# Patient Record
Sex: FEMALE | Race: WHITE | NOT HISPANIC OR LATINO | Employment: FULL TIME | URBAN - METROPOLITAN AREA
[De-identification: names, ages, dates, MRNs, and addresses within clinical notes are randomized per-mention and may not be internally consistent; named-entity substitution may affect disease eponyms.]

---

## 2022-03-17 ENCOUNTER — TELEPHONE (OUTPATIENT)
Dept: GYNECOLOGIC ONCOLOGY | Facility: CLINIC | Age: 32
End: 2022-03-17

## 2022-03-17 NOTE — TELEPHONE ENCOUNTER
Intake Form   Patient Details   Renard Mosqueda     1990     Reason For Appointment   Who is Calling? Patient   If not patient, Name? Who is the Referring Doctor? Jamshid Valera   What is the diagnosis? Stage 1 cervical cancer   Has this diagnosis been confirmed by a biopsy/surgery? If yes, what is the date it was done? yes   Biopsy done at [place] [address]? If not, where was it done? no   Was imaging done, and was it done at [facility]? If not, where was it done? no   For 2nd Opinions Only: Are you currently undergoing treatment, or are you scheduled to start treatment? If yes, name of facility, city and state     For "History Of" only: Have you completed treatment? Have you had Genetic Testing done in the past?  If so, advise to bring test results to their visit no   Record Gathering Information   Did you advise to have records faxed to [phone]? yes   Did you advise to bring discs to office visit? yes   Scheduling Information   What is the best call back number? [phone]   What Insurance does patient have & is an insurance referral required [address]  referral is required      If patient is NEW to [facility] a new patient packet (Titled Breast/Gyn) Yes   Miscellaneous Information

## 2022-03-17 NOTE — TELEPHONE ENCOUNTER
Patient schedule for Thursday, 3/24/22 with Dr Caprice East at [facility]  She notes Dr Leyla Tilley recently performed LEEP

## 2022-03-17 NOTE — TELEPHONE ENCOUNTER
Intake received  Insurance education outreach to follow    03/22/22  Per RTE  Pt has an active [employer] that runs on a mine year  Effective 06/01/18   Plan has in & out of network benefits  Deduct $200 met -0-  Out of pocket $2000 met $50

## 2022-03-24 ENCOUNTER — CONSULT (OUTPATIENT)
Dept: GYNECOLOGIC ONCOLOGY | Facility: HOSPITAL | Age: 32
End: 2022-03-24
Payer: COMMERCIAL

## 2022-03-24 VITALS
DIASTOLIC BLOOD PRESSURE: 82 MMHG | SYSTOLIC BLOOD PRESSURE: 124 MMHG | TEMPERATURE: 99.1 F | WEIGHT: 253 LBS | HEIGHT: 69 IN | BODY MASS INDEX: 37.47 KG/M2

## 2022-03-24 DIAGNOSIS — C53.8 MALIGNANT NEOPLASM OF OVERLAPPING SITES OF CERVIX (HCC): Primary | ICD-10-CM

## 2022-03-24 DIAGNOSIS — Z80.41 FAMILY HISTORY OF OVARIAN CANCER: ICD-10-CM

## 2022-03-24 PROCEDURE — 99205 OFFICE O/P NEW HI 60 MIN: CPT | Performed by: OBSTETRICS & GYNECOLOGY

## 2022-03-24 RX ORDER — CEFAZOLIN SODIUM 2 G/50ML
2000 SOLUTION INTRAVENOUS ONCE
Status: CANCELLED | OUTPATIENT
Start: 2022-03-24 | End: 2022-03-24

## 2022-03-24 RX ORDER — GABAPENTIN 100 MG/1
200 CAPSULE ORAL ONCE
Status: CANCELLED | OUTPATIENT
Start: 2022-03-24 | End: 2022-03-24

## 2022-03-24 RX ORDER — SODIUM CHLORIDE, SODIUM LACTATE, POTASSIUM CHLORIDE, CALCIUM CHLORIDE 600; 310; 30; 20 MG/100ML; MG/100ML; MG/100ML; MG/100ML
125 INJECTION, SOLUTION INTRAVENOUS CONTINUOUS
Status: CANCELLED | OUTPATIENT
Start: 2022-03-24

## 2022-03-24 RX ORDER — ACETAMINOPHEN 325 MG/1
975 TABLET ORAL ONCE
Status: CANCELLED | OUTPATIENT
Start: 2022-03-24 | End: 2022-03-24

## 2022-03-24 RX ORDER — HEPARIN SODIUM 5000 [USP'U]/ML
5000 INJECTION, SOLUTION INTRAVENOUS; SUBCUTANEOUS
Status: CANCELLED | OUTPATIENT
Start: 2022-03-24 | End: 2022-03-25

## 2022-03-24 NOTE — ASSESSMENT & PLAN NOTE
61-year-old  0 with a likely stage I B1 squamous cell carcinoma of the cervix  She is status post LEEP procedure on 3/7/2022  Pathology was reviewed at [facility]  Her performance status is 0     1  We discussed the pathophysiology of squamous cell carcinomas of the cervix  2  We discussed treatment options including radical trachelectomy with lymph node dissection verses radical hysterectomy and lymph node dissection  She is debating future fertility options  3  Preoperative MRI to assess tumor size  Clinical tumor size is less than 2 cm  No parametrial disease  4  I discussed the risks and benefits of radical abdominal hysterectomy, bilateral salpingectomy, pelvic lymph node dissection, all other indicated procedures  She understands the risks and benefits of the operation including the additional risks of bladder injury and dysfunction and she agrees to proceed as outlined  Consent for surgery was obtained by me in the office  5  In the event that she decides to have a radical trachelectomy, she will be referred to a center for this procedure  Thank you for the courtesy of this consultation  All questions were answered by the end of the visit

## 2022-03-24 NOTE — PROGRESS NOTES
Assessment/Plan:    Problem List Items Addressed This Visit        Genitourinary    Malignant neoplasm of overlapping sites of cervix ([facility] [address] ) - Primary     32year-old  0 with a likely stage I B1 squamous cell carcinoma of the cervix  She is status post LEEP procedure on 3/7/2022  Pathology was reviewed at [facility]  Her performance status is 0     1  We discussed the pathophysiology of squamous cell carcinomas of the cervix  2  We discussed treatment options including radical trachelectomy with lymph node dissection verses radical hysterectomy and lymph node dissection  She is debating future fertility options  3  Preoperative MRI to assess tumor size  Clinical tumor size is less than 2 cm  No parametrial disease  4  I discussed the risks and benefits of radical abdominal hysterectomy, bilateral salpingectomy, pelvic lymph node dissection, all other indicated procedures  She understands the risks and benefits of the operation including the additional risks of bladder injury and dysfunction and she agrees to proceed as outlined  Consent for surgery was obtained by me in the office  5  In the event that she decides to have a radical trachelectomy, she will be referred to a center for this procedure  Thank you for the courtesy of this consultation  All questions were answered by the end of the visit           Relevant Orders    MRI pelvis female wo and w contrast    Case request operating room: RADICAL HYSTERECTOMY TOTAL ABDOMINAL (LATONIA), LYMPH NODE DISSECTION (Completed)    Type and screen    Comprehensive metabolic panel    CBC and differential    Protime-INR    HEMOGLOBIN A1C W/ EAG ESTIMATION    XR chest pa & lateral       Other    Family history of ovarian cancer    Relevant Orders    Ambulatory Referral to Oncology Genetics              CHIEF COMPLAINT:  Biopsy-proven squamous cell carcinoma cervix          Patient ID: Blank Wilson is a 32 y o  female  59-year-old  0 presented for 1st Pap smear and then had a LEEP procedure on 3/7/2022  The LEEP procedure revealed moderately differentiated squamous cell carcinoma with lymphovascular space invasion, positive margins  Overall width was greater than 1 cm combined  She is not having abnormal bleeding after LEEP procedure  She has pelvic and vaginal pain  She has a family history of ovarian cancer  She is referred as a consultation from Dr Tim Henry to discuss treatment options  Review of Systems   Constitutional: Positive for fatigue  Gastrointestinal: Positive for abdominal pain and constipation  Genitourinary: Positive for pelvic pain and vaginal pain  Musculoskeletal: Positive for back pain  Hematological: Bruises/bleeds easily  Psychiatric/Behavioral: The patient is nervous/anxious  All other systems reviewed and are negative  Current Outpatient Medications   Medication Sig Dispense Refill    Psyllium (METAMUCIL PO)        No current facility-administered medications for this visit  No Known Allergies    Past Medical History:   Diagnosis Date    Anxiety     Depression     Obesity        Past Surgical History:   Procedure Laterality Date    CERVICAL BIOPSY  W/ LOOP ELECTRODE EXCISION         OB History        0    Para   0    Term   0       0    AB   0    Living   0       SAB   0    IAB   0    Ectopic   0    Multiple   0    Live Births   0                 Family History   Problem Relation Age of Onset    Ovarian cancer Maternal Aunt     Ovarian cancer Maternal Grandmother        The following portions of the patient's history were reviewed and updated as appropriate: allergies, current medications, past family history, past medical history, past social history, past surgical history and problem list       Objective:    Blood pressure 124/82, temperature 99 1 °F (37 3 °C), temperature source Tympanic, height 5' 9" (1 753 m), weight 115 kg (253 lb)    Body mass index is 37 36 kg/m²  Physical Exam  Vitals reviewed  Exam conducted with a chaperone present  Constitutional:       General: She is not in acute distress  Appearance: Normal appearance  She is well-developed  She is obese  She is not ill-appearing, toxic-appearing or diaphoretic  HENT:      Head: Normocephalic and atraumatic  Eyes:      General: No scleral icterus  Right eye: No discharge  Left eye: No discharge  Extraocular Movements: Extraocular movements intact  Neck:      Thyroid: No thyromegaly  Cardiovascular:      Rate and Rhythm: Normal rate and regular rhythm  Heart sounds: Normal heart sounds  No murmur heard  No friction rub  No gallop  Pulmonary:      Effort: Pulmonary effort is normal  No respiratory distress  Breath sounds: Normal breath sounds  No stridor  No wheezing or rhonchi  Abdominal:      General: There is no distension  Palpations: Abdomen is soft  There is no mass  Tenderness: There is no abdominal tenderness  There is no guarding or rebound  Hernia: No hernia is present  Genitourinary:     Comments: The external female genitalia is normal  The bartholin's, uretheral and skenes glands are normal  The urethral meatus is normal (midline with no lesions)  Anus without fissure or lesion  Speculum exam reveals vagina without lesion or discharge  Cervix is normal appearing post LEEP without visible lesions  No bleeding  No significant cystocele or rectocele noted  Bimanual exam notes a uterus with normal contour, mobility  The cervix is approximately 3 cm in diameter  It is not barrel shaped  No tenderness  Adnexa without masses or tenderness  Bladder is without fullness, mass or tenderness  No parametrial disease  Musculoskeletal:      Cervical back: Normal range of motion and neck supple  No rigidity  Right lower leg: No edema  Left lower leg: No edema  Lymphadenopathy:      Cervical: No cervical adenopathy     Skin: General: Skin is warm and dry  Coloration: Skin is not jaundiced or pale  Findings: No bruising or erythema  Neurological:      General: No focal deficit present  Mental Status: She is alert and oriented to person, place, and time  Cranial Nerves: No cranial nerve deficit  Motor: No weakness  Gait: Gait normal    Psychiatric:         Mood and Affect: Mood normal          Behavior: Behavior normal          Thought Content:  Thought content normal          Judgment: Judgment normal

## 2022-03-24 NOTE — LETTER
2022     Bal[place] Rose, [address] [place] [address]    Patient: Astrid Kendall   YOB: 1990   Date of Visit: 3/24/2022       Dear Dr Marsh Lung: Thank you for referring Astrid Kendall to me for evaluation  Below are my notes for this consultation  If you have questions, please do not hesitate to call me  I look forward to following your patient along with you  Sincerely,        Yesi Elliott MD        CC: No Recipients  Yesi Elliott MD  3/24/2022 12:19 PM  Sign when Signing Visit  Assessment/Plan:    Problem List Items Addressed This Visit        Genitourinary    Malignant neoplasm of overlapping sites of cervix [facility]) - Primary     32year-old  0 with a likely stage I B1 squamous cell carcinoma of the cervix  She is status post LEEP procedure on 3/7/2022  Pathology was reviewed at [facility]  Her performance status is 0     1  We discussed the pathophysiology of squamous cell carcinomas of the cervix  2  We discussed treatment options including radical trachelectomy with lymph node dissection verses radical hysterectomy and lymph node dissection  She is debating future fertility options  3  Preoperative MRI to assess tumor size  Clinical tumor size is less than 2 cm  No parametrial disease  4  I discussed the risks and benefits of radical abdominal hysterectomy, bilateral salpingectomy, pelvic lymph node dissection, all other indicated procedures  She understands the risks and benefits of the operation including the additional risks of bladder injury and dysfunction and she agrees to proceed as outlined  Consent for surgery was obtained by me in the office  5  In the event that she decides to have a radical trachelectomy, she will be referred to a center for this procedure  Thank you for the courtesy of this consultation  All questions were answered by the end of the visit           Relevant Orders    MRI pelvis female wo and w contrast    Case request operating room: RADICAL HYSTERECTOMY TOTAL ABDOMINAL (LATONIA), LYMPH NODE DISSECTION (Completed)    Type and screen    Comprehensive metabolic panel    CBC and differential    Protime-INR    HEMOGLOBIN A1C W/ EAG ESTIMATION    XR chest pa & lateral       Other    Family history of ovarian cancer    Relevant Orders    Ambulatory Referral to Oncology Genetics              CHIEF COMPLAINT:  Biopsy-proven squamous cell carcinoma cervix          Patient ID: Roselyn Yoder is a 32 y o  female  35-year-old  0 presented for 1st Pap smear and then had a LEEP procedure on 3/7/2022  The LEEP procedure revealed moderately differentiated squamous cell carcinoma with lymphovascular space invasion, positive margins  Overall width was greater than 1 cm combined  She is not having abnormal bleeding after LEEP procedure  She has pelvic and vaginal pain  She has a family history of ovarian cancer  She is referred as a consultation from Dr Vj Lopez to discuss treatment options  Review of Systems   Constitutional: Positive for fatigue  Gastrointestinal: Positive for abdominal pain and constipation  Genitourinary: Positive for pelvic pain and vaginal pain  Musculoskeletal: Positive for back pain  Hematological: Bruises/bleeds easily  Psychiatric/Behavioral: The patient is nervous/anxious  All other systems reviewed and are negative  Current Outpatient Medications   Medication Sig Dispense Refill    Psyllium (METAMUCIL PO)        No current facility-administered medications for this visit         No Known Allergies    Past Medical History:   Diagnosis Date    Anxiety     Depression     Obesity        Past Surgical History:   Procedure Laterality Date    CERVICAL BIOPSY  W/ LOOP ELECTRODE EXCISION         OB History        0    Para   0    Term   0       0    AB   0    Living   0       SAB   0    IAB   0    Ectopic   0    Multiple   0    Live Births   0                 Family History   Problem Relation Age of Onset    Ovarian cancer Maternal Aunt     Ovarian cancer Maternal Grandmother        The following portions of the patient's history were reviewed and updated as appropriate: allergies, current medications, past family history, past medical history, past social history, past surgical history and problem list       Objective:    Blood pressure 124/82, temperature 99 1 °F (37 3 °C), temperature source Tympanic, height 5' 9" (1 753 m), weight 115 kg (253 lb)  Body mass index is 37 36 kg/m²  Physical Exam  Vitals reviewed  Exam conducted with a chaperone present  Constitutional:       General: She is not in acute distress  Appearance: Normal appearance  She is well-developed  She is obese  She is not ill-appearing, toxic-appearing or diaphoretic  HENT:      Head: Normocephalic and atraumatic  Eyes:      General: No scleral icterus  Right eye: No discharge  Left eye: No discharge  Extraocular Movements: Extraocular movements intact  Neck:      Thyroid: No thyromegaly  Cardiovascular:      Rate and Rhythm: Normal rate and regular rhythm  Heart sounds: Normal heart sounds  No murmur heard  No friction rub  No gallop  Pulmonary:      Effort: Pulmonary effort is normal  No respiratory distress  Breath sounds: Normal breath sounds  No stridor  No wheezing or rhonchi  Abdominal:      General: There is no distension  Palpations: Abdomen is soft  There is no mass  Tenderness: There is no abdominal tenderness  There is no guarding or rebound  Hernia: No hernia is present  Genitourinary:     Comments: The external female genitalia is normal  The bartholin's, uretheral and skenes glands are normal  The urethral meatus is normal (midline with no lesions)  Anus without fissure or lesion  Speculum exam reveals vagina without lesion or discharge   Cervix is normal appearing post LEEP without visible lesions  No bleeding  No significant cystocele or rectocele noted  Bimanual exam notes a uterus with normal contour, mobility  The cervix is approximately 3 cm in diameter  It is not barrel shaped  No tenderness  Adnexa without masses or tenderness  Bladder is without fullness, mass or tenderness  No parametrial disease  Musculoskeletal:      Cervical back: Normal range of motion and neck supple  No rigidity  Right lower leg: No edema  Left lower leg: No edema  Lymphadenopathy:      Cervical: No cervical adenopathy  Skin:     General: Skin is warm and dry  Coloration: Skin is not jaundiced or pale  Findings: No bruising or erythema  Neurological:      General: No focal deficit present  Mental Status: She is alert and oriented to person, place, and time  Cranial Nerves: No cranial nerve deficit  Motor: No weakness  Gait: Gait normal    Psychiatric:         Mood and Affect: Mood normal          Behavior: Behavior normal          Thought Content:  Thought content normal          Judgment: Judgment normal

## 2022-03-31 ENCOUNTER — TELEPHONE (OUTPATIENT)
Dept: GENETICS | Facility: CLINIC | Age: 32
End: 2022-03-31

## 2022-03-31 NOTE — TELEPHONE ENCOUNTER
I called Wisam Gastelum to schedule a new patient appointment with the Cancer [facility]etics [facility]       Outcome:   I left a voice message encouraging the patient to call the genetics team at [phone] to schedule this appointment  Follow-up:   At this time the referral will be closed and we will wait to hear back from the patient regarding scheduling this appointment

## 2022-04-04 ENCOUNTER — TELEPHONE (OUTPATIENT)
Dept: GYNECOLOGIC ONCOLOGY | Facility: CLINIC | Age: 32
End: 2022-04-04

## 2022-04-04 NOTE — TELEPHONE ENCOUNTER
Patient had MRI done last Friday at [facility] and has results back  Would like to know if she should have more testing done

## 2022-04-12 ENCOUNTER — TELEPHONE (OUTPATIENT)
Dept: GYNECOLOGIC ONCOLOGY | Facility: CLINIC | Age: 32
End: 2022-04-12

## 2022-04-12 NOTE — TELEPHONE ENCOUNTER
Phone call to patient to see if she wanted to reschedule her post-op appointment that was cancelled via [address]  States that she is going for a second opinion at [facility] and she's not sure what she wants to do  She does not want to cancel her surgery, but if there is any way they can try to spare her fertility she would like to try  Patient becomes very tearful, let her know that this is not a problem at all and she can let us know how she would like to proceed when she is ready  Has no questions at this time

## 2022-04-13 ENCOUNTER — TELEPHONE (OUTPATIENT)
Dept: GYNECOLOGIC ONCOLOGY | Facility: CLINIC | Age: 32
End: 2022-04-13

## 2022-04-13 NOTE — TELEPHONE ENCOUNTER
Call placed to patient  Highly considering radical trachelectomy  Will reach out to [facility]  Also, has 2nd opinion with [facility] on 4/21/22  Patient will call the office after her 4/21 appt to confirm keeping 5/6 surgery vs  cancelling      ----- Message from Maximus Escalante sent at 4/13/2022  9:15 AM EDT -----  Regarding: FW: Refer    ----- Message -----  From: Ronie Hammans  Sent: 4/13/2022   4:36 AM EDT  To: [facility] [place] [facility]  Subject: Refer                                            Can you refer me to [facility] for radical trachelectomy  Im sorry but Im getting cold feet jumping right to hysterectomy  I have to try, I know it might end up in a hysterectomy anyway but I need the piece of mind that I tried  I was scared when you said I would be the first person you referred and Rolande Libman been in denial  But once my uterus Is out theres no buyers remorse and I cant take that back  I need to know I tried  Again Im really sorry for wasting your time   I cant live with what ifs

## 2022-04-15 ENCOUNTER — HOSPITAL ENCOUNTER (OUTPATIENT)
Dept: RADIOLOGY | Facility: HOSPITAL | Age: 32
Discharge: HOME | End: 2022-04-15
Attending: OBSTETRICS & GYNECOLOGY
Payer: COMMERCIAL

## 2022-04-15 DIAGNOSIS — C53.8 MALIGNANT NEOPLASM OF OVERLAPPING SITES OF CERVIX (HCC): ICD-10-CM

## 2022-04-15 PROCEDURE — 71046 X-RAY EXAM CHEST 2 VIEWS: CPT

## 2022-04-19 ENCOUNTER — TELEPHONE (OUTPATIENT)
Dept: GYNECOLOGIC ONCOLOGY | Facility: CLINIC | Age: 32
End: 2022-04-19

## 2022-04-19 NOTE — TELEPHONE ENCOUNTER
Spoke with patient regarding up coming procedure  Patient is going for 2nd opinion on Thursday 04/21/2022  She desires fertility and wants to know some other options available to her  Will call back once she has made her decision

## 2022-04-21 ENCOUNTER — PATIENT MESSAGE (OUTPATIENT)
Dept: GYNECOLOGIC ONCOLOGY | Facility: HOSPITAL | Age: 32
End: 2022-04-21

## 2022-04-22 ENCOUNTER — TELEPHONE (OUTPATIENT)
Dept: GENETICS | Facility: CLINIC | Age: 32
End: 2022-04-22

## 2022-04-25 ENCOUNTER — TELEPHONE (OUTPATIENT)
Dept: GENETICS | Facility: CLINIC | Age: 32
End: 2022-04-25

## 2022-04-25 NOTE — TELEPHONE ENCOUNTER
I called patient back following up her voicemail from Friday April 22 per Stephanie West left on the main genetics number, patient did not answer left message for her to return call at [phone]

## 2022-04-27 ENCOUNTER — PATIENT MESSAGE (OUTPATIENT)
Dept: GYNECOLOGIC ONCOLOGY | Facility: HOSPITAL | Age: 32
End: 2022-04-27

## 2022-05-02 ENCOUNTER — PATIENT MESSAGE (OUTPATIENT)
Dept: GYNECOLOGIC ONCOLOGY | Facility: HOSPITAL | Age: 32
End: 2022-05-02

## 2022-05-03 ENCOUNTER — PATIENT MESSAGE (OUTPATIENT)
Dept: GYNECOLOGIC ONCOLOGY | Facility: HOSPITAL | Age: 32
End: 2022-05-03

## 2022-05-04 ENCOUNTER — ANESTHESIA EVENT (OUTPATIENT)
Dept: PERIOP | Facility: HOSPITAL | Age: 32
DRG: 740 | End: 2022-05-04
Payer: COMMERCIAL

## 2022-05-04 RX ORDER — ELECTROLYTES/DEXTROSE
SOLUTION, ORAL ORAL
COMMUNITY
Start: 2022-04-29 | End: 2022-07-07

## 2022-05-04 NOTE — PRE-PROCEDURE INSTRUCTIONS
Pre-Surgery Instructions:   Medication Instructions    Psyllium (METAMUCIL PO) Hold day of surgery   Pyridoxine HCl (Vitamin B6) 100 MG TABS Already stopped week ago   Have you had / have a sore throat? No  Have you had / have a cough less than 1 week? No  Have you had / have a fever greater than 100 0 - 100  4? No  Are you experiencing any shortness of breath? No    Review with patient via phone medications and showering instructions  Advised don't take NSAID's, ok tylenol products  Advised ASC call with surgery schedule time, nothing eat or drink after midnight  Verbalized understanding

## 2022-05-05 ENCOUNTER — TELEPHONE (OUTPATIENT)
Dept: GYNECOLOGIC ONCOLOGY | Facility: CLINIC | Age: 32
End: 2022-05-05

## 2022-05-06 ENCOUNTER — HOSPITAL ENCOUNTER (INPATIENT)
Facility: HOSPITAL | Age: 32
LOS: 2 days | Discharge: HOME | DRG: 740 | End: 2022-05-08
Attending: OBSTETRICS & GYNECOLOGY | Admitting: OBSTETRICS & GYNECOLOGY
Payer: COMMERCIAL

## 2022-05-06 ENCOUNTER — ANESTHESIA (OUTPATIENT)
Dept: PERIOP | Facility: HOSPITAL | Age: 32
DRG: 740 | End: 2022-05-06
Payer: COMMERCIAL

## 2022-05-06 DIAGNOSIS — C53.8 MALIGNANT NEOPLASM OF OVERLAPPING SITES OF CERVIX (HCC): ICD-10-CM

## 2022-05-06 PROBLEM — E66.9 OBESITY (BMI 35.0-39.9 WITHOUT COMORBIDITY): Chronic | Status: ACTIVE | Noted: 2022-05-06

## 2022-05-06 LAB
ABO GROUP BLD: NORMAL
ANION GAP SERPL CALCULATED.3IONS-SCNC: 2 MMOL/L (ref 4–13)
BLD GP AB SCN SERPL QL: NEGATIVE
BUN SERPL-MCNC: 9 MG/DL (ref 5–25)
CALCIUM SERPL-MCNC: 9.3 MG/DL (ref 8.3–10.1)
CHLORIDE SERPL-SCNC: 107 MMOL/L (ref 100–108)
CO2 SERPL-SCNC: 27 MMOL/L (ref 21–32)
CREAT SERPL-MCNC: 0.78 MG/DL (ref 0.6–1.3)
ERYTHROCYTE [DISTWIDTH] IN BLOOD BY AUTOMATED COUNT: 11.9 % (ref 11.6–15.1)
EXT PREGNANCY TEST URINE: NEGATIVE
EXT. CONTROL: NORMAL
GFR SERPL CREATININE-BSD FRML MDRD: 101 ML/MIN/1.73SQ M
GLUCOSE SERPL-MCNC: 161 MG/DL (ref 65–140)
HCT VFR BLD AUTO: 42.5 % (ref 34.8–46.1)
HGB BLD-MCNC: 14.3 G/DL (ref 11.5–15.4)
MAGNESIUM SERPL-MCNC: 1.8 MG/DL (ref 1.6–2.6)
MCH RBC QN AUTO: 29.4 PG (ref 26.8–34.3)
MCHC RBC AUTO-ENTMCNC: 33.6 G/DL (ref 31.4–37.4)
MCV RBC AUTO: 87 FL (ref 82–98)
PHOSPHATE SERPL-MCNC: 3 MG/DL (ref 2.7–4.5)
PLATELET # BLD AUTO: 290 THOUSANDS/UL (ref 149–390)
PMV BLD AUTO: 8.7 FL (ref 8.9–12.7)
POTASSIUM SERPL-SCNC: 4 MMOL/L (ref 3.5–5.3)
RBC # BLD AUTO: 4.86 MILLION/UL (ref 3.81–5.12)
RH BLD: POSITIVE
SODIUM SERPL-SCNC: 136 MMOL/L (ref 136–145)
SPECIMEN EXPIRATION DATE: NORMAL
WBC # BLD AUTO: 11.82 THOUSAND/UL (ref 4.31–10.16)

## 2022-05-06 PROCEDURE — 88307 TISSUE EXAM BY PATHOLOGIST: CPT | Performed by: PATHOLOGY

## 2022-05-06 PROCEDURE — 88331 PATH CONSLTJ SURG 1 BLK 1SPC: CPT | Performed by: PATHOLOGY

## 2022-05-06 PROCEDURE — 88342 IMHCHEM/IMCYTCHM 1ST ANTB: CPT | Performed by: PATHOLOGY

## 2022-05-06 PROCEDURE — 86850 RBC ANTIBODY SCREEN: CPT | Performed by: STUDENT IN AN ORGANIZED HEALTH CARE EDUCATION/TRAINING PROGRAM

## 2022-05-06 PROCEDURE — 80048 BASIC METABOLIC PNL TOTAL CA: CPT | Performed by: OBSTETRICS & GYNECOLOGY

## 2022-05-06 PROCEDURE — 07BC0ZX EXCISION OF PELVIS LYMPHATIC, OPEN APPROACH, DIAGNOSTIC: ICD-10-PCS | Performed by: OBSTETRICS & GYNECOLOGY

## 2022-05-06 PROCEDURE — 86901 BLOOD TYPING SEROLOGIC RH(D): CPT | Performed by: STUDENT IN AN ORGANIZED HEALTH CARE EDUCATION/TRAINING PROGRAM

## 2022-05-06 PROCEDURE — 83735 ASSAY OF MAGNESIUM: CPT | Performed by: OBSTETRICS & GYNECOLOGY

## 2022-05-06 PROCEDURE — 0UT70ZZ RESECTION OF BILATERAL FALLOPIAN TUBES, OPEN APPROACH: ICD-10-PCS | Performed by: OBSTETRICS & GYNECOLOGY

## 2022-05-06 PROCEDURE — 85027 COMPLETE CBC AUTOMATED: CPT | Performed by: OBSTETRICS & GYNECOLOGY

## 2022-05-06 PROCEDURE — 58700 REMOVAL OF FALLOPIAN TUBE: CPT | Performed by: OBSTETRICS & GYNECOLOGY

## 2022-05-06 PROCEDURE — 88305 TISSUE EXAM BY PATHOLOGIST: CPT | Performed by: PATHOLOGY

## 2022-05-06 PROCEDURE — 38770 REMOVE PELVIS LYMPH NODES: CPT | Performed by: OBSTETRICS & GYNECOLOGY

## 2022-05-06 PROCEDURE — 58825 TRANSPOSITION OVARY(S): CPT | Performed by: OBSTETRICS & GYNECOLOGY

## 2022-05-06 PROCEDURE — 84100 ASSAY OF PHOSPHORUS: CPT | Performed by: OBSTETRICS & GYNECOLOGY

## 2022-05-06 PROCEDURE — 81025 URINE PREGNANCY TEST: CPT | Performed by: STUDENT IN AN ORGANIZED HEALTH CARE EDUCATION/TRAINING PROGRAM

## 2022-05-06 PROCEDURE — 0US20ZZ REPOSITION BILATERAL OVARIES, OPEN APPROACH: ICD-10-PCS | Performed by: OBSTETRICS & GYNECOLOGY

## 2022-05-06 PROCEDURE — NC001 PR NO CHARGE: Performed by: OBSTETRICS & GYNECOLOGY

## 2022-05-06 PROCEDURE — 86900 BLOOD TYPING SEROLOGIC ABO: CPT | Performed by: STUDENT IN AN ORGANIZED HEALTH CARE EDUCATION/TRAINING PROGRAM

## 2022-05-06 PROCEDURE — 88341 IMHCHEM/IMCYTCHM EA ADD ANTB: CPT | Performed by: PATHOLOGY

## 2022-05-06 RX ORDER — ACETAMINOPHEN 325 MG/1
975 TABLET ORAL ONCE
Status: COMPLETED | OUTPATIENT
Start: 2022-05-06 | End: 2022-05-06

## 2022-05-06 RX ORDER — ROCURONIUM BROMIDE 10 MG/ML
INJECTION, SOLUTION INTRAVENOUS AS NEEDED
Status: DISCONTINUED | OUTPATIENT
Start: 2022-05-06 | End: 2022-05-06

## 2022-05-06 RX ORDER — SODIUM CHLORIDE, SODIUM LACTATE, POTASSIUM CHLORIDE, CALCIUM CHLORIDE 600; 310; 30; 20 MG/100ML; MG/100ML; MG/100ML; MG/100ML
125 INJECTION, SOLUTION INTRAVENOUS CONTINUOUS
Status: DISCONTINUED | OUTPATIENT
Start: 2022-05-06 | End: 2022-05-06

## 2022-05-06 RX ORDER — ONDANSETRON 2 MG/ML
INJECTION INTRAMUSCULAR; INTRAVENOUS AS NEEDED
Status: DISCONTINUED | OUTPATIENT
Start: 2022-05-06 | End: 2022-05-06

## 2022-05-06 RX ORDER — SODIUM CHLORIDE 9 MG/ML
INJECTION, SOLUTION INTRAVENOUS CONTINUOUS PRN
Status: DISCONTINUED | OUTPATIENT
Start: 2022-05-06 | End: 2022-05-06

## 2022-05-06 RX ORDER — SODIUM CHLORIDE, SODIUM LACTATE, POTASSIUM CHLORIDE, CALCIUM CHLORIDE 600; 310; 30; 20 MG/100ML; MG/100ML; MG/100ML; MG/100ML
75 INJECTION, SOLUTION INTRAVENOUS CONTINUOUS
Status: DISCONTINUED | OUTPATIENT
Start: 2022-05-06 | End: 2022-05-07

## 2022-05-06 RX ORDER — CALCIUM CARBONATE 200(500)MG
500 TABLET,CHEWABLE ORAL 2 TIMES DAILY PRN
Status: DISCONTINUED | OUTPATIENT
Start: 2022-05-06 | End: 2022-05-08 | Stop reason: HOSPADM

## 2022-05-06 RX ORDER — CEFAZOLIN SODIUM 2 G/50ML
2000 SOLUTION INTRAVENOUS ONCE
Status: DISCONTINUED | OUTPATIENT
Start: 2022-05-06 | End: 2022-05-06

## 2022-05-06 RX ORDER — FAMOTIDINE 20 MG/1
20 TABLET, FILM COATED ORAL 2 TIMES DAILY
Status: DISCONTINUED | OUTPATIENT
Start: 2022-05-06 | End: 2022-05-08 | Stop reason: HOSPADM

## 2022-05-06 RX ORDER — ALPRAZOLAM 0.5 MG/1
TABLET ORAL
COMMUNITY
Start: 2022-04-25 | End: 2022-07-07

## 2022-05-06 RX ORDER — ROPIVACAINE HYDROCHLORIDE 2 MG/ML
INJECTION, SOLUTION EPIDURAL; INFILTRATION; PERINEURAL CONTINUOUS PRN
Status: DISCONTINUED | OUTPATIENT
Start: 2022-05-06 | End: 2022-05-06

## 2022-05-06 RX ORDER — SIMETHICONE 80 MG
80 TABLET,CHEWABLE ORAL 4 TIMES DAILY PRN
Status: DISCONTINUED | OUTPATIENT
Start: 2022-05-06 | End: 2022-05-08 | Stop reason: HOSPADM

## 2022-05-06 RX ORDER — ONDANSETRON 2 MG/ML
4 INJECTION INTRAMUSCULAR; INTRAVENOUS EVERY 6 HOURS PRN
Status: DISCONTINUED | OUTPATIENT
Start: 2022-05-06 | End: 2022-05-08 | Stop reason: HOSPADM

## 2022-05-06 RX ORDER — LIDOCAINE HYDROCHLORIDE AND EPINEPHRINE 15; 5 MG/ML; UG/ML
INJECTION, SOLUTION EPIDURAL
Status: COMPLETED | OUTPATIENT
Start: 2022-05-06 | End: 2022-05-06

## 2022-05-06 RX ORDER — FENTANYL CITRATE/PF 50 MCG/ML
25 SYRINGE (ML) INJECTION
Status: DISCONTINUED | OUTPATIENT
Start: 2022-05-06 | End: 2022-05-06 | Stop reason: HOSPADM

## 2022-05-06 RX ORDER — DOCUSATE SODIUM 100 MG/1
100 CAPSULE, LIQUID FILLED ORAL 2 TIMES DAILY
Status: DISCONTINUED | OUTPATIENT
Start: 2022-05-06 | End: 2022-05-08 | Stop reason: HOSPADM

## 2022-05-06 RX ORDER — ONDANSETRON 2 MG/ML
4 INJECTION INTRAMUSCULAR; INTRAVENOUS ONCE AS NEEDED
Status: DISCONTINUED | OUTPATIENT
Start: 2022-05-06 | End: 2022-05-06 | Stop reason: HOSPADM

## 2022-05-06 RX ORDER — FENTANYL CITRATE 50 UG/ML
INJECTION, SOLUTION INTRAMUSCULAR; INTRAVENOUS AS NEEDED
Status: DISCONTINUED | OUTPATIENT
Start: 2022-05-06 | End: 2022-05-06

## 2022-05-06 RX ORDER — LIDOCAINE HYDROCHLORIDE 10 MG/ML
0.5 INJECTION, SOLUTION EPIDURAL; INFILTRATION; INTRACAUDAL; PERINEURAL ONCE AS NEEDED
Status: DISCONTINUED | OUTPATIENT
Start: 2022-05-06 | End: 2022-05-06

## 2022-05-06 RX ORDER — ACETAMINOPHEN 325 MG/1
650 TABLET ORAL EVERY 6 HOURS SCHEDULED
Status: DISCONTINUED | OUTPATIENT
Start: 2022-05-06 | End: 2022-05-07 | Stop reason: SDUPTHER

## 2022-05-06 RX ORDER — MIDAZOLAM HYDROCHLORIDE 2 MG/2ML
INJECTION, SOLUTION INTRAMUSCULAR; INTRAVENOUS AS NEEDED
Status: DISCONTINUED | OUTPATIENT
Start: 2022-05-06 | End: 2022-05-06

## 2022-05-06 RX ORDER — PROPOFOL 10 MG/ML
INJECTION, EMULSION INTRAVENOUS AS NEEDED
Status: DISCONTINUED | OUTPATIENT
Start: 2022-05-06 | End: 2022-05-06

## 2022-05-06 RX ORDER — HEPARIN SODIUM 5000 [USP'U]/ML
5000 INJECTION, SOLUTION INTRAVENOUS; SUBCUTANEOUS
Status: COMPLETED | OUTPATIENT
Start: 2022-05-06 | End: 2022-05-06

## 2022-05-06 RX ORDER — SODIUM CHLORIDE, SODIUM LACTATE, POTASSIUM CHLORIDE, CALCIUM CHLORIDE 600; 310; 30; 20 MG/100ML; MG/100ML; MG/100ML; MG/100ML
50 INJECTION, SOLUTION INTRAVENOUS CONTINUOUS
Status: DISCONTINUED | OUTPATIENT
Start: 2022-05-06 | End: 2022-05-06

## 2022-05-06 RX ORDER — DEXAMETHASONE SODIUM PHOSPHATE 10 MG/ML
INJECTION, SOLUTION INTRAMUSCULAR; INTRAVENOUS AS NEEDED
Status: DISCONTINUED | OUTPATIENT
Start: 2022-05-06 | End: 2022-05-06

## 2022-05-06 RX ORDER — FAMOTIDINE 10 MG/ML
20 INJECTION, SOLUTION INTRAVENOUS 2 TIMES DAILY
Status: DISCONTINUED | OUTPATIENT
Start: 2022-05-06 | End: 2022-05-08 | Stop reason: HOSPADM

## 2022-05-06 RX ORDER — LIDOCAINE HYDROCHLORIDE 10 MG/ML
INJECTION, SOLUTION EPIDURAL; INFILTRATION; INTRACAUDAL; PERINEURAL AS NEEDED
Status: DISCONTINUED | OUTPATIENT
Start: 2022-05-06 | End: 2022-05-06

## 2022-05-06 RX ORDER — MAGNESIUM HYDROXIDE 1200 MG/15ML
LIQUID ORAL AS NEEDED
Status: DISCONTINUED | OUTPATIENT
Start: 2022-05-06 | End: 2022-05-06 | Stop reason: HOSPADM

## 2022-05-06 RX ORDER — SODIUM CHLORIDE, SODIUM LACTATE, POTASSIUM CHLORIDE, CALCIUM CHLORIDE 600; 310; 30; 20 MG/100ML; MG/100ML; MG/100ML; MG/100ML
100 INJECTION, SOLUTION INTRAVENOUS CONTINUOUS
Status: DISCONTINUED | OUTPATIENT
Start: 2022-05-06 | End: 2022-05-06

## 2022-05-06 RX ORDER — HEPARIN SODIUM 5000 [USP'U]/ML
5000 INJECTION, SOLUTION INTRAVENOUS; SUBCUTANEOUS EVERY 8 HOURS SCHEDULED
Status: DISCONTINUED | OUTPATIENT
Start: 2022-05-06 | End: 2022-05-08 | Stop reason: HOSPADM

## 2022-05-06 RX ORDER — GABAPENTIN 100 MG/1
200 CAPSULE ORAL ONCE
Status: COMPLETED | OUTPATIENT
Start: 2022-05-06 | End: 2022-05-06

## 2022-05-06 RX ORDER — KETOROLAC TROMETHAMINE 30 MG/ML
INJECTION, SOLUTION INTRAMUSCULAR; INTRAVENOUS AS NEEDED
Status: DISCONTINUED | OUTPATIENT
Start: 2022-05-06 | End: 2022-05-06

## 2022-05-06 RX ORDER — DEXMEDETOMIDINE HYDROCHLORIDE 100 UG/ML
INJECTION, SOLUTION INTRAVENOUS AS NEEDED
Status: DISCONTINUED | OUTPATIENT
Start: 2022-05-06 | End: 2022-05-06

## 2022-05-06 RX ADMIN — PROPOFOL 200 MG: 10 INJECTION, EMULSION INTRAVENOUS at 07:42

## 2022-05-06 RX ADMIN — LIDOCAINE HYDROCHLORIDE AND EPINEPHRINE 2 ML: 15; 5 INJECTION, SOLUTION EPIDURAL at 07:28

## 2022-05-06 RX ADMIN — SODIUM CHLORIDE: 0.9 INJECTION, SOLUTION INTRAVENOUS at 07:43

## 2022-05-06 RX ADMIN — DEXMEDETOMIDINE HCL 12 MCG: 100 INJECTION INTRAVENOUS at 09:03

## 2022-05-06 RX ADMIN — DOCUSATE SODIUM 100 MG: 100 CAPSULE, LIQUID FILLED ORAL at 17:23

## 2022-05-06 RX ADMIN — FENTANYL CITRATE 50 MCG: 50 INJECTION INTRAMUSCULAR; INTRAVENOUS at 08:32

## 2022-05-06 RX ADMIN — DEXMEDETOMIDINE HCL 8 MCG: 100 INJECTION INTRAVENOUS at 08:29

## 2022-05-06 RX ADMIN — DEXAMETHASONE SODIUM PHOSPHATE 10 MG: 10 INJECTION, SOLUTION INTRAMUSCULAR; INTRAVENOUS at 07:42

## 2022-05-06 RX ADMIN — ROCURONIUM BROMIDE 10 MG: 50 INJECTION INTRAVENOUS at 09:48

## 2022-05-06 RX ADMIN — FENTANYL CITRATE 50 MCG: 50 INJECTION INTRAMUSCULAR; INTRAVENOUS at 07:22

## 2022-05-06 RX ADMIN — DEXMEDETOMIDINE HCL 12 MCG: 100 INJECTION INTRAVENOUS at 09:23

## 2022-05-06 RX ADMIN — ROPIVACAINE HYDROCHLORIDE 4 ML/HR: 2 INJECTION, SOLUTION EPIDURAL; INFILTRATION at 09:15

## 2022-05-06 RX ADMIN — ACETAMINOPHEN 650 MG: 325 TABLET, FILM COATED ORAL at 19:45

## 2022-05-06 RX ADMIN — ACETAMINOPHEN 975 MG: 325 TABLET ORAL at 06:37

## 2022-05-06 RX ADMIN — FENTANYL CITRATE 50 MCG: 50 INJECTION INTRAMUSCULAR; INTRAVENOUS at 07:42

## 2022-05-06 RX ADMIN — HEPARIN SODIUM 5000 UNITS: 5000 INJECTION INTRAVENOUS; SUBCUTANEOUS at 21:53

## 2022-05-06 RX ADMIN — SODIUM CHLORIDE, SODIUM LACTATE, POTASSIUM CHLORIDE, AND CALCIUM CHLORIDE: .6; .31; .03; .02 INJECTION, SOLUTION INTRAVENOUS at 08:55

## 2022-05-06 RX ADMIN — ONDANSETRON 4 MG: 2 INJECTION INTRAMUSCULAR; INTRAVENOUS at 10:21

## 2022-05-06 RX ADMIN — DEXMEDETOMIDINE HCL 8 MCG: 100 INJECTION INTRAVENOUS at 07:57

## 2022-05-06 RX ADMIN — SUGAMMADEX 200 MG: 100 INJECTION, SOLUTION INTRAVENOUS at 10:50

## 2022-05-06 RX ADMIN — FAMOTIDINE 20 MG: 20 TABLET ORAL at 17:23

## 2022-05-06 RX ADMIN — SODIUM CHLORIDE, SODIUM LACTATE, POTASSIUM CHLORIDE, AND CALCIUM CHLORIDE: .6; .31; .03; .02 INJECTION, SOLUTION INTRAVENOUS at 07:15

## 2022-05-06 RX ADMIN — ROCURONIUM BROMIDE 50 MG: 50 INJECTION INTRAVENOUS at 07:42

## 2022-05-06 RX ADMIN — MIDAZOLAM 2 MG: 1 INJECTION INTRAMUSCULAR; INTRAVENOUS at 07:20

## 2022-05-06 RX ADMIN — GABAPENTIN 200 MG: 100 CAPSULE ORAL at 06:37

## 2022-05-06 RX ADMIN — ROCURONIUM BROMIDE 10 MG: 50 INJECTION INTRAVENOUS at 08:24

## 2022-05-06 RX ADMIN — KETOROLAC TROMETHAMINE 30 MG: 30 INJECTION, SOLUTION INTRAMUSCULAR at 10:34

## 2022-05-06 RX ADMIN — ROCURONIUM BROMIDE 20 MG: 50 INJECTION INTRAVENOUS at 09:02

## 2022-05-06 RX ADMIN — LIDOCAINE HYDROCHLORIDE 50 MG: 10 INJECTION, SOLUTION EPIDURAL; INFILTRATION; INTRACAUDAL; PERINEURAL at 07:42

## 2022-05-06 RX ADMIN — FENTANYL CITRATE 50 MCG: 50 INJECTION INTRAMUSCULAR; INTRAVENOUS at 10:18

## 2022-05-06 RX ADMIN — SODIUM CHLORIDE, SODIUM LACTATE, POTASSIUM CHLORIDE, AND CALCIUM CHLORIDE 75 ML/HR: .6; .31; .03; .02 INJECTION, SOLUTION INTRAVENOUS at 19:42

## 2022-05-06 RX ADMIN — SODIUM CHLORIDE, SODIUM LACTATE, POTASSIUM CHLORIDE, AND CALCIUM CHLORIDE 100 ML/HR: .6; .31; .03; .02 INJECTION, SOLUTION INTRAVENOUS at 15:09

## 2022-05-06 RX ADMIN — LIDOCAINE HYDROCHLORIDE AND EPINEPHRINE 3 ML: 15; 5 INJECTION, SOLUTION EPIDURAL at 07:25

## 2022-05-06 RX ADMIN — SODIUM CHLORIDE, SODIUM LACTATE, POTASSIUM CHLORIDE, AND CALCIUM CHLORIDE: .6; .31; .03; .02 INJECTION, SOLUTION INTRAVENOUS at 10:55

## 2022-05-06 RX ADMIN — ROPIVACAINE HYDROCHLORIDE: 5 INJECTION EPIDURAL; INFILTRATION; PERINEURAL at 11:13

## 2022-05-06 RX ADMIN — HEPARIN SODIUM 5000 UNITS: 5000 INJECTION INTRAVENOUS; SUBCUTANEOUS at 07:53

## 2022-05-06 NOTE — ANESTHESIA PROCEDURE NOTES
Epidural Block    Patient location during procedure: pre-op  Start time: 5/6/2022 7:12 AM  Reason for block: at surgeon's request and post-op pain management  Staffing  Performed: Anesthesiologist   Anesthesiologist: Armand Smalls, DO  Preanesthetic Checklist  Completed: patient identified, IV checked, site marked, risks and benefits discussed, surgical consent, monitors and equipment checked, pre-op evaluation and timeout performed  Epidural  Patient position: sitting  Prep: ChloraPrep  Patient monitoring: heart rate, cardiac monitor, continuous pulse ox and frequent blood pressure checks  Approach: midline  Location: thoracic  Injection technique: ARTURO saline  Needle  Needle type: Tuohy   Needle gauge: 18 G  Catheter type: side hole  Catheter size: 20 G  Catheter at skin depth: 11 cm  Catheter securement method: clear occlusive dressing  Test dose: negativelidocaine 1 5% with epinephrine 1:200,000 test dose, 3 mL  Assessment  Number of attempts: 1  patient tolerated the procedure well with no immediate complications  Additional Notes  Thoracic epidural placed at about T12 for postoperative pain management  ARTURO to saline obtained at 6cm, catheter taped at 11cm  No complications noted  One attempt

## 2022-05-06 NOTE — ANESTHESIA PREPROCEDURE EVALUATION
Procedure:  RADICAL HYSTERECTOMY TOTAL ABDOMINAL (LATONIA), BILATERAL SALPINECTOMY, PELVIC LYMPHADENECTOMY (N/A Abdomen)    Relevant Problems   GYN   (+) Malignant neoplasm of overlapping sites of cervix ([address] )      Other   (+) Obesity (BMI 35 0-39 9 without comorbidity)      Adequately NPO  Good functional capacity  Never had GA  Pt agreeable with epidural for postoperative pain control  Physical Exam    Airway    Mallampati score: II  TM Distance: >3 FB  Neck ROM: full     Dental   No notable dental hx     Cardiovascular  Rhythm: regular, Rate: normal,     Pulmonary  Pulmonary exam normal     Other Findings        Anesthesia Plan  ASA Score- 2     Anesthesia Type- general with ASA Monitors  Additional Monitors:   Airway Plan: ETT  Plan Factors-Exercise tolerance (METS): >4 METS  Chart reviewed  Patient summary reviewed  Patient is not a current smoker  Obstructive sleep apnea risk education given perioperatively  Induction- intravenous  Postoperative Plan- Plan for postoperative opioid use  Informed Consent- Anesthetic plan and risks discussed with patient  I personally reviewed this patient with the CRNA  Discussed and agreed on the Anesthesia Plan with the SUSIE Skinner

## 2022-05-06 NOTE — ANESTHESIA POSTPROCEDURE EVALUATION
Post-Op Assessment Note    CV Status:  Stable  Pain Score: 0    Pain management: adequate     Mental Status:  Alert and awake   Hydration Status:  Stable   PONV Controlled:  Controlled   Airway Patency:  Patent      Post Op Vitals Reviewed: Yes      Staff: CRNA         No complications documented      BP   130/64   Temp     Pulse  68   Resp   16   SpO2   99

## 2022-05-06 NOTE — H&P
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  She has seen consultation at [facility] and Joie stewart for possible radical trachelectomy given concerns for fertility, however, MRI documented possible invasion into the upper endocervix and she was not deemed a good candidate for radical trachelectomy  She therefore presents for radical hysterectomy, bilateral salpingectomy, pelvic lymph node dissection  I reviewed her MRIs, preoperative labs  Her performance status is 0   1  Plan for radical abdominal hysterectomy, bilateral salpingectomy, pelvic lymph node dissection, all other indicated procedures  CHIEF COMPLAINT:  Here for surgery        Problem:  Cancer Staging  Malignant neoplasm of overlapping sites of cervix [facility]  Staging form: Cervix Uteri, AJCC Version 9  - Clinical: No stage assigned - Unsigned  - Pathologic: No stage assigned - Unsigned      Previous therapy:  Oncology History    No history exists  Patient ID: Jose Antonio Jerez is a 32 y o  female  35-year-old  0 presents for surgery  Since her last visit to the office, she has had multiple consultations regarding possible radical trachelectomy as a fertility sparing surgery  Based on pelvic MRI, she was not deemed a good trachelectomy candidate due to possible extension into the upper endocervix  There has been no other interval change in her medications or medical history since her last visit to the office  Review of Systems   Constitutional: Negative for activity change and unexpected weight change  HENT: Negative  Eyes: Negative  Respiratory: Negative  Cardiovascular: Negative  Gastrointestinal: Negative for abdominal distention and abdominal pain  Endocrine: Negative  Genitourinary: Negative for pelvic pain and vaginal bleeding  Musculoskeletal: Negative  Skin: Negative  Allergic/Immunologic: Negative  Neurological: Negative  Hematological: Negative  Psychiatric/Behavioral: Negative  Current Facility-Administered Medications   Medication Dose Route Frequency Provider Last Rate Last Admin    ceFAZolin (ANCEF) IVPB (premix in dextrose) 2,000 mg 50 mL  2,000 mg Intravenous Once Tomy Byrd MD        heparin (porcine) 5,000 Units in sodium chloride 0 9 % 500 mL irrigation   Irrigation Once Tomy Byrd MD        heparin (porcine) subcutaneous injection 5,000 Units  5,000 Units Subcutaneous On Call To [address] [place] [address], MD        lactated ringers infusion  50 mL/hr Intravenous Continuous Theodoro Councilman, MD        lactated ringers infusion  125 mL/hr Intravenous Continuous Tomy Byrd MD        lidocaine (PF) (XYLOCAINE-MPF) 1 % injection 0 5 mL  0 5 mL Infiltration Once PRN Theodoro Councilman, MD           No Known Allergies    Past Medical History:   Diagnosis Date    Anxiety     Depression     Obesity        Past Surgical History:   Procedure Laterality Date    CERVICAL BIOPSY  W/ LOOP ELECTRODE EXCISION         OB History        0    Para   0    Term   0       0    AB   0    Living   0       SAB   0    IAB   0    Ectopic   0    Multiple   0    Live Births   0                 Family History   Problem Relation Age of Onset    Ovarian cancer Maternal Aunt     Ovarian cancer Maternal Grandmother     No Known Problems Mother     Heart disease Father        The following portions of the patient's history were reviewed and updated as appropriate: allergies, current medications, past family history, past medical history, past social history, past surgical history and problem list       Objective:    Blood pressure 117/77, pulse 86, temperature 99 3 °F (37 4 °C), temperature source Tympanic, resp  rate 16, height 5' 10" (1 778 m), weight 113 kg (250 lb), SpO2 100 %  Body mass index is 35 87 kg/m²  Physical Exam  Vitals reviewed     Constitutional:       General: She is not in acute distress  Appearance: Normal appearance  She is not ill-appearing  HENT:      Head: Normocephalic and atraumatic  Eyes:      General: No scleral icterus  Right eye: No discharge  Left eye: No discharge  Conjunctiva/sclera: Conjunctivae normal    Cardiovascular:      Heart sounds: Normal heart sounds  Pulmonary:      Effort: Pulmonary effort is normal       Breath sounds: Normal breath sounds  Abdominal:      Palpations: Abdomen is soft  Musculoskeletal:      Right lower leg: No edema  Left lower leg: No edema  Skin:     General: Skin is warm and dry  Coloration: Skin is not jaundiced  Findings: No rash  Neurological:      General: No focal deficit present  Mental Status: She is alert and oriented to person, place, and time  Cranial Nerves: No cranial nerve deficit  Motor: No weakness  Gait: Gait normal    Psychiatric:         Mood and Affect: Mood normal          Behavior: Behavior normal          Thought Content:  Thought content normal          Judgment: Judgment normal

## 2022-05-06 NOTE — OP NOTE
OPERATIVE REPORT  PATIENT NAME: Nehemias Szymanski    :  1990  MRN: [id number]  Pt Location: BE OR ROOM 03    SURGERY DATE: 2022    Surgeon(s) and Role:     * Casi Tinslye MD - Primary     * Gracy Castillo MD - Assisting    Preop Diagnosis:  Malignant neoplasm of overlapping sites of cervix ([facility] [address] ) [C53 8]    Post-Op Diagnosis Codes:     * Malignant neoplasm of overlapping sites of cervix ([facility] [address] ) [C53 8]    Procedure(s) (LRB):  DISSECTION/STAGING LYMPH NODE PELVIS/ABDOMEN (Bilateral)  SALPINGECTOMY, OVARIAN TRANSPOSITION (Bilateral)    Specimen(s):  ID Type Source Tests Collected by Time Destination   1 : Bilateral salpingectomy  Tissue Fallopian Tubes, Bilateral TISSUE EXAM Casi Tinsley MD 2022 2400    2 : Right Pelvic Lymph Nodes  Tissue Lymph Node TISSUE EXAM Casi Tinsley MD 2022 0906    3 : Right Pelvic lymph nodes  Tissue Lymph Node TISSUE EXAM Casi Tinsley MD 2022 0912    4 : Right Common Iliac lymph node  Tissue Lymph Node TISSUE EXAM Casi Tinsley MD 2022 0914    5 : Left pelvic lymph nodes  Tissue Lymph Node TISSUE EXAM Casi Tinsley MD 2022 0935        Estimated Blood Loss:   Minimal    Drains:  Urethral Catheter Non-latex;Straight-tip 16 Fr  (Active)   Collection Container Standard drainage bag 22 0755   Number of days: 0       [REMOVED] NG/OG/Enteral Tube Orogastric 18 Fr Right mouth (Removed)   Number of days: 0       Anesthesia Type:   * No anesthesia type entered *    Operative Indications:  Malignant neoplasm of overlapping sites of cervix (HCC) [C488]  32year-old  0 with BMI 35 and a clinical stage I B1 squamous cell carcinoma of the cervix  Preoperative MRI revealed a 1 4 cm right pelvic lymph node  She had 2 other 2nd opinions regarding radical trachelectomy as a fertility sparing procedure  She was not deemed a candidate for radical trachelectomy  Operative Findings:  1   Exam under anesthesia revealed an approximate 3 5 cm cervix  There was no palpable parametrial disease  No palpable adnexal masses  2  On laparotomy, there is no evidence of peritoneal disease  The adnexa were grossly normal bilaterally  Upon opening the perivesical and pararectal spaces bilaterally, there was an enlarged right external iliac lymph node overlying the external iliac vein  This measured approximately 2 x 1 5 cm  There was no other suspicious lymphadenopathy  No palpable parametrial disease  3  Due to gross disease in the right pelvic lymph node, decision was made to abort the radical hysterectomy and perform a bilateral ovarian transposition  Complications:   None    Procedure and Technique:  After informed consent was obtained, an epidural was placed  She then underwent general endotracheal anesthesia without incident  She was prepped and draped in normal sterile fashion in the low dorsal lithotomy position  Examination under anesthesia was then performed with findings noted as above  Marshall catheter was inserted  Attention was then turned the abdomen  A transverse skin incision was made using cutting current cautery  This incision measured approximately 20 cm in length  The incision was taken down to the underlying layer of fascia using cautery as well  The fascia was opened in the midline and the fascial incision extended laterally  The bilateral rectus muscles were cauterized and transected  The bilateral inferior epigastric vasculature was cauterized and transected using the EnSeal bipolar cautery device  The peritoneum was then identified and entered sharply  The peritoneal incision extended laterally  A Bookwalter retractor was placed  Findings on laparotomy are noted as above  The bowel was then packed away with moist laparotomy sponges  The uterus was grasped with  clamps at the cornua bilaterally  The pelvic spaces were then developed    The bilateral pararectal, paravesical, vesicovaginal and rectovaginal spaces were developed in anticipation of the radical hysterectomy  There was no palpable parametrial disease  The medial leaf of the broad ligament was opened above the ureter and the bilateral utero-ovarian ligaments were cauterized and transected with the EnSeal   The ovaries were then tucked into the gutters bilaterally  On the right side, there was a suspicious appearing lymph node identified  It was firm  It was removed intact and sent for frozen section analysis which returned as squamous cell carcinoma  Appropriate retractors were placed to address the right pelvic lymph nodes  Lymph node-bearing tissue was removed from the common iliac vessels superiorly to the deep circumflex iliac vein inferiorly to the ureter and superior vesical artery medially to the genitofemoral nerve laterally  A vein retractor was then placed  Lymph node-bearing tissue was then removed from the obturator fossa superior to the obturator nerve with excellent visualization of the obturator nerve throughout the dissection  An additional biopsy was sent of a right common iliac lymph node  Given the transverse nature of the incision and BMI of the patient, it was difficult to have adequate exposure for a periaortic lymph node dissection  Attention was then turned left-sided pelvis  The spaces had been developed  Appropriate retractors were placed  Lymph node-bearing tissue was then removed from the common iliac vessels superiorly to the deep circumflex iliac vein inferiorly to the ureter and superior vesical artery medially to the genitofemoral nerve laterally  A vein retractor was then placed to retract the external iliac vein and the lymph node-bearing tissue was removed from the obturator fossa superior to the obturator nerve with excellent visualization of the obturator nerve throughout the dissection  Attention was then turned to the bilateral ovarian transposition    The left infundibulopelvic ligament was adherent to the sigmoid mesentery  These adhesions were lysed  The peritoneal incision superior to the ureter was carried further to the pelvic brim allowing the left infundibulopelvic ligament to be more mobilized  The IP on stretch was approximately 15 cm in length  An incision was made on the line of Toldt using cautery to lateralize the colon  An interrupted 3-0 PDS suture was then placed high in the left pericolic gutter and used to secure the left ovary in the pericolic gutter  The IP itself was lateral and retroperitoneal to the colon  There was no evidence of torsion of the ovary  The ovaries and secured and a clip placed on the ovarian stroma to vivian its location  Attention was then turned to the right side  In similar fashion, adhesions from the right infundibulopelvic ligament to the right terminal ileum and ascending colon mesentery were lysed  The IP was lengthened above the ureter as well  Again, with the IP on stretch, the length of the IP was approximately 15 cm  In a similar fashion, the line of Toldt was opened on the patient's right side the colon was reflected and the IP was maintained retroperitoneally  A single 3-0 PDS suture was then used to place a stage high in the right pericolic gutter and secure the right ovary to the right pericolic gutter  Again a hemoclip was placed on the ovary for location purposes  The pelvis was then irrigated  There is no evidence of active bleeding identified  FloSeal was applied to the posterior pelvis at the rectovaginal space dissection  The incision was then closed using 1  Looped PDS in a running fashion to the fascia  Subcutaneous space was irrigated  The skin was closed using 3-0 plain suture to the subcutaneous adipose tissue followed by 3-0 Stratafix to the skin in a subcuticular fashion  Exofin and a sterile dressing were applied    The patient was then awakened and transferred to the recovery room in stable condition  All instrument counts correct x2 for the procedure  No complications  Estimated blood loss is 100 mL      I was present for the entire procedure    Patient Disposition:  PACU       SIGNATURE: Oscar Fitch MD  DATE: May 6, 2022  TIME: 11:30 AM

## 2022-05-07 LAB
ANION GAP SERPL CALCULATED.3IONS-SCNC: 4 MMOL/L (ref 4–13)
BUN SERPL-MCNC: 8 MG/DL (ref 5–25)
CALCIUM SERPL-MCNC: 8.3 MG/DL (ref 8.3–10.1)
CHLORIDE SERPL-SCNC: 108 MMOL/L (ref 100–108)
CO2 SERPL-SCNC: 26 MMOL/L (ref 21–32)
CREAT SERPL-MCNC: 0.63 MG/DL (ref 0.6–1.3)
ERYTHROCYTE [DISTWIDTH] IN BLOOD BY AUTOMATED COUNT: 12.2 % (ref 11.6–15.1)
GFR SERPL CREATININE-BSD FRML MDRD: 119 ML/MIN/1.73SQ M
GLUCOSE SERPL-MCNC: 105 MG/DL (ref 65–140)
HCT VFR BLD AUTO: 36.1 % (ref 34.8–46.1)
HGB BLD-MCNC: 11.8 G/DL (ref 11.5–15.4)
MAGNESIUM SERPL-MCNC: 1.9 MG/DL (ref 1.6–2.6)
MCH RBC QN AUTO: 29.1 PG (ref 26.8–34.3)
MCHC RBC AUTO-ENTMCNC: 32.7 G/DL (ref 31.4–37.4)
MCV RBC AUTO: 89 FL (ref 82–98)
PLATELET # BLD AUTO: 233 THOUSANDS/UL (ref 149–390)
PMV BLD AUTO: 8.9 FL (ref 8.9–12.7)
POTASSIUM SERPL-SCNC: 3.6 MMOL/L (ref 3.5–5.3)
RBC # BLD AUTO: 4.05 MILLION/UL (ref 3.81–5.12)
SODIUM SERPL-SCNC: 138 MMOL/L (ref 136–145)
WBC # BLD AUTO: 8.73 THOUSAND/UL (ref 4.31–10.16)

## 2022-05-07 PROCEDURE — 80048 BASIC METABOLIC PNL TOTAL CA: CPT | Performed by: OBSTETRICS & GYNECOLOGY

## 2022-05-07 PROCEDURE — 99223 1ST HOSP IP/OBS HIGH 75: CPT | Performed by: ANESTHESIOLOGY

## 2022-05-07 PROCEDURE — 85027 COMPLETE CBC AUTOMATED: CPT | Performed by: OBSTETRICS & GYNECOLOGY

## 2022-05-07 PROCEDURE — 83735 ASSAY OF MAGNESIUM: CPT | Performed by: OBSTETRICS & GYNECOLOGY

## 2022-05-07 PROCEDURE — 99024 POSTOP FOLLOW-UP VISIT: CPT | Performed by: OBSTETRICS & GYNECOLOGY

## 2022-05-07 RX ORDER — HYDROMORPHONE HCL/PF 1 MG/ML
1 SYRINGE (ML) INJECTION EVERY 2 HOUR PRN
Status: DISCONTINUED | OUTPATIENT
Start: 2022-05-07 | End: 2022-05-08 | Stop reason: HOSPADM

## 2022-05-07 RX ORDER — OXYCODONE HYDROCHLORIDE 10 MG/1
10 TABLET ORAL EVERY 4 HOURS PRN
Status: DISCONTINUED | OUTPATIENT
Start: 2022-05-07 | End: 2022-05-08 | Stop reason: HOSPADM

## 2022-05-07 RX ORDER — IBUPROFEN 600 MG/1
600 TABLET ORAL EVERY 6 HOURS SCHEDULED
Status: DISCONTINUED | OUTPATIENT
Start: 2022-05-07 | End: 2022-05-08 | Stop reason: HOSPADM

## 2022-05-07 RX ORDER — ACETAMINOPHEN 325 MG/1
975 TABLET ORAL EVERY 8 HOURS SCHEDULED
Status: DISCONTINUED | OUTPATIENT
Start: 2022-05-07 | End: 2022-05-08 | Stop reason: HOSPADM

## 2022-05-07 RX ORDER — OXYCODONE HYDROCHLORIDE 5 MG/1
5 TABLET ORAL EVERY 4 HOURS PRN
Status: DISCONTINUED | OUTPATIENT
Start: 2022-05-07 | End: 2022-05-08 | Stop reason: HOSPADM

## 2022-05-07 RX ADMIN — DOCUSATE SODIUM 100 MG: 100 CAPSULE, LIQUID FILLED ORAL at 08:44

## 2022-05-07 RX ADMIN — HEPARIN SODIUM 5000 UNITS: 5000 INJECTION INTRAVENOUS; SUBCUTANEOUS at 21:36

## 2022-05-07 RX ADMIN — SIMETHICONE 80 MG: 80 TABLET, CHEWABLE ORAL at 16:55

## 2022-05-07 RX ADMIN — DOCUSATE SODIUM 100 MG: 100 CAPSULE, LIQUID FILLED ORAL at 16:56

## 2022-05-07 RX ADMIN — FAMOTIDINE 20 MG: 20 TABLET ORAL at 08:44

## 2022-05-07 RX ADMIN — IBUPROFEN 600 MG: 600 TABLET, FILM COATED ORAL at 12:16

## 2022-05-07 RX ADMIN — ROPIVACAINE HYDROCHLORIDE: 5 INJECTION EPIDURAL; INFILTRATION; PERINEURAL at 09:18

## 2022-05-07 RX ADMIN — ACETAMINOPHEN 650 MG: 325 TABLET, FILM COATED ORAL at 00:26

## 2022-05-07 RX ADMIN — OXYCODONE HYDROCHLORIDE 10 MG: 10 TABLET ORAL at 15:38

## 2022-05-07 RX ADMIN — SIMETHICONE 80 MG: 80 TABLET, CHEWABLE ORAL at 11:18

## 2022-05-07 RX ADMIN — ACETAMINOPHEN 975 MG: 325 TABLET, FILM COATED ORAL at 21:36

## 2022-05-07 RX ADMIN — ACETAMINOPHEN 650 MG: 325 TABLET, FILM COATED ORAL at 06:24

## 2022-05-07 RX ADMIN — OXYCODONE HYDROCHLORIDE 5 MG: 5 TABLET ORAL at 12:29

## 2022-05-07 RX ADMIN — HEPARIN SODIUM 5000 UNITS: 5000 INJECTION INTRAVENOUS; SUBCUTANEOUS at 06:25

## 2022-05-07 RX ADMIN — FAMOTIDINE 20 MG: 20 TABLET ORAL at 16:55

## 2022-05-07 RX ADMIN — ACETAMINOPHEN 650 MG: 325 TABLET, FILM COATED ORAL at 11:18

## 2022-05-07 RX ADMIN — IBUPROFEN 600 MG: 600 TABLET, FILM COATED ORAL at 16:56

## 2022-05-07 RX ADMIN — SODIUM CHLORIDE, SODIUM LACTATE, POTASSIUM CHLORIDE, AND CALCIUM CHLORIDE 75 ML/HR: .6; .31; .03; .02 INJECTION, SOLUTION INTRAVENOUS at 06:27

## 2022-05-07 NOTE — CONSULTS
Consult Note- Acute Pain Service   Kathy Glez 32 y o  female MRN: [id number]  Unit/Bed#: [facility] 919-01 Encounter: [id number]               Assessment/Plan     Assessment:   Patient Active Problem List   Diagnosis    Malignant neoplasm of overlapping sites of cervix ([address] )    Family history of ovarian cancer    Obesity (BMI 35 0-39 9 without comorbidity)     Kathy Glez is a 32 y o  female POD 1 s/p dissection/staging lymph node bilateral pelvis salpingectomy, and bilateral ovarian transposition, with epidural in place for postop pain control    Pt doing well with epidural use for coverage of incisional pain  She uses the PCEA button when needed for breakthrough  She has received PO tylenol for breakthrough  Pain scores 0-4/10  Surgical team wishes epidural catheter be removed today  She is currently OOB to chair and having pain above umbilicus area  I provided a bolus of 5 cc 0 2% ropivacaine through epidural  Heq SQ was provided at 630 am  I visited the patient at 1 pm, with plans to have epidural removed  Since the bolus, pt says her pain is still present above the umbilicus  We discussed it may be a combination of gas pain, bloating, and pain from surgery  I encouraged her to ask for PO pain meds when needed after epidural removal      Epidural was removed without issue at 115 pm, tip intact Area cleaned  Site shows no bruising or erythema or sign of infection  Plan:  - continue tylenol 975 mg PO Q 8 hrs scheduled  - continue dilaudid 1 mg IV Q 2 hrs PRN severe pain  - continue ibuprofen 600 mg PO Q 6 hrs scheduled            - continue oxycodone 5-10 mg PO Q 4 hrs PRN mod-severe pain                               - Bowel regimen when appropriate from surgical perspective  - Docusate (Colace) 100 mg PO twice daily    APS will continue to follow  Please contact Acute Pain Service - [facility] via [employer] from [phone] with additional questions or concerns   See Chen or Jennifer for additional contacts and after hours information  History of Present Illness    Admit Date:  5/6/2022  Hospital Day:  0 days  Primary Service:  GYN Oncology  Attending Provider:  Claudeen Gust,*  Reason for Consult / Principal Problem: Stage I B now newly diagnosed metastatic cervical carcinoma:, opioid naiive patient, with epidural for postop pain control  HPI: Ryan Briones is a 32 y o  female who presents with Stage I B now newly diagnosed metastatic cervical carcinoma     Current pain location(s):  Surgical incision site  Pain Scale:   3-6/10  Current Analgesic regimen:  Epidural to be removed, transitioned to pain regimen by surgical team (PO/IV)    Pain History: opioid naiive patient  Pain Management Provider:  none    I have reviewed the patient's controlled substance dispensing history in the Prescription Drug Monitoring Program in compliance with the [facility] regulations before prescribing any controlled substances  Inpatient consult to Acute Pain Service  Consult performed by: Clarissa Herzog MD  Consult ordered by: Raul Trujillo MD          Review of Systems   Constitutional: Negative  HENT: Negative  Eyes: Negative  Respiratory: Negative  Cardiovascular: Negative  Gastrointestinal: Positive for abdominal pain  Endocrine: Negative  Genitourinary: Negative  Musculoskeletal: Negative  Cramps from surgery   Skin: Negative  Neurological: Negative  Psychiatric/Behavioral: Negative          Historical Information   Past Medical History:   Diagnosis Date    Anxiety     Depression     Obesity      Past Surgical History:   Procedure Laterality Date    CERVICAL BIOPSY  W/ LOOP ELECTRODE EXCISION       Social History   Social History     Substance and Sexual Activity   Alcohol Use Not Currently     Social History     Substance and Sexual Activity   Drug Use Never     Social History     Tobacco Use   Smoking Status Never Smoker   Smokeless Tobacco Never Used     Family History: non-contributory    Meds/Allergies   all current active meds have been reviewed, current meds:   Current Facility-Administered Medications   Medication Dose Route Frequency    acetaminophen (TYLENOL) tablet 650 mg  650 mg Oral Q6H [facility]    calcium carbonate (TUMS) chewable tablet 500 mg  500 mg Oral BID PRN    docusate sodium (COLACE) capsule 100 mg  100 mg Oral BID    famotidine (PEPCID) tablet 20 mg  20 mg Oral BID    Or    Famotidine (PF) (PEPCID) injection 20 mg  20 mg Intravenous BID    heparin (porcine) subcutaneous injection 5,000 Units  5,000 Units Subcutaneous Q8H [facility]    lactated ringers infusion  75 mL/hr Intravenous Continuous    ondansetron (ZOFRAN) injection 4 mg  4 mg Intravenous Q6H PRN    ropivacaine 0 1% and fentaNYL 2 mcg/mL PCEA   Epidural Continuous    simethicone (MYLICON) chewable tablet 80 mg  80 mg Oral 4x Daily PRN    and PTA meds:   Prior to Admission Medications   Prescriptions Last Dose Informant Patient Reported? Taking? ALPRAZolam (XANAX) 0 5 mg tablet   Yes Yes   Si tablet   Psyllium (METAMUCIL PO) 2022  Yes Yes   Sig: as needed     Pyridoxine HCl (Vitamin B6) 100 MG TABS 2022  Yes Yes      Facility-Administered Medications: None       No Known Allergies    Objective   Temp:  [96 8 °F (36 °C)-99 3 °F (37 4 °C)] 98 6 °F (37 °C)  HR:  [] 102  Resp:  [15-18] 15  BP: (104-130)/(52-83) 120/70    Intake/Output Summary (Last 24 hours) at 2022 2314  Last data filed at 2022 2044  Gross per 24 hour   Intake 3905 82 ml   Output 2545 ml   Net 1360 82 ml       Physical Exam  Vitals reviewed  Constitutional:       Appearance: Normal appearance  HENT:      Head: Normocephalic and atraumatic  Nose: Nose normal       Mouth/Throat:      Mouth: Mucous membranes are moist    Eyes:      Extraocular Movements: Extraocular movements intact  Pupils: Pupils are equal, round, and reactive to light  Cardiovascular:      Rate and Rhythm: Normal rate  Pulses: Normal pulses  Pulmonary:      Effort: Pulmonary effort is normal    Abdominal:      Comments: Abdominal pain above umbilical level   Musculoskeletal:      Cervical back: Normal range of motion  Comments: Moves around slowly from pain   Skin:     General: Skin is warm  Neurological:      General: No focal deficit present  Mental Status: She is alert and oriented to person, place, and time  Mental status is at baseline  Psychiatric:         Mood and Affect: Mood normal          Behavior: Behavior normal          Thought Content: Thought content normal          Judgment: Judgment normal          Lab Results:   I have personally reviewed pertinent labs  , CBC:   Lab Results   Component Value Date    WBC 11 82 (H) 05/06/2022    HGB 14 3 05/06/2022    HCT 42 5 05/06/2022    MCV 87 05/06/2022     05/06/2022    MCH 29 4 05/06/2022    MCHC 33 6 05/06/2022    RDW 11 9 05/06/2022    MPV 8 7 (L) 05/06/2022   , CMP:   Lab Results   Component Value Date    SODIUM 136 05/06/2022    K 4 0 05/06/2022     05/06/2022    CO2 27 05/06/2022    BUN 9 05/06/2022    CREATININE 0 78 05/06/2022    CALCIUM 9 3 05/06/2022    EGFR 101 05/06/2022   , BMP:  Lab Results   Component Value Date    SODIUM 136 05/06/2022    K 4 0 05/06/2022     05/06/2022    CO2 27 05/06/2022    BUN 9 05/06/2022    CREATININE 0 78 05/06/2022    GLUC 161 (H) 05/06/2022    CALCIUM 9 3 05/06/2022    AGAP 2 (L) 05/06/2022    EGFR 101 05/06/2022   , PT/PTT:No results found for: PT, PTT    Imaging Studies: I have personally reviewed pertinent reports  EKG, Pathology, and Other Studies: I have personally reviewed pertinent reports  Counseling / Coordination of Care  Total floor / unit time spent today Level 1 = 20 minutes  Greater than 50% of total time was spent with the patient and / or family counseling and / or coordination of care   A description of the counseling / coordination of care: epidural discontinued, pain regimen reinforced for use  Please note that the APS provides consultative services regarding pain management only  With the exception of ketamine and epidural infusions and except when indicated, final decisions regarding starting or changing doses of analgesic medications are at the discretion of the consulting service  Off hours consultation and/or medication management is generally not available      Ana Maria Parada MD  Acute Pain Service

## 2022-05-07 NOTE — UTILIZATION REVIEW
Initial Clinical Review    Elective IP surgical procedure  Age/Sex: 32 y o  female  Surgery Date: 5/6/2022  Procedure:   DISSECTION/STAGING LYMPH NODE PELVIS/ABDOMEN (Bilateral)   SALPINGECTOMY, OVARIAN TRANSPOSITION (Bilateral)  Anesthesia: General  Operative Findings:   1  Exam under anesthesia revealed an approximate 3 5 cm cervix  There was no palpable parametrial disease  No palpable adnexal masses  2  On laparotomy, there is no evidence of peritoneal disease  The adnexa were grossly normal bilaterally  Upon opening the perivesical and pararectal spaces bilaterally, there was an enlarged right external iliac lymph node overlying the external iliac vein  This measured approximately 2 x 1 5 cm  There was no other suspicious lymphadenopathy  No palpable parametrial disease  3  Due to gross disease in the right pelvic lymph node, decision was made to abort the radical hysterectomy and perform a bilateral ovarian transposition  POD#1 Progress Note: Pt with no acute events overnight  On exam today pt states pain is well controlled  [place] reg diet  (+) flatus  Epidural is not functioning particularly well and will need to be d/c'd  Continue pain management with PO/IV meds  D/c paul cath  Continue reg diet  SCD's, OOB/ambulate  Admission Orders: Date/Time/Statement:   Admission Orders (From admission, onward)     Ordered        05/06/22 1128  Inpatient Admission  Once                      Orders Placed This Encounter   Procedures    Inpatient Admission     Standing Status:   Standing     Number of Occurrences:   1     Order Specific Question:   Level of Care     Answer:   Med Surg [16]     Order Specific Question:   Estimated length of stay     Answer:   More than 2 Midnights     Order Specific Question:   Certification     Answer:   I certify that inpatient services are medically necessary for this patient for a duration of greater than two midnights   See H&P and MD Progress Notes for additional information about the patient's course of treatment       Vital Signs:   Date/Time Temp Pulse Resp BP MAP (mmHg) SpO2 O2 Flow Rate (L/min) O2 Device   05/07/22 06:45:35 98 7 °F (37 1 °C) 94 18 105/69 81 98 % -- --   05/07/22 06:44:17 98 7 °F (37 1 °C) 94 18 105/69 81 98 % -- --   05/06/22 22:04:31 98 6 °F (37 °C) 102 15 120/70 87 96 % -- --   05/06/22 2040 -- -- -- -- -- -- -- None (Room air)   05/06/22 18:58:09 98 6 °F (37 °C) 111 Abnormal  18 123/83 96 94 % -- --   05/06/22 16:59:16 98 7 °F (37 1 °C) 121 Abnormal  18 124/81 95 96 % -- --   05/06/22 16:58:48 98 7 °F (37 1 °C) 118 Abnormal  18 124/81 95 96 % -- --   05/06/22 15:50:54 98 2 °F (36 8 °C) 100 17 128/78 95 95 % -- --   05/06/22 12:56:25 97 5 °F (36 4 °C) 78 -- 124/75 91 97 % -- --   05/06/22 1215 97 6 °F (36 4 °C) 80 17 107/52 73 99 % -- None (Room air)   05/06/22 1200 -- 78 16 104/54 76 99 % -- None (Room air)   05/06/22 1145 97 4 °F (36 3 °C) Abnormal  74 17 117/57 82 98 % -- None (Room air)   05/06/22 1130 97 °F (36 1 °C) Abnormal  84 17 116/63 80 97 % -- None (Room air)   05/06/22 1113 96 8 °F (36 °C) Abnormal  88 17 130/66 87 100 % 6 L/min Simple mask   05/06/22 0603 99 3 °F (37 4 °C) 86 16 117/77 -- 100 % -- None (Room air)       Pertinent Labs/Diagnostic Test Results:   Results from last 7 days   Lab Units 05/07/22  0652 05/06/22  1617   WBC Thousand/uL 8 73 11 82*   HEMOGLOBIN g/dL 11 8 14 3   HEMATOCRIT % 36 1 42 5   PLATELETS Thousands/uL 233 290       Results from last 7 days   Lab Units 05/07/22  0651 05/06/22  1617   SODIUM mmol/L 138 136   POTASSIUM mmol/L 3 6 4 0   CHLORIDE mmol/L 108 107   CO2 mmol/L 26 27   ANION GAP mmol/L 4 2*   BUN mg/dL 8 9   CREATININE mg/dL 0 63 0 78   EGFR ml/min/1 73sq m 119 101   CALCIUM mg/dL 8 3 9 3   MAGNESIUM mg/dL 1 9 1 8   PHOSPHORUS mg/dL  --  3 0     Results from last 7 days   Lab Units 05/07/22  0651 05/06/22  1617   GLUCOSE RANDOM mg/dL 105 161*         Scheduled Medications:  acetaminophen, 650 mg, Oral, Q6H [address]  docusate sodium, 100 mg, Oral, BID  famotidine, 20 mg, Oral, BID   Or  Famotidine (PF), 20 mg, Intravenous, BID  heparin (porcine), 5,000 Units, Subcutaneous, Q8H [address]    Continuous IV Infusions:  lactated ringers, 75 mL/hr, Intravenous, Continuous  ropivacaine 0 1% and fentaNYL 2 mcg/mL, , Epidural, Continuous    PRN Meds:  calcium carbonate, 500 mg, Oral, BID PRN  ondansetron, 4 mg, Intravenous, Q6H PRN  simethicone, 80 mg, Oral, 4x Daily PRN        Network Utilization Review Department  ATTENTION: Please call with any questions or concerns to [phone] and carefully listen to the prompts so that you are directed to the right person  All voicemails are confidential   David Díaz all requests for admission clinical reviews, approved or denied determinations and any other requests to dedicated fax number below belonging to the campus where the patient is receiving treatment   List of dedicated fax numbers for the Facilities:  [address] DENIALS (Administrative/Medical Necessity) [phone]   [address] [place] [address] (Maternity/NICU/Pediatrics) [phone]   [address]  [address] [address] [facility] [address] [address] [place] [address] [address] [facility] [place] [address] [phone]

## 2022-05-07 NOTE — QUICK NOTE
Postop check    Assessment/Plan:  35-year-old female who underwent dissection/staging lymph node bilateral pelvis salpingectomy, and bilateral ovarian transposition earlier today for malignant neoplasm of the cervix   -thoracic epidural for pain control  -daily labs  -out of bed as tolerated  -incentive spirometry  -SCDs for DVT prophylaxis  -regular diet  -IV fluids           CHIEF COMPLAINT:       Subjective:     Problem:  Cancer Staging  Malignant neoplasm of overlapping sites of cervix ([address] )  Staging form: Cervix Uteri, AJCC Version 9  - Clinical: No stage assigned - Unsigned  - Pathologic: No stage assigned - Unsigned      Previous therapy:  Oncology History    No history exists  Patient ID: Hiren Sherman is a 32 y o  female  HPI  patient doing very well postoperatively  She states she has minimal pain only with movement  She denies chest pain, shortness of breath, nausea, vomiting, and chills  She is presently sitting up and eating upper and jelly sandwich         Current Facility-Administered Medications   Medication Dose Route Frequency Provider Last Rate Last Admin    acetaminophen (TYLENOL) tablet 650 mg  650 mg Oral Q6H [address] Maile Phan MD   650 mg at 05/06/22 1945    calcium carbonate (TUMS) chewable tablet 500 mg  500 mg Oral BID PRN Maile Phan MD        docusate sodium (COLACE) capsule 100 mg  100 mg Oral BID Maile Phan MD   100 mg at 05/06/22 1723    famotidine (PEPCID) tablet 20 mg  20 mg Oral BID Maile Phan MD   20 mg at 05/06/22 1723    Or    Famotidine (PF) (PEPCID) injection 20 mg  20 mg Intravenous BID Maile Phan MD        lactated ringers infusion  75 mL/hr Intravenous Continuous Juan A Bermudez CRNA 75 mL/hr at 05/06/22 1942 75 mL/hr at 05/06/22 1942    ondansetron (ZOFRAN) injection 4 mg  4 mg Intravenous Q6H PRN Maile Phan MD        ropivacaine 0 1% and fentaNYL 2 mcg/mL PCEA   Epidural Continuous Maile Phan MD   Rate Verify at 05/06/22 1831  simethicone (MYLICON) chewable tablet 80 mg  80 mg Oral 4x Daily PRN Bin Gavin MD           No Known Allergies    Past Medical History:   Diagnosis Date    Anxiety     Depression     Obesity        Past Surgical History:   Procedure Laterality Date    CERVICAL BIOPSY  W/ LOOP ELECTRODE EXCISION         OB History        0    Para   0    Term   0       0    AB   0    Living   0       SAB   0    IAB   0    Ectopic   0    Multiple   0    Live Births   0                 Family History   Problem Relation Age of Onset    Ovarian cancer Maternal Aunt     Ovarian cancer Maternal Grandmother     No Known Problems Mother     Heart disease Father              Objective:    Blood pressure 123/83, pulse (!) 111, temperature 98 6 °F (37 °C), resp  rate 18, height 5' 10" (1 778 m), weight 113 kg (250 lb), SpO2 94 %  Body mass index is 35 87 kg/m²  Physical Exam  Well-developed well-nourished 35-year-old female in no acute distress  She is alert and oriented  Chest:  Clear to auscultation throughout  Heart:  Regular rate and rhythm at 84  Abdomen:  Soft, tenderness as expected postoperatively, Mepilex dressing is dry and intact  Extremities:  No edema    No results found for:   Lab Results   Component Value Date    WBC 11 82 (H) 2022    HGB 14 3 2022    HCT 42 5 2022    MCV 87 2022     2022     Lab Results   Component Value Date    K 4 0 2022     2022    CO2 27 2022    BUN 9 2022    CREATININE 0 78 2022    CALCIUM 9 3 2022    EGFR 101 2022       This text is generated with voice recognition software  There may be translation, syntax,  or grammatical errors  If you have any questions, please contact the dictating provider      Apolinar Morales PA-C

## 2022-05-07 NOTE — CASE MANAGEMENT
Case Management Assessment & Discharge Planning Note    Patient name Jose Antonio Jerez  Location [facility] 919/PPHP 712-77 MRN [id number]  : 1990 Date 2022       Current Admission Date: 2022  Current Admission Diagnosis:Malignant neoplasm of overlapping sites of cervix [facility])   Patient Active Problem List    Diagnosis Date Noted    Obesity (BMI 35 0-39 9 without comorbidity) 2022    Malignant neoplasm of overlapping sites of cervix ([address] ) 2022    Family history of ovarian cancer 2022      LOS (days): 1  Geometric Mean LOS (GMLOS) (days):   Days to GMLOS:     OBJECTIVE:    Risk of Unplanned Readmission Score: 6         Current admission status: Inpatient       Preferred Pharmacy:   100 [place],9D, [address] [facility] [address] [address] [facility] [place] [address] [facility]  Phone: [phone] Fax: [phone]    Primary Care Provider: Jamie Ferrer DO    Primary Insurance: BLUE CROSS  Secondary Insurance:     ASSESSMENT:  Rufino Oliva Proxies    There are no active Health Care Proxies on file           Patient Information  Admitted from[de-identified] Home  Mental Status: Alert  During Assessment patient was accompanied by: Not accompanied during assessment  Assessment information provided by[de-identified] Patient  Primary Caregiver: Self  Support Systems: Spouse/significant other  What city do you live in?: [place]  Type of Current Residence: Other (Comment) (Private)  Living Arrangements: Other (Comment) (boyfriend)    Activities of Daily Living Prior to Admission  Functional Status: Independent  Completes ADLs independently?: Yes  Ambulates independently?: Yes  Does patient use assisted devices?: No  Does patient currently own DME?: No  Does patient have a history of Outpatient Therapy (PT/OT)?: No  Does the patient have a history of Short-Term Rehab?: No  Does patient have a history of HHC?: No  Does patient currently have [facility]?: No      Means of Transportation  Means of Transport to [facility][de-identified] Drives Self  In the past 12 months, has lack of transportation kept you from medical appointments or from getting medications?: No  In the past 12 months, has lack of transportation kept you from meetings, work, or from getting things needed for daily living?: No  Was application for public transport provided?: N/A      Discharge planning discussed with[de-identified] Patient  Freedom of Choice: Yes  Comments - Freedom of Choice: choice will be offered in the interests of dc planning  CM contacted family/caregiver?: No- see comments (pt makes own decisions)        Contacts  Patient Contacts: Kenia Melissa  Relationship to Patient[de-identified] Other (Comment) (sign other)  Contact Method: Phone  Phone Number: [phone]  Reason/Outcome: Continuity of [address]

## 2022-05-07 NOTE — PROGRESS NOTES
Progress Note - General Surgery   Kathy Nabor 32 y o  female MRN: [id number]  Unit/Bed#: [facility] 919-01 Encounter: [id number]    Assessment:  Stage I B now newly diagnosed metastatic cervical carcinoma:  Postop day 1 exploratory laparotomy pelvic lymphadenectomy bilateral oophoropexy for metastatic stage I B cervical carcinoma  Postoperative pain is well controlled urine output is appropriate patient is tolerating regular diet  Blood counts are appropriate  Epidural is not functioning particularly well and will need to be discontinued  Plan:  Postop:  Patient remains stable  DC Marshall catheter  DC epidural   Transition to p o  Pain  Medications  Subjective/Objective   Chief Complaint:  Postoperative day 1  A exploratory laparotomy bilateral oophoropexy lymph node dissection     Subjective:  Patient is doing well  Her pain is well controlled  She is tolerating regular diet  She is passing gas  Blood pressure 124/84, pulse (!) 106, temperature 98 6 °F (37 °C), temperature source Oral, resp  rate 18, height 5' 10" (1 778 m), weight 113 kg (250 lb), SpO2 97 %  ,Body mass index is 35 87 kg/m²        Intake/Output Summary (Last 24 hours) at 5/7/2022 1155  Last data filed at 5/7/2022 9993  Gross per 24 hour   Intake 1953 32 ml   Output 3225 ml   Net -1271 68 ml       Invasive Devices  Report    Peripheral Intravenous Line            Peripheral IV 05/06/22 Left Antecubital 1 day    Peripheral IV 05/06/22 Left Hand 1 day          Epidural Line            Epidural Catheter 05/06/22 1 day          Drain            Urethral Catheter Non-latex;Straight-tip 16 Fr  1 day                Physical Exam: /84   Pulse (!) 106   Temp 98 6 °F (37 °C) (Oral)   Resp 18   Ht 5' 10" (1 778 m)   Wt 113 kg (250 lb)   SpO2 97%   BMI 35 87 kg/m²     General Appearance:    Alert, cooperative, no distress, appears stated age   Head:    Normocephalic, without obvious abnormality, atraumatic   Eyes:    PERRL, conjunctiva/corneas clear, EOM's intact, fundi     benign, both eyes   Ears:    Normal TM's and external ear canals, both ears   Nose:   Nares normal, septum midline, mucosa normal, no drainage    or sinus tenderness   Throat:   Lips, mucosa, and tongue normal; teeth and gums normal   Neck:   Supple, symmetrical, trachea midline, no adenopathy;     thyroid:  no enlargement/tenderness/nodules; no carotid    bruit or JVD   Back:     Symmetric, no curvature, ROM normal, no CVA tenderness   Lungs:     Clear to auscultation bilaterally, respirations unlabored   Chest Wall:    No tenderness or deformity    Heart:    Regular rate and rhythm, S1 and S2 normal, no murmur, rub   or gallop       Abdomen:     Soft, non-tender, bowel sounds active all four quadrants,     no masses, no organomegaly  Incision with  bandage placed    It is clean dry intact         Extremities:   Extremities normal, atraumatic, no cyanosis or edema   Pulses:   2+ and symmetric all extremities   Skin:   Skin color, texture, turgor normal, no rashes or lesions   Lymph nodes:   Cervical, supraclavicular, and axillary nodes normal   Neurologic:   CNII-XII intact, normal strength, sensation and reflexes     throughout       Lab, Imaging and other studies:  CBC:   Lab Results   Component Value Date    WBC 8 73 05/07/2022    HGB 11 8 05/07/2022    HCT 36 1 05/07/2022    MCV 89 05/07/2022     05/07/2022    MCH 29 1 05/07/2022    MCHC 32 7 05/07/2022    RDW 12 2 05/07/2022    MPV 8 9 05/07/2022   , CMP:   Lab Results   Component Value Date    SODIUM 138 05/07/2022    K 3 6 05/07/2022     05/07/2022    CO2 26 05/07/2022    BUN 8 05/07/2022    CREATININE 0 63 05/07/2022    CALCIUM 8 3 05/07/2022    EGFR 119 05/07/2022     VTE Pharmacologic Prophylaxis: Heparin  VTE Mechanical Prophylaxis: sequential compression device

## 2022-05-07 NOTE — PLAN OF CARE
Problem: MOBILITY - ADULT  Goal: Maintain or return to baseline ADL function  Description: INTERVENTIONS:  -  Assess patient's ability to carry out ADLs; assess patient's baseline for ADL function and identify physical deficits which impact ability to perform ADLs (bathing, care of mouth/teeth, toileting, grooming, dressing, etc )  - Assess/evaluate cause of self-care deficits   - Assess range of motion  - Assess patient's mobility; develop plan if impaired  - Assess patient's need for assistive devices and provide as appropriate  - Encourage maximum independence but intervene and supervise when necessary  - Involve family in performance of ADLs  - Assess for home care needs following discharge   - Consider OT consult to assist with ADL evaluation and planning for discharge  - Provide patient education as appropriate  Outcome: Progressing  Goal: Maintains/Returns to pre admission functional level  Description: INTERVENTIONS:  - Perform BMAT or MOVE assessment daily    - Set and communicate daily mobility goal to care team and patient/family/caregiver     - Collaborate with rehabilitation services on mobility goals if consulted  - Stand patient 3 times a day  - Ambulate patient 3 times a day  - Out of bed to chair 3 times a day   - Out of bed for meals 3 times a day  - Out of bed for toileting  - Record patient progress and toleration of activity level   Outcome: Progressing     Problem: PAIN - ADULT  Goal: Verbalizes/displays adequate comfort level or baseline comfort level  Description: Interventions:  - Encourage patient to monitor pain and request assistance  - Assess pain using appropriate pain scale  - Administer analgesics based on type and severity of pain and evaluate response  - Implement non-pharmacological measures as appropriate and evaluate response  - Consider cultural and social influences on pain and pain management  - Notify physician/advanced practitioner if interventions unsuccessful or patient reports new pain  Outcome: Progressing     Problem: INFECTION - ADULT  Goal: Absence or prevention of progression during hospitalization  Description: INTERVENTIONS:  - Assess and monitor for signs and symptoms of infection  - Monitor lab/diagnostic results  - Monitor all insertion sites, i e  indwelling lines, tubes, and drains  - Monitor endotracheal if appropriate and nasal secretions for changes in amount and color  - [place] appropriate cooling/warming therapies per order  - Administer medications as ordered  - Instruct and encourage patient and family to use good hand hygiene technique  - Identify and instruct in appropriate isolation precautions for identified infection/condition  Outcome: Progressing  Goal: Absence of fever/infection during neutropenic period  Description: INTERVENTIONS:  - Monitor WBC    Outcome: Progressing     Problem: SAFETY ADULT  Goal: Maintain or return to baseline ADL function  Description: INTERVENTIONS:  -  Assess patient's ability to carry out ADLs; assess patient's baseline for ADL function and identify physical deficits which impact ability to perform ADLs (bathing, care of mouth/teeth, toileting, grooming, dressing, etc )  - Assess/evaluate cause of self-care deficits   - Assess range of motion  - Assess patient's mobility; develop plan if impaired  - Assess patient's need for assistive devices and provide as appropriate  - Encourage maximum independence but intervene and supervise when necessary  - Involve family in performance of ADLs  - Assess for home care needs following discharge   - Consider OT consult to assist with ADL evaluation and planning for discharge  - Provide patient education as appropriate  Outcome: Progressing  Goal: Patient will remain free of falls  Description: INTERVENTIONS:  -  Assess patient's ability to carry out ADLs; assess patient's baseline for ADL function and identify physical deficits which impact ability to perform ADLs (bathing, care of mouth/teeth, toileting, grooming, dressing, etc )  - Assess/evaluate cause of self-care deficits   - Assess range of motion  - Assess patient's mobility; develop plan if impaired  - Assess patient's need for assistive devices and provide as appropriate  - Encourage maximum independence but intervene and supervise when necessary  - Involve family in performance of ADLs  - Assess for home care needs following discharge   - Consider OT consult to assist with ADL evaluation and planning for discharge  - Provide patient education as appropriate  Outcome: Progressing

## 2022-05-08 VITALS
TEMPERATURE: 98.3 F | HEIGHT: 70 IN | DIASTOLIC BLOOD PRESSURE: 72 MMHG | BODY MASS INDEX: 35.79 KG/M2 | SYSTOLIC BLOOD PRESSURE: 118 MMHG | RESPIRATION RATE: 18 BRPM | HEART RATE: 97 BPM | OXYGEN SATURATION: 96 % | WEIGHT: 250 LBS

## 2022-05-08 LAB
ANION GAP SERPL CALCULATED.3IONS-SCNC: 2 MMOL/L (ref 4–13)
BUN SERPL-MCNC: 6 MG/DL (ref 5–25)
CALCIUM SERPL-MCNC: 8.5 MG/DL (ref 8.3–10.1)
CHLORIDE SERPL-SCNC: 111 MMOL/L (ref 100–108)
CO2 SERPL-SCNC: 29 MMOL/L (ref 21–32)
CREAT SERPL-MCNC: 0.58 MG/DL (ref 0.6–1.3)
ERYTHROCYTE [DISTWIDTH] IN BLOOD BY AUTOMATED COUNT: 12.6 % (ref 11.6–15.1)
GFR SERPL CREATININE-BSD FRML MDRD: 123 ML/MIN/1.73SQ M
GLUCOSE SERPL-MCNC: 87 MG/DL (ref 65–140)
HCT VFR BLD AUTO: 31.1 % (ref 34.8–46.1)
HGB BLD-MCNC: 10.2 G/DL (ref 11.5–15.4)
MAGNESIUM SERPL-MCNC: 1.8 MG/DL (ref 1.6–2.6)
MCH RBC QN AUTO: 29.3 PG (ref 26.8–34.3)
MCHC RBC AUTO-ENTMCNC: 32.8 G/DL (ref 31.4–37.4)
MCV RBC AUTO: 89 FL (ref 82–98)
PLATELET # BLD AUTO: 204 THOUSANDS/UL (ref 149–390)
PMV BLD AUTO: 8.8 FL (ref 8.9–12.7)
POTASSIUM SERPL-SCNC: 3.8 MMOL/L (ref 3.5–5.3)
RBC # BLD AUTO: 3.48 MILLION/UL (ref 3.81–5.12)
SODIUM SERPL-SCNC: 142 MMOL/L (ref 136–145)
WBC # BLD AUTO: 6.73 THOUSAND/UL (ref 4.31–10.16)

## 2022-05-08 PROCEDURE — 99024 POSTOP FOLLOW-UP VISIT: CPT | Performed by: OBSTETRICS & GYNECOLOGY

## 2022-05-08 PROCEDURE — NC001 PR NO CHARGE: Performed by: OBSTETRICS & GYNECOLOGY

## 2022-05-08 PROCEDURE — 80048 BASIC METABOLIC PNL TOTAL CA: CPT | Performed by: OBSTETRICS & GYNECOLOGY

## 2022-05-08 PROCEDURE — NC001 PR NO CHARGE: Performed by: PHYSICIAN ASSISTANT

## 2022-05-08 PROCEDURE — 99232 SBSQ HOSP IP/OBS MODERATE 35: CPT | Performed by: ANESTHESIOLOGY

## 2022-05-08 PROCEDURE — 85027 COMPLETE CBC AUTOMATED: CPT | Performed by: OBSTETRICS & GYNECOLOGY

## 2022-05-08 PROCEDURE — 83735 ASSAY OF MAGNESIUM: CPT | Performed by: OBSTETRICS & GYNECOLOGY

## 2022-05-08 RX ORDER — DOCUSATE SODIUM 100 MG/1
100 CAPSULE, LIQUID FILLED ORAL 2 TIMES DAILY PRN
Qty: 20 CAPSULE | Refills: 0 | Status: SHIPPED | OUTPATIENT
Start: 2022-05-08

## 2022-05-08 RX ORDER — SIMETHICONE 80 MG
80 TABLET,CHEWABLE ORAL EVERY 6 HOURS PRN
Qty: 20 TABLET | Refills: 0 | Status: SHIPPED | OUTPATIENT
Start: 2022-05-08

## 2022-05-08 RX ORDER — OXYCODONE HYDROCHLORIDE 5 MG/1
5 TABLET ORAL EVERY 6 HOURS PRN
Qty: 20 TABLET | Refills: 0 | Status: SHIPPED | OUTPATIENT
Start: 2022-05-08 | End: 2022-05-18

## 2022-05-08 RX ADMIN — IBUPROFEN 600 MG: 600 TABLET, FILM COATED ORAL at 11:16

## 2022-05-08 RX ADMIN — HEPARIN SODIUM 5000 UNITS: 5000 INJECTION INTRAVENOUS; SUBCUTANEOUS at 05:51

## 2022-05-08 RX ADMIN — IBUPROFEN 600 MG: 600 TABLET, FILM COATED ORAL at 00:47

## 2022-05-08 RX ADMIN — DOCUSATE SODIUM 100 MG: 100 CAPSULE, LIQUID FILLED ORAL at 17:17

## 2022-05-08 RX ADMIN — FAMOTIDINE 20 MG: 20 TABLET ORAL at 08:24

## 2022-05-08 RX ADMIN — HEPARIN SODIUM 5000 UNITS: 5000 INJECTION INTRAVENOUS; SUBCUTANEOUS at 14:00

## 2022-05-08 RX ADMIN — DOCUSATE SODIUM 100 MG: 100 CAPSULE, LIQUID FILLED ORAL at 08:24

## 2022-05-08 RX ADMIN — OXYCODONE HYDROCHLORIDE 10 MG: 10 TABLET ORAL at 17:17

## 2022-05-08 RX ADMIN — OXYCODONE HYDROCHLORIDE 10 MG: 10 TABLET ORAL at 11:16

## 2022-05-08 RX ADMIN — SIMETHICONE 80 MG: 80 TABLET, CHEWABLE ORAL at 14:00

## 2022-05-08 RX ADMIN — ACETAMINOPHEN 975 MG: 325 TABLET, FILM COATED ORAL at 14:00

## 2022-05-08 RX ADMIN — FAMOTIDINE 20 MG: 20 TABLET ORAL at 17:17

## 2022-05-08 RX ADMIN — SIMETHICONE 80 MG: 80 TABLET, CHEWABLE ORAL at 08:25

## 2022-05-08 RX ADMIN — IBUPROFEN 600 MG: 600 TABLET, FILM COATED ORAL at 05:51

## 2022-05-08 RX ADMIN — OXYCODONE HYDROCHLORIDE 10 MG: 10 TABLET ORAL at 00:47

## 2022-05-08 RX ADMIN — IBUPROFEN 600 MG: 600 TABLET, FILM COATED ORAL at 17:17

## 2022-05-08 RX ADMIN — ACETAMINOPHEN 975 MG: 325 TABLET, FILM COATED ORAL at 05:51

## 2022-05-08 NOTE — DISCHARGE INSTRUCTIONS
Cervical Cancer   WHAT YOU NEED TO KNOW:   Cervical cancer starts in the cells of the cervix  The cervix is the opening of the uterus  DISCHARGE INSTRUCTIONS:   Call your local emergency number (911 in the [address]) for any of the following:   · You suddenly feel lightheaded and short of breath  · You have chest pain when you take a deep breath or cough  · You cough up blood  Call your doctor or oncologist if:   · Your arm or leg feels warm, tender, and painful  It may look swollen and red  · You cannot urinate  · You have a fever  · You have foul-smelling vaginal discharge  · You have new or heavier bleeding from your vagina  · You have new or worsening pain  · You have nausea or are vomiting  · You have swelling in your abdomen or legs  · You have to urinate urgently and often, or you cannot hold your urine  · You have questions or concerns about your condition or care  Follow up with your doctor or oncologist as directed: You will need to see your oncologist for ongoing tests  Write down your questions so you remember to ask them during your visits  Do not smoke:  Smoking increases your risk for new or returning cancer  Ask your healthcare provider for information if you currently smoke and need help to quit  E-cigarettes or smokeless tobacco still contain nicotine  Talk to your healthcare provider before you use these products  Eat a variety of healthy foods:  Healthy foods include fruits, vegetables, whole-grain breads, low-fat dairy products, beans, lean meats, and fish  Ask if you need to be on a special diet  Drink liquids as directed:  Liquids prevent dehydration  Ask your healthcare provider how much liquid to drink each day and which liquids are best for you  Exercise as directed:  Ask your healthcare provider or oncologist about the best exercise plan for you  Exercise prevents muscle loss and can help improve your energy level and appetite         Limit or do not drink alcohol as directed:  Ask your healthcare provider if it is safe for you to drink alcohol  Also ask how much is safe for you to drink  Alcohol can make it hard to manage side effects of cancer treatment  For support and more information:   · [address]  [place]  [address]  Phone: 4- 299 - 439-8459  Web Address: http://[employer]/  org    · [address] [place] [address], [address] [place] [address]  Phone: 1- 828 - 297-7782  Web Address: http://[employer]/  [address]22 Information is for End User's use only and may not be sold, redistributed or otherwise used for commercial purposes  All illustrations and images included in CareNotes® are the copyrighted property of [employer]  or [address] [place] [address]  The above information is an  only  It is not intended as medical advice for individual conditions or treatments  Talk to your doctor, nurse or pharmacist before following any medical regimen to see if it is safe and effective for you

## 2022-05-08 NOTE — PROGRESS NOTES
Progress Note - Acute Pain Service    Hiren Shreman 32 y o  female MRN: [id number]  Unit/Bed#: [facility] 919-01 Encounter: [id number]      Assessment:   Principal Problem:    Malignant neoplasm of overlapping sites of cervix [facility])  Active Problems:    Obesity (BMI 35 0-39 9 without comorbidity)    Stage I B newly diagnosed metastatic cervical carcinoma to pelvic nodes postoperative day 2  Status post exploratory laparotomy pelvic lymphadenectomy bilateral oophoropexy for metastatic cervical cancer      Pain is controlled however patient is having some difficulty ambulating  She states she is somewhat dizzy  Epidural removed yesterday, pain did get a little worse, mostly gas pain  PRN opioids working  Plan:   1  Stable from a pain standpoint, on pathway  2  Discontinue IV dilaudid  APS sign off  Thank you for the Consult  Please contact APS ([phone]) with any further questions    Pain History  Current pain location(s): abdomen  Pain Scale:   5/10  Quality: throbbing  24 hour history: epidural removed yesterday    Opioid requirement previous 24 hours: 3 doses of 10 mg PO oxycodone    Meds/Allergies   all current active meds have been reviewed    No Known Allergies    Objective     Temp:  [97 4 °F (36 3 °C)-98 °F (36 7 °C)] 98 °F (36 7 °C)  HR:  [85-91] 85  Resp:  [18] 18  BP: (116-122)/(78-83) 122/78    Physical Exam  Constitutional:       Appearance: She is obese  HENT:      Head: Normocephalic  Nose: Nose normal       Mouth/Throat:      Mouth: Mucous membranes are moist    Pulmonary:      Effort: Pulmonary effort is normal    Musculoskeletal:         General: Normal range of motion  Cervical back: Normal range of motion  Skin:     General: Skin is warm  Neurological:      General: No focal deficit present  Mental Status: She is alert and oriented to person, place, and time  Mental status is at baseline     Psychiatric:         Mood and Affect: Mood normal          Behavior: Behavior normal          Thought Content: Thought content normal          Judgment: Judgment normal          Lab Results:   Results from last 7 days   Lab Units 05/08/22  0600   WBC Thousand/uL 6 73   HEMOGLOBIN g/dL 10 2*   HEMATOCRIT % 31 1*   PLATELETS Thousands/uL 204      Results from last 7 days   Lab Units 05/08/22  0600   POTASSIUM mmol/L 3 8   CHLORIDE mmol/L 111*   CO2 mmol/L 29   BUN mg/dL 6   CREATININE mg/dL 0 58*   CALCIUM mg/dL 8 5       Imaging Studies: I have personally reviewed pertinent reports  EKG, Pathology, and Other Studies: I have personally reviewed pertinent reports          Vinnie Hernandez MD

## 2022-05-08 NOTE — CASE MANAGEMENT
Case Management Discharge Planning Note    Patient name Hiren Sherman  Location [address] 919/PPHP 708-43 MRN [id number]  : 1990 Date 2022       Current Admission Date: 2022  Current Admission Diagnosis:Malignant neoplasm of overlapping sites of cervix [facility])   Patient Active Problem List    Diagnosis Date Noted    Obesity (BMI 35 0-39 9 without comorbidity) 2022    Malignant neoplasm of overlapping sites of cervix ([address] ) 2022    Family history of ovarian cancer 2022      LOS (days): 2  Geometric Mean LOS (GMLOS) (days):   Days to GMLOS:     OBJECTIVE:  Risk of Unplanned Readmission Score: 7         Current admission status: Inpatient   Preferred Pharmacy:   [address] [place] [address],9D, [address] [facility]  Phone: [phone] Fax: [phone]    Primary Care Provider: Le Atkinson DO    Primary Insurance: BLUE CROSS  Secondary Insurance:     DISCHARGE DETAILS:    Discharge planning discussed with[de-identified] Pt  Freedom of Choice: Yes  Comments - Freedom of Choice: Pt requesting BSC for d/c home         [address]         Is the patient interested in [facility] at discharge?: No    DME Referral Provided  Referral made for DME?: Yes  DME referral completed for the following items[de-identified] Bedside Commode  DME Supplier Name[de-identified] [employer]

## 2022-05-08 NOTE — PROGRESS NOTES
Was asked by Dr Becky Monge to evaluate patient for discharge this afternoon  Patient was up and ambulating in the hallway when I arrived to see her  She was wearing an abdominal binder for support, and stated that she was tolerating soups and oatmeal without any nausea  She did state that she felt gassy and did pass some flatus and admitted to belching, but no BM as yet  Her pain level was brought down today to approximately 3-4 with Oxycodone 10 mg from 7/10  Patient is voiding well and has minimal bloody vaginal discharge passing a small darker clot today when voiding  Overall her VSS and she is doing better today, feeling that she could go home  Texted Dr Becky Monge who agrees, and she is now written for discharge to home, prescriptions sent to her pharmacy, and instructions given  She is to call the office (Dr Yoly Adams) for follow-up vistit/ appointment       Scottie Jay [facility]  5/8/22 patient encounter [phone]

## 2022-05-08 NOTE — PLAN OF CARE
Problem: SAFETY ADULT  Goal: Maintain or return to baseline ADL function  Description: INTERVENTIONS:  -  Assess patient's ability to carry out ADLs; assess patient's baseline for ADL function and identify physical deficits which impact ability to perform ADLs (bathing, care of mouth/teeth, toileting, grooming, dressing, etc )  - Assess/evaluate cause of self-care deficits   - Assess range of motion  - Assess patient's mobility; develop plan if impaired  - Assess patient's need for assistive devices and provide as appropriate  - Encourage maximum independence but intervene and supervise when necessary  - Involve family in performance of ADLs  - Assess for home care needs following discharge   - Consider OT consult to assist with ADL evaluation and planning for discharge  - Provide patient education as appropriate  5/8/2022 1122 by Sean Hart RN  Outcome: Progressing  5/8/2022 1120 by Sean Hart RN  Outcome: Progressing  Goal: Maintains/Returns to pre admission functional level  Description: INTERVENTIONS:  - Perform BMAT or MOVE assessment daily    - Set and communicate daily mobility goal to care team and patient/family/caregiver  - Collaborate with rehabilitation services on mobility goals if consulted  - Perform Range of Motion 2 times a day  - Reposition patient every 2 hours    - Dangle patient 2 times a day  - Stand patient 2 times a day  - Ambulate patient 2 times a day  - Out of bed to chair 2 times a day   - Out of bed for meals 2 times a day  - Out of bed for toileting  - Record patient progress and toleration of activity level   5/8/2022 1122 by Sean Hart RN  Outcome: Progressing  5/8/2022 1120 by Sean Hart RN  Outcome: Progressing  Goal: Patient will remain free of falls  Description: INTERVENTIONS:  - Educate patient/family on patient safety including physical limitations  - Instruct patient to call for assistance with activity   - Consult OT/PT to assist with strengthening/mobility   - Keep Call bell within reach  - Keep bed low and locked with side rails adjusted as appropriate  - Keep care items and personal belongings within reach  - Initiate and maintain comfort rounds  - Make Fall Risk Sign visible to staff  - Offer Toileting every 2 Hours, in advance of need  - Initiate/Maintain alarm  - Obtain necessary fall risk management equipment:   - Apply yellow socks and bracelet for high fall risk patients  - Consider moving patient to room near nurses station  5/8/2022 1122 by Yusra Blanc RN  Outcome: Progressing  5/8/2022 1120 by Yusra Blanc RN  Outcome: Progressing     Problem: DISCHARGE PLANNING  Goal: Discharge to home or other facility with appropriate resources  Description: INTERVENTIONS:  - Identify barriers to discharge w/patient and caregiver  - Arrange for needed discharge resources and transportation as appropriate  - Identify discharge learning needs (meds, wound care, etc )  - Arrange for interpretive services to assist at discharge as needed  - Refer to Case Management Department for coordinating discharge planning if the patient needs post-hospital services based on physician/advanced practitioner order or complex needs related to functional status, cognitive ability, or social support system  5/8/2022 1122 by Yusra Blanc RN  Outcome: Progressing  5/8/2022 1120 by Yusra Blanc RN  Outcome: Progressing     Problem: Knowledge Deficit  Goal: Patient/family/caregiver demonstrates understanding of disease process, treatment plan, medications, and discharge instructions  Description: Complete learning assessment and assess knowledge base    Interventions:  - Provide teaching at level of understanding  - Provide teaching via preferred learning methods  5/8/2022 1122 by Yusra Blanc RN  Outcome: Progressing  5/8/2022 1120 by Yusra Blanc RN  Outcome: Progressing     Problem: Potential for Falls  Goal: Patient will remain free of falls  Description: INTERVENTIONS:  - Educate patient/family on patient safety including physical limitations  - Instruct patient to call for assistance with activity   - Consult OT/PT to assist with strengthening/mobility   - Keep Call bell within reach  - Keep bed low and locked with side rails adjusted as appropriate  - Keep care items and personal belongings within reach  - Initiate and maintain comfort rounds  - Make Fall Risk Sign visible to staff  - Offer Toileting every 2 Hours, in advance of need  - Initiate/Maintain alarm  - Obtain necessary fall risk management equipment:   - Apply yellow socks and bracelet for high fall risk patients  - Consider moving patient to room near nurses station  5/8/2022 1122 by Angelita Virgen RN  Outcome: Progressing  5/8/2022 1120 by Angelita Virgen RN  Outcome: Progressing     Problem: MOBILITY - ADULT  Goal: Maintain or return to baseline ADL function  Description: INTERVENTIONS:  -  Assess patient's ability to carry out ADLs; assess patient's baseline for ADL function and identify physical deficits which impact ability to perform ADLs (bathing, care of mouth/teeth, toileting, grooming, dressing, etc )  - Assess/evaluate cause of self-care deficits   - Assess range of motion  - Assess patient's mobility; develop plan if impaired  - Assess patient's need for assistive devices and provide as appropriate  - Encourage maximum independence but intervene and supervise when necessary  - Involve family in performance of ADLs  - Assess for home care needs following discharge   - Consider OT consult to assist with ADL evaluation and planning for discharge  - Provide patient education as appropriate  5/8/2022 1122 by Angelita Virgen RN  Outcome: Progressing  5/8/2022 1120 by Angelita Virgen RN  Outcome: Progressing  Goal: Maintains/Returns to pre admission functional level  Description: INTERVENTIONS:  - Perform BMAT or MOVE assessment daily    - Set and communicate daily mobility goal to care team and patient/family/caregiver  - Collaborate with rehabilitation services on mobility goals if consulted  - Perform Range of Motion 2 times a day  - Reposition patient every 2 hours    - Dangle patient 2 times a day  - Stand patient 2 times a day  - Ambulate patient 2 times a day  - Out of bed to chair 2 times a day   - Out of bed for meals 2 times a day  - Out of bed for toileting  - Record patient progress and toleration of activity level   5/8/2022 1122 by Jennifer Bemran RN  Outcome: Progressing  5/8/2022 1120 by Jennifer Berman RN  Outcome: Progressing     Problem: PAIN - ADULT  Goal: Verbalizes/displays adequate comfort level or baseline comfort level  Description: Interventions:  - Encourage patient to monitor pain and request assistance  - Assess pain using appropriate pain scale  - Administer analgesics based on type and severity of pain and evaluate response  - Implement non-pharmacological measures as appropriate and evaluate response  - Consider cultural and social influences on pain and pain management  - Notify physician/advanced practitioner if interventions unsuccessful or patient reports new pain  5/8/2022 1122 by Jennifer Berman RN  Outcome: Progressing  5/8/2022 1120 by Jennifer Berman RN  Outcome: Progressing

## 2022-05-08 NOTE — PLAN OF CARE
Problem: MOBILITY - ADULT  Goal: Maintain or return to baseline ADL function  Description: INTERVENTIONS:  -  Assess patient's ability to carry out ADLs; assess patient's baseline for ADL function and identify physical deficits which impact ability to perform ADLs (bathing, care of mouth/teeth, toileting, grooming, dressing, etc )  - Assess/evaluate cause of self-care deficits   - Assess range of motion  - Assess patient's mobility; develop plan if impaired  - Assess patient's need for assistive devices and provide as appropriate  - Encourage maximum independence but intervene and supervise when necessary  - Involve family in performance of ADLs  - Assess for home care needs following discharge   - Consider OT consult to assist with ADL evaluation and planning for discharge  - Provide patient education as appropriate  Outcome: Progressing  Goal: Maintains/Returns to pre admission functional level  Description: INTERVENTIONS:  - Perform BMAT or MOVE assessment daily    - Set and communicate daily mobility goal to care team and patient/family/caregiver  - Collaborate with rehabilitation services on mobility goals if consulted  - Perform Range of Motion 2 times a day  - Reposition patient every 2 hours    - Dangle patient 2 times a day  - Stand patient 2 times a day  - Ambulate patient 2 times a day  - Out of bed to chair 2 times a day   - Out of bed for meals 2 times a day  - Out of bed for toileting  - Record patient progress and toleration of activity level   Outcome: Progressing     Problem: PAIN - ADULT  Goal: Verbalizes/displays adequate comfort level or baseline comfort level  Description: Interventions:  - Encourage patient to monitor pain and request assistance  - Assess pain using appropriate pain scale  - Administer analgesics based on type and severity of pain and evaluate response  - Implement non-pharmacological measures as appropriate and evaluate response  - Consider cultural and social influences on pain and pain management  - Notify physician/advanced practitioner if interventions unsuccessful or patient reports new pain  Outcome: Progressing     Problem: INFECTION - ADULT  Goal: Absence or prevention of progression during hospitalization  Description: INTERVENTIONS:  - Assess and monitor for signs and symptoms of infection  - Monitor lab/diagnostic results  - Monitor all insertion sites, i e  indwelling lines, tubes, and drains  - Monitor endotracheal if appropriate and nasal secretions for changes in amount and color  - [place] appropriate cooling/warming therapies per order  - Administer medications as ordered  - Instruct and encourage patient and family to use good hand hygiene technique  - Identify and instruct in appropriate isolation precautions for identified infection/condition  Outcome: Progressing  Goal: Absence of fever/infection during neutropenic period  Description: INTERVENTIONS:  - Monitor WBC    Outcome: Progressing     Problem: SAFETY ADULT  Goal: Maintain or return to baseline ADL function  Description: INTERVENTIONS:  -  Assess patient's ability to carry out ADLs; assess patient's baseline for ADL function and identify physical deficits which impact ability to perform ADLs (bathing, care of mouth/teeth, toileting, grooming, dressing, etc )  - Assess/evaluate cause of self-care deficits   - Assess range of motion  - Assess patient's mobility; develop plan if impaired  - Assess patient's need for assistive devices and provide as appropriate  - Encourage maximum independence but intervene and supervise when necessary  - Involve family in performance of ADLs  - Assess for home care needs following discharge   - Consider OT consult to assist with ADL evaluation and planning for discharge  - Provide patient education as appropriate  Outcome: Progressing  Goal: Maintains/Returns to pre admission functional level  Description: INTERVENTIONS:  - Perform BMAT or MOVE assessment daily    - Set and communicate daily mobility goal to care team and patient/family/caregiver  - Collaborate with rehabilitation services on mobility goals if consulted  - Perform Range of Motion 2 times a day  - Reposition patient every 2 hours    - Dangle patient 2 times a day  - Stand patient 2 times a day  - Ambulate patient 2 times a day  - Out of bed to chair 2 times a day   - Out of bed for meals 2 times a day  - Out of bed for toileting  - Record patient progress and toleration of activity level   Outcome: Progressing  Goal: Patient will remain free of falls  Description: INTERVENTIONS:  - Educate patient/family on patient safety including physical limitations  - Instruct patient to call for assistance with activity   - Consult OT/PT to assist with strengthening/mobility   - Keep Call bell within reach  - Keep bed low and locked with side rails adjusted as appropriate  - Keep care items and personal belongings within reach  - Initiate and maintain comfort rounds  - Make Fall Risk Sign visible to staff  - Offer Toileting every 2 Hours, in advance of need  - Initiate/Maintain alarm  - Obtain necessary fall risk management equipment: 2  - Apply yellow socks and bracelet for high fall risk patients  - Consider moving patient to room near nurses station  Outcome: Progressing     Problem: Potential for Falls  Goal: Patient will remain free of falls  Description: INTERVENTIONS:  - Educate patient/family on patient safety including physical limitations  - Instruct patient to call for assistance with activity   - Consult OT/PT to assist with strengthening/mobility   - Keep Call bell within reach  - Keep bed low and locked with side rails adjusted as appropriate  - Keep care items and personal belongings within reach  - Initiate and maintain comfort rounds  - Make Fall Risk Sign visible to staff  - Offer Toileting every 2 Hours, in advance of need  - Initiate/Maintain alarm  - Obtain necessary fall risk management equipment:   - Apply yellow socks and bracelet for high fall risk patients  - Consider moving patient to room near nurses station  Outcome: Progressing

## 2022-05-09 ENCOUNTER — TELEPHONE (OUTPATIENT)
Dept: CARDIAC SURGERY | Facility: CLINIC | Age: 32
End: 2022-05-09

## 2022-05-09 NOTE — UTILIZATION REVIEW
Notification of Discharge   This is a Notification of Discharge from our facility [address]  Please be advised that this patient has been discharge from our facility  Below you will find the admission and discharge date and time including the patients disposition  UTILIZATION REVIEW CONTACT:  Eliana Reaves  Utilization   Network Utilization Review Department  Phone: [phone] x carefully listen to the prompts  All voicemails are confidential   Email: [email or web address]     PHYSICIAN ADVISORY SERVICES:  FOR OJBR-LA-CRCE REVIEW - MEDICAL NECESSITY DENIAL  Phone: [phone]  Fax: [phone]  Email: [email or web address]     PRESENTATION DATE: 5/6/2022  5:19 AM  OBERVATION ADMISSION DATE:   INPATIENT ADMISSION DATE: 5/6/22 11:28 AM   DISCHARGE DATE: 5/8/2022  6:44 PM  DISPOSITION: Home/Self Care Home/Self Care      IMPORTANT INFORMATION:  Send all requests for admission clinical reviews, approved or denied determinations and any other requests to dedicated fax number below belonging to the campus where the patient is receiving treatment   List of dedicated fax numbers:  [address] DENIALS (Administrative/Medical Necessity) [phone]   [address] (Maternity/NICU/Pediatrics) [phone]   [facility] [phone]   130 [facility] [phone]   [address] [place] [address] [phone]   2000 [facility] [address] [place] [address],[address] Floor [address] [phone]   [facility]  [phone]   2205 [facility], [address]  2401 [facility] 1000 [facility] [phone]

## 2022-05-11 DIAGNOSIS — R30.0 DYSURIA: Primary | ICD-10-CM

## 2022-05-11 LAB
DME PARACHUTE DELIVERY DATE ACTUAL: NORMAL
DME PARACHUTE DELIVERY DATE REQUESTED: NORMAL
DME PARACHUTE ITEM DESCRIPTION: NORMAL
DME PARACHUTE ORDER STATUS: NORMAL
DME PARACHUTE SUPPLIER NAME: NORMAL
DME PARACHUTE SUPPLIER PHONE: NORMAL

## 2022-05-13 ENCOUNTER — TELEPHONE (OUTPATIENT)
Dept: SURGICAL ONCOLOGY | Facility: CLINIC | Age: 32
End: 2022-05-13

## 2022-05-13 NOTE — TELEPHONE ENCOUNTER
Patient called in requesting urine lab order be faxed to Kimberly Wallace over lab order to number she provided, [phone]  Confirmed fax sent  She also asked if we had received her FMLA forms  Confirmed with Carolina Jacobs MA and Bar Chong that they had not received anything  Provided fax and email for Carolina Jacobs MA per her request  Patient was agreeable

## 2022-05-15 ENCOUNTER — PATIENT MESSAGE (OUTPATIENT)
Dept: GYNECOLOGIC ONCOLOGY | Facility: HOSPITAL | Age: 32
End: 2022-05-15

## 2022-05-17 ENCOUNTER — PATIENT MESSAGE (OUTPATIENT)
Dept: GYNECOLOGIC ONCOLOGY | Facility: HOSPITAL | Age: 32
End: 2022-05-17

## 2022-05-19 ENCOUNTER — OFFICE VISIT (OUTPATIENT)
Dept: GYNECOLOGIC ONCOLOGY | Facility: HOSPITAL | Age: 32
End: 2022-05-19
Payer: COMMERCIAL

## 2022-05-19 ENCOUNTER — TELEPHONE (OUTPATIENT)
Dept: SURGICAL ONCOLOGY | Facility: CLINIC | Age: 32
End: 2022-05-19

## 2022-05-19 VITALS
WEIGHT: 250 LBS | OXYGEN SATURATION: 98 % | SYSTOLIC BLOOD PRESSURE: 128 MMHG | HEIGHT: 70 IN | RESPIRATION RATE: 16 BRPM | TEMPERATURE: 97.4 F | DIASTOLIC BLOOD PRESSURE: 60 MMHG | BODY MASS INDEX: 35.79 KG/M2 | HEART RATE: 100 BPM

## 2022-05-19 DIAGNOSIS — C53.8 MALIGNANT NEOPLASM OF OVERLAPPING SITES OF CERVIX (HCC): Primary | ICD-10-CM

## 2022-05-19 PROCEDURE — 99024 POSTOP FOLLOW-UP VISIT: CPT | Performed by: OBSTETRICS & GYNECOLOGY

## 2022-05-19 NOTE — PROGRESS NOTES
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 status post exploratory laparotomy, aborted radical hysterectomy, bilateral pelvic lymph node dissection  There were 2 right pelvic lymph nodes positive, 1 was in the common iliac chain  She is recovering well from surgery  Her performance status is 1   1  PET-CT imaging to assess disease status and plan treatment  2  Referral to Radiation Oncology  3  I discussed the risks and benefits of cisplatin chemotherapy at 40 milligrams/meter squared to be given weekly along with radiation therapy as a radiation sensitizer  She understands the risks and benefits of cisplatin chemotherapy including kidney dysfunction, electrolyte imbalance, neuropathy, nausea, vomiting  She understands that side effects are typically limited due to the lower dose  Consent for treatment as well as for potential blood transfusion were obtained by me in the office  4  We discussed scheduling of chemotherapy and reviewed the basics of radiation therapy for cervical cancer  She will discuss this further with Radiation Oncology  5  We discussed ongoing activity limitations  Only a brief time was spent on the postoperative history and physical examination             Relevant Orders    NM PET CT skull base to mid thigh    Ambulatory referral to Radiation Oncology            CHIEF COMPLAINT:  Treatment discussion, postoperative evaluation      Problem:  Cancer Staging  Malignant neoplasm of overlapping sites of cervix ([facility] )  Staging form: Cervix Uteri, AJCC Version 9  - Clinical: No stage assigned - Unsigned  - Pathologic: No stage assigned - Unsigned  - Pathologic: FIGO Stage IIIC1p (pT1b1, cM0) - Signed by Marlena Marcial MD on 5/19/2022        Previous therapy:  Oncology History   Malignant neoplasm of overlapping sites of cervix ([facility] )   3/24/2022 Initial Diagnosis    Malignant neoplasm of overlapping sites of cervix [facility])     5/6/2022 Surgery    Exploratory laparotomy for planned radical hysterectomy, bilateral pelvic lymph node dissection, aborted radical hysterectomy due to positive lymph nodes  1  Two right pelvic lymph nodes positive     5/19/2022 -  Cancer Staged    Staging form: Cervix Uteri, AJCC Version 9  - Pathologic: FIGO Stage IIIC1p (pT1b1, cM0) - Signed by Rosalba Nava MD on 5/19/2022  Stage confirmation method: Pathology  Pelvic grady status: Positive  Pelvic grady method of assessment: Lymph node dissection  Para-aortic status: Negative             Patient ID: Alex Hobson is a 32 y o  female  Returns for postoperative evaluation and treatment discussion  She had an exploratory laparotomy via transverse incision for a preoperative diagnosis of stage I B1 squamous cell carcinoma of the cervix  Preoperative MRI demonstrated a 1 4 cm right-sided pelvic lymph node  A frozen section was obtained from the right pelvic lymph node and was noted to be positive for squamous cell carcinoma  The bilateral lymph node dissections were then completed  A common iliac lymph node on the right also had a squamous cell carcinoma  None of the other lymph nodes were positive  The radical hysterectomy was therefore aborted and a known ovarian transposition was performed  She is recovering well from surgery  She was having difficulty initiating and completing the urinary stream which was leading to urinary incontinence  This has improved  She has some vaginal spotting but no bleeding  No hot flashes        The following portions of the patient's history were reviewed and updated as appropriate: allergies, current medications, past family history, past medical history, past social history, past surgical history and problem list     Review of Systems   Constitutional: Negative for activity change and unexpected weight change  HENT: Negative  Eyes: Negative  Respiratory: Negative  Cardiovascular: Negative  Gastrointestinal: Positive for abdominal pain  Negative for abdominal distention  Endocrine: Negative  Genitourinary: Positive for difficulty urinating  Negative for pelvic pain and vaginal bleeding  Musculoskeletal: Negative  Skin: Negative  Allergic/Immunologic: Negative  Neurological: Negative  Hematological: Negative  Psychiatric/Behavioral: Negative  Current Outpatient Medications   Medication Sig Dispense Refill    docusate sodium (COLACE) 100 mg capsule Take 1 capsule (100 mg total) by mouth 2 (two) times a day as needed for constipation 20 capsule 0    Psyllium (METAMUCIL PO) as needed        simethicone (MYLICON) 80 mg chewable tablet Chew 1 tablet (80 mg total) every 6 (six) hours as needed for flatulence 20 tablet 0    ALPRAZolam (XANAX) 0 5 mg tablet 1 tablet (Patient not taking: No sig reported)      Pyridoxine HCl (Vitamin B6) 100 MG TABS  (Patient not taking: No sig reported)       No current facility-administered medications for this visit  Objective:    Blood pressure 128/60, pulse 100, temperature (!) 97 4 °F (36 3 °C), temperature source Temporal, resp  rate 16, height 5' 10" (1 778 m), weight 113 kg (250 lb), SpO2 98 %  Body mass index is 35 87 kg/m²  Body surface area is 2 29 meters squared  Physical Exam  Vitals reviewed  Constitutional:       General: She is not in acute distress  Appearance: Normal appearance  She is obese  She is not ill-appearing, toxic-appearing or diaphoretic  HENT:      Head: Normocephalic and atraumatic  Eyes:      General: No scleral icterus  Right eye: No discharge  Left eye: No discharge  Conjunctiva/sclera: Conjunctivae normal    Pulmonary:      Effort: Pulmonary effort is normal  No respiratory distress  Breath sounds: No stridor  No wheezing     Abdominal: Palpations: Abdomen is soft  Musculoskeletal:      Right lower leg: No edema  Left lower leg: No edema  Skin:     General: Skin is warm and dry  Coloration: Skin is not jaundiced  Findings: No rash  Comments: Incision is clean dry and intact   Neurological:      General: No focal deficit present  Mental Status: She is alert and oriented to person, place, and time  Mental status is at baseline  Cranial Nerves: No cranial nerve deficit  Motor: No weakness  Gait: Gait normal    Psychiatric:         Mood and Affect: Mood normal          Behavior: Behavior normal          Thought Content:  Thought content normal          Judgment: Judgment normal          No results found for:   Lab Results   Component Value Date    K 3 8 05/08/2022     (H) 05/08/2022    CO2 29 05/08/2022    BUN 6 05/08/2022    CREATININE 0 58 (L) 05/08/2022    CALCIUM 8 5 05/08/2022    EGFR 123 05/08/2022     Lab Results   Component Value Date    WBC 6 73 05/08/2022    HGB 10 2 (L) 05/08/2022    HCT 31 1 (L) 05/08/2022    MCV 89 05/08/2022     05/08/2022     No results found for: NEUTROABS     Trend:  No results found for:

## 2022-05-19 NOTE — LETTER
May 20, 2022     DO Jessica Jackson [id number] [address]    Patient: Kathy Tomlin   YOB: 1990   Date of Visit: 5/19/2022       Dear Dr Dasia Brenner: Thank you for referring Kathy Tomlin to me for evaluation  Below are my notes for this consultation  If you have questions, please do not hesitate to call me  I look forward to following your patient along with you  Sincerely,        Dante Villareal MD        CC: Huey Opitz, DO Delta Shield, MD  5/20/2022  2:27 PM  Sign when Signing Visit  Assessment/Plan:    Problem List Items Addressed This Visit        Genitourinary    Malignant neoplasm of overlapping sites of cervix [facility]) - Primary     19-year-old with stage IIIC 1 squamous cell carcinoma of the cervix status post exploratory laparotomy, aborted radical hysterectomy, bilateral pelvic lymph node dissection  There were 2 right pelvic lymph nodes positive, 1 was in the common iliac chain  She is recovering well from surgery  Her performance status is 1   1  PET-CT imaging to assess disease status and plan treatment  2  Referral to Radiation Oncology  3  I discussed the risks and benefits of cisplatin chemotherapy at 40 milligrams/meter squared to be given weekly along with radiation therapy as a radiation sensitizer  She understands the risks and benefits of cisplatin chemotherapy including kidney dysfunction, electrolyte imbalance, neuropathy, nausea, vomiting  She understands that side effects are typically limited due to the lower dose  Consent for treatment as well as for potential blood transfusion were obtained by me in the office  4  We discussed scheduling of chemotherapy and reviewed the basics of radiation therapy for cervical cancer  She will discuss this further with Radiation Oncology  5  We discussed ongoing activity limitations      Only a brief time was spent on the postoperative history and physical examination  Relevant Orders    NM PET CT skull base to mid thigh    Ambulatory referral to Radiation Oncology            CHIEF COMPLAINT:  Treatment discussion, postoperative evaluation      Problem:  Cancer Staging  Malignant neoplasm of overlapping sites of cervix ([place] [address] )  Staging form: Cervix Uteri, AJCC Version 9  - Clinical: No stage assigned - Unsigned  - Pathologic: No stage assigned - Unsigned  - Pathologic: FIGO Stage IIIC1p (pT1b1, cM0) - Signed by Dante Villareal MD on 5/19/2022        Previous therapy:  Oncology History   Malignant neoplasm of overlapping sites of cervix ([place] [address] )   3/24/2022 Initial Diagnosis    Malignant neoplasm of overlapping sites of cervix ([place] [address] )     5/6/2022 Surgery    Exploratory laparotomy for planned radical hysterectomy, bilateral pelvic lymph node dissection, aborted radical hysterectomy due to positive lymph nodes  1  Two right pelvic lymph nodes positive     5/19/2022 -  Cancer Staged    Staging form: Cervix Uteri, AJCC Version 9  - Pathologic: FIGO Stage IIIC1p (pT1b1, cM0) - Signed by Dante Villareal MD on 5/19/2022  Stage confirmation method: Pathology  Pelvic grady status: Positive  Pelvic grady method of assessment: Lymph node dissection  Para-aortic status: Negative             Patient ID: Kathy Tomlin is a 32 y o  female  Returns for postoperative evaluation and treatment discussion  She had an exploratory laparotomy via transverse incision for a preoperative diagnosis of stage I B1 squamous cell carcinoma of the cervix  Preoperative MRI demonstrated a 1 4 cm right-sided pelvic lymph node  A frozen section was obtained from the right pelvic lymph node and was noted to be positive for squamous cell carcinoma  The bilateral lymph node dissections were then completed  A common iliac lymph node on the right also had a squamous cell carcinoma  None of the other lymph nodes were positive    The radical hysterectomy was therefore aborted and a known ovarian transposition was performed  She is recovering well from surgery  She was having difficulty initiating and completing the urinary stream which was leading to urinary incontinence  This has improved  She has some vaginal spotting but no bleeding  No hot flashes  The following portions of the patient's history were reviewed and updated as appropriate: allergies, current medications, past family history, past medical history, past social history, past surgical history and problem list     Review of Systems   Constitutional: Negative for activity change and unexpected weight change  HENT: Negative  Eyes: Negative  Respiratory: Negative  Cardiovascular: Negative  Gastrointestinal: Positive for abdominal pain  Negative for abdominal distention  Endocrine: Negative  Genitourinary: Positive for difficulty urinating  Negative for pelvic pain and vaginal bleeding  Musculoskeletal: Negative  Skin: Negative  Allergic/Immunologic: Negative  Neurological: Negative  Hematological: Negative  Psychiatric/Behavioral: Negative  Current Outpatient Medications   Medication Sig Dispense Refill    docusate sodium (COLACE) 100 mg capsule Take 1 capsule (100 mg total) by mouth 2 (two) times a day as needed for constipation 20 capsule 0    Psyllium (METAMUCIL PO) as needed        simethicone (MYLICON) 80 mg chewable tablet Chew 1 tablet (80 mg total) every 6 (six) hours as needed for flatulence 20 tablet 0    ALPRAZolam (XANAX) 0 5 mg tablet 1 tablet (Patient not taking: No sig reported)      Pyridoxine HCl (Vitamin B6) 100 MG TABS  (Patient not taking: No sig reported)       No current facility-administered medications for this visit  Objective:    Blood pressure 128/60, pulse 100, temperature (!) 97 4 °F (36 3 °C), temperature source Temporal, resp  rate 16, height 5' 10" (1 778 m), weight 113 kg (250 lb), SpO2 98 %  Body mass index is 35 87 kg/m²    Body surface area is 2 29 meters squared  Physical Exam  Vitals reviewed  Constitutional:       General: She is not in acute distress  Appearance: Normal appearance  She is obese  She is not ill-appearing, toxic-appearing or diaphoretic  HENT:      Head: Normocephalic and atraumatic  Eyes:      General: No scleral icterus  Right eye: No discharge  Left eye: No discharge  Conjunctiva/sclera: Conjunctivae normal    Pulmonary:      Effort: Pulmonary effort is normal  No respiratory distress  Breath sounds: No stridor  No wheezing  Abdominal:      Palpations: Abdomen is soft  Musculoskeletal:      Right lower leg: No edema  Left lower leg: No edema  Skin:     General: Skin is warm and dry  Coloration: Skin is not jaundiced  Findings: No rash  Comments: Incision is clean dry and intact   Neurological:      General: No focal deficit present  Mental Status: She is alert and oriented to person, place, and time  Mental status is at baseline  Cranial Nerves: No cranial nerve deficit  Motor: No weakness  Gait: Gait normal    Psychiatric:         Mood and Affect: Mood normal          Behavior: Behavior normal          Thought Content:  Thought content normal          Judgment: Judgment normal          No results found for:   Lab Results   Component Value Date    K 3 8 05/08/2022     (H) 05/08/2022    CO2 29 05/08/2022    BUN 6 05/08/2022    CREATININE 0 58 (L) 05/08/2022    CALCIUM 8 5 05/08/2022    EGFR 123 05/08/2022     Lab Results   Component Value Date    WBC 6 73 05/08/2022    HGB 10 2 (L) 05/08/2022    HCT 31 1 (L) 05/08/2022    MCV 89 05/08/2022     05/08/2022     No results found for: NEUTROABS     Trend:  No results found for:

## 2022-05-19 NOTE — ASSESSMENT & PLAN NOTE
77-year-old with stage IIIC 1 squamous cell carcinoma of the cervix status post exploratory laparotomy, aborted radical hysterectomy, bilateral pelvic lymph node dissection  There were 2 right pelvic lymph nodes positive, 1 was in the common iliac chain  She is recovering well from surgery  Her performance status is 1   1  PET-CT imaging to assess disease status and plan treatment  2  Referral to Radiation Oncology  3  I discussed the risks and benefits of cisplatin chemotherapy at 40 milligrams/meter squared to be given weekly along with radiation therapy as a radiation sensitizer  She understands the risks and benefits of cisplatin chemotherapy including kidney dysfunction, electrolyte imbalance, neuropathy, nausea, vomiting  She understands that side effects are typically limited due to the lower dose  Consent for treatment as well as for potential blood transfusion were obtained by me in the office  4  We discussed scheduling of chemotherapy and reviewed the basics of radiation therapy for cervical cancer  She will discuss this further with Radiation Oncology  5  We discussed ongoing activity limitations  Only a brief time was spent on the postoperative history and physical examination

## 2022-05-23 ENCOUNTER — TELEPHONE (OUTPATIENT)
Dept: HEMATOLOGY ONCOLOGY | Facility: CLINIC | Age: 32
End: 2022-05-23

## 2022-05-23 ENCOUNTER — PATIENT MESSAGE (OUTPATIENT)
Dept: GYNECOLOGIC ONCOLOGY | Facility: HOSPITAL | Age: 32
End: 2022-05-23

## 2022-05-23 ENCOUNTER — DOCUMENTATION (OUTPATIENT)
Dept: GYNECOLOGIC ONCOLOGY | Facility: CLINIC | Age: 32
End: 2022-05-23

## 2022-05-23 NOTE — TELEPHONE ENCOUNTER
Regarding: Pet scan  ----- Message from [facility] sent at 5/23/2022  9:05 AM EDT -----       ----- Message from Shaila Delgado to Claudeen Gust, MD sent at 5/23/2022  9:04 AM -----   Pet scan is scheduled with at [facility] but my insurance wont allow imaging done outside of [facility]  Any imaging done that isnt through [facility] I have to pay out of network fees (deductible and 20% of the cost of the scan) I dont know how to fix this but I know it will delay treatment if I dont get it done  I think I need the auth transferred to [facility] so I can reschedule please

## 2022-05-23 NOTE — TELEPHONE ENCOUNTER
From: Monico Wright  To: Amber Nunez MD  Sent: 5/23/2022 9:04 AM EDT  Subject: Pet scan    Pet scan is scheduled with at [facility] but my insurance wont allow imaging done outside of [facility]  Any imaging done that isnt through [facility] I have to pay out of network fees (deductible and 20% of the cost of the scan) I dont know how to fix this but I know it will delay treatment if I dont get it done  I think I need the auth transferred to [facility] so I can reschedule please

## 2022-05-24 NOTE — PROGRESS NOTES
Multidisciplinary Gynecologic Oncology Tumor Case Review       Physician Recommended Plan     Katalina Sanz is a 32 y o  female     Diagnosis: Stage IB SCC of the cervix (Stage IIIC p) with positive pelvic LNs     Patient was discussed at the Multidisciplinary Gynecologic Oncology Case review on 5/23/22  The group recommended to consider treatment with PET imaging and curative intent chemo/RT  NCCN guidelines were available for review  The final treatment plan will be left to the discretion of the patient and the treating physician  DISCLAIMERS:    TO THE TREATING PHYSICIAN:  This conference is a meeting of clinicians from various specialty areas who evaluate and discuss patients for whom a multidisciplinary treatment approach is being considered  Please note that the above opinion was a consensus of the conference attendees and is intended only to assist in quality care of the discussed patient  The responsibility for follow up on the input given during the conference, along with any final decisions regarding plan of care, is that of the patient and the patient's provider  Accordingly, appointments have only been recommended based on this information and have NOT been scheduled unless otherwise noted  TO THE PATIENT:  This summary is a brief record of major aspects of your cancer treatment  You may choose to share a copy with any of your doctors or nurses  However, this is not a detailed or comprehensive record of your care

## 2022-05-27 ENCOUNTER — LAB REQUISITION (OUTPATIENT)
Dept: LAB | Facility: HOSPITAL | Age: 32
End: 2022-05-27

## 2022-05-27 DIAGNOSIS — C53.9 MALIGNANT NEOPLASM OF CERVIX UTERI, UNSPECIFIED (HCC): ICD-10-CM

## 2022-05-31 ENCOUNTER — RADIATION ONCOLOGY CONSULT (OUTPATIENT)
Dept: RADIATION ONCOLOGY | Facility: CLINIC | Age: 32
End: 2022-05-31
Attending: INTERNAL MEDICINE
Payer: COMMERCIAL

## 2022-05-31 VITALS
BODY MASS INDEX: 35.92 KG/M2 | RESPIRATION RATE: 20 BRPM | OXYGEN SATURATION: 99 % | HEART RATE: 78 BPM | DIASTOLIC BLOOD PRESSURE: 82 MMHG | HEIGHT: 70 IN | TEMPERATURE: 97.6 F | SYSTOLIC BLOOD PRESSURE: 122 MMHG | WEIGHT: 250.88 LBS

## 2022-05-31 DIAGNOSIS — C53.8 MALIGNANT NEOPLASM OF OVERLAPPING SITES OF CERVIX (HCC): Primary | ICD-10-CM

## 2022-05-31 PROCEDURE — 77263 THER RADIOLOGY TX PLNG CPLX: CPT | Performed by: INTERNAL MEDICINE

## 2022-05-31 PROCEDURE — 99204 OFFICE O/P NEW MOD 45 MIN: CPT | Performed by: INTERNAL MEDICINE

## 2022-05-31 PROCEDURE — 77470 SPECIAL RADIATION TREATMENT: CPT | Performed by: INTERNAL MEDICINE

## 2022-05-31 PROCEDURE — 99211 OFF/OP EST MAY X REQ PHY/QHP: CPT | Performed by: INTERNAL MEDICINE

## 2022-05-31 NOTE — PROGRESS NOTES
Consultation - Radiation Oncology      Patient Name: Nidhi Palafox VRW:[id number] : 1990  Encounter: [id number]  Referring Provider: Sage Pena, [address] [address] Staging  Malignant neoplasm of overlapping sites of cervix [facility])  Staging form: Cervix Uteri, AJCC Version 9  - Pathologic: FIGO Stage IIIC1p (pT1b1, cM0) - Signed by Alix Segura MD on 2022  Stage confirmation method: Pathology  Pelvic grady status: Positive  Pelvic grady method of assessment: Lymph node dissection  Para-aortic status: Negative    PLAN  Nidhi Palafox is a 32 y o  female with FIGO IIIC1 cervical cancer s/p initial LEEP in 2022 showing moderately differentiated SCC with extensive LVSI  She had an MRI on 22 which showed a 1 7 cm lesion in the cervix extending to the right vaginal fornix without parametrial invasion, and a single indeterminate right-sided pelvic LN (between the external and internal iliac chain measuring 1 4 x 1 1 cm in the sagittal plane series 4 image #25 and 11 mm in width series 6 image 24)  She underwent evaluation at [facility] and [facility], and was not deemed a candidate for trachelectomy  She thus was planned for radical hysterectomy  EUA revealed a 3 5cm cervix with no palpable parametrial disease  Gross grady disease was found in the right pelvic node and the hysterectomy was aborted  A staging pelvic LND and ovarian translocation to the paracolic gutters was completed  Pathology revealed 3/10 right-sided nodes (2 right pelvic and 1 right common iliac involved) and 0/5 left-sided pelvic nodes  She had a PET/CT at [facility] on 22, which showed a 2 3cm long focus of uptake in the right side of the cervix (image 244) with no other foci of abnormal uptake elsewhere  MDT consensus was for definitive concurrent chemoRT  We discussed definitive concurrent chemoRT followed by brachytherapy boost for cervical cancer   This would involve 5 weeks of external beam RT to the pelvis and paraaortic chain (extended field given common iliac node involvement and >2+ LNs)  Her transposed ovaries are near the level of her aortic bifurcation and we will maximize sparing, though there is still some risk of ovarian dysfunction/failure  This would be followed by a 4 fraction brachytherapy boost  I described that brachytherapy over 5 fractions is thought to perhaps be gentler in terms of fibrosis, however would necessitate an extra procedure with anesthesia over the 4-fraction regimen (logistically, she notes not having anyone beside her boyfriend for transportation, and he is unsure if he can get time off work to bring her to/from brachytherapy  For this reason, it may be prudent to use 4 fractions)  The benefits, alternatives and potential adverse effects of radiation therapy were explained to the patient  Risks include but are not limited to fatigue, skin reaction/dermatitis, hyperpigmentation, urinary frequency/urgency, dysuria, diarrhea, abdominal fullness/bloating, a permanent change in bowel habits, vaginal stenosis, cystitis, proctitis, risk of fistulas/adhesions, perforation, infection, laceration, bleeding, risks associated with anesthesia, and risk of secondary malignancy  We also discussed that, even with appropriate therapy, there is still a risk of progression or recurrence  We reviewed fertility options together, she mentioned that this was previously discussed and 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  The patient agreed to proceed with radiation therapy, and informed consent was signed  · CT simulation to be scheduled at [facility], with EBRT treatments at [facility] and [address] [facility] at [facility]  · Pregnancy test ordered prior to sim  · Requested discs of PET and MRIs from [facility] to assist with radiotherapy planning  · Will get another MRI at the end of EBRT for brachytherapy planning     · Social work referral for support and assisting with transport for EBRT treatments ([facility]) and for HDR treatments (family member)    Thank you for the opportunity to participate in the care of this patient  Jessie Carpenter MD  Department of [address]    Orders Placed This Encounter   Procedures    hCG, quantitative    Ambulatory Referral to Oncology Social Worker    Radiation Simulation Treatment     1  Malignant neoplasm of overlapping sites of cervix [facility])  Ambulatory referral to Radiation Oncology    Ambulatory Referral to Oncology Social Worker    hCG, quantitative    Radiation Simulation Treatment    hCG, quantitative       CHIEF COMPLAINT  Chief Complaint   Patient presents with   [address]        History of Present Illness  Ardajoselyn [place] 1990 is a 32 y o  female Here to discuss pelvic radiation therapy for her recently diagnosed cervical cancer  Referred by Dr Keara Perez       Patient completed first Pap Smear 11/2021  Result showing HSIL positive high risk HPV  Colposcopy completed 12/2021 showed CIN3  LEEP was performed 3/7/22  (Done at [facility] )     3/7/22 Tissue Exam      Slides (18) labeled  from [facility] (obtained on: 03/07/2022)   A  Cervix, anterior, conization:   - Invasive squamous cell carcinoma, moderately differentiated   -  High grade squamous intraepithelial lesion (NII 3)   - Tumor invades 6 mm in depth   - Extensive lymphovascular invasion is identified   - Carcinoma is involving the endocervical, ectocervical and deep margins of   excision     - CIN3 is involving the endocervical and ectocervical margins       B   Cervix, posterior, conization:   - Invasive squamous cell carcinoma, moderately differentiated   -  High grade squamous intraepithelial lesion (NII 3)   - Tumor invades 5 mm in depth   - Extensive lymphovascular invasion is identified   - CIN3 and invasive carcinoma are involving the endocervical margin   - Ectocervical margin is negative for dysplasia and carcinoma       3/24/22  Dr Naya Wise  Discussed treatment options including radical trachelectomy with lymph node dissection verses radical hysterectomy and lymph node dissection  Is unsure about future fertility   MRI ordered to assess tumor size     4/27/22 Orestes Hu/Interpretation of Outside MRI  LYMPH NODES: Indeterminate right pelvic lymph node located between the external and internal iliac chain measuring 1 4 x 1 1 cm in the sagittal plane series 4 image #25 and 11 mm in width series 6 image 24        Has completed 2 second opinions regarding radical trachelectomy for fertility sparing and was deemed to not be a candidate     5/6/22 Surgery performed [facility]  Procedure(s) ([facility]B):  DISSECTION/STAGING LYMPH NODE PELVIS/ABDOMEN (Bilateral)  SALPINGECTOMY, OVARIAN TRANSPOSITION (Bilateral)  Operative Findings:  1  Exam under anesthesia revealed an approximate 3 5 cm cervix  Femi Brochure was no palpable parametrial disease   No palpable adnexal masses  2  On laparotomy, there is no evidence of peritoneal disease   The adnexa were grossly normal bilaterally   Upon opening the perivesical and pararectal spaces bilaterally, there was an enlarged right external iliac lymph node overlying the external iliac vein   This measured approximately 2 x 1 5 cm   There was no other suspicious lymphadenopathy   No palpable parametrial disease  3  Due to gross disease in the right pelvic lymph node, decision was made to abort the radical hysterectomy and perform a bilateral ovarian transposition      5/6/22  Tissue Exam  A  Right pelvic lymph nodes (dissection):  - Metastatic squamous cell carcinoma involving two (2) of two (2) lymph nodes (2/2, 15mm larger focus)  - Lymph-vascular invasion identified   Comment:  - Tumor cells stain for p16 and p40 compatible with squamous cell carcinoma, suggestive of HPV-association   - The metastasis within the second lymph node is not present on the original frozen section/touch prep slides      B  Bilateral fallopian tubes (bilateral salpingectomy):  - Benign bilateral fallopian tubes  - No carcinoma identified    C  Right pelvic lymph nodes (dissection):  - Six (6) lymph nodes negative for carcinoma (0/6)   Comment:  - CK AE1/3 is negative     D  Right common iliac lymph node (excision):  - Metastatic carcinoma involving one (1) of two (2) lymph nodes (1/2, 2mm)   Comment:  - CK AE1/3 highlights the metastatic focus in one (1) lymph node     E  Left pelvic lymph nodes (dissection):  - Five (5) lymph nodes negative for carcinoma (0/5)  Comment:  - CKAE1/3 is negative      5/19/22  Dr Nickie Wilson  PET-CT ordered to assess status  Risks and benefit of chemotherapy discussed  Referred to radiation Oncology     5/23/22 Multidisciplinary Gynecologic Oncology Tumor Case Review  The group recommended to consider treatment with PET imaging and curative intent chemo/RT      5/27/22  PET scan (completed at [facility])  Called for faxed [phone]    Today, the patient feels well overall  She has mild soreness at her abdominal incision  She has some whitish vaginal discharge  No vaginal pain or bleeding  She works on a [employer] campus as [employer] staff  She presents with her boyfriend  Oncology History   Malignant neoplasm of overlapping sites of cervix ([facility] [address] )   3/24/2022 Initial Diagnosis    Malignant neoplasm of overlapping sites of cervix ([facility] [address] )     5/6/2022 Surgery    Exploratory laparotomy for planned radical hysterectomy, bilateral pelvic lymph node dissection, aborted radical hysterectomy, bilateral ovarian transposition due to positive lymph nodes    1  Two right pelvic lymph nodes positive     5/19/2022 -  Cancer Staged    Staging form: Cervix Uteri, AJCC Version 9  - Pathologic: FIGO Stage IIIC1p (pT1b1, cM0) - Signed by Rosalba Nava MD on 5/19/2022  Stage confirmation method: Pathology  Pelvic grady status: Positive  Pelvic grady method of assessment: Lymph node dissection  Para-aortic status: Negative         Historical Information   Past Medical History:   Diagnosis Date    Anxiety     Cervical cancer ([address] )     Depression     Obesity      Past Surgical History:   Procedure Laterality Date    CERVICAL BIOPSY  W/ LOOP ELECTRODE EXCISION      LYMPH NODE DISSECTION Bilateral 5/6/2022    Procedure: DISSECTION/STAGING LYMPH NODE PELVIS/ABDOMEN;  Surgeon: Roopa Rose MD;  [facility]: [facility];  Service: Gynecology Oncology    SALPINGECTOMY Bilateral 5/6/2022    Procedure: SALPINGECTOMY, OVARIAN TRANSPOSITION;  Surgeon: Roopa Rose MD;  [facility]: [facility];  Service: Gynecology Oncology     Family History   Problem Relation Age of Onset    Ovarian cancer Maternal Aunt     Ovarian cancer Maternal Grandmother     No Known Problems Mother     Heart disease Father      Social History   Social History     Substance and Sexual Activity   Alcohol Use Not Currently     Social History     Substance and Sexual Activity   Drug Use Never    Comment: CBD Gummies/Anxiety     Social History     Tobacco Use   Smoking Status Never Smoker   Smokeless Tobacco Never Used     Meds/Allergies     Current Outpatient Medications:     docusate sodium (COLACE) 100 mg capsule, Take 1 capsule (100 mg total) by mouth 2 (two) times a day as needed for constipation, Disp: 20 capsule, Rfl: 0    Psyllium (METAMUCIL PO), as needed  , Disp: , Rfl:     simethicone (MYLICON) 80 mg chewable tablet, Chew 1 tablet (80 mg total) every 6 (six) hours as needed for flatulence, Disp: 20 tablet, Rfl: 0    ALPRAZolam (XANAX) 0 5 mg tablet, 1 tablet (Patient not taking: No sig reported), Disp: , Rfl:     Pyridoxine HCl (Vitamin B6) 100 MG TABS, , Disp: , Rfl:   No Known Allergies    Lab Results/Imaging Studies       Chemistry        Component Value Date/Time    K 3 8 05/08/2022 0600     (H) 05/08/2022 0600    CO2 29 05/08/2022 0600    BUN 6 05/08/2022 0600    CREATININE 0 58 (L) 2022 0600        Component Value Date/Time    CALCIUM 8 5 2022 0600          Lab Results   Component Value Date    WBC 6 73 2022    HGB 10 2 (L) 2022    HCT 31 1 (L) 2022    MCV 89 2022     2022     Imaging Studies  No results found  Review of Systems  Constitutional: Negative  Pt is post op-Early May 2022 sx  Pt is FT /FMLA at this time  Single  HENT: Positive for dental problem (No regular dental care)  Eyes: Negative  Respiratory: Negative  Cardiovascular: Negative  Gastrointestinal: Positive for constipation and diarrhea  Gas pain still present post op    Endocrine: Negative  Genitourinary: Positive for vaginal discharge (Improving )  Pt notes vaginal burning "in general"  Musculoskeletal: Positive for back pain (Low back pain-Chronic )  Skin: Negative  Allergic/Immunologic: Negative  Neurological: Positive for dizziness (With standing for long periods  ) and light-headedness  Left thigh "numbness and tingling" post op-sx decreasing but present since Early May sx  Hematological: Bruises/bleeds easily (Easy bruising )  Psychiatric/Behavioral: Positive for sleep disturbance (Insomnia at times  )  OBJECTIVE:   /82   Pulse 78   Temp 97 6 °F (36 4 °C)   Resp 20   Ht 5' 10" (1 778 m)   Wt 114 kg (250 lb 14 1 oz)   SpO2 99%   BMI 36 00 kg/m²   Pain Assessment:  0  Performance Status: ECO - Asymptomatic    Physical Exam  General Appearance:  Alert, cooperative, no distress, appears stated age  HEENT: normocephalic/atraumatic  Cardiovascular:  Extremities warm and well perfused, no lower extremity edema  Lungs: Respirations unlabored, no cyanosis, able to speak in complete sentences without dyspnea  Abdomen: Non-distended  Extremities: No cyanosis or edema  Gynecologic: Exam does not show a payal visible cervical tumor  There is yellowish-white discharge but no bleeding  No pain on examination  The vaginal canal and fornices do not show gross tumor  RV exam deferred given multiple prior exams and imaging having ruled out parametrial involvement  Skin: No rash or dermatitis  Neurologic: ANOx3    Pathology:  See above  Portions of the record may have been created with voice recognition software  Occasional wrong word or "sound a like" substitutions may have occurred due to the inherent limitations of voice recognition software  Read the chart carefully and recognize, using context, where substitutions have occurred

## 2022-05-31 NOTE — LETTER
May 31, 2022     [address]    Patient: Nidhi Palafox   YOB: 1990   Date of Visit: 2022       Dear Dr Sage Pena: Thank you for referring Nidhi Palafox to me for evaluation  Below are my notes for this consultation  If you have questions, please do not hesitate to call me  I look forward to following your patient along with you  Sincerely,        Marychuy Hoyt MD        CC: No Recipients  Marychuy Hoyt MD  2022  5:08 PM  Sign when Signing Visit  Consultation - Radiation Oncology      Patient Name: Nidhi Palafox WTR:[id number] : 1990  Encounter: [id number]  Referring Provider: Sage Pena, [address] [place] [address] Staging  Malignant neoplasm of overlapping sites of cervix [facility])  Staging form: Cervix Uteri, AJCC Version 9  - Pathologic: FIGO Stage IIIC1p (pT1b1, cM0) - Signed by Alix Segura MD on 2022  Stage confirmation method: Pathology  Pelvic grady status: Positive  Pelvic grady method of assessment: Lymph node dissection  Para-aortic status: Negative    PLAN  Nidhi Palafox is a 32 y o  female with FIGO IIIC1 cervical cancer s/p initial LEEP in 2022 showing moderately differentiated SCC with extensive LVSI  She had an MRI on 22 which showed a 1 7 cm lesion in the cervix extending to the right vaginal fornix without parametrial invasion, and a single indeterminate right-sided pelvic LN (between the external and internal iliac chain measuring 1 4 x 1 1 cm in the sagittal plane series 4 image #25 and 11 mm in width series 6 image 24)  She underwent evaluation at [facility] and [facility], and was not deemed a candidate for trachelectomy  She thus was planned for radical hysterectomy  EUA revealed a 3 5cm cervix with no palpable parametrial disease  Gross grady disease was found in the right pelvic node and the hysterectomy was aborted   A staging pelvic LND and ovarian translocation to the paracolic gutters was completed  Pathology revealed 3/10 right-sided nodes (2 right pelvic and 1 right common iliac involved) and 0/5 left-sided pelvic nodes  She had a PET/CT at [facility] on 5/27/22, which showed a 2 3cm long focus of uptake in the right side of the cervix (image 244) with no other foci of abnormal uptake elsewhere  MDT consensus was for definitive concurrent chemoRT  We discussed definitive concurrent chemoRT followed by brachytherapy boost for cervical cancer  This would involve 5 weeks of external beam RT to the pelvis and paraaortic chain (extended field given common iliac node involvement and >2+ LNs), with a 4 fraction brachytherapy boost  I described that brachytherapy over 5 fractions is thought to perhaps be gentler in terms of fibrosis, however would necessitate an extra procedure with anesthesia over the 4-fraction regimen (logistically, she notes not having anyone beside her boyfriend for transportation, and he is unsure if he can get time off work to bring her to/from brachytherapy  For this reason, it may be prudent to use 4 fractions)  The benefits, alternatives and potential adverse effects of radiation therapy were explained to the patient  Risks include but are not limited to fatigue, skin reaction/dermatitis, hyperpigmentation, urinary frequency/urgency, dysuria, diarrhea, abdominal fullness/bloating, a permanent change in bowel habits, vaginal stenosis, cystitis, proctitis, risk of fistulas/adhesions, perforation, infection, laceration, bleeding, risks associated with anesthesia, and risk of secondary malignancy  We also discussed that, even with appropriate therapy, there is still a risk of progression or recurrence  The patient agreed to proceed with radiation therapy, and informed consent was signed  · CT simulation to be scheduled at [facility], with EBRT treatments at [facility] and [address] [place] [address] at [place]     · Pregnancy test ordered prior to sim  · Requested discs of PET and MRIs from [facility] to assist with radiotherapy planning  · Will get another MRI at the end of EBRT for brachytherapy planning  · Social work referral for support and assisting with transport for EBRT treatments ([facility]) and for HDR treatments (family member)    Thank you for the opportunity to participate in the care of this patient  Yas Mcelroy MD  Department of [facility] [facility] 12    Orders Placed This Encounter   Procedures    hCG, quantitative    Ambulatory Referral to Oncology Social Worker    Radiation Simulation Treatment     1  Malignant neoplasm of overlapping sites of cervix [facility])  Ambulatory referral to Radiation Oncology    Ambulatory Referral to Oncology Social Worker    hCG, quantitative    Radiation Simulation Treatment    hCG, quantitative       CHIEF COMPLAINT  Chief Complaint   Patient presents with   [address]        History of Present Illness  Nehemias Szymanski 1990 is a 32 y o  female Here to discuss pelvic radiation therapy for her recently diagnosed cervical cancer  Referred by Dr Tyson Anderson       Patient completed first Pap Smear 11/2021  Result showing HSIL positive high risk HPV  Colposcopy completed 12/2021 showed CIN3  LEEP was performed 3/7/22  (Done at [facility] )     3/7/22 Tissue Exam      Slides (18) labeled  from [facility] (obtained on: 03/07/2022)   A  Cervix, anterior, conization:   - Invasive squamous cell carcinoma, moderately differentiated   -  High grade squamous intraepithelial lesion (NII 3)   - Tumor invades 6 mm in depth   - Extensive lymphovascular invasion is identified   - Carcinoma is involving the endocervical, ectocervical and deep margins of   excision     - CIN3 is involving the endocervical and ectocervical margins       B   Cervix, posterior, conization:   - Invasive squamous cell carcinoma, moderately differentiated   -  High grade squamous intraepithelial lesion (NII 3)   - Tumor invades 5 mm in depth   - Extensive lymphovascular invasion is identified   - CIN3 and invasive carcinoma are involving the endocervical margin   - Ectocervical margin is negative for dysplasia and carcinoma       3/24/22  Dr Page Yang  Discussed treatment options including radical trachelectomy with lymph node dissection verses radical hysterectomy and lymph node dissection  Is unsure about future fertility   MRI ordered to assess tumor size     4/27/22 [facility]/Interpretation of Outside MRI  LYMPH NODES: Indeterminate right pelvic lymph node located between the external and internal iliac chain measuring 1 4 x 1 1 cm in the sagittal plane series 4 image #25 and 11 mm in width series 6 image 24        Has completed 2 second opinions regarding radical trachelectomy for fertility sparing and was deemed to not be a candidate     5/6/22 Surgery performed [facility]  Procedure(s) (LRB):  DISSECTION/STAGING LYMPH NODE PELVIS/ABDOMEN (Bilateral)  SALPINGECTOMY, OVARIAN TRANSPOSITION (Bilateral)  Operative Findings:  1  Exam under anesthesia revealed an approximate 3 5 cm cervix  Flakito Gavel was no palpable parametrial disease   No palpable adnexal masses  2  On laparotomy, there is no evidence of peritoneal disease   The adnexa were grossly normal bilaterally   Upon opening the perivesical and pararectal spaces bilaterally, there was an enlarged right external iliac lymph node overlying the external iliac vein   This measured approximately 2 x 1 5 cm   There was no other suspicious lymphadenopathy   No palpable parametrial disease  3  Due to gross disease in the right pelvic lymph node, decision was made to abort the radical hysterectomy and perform a bilateral ovarian transposition      5/6/22  Tissue Exam  A   Right pelvic lymph nodes (dissection):  - Metastatic squamous cell carcinoma involving two (2) of two (2) lymph nodes (2/2, 15mm larger focus)  - Lymph-vascular invasion identified   Comment:  - Tumor cells stain for p16 and p40 compatible with squamous cell carcinoma, suggestive of HPV-association   - The metastasis within the second lymph node is not present on the original frozen section/touch prep slides      B  Bilateral fallopian tubes (bilateral salpingectomy):  - Benign bilateral fallopian tubes  - No carcinoma identified    C  Right pelvic lymph nodes (dissection):  - Six (6) lymph nodes negative for carcinoma (0/6)   Comment:  - CK AE1/3 is negative     D  Right common iliac lymph node (excision):  - Metastatic carcinoma involving one (1) of two (2) lymph nodes (1/2, 2mm)   Comment:  - CK AE1/3 highlights the metastatic focus in one (1) lymph node     E  Left pelvic lymph nodes (dissection):  - Five (5) lymph nodes negative for carcinoma (0/5)  Comment:  - CKAE1/3 is negative      5/19/22  Dr Sage Pena  PET-CT ordered to assess status  Risks and benefit of chemotherapy discussed  Referred to radiation Oncology     5/23/22 Multidisciplinary Gynecologic Oncology Tumor Case Review  The group recommended to consider treatment with PET imaging and curative intent chemo/RT      5/27/22  PET scan (completed at [facility])  Called for faxed [phone]    Today, the patient feels well overall  She has mild soreness at her abdominal incision  She has some whitish vaginal discharge  No vaginal pain or bleeding  She works on a [employer] campus as [employer] staff  She presents with her boyfriend  Oncology History   Malignant neoplasm of overlapping sites of cervix ([address] )   3/24/2022 Initial Diagnosis    Malignant neoplasm of overlapping sites of cervix ([address] )     5/6/2022 Surgery    Exploratory laparotomy for planned radical hysterectomy, bilateral pelvic lymph node dissection, aborted radical hysterectomy, bilateral ovarian transposition due to positive lymph nodes    1  Two right pelvic lymph nodes positive     5/19/2022 -  Cancer Staged    Staging form: Cervix Uteri, AJCC Version 9  - Pathologic: FIGO Stage IIIC1p (pT1b1, cM0) - Signed by Mirta Helms MD on 5/19/2022  Stage confirmation method: Pathology  Pelvic grady status: Positive  Pelvic grady method of assessment: Lymph node dissection  Para-aortic status: Negative         Historical Information   Past Medical History:   Diagnosis Date    Anxiety     Cervical cancer ([address] )     Depression     Obesity      Past Surgical History:   Procedure Laterality Date    CERVICAL BIOPSY  W/ LOOP ELECTRODE EXCISION      LYMPH NODE DISSECTION Bilateral 5/6/2022    Procedure: DISSECTION/STAGING LYMPH NODE PELVIS/ABDOMEN;  Surgeon: Mirta Helms MD;  [facility];  Service: Gynecology Oncology    SALPINGECTOMY Bilateral 5/6/2022    Procedure: SALPINGECTOMY, OVARIAN TRANSPOSITION;  Surgeon: Mirta Helms MD;  [facility];  Service: Gynecology Oncology     Family History   Problem Relation Age of Onset    Ovarian cancer Maternal Aunt     Ovarian cancer Maternal Grandmother     No Known Problems Mother     Heart disease Father      Social History   Social History     Substance and Sexual Activity   Alcohol Use Not Currently     Social History     Substance and Sexual Activity   Drug Use Never    Comment: CBD Gummies/Anxiety     Social History     Tobacco Use   Smoking Status Never Smoker   Smokeless Tobacco Never Used     Meds/Allergies     Current Outpatient Medications:     docusate sodium (COLACE) 100 mg capsule, Take 1 capsule (100 mg total) by mouth 2 (two) times a day as needed for constipation, Disp: 20 capsule, Rfl: 0    Psyllium (METAMUCIL PO), as needed  , Disp: , Rfl:     simethicone (MYLICON) 80 mg chewable tablet, Chew 1 tablet (80 mg total) every 6 (six) hours as needed for flatulence, Disp: 20 tablet, Rfl: 0    ALPRAZolam (XANAX) 0 5 mg tablet, 1 tablet (Patient not taking: No sig reported), Disp: , Rfl:     Pyridoxine HCl (Vitamin B6) 100 MG TABS, , Disp: , Rfl: No Known Allergies    Lab Results/Imaging Studies       Chemistry        Component Value Date/Time    K 3 8 2022 0600     (H) 2022 0600    CO2 29 2022 0600    BUN 6 2022 0600    CREATININE 0 58 (L) 2022 0600        Component Value Date/Time    CALCIUM 8 5 2022 0600          Lab Results   Component Value Date    WBC 6 73 2022    HGB 10 2 (L) 2022    HCT 31 1 (L) 2022    MCV 89 2022     2022     Imaging Studies  No results found  Review of Systems  Constitutional: Negative  Pt is post op-Early May 2022 sx  Pt is FT /FMLA at this time  Single  HENT: Positive for dental problem (No regular dental care)  Eyes: Negative  Respiratory: Negative  Cardiovascular: Negative  Gastrointestinal: Positive for constipation and diarrhea  Gas pain still present post op    Endocrine: Negative  Genitourinary: Positive for vaginal discharge (Improving )  Pt notes vaginal burning "in general"  Musculoskeletal: Positive for back pain (Low back pain-Chronic )  Skin: Negative  Allergic/Immunologic: Negative  Neurological: Positive for dizziness (With standing for long periods  ) and light-headedness  Left thigh "numbness and tingling" post op-sx decreasing but present since Early May sx  Hematological: Bruises/bleeds easily (Easy bruising )  Psychiatric/Behavioral: Positive for sleep disturbance (Insomnia at times  )  OBJECTIVE:   /82   Pulse 78   Temp 97 6 °F (36 4 °C)   Resp 20   Ht 5' 10" (1 778 m)   Wt 114 kg (250 lb 14 1 oz)   SpO2 99%   BMI 36 00 kg/m²   Pain Assessment:  0  Performance Status: ECO - Asymptomatic    Physical Exam  General Appearance:  Alert, cooperative, no distress, appears stated age  HEENT: normocephalic/atraumatic  Cardiovascular:  Extremities warm and well perfused, no lower extremity edema    Lungs: Respirations unlabored, no cyanosis, able to speak in complete sentences without dyspnea  Abdomen: Non-distended  Extremities: No cyanosis or edema  Gynecologic: Exam does not show a payal visible cervical tumor  There is yellowish-white discharge but no bleeding  No pain on examination  The vaginal canal and fornices do not show gross tumor  RV exam deferred given multiple prior exams and imaging having ruled out parametrial involvement  Skin: No rash or dermatitis  Neurologic: ANOx3    Pathology:  See above  Portions of the record may have been created with voice recognition software  Occasional wrong word or "sound a like" substitutions may have occurred due to the inherent limitations of voice recognition software  Read the chart carefully and recognize, using context, where substitutions have occurred

## 2022-05-31 NOTE — PROGRESS NOTES
Nidhi Palafox 1990 is a 32 y o  female Here to discuss pelvic radiation therapy for her recently diagnosed cervical cancer  Referred by Dr Sage Pena  Patient completed first Pap Smear 11/2021  Result showing HSIL positive high risk HPV  Colposcopy completed 12/2021 showed CIN3  LEEP was performed 3/7/22  (Done at [facility] )    3/7/22 Tissue Exam     Slides (18) labeled  from [facility] (obtained on: 03/07/2022)   A  Cervix, anterior, conization:   - Invasive squamous cell carcinoma, moderately differentiated   -  High grade squamous intraepithelial lesion (NII 3)   - Tumor invades 6 mm in depth   - Extensive lymphovascular invasion is identified   - Carcinoma is involving the endocervical, ectocervical and deep margins of   excision     - CIN3 is involving the endocervical and ectocervical margins       B  Cervix, posterior, conization:   - Invasive squamous cell carcinoma, moderately differentiated   -  High grade squamous intraepithelial lesion (NII 3)   - Tumor invades 5 mm in depth   - Extensive lymphovascular invasion is identified   - CIN3 and invasive carcinoma are involving the endocervical margin   - Ectocervical margin is negative for dysplasia and carcinoma      3/24/22  Dr Sage Pena  Discussed treatment options including radical trachelectomy with lymph node dissection verses radical hysterectomy and lymph node dissection  Is unsure about future fertility   MRI ordered to assess tumor size    4/27/22 [facility]/Interpretation of Outside MRI  LYMPH NODES: Indeterminate right pelvic lymph node located between the external and internal iliac chain measuring 1 4 x 1 1 cm in the sagittal plane series 4 image #25 and 11 mm in width series 6 image 24        Has completed 2 second opinions regarding radical trachelectomy for fertility sparing and was deemed to not be a candidate    5/6/22 Surgery performed [facility]  Procedure(s) (LRB):  DISSECTION/STAGING LYMPH NODE PELVIS/ABDOMEN (Bilateral)  SALPINGECTOMY, OVARIAN TRANSPOSITION (Bilateral)  Operative Findings:  1  Exam under anesthesia revealed an approximate 3 5 cm cervix  There was no palpable parametrial disease  No palpable adnexal masses  2  On laparotomy, there is no evidence of peritoneal disease  The adnexa were grossly normal bilaterally  Upon opening the perivesical and pararectal spaces bilaterally, there was an enlarged right external iliac lymph node overlying the external iliac vein  This measured approximately 2 x 1 5 cm  There was no other suspicious lymphadenopathy  No palpable parametrial disease  3  Due to gross disease in the right pelvic lymph node, decision was made to abort the radical hysterectomy and perform a bilateral ovarian transposition  5/6/22  Tissue Exam  A  Right pelvic lymph nodes (dissection):  - Metastatic squamous cell carcinoma involving two (2) of two (2) lymph nodes (2/2, 15mm larger focus)  - Lymph-vascular invasion identified   Comment:  - Tumor cells stain for p16 and p40 compatible with squamous cell carcinoma, suggestive of HPV-association   - The metastasis within the second lymph node is not present on the original frozen section/touch prep slides  B  Bilateral fallopian tubes (bilateral salpingectomy):  - Benign bilateral fallopian tubes  - No carcinoma identified    C  Right pelvic lymph nodes (dissection):  - Six (6) lymph nodes negative for carcinoma (0/6)   Comment:  - CK AE1/3 is negative  D  Right common iliac lymph node (excision):  - Metastatic carcinoma involving one (1) of two (2) lymph nodes (1/2, 2mm)   Comment:  - CK AE1/3 highlights the metastatic focus in one (1) lymph node  E  Left pelvic lymph nodes (dissection):  - Five (5) lymph nodes negative for carcinoma (0/5)  Comment:  - CKAE1/3 is negative      5/19/22  Dr Keara Perez  PET-CT ordered to assess status  Risks and benefit of chemotherapy discussed  Referred to radiation Oncology    5/23/22 Multidisciplinary Gynecologic Oncology Tumor Case Review  The group recommended to consider treatment with PET imaging and curative intent chemo/RT  5/27/22  PET scan (completed at [facility])  Called for faxed [phone]        Oncology History   Malignant neoplasm of overlapping sites of cervix [facility])   3/24/2022 Initial Diagnosis    Malignant neoplasm of overlapping sites of cervix [facility])     5/6/2022 Surgery    Exploratory laparotomy for planned radical hysterectomy, bilateral pelvic lymph node dissection, aborted radical hysterectomy, bilateral ovarian transposition due to positive lymph nodes  1  Two right pelvic lymph nodes positive     5/19/2022 -  Cancer Staged    Staging form: Cervix Uteri, AJCC Version 9  - Pathologic: FIGO Stage IIIC1p (pT1b1, cM0) - Signed by Axel Snow MD on 5/19/2022  Stage confirmation method: Pathology  Pelvic grady status: Positive  Pelvic grady method of assessment: Lymph node dissection  Para-aortic status: Negative           Review of Systems:  Review of Systems   Constitutional: Negative  Pt is post op-Early May 2022 sx  Pt is FT /FMLA at this time  Single  HENT: Positive for dental problem (No regular dental care)  Eyes: Negative  Respiratory: Negative  Cardiovascular: Negative  Gastrointestinal: Positive for constipation and diarrhea  Gas pain still present post op    Endocrine: Negative  Genitourinary: Positive for vaginal discharge (Improving )  Pt notes vaginal burning "in general"  Musculoskeletal: Positive for back pain (Low back pain-Chronic )  Skin: Negative  Allergic/Immunologic: Negative  Neurological: Positive for dizziness (With standing for long periods  ) and light-headedness  Left thigh "numbness and tingling" post op-sx decreasing but present since Early May sx  Hematological: Bruises/bleeds easily (Easy bruising )     Psychiatric/Behavioral: Positive for sleep disturbance (Insomnia at times  )  Clinical Trial: no    OB/GYN History:  The patient underwent menarche at 15 years  Menopause Status Pre   No LMP recorded  Late 2022  Menopause at   Menopause Reason  Hormone replacement therapy: No      0  Para 0  Age at first delivery being   Nursing:   Birth control pills: At a younger age 14yo   oral contraceptives, patch, Depo Inj       Pregnancy test needed:  Yes     ONCOTYPE/MAMMOPRINT results  No     PFT    Prior Radiation  No prior hx Rad/Chemo tx     Teaching NCI RT TEACHING PACKET     MST    Implantable Devices (Port, Pacemaker, pain stimulator)  No    Hip Replacement No     Covid Vaccine Status   Vaccinated/No Boosters     [unfilled]  Health Maintenance   Topic Date Due    Hepatitis C Screening  Never done    Pneumococcal Vaccine: Pediatrics (0 to 5 Years) and At-Risk Patients (6 to 59 Years) (1 - PCV) Never done    HIV Screening  Never done    BMI: Followup Plan  Never done    Annual Physical  Never done    DTaP,Tdap,and Td Vaccines (1 - Tdap) Never done    COVID-19 Vaccine (3 - Moderna risk series) 2021    BMI: Adult  2023    Depression Screening  2023    Cervical Cancer Screening  2026    Influenza Vaccine  Completed    HIB Vaccine  Aged Out    Hepatitis B Vaccine  Aged Out    IPV Vaccine  Aged Out    Hepatitis A Vaccine  Aged Out    Meningococcal ACWY Vaccine  Aged Out    HPV Vaccine  Aged Out     Past Medical History:   Diagnosis Date    Anxiety     [facility] [address] [address]     Depression     Obesity      Past Surgical History:   Procedure Laterality Date    CERVICAL BIOPSY  W/ LOOP ELECTRODE EXCISION      LYMPH NODE DISSECTION Bilateral 2022    Procedure: DISSECTION/STAGING LYMPH NODE PELVIS/ABDOMEN;  Surgeon: Mirta Helms MD;  [facility];  Service: Gynecology Oncology    SALPINGECTOMY Bilateral 2022    Procedure: SALPINGECTOMY, OVARIAN TRANSPOSITION; Surgeon: Alex Clark MD;  [facility];  Service: Gynecology Oncology     Family History   Problem Relation Age of Onset    Ovarian cancer Maternal Aunt     Ovarian cancer Maternal Grandmother     No Known Problems Mother     Heart disease Father      Social History     Tobacco Use    Smoking status: Never Smoker    Smokeless tobacco: Never Used   Vaping Use    Vaping Use: Never used   Substance Use Topics    Alcohol use: Not Currently    Drug use: Never     Comment: CBD Gummies/Anxiety        Current Outpatient Medications:     docusate sodium (COLACE) 100 mg capsule, Take 1 capsule (100 mg total) by mouth 2 (two) times a day as needed for constipation, Disp: 20 capsule, Rfl: 0    Psyllium (METAMUCIL PO), as needed  , Disp: , Rfl:     simethicone (MYLICON) 80 mg chewable tablet, Chew 1 tablet (80 mg total) every 6 (six) hours as needed for flatulence, Disp: 20 tablet, Rfl: 0    ALPRAZolam (XANAX) 0 5 mg tablet, 1 tablet (Patient not taking: No sig reported), Disp: , Rfl:     Pyridoxine HCl (Vitamin B6) 100 MG TABS, , Disp: , Rfl:   No Known Allergies   Vitals:    05/31/22 1033   BP: 122/82   Pulse: 78   Resp: 20   Temp: 97 6 °F (36 4 °C)   SpO2: 99%   Weight: 114 kg (250 lb 14 1 oz)   Height: 5' 10" (1 778 m)     Pain Score: 0-No pain

## 2022-06-01 ENCOUNTER — PATIENT OUTREACH (OUTPATIENT)
Dept: CASE MANAGEMENT | Facility: OTHER | Age: 32
End: 2022-06-01

## 2022-06-01 DIAGNOSIS — C53.8 MALIGNANT NEOPLASM OF OVERLAPPING SITES OF CERVIX (HCC): Primary | ICD-10-CM

## 2022-06-01 NOTE — PROGRESS NOTES
OSW received email pt is requesting transportation for radiation appointments  Her SIM is scheduled on June 7 at 0900  Noted [facility] paperwork was completed at the office during her consult visit  Initially requested [facility] for her [facility] appointment, however pt stated she has a ride arranged  She requested rides moving forward after her SIM  Canceled request with [facility] on 6/7  [facility] team will be available for continued support as needed

## 2022-06-03 LAB — HCG INTACT+B SERPL-ACNC: <1 MIU/ML

## 2022-06-07 ENCOUNTER — PATIENT MESSAGE (OUTPATIENT)
Dept: GYNECOLOGIC ONCOLOGY | Facility: HOSPITAL | Age: 32
End: 2022-06-07

## 2022-06-07 ENCOUNTER — APPOINTMENT (OUTPATIENT)
Dept: RADIATION ONCOLOGY | Facility: CLINIC | Age: 32
End: 2022-06-07
Attending: INTERNAL MEDICINE
Payer: COMMERCIAL

## 2022-06-07 PROCEDURE — 77334 RADIATION TREATMENT AID(S): CPT | Performed by: INTERNAL MEDICINE

## 2022-06-08 ENCOUNTER — TELEPHONE (OUTPATIENT)
Dept: INFUSION CENTER | Facility: CLINIC | Age: 32
End: 2022-06-08

## 2022-06-09 ENCOUNTER — TELEPHONE (OUTPATIENT)
Dept: RADIATION ONCOLOGY | Facility: HOSPITAL | Age: 32
End: 2022-06-09

## 2022-06-09 ENCOUNTER — APPOINTMENT (OUTPATIENT)
Dept: RADIATION ONCOLOGY | Facility: HOSPITAL | Age: 32
End: 2022-06-09
Attending: INTERNAL MEDICINE
Payer: COMMERCIAL

## 2022-06-09 PROCEDURE — 77399 UNLISTED PX MED RADJ PHYSICS: CPT | Performed by: INTERNAL MEDICINE

## 2022-06-09 NOTE — TELEPHONE ENCOUNTER
Call to pt  Disability paperwork is completed  Rosa Rothman would like papers faxed to   [facility]  Papers faxed/original papers to be picked up in [place] on 6 20 22 at pt request   Copies to be scanned into system

## 2022-06-13 DIAGNOSIS — C53.8 MALIGNANT NEOPLASM OF OVERLAPPING SITES OF CERVIX (HCC): Primary | ICD-10-CM

## 2022-06-13 RX ORDER — ONDANSETRON HYDROCHLORIDE 8 MG/1
8 TABLET, FILM COATED ORAL EVERY 8 HOURS PRN
Qty: 20 TABLET | Refills: 1 | Status: SHIPPED | OUTPATIENT
Start: 2022-06-13 | End: 2022-06-21 | Stop reason: SDUPTHER

## 2022-06-16 ENCOUNTER — TELEPHONE (OUTPATIENT)
Dept: HEMATOLOGY ONCOLOGY | Facility: CLINIC | Age: 32
End: 2022-06-16

## 2022-06-16 NOTE — TELEPHONE ENCOUNTER
Patient is scheduled to start treatment on 06/20  Can the emend please be changed to the preferred [facility]?

## 2022-06-17 LAB
ALBUMIN SERPL-MCNC: 4.5 G/DL (ref 3.8–4.8)
ALBUMIN/GLOB SERPL: 1.6 {RATIO} (ref 1.2–2.2)
ALP SERPL-CCNC: 75 IU/L (ref 44–121)
ALT SERPL-CCNC: 13 IU/L (ref 0–32)
AST SERPL-CCNC: 15 IU/L (ref 0–40)
BASOPHILS # BLD AUTO: 0 X10E3/UL (ref 0–0.2)
BASOPHILS NFR BLD AUTO: 0 %
BILIRUB SERPL-MCNC: 0.2 MG/DL (ref 0–1.2)
BUN SERPL-MCNC: 12 MG/DL (ref 6–20)
BUN/CREAT SERPL: 17 (ref 9–23)
CALCIUM SERPL-MCNC: 9.6 MG/DL (ref 8.7–10.2)
CHLORIDE SERPL-SCNC: 103 MMOL/L (ref 96–106)
CO2 SERPL-SCNC: 24 MMOL/L (ref 20–29)
CREAT SERPL-MCNC: 0.69 MG/DL (ref 0.57–1)
EGFR: 119 ML/MIN/1.73
EOSINOPHIL # BLD AUTO: 0.1 X10E3/UL (ref 0–0.4)
EOSINOPHIL NFR BLD AUTO: 2 %
ERYTHROCYTE [DISTWIDTH] IN BLOOD BY AUTOMATED COUNT: 12.5 % (ref 11.7–15.4)
GLOBULIN SER-MCNC: 2.8 G/DL (ref 1.5–4.5)
GLUCOSE SERPL-MCNC: 99 MG/DL (ref 65–99)
HCT VFR BLD AUTO: 38.7 % (ref 34–46.6)
HGB BLD-MCNC: 12.8 G/DL (ref 11.1–15.9)
IMM GRANULOCYTES # BLD: 0 X10E3/UL (ref 0–0.1)
IMM GRANULOCYTES NFR BLD: 0 %
LYMPHOCYTES # BLD AUTO: 1.6 X10E3/UL (ref 0.7–3.1)
LYMPHOCYTES NFR BLD AUTO: 29 %
MAGNESIUM SERPL-MCNC: 2 MG/DL (ref 1.6–2.3)
MCH RBC QN AUTO: 28.8 PG (ref 26.6–33)
MCHC RBC AUTO-ENTMCNC: 33.1 G/DL (ref 31.5–35.7)
MCV RBC AUTO: 87 FL (ref 79–97)
MONOCYTES # BLD AUTO: 0.5 X10E3/UL (ref 0.1–0.9)
MONOCYTES NFR BLD AUTO: 10 %
NEUTROPHILS # BLD AUTO: 3.3 X10E3/UL (ref 1.4–7)
NEUTROPHILS NFR BLD AUTO: 59 %
PLATELET # BLD AUTO: 310 X10E3/UL (ref 150–450)
POTASSIUM SERPL-SCNC: 5 MMOL/L (ref 3.5–5.2)
PROT SERPL-MCNC: 7.3 G/DL (ref 6–8.5)
RBC # BLD AUTO: 4.44 X10E6/UL (ref 3.77–5.28)
SODIUM SERPL-SCNC: 141 MMOL/L (ref 134–144)
WBC # BLD AUTO: 5.5 X10E3/UL (ref 3.4–10.8)

## 2022-06-17 RX ORDER — PALONOSETRON 0.05 MG/ML
0.25 INJECTION, SOLUTION INTRAVENOUS ONCE
Status: CANCELLED | OUTPATIENT
Start: 2022-06-20

## 2022-06-17 RX ORDER — SODIUM CHLORIDE 9 MG/ML
20 INJECTION, SOLUTION INTRAVENOUS ONCE
Status: CANCELLED | OUTPATIENT
Start: 2022-06-20

## 2022-06-20 ENCOUNTER — HOSPITAL ENCOUNTER (OUTPATIENT)
Dept: INFUSION CENTER | Facility: CLINIC | Age: 32
Discharge: HOME | End: 2022-06-20
Payer: COMMERCIAL

## 2022-06-20 VITALS
BODY MASS INDEX: 37.61 KG/M2 | TEMPERATURE: 98.2 F | WEIGHT: 262.68 LBS | HEART RATE: 82 BPM | HEIGHT: 70 IN | SYSTOLIC BLOOD PRESSURE: 119 MMHG | RESPIRATION RATE: 18 BRPM | DIASTOLIC BLOOD PRESSURE: 75 MMHG

## 2022-06-20 DIAGNOSIS — C53.8 MALIGNANT NEOPLASM OF OVERLAPPING SITES OF CERVIX (HCC): Primary | ICD-10-CM

## 2022-06-20 PROCEDURE — 96375 TX/PRO/DX INJ NEW DRUG ADDON: CPT

## 2022-06-20 PROCEDURE — 96367 TX/PROPH/DG ADDL SEQ IV INF: CPT

## 2022-06-20 PROCEDURE — 96361 HYDRATE IV INFUSION ADD-ON: CPT

## 2022-06-20 PROCEDURE — 96413 CHEMO IV INFUSION 1 HR: CPT

## 2022-06-20 RX ORDER — PALONOSETRON 0.05 MG/ML
0.25 INJECTION, SOLUTION INTRAVENOUS ONCE
Status: COMPLETED | OUTPATIENT
Start: 2022-06-20 | End: 2022-06-20

## 2022-06-20 RX ORDER — SODIUM CHLORIDE 9 MG/ML
20 INJECTION, SOLUTION INTRAVENOUS ONCE
Status: COMPLETED | OUTPATIENT
Start: 2022-06-20 | End: 2022-06-20

## 2022-06-20 RX ADMIN — SODIUM CHLORIDE 500 ML: 0.9 INJECTION, SOLUTION INTRAVENOUS at 08:55

## 2022-06-20 RX ADMIN — DEXAMETHASONE SODIUM PHOSPHATE 20 MG: 100 INJECTION INTRAMUSCULAR; INTRAVENOUS at 08:57

## 2022-06-20 RX ADMIN — FAMOTIDINE 20 MG: 10 INJECTION INTRAVENOUS at 09:21

## 2022-06-20 RX ADMIN — SODIUM CHLORIDE 20 ML/HR: 0.9 INJECTION, SOLUTION INTRAVENOUS at 08:56

## 2022-06-20 RX ADMIN — CISPLATIN 70 MG: 1 INJECTION INTRAVENOUS at 10:31

## 2022-06-20 RX ADMIN — SODIUM CHLORIDE 500 ML: 0.9 INJECTION, SOLUTION INTRAVENOUS at 11:34

## 2022-06-20 RX ADMIN — PALONOSETRON 0.25 MG: 0.25 INJECTION, SOLUTION INTRAVENOUS at 09:01

## 2022-06-20 RX ADMIN — APREPITANT 130 MG: 130 INJECTION, EMULSION INTRAVENOUS at 09:45

## 2022-06-20 NOTE — PROGRESS NOTES
Pt presents for cisplatin infusion  Intake assessment completed  No new concerns  Pt tolerated infusion without adverse reaction  Future visits discussed  Bernard Deal from radiation came to see the pt while infusing to answer questions, confirmed pt will not have radiation tomorrow, 6/2/22  AVS reviewed and given

## 2022-06-21 ENCOUNTER — PATIENT MESSAGE (OUTPATIENT)
Dept: GYNECOLOGIC ONCOLOGY | Facility: HOSPITAL | Age: 32
End: 2022-06-21

## 2022-06-21 DIAGNOSIS — C53.8 MALIGNANT NEOPLASM OF OVERLAPPING SITES OF CERVIX (HCC): ICD-10-CM

## 2022-06-21 RX ORDER — PROCHLORPERAZINE MALEATE 5 MG/1
5 TABLET ORAL EVERY 6 HOURS PRN
Qty: 30 TABLET | Refills: 1 | Status: SHIPPED | OUTPATIENT
Start: 2022-06-21 | End: 2022-07-26 | Stop reason: SDUPTHER

## 2022-06-21 RX ORDER — ONDANSETRON HYDROCHLORIDE 8 MG/1
8 TABLET, FILM COATED ORAL EVERY 8 HOURS PRN
Qty: 60 TABLET | Refills: 1 | Status: SHIPPED | OUTPATIENT
Start: 2022-06-21 | End: 2022-07-26 | Stop reason: SDUPTHER

## 2022-06-22 PROCEDURE — 77427 RADIATION TX MANAGEMENT X5: CPT | Performed by: INTERNAL MEDICINE

## 2022-06-22 PROCEDURE — 77386 HB NTSTY MODUL RAD TX DLVR CPLX: CPT | Performed by: RADIOLOGY

## 2022-06-22 PROCEDURE — 77300 RADIATION THERAPY DOSE PLAN: CPT | Performed by: INTERNAL MEDICINE

## 2022-06-22 PROCEDURE — 77301 RADIOTHERAPY DOSE PLAN IMRT: CPT | Performed by: INTERNAL MEDICINE

## 2022-06-22 PROCEDURE — 77338 DESIGN MLC DEVICE FOR IMRT: CPT | Performed by: INTERNAL MEDICINE

## 2022-06-23 ENCOUNTER — PREP FOR PROCEDURE (OUTPATIENT)
Dept: GYNECOLOGIC ONCOLOGY | Facility: HOSPITAL | Age: 32
End: 2022-06-23

## 2022-06-23 ENCOUNTER — TELEPHONE (OUTPATIENT)
Dept: RADIATION ONCOLOGY | Facility: CLINIC | Age: 32
End: 2022-06-23

## 2022-06-23 DIAGNOSIS — C53.8 MALIGNANT NEOPLASM OF OVERLAPPING SITES OF CERVIX (HCC): Primary | ICD-10-CM

## 2022-06-23 PROCEDURE — 77386 HB NTSTY MODUL RAD TX DLVR CPLX: CPT | Performed by: RADIOLOGY

## 2022-06-23 PROCEDURE — 77014 CHG CT GUIDANCE PLACEMENT RAD THERAPY FIELDS: CPT | Performed by: RADIOLOGY

## 2022-06-23 RX ORDER — CEFAZOLIN SODIUM 2 G/50ML
2000 SOLUTION INTRAVENOUS ONCE
Status: CANCELLED | OUTPATIENT
Start: 2022-06-23 | End: 2022-06-23

## 2022-06-23 RX ORDER — ACETAMINOPHEN 325 MG/1
975 TABLET ORAL ONCE
Status: CANCELLED | OUTPATIENT
Start: 2022-06-23 | End: 2022-06-23

## 2022-06-23 RX ORDER — SODIUM CHLORIDE, SODIUM LACTATE, POTASSIUM CHLORIDE, CALCIUM CHLORIDE 600; 310; 30; 20 MG/100ML; MG/100ML; MG/100ML; MG/100ML
125 INJECTION, SOLUTION INTRAVENOUS CONTINUOUS
Status: CANCELLED | OUTPATIENT
Start: 2022-06-23

## 2022-06-24 LAB
ALBUMIN SERPL-MCNC: 4.3 G/DL (ref 3.8–4.8)
ALBUMIN/GLOB SERPL: 1.7 {RATIO} (ref 1.2–2.2)
ALP SERPL-CCNC: 65 IU/L (ref 44–121)
ALT SERPL-CCNC: 18 IU/L (ref 0–32)
AST SERPL-CCNC: 12 IU/L (ref 0–40)
BASOPHILS # BLD AUTO: 0 X10E3/UL (ref 0–0.2)
BASOPHILS NFR BLD AUTO: 0 %
BILIRUB SERPL-MCNC: 0.4 MG/DL (ref 0–1.2)
BUN SERPL-MCNC: 14 MG/DL (ref 6–20)
BUN/CREAT SERPL: 21 (ref 9–23)
CALCIUM SERPL-MCNC: 9.3 MG/DL (ref 8.7–10.2)
CHLORIDE SERPL-SCNC: 100 MMOL/L (ref 96–106)
CO2 SERPL-SCNC: 25 MMOL/L (ref 20–29)
CREAT SERPL-MCNC: 0.67 MG/DL (ref 0.57–1)
EGFR: 120 ML/MIN/1.73
EOSINOPHIL # BLD AUTO: 0.1 X10E3/UL (ref 0–0.4)
EOSINOPHIL NFR BLD AUTO: 1 %
ERYTHROCYTE [DISTWIDTH] IN BLOOD BY AUTOMATED COUNT: 12.3 % (ref 11.7–15.4)
GLOBULIN SER-MCNC: 2.6 G/DL (ref 1.5–4.5)
GLUCOSE SERPL-MCNC: 84 MG/DL (ref 65–99)
HCT VFR BLD AUTO: 37.7 % (ref 34–46.6)
HGB BLD-MCNC: 12.3 G/DL (ref 11.1–15.9)
IMM GRANULOCYTES # BLD: 0 X10E3/UL (ref 0–0.1)
IMM GRANULOCYTES NFR BLD: 0 %
LYMPHOCYTES # BLD AUTO: 1.3 X10E3/UL (ref 0.7–3.1)
LYMPHOCYTES NFR BLD AUTO: 18 %
MAGNESIUM SERPL-MCNC: 1.9 MG/DL (ref 1.6–2.3)
MCH RBC QN AUTO: 28.4 PG (ref 26.6–33)
MCHC RBC AUTO-ENTMCNC: 32.6 G/DL (ref 31.5–35.7)
MCV RBC AUTO: 87 FL (ref 79–97)
MONOCYTES # BLD AUTO: 0.7 X10E3/UL (ref 0.1–0.9)
MONOCYTES NFR BLD AUTO: 10 %
NEUTROPHILS # BLD AUTO: 5 X10E3/UL (ref 1.4–7)
NEUTROPHILS NFR BLD AUTO: 71 %
PLATELET # BLD AUTO: 291 X10E3/UL (ref 150–450)
POTASSIUM SERPL-SCNC: 4.6 MMOL/L (ref 3.5–5.2)
PROT SERPL-MCNC: 6.9 G/DL (ref 6–8.5)
RBC # BLD AUTO: 4.33 X10E6/UL (ref 3.77–5.28)
SODIUM SERPL-SCNC: 139 MMOL/L (ref 134–144)
WBC # BLD AUTO: 7.1 X10E3/UL (ref 3.4–10.8)

## 2022-06-24 PROCEDURE — 77014 CHG CT GUIDANCE PLACEMENT RAD THERAPY FIELDS: CPT | Performed by: INTERNAL MEDICINE

## 2022-06-24 PROCEDURE — 77386 HB NTSTY MODUL RAD TX DLVR CPLX: CPT | Performed by: INTERNAL MEDICINE

## 2022-06-24 RX ORDER — SODIUM CHLORIDE 9 MG/ML
20 INJECTION, SOLUTION INTRAVENOUS ONCE
Status: CANCELLED | OUTPATIENT
Start: 2022-06-27

## 2022-06-24 RX ORDER — PALONOSETRON 0.05 MG/ML
0.25 INJECTION, SOLUTION INTRAVENOUS ONCE
Status: CANCELLED | OUTPATIENT
Start: 2022-06-27

## 2022-06-27 ENCOUNTER — PATIENT OUTREACH (OUTPATIENT)
Dept: CASE MANAGEMENT | Facility: OTHER | Age: 32
End: 2022-06-27

## 2022-06-27 ENCOUNTER — HOSPITAL ENCOUNTER (OUTPATIENT)
Dept: INFUSION CENTER | Facility: CLINIC | Age: 32
Discharge: HOME | End: 2022-06-27
Payer: COMMERCIAL

## 2022-06-27 VITALS
SYSTOLIC BLOOD PRESSURE: 123 MMHG | BODY MASS INDEX: 37.68 KG/M2 | HEIGHT: 70 IN | DIASTOLIC BLOOD PRESSURE: 73 MMHG | HEART RATE: 93 BPM | RESPIRATION RATE: 18 BRPM | TEMPERATURE: 97.3 F | WEIGHT: 263.23 LBS

## 2022-06-27 DIAGNOSIS — C53.8 MALIGNANT NEOPLASM OF OVERLAPPING SITES OF CERVIX (HCC): Primary | ICD-10-CM

## 2022-06-27 PROCEDURE — 96361 HYDRATE IV INFUSION ADD-ON: CPT

## 2022-06-27 PROCEDURE — 96375 TX/PRO/DX INJ NEW DRUG ADDON: CPT

## 2022-06-27 PROCEDURE — 96413 CHEMO IV INFUSION 1 HR: CPT

## 2022-06-27 PROCEDURE — 96367 TX/PROPH/DG ADDL SEQ IV INF: CPT

## 2022-06-27 RX ORDER — PALONOSETRON 0.05 MG/ML
0.25 INJECTION, SOLUTION INTRAVENOUS ONCE
Status: COMPLETED | OUTPATIENT
Start: 2022-06-27 | End: 2022-06-27

## 2022-06-27 RX ORDER — SODIUM CHLORIDE 9 MG/ML
20 INJECTION, SOLUTION INTRAVENOUS ONCE
Status: COMPLETED | OUTPATIENT
Start: 2022-06-27 | End: 2022-06-27

## 2022-06-27 RX ADMIN — SODIUM CHLORIDE 500 ML: 0.9 INJECTION, SOLUTION INTRAVENOUS at 12:00

## 2022-06-27 RX ADMIN — SODIUM CHLORIDE 500 ML: 0.9 INJECTION, SOLUTION INTRAVENOUS at 09:24

## 2022-06-27 RX ADMIN — APREPITANT 130 MG: 130 INJECTION, EMULSION INTRAVENOUS at 10:16

## 2022-06-27 RX ADMIN — SODIUM CHLORIDE 20 ML/HR: 0.9 INJECTION, SOLUTION INTRAVENOUS at 09:26

## 2022-06-27 RX ADMIN — PALONOSETRON 0.25 MG: 0.25 INJECTION, SOLUTION INTRAVENOUS at 09:30

## 2022-06-27 RX ADMIN — CISPLATIN 70 MG: 1 INJECTION INTRAVENOUS at 11:01

## 2022-06-27 RX ADMIN — DEXAMETHASONE SODIUM PHOSPHATE 20 MG: 100 INJECTION INTRAMUSCULAR; INTRAVENOUS at 09:31

## 2022-06-27 RX ADMIN — FAMOTIDINE 20 MG: 10 INJECTION INTRAVENOUS at 09:55

## 2022-06-27 NOTE — PROGRESS NOTES
OSW met with Natividad Mullins today  Introduced self, as this writer previously s/w her on the phone  Today is her birthday  She became tearful and stated her treatments are really "wiping her out " Emotional support provided  She doesn't have any birthday plans this week because of her upcoming side effects  Unfortunately, the radiation machine is down right now, so she may not have her treatment today  Discussed the frustration with this, because any days not treated will be tacked onto the end of her schedule  Hopefully the machine will be fixed later today  OSW will continue to follow and offer support

## 2022-06-28 ENCOUNTER — APPOINTMENT (OUTPATIENT)
Dept: RADIATION ONCOLOGY | Facility: HOSPITAL | Age: 32
End: 2022-06-28
Payer: COMMERCIAL

## 2022-06-28 PROCEDURE — 77386 HB NTSTY MODUL RAD TX DLVR CPLX: CPT | Performed by: STUDENT IN AN ORGANIZED HEALTH CARE EDUCATION/TRAINING PROGRAM

## 2022-06-29 PROCEDURE — 77014 CHG CT GUIDANCE PLACEMENT RAD THERAPY FIELDS: CPT | Performed by: RADIOLOGY

## 2022-06-29 PROCEDURE — 77336 RADIATION PHYSICS CONSULT: CPT | Performed by: INTERNAL MEDICINE

## 2022-06-29 PROCEDURE — 77386 HB NTSTY MODUL RAD TX DLVR CPLX: CPT | Performed by: RADIOLOGY

## 2022-06-30 ENCOUNTER — TELEPHONE (OUTPATIENT)
Dept: RADIATION ONCOLOGY | Facility: HOSPITAL | Age: 32
End: 2022-06-30

## 2022-06-30 ENCOUNTER — TRANSCRIBE ORDERS (OUTPATIENT)
Dept: RADIATION ONCOLOGY | Facility: HOSPITAL | Age: 32
End: 2022-06-30

## 2022-06-30 DIAGNOSIS — C77.5 SECONDARY AND UNSPECIFIED MALIGNANT NEOPLASM OF INTRAPELVIC LYMPH NODES (HCC): Primary | ICD-10-CM

## 2022-06-30 PROCEDURE — 77014 CHG CT GUIDANCE PLACEMENT RAD THERAPY FIELDS: CPT | Performed by: RADIOLOGY

## 2022-06-30 PROCEDURE — 77386 HB NTSTY MODUL RAD TX DLVR CPLX: CPT | Performed by: RADIOLOGY

## 2022-06-30 PROCEDURE — 77427 RADIATION TX MANAGEMENT X5: CPT | Performed by: INTERNAL MEDICINE

## 2022-07-01 ENCOUNTER — APPOINTMENT (OUTPATIENT)
Dept: RADIATION ONCOLOGY | Facility: CLINIC | Age: 32
End: 2022-07-01
Attending: INTERNAL MEDICINE
Payer: COMMERCIAL

## 2022-07-01 ENCOUNTER — TRANSCRIBE ORDERS (OUTPATIENT)
Dept: RADIATION ONCOLOGY | Facility: HOSPITAL | Age: 32
End: 2022-07-01

## 2022-07-01 DIAGNOSIS — C34.11 MALIGNANT NEOPLASM OF UPPER LOBE OF RIGHT LUNG (HCC): Primary | ICD-10-CM

## 2022-07-01 PROCEDURE — 77386 HB NTSTY MODUL RAD TX DLVR CPLX: CPT | Performed by: INTERNAL MEDICINE

## 2022-07-01 PROCEDURE — 77014 CHG CT GUIDANCE PLACEMENT RAD THERAPY FIELDS: CPT | Performed by: INTERNAL MEDICINE

## 2022-07-02 LAB
ALBUMIN SERPL-MCNC: 4.3 G/DL (ref 3.8–4.8)
ALBUMIN/GLOB SERPL: 1.5 {RATIO} (ref 1.2–2.2)
ALP SERPL-CCNC: 72 IU/L (ref 44–121)
ALT SERPL-CCNC: 26 IU/L (ref 0–32)
AST SERPL-CCNC: 20 IU/L (ref 0–40)
BASOPHILS # BLD AUTO: 0 X10E3/UL (ref 0–0.2)
BASOPHILS NFR BLD AUTO: 0 %
BILIRUB SERPL-MCNC: 0.4 MG/DL (ref 0–1.2)
BUN SERPL-MCNC: 13 MG/DL (ref 6–20)
BUN/CREAT SERPL: 19 (ref 9–23)
CALCIUM SERPL-MCNC: 9.4 MG/DL (ref 8.7–10.2)
CHLORIDE SERPL-SCNC: 98 MMOL/L (ref 96–106)
CO2 SERPL-SCNC: 26 MMOL/L (ref 20–29)
CREAT SERPL-MCNC: 0.7 MG/DL (ref 0.57–1)
EGFR: 118 ML/MIN/1.73
EOSINOPHIL # BLD AUTO: 0 X10E3/UL (ref 0–0.4)
EOSINOPHIL NFR BLD AUTO: 1 %
ERYTHROCYTE [DISTWIDTH] IN BLOOD BY AUTOMATED COUNT: 12.7 % (ref 11.7–15.4)
GLOBULIN SER-MCNC: 2.9 G/DL (ref 1.5–4.5)
GLUCOSE SERPL-MCNC: 87 MG/DL (ref 65–99)
HCT VFR BLD AUTO: 35.7 % (ref 34–46.6)
HGB BLD-MCNC: 11.9 G/DL (ref 11.1–15.9)
IMM GRANULOCYTES # BLD: 0 X10E3/UL (ref 0–0.1)
IMM GRANULOCYTES NFR BLD: 0 %
LYMPHOCYTES # BLD AUTO: 0.5 X10E3/UL (ref 0.7–3.1)
LYMPHOCYTES NFR BLD AUTO: 13 %
MAGNESIUM SERPL-MCNC: 1.9 MG/DL (ref 1.6–2.3)
MCH RBC QN AUTO: 28.7 PG (ref 26.6–33)
MCHC RBC AUTO-ENTMCNC: 33.3 G/DL (ref 31.5–35.7)
MCV RBC AUTO: 86 FL (ref 79–97)
MONOCYTES # BLD AUTO: 0.4 X10E3/UL (ref 0.1–0.9)
MONOCYTES NFR BLD AUTO: 10 %
NEUTROPHILS # BLD AUTO: 2.6 X10E3/UL (ref 1.4–7)
NEUTROPHILS NFR BLD AUTO: 76 %
PLATELET # BLD AUTO: 227 X10E3/UL (ref 150–450)
POTASSIUM SERPL-SCNC: 4.3 MMOL/L (ref 3.5–5.2)
PROT SERPL-MCNC: 7.2 G/DL (ref 6–8.5)
RBC # BLD AUTO: 4.15 X10E6/UL (ref 3.77–5.28)
SODIUM SERPL-SCNC: 135 MMOL/L (ref 134–144)
WBC # BLD AUTO: 3.5 X10E3/UL (ref 3.4–10.8)

## 2022-07-05 ENCOUNTER — APPOINTMENT (OUTPATIENT)
Dept: RADIATION ONCOLOGY | Facility: CLINIC | Age: 32
End: 2022-07-05
Attending: INTERNAL MEDICINE
Payer: COMMERCIAL

## 2022-07-05 ENCOUNTER — TELEPHONE (OUTPATIENT)
Dept: RADIATION ONCOLOGY | Facility: CLINIC | Age: 32
End: 2022-07-05

## 2022-07-05 PROCEDURE — 77014 CHG CT GUIDANCE PLACEMENT RAD THERAPY FIELDS: CPT | Performed by: RADIOLOGY

## 2022-07-05 PROCEDURE — 77386 HB NTSTY MODUL RAD TX DLVR CPLX: CPT | Performed by: RADIOLOGY

## 2022-07-05 RX ORDER — PALONOSETRON 0.05 MG/ML
0.25 INJECTION, SOLUTION INTRAVENOUS ONCE
Status: CANCELLED | OUTPATIENT
Start: 2022-07-06

## 2022-07-05 RX ORDER — SODIUM CHLORIDE 9 MG/ML
20 INJECTION, SOLUTION INTRAVENOUS ONCE
Status: CANCELLED | OUTPATIENT
Start: 2022-07-06

## 2022-07-05 NOTE — TELEPHONE ENCOUNTER
1117-Return call to pt  Knee pain is unrelated to RT txmt  May reference PCP if knee pain continues  Pt verbalizes understanding information above

## 2022-07-06 ENCOUNTER — HOSPITAL ENCOUNTER (OUTPATIENT)
Dept: INFUSION CENTER | Facility: CLINIC | Age: 32
Discharge: HOME | End: 2022-07-06
Payer: COMMERCIAL

## 2022-07-06 ENCOUNTER — APPOINTMENT (OUTPATIENT)
Dept: RADIATION ONCOLOGY | Facility: CLINIC | Age: 32
End: 2022-07-06
Attending: INTERNAL MEDICINE
Payer: COMMERCIAL

## 2022-07-06 VITALS
HEIGHT: 70 IN | DIASTOLIC BLOOD PRESSURE: 80 MMHG | TEMPERATURE: 98.1 F | RESPIRATION RATE: 18 BRPM | HEART RATE: 74 BPM | SYSTOLIC BLOOD PRESSURE: 118 MMHG | BODY MASS INDEX: 38.11 KG/M2

## 2022-07-06 DIAGNOSIS — C53.8 MALIGNANT NEOPLASM OF OVERLAPPING SITES OF CERVIX (HCC): Primary | ICD-10-CM

## 2022-07-06 PROCEDURE — 96367 TX/PROPH/DG ADDL SEQ IV INF: CPT

## 2022-07-06 PROCEDURE — 96361 HYDRATE IV INFUSION ADD-ON: CPT

## 2022-07-06 PROCEDURE — 96413 CHEMO IV INFUSION 1 HR: CPT

## 2022-07-06 PROCEDURE — 77386 HB NTSTY MODUL RAD TX DLVR CPLX: CPT | Performed by: INTERNAL MEDICINE

## 2022-07-06 PROCEDURE — 96375 TX/PRO/DX INJ NEW DRUG ADDON: CPT

## 2022-07-06 RX ORDER — PALONOSETRON 0.05 MG/ML
0.25 INJECTION, SOLUTION INTRAVENOUS ONCE
Status: COMPLETED | OUTPATIENT
Start: 2022-07-06 | End: 2022-07-06

## 2022-07-06 RX ORDER — SODIUM CHLORIDE 9 MG/ML
20 INJECTION, SOLUTION INTRAVENOUS ONCE
Status: COMPLETED | OUTPATIENT
Start: 2022-07-06 | End: 2022-07-06

## 2022-07-06 RX ADMIN — DEXAMETHASONE SODIUM PHOSPHATE 20 MG: 10 INJECTION, SOLUTION INTRAMUSCULAR; INTRAVENOUS at 09:13

## 2022-07-06 RX ADMIN — SODIUM CHLORIDE 500 ML: 0.9 INJECTION, SOLUTION INTRAVENOUS at 11:35

## 2022-07-06 RX ADMIN — SODIUM CHLORIDE 500 ML: 0.9 INJECTION, SOLUTION INTRAVENOUS at 09:04

## 2022-07-06 RX ADMIN — PALONOSETRON 0.25 MG: 0.25 INJECTION, SOLUTION INTRAVENOUS at 09:12

## 2022-07-06 RX ADMIN — FAMOTIDINE 20 MG: 10 INJECTION INTRAVENOUS at 09:37

## 2022-07-06 RX ADMIN — APREPITANT 130 MG: 130 INJECTION, EMULSION INTRAVENOUS at 09:58

## 2022-07-06 RX ADMIN — CISPLATIN 70 MG: 1 INJECTION INTRAVENOUS at 10:35

## 2022-07-06 RX ADMIN — SODIUM CHLORIDE 20 ML/HR: 0.9 INJECTION, SOLUTION INTRAVENOUS at 09:09

## 2022-07-07 ENCOUNTER — APPOINTMENT (OUTPATIENT)
Dept: RADIATION ONCOLOGY | Facility: CLINIC | Age: 32
End: 2022-07-07
Attending: INTERNAL MEDICINE
Payer: COMMERCIAL

## 2022-07-07 ENCOUNTER — TELEPHONE (OUTPATIENT)
Dept: SURGICAL ONCOLOGY | Facility: CLINIC | Age: 32
End: 2022-07-07

## 2022-07-07 ENCOUNTER — ANESTHESIA EVENT (OUTPATIENT)
Dept: PERIOP | Facility: HOSPITAL | Age: 32
End: 2022-07-07
Payer: COMMERCIAL

## 2022-07-07 PROCEDURE — 77336 RADIATION PHYSICS CONSULT: CPT | Performed by: INTERNAL MEDICINE

## 2022-07-07 PROCEDURE — 77386 HB NTSTY MODUL RAD TX DLVR CPLX: CPT | Performed by: INTERNAL MEDICINE

## 2022-07-07 PROCEDURE — 77014 CHG CT GUIDANCE PLACEMENT RAD THERAPY FIELDS: CPT | Performed by: INTERNAL MEDICINE

## 2022-07-07 NOTE — TELEPHONE ENCOUNTER
Patient called in to see if it is ok for her to get her labs drawn today since she had chemo yesterday  Confirmed with CORDELL Esquivel and Sakina Gracia RN that she can get labs done today or tomorrow  She was agreeable

## 2022-07-07 NOTE — PRE-PROCEDURE INSTRUCTIONS
Pre-Surgery Instructions:   Medication Instructions    NON FORMULARY Hold day of surgery   ondansetron (ZOFRAN) 8 mg tablet Uses PRN- OK to take day of surgery    prochlorperazine (COMPAZINE) 5 mg tablet Hold day of surgery   Psyllium (METAMUCIL PO) Hold day of surgery  Verbal pre-op instructions given to pt  via phone   Pt verbalizes understanding

## 2022-07-08 ENCOUNTER — APPOINTMENT (OUTPATIENT)
Dept: RADIATION ONCOLOGY | Facility: CLINIC | Age: 32
End: 2022-07-08
Attending: INTERNAL MEDICINE
Payer: COMMERCIAL

## 2022-07-08 LAB
ALBUMIN SERPL-MCNC: 4 G/DL (ref 3.8–4.8)
ALBUMIN/GLOB SERPL: 1.5 {RATIO} (ref 1.2–2.2)
ALP SERPL-CCNC: 63 IU/L (ref 44–121)
ALT SERPL-CCNC: 19 IU/L (ref 0–32)
AST SERPL-CCNC: 13 IU/L (ref 0–40)
BASOPHILS # BLD AUTO: 0 X10E3/UL (ref 0–0.2)
BASOPHILS NFR BLD AUTO: 0 %
BILIRUB SERPL-MCNC: <0.2 MG/DL (ref 0–1.2)
BUN SERPL-MCNC: 14 MG/DL (ref 6–20)
BUN/CREAT SERPL: 22 (ref 9–23)
CALCIUM SERPL-MCNC: 9 MG/DL (ref 8.7–10.2)
CHLORIDE SERPL-SCNC: 104 MMOL/L (ref 96–106)
CO2 SERPL-SCNC: 23 MMOL/L (ref 20–29)
CREAT SERPL-MCNC: 0.64 MG/DL (ref 0.57–1)
EGFR: 120 ML/MIN/1.73
EOSINOPHIL # BLD AUTO: 0 X10E3/UL (ref 0–0.4)
EOSINOPHIL NFR BLD AUTO: 0 %
ERYTHROCYTE [DISTWIDTH] IN BLOOD BY AUTOMATED COUNT: 13.1 % (ref 11.7–15.4)
GLOBULIN SER-MCNC: 2.7 G/DL (ref 1.5–4.5)
GLUCOSE SERPL-MCNC: 114 MG/DL (ref 65–99)
HCT VFR BLD AUTO: 32.1 % (ref 34–46.6)
HGB BLD-MCNC: 10.8 G/DL (ref 11.1–15.9)
IMM GRANULOCYTES # BLD: 0 X10E3/UL (ref 0–0.1)
IMM GRANULOCYTES NFR BLD: 1 %
LYMPHOCYTES # BLD AUTO: 0.3 X10E3/UL (ref 0.7–3.1)
LYMPHOCYTES NFR BLD AUTO: 4 %
MAGNESIUM SERPL-MCNC: 1.8 MG/DL (ref 1.6–2.3)
MCH RBC QN AUTO: 28.5 PG (ref 26.6–33)
MCHC RBC AUTO-ENTMCNC: 33.6 G/DL (ref 31.5–35.7)
MCV RBC AUTO: 85 FL (ref 79–97)
MONOCYTES # BLD AUTO: 0.6 X10E3/UL (ref 0.1–0.9)
MONOCYTES NFR BLD AUTO: 9 %
NEUTROPHILS # BLD AUTO: 5.4 X10E3/UL (ref 1.4–7)
NEUTROPHILS NFR BLD AUTO: 86 %
PLATELET # BLD AUTO: 179 X10E3/UL (ref 150–450)
POTASSIUM SERPL-SCNC: 3.9 MMOL/L (ref 3.5–5.2)
PROT SERPL-MCNC: 6.7 G/DL (ref 6–8.5)
RBC # BLD AUTO: 3.79 X10E6/UL (ref 3.77–5.28)
SODIUM SERPL-SCNC: 139 MMOL/L (ref 134–144)
WBC # BLD AUTO: 6.2 X10E3/UL (ref 3.4–10.8)

## 2022-07-08 PROCEDURE — 77427 RADIATION TX MANAGEMENT X5: CPT | Performed by: INTERNAL MEDICINE

## 2022-07-08 PROCEDURE — 77014 CHG CT GUIDANCE PLACEMENT RAD THERAPY FIELDS: CPT | Performed by: RADIOLOGY

## 2022-07-08 PROCEDURE — 77386 HB NTSTY MODUL RAD TX DLVR CPLX: CPT | Performed by: RADIOLOGY

## 2022-07-08 RX ORDER — SODIUM CHLORIDE 9 MG/ML
20 INJECTION, SOLUTION INTRAVENOUS ONCE
Status: CANCELLED | OUTPATIENT
Start: 2022-07-11

## 2022-07-08 RX ORDER — PALONOSETRON 0.05 MG/ML
0.25 INJECTION, SOLUTION INTRAVENOUS ONCE
Status: CANCELLED | OUTPATIENT
Start: 2022-07-11

## 2022-07-11 ENCOUNTER — APPOINTMENT (OUTPATIENT)
Dept: RADIATION ONCOLOGY | Facility: CLINIC | Age: 32
End: 2022-07-11
Attending: INTERNAL MEDICINE
Payer: COMMERCIAL

## 2022-07-11 ENCOUNTER — HOSPITAL ENCOUNTER (OUTPATIENT)
Dept: INFUSION CENTER | Facility: CLINIC | Age: 32
Discharge: HOME | End: 2022-07-11
Payer: COMMERCIAL

## 2022-07-11 VITALS
WEIGHT: 261.47 LBS | HEIGHT: 69 IN | SYSTOLIC BLOOD PRESSURE: 132 MMHG | DIASTOLIC BLOOD PRESSURE: 81 MMHG | TEMPERATURE: 98.3 F | RESPIRATION RATE: 18 BRPM | BODY MASS INDEX: 38.73 KG/M2 | HEART RATE: 89 BPM

## 2022-07-11 DIAGNOSIS — C53.8 MALIGNANT NEOPLASM OF OVERLAPPING SITES OF CERVIX (HCC): Primary | ICD-10-CM

## 2022-07-11 PROCEDURE — 96413 CHEMO IV INFUSION 1 HR: CPT

## 2022-07-11 PROCEDURE — 77386 HB NTSTY MODUL RAD TX DLVR CPLX: CPT | Performed by: RADIOLOGY

## 2022-07-11 PROCEDURE — 96367 TX/PROPH/DG ADDL SEQ IV INF: CPT

## 2022-07-11 PROCEDURE — 96361 HYDRATE IV INFUSION ADD-ON: CPT

## 2022-07-11 PROCEDURE — 96375 TX/PRO/DX INJ NEW DRUG ADDON: CPT

## 2022-07-11 PROCEDURE — 77014 CHG CT GUIDANCE PLACEMENT RAD THERAPY FIELDS: CPT | Performed by: RADIOLOGY

## 2022-07-11 RX ORDER — SODIUM CHLORIDE 9 MG/ML
20 INJECTION, SOLUTION INTRAVENOUS ONCE
Status: COMPLETED | OUTPATIENT
Start: 2022-07-11 | End: 2022-07-11

## 2022-07-11 RX ORDER — PALONOSETRON 0.05 MG/ML
0.25 INJECTION, SOLUTION INTRAVENOUS ONCE
Status: COMPLETED | OUTPATIENT
Start: 2022-07-11 | End: 2022-07-11

## 2022-07-11 RX ADMIN — FAMOTIDINE 20 MG: 10 INJECTION INTRAVENOUS at 09:29

## 2022-07-11 RX ADMIN — SODIUM CHLORIDE 500 ML: 9 INJECTION, SOLUTION INTRAVENOUS at 09:05

## 2022-07-11 RX ADMIN — SODIUM CHLORIDE 500 ML: 0.9 INJECTION, SOLUTION INTRAVENOUS at 11:26

## 2022-07-11 RX ADMIN — PALONOSETRON 0.25 MG: 0.05 INJECTION, SOLUTION INTRAVENOUS at 09:11

## 2022-07-11 RX ADMIN — SODIUM CHLORIDE 20 ML/HR: 0.9 INJECTION, SOLUTION INTRAVENOUS at 09:05

## 2022-07-11 RX ADMIN — DEXAMETHASONE SODIUM PHOSPHATE 20 MG: 10 INJECTION, SOLUTION INTRAMUSCULAR; INTRAVENOUS at 09:11

## 2022-07-11 RX ADMIN — APREPITANT 130 MG: 130 INJECTION, EMULSION INTRAVENOUS at 09:45

## 2022-07-11 RX ADMIN — CISPLATIN 70 MG: 1 INJECTION INTRAVENOUS at 10:26

## 2022-07-11 NOTE — PROGRESS NOTES
Patient tolerated chemotherapy infusion without incident  Pt is aware of all future appointments   Declined AVS

## 2022-07-12 ENCOUNTER — HOSPITAL ENCOUNTER (OUTPATIENT)
Dept: MRI IMAGING | Facility: HOSPITAL | Age: 32
Discharge: HOME | End: 2022-07-12
Attending: INTERNAL MEDICINE
Payer: COMMERCIAL

## 2022-07-12 ENCOUNTER — APPOINTMENT (OUTPATIENT)
Dept: RADIATION ONCOLOGY | Facility: CLINIC | Age: 32
End: 2022-07-12
Attending: INTERNAL MEDICINE
Payer: COMMERCIAL

## 2022-07-12 DIAGNOSIS — C53.8 MALIGNANT NEOPLASM OF OVERLAPPING SITES OF CERVIX (HCC): ICD-10-CM

## 2022-07-12 PROCEDURE — 77386 HB NTSTY MODUL RAD TX DLVR CPLX: CPT | Performed by: RADIOLOGY

## 2022-07-12 PROCEDURE — A9585 GADOBUTROL INJECTION: HCPCS | Performed by: INTERNAL MEDICINE

## 2022-07-12 PROCEDURE — 72197 MRI PELVIS W/O & W/DYE: CPT

## 2022-07-12 PROCEDURE — 77014 CHG CT GUIDANCE PLACEMENT RAD THERAPY FIELDS: CPT | Performed by: RADIOLOGY

## 2022-07-12 RX ADMIN — GADOBUTROL 11 ML: 604.72 INJECTION INTRAVENOUS at 12:05

## 2022-07-13 ENCOUNTER — TELEPHONE (OUTPATIENT)
Dept: RADIATION ONCOLOGY | Facility: HOSPITAL | Age: 32
End: 2022-07-13

## 2022-07-13 ENCOUNTER — APPOINTMENT (OUTPATIENT)
Dept: RADIATION ONCOLOGY | Facility: CLINIC | Age: 32
End: 2022-07-13
Attending: INTERNAL MEDICINE
Payer: COMMERCIAL

## 2022-07-13 PROCEDURE — 77386 HB NTSTY MODUL RAD TX DLVR CPLX: CPT | Performed by: RADIOLOGY

## 2022-07-13 PROCEDURE — 77014 CHG CT GUIDANCE PLACEMENT RAD THERAPY FIELDS: CPT | Performed by: RADIOLOGY

## 2022-07-13 NOTE — TELEPHONE ENCOUNTER
Pt received a call from [facility] stating her ultrasounds that were scheduled in-conjunction with her NADIR sleeve procedure, has been canceled  Upon further review I reached out to [place] from authorizations to inform her this procedure is to be billed as a package  I called pt to notified her that we are good to go for her procedure  Pt verbalized understanding of information given

## 2022-07-14 ENCOUNTER — APPOINTMENT (OUTPATIENT)
Dept: RADIATION ONCOLOGY | Facility: CLINIC | Age: 32
End: 2022-07-14
Attending: INTERNAL MEDICINE
Payer: COMMERCIAL

## 2022-07-14 ENCOUNTER — TELEPHONE (OUTPATIENT)
Dept: RADIATION ONCOLOGY | Facility: HOSPITAL | Age: 32
End: 2022-07-14

## 2022-07-14 PROCEDURE — 77386 HB NTSTY MODUL RAD TX DLVR CPLX: CPT | Performed by: STUDENT IN AN ORGANIZED HEALTH CARE EDUCATION/TRAINING PROGRAM

## 2022-07-14 PROCEDURE — 77336 RADIATION PHYSICS CONSULT: CPT | Performed by: STUDENT IN AN ORGANIZED HEALTH CARE EDUCATION/TRAINING PROGRAM

## 2022-07-14 NOTE — TELEPHONE ENCOUNTER
1026-Call to pt  Reminder-Plan to start clear liquids at noon today  NPO after 2400 tonight  OR will call pt with arrival time/later today  Pt notes loose stools at this time-forgo Miralax prior to procedure  Pt verbalizes understanding information above

## 2022-07-15 ENCOUNTER — HOSPITAL ENCOUNTER (OUTPATIENT)
Facility: HOSPITAL | Age: 32
Setting detail: OUTPATIENT SURGERY
Discharge: HOME | End: 2022-07-15
Attending: OBSTETRICS & GYNECOLOGY | Admitting: OBSTETRICS & GYNECOLOGY
Payer: COMMERCIAL

## 2022-07-15 ENCOUNTER — APPOINTMENT (OUTPATIENT)
Dept: RADIATION ONCOLOGY | Facility: HOSPITAL | Age: 32
End: 2022-07-15
Attending: RADIOLOGY
Payer: COMMERCIAL

## 2022-07-15 ENCOUNTER — APPOINTMENT (OUTPATIENT)
Dept: RADIATION ONCOLOGY | Facility: CLINIC | Age: 32
End: 2022-07-15
Payer: COMMERCIAL

## 2022-07-15 ENCOUNTER — ANESTHESIA (OUTPATIENT)
Dept: PERIOP | Facility: HOSPITAL | Age: 32
End: 2022-07-15
Payer: COMMERCIAL

## 2022-07-15 ENCOUNTER — APPOINTMENT (OUTPATIENT)
Dept: RADIATION ONCOLOGY | Facility: CLINIC | Age: 32
End: 2022-07-15
Attending: INTERNAL MEDICINE
Payer: COMMERCIAL

## 2022-07-15 ENCOUNTER — APPOINTMENT (OUTPATIENT)
Dept: RADIATION ONCOLOGY | Facility: HOSPITAL | Age: 32
End: 2022-07-15
Payer: COMMERCIAL

## 2022-07-15 ENCOUNTER — RADIATION THERAPY TREATMENT (OUTPATIENT)
Dept: RADIATION ONCOLOGY | Facility: HOSPITAL | Age: 32
End: 2022-07-15
Attending: INTERNAL MEDICINE
Payer: COMMERCIAL

## 2022-07-15 ENCOUNTER — HOSPITAL ENCOUNTER (OUTPATIENT)
Dept: RADIOLOGY | Facility: HOSPITAL | Age: 32
Discharge: HOME | End: 2022-07-15
Payer: COMMERCIAL

## 2022-07-15 ENCOUNTER — HOSPITAL ENCOUNTER (OUTPATIENT)
Dept: RADIOLOGY | Facility: HOSPITAL | Age: 32
Setting detail: RADIATION/ONCOLOGY SERIES
Discharge: HOME | End: 2022-07-15
Attending: INTERNAL MEDICINE
Payer: COMMERCIAL

## 2022-07-15 VITALS
OXYGEN SATURATION: 100 % | SYSTOLIC BLOOD PRESSURE: 109 MMHG | BODY MASS INDEX: 39.84 KG/M2 | WEIGHT: 269 LBS | TEMPERATURE: 98.2 F | DIASTOLIC BLOOD PRESSURE: 66 MMHG | HEIGHT: 69 IN | HEART RATE: 80 BPM | RESPIRATION RATE: 15 BRPM

## 2022-07-15 DIAGNOSIS — C53.9: ICD-10-CM

## 2022-07-15 DIAGNOSIS — C77.5 SECONDARY AND UNSPECIFIED MALIGNANT NEOPLASM OF INTRAPELVIC LYMPH NODES (HCC): ICD-10-CM

## 2022-07-15 LAB
ALBUMIN SERPL-MCNC: 4.3 G/DL (ref 3.8–4.8)
ALBUMIN/GLOB SERPL: 1.4 {RATIO} (ref 1.2–2.2)
ALP SERPL-CCNC: 69 IU/L (ref 44–121)
ALT SERPL-CCNC: 23 IU/L (ref 0–32)
AST SERPL-CCNC: 17 IU/L (ref 0–40)
BASOPHILS # BLD AUTO: 0 X10E3/UL (ref 0–0.2)
BASOPHILS NFR BLD AUTO: 0 %
BILIRUB SERPL-MCNC: 0.3 MG/DL (ref 0–1.2)
BUN SERPL-MCNC: 18 MG/DL (ref 6–20)
BUN/CREAT SERPL: 23 (ref 9–23)
CALCIUM SERPL-MCNC: 9.4 MG/DL (ref 8.7–10.2)
CHLORIDE SERPL-SCNC: 98 MMOL/L (ref 96–106)
CO2 SERPL-SCNC: 26 MMOL/L (ref 20–29)
CREAT SERPL-MCNC: 0.8 MG/DL (ref 0.57–1)
EGFR: 100 ML/MIN/1.73
EOSINOPHIL # BLD AUTO: 0 X10E3/UL (ref 0–0.4)
EOSINOPHIL NFR BLD AUTO: 2 %
ERYTHROCYTE [DISTWIDTH] IN BLOOD BY AUTOMATED COUNT: 14 % (ref 11.7–15.4)
EXT PREGNANCY TEST URINE: NEGATIVE
EXT. CONTROL: NORMAL
GLOBULIN SER-MCNC: 3 G/DL (ref 1.5–4.5)
GLUCOSE SERPL-MCNC: 83 MG/DL (ref 65–99)
HCT VFR BLD AUTO: 35.4 % (ref 34–46.6)
HGB BLD-MCNC: 11.5 G/DL (ref 11.1–15.9)
IMM GRANULOCYTES # BLD: 0 X10E3/UL (ref 0–0.1)
IMM GRANULOCYTES NFR BLD: 1 %
LYMPHOCYTES # BLD AUTO: 0.3 X10E3/UL (ref 0.7–3.1)
LYMPHOCYTES NFR BLD AUTO: 11 %
MAGNESIUM SERPL-MCNC: 1.5 MG/DL (ref 1.6–2.3)
MCH RBC QN AUTO: 28 PG (ref 26.6–33)
MCHC RBC AUTO-ENTMCNC: 32.5 G/DL (ref 31.5–35.7)
MCV RBC AUTO: 86 FL (ref 79–97)
MONOCYTES # BLD AUTO: 0.4 X10E3/UL (ref 0.1–0.9)
MONOCYTES NFR BLD AUTO: 16 %
NEUTROPHILS # BLD AUTO: 1.9 X10E3/UL (ref 1.4–7)
NEUTROPHILS NFR BLD AUTO: 70 %
PLATELET # BLD AUTO: 158 X10E3/UL (ref 150–450)
POTASSIUM SERPL-SCNC: 4.2 MMOL/L (ref 3.5–5.2)
PROT SERPL-MCNC: 7.3 G/DL (ref 6–8.5)
RBC # BLD AUTO: 4.1 X10E6/UL (ref 3.77–5.28)
SODIUM SERPL-SCNC: 137 MMOL/L (ref 134–144)
WBC # BLD AUTO: 2.7 X10E3/UL (ref 3.4–10.8)

## 2022-07-15 PROCEDURE — 57156 INS VAG BRACHYTX DEVICE: CPT | Performed by: OBSTETRICS & GYNECOLOGY

## 2022-07-15 PROCEDURE — 77014 HB CT SCAN FOR THERAPY GUIDE: CPT

## 2022-07-15 PROCEDURE — NC001 PR NO CHARGE: Performed by: OBSTETRICS & GYNECOLOGY

## 2022-07-15 PROCEDURE — 77280 THER RAD SIMULAJ FIELD SMPL: CPT | Performed by: INTERNAL MEDICINE

## 2022-07-15 PROCEDURE — 77771 HDR RDNCL NTRSTL/ICAV BRCHTX: CPT | Performed by: INTERNAL MEDICINE

## 2022-07-15 PROCEDURE — 81025 URINE PREGNANCY TEST: CPT | Performed by: OBSTETRICS & GYNECOLOGY

## 2022-07-15 PROCEDURE — 76942 ECHO GUIDE FOR BIOPSY: CPT

## 2022-07-15 PROCEDURE — C1717 BRACHYTX, NON-STR,HDR IR-192: HCPCS | Performed by: INTERNAL MEDICINE

## 2022-07-15 PROCEDURE — 77295 3-D RADIOTHERAPY PLAN: CPT | Performed by: INTERNAL MEDICINE

## 2022-07-15 PROCEDURE — 57155 INSERT UTERI TANDEM/OVOIDS: CPT | Performed by: INTERNAL MEDICINE

## 2022-07-15 PROCEDURE — 77370 RADIATION PHYSICS CONSULT: CPT | Performed by: INTERNAL MEDICINE

## 2022-07-15 RX ORDER — PROPOFOL 10 MG/ML
INJECTION, EMULSION INTRAVENOUS AS NEEDED
Status: DISCONTINUED | OUTPATIENT
Start: 2022-07-15 | End: 2022-07-15

## 2022-07-15 RX ORDER — FENTANYL CITRATE 50 UG/ML
INJECTION, SOLUTION INTRAMUSCULAR; INTRAVENOUS AS NEEDED
Status: DISCONTINUED | OUTPATIENT
Start: 2022-07-15 | End: 2022-07-15

## 2022-07-15 RX ORDER — CEFAZOLIN SODIUM 2 G/50ML
2000 SOLUTION INTRAVENOUS ONCE
Status: COMPLETED | OUTPATIENT
Start: 2022-07-15 | End: 2022-07-15

## 2022-07-15 RX ORDER — FENTANYL CITRATE/PF 50 MCG/ML
25 SYRINGE (ML) INJECTION
Status: DISCONTINUED | OUTPATIENT
Start: 2022-07-15 | End: 2022-07-15 | Stop reason: HOSPADM

## 2022-07-15 RX ORDER — KETOROLAC TROMETHAMINE 30 MG/ML
30 INJECTION, SOLUTION INTRAMUSCULAR; INTRAVENOUS EVERY 6 HOURS PRN
Status: DISCONTINUED | OUTPATIENT
Start: 2022-07-15 | End: 2022-07-15 | Stop reason: HOSPADM

## 2022-07-15 RX ORDER — HYDROMORPHONE HCL/PF 1 MG/ML
0.5 SYRINGE (ML) INJECTION EVERY 4 HOURS PRN
Status: DISCONTINUED | OUTPATIENT
Start: 2022-07-15 | End: 2022-07-15 | Stop reason: HOSPADM

## 2022-07-15 RX ORDER — HYDROMORPHONE HCL/PF 1 MG/ML
0.5 SYRINGE (ML) INJECTION ONCE
Status: COMPLETED | OUTPATIENT
Start: 2022-07-15 | End: 2022-07-15

## 2022-07-15 RX ORDER — LIDOCAINE HYDROCHLORIDE 10 MG/ML
0.5 INJECTION, SOLUTION EPIDURAL; INFILTRATION; INTRACAUDAL; PERINEURAL ONCE AS NEEDED
Status: DISCONTINUED | OUTPATIENT
Start: 2022-07-15 | End: 2022-07-15 | Stop reason: HOSPADM

## 2022-07-15 RX ORDER — MIDAZOLAM HYDROCHLORIDE 2 MG/2ML
INJECTION, SOLUTION INTRAMUSCULAR; INTRAVENOUS AS NEEDED
Status: DISCONTINUED | OUTPATIENT
Start: 2022-07-15 | End: 2022-07-15

## 2022-07-15 RX ORDER — ACETAMINOPHEN 325 MG/1
975 TABLET ORAL EVERY 6 HOURS PRN
Status: DISCONTINUED | OUTPATIENT
Start: 2022-07-15 | End: 2022-07-15 | Stop reason: HOSPADM

## 2022-07-15 RX ORDER — LIDOCAINE HYDROCHLORIDE 10 MG/ML
INJECTION, SOLUTION EPIDURAL; INFILTRATION; INTRACAUDAL; PERINEURAL AS NEEDED
Status: DISCONTINUED | OUTPATIENT
Start: 2022-07-15 | End: 2022-07-15

## 2022-07-15 RX ORDER — PROMETHAZINE HYDROCHLORIDE 25 MG/ML
12.5 INJECTION, SOLUTION INTRAMUSCULAR; INTRAVENOUS
Status: COMPLETED | OUTPATIENT
Start: 2022-07-15 | End: 2022-07-15

## 2022-07-15 RX ORDER — DEXAMETHASONE SODIUM PHOSPHATE 10 MG/ML
INJECTION, SOLUTION INTRAMUSCULAR; INTRAVENOUS AS NEEDED
Status: DISCONTINUED | OUTPATIENT
Start: 2022-07-15 | End: 2022-07-15

## 2022-07-15 RX ORDER — DIPHENHYDRAMINE HYDROCHLORIDE 50 MG/ML
12.5 INJECTION INTRAMUSCULAR; INTRAVENOUS ONCE AS NEEDED
Status: COMPLETED | OUTPATIENT
Start: 2022-07-15 | End: 2022-07-15

## 2022-07-15 RX ORDER — HYDROMORPHONE HCL IN WATER/PF 6 MG/30 ML
0.2 PATIENT CONTROLLED ANALGESIA SYRINGE INTRAVENOUS
Status: DISCONTINUED | OUTPATIENT
Start: 2022-07-15 | End: 2022-07-15 | Stop reason: HOSPADM

## 2022-07-15 RX ORDER — ONDANSETRON 2 MG/ML
4 INJECTION INTRAMUSCULAR; INTRAVENOUS ONCE AS NEEDED
Status: DISCONTINUED | OUTPATIENT
Start: 2022-07-15 | End: 2022-07-15 | Stop reason: HOSPADM

## 2022-07-15 RX ORDER — SODIUM CHLORIDE, SODIUM LACTATE, POTASSIUM CHLORIDE, CALCIUM CHLORIDE 600; 310; 30; 20 MG/100ML; MG/100ML; MG/100ML; MG/100ML
125 INJECTION, SOLUTION INTRAVENOUS CONTINUOUS
Status: DISCONTINUED | OUTPATIENT
Start: 2022-07-15 | End: 2022-07-15 | Stop reason: HOSPADM

## 2022-07-15 RX ORDER — ACETAMINOPHEN 325 MG/1
975 TABLET ORAL ONCE
Status: COMPLETED | OUTPATIENT
Start: 2022-07-15 | End: 2022-07-15

## 2022-07-15 RX ORDER — KETOROLAC TROMETHAMINE 30 MG/ML
INJECTION, SOLUTION INTRAMUSCULAR; INTRAVENOUS AS NEEDED
Status: DISCONTINUED | OUTPATIENT
Start: 2022-07-15 | End: 2022-07-15

## 2022-07-15 RX ORDER — IBUPROFEN 600 MG/1
600 TABLET ORAL EVERY 6 HOURS PRN
Status: DISCONTINUED | OUTPATIENT
Start: 2022-07-15 | End: 2022-07-15 | Stop reason: HOSPADM

## 2022-07-15 RX ORDER — OXYCODONE HYDROCHLORIDE 5 MG/1
5 TABLET ORAL EVERY 4 HOURS PRN
Status: DISCONTINUED | OUTPATIENT
Start: 2022-07-15 | End: 2022-07-15 | Stop reason: HOSPADM

## 2022-07-15 RX ORDER — SCOLOPAMINE TRANSDERMAL SYSTEM 1 MG/1
1 PATCH, EXTENDED RELEASE TRANSDERMAL ONCE AS NEEDED
Status: DISCONTINUED | OUTPATIENT
Start: 2022-07-15 | End: 2022-07-15 | Stop reason: HOSPADM

## 2022-07-15 RX ORDER — SODIUM CHLORIDE 9 MG/ML
INJECTION, SOLUTION INTRAVENOUS AS NEEDED
Status: DISCONTINUED | OUTPATIENT
Start: 2022-07-15 | End: 2022-07-15 | Stop reason: HOSPADM

## 2022-07-15 RX ORDER — ONDANSETRON 2 MG/ML
INJECTION INTRAMUSCULAR; INTRAVENOUS AS NEEDED
Status: DISCONTINUED | OUTPATIENT
Start: 2022-07-15 | End: 2022-07-15

## 2022-07-15 RX ADMIN — DEXAMETHASONE SODIUM PHOSPHATE 10 MG: 10 INJECTION, SOLUTION INTRAMUSCULAR; INTRAVENOUS at 11:14

## 2022-07-15 RX ADMIN — FENTANYL CITRATE 50 MCG: 50 INJECTION INTRAMUSCULAR; INTRAVENOUS at 11:22

## 2022-07-15 RX ADMIN — PROMETHAZINE HYDROCHLORIDE 12.5 MG: 25 INJECTION INTRAMUSCULAR; INTRAVENOUS at 12:12

## 2022-07-15 RX ADMIN — Medication 25 MCG: at 12:30

## 2022-07-15 RX ADMIN — PROMETHAZINE HYDROCHLORIDE 12.5 MG: 25 INJECTION INTRAMUSCULAR; INTRAVENOUS at 12:11

## 2022-07-15 RX ADMIN — KETOROLAC TROMETHAMINE 15 MG: 30 INJECTION, SOLUTION INTRAMUSCULAR; INTRAVENOUS at 11:43

## 2022-07-15 RX ADMIN — PROPOFOL 200 MG: 10 INJECTION, EMULSION INTRAVENOUS at 11:12

## 2022-07-15 RX ADMIN — MIDAZOLAM 2 MG: 1 INJECTION INTRAMUSCULAR; INTRAVENOUS at 11:04

## 2022-07-15 RX ADMIN — LIDOCAINE HYDROCHLORIDE 50 MG: 10 INJECTION, SOLUTION EPIDURAL; INFILTRATION; INTRACAUDAL at 11:11

## 2022-07-15 RX ADMIN — DIPHENHYDRAMINE HYDROCHLORIDE 12.5 MG: 50 INJECTION, SOLUTION INTRAMUSCULAR; INTRAVENOUS at 12:31

## 2022-07-15 RX ADMIN — SODIUM CHLORIDE, POTASSIUM CHLORIDE, SODIUM LACTATE AND CALCIUM CHLORIDE 125 ML/HR: 600; 310; 30; 20 INJECTION, SOLUTION INTRAVENOUS at 08:54

## 2022-07-15 RX ADMIN — HYDROMORPHONE HYDROCHLORIDE 0.5 MG: 1 INJECTION, SOLUTION INTRAMUSCULAR; INTRAVENOUS; SUBCUTANEOUS at 13:51

## 2022-07-15 RX ADMIN — HYDROMORPHONE HYDROCHLORIDE 0.5 MG: 1 INJECTION, SOLUTION INTRAMUSCULAR; INTRAVENOUS; SUBCUTANEOUS at 16:17

## 2022-07-15 RX ADMIN — CEFAZOLIN SODIUM 2000 MG: 2 SOLUTION INTRAVENOUS at 11:12

## 2022-07-15 RX ADMIN — Medication 25 MCG: at 12:15

## 2022-07-15 RX ADMIN — ONDANSETRON HYDROCHLORIDE 4 MG: 2 INJECTION, SOLUTION INTRAMUSCULAR; INTRAVENOUS at 11:26

## 2022-07-15 RX ADMIN — ACETAMINOPHEN 975 MG: 325 TABLET, FILM COATED ORAL at 08:56

## 2022-07-15 RX ADMIN — Medication 25 MCG: at 12:20

## 2022-07-15 NOTE — OP NOTE
OPERATIVE REPORT  PATIENT NAME: Kary Trevino    :  1990  MRN: [id number]  Pt Location:  OR ROOM 06    SURGERY DATE: 7/15/2022    Surgeon(s) and Role:     * Roman Caballero MD - Primary     * Yvonne Paez MD - Assisting    Preop Diagnosis:  Malignant neoplasm of overlapping sites of cervix ([address] ) [C53 8]    Post-Op Diagnosis Codes:     * Malignant neoplasm of overlapping sites of cervix ([address] ) [C53 8]    Procedure(s) (LRB):  INSERTION NADIR SLEEVE VAGINA WITH POST OP BRACHYTHERAPY (IN RADIATION ONCOLOGY) (N/A)    Specimen(s):  * No specimens in log *    Estimated Blood Loss:   Minimal    Drains:  * No LDAs found *    Anesthesia Type:   General/LMA    Operative Indications:  Malignant neoplasm of overlapping sites of cervix ([address] ) [C53 8]  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 status post aborted radical hysterectomy getting curative intent chemotherapy and radiation presents for Nadir sleeve insertion and brachytherapy placement    Operative Findings:  1  Exam under anesthesia revealed a grossly normal cervix  There was no evidence of parametrial disease  The uterus was mobile  2  The uterus sounded to 6 cm  Appropriate placement of the Nadir sleeve was documented by intraoperative ultrasound  Complications:   None    Procedure and Technique:  After informed consent was obtained verbally from the patient and her , the patient was taken to the operating room where general anesthesia was administered via LMA  She was then prepped and draped in normal sterile fashion in the dorsal lithotomy position  Examination under anesthesia was then performed with findings noted as above  A speculum was placed in patient's vagina  The anterior lip of the cervix was grasped with single-tooth tenaculum  The uterus was sounded to 6 cm  Cervical dilation with Lois Spearing dilators was then performed under direct visualization with intraoperative ultrasound    2-0 Prolene suture was then placed at 3 and 9 o'clock on the cervix and we used to secure the 6 cm Antoine sleeve in place  Confirmation of appropriate placement was obtained with intraoperative ultrasound  The procedure was then turned over to Dr Marianne Contreras for brachytherapy placement  All instrument counts correct x2 at the end of the procedure    Estimated blood loss for this portion procedure is minimal    I was present for the entire procedure and A co-surgeon was required because of skills and techniques relevant to speciality    Patient Disposition:  PACU       SIGNATURE: Volodymyr Hamilton MD  DATE: July 15, 2022  TIME: 11:49 AM

## 2022-07-15 NOTE — ANESTHESIA PREPROCEDURE EVALUATION
Procedure:  INSERTION NADIR SLEEVE VAGINA WITH POST OP BRACHYTHERAPY (IN RADIATION ONCOLOGY) (N/A Abdomen)    Relevant Problems   ANESTHESIA (within normal limits)   (-) History of anesthesia complications      CARDIO   (-) Chest pain   (-) SEAMAN (dyspnea on exertion)      GI/HEPATIC   (-) Gastroesophageal reflux disease      GYN   (+) Malignant neoplasm of overlapping sites of cervix (HCC)      PULMONARY   (-) Shortness of breath   (-) URI (upper respiratory infection)      Other   (+) Obesity (BMI 35 0-39 9 without comorbidity)        Physical Exam    Airway    Mallampati score: I  TM Distance: >3 FB  Neck ROM: full     Dental       Cardiovascular      Pulmonary      Other Findings        Anesthesia Plan  ASA Score- 2     Anesthesia Type- general with ASA Monitors  Additional Monitors:   Airway Plan: LMA  Plan Factors-    Chart reviewed  Patient summary reviewed  Induction- intravenous  Postoperative Plan-     Informed Consent- Anesthetic plan and risks discussed with patient  I personally reviewed this patient with the CRNA  Discussed and agreed on the Anesthesia Plan with the CRNA  Beltran Toledo

## 2022-07-15 NOTE — H&P
H&P Exam - Gynecologic Oncology   Cathi Duke 28 y o  female MRN: [id number]  Unit/Bed#: OR POOL Encounter: [id number]    Assessment/Plan     Assessment:  29 yo G0 with stage IIIC1 SCCA s/p ex-lap, aborted radical hysterectomy, and b/l pelvic lymph node dissection, and chemotherapy x4 cycles  Plan: To OR for tamela sleeve placement    History of Present Illness   HPI:  Cathi Duke is a 28 y o  female who presents for Tamlea sleeve insertion with post-op brachytherapy  She has hx of stage IIC1 squamous cell carcinoma of cervix s/p ex-lap, aborted radical hysterectomy, and b/l pelvic lymph node dissection  She is feeling well today, but is anxious about the procedure  Review of Systems   Constitutional: Negative for chills and fever  Respiratory: Negative for chest tightness and shortness of breath  Cardiovascular: Negative for chest pain and palpitations  Genitourinary: Negative for pelvic pain, vaginal bleeding, vaginal discharge and vaginal pain  Psychiatric/Behavioral: The patient is nervous/anxious          Historical Information   Previous Oncology History:   Past Medical History:   Diagnosis Date    Anxiety     Cervical cancer ([address] )     receiving chemo and radiation    COVID     Jan 2022    Depression     Diarrhea     Dizziness     Frequent headaches     Obesity      Past Surgical History:   Procedure Laterality Date    CERVICAL BIOPSY  W/ LOOP ELECTRODE EXCISION      LYMPH NODE DISSECTION Bilateral 5/6/2022    Procedure: DISSECTION/STAGING LYMPH NODE PELVIS/ABDOMEN;  Surgeon: Leodan Escudero MD;  Location: [facility];  Service: Gynecology Oncology    SALPINGECTOMY Bilateral 5/6/2022    Procedure: SALPINGECTOMY, OVARIAN TRANSPOSITION;  Surgeon: Leodan Escudero MD;  Location: [facility];  Service: Gynecology Oncology     OB/GYN History:   Family History   Problem Relation Age of Onset    Ovarian cancer Maternal Aunt     Ovarian cancer Maternal Grandmother     No Known Problems Mother     Heart disease Father      Social History   Social History     Substance and Sexual Activity   Alcohol Use Not Currently     Social History     Substance and Sexual Activity   Drug Use Never    Comment: CBD Gummies/Anxiety     Social History     Tobacco Use   Smoking Status Never Smoker   Smokeless Tobacco Never Used     E-Cigarette/Vaping    E-Cigarette Use Never User      E-Cigarette/Vaping Substances    Nicotine No     THC No     CBD No     Flavoring No     Other No     Unknown No        Meds/Allergies   all current active meds have been reviewed  No Known Allergies    Objective   Vitals: Blood pressure 126/88, pulse 85, temperature 97 9 °F (36 6 °C), temperature source Temporal, resp  rate 21, height 5' 9" (1 753 m), weight 122 kg (269 lb), last menstrual period 06/30/2022, SpO2 98 %, not currently breastfeeding  No intake or output data in the 24 hours ending 07/15/22 0846    Invasive Devices:        Physical Exam  Constitutional:       Appearance: She is obese  HENT:      Head: Normocephalic  Eyes:      Conjunctiva/sclera: Conjunctivae normal    Cardiovascular:      Rate and Rhythm: Normal rate and regular rhythm  Pulses: Normal pulses  Heart sounds: Normal heart sounds  Pulmonary:      Effort: Pulmonary effort is normal       Breath sounds: Normal breath sounds  Abdominal:      Palpations: Abdomen is soft  Tenderness: There is no abdominal tenderness  Musculoskeletal:         General: No swelling or tenderness  Skin:     General: Skin is warm  Capillary Refill: Capillary refill takes less than 2 seconds  Neurological:      Mental Status: She is alert and oriented to person, place, and time  Psychiatric:      Comments: Anxious         Lab Results: I have personally reviewed pertinent reports  Imaging: I have personally reviewed pertinent reports

## 2022-07-15 NOTE — ANESTHESIA POSTPROCEDURE EVALUATION
Post-Op Assessment Note    CV Status:  Stable  Pain Score: 0    Pain management: adequate     Mental Status:  Alert and awake   Hydration Status:  Euvolemic   PONV Controlled:  Controlled   Airway Patency:  Patent      Post Op Vitals Reviewed: Yes      Staff: Anesthesiologist         No complications documented      BP   124/73   Temp      Pulse  67   Resp   11   SpO2   97

## 2022-07-18 ENCOUNTER — APPOINTMENT (OUTPATIENT)
Dept: RADIATION ONCOLOGY | Facility: CLINIC | Age: 32
End: 2022-07-18
Attending: INTERNAL MEDICINE
Payer: COMMERCIAL

## 2022-07-18 ENCOUNTER — ANESTHESIA EVENT (OUTPATIENT)
Dept: SURGERY | Facility: HOSPITAL | Age: 32
End: 2022-07-18

## 2022-07-18 ENCOUNTER — APPOINTMENT (OUTPATIENT)
Dept: RADIATION ONCOLOGY | Facility: HOSPITAL | Age: 32
End: 2022-07-18
Attending: INTERNAL MEDICINE
Payer: COMMERCIAL

## 2022-07-18 ENCOUNTER — APPOINTMENT (OUTPATIENT)
Dept: RADIATION ONCOLOGY | Facility: HOSPITAL | Age: 32
End: 2022-07-18
Attending: RADIOLOGY
Payer: COMMERCIAL

## 2022-07-18 ENCOUNTER — HOSPITAL ENCOUNTER (OUTPATIENT)
Dept: SURGERY | Facility: HOSPITAL | Age: 32
Setting detail: OUTPATIENT SURGERY
Discharge: HOME | End: 2022-07-18
Attending: INTERNAL MEDICINE
Payer: COMMERCIAL

## 2022-07-18 ENCOUNTER — HOSPITAL ENCOUNTER (OUTPATIENT)
Dept: RADIOLOGY | Facility: HOSPITAL | Age: 32
Setting detail: RADIATION/ONCOLOGY SERIES
Discharge: HOME | End: 2022-07-18
Attending: RADIOLOGY
Payer: COMMERCIAL

## 2022-07-18 ENCOUNTER — HOSPITAL ENCOUNTER (OUTPATIENT)
Dept: RADIOLOGY | Facility: HOSPITAL | Age: 32
Discharge: HOME | End: 2022-07-18
Attending: INTERNAL MEDICINE
Payer: COMMERCIAL

## 2022-07-18 ENCOUNTER — ANESTHESIA (OUTPATIENT)
Dept: SURGERY | Facility: HOSPITAL | Age: 32
End: 2022-07-18

## 2022-07-18 VITALS
SYSTOLIC BLOOD PRESSURE: 119 MMHG | HEART RATE: 82 BPM | BODY MASS INDEX: 39.84 KG/M2 | DIASTOLIC BLOOD PRESSURE: 81 MMHG | RESPIRATION RATE: 15 BRPM | OXYGEN SATURATION: 100 % | HEIGHT: 69 IN | WEIGHT: 269 LBS | TEMPERATURE: 98.7 F

## 2022-07-18 DIAGNOSIS — C53.8 MALIGNANT NEOPLASM OF OVERLAPPING SITES OF CERVIX (HCC): ICD-10-CM

## 2022-07-18 DIAGNOSIS — C34.11 MALIGNANT NEOPLASM OF UPPER LOBE OF RIGHT LUNG (HCC): ICD-10-CM

## 2022-07-18 LAB
EXT PREGNANCY TEST URINE: NEGATIVE
EXT. CONTROL: NORMAL

## 2022-07-18 PROCEDURE — C1717 BRACHYTX, NON-STR,HDR IR-192: HCPCS | Performed by: INTERNAL MEDICINE

## 2022-07-18 PROCEDURE — 77280 THER RAD SIMULAJ FIELD SMPL: CPT | Performed by: INTERNAL MEDICINE

## 2022-07-18 PROCEDURE — 77295 3-D RADIOTHERAPY PLAN: CPT | Performed by: INTERNAL MEDICINE

## 2022-07-18 PROCEDURE — 77771 HDR RDNCL NTRSTL/ICAV BRCHTX: CPT | Performed by: INTERNAL MEDICINE

## 2022-07-18 PROCEDURE — 81025 URINE PREGNANCY TEST: CPT | Performed by: INTERNAL MEDICINE

## 2022-07-18 PROCEDURE — 57155 INSERT UTERI TANDEM/OVOIDS: CPT | Performed by: INTERNAL MEDICINE

## 2022-07-18 PROCEDURE — 76857 US EXAM PELVIC LIMITED: CPT

## 2022-07-18 RX ORDER — DIPHENHYDRAMINE HYDROCHLORIDE 50 MG/ML
INJECTION INTRAMUSCULAR; INTRAVENOUS AS NEEDED
Status: DISCONTINUED | OUTPATIENT
Start: 2022-07-18 | End: 2022-07-18

## 2022-07-18 RX ORDER — FENTANYL CITRATE 50 UG/ML
INJECTION, SOLUTION INTRAMUSCULAR; INTRAVENOUS AS NEEDED
Status: DISCONTINUED | OUTPATIENT
Start: 2022-07-18 | End: 2022-07-18

## 2022-07-18 RX ORDER — MAGNESIUM SULFATE HEPTAHYDRATE 40 MG/ML
2 INJECTION, SOLUTION INTRAVENOUS ONCE
Status: CANCELLED | OUTPATIENT
Start: 2022-07-19

## 2022-07-18 RX ORDER — MIDAZOLAM HYDROCHLORIDE 2 MG/2ML
INJECTION, SOLUTION INTRAMUSCULAR; INTRAVENOUS AS NEEDED
Status: DISCONTINUED | OUTPATIENT
Start: 2022-07-18 | End: 2022-07-18

## 2022-07-18 RX ORDER — PROPOFOL 10 MG/ML
INJECTION, EMULSION INTRAVENOUS AS NEEDED
Status: DISCONTINUED | OUTPATIENT
Start: 2022-07-18 | End: 2022-07-18

## 2022-07-18 RX ORDER — HYDROMORPHONE HCL IN WATER/PF 6 MG/30 ML
0.2 PATIENT CONTROLLED ANALGESIA SYRINGE INTRAVENOUS
Status: DISCONTINUED | OUTPATIENT
Start: 2022-07-18 | End: 2022-07-18 | Stop reason: HOSPADM

## 2022-07-18 RX ORDER — CEFAZOLIN SODIUM 1 G/3ML
INJECTION, POWDER, FOR SOLUTION INTRAMUSCULAR; INTRAVENOUS AS NEEDED
Status: DISCONTINUED | OUTPATIENT
Start: 2022-07-18 | End: 2022-07-18

## 2022-07-18 RX ORDER — LIDOCAINE HYDROCHLORIDE 10 MG/ML
INJECTION, SOLUTION EPIDURAL; INFILTRATION; INTRACAUDAL; PERINEURAL AS NEEDED
Status: DISCONTINUED | OUTPATIENT
Start: 2022-07-18 | End: 2022-07-18

## 2022-07-18 RX ORDER — SODIUM CHLORIDE, SODIUM LACTATE, POTASSIUM CHLORIDE, CALCIUM CHLORIDE 600; 310; 30; 20 MG/100ML; MG/100ML; MG/100ML; MG/100ML
20 INJECTION, SOLUTION INTRAVENOUS CONTINUOUS
Status: DISCONTINUED | OUTPATIENT
Start: 2022-07-18 | End: 2022-07-22 | Stop reason: HOSPADM

## 2022-07-18 RX ORDER — KETOROLAC TROMETHAMINE 30 MG/ML
30 INJECTION, SOLUTION INTRAMUSCULAR; INTRAVENOUS EVERY 6 HOURS PRN
Status: DISCONTINUED | OUTPATIENT
Start: 2022-07-18 | End: 2022-07-21 | Stop reason: HOSPADM

## 2022-07-18 RX ORDER — FENTANYL CITRATE/PF 50 MCG/ML
50 SYRINGE (ML) INJECTION
Status: COMPLETED | OUTPATIENT
Start: 2022-07-18 | End: 2022-07-18

## 2022-07-18 RX ORDER — HYDROMORPHONE HCL/PF 1 MG/ML
0.5 SYRINGE (ML) INJECTION ONCE
Status: COMPLETED | OUTPATIENT
Start: 2022-07-18 | End: 2022-07-18

## 2022-07-18 RX ORDER — PALONOSETRON 0.05 MG/ML
0.25 INJECTION, SOLUTION INTRAVENOUS ONCE
Status: CANCELLED | OUTPATIENT
Start: 2022-07-19

## 2022-07-18 RX ORDER — HYDROMORPHONE HCL/PF 1 MG/ML
0.5 SYRINGE (ML) INJECTION EVERY 6 HOURS PRN
Status: DISCONTINUED | OUTPATIENT
Start: 2022-07-18 | End: 2022-07-22 | Stop reason: HOSPADM

## 2022-07-18 RX ORDER — ACETAMINOPHEN 500 MG
1000 TABLET ORAL EVERY 6 HOURS PRN
COMMUNITY

## 2022-07-18 RX ORDER — GLYCOPYRROLATE 0.2 MG/ML
INJECTION INTRAMUSCULAR; INTRAVENOUS AS NEEDED
Status: DISCONTINUED | OUTPATIENT
Start: 2022-07-18 | End: 2022-07-18

## 2022-07-18 RX ORDER — DEXAMETHASONE SODIUM PHOSPHATE 10 MG/ML
INJECTION, SOLUTION INTRAMUSCULAR; INTRAVENOUS AS NEEDED
Status: DISCONTINUED | OUTPATIENT
Start: 2022-07-18 | End: 2022-07-18

## 2022-07-18 RX ORDER — KETOROLAC TROMETHAMINE 30 MG/ML
INJECTION, SOLUTION INTRAMUSCULAR; INTRAVENOUS AS NEEDED
Status: DISCONTINUED | OUTPATIENT
Start: 2022-07-18 | End: 2022-07-18

## 2022-07-18 RX ORDER — ONDANSETRON 2 MG/ML
INJECTION INTRAMUSCULAR; INTRAVENOUS AS NEEDED
Status: DISCONTINUED | OUTPATIENT
Start: 2022-07-18 | End: 2022-07-18

## 2022-07-18 RX ORDER — SODIUM CHLORIDE 9 MG/ML
20 INJECTION, SOLUTION INTRAVENOUS ONCE
Status: CANCELLED | OUTPATIENT
Start: 2022-07-19

## 2022-07-18 RX ORDER — LORAZEPAM 2 MG/ML
1 INJECTION INTRAMUSCULAR ONCE
Status: COMPLETED | OUTPATIENT
Start: 2022-07-18 | End: 2022-07-18

## 2022-07-18 RX ORDER — SODIUM CHLORIDE, SODIUM LACTATE, POTASSIUM CHLORIDE, CALCIUM CHLORIDE 600; 310; 30; 20 MG/100ML; MG/100ML; MG/100ML; MG/100ML
100 INJECTION, SOLUTION INTRAVENOUS CONTINUOUS
Status: CANCELLED | OUTPATIENT
Start: 2022-07-18

## 2022-07-18 RX ORDER — IBUPROFEN 600 MG/1
600 TABLET ORAL EVERY 4 HOURS PRN
Status: DISCONTINUED | OUTPATIENT
Start: 2022-07-18 | End: 2022-07-22 | Stop reason: HOSPADM

## 2022-07-18 RX ADMIN — FENTANYL CITRATE 25 MCG: 50 INJECTION INTRAMUSCULAR; INTRAVENOUS at 08:26

## 2022-07-18 RX ADMIN — KETOROLAC TROMETHAMINE 30 MG: 30 INJECTION, SOLUTION INTRAMUSCULAR at 08:50

## 2022-07-18 RX ADMIN — DEXAMETHASONE SODIUM PHOSPHATE 10 MG: 10 INJECTION, SOLUTION INTRAMUSCULAR; INTRAVENOUS at 08:26

## 2022-07-18 RX ADMIN — DIPHENHYDRAMINE HYDROCHLORIDE 25 MG: 50 INJECTION, SOLUTION INTRAMUSCULAR; INTRAVENOUS at 08:32

## 2022-07-18 RX ADMIN — HYDROMORPHONE HYDROCHLORIDE 0.5 MG: 1 INJECTION, SOLUTION INTRAMUSCULAR; INTRAVENOUS; SUBCUTANEOUS at 12:03

## 2022-07-18 RX ADMIN — Medication 50 MCG: at 09:54

## 2022-07-18 RX ADMIN — SODIUM CHLORIDE, SODIUM LACTATE, POTASSIUM CHLORIDE, AND CALCIUM CHLORIDE: .6; .31; .03; .02 INJECTION, SOLUTION INTRAVENOUS at 08:16

## 2022-07-18 RX ADMIN — ONDANSETRON 4 MG: 2 INJECTION INTRAMUSCULAR; INTRAVENOUS at 08:26

## 2022-07-18 RX ADMIN — LORAZEPAM 1 MG: 2 INJECTION INTRAMUSCULAR; INTRAVENOUS at 09:55

## 2022-07-18 RX ADMIN — FENTANYL CITRATE 50 MCG: 50 INJECTION INTRAMUSCULAR; INTRAVENOUS at 09:32

## 2022-07-18 RX ADMIN — FENTANYL CITRATE 25 MCG: 50 INJECTION INTRAMUSCULAR; INTRAVENOUS at 08:45

## 2022-07-18 RX ADMIN — HYDROMORPHONE HYDROCHLORIDE 0.2 MG: 0.2 INJECTION, SOLUTION INTRAMUSCULAR; INTRAVENOUS; SUBCUTANEOUS at 10:21

## 2022-07-18 RX ADMIN — GLYCOPYRROLATE 0.2 MG: 0.2 INJECTION INTRAMUSCULAR; INTRAVENOUS at 08:22

## 2022-07-18 RX ADMIN — LIDOCAINE HYDROCHLORIDE 50 MG: 10 INJECTION, SOLUTION EPIDURAL; INFILTRATION; INTRACAUDAL; PERINEURAL at 08:22

## 2022-07-18 RX ADMIN — PROPOFOL 200 MG: 10 INJECTION, EMULSION INTRAVENOUS at 08:22

## 2022-07-18 RX ADMIN — Medication 50 MCG: at 10:08

## 2022-07-18 RX ADMIN — CEFAZOLIN SODIUM 2000 MG: 1 INJECTION, POWDER, FOR SOLUTION INTRAMUSCULAR; INTRAVENOUS at 08:26

## 2022-07-18 RX ADMIN — MIDAZOLAM 2 MG: 1 INJECTION INTRAMUSCULAR; INTRAVENOUS at 08:16

## 2022-07-18 NOTE — ANESTHESIA PREPROCEDURE EVALUATION
Procedure:  RAD ONCOLOGY VISIT TYPE    Relevant Problems   No relevant active problems   Cervical cancer, obesity    Physical Exam    Airway    Mallampati score: II  TM Distance: >3 FB  Neck ROM: full     Dental   No notable dental hx     Cardiovascular  Cardiovascular exam normal    Pulmonary      Other Findings        Anesthesia Plan  ASA Score- 2     Anesthesia Type- general with ASA Monitors  Additional Monitors:   Airway Plan: LMA  Plan Factors-    Induction- intravenous  Postoperative Plan-     Informed Consent- Anesthetic plan and risks discussed with patient

## 2022-07-18 NOTE — ANESTHESIA PREPROCEDURE EVALUATION
Procedure:  RAD ONCOLOGY VISIT TYPE    Relevant Problems   GYN   (+) Malignant neoplasm of overlapping sites of cervix [facility])        Physical Exam    Airway       Dental       Cardiovascular  Cardiovascular exam normal    Pulmonary  Pulmonary exam normal     Other Findings        Anesthesia Plan  ASA Score- 3     Anesthesia Type- general with ASA Monitors  Additional Monitors:   Airway Plan: LMA  Plan Factors-    Chart reviewed  Existing labs reviewed  Patient summary reviewed  Induction- intravenous  Postoperative Plan- Plan for postoperative opioid use  Planned trial extubation    Informed Consent- Anesthetic plan and risks discussed with patient  I personally reviewed this patient with the CRNA  Discussed and agreed on the Anesthesia Plan with the SUSIE Orr

## 2022-07-18 NOTE — ANESTHESIA POSTPROCEDURE EVALUATION
Post-Op Assessment Note    CV Status:  Stable    Pain management: adequate     Mental Status:  Alert and awake   Hydration Status:  Euvolemic   PONV Controlled:  Controlled   Airway Patency:  Patent      Post Op Vitals Reviewed: Yes      Staff: CRNA   Comments: vss report RN        No complications documented      BP   116/61   Temp      Pulse  96   Resp      SpO2   99 RA

## 2022-07-19 ENCOUNTER — HOSPITAL ENCOUNTER (OUTPATIENT)
Dept: INFUSION CENTER | Facility: CLINIC | Age: 32
Discharge: HOME | End: 2022-07-19
Payer: COMMERCIAL

## 2022-07-19 ENCOUNTER — APPOINTMENT (OUTPATIENT)
Dept: RADIATION ONCOLOGY | Facility: CLINIC | Age: 32
End: 2022-07-19
Attending: INTERNAL MEDICINE
Payer: COMMERCIAL

## 2022-07-19 ENCOUNTER — PATIENT OUTREACH (OUTPATIENT)
Dept: CASE MANAGEMENT | Facility: OTHER | Age: 32
End: 2022-07-19

## 2022-07-19 VITALS
RESPIRATION RATE: 16 BRPM | HEART RATE: 86 BPM | BODY MASS INDEX: 39.98 KG/M2 | SYSTOLIC BLOOD PRESSURE: 123 MMHG | HEIGHT: 69 IN | DIASTOLIC BLOOD PRESSURE: 70 MMHG | OXYGEN SATURATION: 99 % | TEMPERATURE: 98.7 F | WEIGHT: 269.95 LBS

## 2022-07-19 DIAGNOSIS — C53.8 MALIGNANT NEOPLASM OF OVERLAPPING SITES OF CERVIX (HCC): Primary | ICD-10-CM

## 2022-07-19 PROCEDURE — 77427 RADIATION TX MANAGEMENT X5: CPT | Performed by: INTERNAL MEDICINE

## 2022-07-19 PROCEDURE — 96367 TX/PROPH/DG ADDL SEQ IV INF: CPT

## 2022-07-19 PROCEDURE — 96375 TX/PRO/DX INJ NEW DRUG ADDON: CPT

## 2022-07-19 PROCEDURE — 96413 CHEMO IV INFUSION 1 HR: CPT

## 2022-07-19 PROCEDURE — 96361 HYDRATE IV INFUSION ADD-ON: CPT

## 2022-07-19 RX ORDER — SODIUM CHLORIDE 9 MG/ML
20 INJECTION, SOLUTION INTRAVENOUS ONCE
Status: COMPLETED | OUTPATIENT
Start: 2022-07-19 | End: 2022-07-19

## 2022-07-19 RX ORDER — MAGNESIUM SULFATE HEPTAHYDRATE 40 MG/ML
2 INJECTION, SOLUTION INTRAVENOUS ONCE
Status: COMPLETED | OUTPATIENT
Start: 2022-07-19 | End: 2022-07-19

## 2022-07-19 RX ORDER — PALONOSETRON 0.05 MG/ML
0.25 INJECTION, SOLUTION INTRAVENOUS ONCE
Status: COMPLETED | OUTPATIENT
Start: 2022-07-19 | End: 2022-07-19

## 2022-07-19 RX ADMIN — SODIUM CHLORIDE 500 ML: 0.9 INJECTION, SOLUTION INTRAVENOUS at 08:57

## 2022-07-19 RX ADMIN — SODIUM CHLORIDE 500 ML: 0.9 INJECTION, SOLUTION INTRAVENOUS at 13:10

## 2022-07-19 RX ADMIN — FAMOTIDINE 20 MG: 10 INJECTION INTRAVENOUS at 11:04

## 2022-07-19 RX ADMIN — DEXAMETHASONE SODIUM PHOSPHATE 20 MG: 10 INJECTION, SOLUTION INTRAMUSCULAR; INTRAVENOUS at 10:31

## 2022-07-19 RX ADMIN — CISPLATIN 70 MG: 1 INJECTION INTRAVENOUS at 12:11

## 2022-07-19 RX ADMIN — APREPITANT 130 MG: 130 INJECTION, EMULSION INTRAVENOUS at 11:31

## 2022-07-19 RX ADMIN — SODIUM CHLORIDE 20 ML/HR: 0.9 INJECTION, SOLUTION INTRAVENOUS at 09:00

## 2022-07-19 RX ADMIN — PALONOSETRON 0.25 MG: 0.05 INJECTION, SOLUTION INTRAVENOUS at 10:28

## 2022-07-19 RX ADMIN — MAGNESIUM SULFATE HEPTAHYDRATE 2 G: 40 INJECTION, SOLUTION INTRAVENOUS at 09:02

## 2022-07-19 NOTE — PROGRESS NOTES
Pt arrived to unit without complaint  Pt tolerated treatment without incident  AVS provided  Pt left unit in stable condition

## 2022-07-19 NOTE — PROGRESS NOTES
OSW team met with Eliza Caballero today during her infusion  She started brachytherapy last week  There was an issue with pain management after her procedure, but during her 2nd treatment on Monday, the medications were available to her quickly and she did not have uncontrolled pain  She has 2 more brachytherapy treatments left  She shared how difficult her treatments have been, but is staying the course  Provided emotional support  Explained this writer will be changing assignments and introduced her to new OSW  Pt appreciative of support

## 2022-07-20 ENCOUNTER — ANESTHESIA (OUTPATIENT)
Dept: ANESTHESIOLOGY | Facility: HOSPITAL | Age: 32
End: 2022-07-20

## 2022-07-20 ENCOUNTER — APPOINTMENT (OUTPATIENT)
Dept: RADIATION ONCOLOGY | Facility: CLINIC | Age: 32
End: 2022-07-20
Attending: INTERNAL MEDICINE
Payer: COMMERCIAL

## 2022-07-20 ENCOUNTER — ANESTHESIA EVENT (OUTPATIENT)
Dept: ANESTHESIOLOGY | Facility: HOSPITAL | Age: 32
End: 2022-07-20

## 2022-07-20 PROCEDURE — 77386 HB NTSTY MODUL RAD TX DLVR CPLX: CPT | Performed by: RADIOLOGY

## 2022-07-20 NOTE — ANESTHESIA PREPROCEDURE EVALUATION
Procedure:  PRE-OP ONLY    Relevant Problems   GYN   (+) Malignant neoplasm of overlapping sites of cervix [facility])      Lab Results   Component Value Date    WBC 2 7 (L) 07/14/2022    HGB 11 5 07/14/2022    HCT 35 4 07/14/2022    MCV 86 07/14/2022     07/14/2022     Lab Results   Component Value Date    SODIUM 137 07/14/2022    K 4 2 07/14/2022    CL 98 07/14/2022    CO2 26 07/14/2022    BUN 18 07/14/2022    CREATININE 0 80 07/14/2022    GLUC 83 07/14/2022    CALCIUM 8 5 05/08/2022     No results found for: INR, PROTIME  No results found for: HGBA1C            Anesthesia Plan  ASA Score- 2     Anesthesia Type- general with ASA Monitors  Additional Monitors:   Airway Plan: LMA  Plan Factors-    Chart reviewed  Existing labs reviewed  Patient summary reviewed  Induction- intravenous      Postoperative Plan-     Informed Consent-

## 2022-07-21 ENCOUNTER — ANESTHESIA (OUTPATIENT)
Dept: SURGERY | Facility: HOSPITAL | Age: 32
End: 2022-07-21

## 2022-07-21 ENCOUNTER — APPOINTMENT (OUTPATIENT)
Dept: RADIATION ONCOLOGY | Facility: HOSPITAL | Age: 32
End: 2022-07-21
Attending: INTERNAL MEDICINE
Payer: COMMERCIAL

## 2022-07-21 ENCOUNTER — HOSPITAL ENCOUNTER (OUTPATIENT)
Dept: SURGERY | Facility: HOSPITAL | Age: 32
Setting detail: OUTPATIENT SURGERY
Discharge: HOME | End: 2022-07-21
Attending: INTERNAL MEDICINE
Payer: COMMERCIAL

## 2022-07-21 ENCOUNTER — ANESTHESIA EVENT (OUTPATIENT)
Dept: SURGERY | Facility: HOSPITAL | Age: 32
End: 2022-07-21

## 2022-07-21 ENCOUNTER — HOSPITAL ENCOUNTER (OUTPATIENT)
Dept: RADIOLOGY | Facility: HOSPITAL | Age: 32
Discharge: HOME | End: 2022-07-21
Attending: INTERNAL MEDICINE
Payer: COMMERCIAL

## 2022-07-21 ENCOUNTER — HOSPITAL ENCOUNTER (OUTPATIENT)
Dept: RADIOLOGY | Facility: HOSPITAL | Age: 32
Setting detail: RADIATION/ONCOLOGY SERIES
Discharge: HOME | End: 2022-07-21
Attending: INTERNAL MEDICINE
Payer: COMMERCIAL

## 2022-07-21 ENCOUNTER — RADIATION THERAPY TREATMENT (OUTPATIENT)
Dept: RADIATION ONCOLOGY | Facility: HOSPITAL | Age: 32
End: 2022-07-21
Attending: RADIOLOGY
Payer: COMMERCIAL

## 2022-07-21 ENCOUNTER — APPOINTMENT (OUTPATIENT)
Dept: RADIATION ONCOLOGY | Facility: CLINIC | Age: 32
End: 2022-07-21
Payer: COMMERCIAL

## 2022-07-21 VITALS
RESPIRATION RATE: 18 BRPM | HEIGHT: 69 IN | OXYGEN SATURATION: 97 % | TEMPERATURE: 98.5 F | DIASTOLIC BLOOD PRESSURE: 68 MMHG | HEART RATE: 84 BPM | WEIGHT: 269 LBS | BODY MASS INDEX: 39.84 KG/M2 | SYSTOLIC BLOOD PRESSURE: 119 MMHG

## 2022-07-21 DIAGNOSIS — C53.8 MALIGNANT NEOPLASM OF OVERLAPPING SITES OF CERVIX (HCC): ICD-10-CM

## 2022-07-21 DIAGNOSIS — C77.5 SECONDARY AND UNSPECIFIED MALIGNANT NEOPLASM OF INTRAPELVIC LYMPH NODES (HCC): ICD-10-CM

## 2022-07-21 LAB
EXT PREGNANCY TEST URINE: NEGATIVE
EXT. CONTROL: NORMAL

## 2022-07-21 PROCEDURE — 77280 THER RAD SIMULAJ FIELD SMPL: CPT | Performed by: INTERNAL MEDICINE

## 2022-07-21 PROCEDURE — C1717 BRACHYTX, NON-STR,HDR IR-192: HCPCS | Performed by: INTERNAL MEDICINE

## 2022-07-21 PROCEDURE — 77771 HDR RDNCL NTRSTL/ICAV BRCHTX: CPT | Performed by: INTERNAL MEDICINE

## 2022-07-21 PROCEDURE — 76857 US EXAM PELVIC LIMITED: CPT

## 2022-07-21 PROCEDURE — 77295 3-D RADIOTHERAPY PLAN: CPT | Performed by: INTERNAL MEDICINE

## 2022-07-21 PROCEDURE — 57155 INSERT UTERI TANDEM/OVOIDS: CPT | Performed by: INTERNAL MEDICINE

## 2022-07-21 PROCEDURE — 81025 URINE PREGNANCY TEST: CPT | Performed by: INTERNAL MEDICINE

## 2022-07-21 RX ORDER — HYDROMORPHONE HCL/PF 1 MG/ML
0.5 SYRINGE (ML) INJECTION EVERY 4 HOURS PRN
Status: DISCONTINUED | OUTPATIENT
Start: 2022-07-21 | End: 2022-07-25 | Stop reason: HOSPADM

## 2022-07-21 RX ORDER — FENTANYL CITRATE 50 UG/ML
INJECTION, SOLUTION INTRAMUSCULAR; INTRAVENOUS AS NEEDED
Status: DISCONTINUED | OUTPATIENT
Start: 2022-07-21 | End: 2022-07-21

## 2022-07-21 RX ORDER — LIDOCAINE HYDROCHLORIDE 10 MG/ML
INJECTION, SOLUTION EPIDURAL; INFILTRATION; INTRACAUDAL; PERINEURAL AS NEEDED
Status: DISCONTINUED | OUTPATIENT
Start: 2022-07-21 | End: 2022-07-21

## 2022-07-21 RX ORDER — IBUPROFEN 600 MG/1
600 TABLET ORAL EVERY 4 HOURS PRN
Status: DISCONTINUED | OUTPATIENT
Start: 2022-07-21 | End: 2022-07-25 | Stop reason: HOSPADM

## 2022-07-21 RX ORDER — PROPOFOL 10 MG/ML
INJECTION, EMULSION INTRAVENOUS AS NEEDED
Status: DISCONTINUED | OUTPATIENT
Start: 2022-07-21 | End: 2022-07-21

## 2022-07-21 RX ORDER — MIDAZOLAM HYDROCHLORIDE 2 MG/2ML
INJECTION, SOLUTION INTRAMUSCULAR; INTRAVENOUS
Status: COMPLETED
Start: 2022-07-21 | End: 2022-07-21

## 2022-07-21 RX ORDER — LORAZEPAM 2 MG/ML
INJECTION INTRAMUSCULAR AS NEEDED
Status: DISCONTINUED | OUTPATIENT
Start: 2022-07-21 | End: 2022-07-21

## 2022-07-21 RX ORDER — DIAZEPAM 5 MG/1
TABLET ORAL
COMMUNITY
Start: 2022-06-28

## 2022-07-21 RX ORDER — PROMETHAZINE HYDROCHLORIDE 25 MG/ML
12.5 INJECTION, SOLUTION INTRAMUSCULAR; INTRAVENOUS ONCE AS NEEDED
Status: DISCONTINUED | OUTPATIENT
Start: 2022-07-21 | End: 2022-07-21 | Stop reason: HOSPADM

## 2022-07-21 RX ORDER — SODIUM CHLORIDE, SODIUM LACTATE, POTASSIUM CHLORIDE, CALCIUM CHLORIDE 600; 310; 30; 20 MG/100ML; MG/100ML; MG/100ML; MG/100ML
20 INJECTION, SOLUTION INTRAVENOUS CONTINUOUS
Status: DISCONTINUED | OUTPATIENT
Start: 2022-07-21 | End: 2022-07-25 | Stop reason: HOSPADM

## 2022-07-21 RX ORDER — KETOROLAC TROMETHAMINE 30 MG/ML
INJECTION, SOLUTION INTRAMUSCULAR; INTRAVENOUS AS NEEDED
Status: DISCONTINUED | OUTPATIENT
Start: 2022-07-21 | End: 2022-07-21

## 2022-07-21 RX ORDER — FENTANYL CITRATE/PF 50 MCG/ML
25 SYRINGE (ML) INJECTION
Status: DISCONTINUED | OUTPATIENT
Start: 2022-07-21 | End: 2022-07-21 | Stop reason: HOSPADM

## 2022-07-21 RX ORDER — KETOROLAC TROMETHAMINE 30 MG/ML
30 INJECTION, SOLUTION INTRAMUSCULAR; INTRAVENOUS EVERY 6 HOURS PRN
Status: DISCONTINUED | OUTPATIENT
Start: 2022-07-21 | End: 2022-07-24 | Stop reason: HOSPADM

## 2022-07-21 RX ORDER — DEXAMETHASONE SODIUM PHOSPHATE 10 MG/ML
INJECTION, SOLUTION INTRAMUSCULAR; INTRAVENOUS AS NEEDED
Status: DISCONTINUED | OUTPATIENT
Start: 2022-07-21 | End: 2022-07-21

## 2022-07-21 RX ORDER — HYDROMORPHONE HCL/PF 1 MG/ML
0.5 SYRINGE (ML) INJECTION ONCE
Status: COMPLETED | OUTPATIENT
Start: 2022-07-21 | End: 2022-07-21

## 2022-07-21 RX ORDER — CEFAZOLIN SODIUM 1 G/3ML
INJECTION, POWDER, FOR SOLUTION INTRAMUSCULAR; INTRAVENOUS AS NEEDED
Status: DISCONTINUED | OUTPATIENT
Start: 2022-07-21 | End: 2022-07-21

## 2022-07-21 RX ORDER — MIDAZOLAM HYDROCHLORIDE 2 MG/2ML
INJECTION, SOLUTION INTRAMUSCULAR; INTRAVENOUS AS NEEDED
Status: DISCONTINUED | OUTPATIENT
Start: 2022-07-21 | End: 2022-07-21

## 2022-07-21 RX ORDER — SODIUM CHLORIDE, SODIUM LACTATE, POTASSIUM CHLORIDE, CALCIUM CHLORIDE 600; 310; 30; 20 MG/100ML; MG/100ML; MG/100ML; MG/100ML
100 INJECTION, SOLUTION INTRAVENOUS CONTINUOUS
Status: DISCONTINUED | OUTPATIENT
Start: 2022-07-21 | End: 2022-07-25 | Stop reason: HOSPADM

## 2022-07-21 RX ORDER — ONDANSETRON 2 MG/ML
INJECTION INTRAMUSCULAR; INTRAVENOUS AS NEEDED
Status: DISCONTINUED | OUTPATIENT
Start: 2022-07-21 | End: 2022-07-21

## 2022-07-21 RX ORDER — CEFAZOLIN SODIUM 2 G/50ML
2000 SOLUTION INTRAVENOUS ONCE
Status: DISCONTINUED | OUTPATIENT
Start: 2022-07-21 | End: 2022-07-25 | Stop reason: HOSPADM

## 2022-07-21 RX ORDER — HYDROMORPHONE HCL/PF 1 MG/ML
SYRINGE (ML) INJECTION AS NEEDED
Status: DISCONTINUED | OUTPATIENT
Start: 2022-07-21 | End: 2022-07-21

## 2022-07-21 RX ADMIN — HYDROMORPHONE HYDROCHLORIDE 0.5 MG: 1 INJECTION, SOLUTION INTRAMUSCULAR; INTRAVENOUS; SUBCUTANEOUS at 11:08

## 2022-07-21 RX ADMIN — ONDANSETRON 4 MG: 2 INJECTION INTRAMUSCULAR; INTRAVENOUS at 08:44

## 2022-07-21 RX ADMIN — ONDANSETRON 4 MG: 2 INJECTION INTRAMUSCULAR; INTRAVENOUS at 09:08

## 2022-07-21 RX ADMIN — HYDROMORPHONE HYDROCHLORIDE 0.5 MG: 1 INJECTION, SOLUTION INTRAMUSCULAR; INTRAVENOUS; SUBCUTANEOUS at 09:08

## 2022-07-21 RX ADMIN — HYDROMORPHONE HYDROCHLORIDE 0.5 MG: 1 INJECTION, SOLUTION INTRAMUSCULAR; INTRAVENOUS; SUBCUTANEOUS at 12:01

## 2022-07-21 RX ADMIN — PROPOFOL 200 MG: 10 INJECTION, EMULSION INTRAVENOUS at 08:14

## 2022-07-21 RX ADMIN — DEXAMETHASONE SODIUM PHOSPHATE 10 MG: 10 INJECTION, SOLUTION INTRAMUSCULAR; INTRAVENOUS at 08:44

## 2022-07-21 RX ADMIN — SODIUM CHLORIDE, SODIUM LACTATE, POTASSIUM CHLORIDE, AND CALCIUM CHLORIDE: .6; .31; .03; .02 INJECTION, SOLUTION INTRAVENOUS at 08:14

## 2022-07-21 RX ADMIN — KETOROLAC TROMETHAMINE 30 MG: 30 INJECTION, SOLUTION INTRAMUSCULAR at 09:00

## 2022-07-21 RX ADMIN — LORAZEPAM 1 MG: 2 INJECTION INTRAMUSCULAR; INTRAVENOUS at 09:26

## 2022-07-21 RX ADMIN — LIDOCAINE HYDROCHLORIDE 50 MG: 10 INJECTION, SOLUTION EPIDURAL; INFILTRATION; INTRACAUDAL; PERINEURAL at 08:14

## 2022-07-21 RX ADMIN — MIDAZOLAM 2 MG: 1 INJECTION INTRAMUSCULAR; INTRAVENOUS at 08:08

## 2022-07-21 RX ADMIN — CEFAZOLIN SODIUM 2000 MG: 1 INJECTION, POWDER, FOR SOLUTION INTRAMUSCULAR; INTRAVENOUS at 08:20

## 2022-07-21 RX ADMIN — IBUPROFEN 600 MG: 600 TABLET ORAL at 10:20

## 2022-07-21 RX ADMIN — FENTANYL CITRATE 50 MCG: 50 INJECTION INTRAMUSCULAR; INTRAVENOUS at 09:26

## 2022-07-21 NOTE — ANESTHESIA PREPROCEDURE EVALUATION
Procedure:  RAD ONCOLOGY VISIT TYPE    Relevant Problems   ANESTHESIA (within normal limits)      CARDIO (within normal limits)      GI/HEPATIC (within normal limits)      /RENAL (within normal limits)      GYN   (+) Malignant neoplasm of overlapping sites of cervix (HCC)      HEMATOLOGY (within normal limits)      NEURO/PSYCH (within normal limits)      PULMONARY (within normal limits)      Other   (+) Obesity (BMI 35 0-39 9 without comorbidity)      Lab Results   Component Value Date    WBC 2 7 (L) 07/14/2022    HGB 11 5 07/14/2022    HCT 35 4 07/14/2022    MCV 86 07/14/2022     07/14/2022     Lab Results   Component Value Date    SODIUM 137 07/14/2022    K 4 2 07/14/2022    CL 98 07/14/2022    CO2 26 07/14/2022    BUN 18 07/14/2022    CREATININE 0 80 07/14/2022    GLUC 83 07/14/2022    CALCIUM 8 5 05/08/2022     No results found for: INR, PROTIME  No results found for: HGBA1C            Anesthesia Plan  ASA Score- 2     Anesthesia Type- general with ASA Monitors  Additional Monitors:   Airway Plan: LMA  Plan Factors-    Chart reviewed  Existing labs reviewed  Patient summary reviewed  Induction- intravenous  Postoperative Plan-     Informed Consent-     Physical Exam    Airway    Mallampati score: II  TM Distance: >3 FB  Neck ROM: full     Dental   No notable dental hx     Cardiovascular  Cardiovascular exam normal    Pulmonary  Pulmonary exam normal     Other Findings        Anesthesia Plan  ASA Score- 2     Anesthesia Type- general with ASA Monitors  Additional Monitors:   Airway Plan: LMA  Comment: Patient with anxiety; responds better with lorazepam over midazolam        Plan Factors-Exercise tolerance (METS): >4 METS  Chart reviewed  Existing labs reviewed  Patient summary reviewed  Induction- intravenous  Postoperative Plan-     Informed Consent- Anesthetic plan and risks discussed with patient    I personally reviewed this patient with the CRNA  Discussed and agreed on the Anesthesia Plan with the CRNA  Giovana Sandoval

## 2022-07-22 ENCOUNTER — APPOINTMENT (OUTPATIENT)
Dept: RADIATION ONCOLOGY | Facility: CLINIC | Age: 32
End: 2022-07-22
Attending: INTERNAL MEDICINE
Payer: COMMERCIAL

## 2022-07-22 DIAGNOSIS — R30.0 DYSURIA: Primary | ICD-10-CM

## 2022-07-22 PROCEDURE — 77386 HB NTSTY MODUL RAD TX DLVR CPLX: CPT | Performed by: RADIOLOGY

## 2022-07-23 LAB
ALBUMIN SERPL-MCNC: 3.7 G/DL (ref 3.8–4.8)
ALBUMIN/GLOB SERPL: 1.2 {RATIO} (ref 1.2–2.2)
ALP SERPL-CCNC: 60 IU/L (ref 44–121)
ALT SERPL-CCNC: 17 IU/L (ref 0–32)
AST SERPL-CCNC: 14 IU/L (ref 0–40)
BASOPHILS # BLD AUTO: 0 X10E3/UL (ref 0–0.2)
BASOPHILS NFR BLD AUTO: 0 %
BILIRUB SERPL-MCNC: 0.3 MG/DL (ref 0–1.2)
BUN SERPL-MCNC: 15 MG/DL (ref 6–20)
BUN/CREAT SERPL: 23 (ref 9–23)
CALCIUM SERPL-MCNC: 8.9 MG/DL (ref 8.7–10.2)
CHLORIDE SERPL-SCNC: 101 MMOL/L (ref 96–106)
CO2 SERPL-SCNC: 23 MMOL/L (ref 20–29)
CREAT SERPL-MCNC: 0.64 MG/DL (ref 0.57–1)
EGFR: 120 ML/MIN/1.73
EOSINOPHIL # BLD AUTO: 0 X10E3/UL (ref 0–0.4)
EOSINOPHIL NFR BLD AUTO: 1 %
ERYTHROCYTE [DISTWIDTH] IN BLOOD BY AUTOMATED COUNT: 14.4 % (ref 11.7–15.4)
GLOBULIN SER-MCNC: 3 G/DL (ref 1.5–4.5)
GLUCOSE SERPL-MCNC: 82 MG/DL (ref 65–99)
HCT VFR BLD AUTO: 31 % (ref 34–46.6)
HGB BLD-MCNC: 10.4 G/DL (ref 11.1–15.9)
IMM GRANULOCYTES # BLD: 0 X10E3/UL (ref 0–0.1)
IMM GRANULOCYTES NFR BLD: 0 %
LYMPHOCYTES # BLD AUTO: 0.5 X10E3/UL (ref 0.7–3.1)
LYMPHOCYTES NFR BLD AUTO: 10 %
MAGNESIUM SERPL-MCNC: 1.5 MG/DL (ref 1.6–2.3)
MCH RBC QN AUTO: 28.4 PG (ref 26.6–33)
MCHC RBC AUTO-ENTMCNC: 33.5 G/DL (ref 31.5–35.7)
MCV RBC AUTO: 85 FL (ref 79–97)
MONOCYTES # BLD AUTO: 0.5 X10E3/UL (ref 0.1–0.9)
MONOCYTES NFR BLD AUTO: 10 %
NEUTROPHILS # BLD AUTO: 3.7 X10E3/UL (ref 1.4–7)
NEUTROPHILS NFR BLD AUTO: 79 %
PLATELET # BLD AUTO: 121 X10E3/UL (ref 150–450)
POTASSIUM SERPL-SCNC: 3.7 MMOL/L (ref 3.5–5.2)
PROT SERPL-MCNC: 6.7 G/DL (ref 6–8.5)
RBC # BLD AUTO: 3.66 X10E6/UL (ref 3.77–5.28)
SODIUM SERPL-SCNC: 138 MMOL/L (ref 134–144)
WBC # BLD AUTO: 4.7 X10E3/UL (ref 3.4–10.8)

## 2022-07-25 ENCOUNTER — ANESTHESIA EVENT (OUTPATIENT)
Dept: SURGERY | Facility: HOSPITAL | Age: 32
End: 2022-07-25

## 2022-07-25 ENCOUNTER — HOSPITAL ENCOUNTER (OUTPATIENT)
Dept: RADIOLOGY | Facility: HOSPITAL | Age: 32
Discharge: HOME | End: 2022-07-25
Attending: INTERNAL MEDICINE
Payer: COMMERCIAL

## 2022-07-25 ENCOUNTER — APPOINTMENT (OUTPATIENT)
Dept: RADIATION ONCOLOGY | Facility: HOSPITAL | Age: 32
End: 2022-07-25
Attending: RADIOLOGY
Payer: COMMERCIAL

## 2022-07-25 ENCOUNTER — APPOINTMENT (OUTPATIENT)
Dept: RADIATION ONCOLOGY | Facility: HOSPITAL | Age: 32
End: 2022-07-25
Attending: INTERNAL MEDICINE
Payer: COMMERCIAL

## 2022-07-25 ENCOUNTER — HOSPITAL ENCOUNTER (OUTPATIENT)
Dept: RADIOLOGY | Facility: HOSPITAL | Age: 32
Setting detail: RADIATION/ONCOLOGY SERIES
Discharge: HOME | End: 2022-07-25
Attending: RADIOLOGY
Payer: COMMERCIAL

## 2022-07-25 ENCOUNTER — HOSPITAL ENCOUNTER (OUTPATIENT)
Dept: SURGERY | Facility: HOSPITAL | Age: 32
Setting detail: OUTPATIENT SURGERY
Discharge: HOME | End: 2022-07-25
Attending: INTERNAL MEDICINE
Payer: COMMERCIAL

## 2022-07-25 ENCOUNTER — ANESTHESIA (OUTPATIENT)
Dept: SURGERY | Facility: HOSPITAL | Age: 32
End: 2022-07-25

## 2022-07-25 ENCOUNTER — APPOINTMENT (OUTPATIENT)
Dept: RADIATION ONCOLOGY | Facility: CLINIC | Age: 32
End: 2022-07-25
Payer: COMMERCIAL

## 2022-07-25 VITALS
TEMPERATURE: 97.5 F | DIASTOLIC BLOOD PRESSURE: 71 MMHG | OXYGEN SATURATION: 100 % | RESPIRATION RATE: 18 BRPM | SYSTOLIC BLOOD PRESSURE: 114 MMHG | WEIGHT: 269 LBS | HEIGHT: 69 IN | HEART RATE: 93 BPM | BODY MASS INDEX: 39.84 KG/M2

## 2022-07-25 DIAGNOSIS — C34.11 MALIGNANT NEOPLASM OF UPPER LOBE OF RIGHT LUNG (HCC): ICD-10-CM

## 2022-07-25 DIAGNOSIS — C53.8 MALIGNANT NEOPLASM OF OVERLAPPING SITES OF CERVIX (HCC): ICD-10-CM

## 2022-07-25 LAB
APPEARANCE UR: CLEAR
BACTERIA UR CULT: ABNORMAL
BACTERIA URNS QL MICRO: ABNORMAL
BILIRUB UR QL STRIP: NEGATIVE
CASTS URNS QL MICRO: ABNORMAL /LPF
COLOR UR: YELLOW
EPI CELLS #/AREA URNS HPF: ABNORMAL /HPF (ref 0–10)
EXT PREGNANCY TEST URINE: NEGATIVE
EXT. CONTROL: NORMAL
GLUCOSE UR QL: NEGATIVE
HGB UR QL STRIP: NEGATIVE
KETONES UR QL STRIP: NEGATIVE
LEUKOCYTE ESTERASE UR QL STRIP: ABNORMAL
Lab: ABNORMAL
Lab: ABNORMAL
MICRO URNS: ABNORMAL
NITRITE UR QL STRIP: NEGATIVE
PH UR STRIP: 6 [PH] (ref 5–7.5)
PROT UR QL STRIP: NEGATIVE
RBC #/AREA URNS HPF: ABNORMAL /HPF (ref 0–2)
SL AMB ANTIMICROBIAL SUSCEPTIBILITY: ABNORMAL
SL AMB URINALYSIS REFLEX: ABNORMAL
SP GR UR: 1.01 (ref 1–1.03)
UROBILINOGEN UR STRIP-ACNC: 0.2 MG/DL (ref 0.2–1)
WBC #/AREA URNS HPF: ABNORMAL /HPF (ref 0–5)

## 2022-07-25 PROCEDURE — 77295 3-D RADIOTHERAPY PLAN: CPT | Performed by: INTERNAL MEDICINE

## 2022-07-25 PROCEDURE — 81025 URINE PREGNANCY TEST: CPT | Performed by: ANESTHESIOLOGY

## 2022-07-25 PROCEDURE — 77771 HDR RDNCL NTRSTL/ICAV BRCHTX: CPT | Performed by: INTERNAL MEDICINE

## 2022-07-25 PROCEDURE — 77280 THER RAD SIMULAJ FIELD SMPL: CPT | Performed by: INTERNAL MEDICINE

## 2022-07-25 PROCEDURE — 57155 INSERT UTERI TANDEM/OVOIDS: CPT | Performed by: INTERNAL MEDICINE

## 2022-07-25 PROCEDURE — C1717 BRACHYTX, NON-STR,HDR IR-192: HCPCS | Performed by: INTERNAL MEDICINE

## 2022-07-25 PROCEDURE — 77014 HB CT SCAN FOR THERAPY GUIDE: CPT

## 2022-07-25 RX ORDER — KETOROLAC TROMETHAMINE 30 MG/ML
30 INJECTION, SOLUTION INTRAMUSCULAR; INTRAVENOUS EVERY 6 HOURS PRN
Status: DISCONTINUED | OUTPATIENT
Start: 2022-07-25 | End: 2022-07-26 | Stop reason: HOSPADM

## 2022-07-25 RX ORDER — HYDROMORPHONE HCL/PF 1 MG/ML
0.4 SYRINGE (ML) INJECTION
Status: DISCONTINUED | OUTPATIENT
Start: 2022-07-25 | End: 2022-07-25 | Stop reason: HOSPADM

## 2022-07-25 RX ORDER — HYDROMORPHONE HCL 110MG/55ML
PATIENT CONTROLLED ANALGESIA SYRINGE INTRAVENOUS AS NEEDED
Status: DISCONTINUED | OUTPATIENT
Start: 2022-07-25 | End: 2022-07-25

## 2022-07-25 RX ORDER — ONDANSETRON 2 MG/ML
4 INJECTION INTRAMUSCULAR; INTRAVENOUS ONCE AS NEEDED
Status: COMPLETED | OUTPATIENT
Start: 2022-07-25 | End: 2022-07-25

## 2022-07-25 RX ORDER — HYDROMORPHONE HCL/PF 1 MG/ML
0.5 SYRINGE (ML) INJECTION ONCE
Status: COMPLETED | OUTPATIENT
Start: 2022-07-25 | End: 2022-07-25

## 2022-07-25 RX ORDER — METOCLOPRAMIDE HYDROCHLORIDE 5 MG/ML
10 INJECTION INTRAMUSCULAR; INTRAVENOUS ONCE AS NEEDED
Status: COMPLETED | OUTPATIENT
Start: 2022-07-25 | End: 2022-07-25

## 2022-07-25 RX ORDER — CEFAZOLIN SODIUM 2 G/50ML
2000 SOLUTION INTRAVENOUS
Status: DISCONTINUED | OUTPATIENT
Start: 2022-07-25 | End: 2022-07-26 | Stop reason: HOSPADM

## 2022-07-25 RX ORDER — SODIUM CHLORIDE, SODIUM LACTATE, POTASSIUM CHLORIDE, CALCIUM CHLORIDE 600; 310; 30; 20 MG/100ML; MG/100ML; MG/100ML; MG/100ML
INJECTION, SOLUTION INTRAVENOUS CONTINUOUS PRN
Status: DISCONTINUED | OUTPATIENT
Start: 2022-07-25 | End: 2022-07-25

## 2022-07-25 RX ORDER — CEFAZOLIN SODIUM 2 G/50ML
2000 SOLUTION INTRAVENOUS ONCE
Status: DISCONTINUED | OUTPATIENT
Start: 2022-07-25 | End: 2022-07-29 | Stop reason: HOSPADM

## 2022-07-25 RX ORDER — HYDROMORPHONE HCL/PF 1 MG/ML
0.5 SYRINGE (ML) INJECTION EVERY 4 HOURS PRN
Status: DISCONTINUED | OUTPATIENT
Start: 2022-07-25 | End: 2022-07-29 | Stop reason: HOSPADM

## 2022-07-25 RX ORDER — PROPOFOL 10 MG/ML
INJECTION, EMULSION INTRAVENOUS AS NEEDED
Status: DISCONTINUED | OUTPATIENT
Start: 2022-07-25 | End: 2022-07-25

## 2022-07-25 RX ORDER — HYDROMORPHONE HCL/PF 1 MG/ML
0.5 SYRINGE (ML) INJECTION EVERY 4 HOURS PRN
Status: DISCONTINUED | OUTPATIENT
Start: 2022-07-25 | End: 2022-07-26 | Stop reason: HOSPADM

## 2022-07-25 RX ORDER — MIDAZOLAM HYDROCHLORIDE 2 MG/2ML
INJECTION, SOLUTION INTRAMUSCULAR; INTRAVENOUS AS NEEDED
Status: DISCONTINUED | OUTPATIENT
Start: 2022-07-25 | End: 2022-07-25

## 2022-07-25 RX ORDER — EPHEDRINE SULFATE 50 MG/ML
INJECTION INTRAVENOUS AS NEEDED
Status: DISCONTINUED | OUTPATIENT
Start: 2022-07-25 | End: 2022-07-25

## 2022-07-25 RX ORDER — SODIUM CHLORIDE, SODIUM LACTATE, POTASSIUM CHLORIDE, CALCIUM CHLORIDE 600; 310; 30; 20 MG/100ML; MG/100ML; MG/100ML; MG/100ML
20 INJECTION, SOLUTION INTRAVENOUS CONTINUOUS
Status: DISCONTINUED | OUTPATIENT
Start: 2022-07-25 | End: 2022-07-26 | Stop reason: HOSPADM

## 2022-07-25 RX ORDER — ONDANSETRON 2 MG/ML
4 INJECTION INTRAMUSCULAR; INTRAVENOUS ONCE
Status: COMPLETED | OUTPATIENT
Start: 2022-07-25 | End: 2022-07-25

## 2022-07-25 RX ORDER — LIDOCAINE HYDROCHLORIDE 10 MG/ML
INJECTION, SOLUTION EPIDURAL; INFILTRATION; INTRACAUDAL; PERINEURAL
Status: DISCONTINUED
Start: 2022-07-25 | End: 2022-07-25 | Stop reason: HOSPADM

## 2022-07-25 RX ORDER — KETOROLAC TROMETHAMINE 30 MG/ML
30 INJECTION, SOLUTION INTRAMUSCULAR; INTRAVENOUS EVERY 6 HOURS PRN
Status: CANCELLED | OUTPATIENT
Start: 2022-07-25 | End: 2022-07-27

## 2022-07-25 RX ORDER — IBUPROFEN 600 MG/1
600 TABLET ORAL EVERY 4 HOURS PRN
Status: DISCONTINUED | OUTPATIENT
Start: 2022-07-25 | End: 2022-07-29 | Stop reason: HOSPADM

## 2022-07-25 RX ORDER — LIDOCAINE HYDROCHLORIDE 10 MG/ML
0.5 INJECTION, SOLUTION EPIDURAL; INFILTRATION; INTRACAUDAL; PERINEURAL ONCE AS NEEDED
Status: DISCONTINUED | OUTPATIENT
Start: 2022-07-25 | End: 2022-07-29 | Stop reason: HOSPADM

## 2022-07-25 RX ORDER — ONDANSETRON 2 MG/ML
INJECTION INTRAMUSCULAR; INTRAVENOUS AS NEEDED
Status: DISCONTINUED | OUTPATIENT
Start: 2022-07-25 | End: 2022-07-25

## 2022-07-25 RX ORDER — SODIUM CHLORIDE, SODIUM LACTATE, POTASSIUM CHLORIDE, CALCIUM CHLORIDE 600; 310; 30; 20 MG/100ML; MG/100ML; MG/100ML; MG/100ML
20 INJECTION, SOLUTION INTRAVENOUS CONTINUOUS
Status: DISCONTINUED | OUTPATIENT
Start: 2022-07-25 | End: 2022-07-29 | Stop reason: HOSPADM

## 2022-07-25 RX ORDER — FENTANYL CITRATE/PF 50 MCG/ML
50 SYRINGE (ML) INJECTION
Status: DISCONTINUED | OUTPATIENT
Start: 2022-07-25 | End: 2022-07-25 | Stop reason: HOSPADM

## 2022-07-25 RX ORDER — HYDROMORPHONE HCL/PF 1 MG/ML
0.5 SYRINGE (ML) INJECTION EVERY 4 HOURS PRN
Status: CANCELLED | OUTPATIENT
Start: 2022-07-25

## 2022-07-25 RX ORDER — PALONOSETRON 0.05 MG/ML
0.25 INJECTION, SOLUTION INTRAVENOUS ONCE
Status: CANCELLED | OUTPATIENT
Start: 2022-07-26

## 2022-07-25 RX ORDER — IBUPROFEN 600 MG/1
600 TABLET ORAL EVERY 4 HOURS PRN
Status: CANCELLED | OUTPATIENT
Start: 2022-07-25

## 2022-07-25 RX ORDER — FENTANYL CITRATE 50 UG/ML
INJECTION, SOLUTION INTRAMUSCULAR; INTRAVENOUS AS NEEDED
Status: DISCONTINUED | OUTPATIENT
Start: 2022-07-25 | End: 2022-07-25

## 2022-07-25 RX ORDER — SODIUM CHLORIDE 9 MG/ML
20 INJECTION, SOLUTION INTRAVENOUS ONCE
Status: CANCELLED | OUTPATIENT
Start: 2022-07-26

## 2022-07-25 RX ORDER — ALBUTEROL SULFATE 2.5 MG/3ML
2.5 SOLUTION RESPIRATORY (INHALATION) ONCE AS NEEDED
Status: DISCONTINUED | OUTPATIENT
Start: 2022-07-25 | End: 2022-07-25 | Stop reason: HOSPADM

## 2022-07-25 RX ORDER — MAGNESIUM SULFATE HEPTAHYDRATE 40 MG/ML
2 INJECTION, SOLUTION INTRAVENOUS ONCE
Status: CANCELLED | OUTPATIENT
Start: 2022-07-26

## 2022-07-25 RX ORDER — CEFAZOLIN SODIUM 2 G/50ML
SOLUTION INTRAVENOUS AS NEEDED
Status: DISCONTINUED | OUTPATIENT
Start: 2022-07-25 | End: 2022-07-25

## 2022-07-25 RX ORDER — IBUPROFEN 600 MG/1
600 TABLET ORAL EVERY 4 HOURS PRN
Status: DISCONTINUED | OUTPATIENT
Start: 2022-07-25 | End: 2022-07-26 | Stop reason: HOSPADM

## 2022-07-25 RX ORDER — DEXAMETHASONE SODIUM PHOSPHATE 10 MG/ML
INJECTION, SOLUTION INTRAMUSCULAR; INTRAVENOUS AS NEEDED
Status: DISCONTINUED | OUTPATIENT
Start: 2022-07-25 | End: 2022-07-25

## 2022-07-25 RX ADMIN — DEXAMETHASONE SODIUM PHOSPHATE 10 MG: 10 INJECTION, SOLUTION INTRAMUSCULAR; INTRAVENOUS at 08:34

## 2022-07-25 RX ADMIN — ONDANSETRON 4 MG: 2 INJECTION INTRAMUSCULAR; INTRAVENOUS at 13:36

## 2022-07-25 RX ADMIN — ONDANSETRON 4 MG: 2 INJECTION INTRAMUSCULAR; INTRAVENOUS at 08:34

## 2022-07-25 RX ADMIN — FENTANYL CITRATE 100 MCG: 50 INJECTION INTRAMUSCULAR; INTRAVENOUS at 08:34

## 2022-07-25 RX ADMIN — HYDROMORPHONE HYDROCHLORIDE 1 MG: 2 INJECTION, SOLUTION INTRAMUSCULAR; INTRAVENOUS; SUBCUTANEOUS at 09:28

## 2022-07-25 RX ADMIN — METOCLOPRAMIDE HYDROCHLORIDE 10 MG: 5 INJECTION INTRAMUSCULAR; INTRAVENOUS at 09:58

## 2022-07-25 RX ADMIN — HYDROMORPHONE HYDROCHLORIDE 0.5 MG: 1 INJECTION, SOLUTION INTRAMUSCULAR; INTRAVENOUS; SUBCUTANEOUS at 11:23

## 2022-07-25 RX ADMIN — SODIUM CHLORIDE, SODIUM LACTATE, POTASSIUM CHLORIDE, AND CALCIUM CHLORIDE: .6; .31; .03; .02 INJECTION, SOLUTION INTRAVENOUS at 07:45

## 2022-07-25 RX ADMIN — PROPOFOL 180 MG: 10 INJECTION, EMULSION INTRAVENOUS at 08:34

## 2022-07-25 RX ADMIN — Medication 50 MCG: at 09:43

## 2022-07-25 RX ADMIN — CEFAZOLIN SODIUM 2000 MG: 2 SOLUTION INTRAVENOUS at 08:36

## 2022-07-25 RX ADMIN — HYDROMORPHONE HYDROCHLORIDE 0.5 MG: 1 INJECTION, SOLUTION INTRAMUSCULAR; INTRAVENOUS; SUBCUTANEOUS at 13:37

## 2022-07-25 RX ADMIN — EPHEDRINE SULFATE 5 MG: 50 INJECTION INTRAVENOUS at 09:03

## 2022-07-25 RX ADMIN — MIDAZOLAM 2 MG: 1 INJECTION INTRAMUSCULAR; INTRAVENOUS at 08:25

## 2022-07-25 RX ADMIN — Medication 50 MCG: at 09:55

## 2022-07-25 RX ADMIN — KETOROLAC TROMETHAMINE 30 MG: 30 INJECTION, SOLUTION INTRAMUSCULAR; INTRAVENOUS at 13:07

## 2022-07-25 RX ADMIN — ONDANSETRON 4 MG: 2 INJECTION INTRAMUSCULAR; INTRAVENOUS at 09:43

## 2022-07-25 NOTE — ANESTHESIA POSTPROCEDURE EVALUATION
Post-Op Assessment Note    CV Status:  Stable  Pain Score: 3    Pain management: adequate     Mental Status:  Alert and awake   Hydration Status:  Euvolemic   PONV Controlled:  Controlled   Airway Patency:  Patent      Post Op Vitals Reviewed: Yes      Staff: Anesthesiologist, CRNA         No complications documented      BP   122/76   Temp   97 8   Pulse  96   Resp   16   SpO2   100

## 2022-07-25 NOTE — ANESTHESIA PREPROCEDURE EVALUATION
Procedure:  RAD ONCOLOGY VISIT TYPE    Relevant Problems   GYN   (+) Malignant neoplasm of overlapping sites of cervix (HCC)      NEURO/PSYCH   (+) Anxiety   (+) Depression      Other   (+) Dysuria   (+) Obesity (BMI 35 0-39 9 without comorbidity)        Physical Exam    Airway    Mallampati score: II  TM Distance: >3 FB  Neck ROM: full     Dental   No notable dental hx     Cardiovascular  Cardiovascular exam normal    Pulmonary  Pulmonary exam normal Breath sounds clear to auscultation,     Other Findings        Anesthesia Plan  ASA Score- 2     Anesthesia Type- general with ASA Monitors  Additional Monitors:   Airway Plan: LMA  Plan Factors-    Chart reviewed  EKG reviewed  Imaging results reviewed  Existing labs reviewed  Patient summary reviewed  Patient is not a current smoker  Patient instructed to abstain from smoking on day of procedure  Patient did not smoke on day of surgery  Obstructive sleep apnea risk education given perioperatively  Induction- intravenous  Postoperative Plan- Plan for postoperative opioid use  Planned trial extubation    Informed Consent- Anesthetic plan and risks discussed with patient  I personally reviewed this patient with the CRNA  Discussed and agreed on the Anesthesia Plan with the CRNA             Lab Results   Component Value Date    WBC 2 7 (L) 07/14/2022    HGB 11 5 07/14/2022    HCT 35 4 07/14/2022    MCV 86 07/14/2022     07/14/2022     Lab Results   Component Value Date    CALCIUM 8 5 05/08/2022    K 4 2 07/14/2022    CO2 26 07/14/2022    CL 98 07/14/2022    BUN 18 07/14/2022    CREATININE 0 80 07/14/2022     No results found for: INR, PROTIME  No results found for: PTT  Type and Screen:  O        Discussed with pt the benefits/alternatives and risks or General Anesthesia including breathing tube remaining in place if not strong enough, PONV, damage to lips and teeth, sore throat, eye injury or blindness    IDr Ron, the attending physician, have personally seen and evaluated the patient prior to anesthetic care  I have reviewed the pre-anesthetic record, and other medical records if appropriate to the anesthetic care  If a CRNA is involved in the case, I have reviewed the CRNA assessment, if present, and agree  The patient is in a suitable condition to proceed with my formulated anesthetic plan

## 2022-07-26 ENCOUNTER — TELEPHONE (OUTPATIENT)
Dept: RADIATION ONCOLOGY | Facility: HOSPITAL | Age: 32
End: 2022-07-26

## 2022-07-26 ENCOUNTER — HOSPITAL ENCOUNTER (OUTPATIENT)
Dept: INFUSION CENTER | Facility: CLINIC | Age: 32
Discharge: HOME | End: 2022-07-26
Payer: COMMERCIAL

## 2022-07-26 ENCOUNTER — APPOINTMENT (OUTPATIENT)
Dept: RADIATION ONCOLOGY | Facility: CLINIC | Age: 32
End: 2022-07-26
Attending: INTERNAL MEDICINE
Payer: COMMERCIAL

## 2022-07-26 ENCOUNTER — TELEPHONE (OUTPATIENT)
Dept: GYNECOLOGIC ONCOLOGY | Facility: CLINIC | Age: 32
End: 2022-07-26

## 2022-07-26 ENCOUNTER — PATIENT MESSAGE (OUTPATIENT)
Dept: GYNECOLOGIC ONCOLOGY | Facility: HOSPITAL | Age: 32
End: 2022-07-26

## 2022-07-26 VITALS
HEIGHT: 69 IN | DIASTOLIC BLOOD PRESSURE: 80 MMHG | SYSTOLIC BLOOD PRESSURE: 115 MMHG | TEMPERATURE: 97.7 F | WEIGHT: 261.91 LBS | HEART RATE: 81 BPM | RESPIRATION RATE: 16 BRPM | BODY MASS INDEX: 38.79 KG/M2

## 2022-07-26 DIAGNOSIS — N30.90 CYSTITIS: Primary | ICD-10-CM

## 2022-07-26 DIAGNOSIS — C53.8 MALIGNANT NEOPLASM OF OVERLAPPING SITES OF CERVIX (HCC): Primary | ICD-10-CM

## 2022-07-26 DIAGNOSIS — C53.8 MALIGNANT NEOPLASM OF OVERLAPPING SITES OF CERVIX (HCC): ICD-10-CM

## 2022-07-26 PROCEDURE — 96375 TX/PRO/DX INJ NEW DRUG ADDON: CPT

## 2022-07-26 PROCEDURE — 77386 HB NTSTY MODUL RAD TX DLVR CPLX: CPT | Performed by: SURGERY

## 2022-07-26 PROCEDURE — 96413 CHEMO IV INFUSION 1 HR: CPT

## 2022-07-26 PROCEDURE — 96367 TX/PROPH/DG ADDL SEQ IV INF: CPT

## 2022-07-26 PROCEDURE — 96361 HYDRATE IV INFUSION ADD-ON: CPT

## 2022-07-26 RX ORDER — PROCHLORPERAZINE MALEATE 5 MG/1
5 TABLET ORAL EVERY 6 HOURS PRN
Qty: 30 TABLET | Refills: 1 | Status: SHIPPED | OUTPATIENT
Start: 2022-07-26

## 2022-07-26 RX ORDER — PALONOSETRON 0.05 MG/ML
0.25 INJECTION, SOLUTION INTRAVENOUS ONCE
Status: COMPLETED | OUTPATIENT
Start: 2022-07-26 | End: 2022-07-26

## 2022-07-26 RX ORDER — MAGNESIUM SULFATE HEPTAHYDRATE 40 MG/ML
2 INJECTION, SOLUTION INTRAVENOUS ONCE
Status: COMPLETED | OUTPATIENT
Start: 2022-07-26 | End: 2022-07-26

## 2022-07-26 RX ORDER — SODIUM CHLORIDE 9 MG/ML
20 INJECTION, SOLUTION INTRAVENOUS ONCE
Status: COMPLETED | OUTPATIENT
Start: 2022-07-26 | End: 2022-07-26

## 2022-07-26 RX ORDER — CIPROFLOXACIN 250 MG/1
250 TABLET, FILM COATED ORAL EVERY 12 HOURS SCHEDULED
Qty: 6 TABLET | Refills: 0 | Status: SHIPPED | OUTPATIENT
Start: 2022-07-26 | End: 2022-07-29

## 2022-07-26 RX ORDER — ONDANSETRON HYDROCHLORIDE 8 MG/1
8 TABLET, FILM COATED ORAL EVERY 8 HOURS PRN
Qty: 60 TABLET | Refills: 1 | Status: SHIPPED | OUTPATIENT
Start: 2022-07-26

## 2022-07-26 RX ADMIN — APREPITANT 130 MG: 130 INJECTION, EMULSION INTRAVENOUS at 11:35

## 2022-07-26 RX ADMIN — SODIUM CHLORIDE 500 ML: 0.9 INJECTION, SOLUTION INTRAVENOUS at 10:05

## 2022-07-26 RX ADMIN — DEXAMETHASONE SODIUM PHOSPHATE 20 MG: 10 INJECTION, SOLUTION INTRAMUSCULAR; INTRAVENOUS at 10:46

## 2022-07-26 RX ADMIN — FAMOTIDINE 20 MG: 10 INJECTION INTRAVENOUS at 11:10

## 2022-07-26 RX ADMIN — SODIUM CHLORIDE 500 ML: 0.9 INJECTION, SOLUTION INTRAVENOUS at 13:40

## 2022-07-26 RX ADMIN — SODIUM CHLORIDE 20 ML/HR: 0.9 INJECTION, SOLUTION INTRAVENOUS at 10:05

## 2022-07-26 RX ADMIN — PALONOSETRON 0.25 MG: 0.05 INJECTION, SOLUTION INTRAVENOUS at 12:21

## 2022-07-26 RX ADMIN — MAGNESIUM SULFATE HEPTAHYDRATE 2 G: 40 INJECTION, SOLUTION INTRAVENOUS at 13:40

## 2022-07-26 RX ADMIN — CISPLATIN 70 MG: 1 INJECTION INTRAVENOUS at 12:32

## 2022-07-26 NOTE — PROGRESS NOTES
Patient position supine. Patient prepped and draped per unit standard.    Safety straps applied:Yes   Pt  Arrived to unit c/o burning with urination  It appears Dr Bre Delgadillo ordered urinalysis  RN notified Saida Keating PAC  Per Armando Lubin, ok to proceed with treatment today as long as afebrile  T  97 7  Labs reviewed  Mg 1 5  Pt  Will receive Magnesium as ordered  Cisplatin and hydration also ordered today

## 2022-07-26 NOTE — TELEPHONE ENCOUNTER
1002-Call to pt  Aware Cipro (Antibiotic) has been escribed to her pharmacy post lab results dated 7 22 22  Pt notes "still have some urinary burning and throbbing pain from yesterday"  Pt planning to utilize antibiotic Rx as directed

## 2022-07-27 ENCOUNTER — APPOINTMENT (OUTPATIENT)
Dept: RADIATION ONCOLOGY | Facility: CLINIC | Age: 32
End: 2022-07-27
Attending: INTERNAL MEDICINE
Payer: COMMERCIAL

## 2022-07-27 ENCOUNTER — APPOINTMENT (OUTPATIENT)
Dept: RADIATION ONCOLOGY | Facility: CLINIC | Age: 32
End: 2022-07-27
Payer: COMMERCIAL

## 2022-07-27 PROCEDURE — 77386 HB NTSTY MODUL RAD TX DLVR CPLX: CPT | Performed by: INTERNAL MEDICINE

## 2022-07-27 PROCEDURE — 77336 RADIATION PHYSICS CONSULT: CPT | Performed by: INTERNAL MEDICINE

## 2022-07-28 ENCOUNTER — APPOINTMENT (OUTPATIENT)
Dept: RADIATION ONCOLOGY | Facility: CLINIC | Age: 32
End: 2022-07-28
Attending: INTERNAL MEDICINE
Payer: COMMERCIAL

## 2022-07-28 ENCOUNTER — APPOINTMENT (OUTPATIENT)
Dept: RADIATION ONCOLOGY | Facility: CLINIC | Age: 32
End: 2022-07-28
Payer: COMMERCIAL

## 2022-07-28 PROCEDURE — 77386 HB NTSTY MODUL RAD TX DLVR CPLX: CPT | Performed by: SURGERY

## 2022-07-28 PROCEDURE — 77014 CHG CT GUIDANCE PLACEMENT RAD THERAPY FIELDS: CPT | Performed by: RADIOLOGY

## 2022-07-28 PROCEDURE — 77427 RADIATION TX MANAGEMENT X5: CPT | Performed by: INTERNAL MEDICINE

## 2022-07-29 ENCOUNTER — APPOINTMENT (OUTPATIENT)
Dept: RADIATION ONCOLOGY | Facility: CLINIC | Age: 32
End: 2022-07-29
Attending: INTERNAL MEDICINE
Payer: COMMERCIAL

## 2022-07-29 ENCOUNTER — APPOINTMENT (OUTPATIENT)
Dept: RADIATION ONCOLOGY | Facility: CLINIC | Age: 32
End: 2022-07-29
Payer: COMMERCIAL

## 2022-07-29 LAB
ALBUMIN SERPL-MCNC: 4 G/DL (ref 3.8–4.8)
ALBUMIN/GLOB SERPL: 1.6 {RATIO} (ref 1.2–2.2)
ALP SERPL-CCNC: 60 IU/L (ref 44–121)
ALT SERPL-CCNC: 17 IU/L (ref 0–32)
AST SERPL-CCNC: 12 IU/L (ref 0–40)
BASOPHILS # BLD AUTO: 0 X10E3/UL (ref 0–0.2)
BASOPHILS NFR BLD AUTO: 0 %
BILIRUB SERPL-MCNC: 0.2 MG/DL (ref 0–1.2)
BUN SERPL-MCNC: 12 MG/DL (ref 6–20)
BUN/CREAT SERPL: 15 (ref 9–23)
CALCIUM SERPL-MCNC: 8.9 MG/DL (ref 8.7–10.2)
CHLORIDE SERPL-SCNC: 100 MMOL/L (ref 96–106)
CO2 SERPL-SCNC: 23 MMOL/L (ref 20–29)
CREAT SERPL-MCNC: 0.78 MG/DL (ref 0.57–1)
EGFR: 103 ML/MIN/1.73
EOSINOPHIL # BLD AUTO: 0 X10E3/UL (ref 0–0.4)
EOSINOPHIL NFR BLD AUTO: 1 %
ERYTHROCYTE [DISTWIDTH] IN BLOOD BY AUTOMATED COUNT: 15.3 % (ref 11.7–15.4)
GLOBULIN SER-MCNC: 2.5 G/DL (ref 1.5–4.5)
GLUCOSE SERPL-MCNC: 81 MG/DL (ref 65–99)
HCT VFR BLD AUTO: 29.4 % (ref 34–46.6)
HGB BLD-MCNC: 10.1 G/DL (ref 11.1–15.9)
IMM GRANULOCYTES # BLD: 0 X10E3/UL (ref 0–0.1)
IMM GRANULOCYTES NFR BLD: 1 %
LYMPHOCYTES # BLD AUTO: 0.3 X10E3/UL (ref 0.7–3.1)
LYMPHOCYTES NFR BLD AUTO: 9 %
MAGNESIUM SERPL-MCNC: 1.3 MG/DL (ref 1.6–2.3)
MCH RBC QN AUTO: 29 PG (ref 26.6–33)
MCHC RBC AUTO-ENTMCNC: 34.4 G/DL (ref 31.5–35.7)
MCV RBC AUTO: 85 FL (ref 79–97)
MONOCYTES # BLD AUTO: 0.5 X10E3/UL (ref 0.1–0.9)
MONOCYTES NFR BLD AUTO: 14 %
MORPHOLOGY BLD-IMP: ABNORMAL
NEUTROPHILS # BLD AUTO: 2.4 X10E3/UL (ref 1.4–7)
NEUTROPHILS NFR BLD AUTO: 75 %
PLATELET # BLD AUTO: 93 X10E3/UL (ref 150–450)
POTASSIUM SERPL-SCNC: 3.7 MMOL/L (ref 3.5–5.2)
PROT SERPL-MCNC: 6.5 G/DL (ref 6–8.5)
RBC # BLD AUTO: 3.48 X10E6/UL (ref 3.77–5.28)
SODIUM SERPL-SCNC: 138 MMOL/L (ref 134–144)
WBC # BLD AUTO: 3.2 X10E3/UL (ref 3.4–10.8)

## 2022-07-29 PROCEDURE — 77014 CHG CT GUIDANCE PLACEMENT RAD THERAPY FIELDS: CPT | Performed by: RADIOLOGY

## 2022-07-29 PROCEDURE — 77386 HB NTSTY MODUL RAD TX DLVR CPLX: CPT | Performed by: RADIOLOGY

## 2022-08-01 ENCOUNTER — APPOINTMENT (OUTPATIENT)
Dept: RADIATION ONCOLOGY | Facility: CLINIC | Age: 32
End: 2022-08-01
Attending: INTERNAL MEDICINE
Payer: COMMERCIAL

## 2022-08-01 PROCEDURE — 77386 HB NTSTY MODUL RAD TX DLVR CPLX: CPT | Performed by: RADIOLOGY

## 2022-08-01 PROCEDURE — 77014 CHG CT GUIDANCE PLACEMENT RAD THERAPY FIELDS: CPT | Performed by: RADIOLOGY

## 2022-08-02 ENCOUNTER — APPOINTMENT (OUTPATIENT)
Dept: RADIATION ONCOLOGY | Facility: CLINIC | Age: 32
End: 2022-08-02
Attending: INTERNAL MEDICINE
Payer: COMMERCIAL

## 2022-08-02 PROCEDURE — 77014 CHG CT GUIDANCE PLACEMENT RAD THERAPY FIELDS: CPT | Performed by: RADIOLOGY

## 2022-08-02 PROCEDURE — 77386 HB NTSTY MODUL RAD TX DLVR CPLX: CPT | Performed by: RADIOLOGY

## 2022-08-03 ENCOUNTER — APPOINTMENT (OUTPATIENT)
Dept: RADIATION ONCOLOGY | Facility: CLINIC | Age: 32
End: 2022-08-03
Attending: INTERNAL MEDICINE
Payer: COMMERCIAL

## 2022-08-03 PROCEDURE — 77336 RADIATION PHYSICS CONSULT: CPT | Performed by: INTERNAL MEDICINE

## 2022-08-03 PROCEDURE — 77386 HB NTSTY MODUL RAD TX DLVR CPLX: CPT | Performed by: INTERNAL MEDICINE

## 2022-08-03 PROCEDURE — 77014 CHG CT GUIDANCE PLACEMENT RAD THERAPY FIELDS: CPT | Performed by: INTERNAL MEDICINE

## 2022-08-04 ENCOUNTER — OFFICE VISIT (OUTPATIENT)
Dept: GYNECOLOGIC ONCOLOGY | Facility: HOSPITAL | Age: 32
End: 2022-08-04

## 2022-08-04 VITALS
BODY MASS INDEX: 38.54 KG/M2 | HEIGHT: 69 IN | DIASTOLIC BLOOD PRESSURE: 82 MMHG | OXYGEN SATURATION: 100 % | SYSTOLIC BLOOD PRESSURE: 123 MMHG | WEIGHT: 260.2 LBS | HEART RATE: 93 BPM | TEMPERATURE: 97.6 F | RESPIRATION RATE: 17 BRPM

## 2022-08-04 DIAGNOSIS — C53.8 MALIGNANT NEOPLASM OF OVERLAPPING SITES OF CERVIX (HCC): Primary | ICD-10-CM

## 2022-08-04 PROCEDURE — 99024 POSTOP FOLLOW-UP VISIT: CPT | Performed by: OBSTETRICS & GYNECOLOGY

## 2022-08-04 RX ORDER — SODIUM FLUORIDE 6.1 MG/ML
PASTE, DENTIFRICE DENTAL
COMMUNITY
Start: 2022-07-11

## 2022-08-04 NOTE — PROGRESS NOTES
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 status post aborted radical hysterectomy, bilateral pelvic lymph node dissection, curative intent chemotherapy and radiation  She received 6 cycles of cisplatin chemotherapy as of 7/26/2022 and completed her external beam and brachytherapy on 8/3/2022  Antoine sleeve was removed in the office today  She does not have menopausal symptoms  Her performance status is 0     1  Three-month post treatment PET scan prior to her next visit  Relevant Orders    NM PET CT skull base to mid thigh            CHIEF COMPLAINT:  Follow-up after radiation      Problem:  Cancer Staging  Malignant neoplasm of overlapping sites of cervix ([facility] )  Staging form: Cervix Uteri, AJCC Version 9  - Clinical: No stage assigned - Unsigned  - Pathologic: No stage assigned - Unsigned  - Pathologic: FIGO Stage IIIC1p (pT1b1, cM0) - Signed by Emery Fernandez MD on 5/19/2022        Previous therapy:  Oncology History   Malignant neoplasm of overlapping sites of cervix ([facility] )   3/24/2022 Initial Diagnosis    Malign[facility] sites [facility]     5/6/2022 Surgery    Exploratory laparotomy for planned radical hysterectomy, bilateral pelvic lymph node dissection, aborted radical hysterectomy, bilateral ovarian transposition due to positive lymph nodes    1  Two right pelvic lymph nodes positive     5/19/2022 -  Cancer Staged    Staging form: Cervix Uteri, AJCC Version 9  - Pathologic: FIGO Stage IIIC1p (pT1b1, cM0) - Signed by Emery Fernandez MD on 5/19/2022  Stage confirmation method: Pathology  Pelvic grady status: Positive  Pelvic grady method of assessment: Lymph node dissection  Para-aortic status: Negative       6/20/2022 - 7/26/2022 Chemotherapy    palonosetron (ALOXI), 0 25 mg, Intravenous, Once, 6 of 6 cycles  Administration: 0 25 mg (6/20/2022), 0 25 mg (6/27/2022), 0 25 mg (7/6/2022), 0 25 mg (7/11/2022), 0 25 mg (7/19/2022), 0 25 mg (7/26/2022)  CISplatin (PLATINOL) infusion, 70 mg (original dose 40 mg/m2), Intravenous, Once, 6 of 6 cycles  Dose modification: 70 mg (original dose 40 mg/m2, Cycle 1, Reason: Other (Must fill in a comment), Comment: max 70), 70 mg (original dose 40 mg/m2, Cycle 2, Reason: Dose modified as per discussion with consulting physician)  Administration: 70 mg (6/20/2022), 70 mg (6/27/2022), 70 mg (7/6/2022), 70 mg (7/11/2022), 70 mg (7/19/2022), 70 mg (7/26/2022)  aprepitant (CINVANTI) in  mL IVPB, 130 mg, Intravenous, Once, 6 of 6 cycles  Administration: 130 mg (6/20/2022), 130 mg (6/27/2022), 130 mg (7/6/2022), 130 mg (7/11/2022), 130 mg (7/19/2022), 130 mg (7/26/2022)      - 8/3/2022 Radiation    External beam and brachytherapy completed 8/3/2022  Patient ID: Arnie Grey is a 28 y o  female  Returns after completion of curative intent chemoradiation for stage IIIC 1 cervical cancer  She completed radiation yesterday completed chemotherapy on 7/26/2022  Hemoglobin is 10 1 grams/deciliter, platelet count 93 K  she tolerated radiation therapy well  No new complaints  No other interval change in her medications or medical history  She is not having hot flashes  The following portions of the patient's history were reviewed and updated as appropriate: allergies, current medications, past family history, past medical history, past social history, past surgical history and problem list     Review of Systems   Constitutional: Negative for activity change and unexpected weight change  HENT: Negative  Eyes: Negative  Respiratory: Negative  Cardiovascular: Negative  Gastrointestinal: Negative for abdominal distention and abdominal pain  Endocrine: Negative  Genitourinary: Negative for pelvic pain and vaginal bleeding  Musculoskeletal: Negative  Skin: Negative  Allergic/Immunologic: Negative  Neurological: Negative  Hematological: Negative  Psychiatric/Behavioral: Negative  Current Outpatient Medications   Medication Sig Dispense Refill    acetaminophen (TYLENOL) 500 mg tablet Take 1,000 mg by mouth every 6 (six) hours as needed for mild pain      diazepam (VALIUM) 5 mg tablet 1 TABLET AS NEEDED ORALLY ONCE A DAY BEFORE IMAGING 7 DAYS      docusate sodium (COLACE) 100 mg capsule Take 1 capsule (100 mg total) by mouth 2 (two) times a day as needed for constipation 20 capsule 0    NON FORMULARY CBD gummies daily      ondansetron (ZOFRAN) 8 mg tablet Take 1 tablet (8 mg total) by mouth every 8 (eight) hours as needed for nausea or vomiting 60 tablet 1    prochlorperazine (COMPAZINE) 5 mg tablet Take 1 tablet (5 mg total) by mouth every 6 (six) hours as needed for nausea or vomiting 30 tablet 1    Psyllium (METAMUCIL PO) as needed        simethicone (MYLICON) 80 mg chewable tablet Chew 1 tablet (80 mg total) every 6 (six) hours as needed for flatulence 20 tablet 0    Sodium Fluoride 5000 PPM 1 1 % GEL BRUSH 2 MIN AT BEDTIME, EXPECTORATE WELL, DO NOT RINSE, NOTHING BY MOUTH FOR 1 HOUR AFTERWARDS       No current facility-administered medications for this visit  Objective:    Blood pressure 123/82, pulse 93, temperature 97 6 °F (36 4 °C), temperature source Temporal, resp  rate 17, height 5' 9 02" (1 753 m), last menstrual period 07/27/2022, SpO2 100 %, not currently breastfeeding  Body mass index is 38 65 kg/m²  Body surface area is 2 32 meters squared  Physical Exam  Vitals reviewed  Exam conducted with a chaperone present  Constitutional:       General: She is not in acute distress  Appearance: Normal appearance  She is well-developed  She is obese  She is not ill-appearing, toxic-appearing or diaphoretic  HENT:      Head: Normocephalic and atraumatic     Eyes:      Extraocular Movements: Extraocular movements intact  Conjunctiva/sclera: Conjunctivae normal    Neck:      Thyroid: No thyromegaly  Abdominal:      General: There is no distension  Palpations: Abdomen is soft  There is no mass  Tenderness: There is no abdominal tenderness  There is no guarding or rebound  Genitourinary:     Comments: External female genitalia are normal   Speculum examination reveals a grossly normal vagina and cervix with radiation treatment effect  The Antoine sleeve was then removed  Musculoskeletal:      Cervical back: Normal range of motion and neck supple  Right lower leg: No edema  Left lower leg: No edema  Lymphadenopathy:      Cervical: No cervical adenopathy  Skin:     General: Skin is warm and dry  Coloration: Skin is not jaundiced or pale  Findings: Erythema present  No bruising  Comments: Mild erythema lower abdomen adjacent to radiation tattoo at the incision site  Neurological:      General: No focal deficit present  Mental Status: She is alert and oriented to person, place, and time  Mental status is at baseline  Cranial Nerves: No cranial nerve deficit  Motor: No weakness  Gait: Gait normal    Psychiatric:         Mood and Affect: Mood normal          Behavior: Behavior normal          Thought Content:  Thought content normal          Judgment: Judgment normal          No results found for:   Lab Results   Component Value Date    K 3 7 07/28/2022     07/28/2022    CO2 23 07/28/2022    BUN 12 07/28/2022    CREATININE 0 78 07/28/2022    CALCIUM 8 5 05/08/2022    AST 12 07/28/2022    ALT 17 07/28/2022    EGFR 103 07/28/2022     Lab Results   Component Value Date    WBC 3 2 (L) 07/28/2022    HGB 10 1 (L) 07/28/2022    HCT 29 4 (L) 07/28/2022    MCV 85 07/28/2022    PLT 93 (LL) 07/28/2022     Lab Results   Component Value Date    NEUTROABS 2 4 07/28/2022        Trend:  No results found for:

## 2022-08-04 NOTE — ASSESSMENT & PLAN NOTE
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

## 2022-08-04 NOTE — LETTER
August 4, 2022     Malcolm Parker, [id number] [address]    Patient: Niki Cuellar   YOB: 1990   Date of Visit: 8/4/2022       Dear Dr Vandana San: Thank you for referring Niki Cuellar to me for evaluation  Below are my notes for this consultation  If you have questions, please do not hesitate to call me  I look forward to following your patient along with you  Sincerely,        Lai Enriquez MD        CC: No Recipients  Lai Enriquez MD  8/4/2022  9:06 AM  Incomplete  Assessment/Plan:    Problem List Items Addressed This Visit        Genitourinary    Malignant neoplasm of overlapping sites of cervix ([facility] ) - Primary     58-year-old with stage IIIC 1 squamous cell carcinoma of the cervix status post aborted radical hysterectomy, bilateral pelvic lymph node dissection, curative intent chemotherapy and radiation  She received 6 cycles of cisplatin chemotherapy as of 7/26/2022 and completed her external beam and brachytherapy on 8/3/2022  Antoine sleeve was removed in the office today  She does not have menopausal symptoms  Her performance status is 0     1  Three-month post treatment PET scan prior to her next visit           Relevant Orders    NM PET CT skull base to mid thigh            CHIEF COMPLAINT:  Follow-up after radiation      Problem:  Cancer Staging  Malignant neoplasm of overlapping sites of cervix ([facility] [address] )  Staging form: Cervix Uteri, AJCC Version 9  - Clinical: No stage assigned - Unsigned  - Pathologic: No stage assigned - Unsigned  - Pathologic: FIGO Stage IIIC1p (pT1b1, cM0) - Signed by Lai Enriquez MD on 5/19/2022        Previous therapy:  Oncology History   Malignant neoplasm of overlapping sites of cervix ([facility] [address] )   3/24/2022 Initial Diagnosis    Malignant neoplasm of overlapping sites of cervix ([facility] [address] )     5/6/2022 Surgery    Exploratory laparotomy for planned radical hysterectomy, bilateral pelvic lymph node dissection, aborted radical hysterectomy, bilateral ovarian transposition due to positive lymph nodes  1  Two right pelvic lymph nodes positive     5/19/2022 -  Cancer Staged    Staging form: Cervix Uteri, AJCC Version 9  - Pathologic: FIGO Stage IIIC1p (pT1b1, cM0) - Signed by Isaura Quinonez MD on 5/19/2022  Stage confirmation method: Pathology  Pelvic grady status: Positive  Pelvic grady method of assessment: Lymph node dissection  Para-aortic status: Negative       6/20/2022 - 7/26/2022 Chemotherapy    palonosetron (ALOXI), 0 25 mg, Intravenous, Once, 6 of 6 cycles  Administration: 0 25 mg (6/20/2022), 0 25 mg (6/27/2022), 0 25 mg (7/6/2022), 0 25 mg (7/11/2022), 0 25 mg (7/19/2022), 0 25 mg (7/26/2022)  CISplatin (PLATINOL) infusion, 70 mg (original dose 40 mg/m2), Intravenous, Once, 6 of 6 cycles  Dose modification: 70 mg (original dose 40 mg/m2, Cycle 1, Reason: Other (Must fill in a comment), Comment: max 70), 70 mg (original dose 40 mg/m2, Cycle 2, Reason: Dose modified as per discussion with consulting physician)  Administration: 70 mg (6/20/2022), 70 mg (6/27/2022), 70 mg (7/6/2022), 70 mg (7/11/2022), 70 mg (7/19/2022), 70 mg (7/26/2022)  aprepitant (CINVANTI) in  mL IVPB, 130 mg, Intravenous, Once, 6 of 6 cycles  Administration: 130 mg (6/20/2022), 130 mg (6/27/2022), 130 mg (7/6/2022), 130 mg (7/11/2022), 130 mg (7/19/2022), 130 mg (7/26/2022)      - 8/3/2022 Radiation    External beam and brachytherapy completed 8/3/2022  Patient ID: Boris Wise is a 28 y o  female  Returns after completion of curative intent chemoradiation for stage IIIC 1 cervical cancer  She completed radiation yesterday completed chemotherapy on 7/26/2022  Hemoglobin is 10 1 grams/deciliter, platelet count 93 K  she tolerated radiation therapy well  No new complaints  No other interval change in her medications or medical history  She is not having hot flashes        The following portions of the patient's history were reviewed and updated as appropriate: allergies, current medications, past family history, past medical history, past social history, past surgical history and problem list     Review of Systems   Constitutional: Negative for activity change and unexpected weight change  HENT: Negative  Eyes: Negative  Respiratory: Negative  Cardiovascular: Negative  Gastrointestinal: Negative for abdominal distention and abdominal pain  Endocrine: Negative  Genitourinary: Negative for pelvic pain and vaginal bleeding  Musculoskeletal: Negative  Skin: Negative  Allergic/Immunologic: Negative  Neurological: Negative  Hematological: Negative  Psychiatric/Behavioral: Negative  Current Outpatient Medications   Medication Sig Dispense Refill    acetaminophen (TYLENOL) 500 mg tablet Take 1,000 mg by mouth every 6 (six) hours as needed for mild pain      diazepam (VALIUM) 5 mg tablet 1 TABLET AS NEEDED ORALLY ONCE A DAY BEFORE IMAGING 7 DAYS      docusate sodium (COLACE) 100 mg capsule Take 1 capsule (100 mg total) by mouth 2 (two) times a day as needed for constipation 20 capsule 0    NON FORMULARY CBD gummies daily      ondansetron (ZOFRAN) 8 mg tablet Take 1 tablet (8 mg total) by mouth every 8 (eight) hours as needed for nausea or vomiting 60 tablet 1    prochlorperazine (COMPAZINE) 5 mg tablet Take 1 tablet (5 mg total) by mouth every 6 (six) hours as needed for nausea or vomiting 30 tablet 1    Psyllium (METAMUCIL PO) as needed        simethicone (MYLICON) 80 mg chewable tablet Chew 1 tablet (80 mg total) every 6 (six) hours as needed for flatulence 20 tablet 0    Sodium Fluoride 5000 PPM 1 1 % GEL BRUSH 2 MIN AT BEDTIME, EXPECTORATE WELL, DO NOT RINSE, NOTHING BY MOUTH FOR 1 HOUR AFTERWARDS       No current facility-administered medications for this visit             Objective:    Blood pressure 123/82, pulse 93, temperature 97 6 °F (36 4 °C), temperature source Temporal, resp  rate 17, height 5' 9 02" (1 753 m), last menstrual period 07/27/2022, SpO2 100 %, not currently breastfeeding  Body mass index is 38 65 kg/m²  Body surface area is 2 32 meters squared  Physical Exam  Vitals reviewed  Exam conducted with a chaperone present  Constitutional:       General: She is not in acute distress  Appearance: Normal appearance  She is well-developed  She is obese  She is not ill-appearing, toxic-appearing or diaphoretic  HENT:      Head: Normocephalic and atraumatic  Eyes:      Extraocular Movements: Extraocular movements intact  Conjunctiva/sclera: Conjunctivae normal    Neck:      Thyroid: No thyromegaly  Abdominal:      General: There is no distension  Palpations: Abdomen is soft  There is no mass  Tenderness: There is no abdominal tenderness  There is no guarding or rebound  Genitourinary:     Comments: External female genitalia are normal   Speculum examination reveals a grossly normal vagina and cervix with radiation treatment effect  The Antoine sleeve was then removed  Musculoskeletal:      Cervical back: Normal range of motion and neck supple  Right lower leg: No edema  Left lower leg: No edema  Lymphadenopathy:      Cervical: No cervical adenopathy  Skin:     General: Skin is warm and dry  Coloration: Skin is not jaundiced or pale  Findings: Erythema present  No bruising  Comments: Mild erythema lower abdomen adjacent to radiation tattoo at the incision site  Neurological:      General: No focal deficit present  Mental Status: She is alert and oriented to person, place, and time  Mental status is at baseline  Cranial Nerves: No cranial nerve deficit  Motor: No weakness  Gait: Gait normal    Psychiatric:         Mood and Affect: Mood normal          Behavior: Behavior normal          Thought Content:  Thought content normal          Judgment: Judgment normal          No results found for:   Lab Results   Component Value Date    K 3 7 07/28/2022     07/28/2022    CO2 23 07/28/2022    BUN 12 07/28/2022    CREATININE 0 78 07/28/2022    CALCIUM 8 5 05/08/2022    AST 12 07/28/2022    ALT 17 07/28/2022    EGFR 103 07/28/2022     Lab Results   Component Value Date    WBC 3 2 (L) 07/28/2022    HGB 10 1 (L) 07/28/2022    HCT 29 4 (L) 07/28/2022    MCV 85 07/28/2022    PLT 93 (LL) 07/28/2022     Lab Results   Component Value Date    NEUTROABS 2 4 07/28/2022        Trend:  No results found for:                  Marika Willis MD  8/4/2022  9:05 AM  Sign when Signing Visit  Assessment/Plan:    Problem List Items Addressed This Visit        Genitourinary    Malignant neoplasm of overlapping sites of cervix ([facility] ) - Primary     59-year-old with stage IIIC 1 squamous cell carcinoma of the cervix status post aborted radical hysterectomy, bilateral pelvic lymph node dissection, curative intent chemotherapy and radiation  She received 6 cycles of cisplatin chemotherapy as of 7/26/2022 and completed her external beam and brachytherapy on 8/3/2022  Antoine sleeve was removed in the office today  She does not have menopausal symptoms  Her performance status is 0     1  Three-month post treatment PET scan prior to her next visit           Relevant Orders    NM PET CT skull base to mid thigh            CHIEF COMPLAINT:       Problem:  Cancer Staging  Malignant neoplasm of overlapping sites of cervix ([facility] [address] )  Staging form: Cervix Uteri, AJCC Version 9  - Clinical: No stage assigned - Unsigned  - Pathologic: No stage assigned - Unsigned  - Pathologic: FIGO Stage IIIC1p (pT1b1, cM0) - Signed by Marika Willis MD on 5/19/2022        Previous therapy:  Oncology History   Malignant neoplasm of overlapping sites of cervix [facility])   3/24/2022 Initial Diagnosis    Malignant neoplasm of overlapping sites of cervix [facility])     5/6/2022 Surgery    Exploratory laparotomy for planned radical hysterectomy, bilateral pelvic lymph node dissection, aborted radical hysterectomy, bilateral ovarian transposition due to positive lymph nodes  1  Two right pelvic lymph nodes positive     5/19/2022 -  Cancer Staged    Staging form: Cervix Uteri, AJCC Version 9  - Pathologic: FIGO Stage IIIC1p (pT1b1, cM0) - Signed by Jason Galeana MD on 5/19/2022  Stage confirmation method: Pathology  Pelvic grady status: Positive  Pelvic grady method of assessment: Lymph node dissection  Para-aortic status: Negative       6/20/2022 - 7/26/2022 Chemotherapy    palonosetron (ALOXI), 0 25 mg, Intravenous, Once, 6 of 6 cycles  Administration: 0 25 mg (6/20/2022), 0 25 mg (6/27/2022), 0 25 mg (7/6/2022), 0 25 mg (7/11/2022), 0 25 mg (7/19/2022), 0 25 mg (7/26/2022)  CISplatin (PLATINOL) infusion, 70 mg (original dose 40 mg/m2), Intravenous, Once, 6 of 6 cycles  Dose modification: 70 mg (original dose 40 mg/m2, Cycle 1, Reason: Other (Must fill in a comment), Comment: max 70), 70 mg (original dose 40 mg/m2, Cycle 2, Reason: Dose modified as per discussion with consulting physician)  Administration: 70 mg (6/20/2022), 70 mg (6/27/2022), 70 mg (7/6/2022), 70 mg (7/11/2022), 70 mg (7/19/2022), 70 mg (7/26/2022)  aprepitant (CINVANTI) in  mL IVPB, 130 mg, Intravenous, Once, 6 of 6 cycles  Administration: 130 mg (6/20/2022), 130 mg (6/27/2022), 130 mg (7/6/2022), 130 mg (7/11/2022), 130 mg (7/19/2022), 130 mg (7/26/2022)      - 8/3/2022 Radiation    External beam and brachytherapy completed 8/3/2022  Patient ID: Blank Wilson is a 28 y o  female  Returns after completion of curative intent chemoradiation for stage IIIC 1 cervical cancer  She completed radiation yesterday completed chemotherapy on 7/26/2022  Hemoglobin is 10 1 grams/deciliter, platelet count 93 K  she tolerated radiation therapy well  No new complaints  No other interval change in her medications or medical history        The following portions of the patient's history were reviewed and updated as appropriate: allergies, current medications, past family history, past medical history, past social history, past surgical history and problem list     Review of Systems   Constitutional: Negative for activity change and unexpected weight change  HENT: Negative  Eyes: Negative  Respiratory: Negative  Cardiovascular: Negative  Gastrointestinal: Negative for abdominal distention and abdominal pain  Endocrine: Negative  Genitourinary: Negative for pelvic pain and vaginal bleeding  Musculoskeletal: Negative  Skin: Negative  Allergic/Immunologic: Negative  Neurological: Negative  Hematological: Negative  Psychiatric/Behavioral: Negative  Current Outpatient Medications   Medication Sig Dispense Refill    acetaminophen (TYLENOL) 500 mg tablet Take 1,000 mg by mouth every 6 (six) hours as needed for mild pain      diazepam (VALIUM) 5 mg tablet 1 TABLET AS NEEDED ORALLY ONCE A DAY BEFORE IMAGING 7 DAYS      docusate sodium (COLACE) 100 mg capsule Take 1 capsule (100 mg total) by mouth 2 (two) times a day as needed for constipation 20 capsule 0    NON FORMULARY CBD gummies daily      ondansetron (ZOFRAN) 8 mg tablet Take 1 tablet (8 mg total) by mouth every 8 (eight) hours as needed for nausea or vomiting 60 tablet 1    prochlorperazine (COMPAZINE) 5 mg tablet Take 1 tablet (5 mg total) by mouth every 6 (six) hours as needed for nausea or vomiting 30 tablet 1    Psyllium (METAMUCIL PO) as needed        simethicone (MYLICON) 80 mg chewable tablet Chew 1 tablet (80 mg total) every 6 (six) hours as needed for flatulence 20 tablet 0    Sodium Fluoride 5000 PPM 1 1 % GEL BRUSH 2 MIN AT BEDTIME, EXPECTORATE WELL, DO NOT RINSE, NOTHING BY MOUTH FOR 1 HOUR AFTERWARDS       No current facility-administered medications for this visit             Objective:    Blood pressure 123/82, pulse 93, temperature 97 6 °F (36 4 °C), temperature source Temporal, resp  rate 17, height 5' 9 02" (1 753 m), last menstrual period 07/27/2022, SpO2 100 %, not currently breastfeeding  Body mass index is 38 65 kg/m²  Body surface area is 2 32 meters squared  Physical Exam  Vitals reviewed  Exam conducted with a chaperone present  Constitutional:       General: She is not in acute distress  Appearance: Normal appearance  She is well-developed  She is obese  She is not ill-appearing, toxic-appearing or diaphoretic  HENT:      Head: Normocephalic and atraumatic  Eyes:      Extraocular Movements: Extraocular movements intact  Conjunctiva/sclera: Conjunctivae normal    Neck:      Thyroid: No thyromegaly  Abdominal:      General: There is no distension  Palpations: Abdomen is soft  There is no mass  Tenderness: There is no abdominal tenderness  There is no guarding or rebound  Genitourinary:     Comments: External female genitalia are normal   Speculum examination reveals a grossly normal vagina and cervix with radiation treatment effect  The Antoine sleeve was then removed  Musculoskeletal:      Cervical back: Normal range of motion and neck supple  Right lower leg: No edema  Left lower leg: No edema  Lymphadenopathy:      Cervical: No cervical adenopathy  Skin:     General: Skin is warm and dry  Coloration: Skin is not jaundiced or pale  Findings: Erythema present  No bruising  Comments: Mild erythema lower abdomen adjacent to radiation tattoo at the incision site  Neurological:      General: No focal deficit present  Mental Status: She is alert and oriented to person, place, and time  Mental status is at baseline  Cranial Nerves: No cranial nerve deficit  Motor: No weakness  Gait: Gait normal    Psychiatric:         Mood and Affect: Mood normal          Behavior: Behavior normal          Thought Content:  Thought content normal          Judgment: Judgment normal          No results found for:   Lab Results   Component Value Date    K 3 7 07/28/2022     07/28/2022    CO2 23 07/28/2022    BUN 12 07/28/2022    CREATININE 0 78 07/28/2022    CALCIUM 8 5 05/08/2022    AST 12 07/28/2022    ALT 17 07/28/2022    EGFR 103 07/28/2022     Lab Results   Component Value Date    WBC 3 2 (L) 07/28/2022    HGB 10 1 (L) 07/28/2022    HCT 29 4 (L) 07/28/2022    MCV 85 07/28/2022    PLT 93 (LL) 07/28/2022     Lab Results   Component Value Date    NEUTROABS 2 4 07/28/2022        Trend:  No results found for:

## 2022-08-05 ENCOUNTER — APPOINTMENT (OUTPATIENT)
Dept: RADIATION ONCOLOGY | Facility: CLINIC | Age: 32
End: 2022-08-05
Payer: COMMERCIAL

## 2022-08-06 ENCOUNTER — PATIENT MESSAGE (OUTPATIENT)
Dept: GYNECOLOGIC ONCOLOGY | Facility: HOSPITAL | Age: 32
End: 2022-08-06

## 2022-08-06 LAB
ALBUMIN SERPL-MCNC: 4.3 G/DL (ref 3.8–4.8)
ALBUMIN/GLOB SERPL: 1.8 {RATIO} (ref 1.2–2.2)
ALP SERPL-CCNC: 71 IU/L (ref 44–121)
ALT SERPL-CCNC: 23 IU/L (ref 0–32)
AST SERPL-CCNC: 19 IU/L (ref 0–40)
BASOPHILS # BLD AUTO: 0 X10E3/UL (ref 0–0.2)
BASOPHILS NFR BLD AUTO: 1 %
BILIRUB SERPL-MCNC: 0.3 MG/DL (ref 0–1.2)
BUN SERPL-MCNC: 8 MG/DL (ref 6–20)
BUN/CREAT SERPL: 12 (ref 9–23)
CALCIUM SERPL-MCNC: 8.9 MG/DL (ref 8.7–10.2)
CHLORIDE SERPL-SCNC: 103 MMOL/L (ref 96–106)
CO2 SERPL-SCNC: 25 MMOL/L (ref 20–29)
CREAT SERPL-MCNC: 0.66 MG/DL (ref 0.57–1)
EGFR: 119 ML/MIN/1.73
EOSINOPHIL # BLD AUTO: 0 X10E3/UL (ref 0–0.4)
EOSINOPHIL NFR BLD AUTO: 2 %
ERYTHROCYTE [DISTWIDTH] IN BLOOD BY AUTOMATED COUNT: 16.3 % (ref 11.7–15.4)
GLOBULIN SER-MCNC: 2.4 G/DL (ref 1.5–4.5)
GLUCOSE SERPL-MCNC: 86 MG/DL (ref 65–99)
HCT VFR BLD AUTO: 27.3 % (ref 34–46.6)
HGB BLD-MCNC: 9.4 G/DL (ref 11.1–15.9)
IMM GRANULOCYTES # BLD: 0 X10E3/UL (ref 0–0.1)
IMM GRANULOCYTES NFR BLD: 1 %
LYMPHOCYTES # BLD AUTO: 0.2 X10E3/UL (ref 0.7–3.1)
LYMPHOCYTES NFR BLD AUTO: 10 %
MAGNESIUM SERPL-MCNC: 1.4 MG/DL (ref 1.6–2.3)
MCH RBC QN AUTO: 29 PG (ref 26.6–33)
MCHC RBC AUTO-ENTMCNC: 34.4 G/DL (ref 31.5–35.7)
MCV RBC AUTO: 84 FL (ref 79–97)
MONOCYTES # BLD AUTO: 0.1 X10E3/UL (ref 0.1–0.9)
MONOCYTES NFR BLD AUTO: 8 %
MORPHOLOGY BLD-IMP: ABNORMAL
NEUTROPHILS # BLD AUTO: 1.4 X10E3/UL (ref 1.4–7)
NEUTROPHILS NFR BLD AUTO: 78 %
PLATELET # BLD AUTO: 65 X10E3/UL (ref 150–450)
POTASSIUM SERPL-SCNC: 4.1 MMOL/L (ref 3.5–5.2)
PROT SERPL-MCNC: 6.7 G/DL (ref 6–8.5)
RBC # BLD AUTO: 3.24 X10E6/UL (ref 3.77–5.28)
SODIUM SERPL-SCNC: 141 MMOL/L (ref 134–144)
WBC # BLD AUTO: 1.7 X10E3/UL (ref 3.4–10.8)

## 2022-08-12 ENCOUNTER — TELEPHONE (OUTPATIENT)
Dept: GYNECOLOGIC ONCOLOGY | Facility: CLINIC | Age: 32
End: 2022-08-12

## 2022-08-12 ENCOUNTER — APPOINTMENT (OUTPATIENT)
Dept: RADIATION ONCOLOGY | Facility: CLINIC | Age: 32
End: 2022-08-12
Payer: COMMERCIAL

## 2022-08-12 LAB
ALBUMIN SERPL-MCNC: 4.1 G/DL (ref 3.8–4.8)
ALBUMIN/GLOB SERPL: 1.6 {RATIO} (ref 1.2–2.2)
ALP SERPL-CCNC: 72 IU/L (ref 44–121)
ALT SERPL-CCNC: 20 IU/L (ref 0–32)
AST SERPL-CCNC: 18 IU/L (ref 0–40)
BASOPHILS # BLD AUTO: 0 X10E3/UL (ref 0–0.2)
BASOPHILS NFR BLD AUTO: 0 %
BILIRUB SERPL-MCNC: 0.3 MG/DL (ref 0–1.2)
BUN SERPL-MCNC: 8 MG/DL (ref 6–20)
BUN/CREAT SERPL: 11 (ref 9–23)
CALCIUM SERPL-MCNC: 9.3 MG/DL (ref 8.7–10.2)
CHLORIDE SERPL-SCNC: 102 MMOL/L (ref 96–106)
CO2 SERPL-SCNC: 25 MMOL/L (ref 20–29)
CREAT SERPL-MCNC: 0.73 MG/DL (ref 0.57–1)
EGFR: 112 ML/MIN/1.73
EOSINOPHIL # BLD AUTO: 0 X10E3/UL (ref 0–0.4)
EOSINOPHIL NFR BLD AUTO: 2 %
ERYTHROCYTE [DISTWIDTH] IN BLOOD BY AUTOMATED COUNT: 17 % (ref 11.7–15.4)
GLOBULIN SER-MCNC: 2.6 G/DL (ref 1.5–4.5)
GLUCOSE SERPL-MCNC: 88 MG/DL (ref 65–99)
HCT VFR BLD AUTO: 27.6 % (ref 34–46.6)
HGB BLD-MCNC: 9.1 G/DL (ref 11.1–15.9)
IMM GRANULOCYTES # BLD: 0 X10E3/UL (ref 0–0.1)
IMM GRANULOCYTES NFR BLD: 1 %
LYMPHOCYTES # BLD AUTO: 0.2 X10E3/UL (ref 0.7–3.1)
LYMPHOCYTES NFR BLD AUTO: 19 %
MAGNESIUM SERPL-MCNC: 1.4 MG/DL (ref 1.6–2.3)
MCH RBC QN AUTO: 27.7 PG (ref 26.6–33)
MCHC RBC AUTO-ENTMCNC: 33 G/DL (ref 31.5–35.7)
MCV RBC AUTO: 84 FL (ref 79–97)
MONOCYTES # BLD AUTO: 0.1 X10E3/UL (ref 0.1–0.9)
MONOCYTES NFR BLD AUTO: 14 %
MORPHOLOGY BLD-IMP: ABNORMAL
NEUTROPHILS # BLD AUTO: 0.6 X10E3/UL (ref 1.4–7)
NEUTROPHILS NFR BLD AUTO: 64 %
PLATELET # BLD AUTO: 91 X10E3/UL (ref 150–450)
POTASSIUM SERPL-SCNC: 4 MMOL/L (ref 3.5–5.2)
PROT SERPL-MCNC: 6.7 G/DL (ref 6–8.5)
RBC # BLD AUTO: 3.28 X10E6/UL (ref 3.77–5.28)
SODIUM SERPL-SCNC: 140 MMOL/L (ref 134–144)
WBC # BLD AUTO: 1 X10E3/UL (ref 3.4–10.8)

## 2022-08-12 NOTE — RESULT ENCOUNTER NOTE
Call placed to patient to review labs, [facility]  Also morenitahart message sent  Plan to repeat labs on Monday, 8/15  Will plan for GSF is no improvement in WBC

## 2022-08-12 NOTE — TELEPHONE ENCOUNTER
Lashawn Lee from [facility] called to inform Corinna Goodell and Dr Jayshree Noriega that the patient had a critcal lab result  Patient's WBC is 1 0 and there was a note on the lab report stating "CBC results repeated"  [facility] is faxing over the lab report to the office

## 2022-08-16 LAB
ALBUMIN SERPL-MCNC: 4.5 G/DL (ref 3.8–4.8)
ALBUMIN/GLOB SERPL: 1.7 {RATIO} (ref 1.2–2.2)
ALP SERPL-CCNC: 82 IU/L (ref 44–121)
ALT SERPL-CCNC: 20 IU/L (ref 0–32)
AST SERPL-CCNC: 17 IU/L (ref 0–40)
BASOPHILS # BLD AUTO: 0 X10E3/UL (ref 0–0.2)
BASOPHILS NFR BLD AUTO: 0 %
BILIRUB SERPL-MCNC: 0.3 MG/DL (ref 0–1.2)
BUN SERPL-MCNC: 14 MG/DL (ref 6–20)
BUN/CREAT SERPL: 21 (ref 9–23)
CALCIUM SERPL-MCNC: 9.5 MG/DL (ref 8.7–10.2)
CHLORIDE SERPL-SCNC: 104 MMOL/L (ref 96–106)
CO2 SERPL-SCNC: 25 MMOL/L (ref 20–29)
CREAT SERPL-MCNC: 0.67 MG/DL (ref 0.57–1)
EGFR: 119 ML/MIN/1.73
EOSINOPHIL # BLD AUTO: 0 X10E3/UL (ref 0–0.4)
EOSINOPHIL NFR BLD AUTO: 1 %
ERYTHROCYTE [DISTWIDTH] IN BLOOD BY AUTOMATED COUNT: 18.1 % (ref 11.7–15.4)
GLOBULIN SER-MCNC: 2.6 G/DL (ref 1.5–4.5)
GLUCOSE SERPL-MCNC: 94 MG/DL (ref 65–99)
HCT VFR BLD AUTO: 27.6 % (ref 34–46.6)
HGB BLD-MCNC: 9.4 G/DL (ref 11.1–15.9)
IMM GRANULOCYTES # BLD: 0 X10E3/UL (ref 0–0.1)
IMM GRANULOCYTES NFR BLD: 2 %
LYMPHOCYTES # BLD AUTO: 0.3 X10E3/UL (ref 0.7–3.1)
LYMPHOCYTES NFR BLD AUTO: 21 %
MAGNESIUM SERPL-MCNC: 1.5 MG/DL (ref 1.6–2.3)
MCH RBC QN AUTO: 28.5 PG (ref 26.6–33)
MCHC RBC AUTO-ENTMCNC: 34.1 G/DL (ref 31.5–35.7)
MCV RBC AUTO: 84 FL (ref 79–97)
MONOCYTES # BLD AUTO: 0.2 X10E3/UL (ref 0.1–0.9)
MONOCYTES NFR BLD AUTO: 15 %
MORPHOLOGY BLD-IMP: ABNORMAL
NEUTROPHILS # BLD AUTO: 0.7 X10E3/UL (ref 1.4–7)
NEUTROPHILS NFR BLD AUTO: 61 %
PLATELET # BLD AUTO: 97 X10E3/UL (ref 150–450)
POTASSIUM SERPL-SCNC: 4.3 MMOL/L (ref 3.5–5.2)
PROT SERPL-MCNC: 7.1 G/DL (ref 6–8.5)
RBC # BLD AUTO: 3.3 X10E6/UL (ref 3.77–5.28)
SODIUM SERPL-SCNC: 141 MMOL/L (ref 134–144)
WBC # BLD AUTO: 1.2 X10E3/UL (ref 3.4–10.8)

## 2022-08-18 ENCOUNTER — PATIENT MESSAGE (OUTPATIENT)
Dept: GYNECOLOGIC ONCOLOGY | Facility: HOSPITAL | Age: 32
End: 2022-08-18

## 2022-08-18 ENCOUNTER — TELEPHONE (OUTPATIENT)
Dept: RADIATION ONCOLOGY | Facility: HOSPITAL | Age: 32
End: 2022-08-18

## 2022-08-18 ENCOUNTER — PATIENT OUTREACH (OUTPATIENT)
Dept: CASE MANAGEMENT | Facility: OTHER | Age: 32
End: 2022-08-18

## 2022-08-18 NOTE — TELEPHONE ENCOUNTER
1524-Return call to pt-question concerning LTD and extension of her LTD d/t abnormal lab values and her work environment  From Rad Onc perspective the pt's return to work date is as previously planned-Sept 1, 2022  Pt is followed by GynOnc for her labs  Suggest call to [address] to discuss her concerns  Information above left on pt's VM and office number if she would have any further questions

## 2022-08-18 NOTE — PROGRESS NOTES
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

## 2022-08-25 ENCOUNTER — CLINICAL SUPPORT (OUTPATIENT)
Dept: RADIATION ONCOLOGY | Facility: HOSPITAL | Age: 32
End: 2022-08-25
Attending: INTERNAL MEDICINE

## 2022-08-25 ENCOUNTER — RADIATION ONCOLOGY FOLLOW-UP (OUTPATIENT)
Dept: RADIATION ONCOLOGY | Facility: HOSPITAL | Age: 32
End: 2022-08-25

## 2022-08-25 VITALS
TEMPERATURE: 97.9 F | OXYGEN SATURATION: 98 % | HEART RATE: 119 BPM | HEIGHT: 69 IN | SYSTOLIC BLOOD PRESSURE: 124 MMHG | BODY MASS INDEX: 39.25 KG/M2 | RESPIRATION RATE: 20 BRPM | WEIGHT: 265 LBS | DIASTOLIC BLOOD PRESSURE: 86 MMHG

## 2022-08-25 DIAGNOSIS — C53.8 MALIGNANT NEOPLASM OF OVERLAPPING SITES OF CERVIX (HCC): Primary | ICD-10-CM

## 2022-08-25 PROCEDURE — 99024 POSTOP FOLLOW-UP VISIT: CPT | Performed by: INTERNAL MEDICINE

## 2022-08-25 PROCEDURE — 99211 OFF/OP EST MAY X REQ PHY/QHP: CPT | Performed by: INTERNAL MEDICINE

## 2022-08-25 NOTE — PROGRESS NOTES
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 s/p aborted radical hysterectomy  She proceeded with concurrent chemoradiation  She received 6 cycles of cisplatin concurrent with pelvic RT with brachytherapy boost on 8/3/22  She had mild to moderate expected acute toxicity of treatment  She returns today for 3 week follow-up  8/4/22 Dr  [place] follow-up  Antoine sleeve removed today  Plan for f/u post PET/CT in 3 months  PET ordered, not scheduled   11/10/22 Dr George matt, [facility]          Follow up visit     Oncology History   Malignant neoplasm of overlapping sites of cervix [facility])   3/24/2022 Initial Diagnosis    Malignant neoplasm of overlapping sites of cervix ([facility] [address] )     5/6/2022 Surgery    Exploratory laparotomy for planned radical hysterectomy, bilateral pelvic lymph node dissection, aborted radical hysterectomy, bilateral ovarian transposition due to positive lymph nodes    1  Two right pelvic lymph nodes positive     5/19/2022 -  Cancer Staged    Staging form: Cervix Uteri, AJCC Version 9  - Pathologic: FIGO Stage IIIC1p (pT1b1, cM0) - Signed by Emery Fernandez MD on 5/19/2022  Stage confirmation method: Pathology  Pelvic grady status: Positive  Pelvic grady method of assessment: Lymph node dissection  Para-aortic status: Negative       6/20/2022 - 7/26/2022 Chemotherapy    palonosetron (ALOXI), 0 25 mg, Intravenous, Once, 6 of 6 cycles  Administration: 0 25 mg (6/20/2022), 0 25 mg (6/27/2022), 0 25 mg (7/6/2022), 0 25 mg (7/11/2022), 0 25 mg (7/19/2022), 0 25 mg (7/26/2022)  CISplatin (PLATINOL) infusion, 70 mg (original dose 40 mg/m2), Intravenous, Once, 6 of 6 cycles  Dose modification: 70 mg (original dose 40 mg/m2, Cycle 1, Reason: Other (Must fill in a comment), Comment: max 70), 70 mg (original dose 40 mg/m2, Cycle 2, Reason: Dose modified as per discussion with consulting physician)  Administration: 70 mg (6/20/2022), 70 mg (6/27/2022), 70 mg (7/6/2022), 70 mg (7/11/2022), 70 mg (7/19/2022), 70 mg (7/26/2022)  aprepitant (CINVANTI) in  mL IVPB, 130 mg, Intravenous, Once, 6 of 6 cycles  Administration: 130 mg (6/20/2022), 130 mg (6/27/2022), 130 mg (7/6/2022), 130 mg (7/11/2022), 130 mg (7/19/2022), 130 mg (7/26/2022)     6/22/2022 - 8/3/2022 Radiation    Course: C1 - EBRT  Plan ID Energy Fractions Dose per Fraction (cGy) Dose Correction (cGy) Total Dose Delivered (cGy) Elapsed Days   Whole Pelvis 10X 25 / 25 180 0 4,500 42       Course: C1 HDR Tandem and Ring Brachytherapy  Plan ID Energy Fractions Dose per Fraction (cGy) Dose Correction (cGy) Total Dose Delivered (cGy) Elapsed Days   HDR1 7-15-22  1 / 1 700 0 700 0   [id number] 1 / 1 700 0 700 0   LUE0_2--21-22 1 / 1 700 0 700 0   HDR4 7-25-22 1 / 1 700 0 700 0             Review of Systems:  Review of Systems   Constitutional: Positive for chills and fatigue (5/10)  HENT: Negative  Eyes: Negative  Respiratory: Negative  Cardiovascular: Negative  Gastrointestinal: Positive for diarrhea (Occasional ) and nausea  Endocrine: Positive for heat intolerance (Hot flashes ? ?)  Genitourinary: Positive for frequency  Urinary incontinence noted   Musculoskeletal: Negative  Skin: Negative  Allergic/Immunologic: Negative  Neurological: Positive for dizziness, light-headedness and headaches (Daily )  Hematological: Negative  Psychiatric/Behavioral: Positive for sleep disturbance (Frequent waking periods )         Clinical Trial: no    Teaching    Covid Vaccine Status  Vaccinated/No Boosters     Health Maintenance   Topic Date Due    Hepatitis C Screening  Never done    Pneumococcal Vaccine: Pediatrics (0 to 5 Years) and At-Risk Patients (6 to 59 Years) (1 - PCV) Never done    HIV Screening  Never done    BMI: Followup Plan  Never done    Annual Physical  Never done    DTaP,Tdap,and Td Vaccines (1 - Tdap) Never done    COVID-19 Vaccine (3 - Moderna risk series) 06/22/2021    Influenza Vaccine (1) 09/01/2022    BMI: Adult  08/04/2023    Cervical Cancer Screening  11/12/2026    HIB Vaccine  Aged Out    Hepatitis B Vaccine  Aged Out    IPV Vaccine  Aged Out    Hepatitis A Vaccine  Aged Out    Meningococcal ACWY Vaccine  Aged Out    HPV Vaccine  Aged Out     Patient Active Problem List   Diagnosis    [facility]    Family history of ovarian cancer    Obesity (BMI 35 0-39 9 without comorbidity)    Dysuria    Anxiety    Depression     Past Medical History:   Diagnosis Date    Anxiety     Cervical cancer ([facility] )     receiving chemo and radiation    COVID     Jan 2022    Depression     Diarrhea     Dizziness     Frequent headaches     Obesity      Past Surgical History:   Procedure Laterality Date    CERVICAL BIOPSY  W/ LOOP ELECTRODE EXCISION      LYMPH NODE DISSECTION Bilateral 5/6/2022    Procedure: DISSECTION/STAGING LYMPH NODE PELVIS/ABDOMEN;  Surgeon: Denver Kitten, MD;  [facility];  Service: Gynecology Oncology    AK INSERT VAGINAL RADIATION DEVICE N/A 7/15/2022    Procedure: INSERTION NADIR SLEEVE VAGINA WITH POST OP BRACHYTHERAPY (IN RADIATION ONCOLOGY);   Surgeon: Denver Kitten, MD;  [facility];  Service: Gynecology Oncology    SALPINGECTOMY Bilateral 5/6/2022    Procedure: SALPINGECTOMY, OVARIAN TRANSPOSITION;  Surgeon: Denver Kitten, MD;  [facility];  Service: Gynecology Oncology    US GUIDANCE  7/15/2022     Family History   Problem Relation Age of Onset    Ovarian cancer Maternal Aunt     Ovarian cancer Maternal Grandmother     No Known Problems Mother     Heart disease Father      Social History     Socioeconomic History    Marital status: Single     Spouse name: Not on file    Number of children: Not on file    Years of education: Not on file    Highest education level: Not on file   Occupational History    Not on file   Tobacco Use    Smoking status: Never Smoker    Smokeless tobacco: Never Used   Vaping Use    Vaping Use: Never used   Substance and Sexual Activity    Alcohol use: Not Currently    Drug use: Never     Comment: CBD Gummies/Anxiety    Sexual activity: Not Currently   Other Topics Concern    Not on file   Social History Narrative    Not on file     Social Determinants of Health     Financial Resource Strain: Not on file   Food Insecurity: Not on file   Transportation Needs: No Transportation Needs    Lack of Transportation (Medical): No    Lack of Transportation (Non-Medical):  No   Physical Activity: Not on file   Stress: Not on file   Social Connections: Not on file   Intimate Partner Violence: Not on file   Housing Stability: Not on file       Current Outpatient Medications:     acetaminophen (TYLENOL) 500 mg tablet, Take 1,000 mg by mouth every 6 (six) hours as needed for mild pain, Disp: , Rfl:     docusate sodium (COLACE) 100 mg capsule, Take 1 capsule (100 mg total) by mouth 2 (two) times a day as needed for constipation, Disp: 20 capsule, Rfl: 0    ondansetron (ZOFRAN) 8 mg tablet, Take 1 tablet (8 mg total) by mouth every 8 (eight) hours as needed for nausea or vomiting, Disp: 60 tablet, Rfl: 1    prochlorperazine (COMPAZINE) 5 mg tablet, Take 1 tablet (5 mg total) by mouth every 6 (six) hours as needed for nausea or vomiting, Disp: 30 tablet, Rfl: 1    diazepam (VALIUM) 5 mg tablet, 1 TABLET AS NEEDED ORALLY ONCE A DAY BEFORE IMAGING 7 DAYS (Patient not taking: Reported on 8/25/2022), Disp: , Rfl:     NON FORMULARY, CBD gummies daily, Disp: , Rfl:     Psyllium (METAMUCIL PO), as needed  , Disp: , Rfl:     simethicone (MYLICON) 80 mg chewable tablet, Chew 1 tablet (80 mg total) every 6 (six) hours as needed for flatulence (Patient not taking: Reported on 8/25/2022), Disp: 20 tablet, Rfl: 0    Sodium Fluoride 5000 PPM 1 1 % GEL, BRUSH 2 MIN AT BEDTIME, EXPECTORATE WELL, DO NOT RINSE, NOTHING BY MOUTH FOR 1 HOUR AFTERWARDS (Patient not taking: Reported on 8/25/2022), Disp: , Rfl:   No Known Allergies  Vitals:    08/25/22 1408   BP: 124/86   Pulse: (!) 119   Resp: 20   Temp: 97 9 °F (36 6 °C)   SpO2: 98%   Weight: 120 kg (265 lb)   Height: 5' 9" (1 753 m)      Pain Score: 0-No pain

## 2022-08-26 LAB
ALBUMIN SERPL-MCNC: 4.6 G/DL (ref 3.8–4.8)
ALBUMIN/GLOB SERPL: 1.8 {RATIO} (ref 1.2–2.2)
ALP SERPL-CCNC: 74 IU/L (ref 44–121)
ALT SERPL-CCNC: 35 IU/L (ref 0–32)
AST SERPL-CCNC: 25 IU/L (ref 0–40)
BASOPHILS # BLD AUTO: 0 X10E3/UL (ref 0–0.2)
BASOPHILS NFR BLD AUTO: 1 %
BILIRUB SERPL-MCNC: 0.3 MG/DL (ref 0–1.2)
BUN SERPL-MCNC: 12 MG/DL (ref 6–20)
BUN/CREAT SERPL: 15 (ref 9–23)
CALCIUM SERPL-MCNC: 9.3 MG/DL (ref 8.7–10.2)
CHLORIDE SERPL-SCNC: 100 MMOL/L (ref 96–106)
CO2 SERPL-SCNC: 24 MMOL/L (ref 20–29)
CREAT SERPL-MCNC: 0.79 MG/DL (ref 0.57–1)
EGFR: 102 ML/MIN/1.73
EOSINOPHIL # BLD AUTO: 0 X10E3/UL (ref 0–0.4)
EOSINOPHIL NFR BLD AUTO: 1 %
ERYTHROCYTE [DISTWIDTH] IN BLOOD BY AUTOMATED COUNT: 21.1 % (ref 11.7–15.4)
GLOBULIN SER-MCNC: 2.6 G/DL (ref 1.5–4.5)
GLUCOSE SERPL-MCNC: 97 MG/DL (ref 65–99)
HCT VFR BLD AUTO: 26.2 % (ref 34–46.6)
HGB BLD-MCNC: 9 G/DL (ref 11.1–15.9)
IMM GRANULOCYTES # BLD: 0 X10E3/UL (ref 0–0.1)
IMM GRANULOCYTES NFR BLD: 2 %
LYMPHOCYTES # BLD AUTO: 0.3 X10E3/UL (ref 0.7–3.1)
LYMPHOCYTES NFR BLD AUTO: 17 %
MAGNESIUM SERPL-MCNC: 1.8 MG/DL (ref 1.6–2.3)
MCH RBC QN AUTO: 29.3 PG (ref 26.6–33)
MCHC RBC AUTO-ENTMCNC: 34.4 G/DL (ref 31.5–35.7)
MCV RBC AUTO: 85 FL (ref 79–97)
MONOCYTES # BLD AUTO: 0.4 X10E3/UL (ref 0.1–0.9)
MONOCYTES NFR BLD AUTO: 19 %
NEUTROPHILS # BLD AUTO: 1.2 X10E3/UL (ref 1.4–7)
NEUTROPHILS NFR BLD AUTO: 60 %
PLATELET # BLD AUTO: 137 X10E3/UL (ref 150–450)
POTASSIUM SERPL-SCNC: 4.2 MMOL/L (ref 3.5–5.2)
PROT SERPL-MCNC: 7.2 G/DL (ref 6–8.5)
RBC # BLD AUTO: 3.07 X10E6/UL (ref 3.77–5.28)
SODIUM SERPL-SCNC: 138 MMOL/L (ref 134–144)
WBC # BLD AUTO: 2 X10E3/UL (ref 3.4–10.8)

## 2022-09-02 LAB
ALBUMIN SERPL-MCNC: 4.6 G/DL (ref 3.8–4.8)
ALBUMIN/GLOB SERPL: 2 {RATIO} (ref 1.2–2.2)
ALP SERPL-CCNC: 71 IU/L (ref 44–121)
ALT SERPL-CCNC: 25 IU/L (ref 0–32)
AST SERPL-CCNC: 19 IU/L (ref 0–40)
BASOPHILS # BLD AUTO: 0 X10E3/UL (ref 0–0.2)
BASOPHILS NFR BLD AUTO: 0 %
BILIRUB SERPL-MCNC: 0.3 MG/DL (ref 0–1.2)
BUN SERPL-MCNC: 11 MG/DL (ref 6–20)
BUN/CREAT SERPL: 14 (ref 9–23)
CALCIUM SERPL-MCNC: 9.5 MG/DL (ref 8.7–10.2)
CHLORIDE SERPL-SCNC: 103 MMOL/L (ref 96–106)
CO2 SERPL-SCNC: 25 MMOL/L (ref 20–29)
CREAT SERPL-MCNC: 0.79 MG/DL (ref 0.57–1)
EGFR: 102 ML/MIN/1.73
EOSINOPHIL # BLD AUTO: 0 X10E3/UL (ref 0–0.4)
EOSINOPHIL NFR BLD AUTO: 0 %
ERYTHROCYTE [DISTWIDTH] IN BLOOD BY AUTOMATED COUNT: 22.2 % (ref 11.7–15.4)
GLOBULIN SER-MCNC: 2.3 G/DL (ref 1.5–4.5)
GLUCOSE SERPL-MCNC: 92 MG/DL (ref 65–99)
HCT VFR BLD AUTO: 26.4 % (ref 34–46.6)
HGB BLD-MCNC: 9 G/DL (ref 11.1–15.9)
LYMPHOCYTES # BLD AUTO: 0.3 X10E3/UL (ref 0.7–3.1)
LYMPHOCYTES NFR BLD AUTO: 17 %
MAGNESIUM SERPL-MCNC: 1.6 MG/DL (ref 1.6–2.3)
MCH RBC QN AUTO: 29.4 PG (ref 26.6–33)
MCHC RBC AUTO-ENTMCNC: 34.1 G/DL (ref 31.5–35.7)
MCV RBC AUTO: 86 FL (ref 79–97)
MONOCYTES # BLD AUTO: 0.2 X10E3/UL (ref 0.1–0.9)
MONOCYTES NFR BLD AUTO: 10 %
MORPHOLOGY BLD-IMP: ABNORMAL
NEUTROPHILS # BLD AUTO: 1.3 X10E3/UL (ref 1.4–7)
NEUTROPHILS NFR BLD AUTO: 73 %
PLATELET # BLD AUTO: 161 X10E3/UL (ref 150–450)
POTASSIUM SERPL-SCNC: 4 MMOL/L (ref 3.5–5.2)
PROT SERPL-MCNC: 6.9 G/DL (ref 6–8.5)
RBC # BLD AUTO: 3.06 X10E6/UL (ref 3.77–5.28)
SODIUM SERPL-SCNC: 140 MMOL/L (ref 134–144)
WBC # BLD AUTO: 1.8 X10E3/UL (ref 3.4–10.8)

## 2022-09-07 ENCOUNTER — TELEPHONE (OUTPATIENT)
Dept: GYNECOLOGIC ONCOLOGY | Facility: CLINIC | Age: 32
End: 2022-09-07

## 2022-09-07 NOTE — PROGRESS NOTES
Follow-up - Radiation Oncology   Antoine Corley 1990 28 y o  female [id number]    Cancer Staging  Malignant neoplasm of overlapping sites of cervix [facility])  Staging form: Cervix Uteri, AJCC Version 9  - Clinical: No stage assigned - Unsigned  - Pathologic: No stage assigned - Unsigned  - Pathologic: FIGO Stage IIIC1p (pT1b1, cM0) - Signed by Melanie Reagan MD on 5/19/2022  Stage confirmation method: Pathology  Pelvic grady status: Positive  Pelvic grady method of assessment: Lymph node dissection  Para-aortic status: Negative    Assessment/Plan:  Antoine Corley is a 32 y o  female with FIGO IIIC1 cervical cancer s/p initial LEEP in March 2022 showing moderately differentiated SCC with extensive LVSI  She had an MRI on 4/1/22 which showed a 1 7 cm lesion in the cervix extending to the right vaginal fornix without parametrial invasion, and a single indeterminate right-sided pelvic LN (between the external and internal iliac chain measuring 1 4 x 1 1 cm in the sagittal plane series 4 image #25 and 11 mm in width series 6 image 24)  She underwent evaluation at [facility] and [facility], and was not deemed a candidate for trachelectomy  She thus was planned for radical hysterectomy  EUA revealed a 3 5cm cervix with no palpable parametrial disease  Gross grady disease was found in the right pelvic node and the hysterectomy was aborted  A staging pelvic LND and ovarian translocation to the paracolic gutters was completed  Pathology revealed 3/10 right-sided nodes (2 right pelvic and 1 right common iliac involved) and 0/5 left-sided pelvic nodes  She had a PET/CT at [facility] on 5/27/22, which showed a 2 3cm long focus of uptake in the right side of the cervix (image 244) with no other foci of abnormal uptake elsewhere  MDT consensus was for definitive concurrent chemoRT  She received 6 cycles of cisplatin concurrent with pelvic RT with brachytherapy boost on 8/3/22  She presents for 1 month EOT visit  She has largely recovered from expected acute toxicities of RT  She has intermittent hot flashes and rare diarrhea  She has urinary frequency with occasional stress incontinence  Her exam today is unremarkable  We reviewed vaginal dilator use if not sexually active    -Continue clinical and radiographic surveillance  PET ordered, but not yet scheduled  I have encouraged her to schedule this at [facility] for comparison of prior scans  11/10/22 Dr Lily Chavez  RTC in 3 months  No orders of the defined types were placed in this encounter  History of Present Illness   Interval History:  Portillo Rajan 1990 is a 28 y o  female  with history of Stage IIIC1 squamous cell carcinoma of cervix s/p aborted radical hysterectomy  She proceeded with concurrent chemoradiation  She received 6 cycles of cisplatin concurrent with pelvic RT with brachytherapy boost on 8/3/22  She had mild to moderate expected acute toxicity of treatment  She returns today for 3 week follow-up       8/4/22 Dr Gonzalo Zhong follow-up  Antoine sleeve removed today  Plan for f/u post PET/CT in 3 months       She has intermittent hot flashes and rare diarrhea  She has urinary frequency with occasional stress incontinence  Historical Information   Oncology History   Malignant neoplasm of overlapping sites of cervix ([facility] [address] )   3/24/2022 Initial Diagnosis    Malignant neoplasm of overlapping sites of cervix ([facility] [address] )     5/6/2022 Surgery    Exploratory laparotomy for planned radical hysterectomy, bilateral pelvic lymph node dissection, aborted radical hysterectomy, bilateral ovarian transposition due to positive lymph nodes    1  Two right pelvic lymph nodes positive     5/19/2022 -  Cancer Staged    Staging form: Cervix Uteri, AJCC Version 9  - Pathologic: FIGO Stage IIIC1p (pT1b1, cM0) - Signed by Dorcas Sharma MD on 5/19/2022  Stage confirmation method: Pathology  Pelvic grady status: Positive  Pelvic grady method of assessment: Lymph node dissection  Para-aortic status: Negative       6/20/2022 - 7/26/2022 Chemotherapy    palonosetron (ALOXI), 0 25 mg, Intravenous, Once, 6 of 6 cycles  Administration: 0 25 mg (6/20/2022), 0 25 mg (6/27/2022), 0 25 mg (7/6/2022), 0 25 mg (7/11/2022), 0 25 mg (7/19/2022), 0 25 mg (7/26/2022)  CISplatin (PLATINOL) infusion, 70 mg (original dose 40 mg/m2), Intravenous, Once, 6 of 6 cycles  Dose modification: 70 mg (original dose 40 mg/m2, Cycle 1, Reason: Other (Must fill in a comment), Comment: max 70), 70 mg (original dose 40 mg/m2, Cycle 2, Reason: Dose modified as per discussion with consulting physician)  Administration: 70 mg (6/20/2022), 70 mg (6/27/2022), 70 mg (7/6/2022), 70 mg (7/11/2022), 70 mg (7/19/2022), 70 mg (7/26/2022)  aprepitant (CINVANTI) in  mL IVPB, 130 mg, Intravenous, Once, 6 of 6 cycles  Administration: 130 mg (6/20/2022), 130 mg (6/27/2022), 130 mg (7/6/2022), 130 mg (7/11/2022), 130 mg (7/19/2022), 130 mg (7/26/2022)     6/22/2022 - 8/3/2022 Radiation    Course: C1 - EBRT  Plan ID Energy Fractions Dose per Fraction (cGy) Dose Correction (cGy) Total Dose Delivered (cGy) Elapsed Days   Whole Pelvis 10X 25 / 25 180 0 4,500 42       Course: C1 HDR Tandem and Ring Brachytherapy  Plan ID Energy Fractions Dose per Fraction (cGy) Dose Correction (cGy) Total Dose Delivered (cGy) Elapsed Days   HDR1 7-15-22  1 / 1 700 0 700 0   HUZ0_2--18-22 1 / 1 700 0 700 0   AXQ2_7--21-22 1 / 1 700 0 700 0   HDR4 7-25-22 1 / 1 700 0 700 0             Past Medical History:   Diagnosis Date    Anxiety     Cervical cancer ([address] )     receiving chemo and radiation    COVID     Jan 2022    Depression     Diarrhea     Dizziness     Frequent headaches     Obesity      Past Surgical History:   Procedure Laterality Date    CERVICAL BIOPSY  W/ LOOP ELECTRODE EXCISION      LYMPH NODE DISSECTION Bilateral 5/6/2022    Procedure: DISSECTION/STAGING LYMPH NODE PELVIS/ABDOMEN;  Surgeon: Yesi Elliott, MD;  Location: [facility];  Service: Gynecology Oncology    GA INSERT VAGINAL RADIATION DEVICE N/A 7/15/2022    Procedure: INSERTION NADIR SLEEVE VAGINA WITH POST OP BRACHYTHERAPY (IN RADIATION ONCOLOGY);   Surgeon: Bobbi Johnson MD;  Location: [facility];  Service: Gynecology Oncology    SALPINGECTOMY Bilateral 5/6/2022    Procedure: SALPINGECTOMY, OVARIAN TRANSPOSITION;  Surgeon: Bobbi Johnson MD;  [facility]: [facility];  Service: Gynecology Oncology    US GUIDANCE  7/15/2022       Social History   Social History     Substance and Sexual Activity   Alcohol Use Not Currently     Social History     Substance and Sexual Activity   Drug Use Never    Comment: CBD Gummies/Anxiety     Social History     Tobacco Use   Smoking Status Never Smoker   Smokeless Tobacco Never Used         Meds/Allergies     Current Outpatient Medications:     acetaminophen (TYLENOL) 500 mg tablet, Take 1,000 mg by mouth every 6 (six) hours as needed for mild pain, Disp: , Rfl:     docusate sodium (COLACE) 100 mg capsule, Take 1 capsule (100 mg total) by mouth 2 (two) times a day as needed for constipation, Disp: 20 capsule, Rfl: 0    ondansetron (ZOFRAN) 8 mg tablet, Take 1 tablet (8 mg total) by mouth every 8 (eight) hours as needed for nausea or vomiting, Disp: 60 tablet, Rfl: 1    prochlorperazine (COMPAZINE) 5 mg tablet, Take 1 tablet (5 mg total) by mouth every 6 (six) hours as needed for nausea or vomiting, Disp: 30 tablet, Rfl: 1    diazepam (VALIUM) 5 mg tablet, 1 TABLET AS NEEDED ORALLY ONCE A DAY BEFORE IMAGING 7 DAYS (Patient not taking: Reported on 8/25/2022), Disp: , Rfl:     NON FORMULARY, CBD gummies daily, Disp: , Rfl:     Psyllium (METAMUCIL PO), as needed  , Disp: , Rfl:     simethicone (MYLICON) 80 mg chewable tablet, Chew 1 tablet (80 mg total) every 6 (six) hours as needed for flatulence (Patient not taking: Reported on 8/25/2022), Disp: 20 tablet, Rfl: 0    Sodium Fluoride 5000 PPM 1 1 % GEL, BRUSH 2 MIN AT BEDTIME, EXPECTORATE WELL, DO NOT RINSE, NOTHING BY MOUTH FOR 1 HOUR AFTERWARDS (Patient not taking: Reported on 8/25/2022), Disp: , Rfl:   No Known Allergies      Review of Systems   Constitutional: Positive for chills and fatigue (5/10)  HENT: Negative  Eyes: Negative  Respiratory: Negative  Cardiovascular: Negative  Gastrointestinal: Positive for diarrhea (Occasional ) and nausea  Endocrine: Positive for heat intolerance (Hot flashes ? ?)  Genitourinary: Positive for frequency  Urinary incontinence noted   Musculoskeletal: Negative  Skin: Negative  Allergic/Immunologic: Negative  Neurological: Positive for dizziness, light-headedness and headaches (Daily )  Hematological: Negative  Psychiatric/Behavioral: Positive for sleep disturbance (Frequent waking periods )  OBJECTIVE:   /86   Pulse (!) 119   Temp 97 9 °F (36 6 °C)   Resp 20   Ht 5' 9" (1 753 m)   Wt 120 kg (265 lb)   LMP 07/27/2022 (Exact Date)   SpO2 98%   BMI 39 13 kg/m²   Pain Assessment:  0  ECOG/Zubrod/WHO: 1 - Symptomatic but completely ambulatory    Physical Exam   General Appearance:  Alert, cooperative, no distress, appears stated age  Gynecologic: Cervix and vagina normal  Mild RT induration  No payal masses appreciated  No discharge or bleeding appreciated  No pain on examination  Exam chaperoned by Leonila Patton RN      RESULTS    Lab Results:   Recent Results (from the past 672 hour(s))   CBC and differential    Collection Time: 08/11/22 10:58 AM   Result Value Ref Range    White Blood Cell Count 1 0 (<) 3 4 - 10 8 x10E3/uL    Red Blood Cell Count 3 28 (L) 3 77 - 5 28 x10E6/uL    Hemoglobin 9 1 (L) 11 1 - 15 9 g/dL    HCT 27 6 (L) 34 0 - 46 6 %    MCV 84 79 - 97 fL    MCH 27 7 26 6 - 33 0 pg    MCHC 33 0 31 5 - 35 7 g/dL    RDW 17 0 (H) 11 7 - 15 4 %    Platelet Count 91 (LL) 150 - 450 x10E3/uL    Neutrophils 64 Not Estab  %    Lymphocytes 19 Not Estab  %    Monocytes 14 Not Estab  %    Eosinophils 2 Not Estab  %    Basophils PCT 0 Not Estab  %    Neutrophils (Absolute) 0 6 (L) 1 4 - 7 0 x10E3/uL    Lymphocytes (Absolute) 0 2 (L) 0 7 - 3 1 x10E3/uL    Monocytes (Absolute) 0 1 0 1 - 0 9 x10E3/uL    Eosinophils (Absolute) 0 0 0 0 - 0 4 x10E3/uL    Basophils ABS 0 0 0 0 - 0 2 x10E3/uL    Immature Granulocytes 1 Not Estab  %    Immature Granulocytes (Absolute) 0 0 0 0 - 0 1 x10E3/uL    Hematology Comments Note:    Comprehensive metabolic panel    Collection Time: 08/11/22 10:58 AM   Result Value Ref Range    Glucose, Random 88 65 - 99 mg/dL    BUN 8 6 - 20 mg/dL    Creatinine 0 73 0 57 - 1 00 mg/dL    eGFR 112 >59 mL/min/1 73    SL AMB BUN/CREATININE RATIO 11 9 - 23    Sodium 140 134 - 144 mmol/L    Potassium 4 0 3 5 - 5 2 mmol/L    Chloride 102 96 - 106 mmol/L    CO2 25 20 - 29 mmol/L    CALCIUM 9 3 8 7 - 10 2 mg/dL    Protein, Total 6 7 6 0 - 8 5 g/dL    Albumin 4 1 3 8 - 4 8 g/dL    Globulin, Total 2 6 1 5 - 4 5 g/dL    Albumin/Globulin Ratio 1 6 1 2 - 2 2    TOTAL BILIRUBIN 0 3 0 0 - 1 2 mg/dL    Alk Phos Isoenzymes 72 44 - 121 IU/L    AST 18 0 - 40 IU/L    ALT 20 0 - 32 IU/L   Magnesium    Collection Time: 08/11/22 10:58 AM   Result Value Ref Range    Magnesium, Serum 1 4 (L) 1 6 - 2 3 mg/dL   CBC and differential    Collection Time: 08/15/22  9:31 AM   Result Value Ref Range    White Blood Cell Count 1 2 (LL) 3 4 - 10 8 x10E3/uL    Red Blood Cell Count 3 30 (L) 3 77 - 5 28 x10E6/uL    Hemoglobin 9 4 (L) 11 1 - 15 9 g/dL    HCT 27 6 (L) 34 0 - 46 6 %    MCV 84 79 - 97 fL    MCH 28 5 26 6 - 33 0 pg    MCHC 34 1 31 5 - 35 7 g/dL    RDW 18 1 (H) 11 7 - 15 4 %    Platelet Count 97 (LL) 150 - 450 x10E3/uL    Neutrophils 61 Not Estab  %    Lymphocytes 21 Not Estab  %    Monocytes 15 Not Estab  %    Eosinophils 1 Not Estab  %    Basophils PCT 0 Not Estab  %    Neutrophils (Absolute) 0 7 (L) 1 4 - 7 0 x10E3/uL    Lymphocytes (Absolute) 0 3 (L) 0 7 - 3 1 x10E3/uL    Monocytes (Absolute) 0 2 0 1 - 0 9 x10E3/uL    Eosinophils (Absolute) 0 0 0 0 - 0 4 x10E3/uL    Basophils ABS 0 0 0 0 - 0 2 x10E3/uL    Immature Granulocytes 2 Not Estab  %    Immature Granulocytes (Absolute) 0 0 0 0 - 0 1 x10E3/uL    Hematology Comments Note:    Comprehensive metabolic panel    Collection Time: 08/15/22  9:31 AM   Result Value Ref Range    Glucose, Random 94 65 - 99 mg/dL    BUN 14 6 - 20 mg/dL    Creatinine 0 67 0 57 - 1 00 mg/dL    eGFR 119 >59 mL/min/1 73    SL AMB BUN/CREATININE RATIO 21 9 - 23    Sodium 141 134 - 144 mmol/L    Potassium 4 3 3 5 - 5 2 mmol/L    Chloride 104 96 - 106 mmol/L    CO2 25 20 - 29 mmol/L    CALCIUM 9 5 8 7 - 10 2 mg/dL    Protein, Total 7 1 6 0 - 8 5 g/dL    Albumin 4 5 3 8 - 4 8 g/dL    Globulin, Total 2 6 1 5 - 4 5 g/dL    Albumin/Globulin Ratio 1 7 1 2 - 2 2    TOTAL BILIRUBIN 0 3 0 0 - 1 2 mg/dL    Alk Phos Isoenzymes 82 44 - 121 IU/L    AST 17 0 - 40 IU/L    ALT 20 0 - 32 IU/L   Magnesium    Collection Time: 08/15/22  9:31 AM   Result Value Ref Range    Magnesium, Serum 1 5 (L) 1 6 - 2 3 mg/dL   CBC and differential    Collection Time: 08/25/22 10:26 AM   Result Value Ref Range    White Blood Cell Count 2 0 (LL) 3 4 - 10 8 x10E3/uL    Red Blood Cell Count 3 07 (L) 3 77 - 5 28 x10E6/uL    Hemoglobin 9 0 (L) 11 1 - 15 9 g/dL    HCT 26 2 (L) 34 0 - 46 6 %    MCV 85 79 - 97 fL    MCH 29 3 26 6 - 33 0 pg    MCHC 34 4 31 5 - 35 7 g/dL    RDW 21 1 (H) 11 7 - 15 4 %    Platelet Count 657 (L) 150 - 450 x10E3/uL    Neutrophils 60 Not Estab  %    Lymphocytes 17 Not Estab  %    Monocytes 19 Not Estab  %    Eosinophils 1 Not Estab  %    Basophils PCT 1 Not Estab  %    Neutrophils (Absolute) 1 2 (L) 1 4 - 7 0 x10E3/uL    Lymphocytes (Absolute) 0 3 (L) 0 7 - 3 1 x10E3/uL    Monocytes (Absolute) 0 4 0 1 - 0 9 x10E3/uL    Eosinophils (Absolute) 0 0 0 0 - 0 4 x10E3/uL    Basophils ABS 0 0 0 0 - 0 2 x10E3/uL    Immature Granulocytes 2 Not Estab  %    Immature Granulocytes (Absolute) 0 0 0 0 - 0 1 x10E3/uL   Comprehensive metabolic panel    Collection Time: 08/25/22 10:26 AM   Result Value Ref Range    Glucose, Random 97 65 - 99 mg/dL    BUN 12 6 - 20 mg/dL    Creatinine 0 79 0 57 - 1 00 mg/dL    eGFR 102 >59 mL/min/1 73    SL AMB BUN/CREATININE RATIO 15 9 - 23    Sodium 138 134 - 144 mmol/L    Potassium 4 2 3 5 - 5 2 mmol/L    Chloride 100 96 - 106 mmol/L    CO2 24 20 - 29 mmol/L    CALCIUM 9 3 8 7 - 10 2 mg/dL    Protein, Total 7 2 6 0 - 8 5 g/dL    Albumin 4 6 3 8 - 4 8 g/dL    Globulin, Total 2 6 1 5 - 4 5 g/dL    Albumin/Globulin Ratio 1 8 1 2 - 2 2    TOTAL BILIRUBIN 0 3 0 0 - 1 2 mg/dL    Alk Phos Isoenzymes 74 44 - 121 IU/L    AST 25 0 - 40 IU/L    ALT 35 (H) 0 - 32 IU/L   Magnesium    Collection Time: 08/25/22 10:26 AM   Result Value Ref Range    Magnesium, Serum 1 8 1 6 - 2 3 mg/dL   CBC and differential    Collection Time: 09/01/22  9:25 AM   Result Value Ref Range    White Blood Cell Count 1 8 (LL) 3 4 - 10 8 x10E3/uL    Red Blood Cell Count 3 06 (L) 3 77 - 5 28 x10E6/uL    Hemoglobin 9 0 (L) 11 1 - 15 9 g/dL    HCT 26 4 (L) 34 0 - 46 6 %    MCV 86 79 - 97 fL    MCH 29 4 26 6 - 33 0 pg    MCHC 34 1 31 5 - 35 7 g/dL    RDW 22 2 (H) 11 7 - 15 4 %    Platelet Count 412 697 - 450 x10E3/uL    Neutrophils 73 Not Estab  %    Lymphocytes 17 Not Estab  %    Monocytes 10 Not Estab  %    Eosinophils 0 Not Estab  %    Basophils PCT 0 Not Estab  %    Neutrophils (Absolute) 1 3 (L) 1 4 - 7 0 x10E3/uL    Lymphocytes (Absolute) 0 3 (L) 0 7 - 3 1 x10E3/uL    Monocytes (Absolute) 0 2 0 1 - 0 9 x10E3/uL    Eosinophils (Absolute) 0 0 0 0 - 0 4 x10E3/uL    Basophils ABS 0 0 0 0 - 0 2 x10E3/uL    Hematology Comments Note:    Comprehensive metabolic panel    Collection Time: 09/01/22  9:25 AM   Result Value Ref Range    Glucose, Random 92 65 - 99 mg/dL    BUN 11 6 - 20 mg/dL    Creatinine 0 79 0 57 - 1 00 mg/dL    eGFR 102 >59 mL/min/1 73    SL AMB BUN/CREATININE RATIO 14 9 - 23    Sodium 140 134 - 144 mmol/L Potassium 4 0 3 5 - 5 2 mmol/L    Chloride 103 96 - 106 mmol/L    CO2 25 20 - 29 mmol/L    CALCIUM 9 5 8 7 - 10 2 mg/dL    Protein, Total 6 9 6 0 - 8 5 g/dL    Albumin 4 6 3 8 - 4 8 g/dL    Globulin, Total 2 3 1 5 - 4 5 g/dL    Albumin/Globulin Ratio 2 0 1 2 - 2 2    TOTAL BILIRUBIN 0 3 0 0 - 1 2 mg/dL    Alk Phos Isoenzymes 71 44 - 121 IU/L    AST 19 0 - 40 IU/L    ALT 25 0 - 32 IU/L   Magnesium    Collection Time: 09/01/22  9:25 AM   Result Value Ref Range    Magnesium, Serum 1 6 1 6 - 2 3 mg/dL       Imaging Studies:No results found  Ana Aquino MD  9/7/2022,6:37 PM    Portions of the record may have been created with voice recognition software  Occasional wrong word or "sound a like" substitutions may have occurred due to the inherent limitations of voice recognition software  Read the chart carefully and recognize, using context, where substitutions have occurred

## 2022-09-07 NOTE — TELEPHONE ENCOUNTER
[employer] called regarding the medical release on August 31 2022  Confirmation number is [id number]   If there are any questions please call back @ [phone]

## 2022-09-09 LAB
ALBUMIN SERPL-MCNC: 4.3 G/DL (ref 3.8–4.8)
ALBUMIN/GLOB SERPL: 1.7 {RATIO} (ref 1.2–2.2)
ALP SERPL-CCNC: 69 IU/L (ref 44–121)
ALT SERPL-CCNC: 21 IU/L (ref 0–32)
AST SERPL-CCNC: 16 IU/L (ref 0–40)
BASOPHILS # BLD AUTO: 0 X10E3/UL (ref 0–0.2)
BASOPHILS NFR BLD AUTO: 0 %
BILIRUB SERPL-MCNC: 0.3 MG/DL (ref 0–1.2)
BUN SERPL-MCNC: 12 MG/DL (ref 6–20)
BUN/CREAT SERPL: 14 (ref 9–23)
CALCIUM SERPL-MCNC: 9.5 MG/DL (ref 8.7–10.2)
CHLORIDE SERPL-SCNC: 104 MMOL/L (ref 96–106)
CO2 SERPL-SCNC: 24 MMOL/L (ref 20–29)
CREAT SERPL-MCNC: 0.83 MG/DL (ref 0.57–1)
EGFR: 96 ML/MIN/1.73
EOSINOPHIL # BLD AUTO: 0 X10E3/UL (ref 0–0.4)
EOSINOPHIL NFR BLD AUTO: 1 %
GLOBULIN SER-MCNC: 2.6 G/DL (ref 1.5–4.5)
GLUCOSE SERPL-MCNC: 89 MG/DL (ref 65–99)
HCT VFR BLD AUTO: 26.9 % (ref 34–46.6)
HGB BLD-MCNC: 9.3 G/DL (ref 11.1–15.9)
IMM GRANULOCYTES # BLD: 0 X10E3/UL (ref 0–0.1)
IMM GRANULOCYTES NFR BLD: 2 %
LYMPHOCYTES # BLD AUTO: 0.4 X10E3/UL (ref 0.7–3.1)
LYMPHOCYTES NFR BLD AUTO: 16 %
MAGNESIUM SERPL-MCNC: 1.7 MG/DL (ref 1.6–2.3)
MCH RBC QN AUTO: 30.3 PG (ref 26.6–33)
MCHC RBC AUTO-ENTMCNC: 34.6 G/DL (ref 31.5–35.7)
MCV RBC AUTO: 88 FL (ref 79–97)
MONOCYTES # BLD AUTO: 0.4 X10E3/UL (ref 0.1–0.9)
MONOCYTES NFR BLD AUTO: 17 %
MORPHOLOGY BLD-IMP: ABNORMAL
NEUTROPHILS # BLD AUTO: 1.5 X10E3/UL (ref 1.4–7)
NEUTROPHILS NFR BLD AUTO: 64 %
PLATELET # BLD AUTO: 182 X10E3/UL (ref 150–450)
POTASSIUM SERPL-SCNC: 4.2 MMOL/L (ref 3.5–5.2)
PROT SERPL-MCNC: 6.9 G/DL (ref 6–8.5)
RBC # BLD AUTO: 3.07 X10E6/UL (ref 3.77–5.28)
SODIUM SERPL-SCNC: 141 MMOL/L (ref 134–144)
WBC # BLD AUTO: 2.3 X10E3/UL (ref 3.4–10.8)

## 2022-09-16 LAB
ALBUMIN SERPL-MCNC: 4.2 G/DL (ref 3.8–4.8)
ALBUMIN/GLOB SERPL: 1.6 {RATIO} (ref 1.2–2.2)
ALP SERPL-CCNC: 79 IU/L (ref 44–121)
ALT SERPL-CCNC: 20 IU/L (ref 0–32)
AST SERPL-CCNC: 10 IU/L (ref 0–40)
BASOPHILS # BLD AUTO: 0 X10E3/UL (ref 0–0.2)
BASOPHILS NFR BLD AUTO: 1 %
BILIRUB SERPL-MCNC: 0.4 MG/DL (ref 0–1.2)
BUN SERPL-MCNC: 9 MG/DL (ref 6–20)
BUN/CREAT SERPL: 11 (ref 9–23)
CALCIUM SERPL-MCNC: 9.5 MG/DL (ref 8.7–10.2)
CHLORIDE SERPL-SCNC: 103 MMOL/L (ref 96–106)
CO2 SERPL-SCNC: 24 MMOL/L (ref 20–29)
CREAT SERPL-MCNC: 0.82 MG/DL (ref 0.57–1)
EGFR: 97 ML/MIN/1.73
EOSINOPHIL # BLD AUTO: 0.1 X10E3/UL (ref 0–0.4)
EOSINOPHIL NFR BLD AUTO: 3 %
GLOBULIN SER-MCNC: 2.6 G/DL (ref 1.5–4.5)
GLUCOSE SERPL-MCNC: 93 MG/DL (ref 65–99)
HCT VFR BLD AUTO: 27.4 % (ref 34–46.6)
HGB BLD-MCNC: 9.5 G/DL (ref 11.1–15.9)
IMM GRANULOCYTES # BLD: 0 X10E3/UL (ref 0–0.1)
IMM GRANULOCYTES NFR BLD: 2 %
LYMPHOCYTES # BLD AUTO: 0.3 X10E3/UL (ref 0.7–3.1)
LYMPHOCYTES NFR BLD AUTO: 14 %
MAGNESIUM SERPL-MCNC: 1.7 MG/DL (ref 1.6–2.3)
MCH RBC QN AUTO: 30.6 PG (ref 26.6–33)
MCHC RBC AUTO-ENTMCNC: 34.7 G/DL (ref 31.5–35.7)
MCV RBC AUTO: 88 FL (ref 79–97)
MONOCYTES # BLD AUTO: 0.3 X10E3/UL (ref 0.1–0.9)
MONOCYTES NFR BLD AUTO: 15 %
MORPHOLOGY BLD-IMP: ABNORMAL
NEUTROPHILS # BLD AUTO: 1.4 X10E3/UL (ref 1.4–7)
NEUTROPHILS NFR BLD AUTO: 65 %
PLATELET # BLD AUTO: 188 X10E3/UL (ref 150–450)
POTASSIUM SERPL-SCNC: 4 MMOL/L (ref 3.5–5.2)
PROT SERPL-MCNC: 6.8 G/DL (ref 6–8.5)
RBC # BLD AUTO: 3.1 X10E6/UL (ref 3.77–5.28)
SODIUM SERPL-SCNC: 140 MMOL/L (ref 134–144)
WBC # BLD AUTO: 2.1 X10E3/UL (ref 3.4–10.8)

## 2022-09-21 ENCOUNTER — PATIENT MESSAGE (OUTPATIENT)
Dept: GYNECOLOGIC ONCOLOGY | Facility: HOSPITAL | Age: 32
End: 2022-09-21

## 2022-10-03 ENCOUNTER — TELEPHONE (OUTPATIENT)
Dept: GYNECOLOGIC ONCOLOGY | Facility: CLINIC | Age: 32
End: 2022-10-03

## 2022-10-12 LAB
ALBUMIN SERPL-MCNC: 4.6 G/DL (ref 3.8–4.8)
ALBUMIN/GLOB SERPL: 1.8 {RATIO} (ref 1.2–2.2)
ALP SERPL-CCNC: 78 IU/L (ref 44–121)
ALT SERPL-CCNC: 29 IU/L (ref 0–32)
AST SERPL-CCNC: 24 IU/L (ref 0–40)
BASOPHILS # BLD AUTO: 0 X10E3/UL (ref 0–0.2)
BASOPHILS NFR BLD AUTO: 0 %
BILIRUB SERPL-MCNC: 0.3 MG/DL (ref 0–1.2)
BUN SERPL-MCNC: 7 MG/DL (ref 6–20)
BUN/CREAT SERPL: 10 (ref 9–23)
CALCIUM SERPL-MCNC: 9.5 MG/DL (ref 8.7–10.2)
CHLORIDE SERPL-SCNC: 104 MMOL/L (ref 96–106)
CO2 SERPL-SCNC: 25 MMOL/L (ref 20–29)
CREAT SERPL-MCNC: 0.73 MG/DL (ref 0.57–1)
EGFR: 112 ML/MIN/1.73
EOSINOPHIL # BLD AUTO: 0.3 X10E3/UL (ref 0–0.4)
EOSINOPHIL NFR BLD AUTO: 11 %
ERYTHROCYTE [DISTWIDTH] IN BLOOD BY AUTOMATED COUNT: 15.2 % (ref 11.7–15.4)
GLOBULIN SER-MCNC: 2.5 G/DL (ref 1.5–4.5)
GLUCOSE SERPL-MCNC: 87 MG/DL (ref 70–99)
HCT VFR BLD AUTO: 29.7 % (ref 34–46.6)
HGB BLD-MCNC: 9.8 G/DL (ref 11.1–15.9)
IMM GRANULOCYTES # BLD: 0 X10E3/UL (ref 0–0.1)
IMM GRANULOCYTES NFR BLD: 0 %
LYMPHOCYTES # BLD AUTO: 0.4 X10E3/UL (ref 0.7–3.1)
LYMPHOCYTES NFR BLD AUTO: 15 %
MAGNESIUM SERPL-MCNC: 1.6 MG/DL (ref 1.6–2.3)
MCH RBC QN AUTO: 31.9 PG (ref 26.6–33)
MCHC RBC AUTO-ENTMCNC: 33 G/DL (ref 31.5–35.7)
MCV RBC AUTO: 97 FL (ref 79–97)
MONOCYTES # BLD AUTO: 0.4 X10E3/UL (ref 0.1–0.9)
MONOCYTES NFR BLD AUTO: 15 %
NEUTROPHILS # BLD AUTO: 1.4 X10E3/UL (ref 1.4–7)
NEUTROPHILS NFR BLD AUTO: 59 %
PLATELET # BLD AUTO: 217 X10E3/UL (ref 150–450)
POTASSIUM SERPL-SCNC: 3.8 MMOL/L (ref 3.5–5.2)
PROT SERPL-MCNC: 7.1 G/DL (ref 6–8.5)
RBC # BLD AUTO: 3.07 X10E6/UL (ref 3.77–5.28)
SODIUM SERPL-SCNC: 142 MMOL/L (ref 134–144)
WBC # BLD AUTO: 2.4 X10E3/UL (ref 3.4–10.8)

## 2022-10-13 ENCOUNTER — PATIENT MESSAGE (OUTPATIENT)
Dept: GYNECOLOGIC ONCOLOGY | Facility: HOSPITAL | Age: 32
End: 2022-10-13

## 2022-11-10 ENCOUNTER — OFFICE VISIT (OUTPATIENT)
Dept: GYNECOLOGIC ONCOLOGY | Facility: HOSPITAL | Age: 32
End: 2022-11-10

## 2022-11-10 VITALS
BODY MASS INDEX: 37.92 KG/M2 | TEMPERATURE: 96.2 F | WEIGHT: 256 LBS | HEIGHT: 69 IN | SYSTOLIC BLOOD PRESSURE: 122 MMHG | DIASTOLIC BLOOD PRESSURE: 80 MMHG

## 2022-11-10 DIAGNOSIS — N94.19 DYSPAREUNIA DUE TO MEDICAL CONDITION IN FEMALE PATIENT: ICD-10-CM

## 2022-11-10 DIAGNOSIS — N95.1 MENOPAUSAL SYMPTOMS: ICD-10-CM

## 2022-11-10 DIAGNOSIS — C53.8 MALIGNANT NEOPLASM OF OVERLAPPING SITES OF CERVIX (HCC): Primary | ICD-10-CM

## 2022-11-10 RX ORDER — ESTRADIOL 0.04 MG/D
1 FILM, EXTENDED RELEASE TRANSDERMAL 2 TIMES WEEKLY
Qty: 8 PATCH | Refills: 3 | Status: SHIPPED | OUTPATIENT
Start: 2022-11-10 | End: 2022-11-15

## 2022-11-10 NOTE — ASSESSMENT & PLAN NOTE
41-year-old with stage IIIC 1 cervical cancer status post curative intent chemotherapy and radiation  I reviewed the PET-CT from 11/4/2022  She is clinically and radiologically without evidence of disease recurrence  She has dyspareunia secondary to radiation therapy, menopausal symptoms which are affecting her quality of life  Performance status is 0     1  Return in 3 months for cervical cancer surveillance  2  I discussed the risks and benefits of starting transdermal estrogen therapy to treat menopausal symptoms that are affecting her quality of life  She agrees to proceed with treatment  Given ovarian transposition, she will go for an [facility] prior to starting transdermal estrogen  3  Referral to pelvic physiotherapy for evaluation treatment of radiation induced dyspareunia

## 2022-11-10 NOTE — LETTER
November 10, 2022     DO Jessica Morgan [address]ika [address]  [facility] [place] [address]    Patient: Katalina Sanz   YOB: 1990   Date of Visit: 11/10/2022       Dear Dr Shea Cline: Thank you for referring Katalina Sanz to me for evaluation  Below are my notes for this consultation  If you have questions, please do not hesitate to call me  I look forward to following your patient along with you  Sincerely,        Yazan Heart MD        CC: DO Yzaan Clemens MD  11/10/2022  9:31 AM  Sign when Signing Visit  Assessment/Plan:    Problem List Items Addressed This Visit        Genitourinary    Malignant neoplasm of overlapping sites of cervix [facility]) - Primary     40-year-old with stage IIIC 1 cervical cancer status post curative intent chemotherapy and radiation  I reviewed the PET-CT from 11/4/2022  She is clinically and radiologically without evidence of disease recurrence  She has dyspareunia secondary to radiation therapy, menopausal symptoms which are affecting her quality of life  Performance status is 0     1  Return in 3 months for cervical cancer surveillance  2  I discussed the risks and benefits of starting transdermal estrogen therapy to treat menopausal symptoms that are affecting her quality of life  She agrees to proceed with treatment  Given ovarian transposition, she will go for an [facility] prior to starting transdermal estrogen  3  Referral to pelvic physiotherapy for evaluation treatment of radiation induced dyspareunia              Other    Dyspareunia due to medical condition in female patient    Relevant Orders    Ambulatory referral to Physical Therapy    Menopausal symptoms    Relevant Medications    estradiol (VIVELLE-DOT) 0 0375 MG/24HR    Other Relevant Orders    Follicle stimulating hormone            CHIEF COMPLAINT:  Hot flashes, dyspareunia, cervical cancer surveillance      Problem:  Cancer Staging  Malignant neoplasm of overlapping sites of cervix ([place] [address] )  Staging form: Cervix Uteri, AJCC Version 9  - Clinical: No stage assigned - Unsigned  - Pathologic: No stage assigned - Unsigned  - Pathologic: FIGO Stage IIIC1p (pT1b1, cM0) - Signed by Ti Chowdhury MD on 5/19/2022        Previous therapy:  Oncology History   Malignant neoplasm of overlapping sites of cervix ([place] [address] )   3/24/2022 Initial Diagnosis    Malignant neoplasm of overlapping sites of cervix ([place] [address] )     5/6/2022 Surgery    Exploratory laparotomy for planned radical hysterectomy, bilateral pelvic lymph node dissection, aborted radical hysterectomy, bilateral ovarian transposition due to positive lymph nodes    1  Two right pelvic lymph nodes positive     5/19/2022 -  Cancer Staged    Staging form: Cervix Uteri, AJCC Version 9  - Pathologic: FIGO Stage IIIC1p (pT1b1, cM0) - Signed by Ti Chowdhury MD on 5/19/2022  Stage confirmation method: Pathology  Pelvic grady status: Positive  Pelvic grady method of assessment: Lymph node dissection  Para-aortic status: Negative       6/20/2022 - 7/26/2022 Chemotherapy    palonosetron (ALOXI), 0 25 mg, Intravenous, Once, 6 of 6 cycles  Administration: 0 25 mg (6/20/2022), 0 25 mg (6/27/2022), 0 25 mg (7/6/2022), 0 25 mg (7/11/2022), 0 25 mg (7/19/2022), 0 25 mg (7/26/2022)  CISplatin (PLATINOL) infusion, 70 mg (original dose 40 mg/m2), Intravenous, Once, 6 of 6 cycles  Dose modification: 70 mg (original dose 40 mg/m2, Cycle 1, Reason: Other (Must fill in a comment), Comment: max 70), 70 mg (original dose 40 mg/m2, Cycle 2, Reason: Dose modified as per discussion with consulting physician)  Administration: 70 mg (6/20/2022), 70 mg (6/27/2022), 70 mg (7/6/2022), 70 mg (7/11/2022), 70 mg (7/19/2022), 70 mg (7/26/2022)  aprepitant (CINVANTI) in  mL IVPB, 130 mg, Intravenous, Once, 6 of 6 cycles  Administration: 130 mg (6/20/2022), 130 mg (6/27/2022), 130 mg (7/6/2022), 130 mg (7/11/2022), 130 mg (7/19/2022), 130 mg (7/26/2022)     6/22/2022 - 8/3/2022 Radiation    Course: C1 - EBRT  Plan ID Energy Fractions Dose per Fraction (cGy) Dose Correction (cGy) Total Dose Delivered (cGy) Elapsed Days   Whole Pelvis 10X 25 / 25 180 0 4,500 42       Course: C1 HDR Tandem and Ring Brachytherapy  Plan ID Energy Fractions Dose per Fraction (cGy) Dose Correction (cGy) Total Dose Delivered (cGy) Elapsed Days   HDR1 7-15-22  1 / 1 700 0 700 0   [id number]_4--18-22 1 / 1 700 0 700 0   VEN9_1--21-22 1 / 1 700 0 700 0   HDR4 7-25-22 1 / 1 700 0 700 0               Patient ID: Curtis Cheng is a 28 y o  female  Who returns for cervical cancer surveillance  She completed curative intent chemotherapy and radiation in August of 2022  PET-CT 3 months post treatment on 11/4/2022 did not reveal any evidence of recurrent disease  There was brown fat seen in the neck  She has dyspareunia  She is not been using dilators  She is having significant hot flashes that are affecting her quality of life  No other interval change in medications or medical history since her last visit  She does not have vaginal bleeding, hematochezia/melena, hematuria  The following portions of the patient's history were reviewed and updated as appropriate: allergies, current medications, past family history, past medical history, past social history, past surgical history and problem list     Review of Systems   Constitutional: Negative for activity change and unexpected weight change  HENT: Negative  Eyes: Negative  Respiratory: Negative  Cardiovascular: Negative  Gastrointestinal: Negative for abdominal distention and abdominal pain  Endocrine: Negative  Hot flashes   Genitourinary: Positive for dyspareunia  Negative for pelvic pain and vaginal bleeding  Musculoskeletal: Negative  Skin: Negative  Allergic/Immunologic: Negative  Neurological: Negative  Hematological: Negative  Psychiatric/Behavioral: Negative          Current Outpatient Medications   Medication Sig Dispense Refill   • estradiol (VIVELLE-DOT) 0 0375 MG/24HR Place 1 patch on the skin 2 (two) times a week 8 patch 3   • NON FORMULARY CBD gummies daily       No current facility-administered medications for this visit  Objective:    Blood pressure 122/80, temperature (!) 96 2 °F (35 7 °C), temperature source Tympanic, height 5' 9 02" (1 753 m), weight 116 kg (256 lb), not currently breastfeeding  Body mass index is 37 78 kg/m²  Body surface area is 2 29 meters squared  Physical Exam  Vitals reviewed  Exam conducted with a chaperone present  Constitutional:       General: She is not in acute distress  Appearance: Normal appearance  She is well-developed  She is obese  She is not ill-appearing, toxic-appearing or diaphoretic  HENT:      Head: Normocephalic and atraumatic  Eyes:      General: No scleral icterus  Extraocular Movements: Extraocular movements intact  Conjunctiva/sclera: Conjunctivae normal    Neck:      Thyroid: No thyromegaly  Pulmonary:      Effort: Pulmonary effort is normal    Abdominal:      General: There is no distension  Palpations: Abdomen is soft  There is no mass  Tenderness: There is no abdominal tenderness  There is no guarding or rebound  Hernia: No hernia is present  Genitourinary:     Comments: The external female genitalia is normal  The bartholin's, uretheral and skenes glands are normal  The urethral meatus is normal (midline with no lesions)  Anus without fissure or lesion  Speculum exam reveals vagina without lesion or discharge  Cervix is normal appearing without visible lesions  No bleeding  No significant cystocele or rectocele noted  Bimanual exam notes a uterus with normal contour, limited mobility  No tenderness  Adnexa without masses or tenderness  Bladder is without fullness, mass or tenderness  No parametrial disease  Musculoskeletal:         General: No swelling or tenderness  Cervical back: Normal range of motion and neck supple  Right lower leg: No edema  Left lower leg: No edema  Lymphadenopathy:      Cervical: No cervical adenopathy  Skin:     General: Skin is warm and dry  Coloration: Skin is not jaundiced or pale  Findings: No lesion or rash  Neurological:      General: No focal deficit present  Mental Status: She is alert and oriented to person, place, and time  Mental status is at baseline  Cranial Nerves: No cranial nerve deficit  Motor: No weakness  Gait: Gait normal    Psychiatric:         Mood and Affect: Mood normal          Behavior: Behavior normal          Thought Content:  Thought content normal          Judgment: Judgment normal          No results found for:   Lab Results   Component Value Date    K 3 8 10/11/2022     10/11/2022    CO2 25 10/11/2022    BUN 7 10/11/2022    CREATININE 0 73 10/11/2022    CALCIUM 8 5 05/08/2022    AST 24 10/11/2022    ALT 29 10/11/2022    EGFR 112 10/11/2022     Lab Results   Component Value Date    WBC 2 4 (LL) 10/11/2022    HGB 9 8 (L) 10/11/2022    HCT 29 7 (L) 10/11/2022    MCV 97 10/11/2022     10/11/2022     Lab Results   Component Value Date    NEUTROABS 1 4 10/11/2022        Trend:  No results found for:

## 2022-11-10 NOTE — LETTER
November 10, 2022     Patient: Nidhi Palafox  YOB: 1990  Date of Visit: 11/10/2022      To Whom it May Concern:    Nidhi Palafox is under my professional care  Luke Morocho was seen in my office on 11/10/2022  Luke Morocho may return to work on 11/11/2022       If you have any questions or concerns, please don't hesitate to call           Sincerely,          Alix Segura MD

## 2022-11-10 NOTE — PROGRESS NOTES
Assessment/Plan:    Problem List Items Addressed This Visit        Genitourinary    Malignant neoplasm of overlapping sites of cervix ([facility] ) - Primary     77-year-old with stage IIIC 1 cervical cancer status post curative intent chemotherapy and radiation  I reviewed the PET-CT from 11/4/2022  She is clinically and radiologically without evidence of disease recurrence  She has dyspareunia secondary to radiation therapy, menopausal symptoms which are affecting her quality of life  Performance status is 0     1  Return in 3 months for cervical cancer surveillance  2  I discussed the risks and benefits of starting transdermal estrogen therapy to treat menopausal symptoms that are affecting her quality of life  She agrees to proceed with treatment  Given ovarian transposition, she will go for an [facility] prior to starting transdermal estrogen  3  Referral to pelvic physiotherapy for evaluation treatment of radiation induced dyspareunia              Other    Dyspareunia due to medical condition in female patient    Relevant Orders    Ambulatory referral to Physical Therapy    Menopausal symptoms    Relevant Medications    estradiol (VIVELLE-DOT) 0 0375 MG/24HR    Other Relevant Orders    Follicle stimulating hormone            CHIEF COMPLAINT:  Hot flashes, dyspareunia, cervical cancer surveillance      Problem:  Cancer Staging  Malignant neoplasm of overlapping sites of cervix ([facility] )  Staging form: Cervix Uteri, AJCC Version 9  - Clinical: No stage assigned - Unsigned  - Pathologic: No stage assigned - Unsigned  - Pathologic: FIGO Stage IIIC1p (pT1b1, cM0) - Signed by Esther Szymanski MD on 5/19/2022        Previous therapy:  Oncology History   Malignant neoplasm of overlapping sites of cervix ([facility] )   3/24/2022 Initial Diagnosis    Malignant neoplasm of overlapping sites of cervix ([place] [address] )     5/6/2022 Surgery    Exploratory laparotomy for planned radical hysterectomy, bilateral pelvic lymph node dissection, aborted radical hysterectomy, bilateral ovarian transposition due to positive lymph nodes  1  Two right pelvic lymph nodes positive     5/19/2022 -  Cancer Staged    Staging form: Cervix Uteri, AJCC Version 9  - Pathologic: FIGO Stage IIIC1p (pT1b1, cM0) - Signed by Susana Magana MD on 5/19/2022  Stage confirmation method: Pathology  Pelvic grady status: Positive  Pelvic grady method of assessment: Lymph node dissection  Para-aortic status: Negative       6/20/2022 - 7/26/2022 Chemotherapy    palonosetron (ALOXI), 0 25 mg, Intravenous, Once, 6 of 6 cycles  Administration: 0 25 mg (6/20/2022), 0 25 mg (6/27/2022), 0 25 mg (7/6/2022), 0 25 mg (7/11/2022), 0 25 mg (7/19/2022), 0 25 mg (7/26/2022)  CISplatin (PLATINOL) infusion, 70 mg (original dose 40 mg/m2), Intravenous, Once, 6 of 6 cycles  Dose modification: 70 mg (original dose 40 mg/m2, Cycle 1, Reason: Other (Must fill in a comment), Comment: max 70), 70 mg (original dose 40 mg/m2, Cycle 2, Reason: Dose modified as per discussion with consulting physician)  Administration: 70 mg (6/20/2022), 70 mg (6/27/2022), 70 mg (7/6/2022), 70 mg (7/11/2022), 70 mg (7/19/2022), 70 mg (7/26/2022)  aprepitant (CINVANTI) in  mL IVPB, 130 mg, Intravenous, Once, 6 of 6 cycles  Administration: 130 mg (6/20/2022), 130 mg (6/27/2022), 130 mg (7/6/2022), 130 mg (7/11/2022), 130 mg (7/19/2022), 130 mg (7/26/2022)     6/22/2022 - 8/3/2022 Radiation    Course: C1 - EBRT  Plan ID Energy Fractions Dose per Fraction (cGy) Dose Correction (cGy) Total Dose Delivered (cGy) Elapsed Days   Whole Pelvis 10X 25 / 25 180 0 4,500 42       Course: C1 HDR Tandem and Ring Brachytherapy  Plan ID Energy Fractions Dose per Fraction (cGy) Dose Correction (cGy) Total Dose Delivered (cGy) Elapsed Days   HDR1 7-15-22  1 / 1 700 0 700 0   [id number]--18-22 1 / 1 700 0 700 0   TZQ9_7--21-22 1 / 1 700 0 700 0   HDR4 7-25-22 1 / 1 700 0 700 0               Patient ID: Zoe Painter is a 28 y o  female  Who returns for cervical cancer surveillance  She completed curative intent chemotherapy and radiation in August of 2022  PET-CT 3 months post treatment on 11/4/2022 did not reveal any evidence of recurrent disease  There was brown fat seen in the neck  She has dyspareunia  She is not been using dilators  She is having significant hot flashes that are affecting her quality of life  No other interval change in medications or medical history since her last visit  She does not have vaginal bleeding, hematochezia/melena, hematuria  The following portions of the patient's history were reviewed and updated as appropriate: allergies, current medications, past family history, past medical history, past social history, past surgical history and problem list     Review of Systems   Constitutional: Negative for activity change and unexpected weight change  HENT: Negative  Eyes: Negative  Respiratory: Negative  Cardiovascular: Negative  Gastrointestinal: Negative for abdominal distention and abdominal pain  Endocrine: Negative  Hot flashes   Genitourinary: Positive for dyspareunia  Negative for pelvic pain and vaginal bleeding  Musculoskeletal: Negative  Skin: Negative  Allergic/Immunologic: Negative  Neurological: Negative  Hematological: Negative  Psychiatric/Behavioral: Negative  Current Outpatient Medications   Medication Sig Dispense Refill   • estradiol (VIVELLE-DOT) 0 0375 MG/24HR Place 1 patch on the skin 2 (two) times a week 8 patch 3   • NON FORMULARY CBD gummies daily       No current facility-administered medications for this visit  Objective:    Blood pressure 122/80, temperature (!) 96 2 °F (35 7 °C), temperature source Tympanic, height 5' 9 02" (1 753 m), weight 116 kg (256 lb), not currently breastfeeding  Body mass index is 37 78 kg/m²  Body surface area is 2 29 meters squared  Physical Exam  Vitals reviewed   Exam conducted with a chaperone present  Constitutional:       General: She is not in acute distress  Appearance: Normal appearance  She is well-developed  She is obese  She is not ill-appearing, toxic-appearing or diaphoretic  HENT:      Head: Normocephalic and atraumatic  Eyes:      General: No scleral icterus  Extraocular Movements: Extraocular movements intact  Conjunctiva/sclera: Conjunctivae normal    Neck:      Thyroid: No thyromegaly  Pulmonary:      Effort: Pulmonary effort is normal    Abdominal:      General: There is no distension  Palpations: Abdomen is soft  There is no mass  Tenderness: There is no abdominal tenderness  There is no guarding or rebound  Hernia: No hernia is present  Genitourinary:     Comments: The external female genitalia is normal  The bartholin's, uretheral and skenes glands are normal  The urethral meatus is normal (midline with no lesions)  Anus without fissure or lesion  Speculum exam reveals vagina without lesion or discharge  Cervix is normal appearing without visible lesions  No bleeding  No significant cystocele or rectocele noted  Bimanual exam notes a uterus with normal contour, limited mobility  No tenderness  Adnexa without masses or tenderness  Bladder is without fullness, mass or tenderness  No parametrial disease  Musculoskeletal:         General: No swelling or tenderness  Cervical back: Normal range of motion and neck supple  Right lower leg: No edema  Left lower leg: No edema  Lymphadenopathy:      Cervical: No cervical adenopathy  Skin:     General: Skin is warm and dry  Coloration: Skin is not jaundiced or pale  Findings: No lesion or rash  Neurological:      General: No focal deficit present  Mental Status: She is alert and oriented to person, place, and time  Mental status is at baseline  Cranial Nerves: No cranial nerve deficit  Motor: No weakness        Gait: Gait normal    Psychiatric: Mood and Affect: Mood normal          Behavior: Behavior normal          Thought Content:  Thought content normal          Judgment: Judgment normal          No results found for:   Lab Results   Component Value Date    K 3 8 10/11/2022     10/11/2022    CO2 25 10/11/2022    BUN 7 10/11/2022    CREATININE 0 73 10/11/2022    CALCIUM 8 5 05/08/2022    AST 24 10/11/2022    ALT 29 10/11/2022    EGFR 112 10/11/2022     Lab Results   Component Value Date    WBC 2 4 (LL) 10/11/2022    HGB 9 8 (L) 10/11/2022    HCT 29 7 (L) 10/11/2022    MCV 97 10/11/2022     10/11/2022     Lab Results   Component Value Date    NEUTROABS 1 4 10/11/2022        Trend:  No results found for:

## 2022-11-15 DIAGNOSIS — N95.1 MENOPAUSAL SYMPTOMS: Primary | ICD-10-CM

## 2022-11-18 ENCOUNTER — RADIATION ONCOLOGY FOLLOW-UP (OUTPATIENT)
Dept: RADIATION ONCOLOGY | Facility: CLINIC | Age: 32
End: 2022-11-18
Attending: INTERNAL MEDICINE

## 2022-11-18 ENCOUNTER — CLINICAL SUPPORT (OUTPATIENT)
Dept: RADIATION ONCOLOGY | Facility: CLINIC | Age: 32
End: 2022-11-18

## 2022-11-18 VITALS
HEART RATE: 87 BPM | BODY MASS INDEX: 38.8 KG/M2 | TEMPERATURE: 96.9 F | SYSTOLIC BLOOD PRESSURE: 128 MMHG | RESPIRATION RATE: 18 BRPM | OXYGEN SATURATION: 98 % | WEIGHT: 262 LBS | HEIGHT: 69 IN | DIASTOLIC BLOOD PRESSURE: 78 MMHG

## 2022-11-18 DIAGNOSIS — C53.8 MALIGNANT NEOPLASM OF OVERLAPPING SITES OF CERVIX (HCC): Primary | ICD-10-CM

## 2022-11-18 NOTE — PROGRESS NOTES
Alex Hobson 1990 is a 28 y o  female   with FIGO IIIC1 cervical cancer s/p initial LEEP in March 2022 showing moderately differentiated SCC with extensive LVSI  She had an MRI on 4/1/22 which showed a 1 7 cm lesion in the cervix extending to the right vaginal fornix without parametrial invasion, and a single indeterminate right-sided pelvic LN (between the external and internal iliac chain measuring 1 4 x 1 1 cm in the sagittal plane series 4 image #25 and 11 mm in width series 6 image 24)  She underwent evaluation at [facility] and [facility], and was not deemed a candidate for trachelectomy  She thus was planned for radical hysterectomy  EUA revealed a 3 5cm cervix with no palpable parametrial disease  Gross grady disease was found in the right pelvic node and the hysterectomy was aborted  A staging pelvic LND and ovarian translocation to the paracolic gutters was completed  Pathology revealed 3/10 right-sided nodes (2 right pelvic and 1 right common iliac involved) and 0/5 left-sided pelvic nodes  She had a PET/CT at [facility] on 5/27/22, which showed a 2 3cm long focus of uptake in the right side of the cervix (image 244) with no other foci of abnormal uptake elsewhere  MDT consensus was for definitive concurrent chemoRT  She received 6 cycles of cisplatin concurrent with pelvic RT with brachytherapy boost on 8/3/22  Pt was seen for first follow up visit on 8 26 22  She returns for a three month follow up visit today  11 10 22  Mehnaz Wilson   Exam 11 10 22   Plan: PET 11 4 22 ([facility]) without evidence of disease recurrence  Experiencing dyspareunia secondary to radiation therapy and menopausal symptoms which are affecting her quality of life  Return in 3 mons for surveillance  Discussed starting transdermal estrogen therapy to tx menopausal sx  Pt agrees to proceed w/txmt  Given ovarian transposition, [address] to be obtained prior to starting transdermal estrogen      Referral to pelvic physiotherapy for eval of radiation induced dyspareunia  Appt  PT see schedule   2 9 23  Jenita Opitz Dr Marten Moros       Follow up visit     Oncology History   Malignant neoplasm of overlapping sites of cervix [facility])   3/24/2022 Initial Diagnosis    Malignant neoplasm of overlapping sites of cervix ([facility] [address] )     5/6/2022 Surgery    Exploratory laparotomy for planned radical hysterectomy, bilateral pelvic lymph node dissection, aborted radical hysterectomy, bilateral ovarian transposition due to positive lymph nodes    1  Two right pelvic lymph nodes positive     5/19/2022 -  Cancer Staged    Staging form: Cervix Uteri, AJCC Version 9  - Pathologic: FIGO Stage IIIC1p (pT1b1, cM0) - Signed by Uma Chadwick MD on 5/19/2022  Stage confirmation method: Pathology  Pelvic grady status: Positive  Pelvic grady method of assessment: Lymph node dissection  Para-aortic status: Negative       6/20/2022 - 7/26/2022 Chemotherapy    palonosetron (ALOXI), 0 25 mg, Intravenous, Once, 6 of 6 cycles  Administration: 0 25 mg (6/20/2022), 0 25 mg (6/27/2022), 0 25 mg (7/6/2022), 0 25 mg (7/11/2022), 0 25 mg (7/19/2022), 0 25 mg (7/26/2022)  CISplatin (PLATINOL) infusion, 70 mg (original dose 40 mg/m2), Intravenous, Once, 6 of 6 cycles  Dose modification: 70 mg (original dose 40 mg/m2, Cycle 1, Reason: Other (Must fill in a comment), Comment: max 70), 70 mg (original dose 40 mg/m2, Cycle 2, Reason: Dose modified as per discussion with consulting physician)  Administration: 70 mg (6/20/2022), 70 mg (6/27/2022), 70 mg (7/6/2022), 70 mg (7/11/2022), 70 mg (7/19/2022), 70 mg (7/26/2022)  aprepitant (CINVANTI) in  mL IVPB, 130 mg, Intravenous, Once, 6 of 6 cycles  Administration: 130 mg (6/20/2022), 130 mg (6/27/2022), 130 mg (7/6/2022), 130 mg (7/11/2022), 130 mg (7/19/2022), 130 mg (7/26/2022)     6/22/2022 - 8/3/2022 Radiation    Course: C1 - EBRT  Plan ID Energy Fractions Dose per Fraction (cGy) Dose Correction (cGy) Total Dose Delivered (cGy) Elapsed Days   Whole Pelvis 10X 25 / 25 180 0 4,500 42       Course: C1 HDR Tandem and Ring Brachytherapy  Plan ID Energy Fractions Dose per Fraction (cGy) Dose Correction (cGy) Total Dose Delivered (cGy) Elapsed Days   HDR1 7-15-22  1 / 1 700 0 700 0   QAY7_5--18-22 1 / 1 700 0 700 0   ZTA8_5--21-22 1 / 1 700 0 700 0   HDR4 7-25-22 1 / 1 700 0 700 0             Review of Systems:  Review of Systems   Constitutional: Positive for fatigue (4/10)  HENT: Negative  Eyes: Negative  Respiratory: Negative  Cardiovascular: Negative  Genitourinary: Negative  Musculoskeletal: Positive for back pain (Chronic )  Allergic/Immunologic: Negative  Neurological: Positive for numbness (Numbness Left thigh)  Hematological: Negative  Psychiatric/Behavioral: Positive for sleep disturbance (Hot flashes at night )         Clinical Trial: no    Teaching    Covid Vaccine Status    Health Maintenance   Topic Date Due   • Hepatitis C Screening  Never done   • Hepatitis B Vaccine (1 of 3 - 3-dose series) Never done   • Pneumococcal Vaccine: Pediatrics (0 to 5 Years) and At-Risk Patients (6 to 59 Years) (1 - PCV) Never done   • HIV Screening  Never done   • BMI: Followup Plan  Never done   • Annual Physical  Never done   • DTaP,Tdap,and Td Vaccines (1 - Tdap) Never done   • COVID-19 Vaccine (3 - Moderna risk series) 06/22/2021   • Influenza Vaccine (1) Never done   • BMI: Adult  11/18/2023   • Cervical Cancer Screening  11/12/2026   • HIB Vaccine  Aged Out   • IPV Vaccine  Aged Out   • Hepatitis A Vaccine  Aged Out   • Meningococcal ACWY Vaccine  Aged Out   • HPV Vaccine  Aged Out     Patient Active Problem List   Diagnosis   • Malignant neoplasm of overlapping sites of cervix ([facility] [address] )   • Family history of ovarian cancer   • Obesity (BMI 35 0-39 9 without comorbidity)   • Dysuria   • Anxiety   • Depression   • Dyspareunia due to medical condition in female patient   • Menopausal symptoms Past Medical History:   Diagnosis Date   • Anxiety    • Cervical cancer [facility])     receiving chemo and radiation   • COVID     Jan 2022   • Depression    • Diarrhea    • Dizziness    • Frequent headaches    • Obesity      Past Surgical History:   Procedure Laterality Date   • CERVICAL BIOPSY  W/ LOOP ELECTRODE EXCISION     • LYMPH NODE DISSECTION Bilateral 5/6/2022    Procedure: DISSECTION/STAGING LYMPH NODE PELVIS/ABDOMEN;  Surgeon: Vianca Dean MD;  [facility];  Service: Gynecology Oncology   • OR INSERT VAGINAL RADIATION DEVICE N/A 7/15/2022    Procedure: INSERTION NADIR SLEEVE VAGINA WITH POST OP BRACHYTHERAPY (IN RADIATION ONCOLOGY); Surgeon: Vianca Dean MD;  [facility];  Service: Gynecology Oncology   • SALPINGECTOMY Bilateral 5/6/2022    Procedure: SALPINGECTOMY, OVARIAN TRANSPOSITION;  Surgeon: Vianca Dean MD;  [facility];  Service: Gynecology Oncology   • US GUIDANCE  7/15/2022     Family History   Problem Relation Age of Onset   • Ovarian cancer Maternal Aunt    • Ovarian cancer Maternal Grandmother    • No Known Problems Mother    • Heart disease Father      Social History     Socioeconomic History   • Marital status: Single     Spouse name: Not on file   • Number of children: Not on file   • Years of education: Not on file   • Highest education level: Not on file   Occupational History   • Not on file   Tobacco Use   • Smoking status: Never   • Smokeless tobacco: Never   Vaping Use   • Vaping Use: Never used   Substance and Sexual Activity   • Alcohol use: Not Currently   • Drug use: Never     Comment: CBD Gummies/Anxiety   • Sexual activity: Not Currently   Other Topics Concern   • Not on file   Social History Narrative   • Not on file     Social Determinants of Health     Financial Resource Strain: Not on file   Food Insecurity: Not on file   Transportation Needs: No Transportation Needs   • Lack of Transportation (Medical):  No   • Lack of Transportation (Non-Medical): No   Physical Activity: Not on file   Stress: Not on file   Social Connections: Not on file   Intimate Partner Violence: Not on file   Housing Stability: Not on file       Current Outpatient Medications:   •  NON FORMULARY, CBD gummies daily, Disp: , Rfl:   •  conjugated estrogens (Premarin) 0 3 mg tablet, Take 1 tablet (0 3 mg total) by mouth daily Take daily for 21 days then do not take for 7 days   (Patient not taking: Reported on 11/18/2022), Disp: 30 tablet, Rfl: 3  No Known Allergies  Vitals:    11/18/22 1427   BP: 128/78   Pulse: 87   Resp: 18   Temp: (!) 96 9 °F (36 1 °C)   SpO2: 98%   Weight: 119 kg (262 lb)   Height: 5' 9" (1 753 m)      Pain Score: 0-No pain

## 2022-11-20 NOTE — PROGRESS NOTES
Follow-up - Radiation Oncology   Brice Lopez 1990 28 y o  female [id number]    Cancer Staging  Malignant neoplasm of overlapping sites of cervix [facility])  Staging form: Cervix Uteri, AJCC Version 9  - Clinical: No stage assigned - Unsigned  - Pathologic: No stage assigned - Unsigned  - Pathologic: FIGO Stage IIIC1p (pT1b1, cM0) - Signed by Dodie Harkins MD on 5/19/2022  Stage confirmation method: Pathology  Pelvic grady status: Positive  Pelvic grady method of assessment: Lymph node dissection  Para-aortic status: Negative    Assessment/Plan:  Brice Lopez is a 32 y o  female with FIGO IIIC1 cervical cancer s/p initial LEEP in March 2022 showing moderately differentiated SCC with extensive LVSI  She had an MRI on 4/1/22 which showed a 1 7 cm lesion in the cervix extending to the right vaginal fornix without parametrial invasion, and a single indeterminate right-sided pelvic LN (between the external and internal iliac chain measuring 1 4 x 1 1 cm in the sagittal plane series 4 image #25 and 11 mm in width series 6 image 24)  She underwent evaluation at [facility], and was not deemed a candidate for trachelectomy  She thus was planned for radical hysterectomy  EUA revealed a 3 5cm cervix with no palpable parametrial disease  Gross grady disease was found in the right pelvic node and the hysterectomy was aborted  A staging pelvic LND and ovarian translocation to the paracolic gutters was completed  Pathology revealed 3/10 right-sided nodes (2 right pelvic and 1 right common iliac involved) and 0/5 left-sided pelvic nodes  She had a PET/CT at [facility] on 5/27/22, which showed a 2 3cm long focus of uptake in the right side of the cervix (image 244) with no other foci of abnormal uptake elsewhere  MDT consensus was for definitive concurrent chemoRT  She received 6 cycles of cisplatin concurrent with pelvic RT with brachytherapy boost on 8/3/22      She presents for 3 month follow-up visit  She has largely recovered from expected acute toxicities of RT  She notes some dyspareunia  She had limited effect with an estrogen patch, was advised by nursing to try having it on for longer  She was also prescribed premarin pills but is reluctant to use these  Her PET/CT at [facility] was reassuring, no evidence of FDG avid disease and resolution of prior cervical activity  Her exam today is unremarkable  We reviewed vaginal dilator use if not sexually active  She is on physical therapy  -Continue surveillance  PT as scheduled  2 9 23  Gyn Onc  Dr Freeman Burr   RTC 3 months after GynOncology      No orders of the defined types were placed in this encounter  History of Present Illness   Interval History:  Pt was seen for first follow up visit on 8 26 22  She returns for a three month follow up visit today       11 10 22  Burak Burr   Exam 18 58 18   Plan: PET 11 4 22 ([facility]) without evidence of disease recurrence  Experiencing dyspareunia secondary to radiation therapy and menopausal symptoms which are affecting her quality of life  Return in 3 mons for surveillance  Discussed starting transdermal estrogen therapy to tx menopausal sx  Pt agrees to proceed w/txmt  Given ovarian transposition, [facility] to be obtained prior to starting transdermal estrogen  Referral to pelvic physiotherapy for eval of radiation induced dyspareunia       She notes vaginal dryness and dyspareunia  She is working  She has occasional urinary frequency with occasional stress incontinence  Historical Information   Oncology History   Malignant neoplasm of overlapping sites of cervix ([address] )   3/24/2022 Initial Diagnosis    Malignant neoplasm of overlapping sites of cervix ([address] )     5/6/2022 Surgery    Exploratory laparotomy for planned radical hysterectomy, bilateral pelvic lymph node dissection, aborted radical hysterectomy, bilateral ovarian transposition due to positive lymph nodes    1  Two right pelvic lymph nodes positive     5/19/2022 -  Cancer Staged    Staging form: Cervix Uteri, AJCC Version 9  - Pathologic: FIGO Stage IIIC1p (pT1b1, cM0) - Signed by Kamryn Alexandre MD on 5/19/2022  Stage confirmation method: Pathology  Pelvic grady status: Positive  Pelvic grady method of assessment: Lymph node dissection  Para-aortic status: Negative       6/20/2022 - 7/26/2022 Chemotherapy    palonosetron (ALOXI), 0 25 mg, Intravenous, Once, 6 of 6 cycles  Administration: 0 25 mg (6/20/2022), 0 25 mg (6/27/2022), 0 25 mg (7/6/2022), 0 25 mg (7/11/2022), 0 25 mg (7/19/2022), 0 25 mg (7/26/2022)  CISplatin (PLATINOL) infusion, 70 mg (original dose 40 mg/m2), Intravenous, Once, 6 of 6 cycles  Dose modification: 70 mg (original dose 40 mg/m2, Cycle 1, Reason: Other (Must fill in a comment), Comment: max 70), 70 mg (original dose 40 mg/m2, Cycle 2, Reason: Dose modified as per discussion with consulting physician)  Administration: 70 mg (6/20/2022), 70 mg (6/27/2022), 70 mg (7/6/2022), 70 mg (7/11/2022), 70 mg (7/19/2022), 70 mg (7/26/2022)  aprepitant (CINVANTI) in  mL IVPB, 130 mg, Intravenous, Once, 6 of 6 cycles  Administration: 130 mg (6/20/2022), 130 mg (6/27/2022), 130 mg (7/6/2022), 130 mg (7/11/2022), 130 mg (7/19/2022), 130 mg (7/26/2022)     6/22/2022 - 8/3/2022 Radiation    Course: C1 - EBRT  Plan ID Energy Fractions Dose per Fraction (cGy) Dose Correction (cGy) Total Dose Delivered (cGy) Elapsed Days   Whole Pelvis 10X 25 / 25 180 0 4,500 42       Course: C1 HDR Tandem and Ring Brachytherapy  Plan ID Energy Fractions Dose per Fraction (cGy) Dose Correction (cGy) Total Dose Delivered (cGy) Elapsed Days   HDR1 7-15-22 1 / 1 700 0 700 0   JMG0_5--18-22 1 / 1 700 0 700 0   AWE5_2--21-22 1 / 1 700 0 700 0   HDR4 7-25-22 1 / 1 700 0 700 0             Past Medical History:   Diagnosis Date   • Anxiety    • Cervical cancer ([facility] [address] )     receiving chemo and radiation   • COVID     Jan 2022   • Depression    • Diarrhea    • Dizziness    • Frequent headaches    • Obesity      Past Surgical History:   Procedure Laterality Date   • CERVICAL BIOPSY  W/ LOOP ELECTRODE EXCISION     • LYMPH NODE DISSECTION Bilateral 5/6/2022    Procedure: DISSECTION/STAGING LYMPH NODE PELVIS/ABDOMEN;  Surgeon: Hao Del Castillo MD;  [facility];  Service: Gynecology Oncology   • DE INSERT VAGINAL RADIATION DEVICE N/A 7/15/2022    Procedure: INSERTION NADIR SLEEVE VAGINA WITH POST OP BRACHYTHERAPY (IN RADIATION ONCOLOGY); Surgeon: Hao Del Castillo MD;  [facility];  Service: Gynecology Oncology   • SALPINGECTOMY Bilateral 5/6/2022    Procedure: SALPINGECTOMY, OVARIAN TRANSPOSITION;  Surgeon: Hao Del Castillo MD;  [facility];  Service: Gynecology Oncology   • US GUIDANCE  7/15/2022       Social History   Social History     Substance and Sexual Activity   Alcohol Use Not Currently     Social History     Substance and Sexual Activity   Drug Use Never    Comment: CBD Gummies/Anxiety     Social History     Tobacco Use   Smoking Status Never   Smokeless Tobacco Never         Meds/Allergies     Current Outpatient Medications:   •  NON FORMULARY, CBD gummies daily, Disp: , Rfl:   •  conjugated estrogens (Premarin) 0 3 mg tablet, Take 1 tablet (0 3 mg total) by mouth daily Take daily for 21 days then do not take for 7 days  (Patient not taking: Reported on 11/18/2022), Disp: 30 tablet, Rfl: 3  No Known Allergies      Review of Systems   Constitutional: Positive for chills and fatigue (5/10)  HENT: Negative  Eyes: Negative  Respiratory: Negative  Cardiovascular: Negative  Gastrointestinal: Positive for diarrhea (Occasional ) and nausea  Endocrine: Positive for heat intolerance (Hot flashes ? ?)  Genitourinary: Positive for frequency  Urinary incontinence noted   Musculoskeletal: Negative  Back pain: Chronic    Skin: Negative  Allergic/Immunologic: Negative      Neurological: Positive for dizziness, light-headedness, numbness (Numbness Left thigh) and headaches (Daily )  Hematological: Negative  Psychiatric/Behavioral: Positive for sleep disturbance (Frequent waking periods )  OBJECTIVE:   /78   Pulse 87   Temp (!) 96 9 °F (36 1 °C)   Resp 18   Ht 5' 9" (1 753 m)   Wt 119 kg (262 lb)   SpO2 98%   BMI 38 69 kg/m²   Pain Assessment:  0  ECOG/Zubrod/WHO: 1 - Symptomatic but completely ambulatory    Physical Exam   General Appearance:  Alert, cooperative, no distress, appears stated age  Cardiovascular:  Extremities warm and well perfused  Lungs: Respirations unlabored, no cyanosis, able to speak in complete sentences without dyspnea  Abdomen: Non-distended  Gynecologic: Cervix and vagina well healed  Mild RT induration  No payal masses appreciated  No discharge or bleeding appreciated  No pain on examination  Exam chaperoned by Flavio Sheldon RN  Skin: No generalized rash or dermatitis  Neurologic: ANOx3, speech and cognition intact  RESULTS    Lab Results:   No results found for this or any previous visit (from the past 672 hour(s))  Imaging Studies:No results found  Jessie Carpenter  11/20/2022,10:08 AM    Portions of the record may have been created with voice recognition software  Occasional wrong word or "sound a like" substitutions may have occurred due to the inherent limitations of voice recognition software  Read the chart carefully and recognize, using context, where substitutions have occurred

## 2022-12-13 ENCOUNTER — TELEPHONE (OUTPATIENT)
Dept: RADIATION ONCOLOGY | Facility: HOSPITAL | Age: 32
End: 2022-12-13

## 2022-12-13 DIAGNOSIS — N95.1 MENOPAUSAL SYMPTOMS: Primary | ICD-10-CM

## 2022-12-13 RX ORDER — ESTRADIOL 0.04 MG/D
1 FILM, EXTENDED RELEASE TRANSDERMAL 2 TIMES WEEKLY
Qty: 8 PATCH | Refills: 3 | Status: SHIPPED | OUTPATIENT
Start: 2022-12-15

## 2022-12-13 NOTE — TELEPHONE ENCOUNTER
0031-Return call to pt  Pt is S/P HDR EOT 8 3 22/Cervical Cancer  Pt noting three episodes of "rectal bleeding/itching and burning" in early December  Pt notes "bleeding is not w/wiping, but noted post BM"  "Wore a pad on three occasions"  Pt was not soaking a pad w/blood at any time  Pt notes no further bleeding post early December episodes  Education offered-may see rectal bleeding up to six months post RT tx  Suggested-baby wipes and sitz baths to area  Pt will call w/any further incidents of bleeding or concerns  Pt verbalizes understanding information above and in agreement w/the plan

## 2022-12-14 ENCOUNTER — TELEPHONE (OUTPATIENT)
Dept: GYNECOLOGIC ONCOLOGY | Facility: CLINIC | Age: 32
End: 2022-12-14

## 2022-12-14 NOTE — TELEPHONE ENCOUNTER
Left message that the letter in her chart and she can get it on MyChart  Also, stated that if she wants it received any other way she can give us a call back

## 2022-12-28 ENCOUNTER — PATIENT OUTREACH (OUTPATIENT)
Dept: CASE MANAGEMENT | Facility: OTHER | Age: 32
End: 2022-12-28

## 2022-12-28 NOTE — PROGRESS NOTES
OSW performed chart review  Pt has completed her radiation and per chart review is doing well  OSW will close the referral at this time  Pt was encouraged to reach out to this writer back in August with any needs  She does have this writer's contact information if any needs arise

## 2022-12-29 ENCOUNTER — TELEPHONE (OUTPATIENT)
Dept: RADIATION ONCOLOGY | Facility: HOSPITAL | Age: 32
End: 2022-12-29

## 2022-12-29 NOTE — TELEPHONE ENCOUNTER
1422-Return call to pt  Post physician review  Per pt earlier message-advised to follow up w/PCP concerning urinary symptoms  Pt verbalizes understanding information above

## 2023-01-01 ENCOUNTER — APPOINTMENT (EMERGENCY)
Dept: CT IMAGING | Facility: HOSPITAL | Age: 33
End: 2023-01-01

## 2023-01-01 ENCOUNTER — HOSPITAL ENCOUNTER (INPATIENT)
Facility: HOSPITAL | Age: 33
LOS: 1 days | Discharge: LEFT BEFORE DISCHARGE | End: 2023-01-02
Attending: EMERGENCY MEDICINE | Admitting: INTERNAL MEDICINE

## 2023-01-01 DIAGNOSIS — K52.9 COLITIS: ICD-10-CM

## 2023-01-01 DIAGNOSIS — R10.30 LOWER ABDOMINAL PAIN: Primary | ICD-10-CM

## 2023-01-01 DIAGNOSIS — K62.89 PROCTITIS: ICD-10-CM

## 2023-01-01 LAB
ALBUMIN SERPL BCP-MCNC: 3.7 G/DL (ref 3.5–5)
ALP SERPL-CCNC: 84 U/L (ref 46–116)
ALT SERPL W P-5'-P-CCNC: 20 U/L (ref 12–78)
AMORPH URATE CRY URNS QL MICRO: ABNORMAL
ANION GAP SERPL CALCULATED.3IONS-SCNC: 7 MMOL/L (ref 4–13)
AST SERPL W P-5'-P-CCNC: 15 U/L (ref 5–45)
BACTERIA UR QL AUTO: ABNORMAL /HPF
BASOPHILS # BLD AUTO: 0.01 THOUSANDS/ÂΜL (ref 0–0.1)
BASOPHILS NFR BLD AUTO: 0 % (ref 0–1)
BILIRUB SERPL-MCNC: 0.3 MG/DL (ref 0.2–1)
BILIRUB UR QL STRIP: NEGATIVE
BUN SERPL-MCNC: 15 MG/DL (ref 5–25)
CALCIUM SERPL-MCNC: 8.9 MG/DL (ref 8.3–10.1)
CHLORIDE SERPL-SCNC: 102 MMOL/L (ref 96–108)
CLARITY UR: ABNORMAL
CO2 SERPL-SCNC: 31 MMOL/L (ref 21–32)
COLOR UR: YELLOW
CREAT SERPL-MCNC: 0.8 MG/DL (ref 0.6–1.3)
EOSINOPHIL # BLD AUTO: 0.13 THOUSAND/ÂΜL (ref 0–0.61)
EOSINOPHIL NFR BLD AUTO: 3 % (ref 0–6)
ERYTHROCYTE [DISTWIDTH] IN BLOOD BY AUTOMATED COUNT: 11.9 % (ref 11.6–15.1)
GFR SERPL CREATININE-BSD FRML MDRD: 97 ML/MIN/1.73SQ M
GLUCOSE SERPL-MCNC: 103 MG/DL (ref 65–140)
GLUCOSE UR STRIP-MCNC: NEGATIVE MG/DL
HCT VFR BLD AUTO: 37.2 % (ref 34.8–46.1)
HGB BLD-MCNC: 12.5 G/DL (ref 11.5–15.4)
HGB UR QL STRIP.AUTO: NEGATIVE
IMM GRANULOCYTES # BLD AUTO: 0.03 THOUSAND/UL (ref 0–0.2)
IMM GRANULOCYTES NFR BLD AUTO: 1 % (ref 0–2)
KETONES UR STRIP-MCNC: NEGATIVE MG/DL
LEUKOCYTE ESTERASE UR QL STRIP: ABNORMAL
LIPASE SERPL-CCNC: 79 U/L (ref 73–393)
LYMPHOCYTES # BLD AUTO: 0.72 THOUSANDS/ÂΜL (ref 0.6–4.47)
LYMPHOCYTES NFR BLD AUTO: 16 % (ref 14–44)
MCH RBC QN AUTO: 29.7 PG (ref 26.8–34.3)
MCHC RBC AUTO-ENTMCNC: 33.6 G/DL (ref 31.4–37.4)
MCV RBC AUTO: 88 FL (ref 82–98)
MONOCYTES # BLD AUTO: 0.48 THOUSAND/ÂΜL (ref 0.17–1.22)
MONOCYTES NFR BLD AUTO: 11 % (ref 4–12)
MUCOUS THREADS UR QL AUTO: ABNORMAL
NEUTROPHILS # BLD AUTO: 3.05 THOUSANDS/ÂΜL (ref 1.85–7.62)
NEUTS SEG NFR BLD AUTO: 69 % (ref 43–75)
NITRITE UR QL STRIP: NEGATIVE
NON-SQ EPI CELLS URNS QL MICRO: ABNORMAL /HPF
NRBC BLD AUTO-RTO: 0 /100 WBCS
PH UR STRIP.AUTO: 8.5 [PH]
PLATELET # BLD AUTO: 252 THOUSANDS/UL (ref 149–390)
PMV BLD AUTO: 7.8 FL (ref 8.9–12.7)
POTASSIUM SERPL-SCNC: 3.8 MMOL/L (ref 3.5–5.3)
PROT SERPL-MCNC: 8 G/DL (ref 6.4–8.4)
PROT UR STRIP-MCNC: ABNORMAL MG/DL
RBC # BLD AUTO: 4.21 MILLION/UL (ref 3.81–5.12)
RBC #/AREA URNS AUTO: ABNORMAL /HPF
SODIUM SERPL-SCNC: 140 MMOL/L (ref 135–147)
SP GR UR STRIP.AUTO: 1.01 (ref 1–1.03)
UROBILINOGEN UR STRIP-ACNC: <2 MG/DL
WBC # BLD AUTO: 4.42 THOUSAND/UL (ref 4.31–10.16)
WBC #/AREA URNS AUTO: ABNORMAL /HPF

## 2023-01-01 RX ORDER — KETOROLAC TROMETHAMINE 30 MG/ML
15 INJECTION, SOLUTION INTRAMUSCULAR; INTRAVENOUS ONCE
Status: COMPLETED | OUTPATIENT
Start: 2023-01-01 | End: 2023-01-01

## 2023-01-01 RX ADMIN — SODIUM CHLORIDE 1000 ML: 0.9 INJECTION, SOLUTION INTRAVENOUS at 23:15

## 2023-01-01 RX ADMIN — IOHEXOL 100 ML: 350 INJECTION, SOLUTION INTRAVENOUS at 22:28

## 2023-01-01 RX ADMIN — SODIUM CHLORIDE 1000 ML: 0.9 INJECTION, SOLUTION INTRAVENOUS at 22:04

## 2023-01-01 RX ADMIN — KETOROLAC TROMETHAMINE 15 MG: 30 INJECTION, SOLUTION INTRAMUSCULAR; INTRAVENOUS at 22:06

## 2023-01-02 VITALS
HEART RATE: 84 BPM | DIASTOLIC BLOOD PRESSURE: 92 MMHG | RESPIRATION RATE: 16 BRPM | SYSTOLIC BLOOD PRESSURE: 142 MMHG | TEMPERATURE: 98.2 F | OXYGEN SATURATION: 98 %

## 2023-01-02 PROBLEM — R10.9 ABDOMINAL PAIN: Status: ACTIVE | Noted: 2023-01-02

## 2023-01-02 LAB
ALBUMIN SERPL BCP-MCNC: 2.7 G/DL (ref 3.5–5)
ALP SERPL-CCNC: 64 U/L (ref 46–116)
ALT SERPL W P-5'-P-CCNC: 15 U/L (ref 12–78)
ANION GAP SERPL CALCULATED.3IONS-SCNC: 5 MMOL/L (ref 4–13)
APTT PPP: 31 SECONDS (ref 23–37)
AST SERPL W P-5'-P-CCNC: 8 U/L (ref 5–45)
BASOPHILS # BLD AUTO: 0 THOUSANDS/ÂΜL (ref 0–0.1)
BASOPHILS NFR BLD AUTO: 0 % (ref 0–1)
BILIRUB SERPL-MCNC: 0.3 MG/DL (ref 0.2–1)
BUN SERPL-MCNC: 15 MG/DL (ref 5–25)
CALCIUM ALBUM COR SERPL-MCNC: 9 MG/DL (ref 8.3–10.1)
CALCIUM SERPL-MCNC: 8 MG/DL (ref 8.3–10.1)
CHLORIDE SERPL-SCNC: 110 MMOL/L (ref 96–108)
CK SERPL-CCNC: 27 U/L (ref 26–192)
CO2 SERPL-SCNC: 25 MMOL/L (ref 21–32)
CREAT SERPL-MCNC: 0.67 MG/DL (ref 0.6–1.3)
CRP SERPL QL: 36.2 MG/L
EOSINOPHIL # BLD AUTO: 0.15 THOUSAND/ÂΜL (ref 0–0.61)
EOSINOPHIL NFR BLD AUTO: 4 % (ref 0–6)
ERYTHROCYTE [DISTWIDTH] IN BLOOD BY AUTOMATED COUNT: 11.9 % (ref 11.6–15.1)
ERYTHROCYTE [SEDIMENTATION RATE] IN BLOOD: 28 MM/HOUR (ref 0–19)
GFR SERPL CREATININE-BSD FRML MDRD: 116 ML/MIN/1.73SQ M
GLUCOSE SERPL-MCNC: 87 MG/DL (ref 65–140)
HCT VFR BLD AUTO: 29.6 % (ref 34.8–46.1)
HGB BLD-MCNC: 9.8 G/DL (ref 11.5–15.4)
IMM GRANULOCYTES # BLD AUTO: 0.02 THOUSAND/UL (ref 0–0.2)
IMM GRANULOCYTES NFR BLD AUTO: 1 % (ref 0–2)
INR PPP: 1 (ref 0.84–1.19)
LACTATE SERPL-SCNC: 0.3 MMOL/L (ref 0.5–2)
LYMPHOCYTES # BLD AUTO: 0.72 THOUSANDS/ÂΜL (ref 0.6–4.47)
LYMPHOCYTES NFR BLD AUTO: 20 % (ref 14–44)
MAGNESIUM SERPL-MCNC: 1.8 MG/DL (ref 1.6–2.6)
MCH RBC QN AUTO: 29.6 PG (ref 26.8–34.3)
MCHC RBC AUTO-ENTMCNC: 33.1 G/DL (ref 31.4–37.4)
MCV RBC AUTO: 89 FL (ref 82–98)
MONOCYTES # BLD AUTO: 0.52 THOUSAND/ÂΜL (ref 0.17–1.22)
MONOCYTES NFR BLD AUTO: 14 % (ref 4–12)
NEUTROPHILS # BLD AUTO: 2.22 THOUSANDS/ÂΜL (ref 1.85–7.62)
NEUTS SEG NFR BLD AUTO: 61 % (ref 43–75)
NRBC BLD AUTO-RTO: 0 /100 WBCS
PHOSPHATE SERPL-MCNC: 3.5 MG/DL (ref 2.7–4.5)
PLATELET # BLD AUTO: 175 THOUSANDS/UL (ref 149–390)
PMV BLD AUTO: 7.8 FL (ref 8.9–12.7)
POTASSIUM SERPL-SCNC: 3.7 MMOL/L (ref 3.5–5.3)
PROT SERPL-MCNC: 6.1 G/DL (ref 6.4–8.4)
PROTHROMBIN TIME: 14 SECONDS (ref 11.6–14.5)
RBC # BLD AUTO: 3.31 MILLION/UL (ref 3.81–5.12)
SODIUM SERPL-SCNC: 140 MMOL/L (ref 135–147)
WBC # BLD AUTO: 3.63 THOUSAND/UL (ref 4.31–10.16)

## 2023-01-02 RX ORDER — SODIUM CHLORIDE 9 MG/ML
125 INJECTION, SOLUTION INTRAVENOUS CONTINUOUS
Status: DISCONTINUED | OUTPATIENT
Start: 2023-01-02 | End: 2023-01-02 | Stop reason: HOSPADM

## 2023-01-02 RX ORDER — SIMETHICONE 80 MG
80 TABLET,CHEWABLE ORAL EVERY 6 HOURS PRN
Status: DISCONTINUED | OUTPATIENT
Start: 2023-01-02 | End: 2023-01-02 | Stop reason: HOSPADM

## 2023-01-02 RX ORDER — POLYETHYLENE GLYCOL 3350 17 G/17G
17 POWDER, FOR SOLUTION ORAL DAILY
Qty: 119 G | Refills: 0 | Status: SHIPPED | OUTPATIENT
Start: 2023-01-03 | End: 2023-01-10

## 2023-01-02 RX ORDER — ACETAMINOPHEN 325 MG/1
650 TABLET ORAL EVERY 6 HOURS PRN
Status: DISCONTINUED | OUTPATIENT
Start: 2023-01-02 | End: 2023-01-02 | Stop reason: HOSPADM

## 2023-01-02 RX ORDER — DOCUSATE SODIUM 100 MG/1
100 CAPSULE, LIQUID FILLED ORAL 2 TIMES DAILY
Qty: 20 CAPSULE | Refills: 0 | Status: SHIPPED | OUTPATIENT
Start: 2023-01-02 | End: 2023-01-12

## 2023-01-02 RX ORDER — POLYETHYLENE GLYCOL 3350 17 G/17G
17 POWDER, FOR SOLUTION ORAL DAILY
Status: DISCONTINUED | OUTPATIENT
Start: 2023-01-02 | End: 2023-01-02 | Stop reason: HOSPADM

## 2023-01-02 RX ORDER — KETOROLAC TROMETHAMINE 30 MG/ML
15 INJECTION, SOLUTION INTRAMUSCULAR; INTRAVENOUS EVERY 6 HOURS PRN
Status: DISCONTINUED | OUTPATIENT
Start: 2023-01-02 | End: 2023-01-02 | Stop reason: HOSPADM

## 2023-01-02 RX ORDER — ONDANSETRON 2 MG/ML
4 INJECTION INTRAMUSCULAR; INTRAVENOUS EVERY 4 HOURS PRN
Status: DISCONTINUED | OUTPATIENT
Start: 2023-01-02 | End: 2023-01-02 | Stop reason: HOSPADM

## 2023-01-02 RX ORDER — DOCUSATE SODIUM 100 MG/1
100 CAPSULE, LIQUID FILLED ORAL 2 TIMES DAILY
Status: DISCONTINUED | OUTPATIENT
Start: 2023-01-02 | End: 2023-01-02 | Stop reason: HOSPADM

## 2023-01-02 RX ADMIN — SODIUM CHLORIDE 125 ML/HR: 0.9 INJECTION, SOLUTION INTRAVENOUS at 02:00

## 2023-01-02 RX ADMIN — KETOROLAC TROMETHAMINE 15 MG: 30 INJECTION, SOLUTION INTRAMUSCULAR; INTRAVENOUS at 11:19

## 2023-01-02 RX ADMIN — SODIUM CHLORIDE 125 ML/HR: 0.9 INJECTION, SOLUTION INTRAVENOUS at 08:11

## 2023-01-02 RX ADMIN — POLYETHYLENE GLYCOL 3350 17 G: 17 POWDER, FOR SOLUTION ORAL at 11:19

## 2023-01-02 RX ADMIN — DOCUSATE SODIUM 100 MG: 100 CAPSULE, LIQUID FILLED ORAL at 11:19

## 2023-01-02 NOTE — H&P
[facility] 1990, 28 y o  female MRN: [id number]  Unit/Bed#: Z2 H2 Encounter: [id number]  Primary Care Provider: Wenceslao Alvarado DO   Date and time admitted to hospital: 1/1/2023  9:25 PM    * Abdominal pain  Assessment & Plan  · Presented due to worsening lower abdominal pain x 5-6 days  Chronic mild abdominal pain due to history of cervical cancer s/p complete radical hysterectomy  · CT abdomen pelvis  · Severe thickening of the sigmoid colon and rectal wall which may represent colitis/proctitis (infection versus inflammatory cannot rule out ischemia)  Follow-up with gastroenterology is recommended  · Appendix measuring up to 9 mm with a appendicolith  Findings are equivocal for acute appendicitis  · Patient seen by surgical PA in the ER  Minimal RLQ pain  Do not feel patient has acute appendicitis at this time  · Recommended GI evaluation and holding off on antibiotics at this time  · NPO/IVF  · Manage pain  · Consult GI    Obesity (BMI 35 0-39 9 without comorbidity)  Assessment & Plan  · BMI 38 69  · Encourage lifestyle changes    Malignant neoplasm of overlapping sites of cervix [facility])  Assessment & Plan  · History of stage IIIc cervical cancer with positive lymph node biopsy  · S/p chemo/radiation therapy and hysterectomy  · Follows with gynecology oncology outpatient    VTE Pharmacologic Prophylaxis: VTE Score: 2 Low Risk (Score 0-2) - Encourage Ambulation  Code Status: Level 1 - Full Code   Discussion with family: Updated  (significant other) at bedside  Anticipated Length of Stay: Patient will be admitted on an inpatient basis with an anticipated length of stay of greater than 2 midnights secondary to Abdominal pain  Total Time for Visit, including Counseling / Coordination of Care: 60 minutes Greater than 50% of this total time spent on direct patient counseling and coordination of care      Chief Complaint: Abdominal pain     History of Present Illness:  John Montana is a 28 y o  female with a PMH of stage 3 cervical cancer, s/p hysterectomy, obesity who presents from home due to worsening lower abdominal pain over the past 5-6 days  Reports ongoing difficulty with abdominal pain related to cancer and hysterectomy but this pain is worse  Denies nausea or vomiting  Has not had a bowel movement in about 5 days which is abnormal for her  Reports normal appetite  Denies fevers or chills  Review of Systems:  Review of Systems   Constitutional: Negative for fatigue and fever  HENT: Negative for sore throat  Respiratory: Negative for cough, chest tightness and shortness of breath  Cardiovascular: Negative for chest pain  Gastrointestinal: Positive for abdominal pain and constipation  Negative for abdominal distention, diarrhea, nausea and vomiting  Genitourinary: Negative for difficulty urinating  Musculoskeletal: Negative for arthralgias  Neurological: Negative for weakness and headaches  Psychiatric/Behavioral: Negative for agitation and behavioral problems  All other systems reviewed and are negative  Past Medical and Surgical History:   Past Medical History:   Diagnosis Date   • Anxiety    • Cervical cancer [facility])     receiving chemo and radiation   • COVID     Jan 2022   • Depression    • Diarrhea    • Dizziness    • Frequent headaches    • Obesity        Past Surgical History:   Procedure Laterality Date   • CERVICAL BIOPSY  W/ LOOP ELECTRODE EXCISION     • LYMPH NODE DISSECTION Bilateral 5/6/2022    Procedure: DISSECTION/STAGING LYMPH NODE PELVIS/ABDOMEN;  Surgeon: Humera Lucero MD;  Location: [facility];  Service: Gynecology Oncology   • DC INSERTION VAGINAL RADIATION DEVICE N/A 7/15/2022    Procedure: INSERTION NADIR SLEEVE VAGINA WITH POST OP BRACHYTHERAPY (IN RADIATION ONCOLOGY);   Surgeon: Humera Lucero MD;  Location: [facility];  Service: Gynecology Oncology   • SALPINGECTOMY Bilateral 5/6/2022    Procedure: SALPINGECTOMY, OVARIAN TRANSPOSITION;  Surgeon: Sage Nava MD;  Location: [facility];  Service: Gynecology Oncology   • US GUIDANCE  7/15/2022       Meds/Allergies:  Prior to Admission medications    Medication Sig Start Date End Date Taking? Authorizing Provider   estradiol (VIVELLE-DOT) 0 0375 MG/24HR Place 1 patch on the skin 2 (two) times a week 12/15/22  Yes Sage Nava MD   NON FORMULARY CBD gummies daily   Yes Historical Provider, MD     I have reviewed home medications with patient personally  Allergies: No Known Allergies    Social History:  Marital Status: Single   Occupation: unknown   Patient Pre-hospital Living Situation: Home  Patient Pre-hospital Level of Mobility: walks  Patient Pre-hospital Diet Restrictions: regular   Substance Use History:   Social History     Substance and Sexual Activity   Alcohol Use Not Currently     Social History     Tobacco Use   Smoking Status Never   Smokeless Tobacco Never     Social History     Substance and Sexual Activity   Drug Use Never    Comment: CBD Gummies/Anxiety       Family History:  Family History   Problem Relation Age of Onset   • Ovarian cancer Maternal Aunt    • Ovarian cancer Maternal Grandmother    • No Known Problems Mother    • Heart disease Father        Physical Exam:     Vitals:   Blood Pressure: 120/70 (01/02/23 0100)  Pulse: 91 (01/02/23 0100)  Temperature: 99 2 °F (37 3 °C) (01/01/23 1706)  Respirations: 17 (01/02/23 0100)  SpO2: 97 % (01/02/23 0100)    Physical Exam  Vitals and nursing note reviewed  Constitutional:       Appearance: Normal appearance  She is obese  HENT:      Head: Normocephalic  Eyes:      Extraocular Movements: Extraocular movements intact  Pupils: Pupils are equal, round, and reactive to light  Cardiovascular:      Rate and Rhythm: Normal rate and regular rhythm  Heart sounds: No murmur heard    Pulmonary:      Effort: Pulmonary effort is normal  No respiratory distress  Breath sounds: Normal breath sounds  No wheezing  Abdominal:      General: Bowel sounds are normal  There is no distension  Tenderness: There is abdominal tenderness  There is no guarding  Musculoskeletal:         General: Normal range of motion  Cervical back: Normal range of motion  Skin:     General: Skin is warm  Neurological:      General: No focal deficit present  Mental Status: She is alert and oriented to person, place, and time  Psychiatric:         Mood and Affect: Mood normal          Behavior: Behavior normal          Thought Content: Thought content normal           Additional Data:     Lab Results:  Results from last 7 days   Lab Units 01/01/23  1712   WBC Thousand/uL 4 42   HEMOGLOBIN g/dL 12 5   HEMATOCRIT % 37 2   PLATELETS Thousands/uL 252   NEUTROS PCT % 69   LYMPHS PCT % 16   MONOS PCT % 11   EOS PCT % 3     Results from last 7 days   Lab Units 01/01/23  1712   SODIUM mmol/L 140   POTASSIUM mmol/L 3 8   CHLORIDE mmol/L 102   CO2 mmol/L 31   BUN mg/dL 15   CREATININE mg/dL 0 80   ANION GAP mmol/L 7   CALCIUM mg/dL 8 9   ALBUMIN g/dL 3 7   TOTAL BILIRUBIN mg/dL 0 30   ALK PHOS U/L 84   ALT U/L 20   AST U/L 15   GLUCOSE RANDOM mg/dL 103                 Results from last 7 days   Lab Units 01/01/23  2354   LACTIC ACID mmol/L 0 3*       Lines/Drains:  Invasive Devices     Peripheral Intravenous Line  Duration           Peripheral IV 01/01/23 Left Antecubital <1 day                    Imaging: Reviewed radiology reports from this admission including: abdominal/pelvic CT  CT abdomen pelvis with contrast   Final Result by Seble Hernandez DO (01/01 2143)      Severe thickening of the sigmoid colon and rectal wall which may represent colitis/proctitis (infection versus inflammatory cannot rule out ischemia)  Follow-up with gastroenterology is recommended  Appendix measuring up to 9 mm with a appendicolith    Findings are equivocal for acute appendicitis      The study was marked in EPIC for immediate notification  Workstation performed: [id number]             ** Please Note: This note has been constructed using a voice recognition system   **

## 2023-01-02 NOTE — ASSESSMENT & PLAN NOTE
· Presented due to worsening lower abdominal pain x 5-6 days  Chronic mild abdominal pain due to history of cervical cancer s/p complete radical hysterectomy  · CT abdomen pelvis  · Severe thickening of the sigmoid colon and rectal wall which may represent colitis/proctitis (infection versus inflammatory cannot rule out ischemia)  Follow-up with gastroenterology is recommended  · Appendix measuring up to 9 mm with a appendicolith  Findings are equivocal for acute appendicitis  · Patient seen by surgical PA in the ER  Minimal RLQ pain  Do not feel patient has acute appendicitis at this time  · Recommended GI evaluation and holding off on antibiotics at this time  · NPO/IVF  · Manage pain  · GI consult appreciated  · GI recommended symptoms are most likely consistent with radiation damage  Treatment is supportive  GI offered budesonide enemas patient states she wants to think about this and discuss with her radiation oncology and wants to get discharged    Patient signed out [address] and is recommended follow-up with radiation oncology and PCP ASAP

## 2023-01-02 NOTE — ED PROVIDER NOTES
History  Chief Complaint   Patient presents with   • Abdominal Pain     Pt recently in remission for cervical cancer since October  Pt says that she has 7/10 lower abd pain for 8 days now  Pt also states she hasnt had a bowl movement in a week and has tried otc medicine  Pt has nausea, denies vomitting  This is a 27 y/o female with PMH cervical cancer s/p complete radical hysterectomy, chemo and radiation and in remission currently not on medications for it other than estradiol  Patient here now for lower abdominal pain x 5-6 days  Patient states at baseline she is usually in 3/10 abdominal pain but it has gradually been worsening to now being a 7/10  She admits to intermittent nausea  Increased urinary frequency with "bladder spasms" occurring when urinating and incomplete emptying  She states she has not been able to have  BM or pass any flatus in the past 4-6 days either  She denies any fevers, chills, chest pain, shortness of breath, URI symptoms, coughing, vaginal bleeding  States she tried stool softeners, milk of magnesia, pepto bismol at home with no relief  Only abdominal surgery in the past was radical hysterectomy  No known allergies      History provided by:  Patient   used: No    Abdominal Pain  Pain location:  LLQ and RLQ  Pain quality: stabbing    Pain radiates to:  Does not radiate  Pain severity:  Moderate  Onset quality:  Gradual  Duration:  5 days  Timing:  Constant  Progression:  Worsening  Chronicity:  Recurrent  Context: previous surgery    Ineffective treatments:  OTC medications  Associated symptoms: anorexia, constipation, dysuria and nausea    Associated symptoms: no chest pain, no chills, no diarrhea, no fever, no hematemesis, no hematochezia, no hematuria, no melena, no shortness of breath, no vaginal bleeding, no vaginal discharge and no vomiting        Prior to Admission Medications   Prescriptions Last Dose Informant Patient Reported? Taking?    NON FORMULARY 1/1/2023  Yes Yes   Sig: CBD gummies daily   estradiol (VIVELLE-DOT) 0 0375 MG/24HR 1/1/2023  No Yes   Sig: Place 1 patch on the skin 2 (two) times a week      Facility-Administered Medications: None       Past Medical History:   Diagnosis Date   • Anxiety    • Cervical cancer ([address] )     receiving chemo and radiation   • COVID     Jan 2022   • Depression    • Diarrhea    • Dizziness    • Frequent headaches    • Obesity        Past Surgical History:   Procedure Laterality Date   • CERVICAL BIOPSY  W/ LOOP ELECTRODE EXCISION     • LYMPH NODE DISSECTION Bilateral 5/6/2022    Procedure: DISSECTION/STAGING LYMPH NODE PELVIS/ABDOMEN;  Surgeon: Bruce Raman MD;  Location: [facility];  Service: Gynecology Oncology   • FL INSERTION VAGINAL RADIATION DEVICE N/A 7/15/2022    Procedure: INSERTION NADIR SLEEVE VAGINA WITH POST OP BRACHYTHERAPY (IN RADIATION ONCOLOGY); Surgeon: Bruce Raman MD;  Location: [facility];  Service: Gynecology Oncology   • SALPINGECTOMY Bilateral 5/6/2022    Procedure: SALPINGECTOMY, OVARIAN TRANSPOSITION;  Surgeon: Bruce Raman MD;  Location: [facility];  Service: Gynecology Oncology   • US GUIDANCE  7/15/2022       Family History   Problem Relation Age of Onset   • Ovarian cancer Maternal Aunt    • Ovarian cancer Maternal Grandmother    • No Known Problems Mother    • Heart disease Father      I have reviewed and agree with the history as documented  E-Cigarette/Vaping   • E-Cigarette Use Never User      E-Cigarette/Vaping Substances   • Nicotine No    • THC No    • CBD No    • Flavoring No    • Other No    • Unknown No      Social History     Tobacco Use   • Smoking status: Never   • Smokeless tobacco: Never   Vaping Use   • Vaping Use: Never used   Substance Use Topics   • Alcohol use: Not Currently   • Drug use: Never     Comment: CBD Gummies/Anxiety       Review of Systems   Constitutional: Negative for chills and fever     Respiratory: Negative for shortness of breath  Cardiovascular: Negative for chest pain  Gastrointestinal: Positive for abdominal pain, anorexia, constipation and nausea  Negative for diarrhea, hematemesis, hematochezia, melena and vomiting  Genitourinary: Positive for dysuria  Negative for hematuria, vaginal bleeding and vaginal discharge  Physical Exam  Physical Exam  Vitals and nursing note reviewed  Constitutional:       General: She is awake  Appearance: Normal appearance  She is well-developed  HENT:      Head: Normocephalic and atraumatic  Right Ear: External ear normal       Left Ear: External ear normal       Nose: Nose normal    Eyes:      General: No scleral icterus  Extraocular Movements: Extraocular movements intact  Cardiovascular:      Rate and Rhythm: Normal rate and regular rhythm  Heart sounds: Normal heart sounds, S1 normal and S2 normal  No murmur heard  No gallop  Pulmonary:      Effort: Pulmonary effort is normal       Breath sounds: Normal breath sounds  No wheezing, rhonchi or rales  Abdominal:      General: Abdomen is flat  Bowel sounds are normal       Palpations: Abdomen is soft  Tenderness: There is abdominal tenderness in the right lower quadrant, suprapubic area and left lower quadrant  There is no right CVA tenderness, left CVA tenderness, guarding or rebound  Musculoskeletal:         General: Normal range of motion  Cervical back: Normal range of motion  Skin:     General: Skin is warm and dry  Neurological:      General: No focal deficit present  Mental Status: She is alert  Psychiatric:         Attention and Perception: Attention and perception normal          Mood and Affect: Mood normal          Behavior: Behavior normal  Behavior is cooperative           Vital Signs  ED Triage Vitals   Temperature Pulse Respirations Blood Pressure SpO2   01/01/23 1706 01/01/23 1706 01/01/23 1706 01/01/23 1706 01/01/23 1707   99 2 °F (37 3 °C) (!) 118 18 135/99 98 % Temp Source Heart Rate Source Patient Position - Orthostatic VS BP Location FiO2 (%)   01/02/23 1128 01/02/23 0100 01/02/23 0655 01/02/23 0655 --   Oral Monitor Lying Right arm       Pain Score       01/01/23 2204       6           Vitals:    01/02/23 0030 01/02/23 0100 01/02/23 0655 01/02/23 0811   BP: 115/62 120/70 95/51 142/92   Pulse: 100 91 88 84   Patient Position - Orthostatic VS:   Lying Sitting         Visual Acuity      ED Medications  Medications   sodium chloride 0 9 % bolus 1,000 mL (0 mL Intravenous Stopped 1/1/23 2314)   ketorolac (TORADOL) injection 15 mg (15 mg Intravenous Given 1/1/23 2206)   iohexol (OMNIPAQUE) 350 MG/ML injection (SINGLE-DOSE) 100 mL (100 mL Intravenous Given 1/1/23 2228)   sodium chloride 0 9 % bolus 1,000 mL (0 mL Intravenous Stopped 1/2/23 0137)       Diagnostic Studies  Results Reviewed     Procedure Component Value Units Date/Time    Urine culture [[id number]] Collected: 01/02/23 6852    Lab Status: Final result Specimen: Urine, Clean Catch Updated: 01/03/23 1038     Urine Culture 20,000-29,000 cfu/ml    C-reactive protein [[id number]]  (Abnormal) Collected: 01/02/23 0449    Lab Status: Final result Specimen: Blood from Arm, Left Updated: 01/02/23 0525     CRP 36 2 mg/L     Comprehensive metabolic panel [[id number]]  (Abnormal) Collected: 01/02/23 0449    Lab Status: Final result Specimen: Blood from Arm, Left Updated: 01/02/23 0525     Sodium 140 mmol/L      Potassium 3 7 mmol/L      Chloride 110 mmol/L      CO2 25 mmol/L      ANION GAP 5 mmol/L      BUN 15 mg/dL      Creatinine 0 67 mg/dL      Glucose 87 mg/dL      Calcium 8 0 mg/dL      Corrected Calcium 9 0 mg/dL      AST 8 U/L      ALT 15 U/L      Alkaline Phosphatase 64 U/L      Total Protein 6 1 g/dL      Albumin 2 7 g/dL      Total Bilirubin 0 30 mg/dL      eGFR 116 ml/min/1 73sq m     Narrative:      [place] guidelines for Chronic Kidney Disease (CKD):   •  Stage 1 with normal or high GFR (GFR > 90 mL/min/1 73 square meters)  •  Stage 2 Mild CKD (GFR = 60-89 mL/min/1 73 square meters)  •  Stage 3A Moderate CKD (GFR = 45-59 mL/min/1 73 square meters)  •  Stage 3B Moderate CKD (GFR = 30-44 mL/min/1 73 square meters)  •  Stage 4 Severe CKD (GFR = 15-29 mL/min/1 73 square meters)  •  Stage 5 End Stage CKD (GFR <15 mL/min/1 73 square meters)  Note: GFR calculation is accurate only with a steady state creatinine    Phosphorus - Morning draw [[id number]]  (Normal) Collected: 01/02/23 0449    Lab Status: Final result Specimen: Blood from Arm, Left Updated: 01/02/23 0525     Phosphorus 3 5 mg/dL     CK (with reflex to MB) [[id number]]  (Normal) Collected: 01/02/23 0449    Lab Status: Final result Specimen: Blood from Arm, Left Updated: 01/02/23 0525     Total CK 27 U/L     Magnesium - Morning draw [[id number]]  (Normal) Collected: 01/02/23 0449    Lab Status: Final result Specimen: Blood from Arm, Left Updated: 01/02/23 0525     Magnesium 1 8 mg/dL     Erythrocyte Sedimentation Rate (ESR) - Morning draw [[id number]]  (Abnormal) Collected: 01/02/23 0449    Lab Status: Final result Specimen: Blood from Arm, Left Updated: 01/02/23 0511     Sed Rate 28 mm/hour     CBC and differential [[id number]]  (Abnormal) Collected: 01/02/23 0449    Lab Status: Final result Specimen: Blood from Arm, Left Updated: 01/02/23 0510     WBC 3 63 Thousand/uL      RBC 3 31 Million/uL      Hemoglobin 9 8 g/dL      Hematocrit 29 6 %      MCV 89 fL      MCH 29 6 pg      MCHC 33 1 g/dL      RDW 11 9 %      MPV 7 8 fL      Platelets 182 Thousands/uL      nRBC 0 /100 WBCs      Neutrophils Relative 61 %      Immat GRANS % 1 %      Lymphocytes Relative 20 %      Monocytes Relative 14 %      Eosinophils Relative 4 %      Basophils Relative 0 %      Neutrophils Absolute 2 22 Thousands/µL      Immature Grans Absolute 0 02 Thousand/uL      Lymphocytes Absolute 0 72 Thousands/µL      Monocytes Absolute 0 52 Thousand/µL      Eosinophils Absolute 0 15 Thousand/µL      Basophils Absolute 0 00 Thousands/µL     Protime-INR - Morning draw [[id number]]  (Normal) Collected: 01/02/23 0449    Lab Status: Final result Specimen: Blood from Arm, Left Updated: 01/02/23 0508     Protime 14 0 seconds      INR 1 00    APTT - Morning draw [[id number]]  (Normal) Collected: 01/02/23 0449    Lab Status: Final result Specimen: Blood from Arm, Left Updated: 01/02/23 0508     PTT 31 seconds     Lactic acid, plasma [[id number]]  (Abnormal) Collected: 01/01/23 2354    Lab Status: Final result Specimen: Blood from Arm, Right Updated: 01/02/23 0021     LACTIC ACID 0 3 mmol/L     Narrative:      Result may be elevated if tourniquet was used during collection      Urine Microscopic [[id number]]  (Abnormal) Collected: 01/01/23 1712    Lab Status: Final result Specimen: Urine, Clean Catch Updated: 01/01/23 1749     RBC, UA None Seen /hpf      WBC, UA 4-10 /hpf      Epithelial Cells Moderate /hpf      Bacteria, UA Occasional /hpf      MUCUS THREADS Occasional     Amorphous Crystals, UA Moderate    UA w Reflex to Microscopic w Reflex to Culture [[id number]]  (Abnormal) Collected: 01/01/23 1712    Lab Status: Final result Specimen: Urine, Clean Catch Updated: 01/01/23 1748     Color, UA Yellow     Clarity, UA Cloudy     Specific Gravity, UA 1 015     pH, UA 8 5     Leukocytes, UA Moderate     Nitrite, UA Negative     Protein, UA Trace mg/dl      Glucose, UA Negative mg/dl      Ketones, UA Negative mg/dl      Urobilinogen, UA <2 0 mg/dl      Bilirubin, UA Negative     Occult Blood, UA Negative    Comprehensive metabolic panel [[id number]] Collected: 01/01/23 1712    Lab Status: Final result Specimen: Blood from Arm, Right Updated: 01/01/23 1733     Sodium 140 mmol/L      Potassium 3 8 mmol/L      Chloride 102 mmol/L      CO2 31 mmol/L      ANION GAP 7 mmol/L      BUN 15 mg/dL      Creatinine 0 80 mg/dL      Glucose 103 mg/dL      Calcium 8 9 mg/dL      AST 15 U/L      ALT 20 U/L      Alkaline Phosphatase 84 U/L      Total Protein 8 0 g/dL      Albumin 3 7 g/dL      Total Bilirubin 0 30 mg/dL      eGFR 97 ml/min/1 73sq m     Narrative:      [employer] guidelines for Chronic Kidney Disease (CKD):   •  Stage 1 with normal or high GFR (GFR > 90 mL/min/1 73 square meters)  •  Stage 2 Mild CKD (GFR = 60-89 mL/min/1 73 square meters)  •  Stage 3A Moderate CKD (GFR = 45-59 mL/min/1 73 square meters)  •  Stage 3B Moderate CKD (GFR = 30-44 mL/min/1 73 square meters)  •  Stage 4 Severe CKD (GFR = 15-29 mL/min/1 73 square meters)  •  Stage 5 End Stage CKD (GFR <15 mL/min/1 73 square meters)  Note: GFR calculation is accurate only with a steady state creatinine    Lipase [[id number]]  (Normal) Collected: 01/01/23 1712    Lab Status: Final result Specimen: Blood from Arm, Right Updated: 01/01/23 1733     Lipase 79 u/L     CBC and differential [[id number]]  (Abnormal) Collected: 01/01/23 1712    Lab Status: Final result Specimen: Blood from Arm, Right Updated: 01/01/23 1718     WBC 4 42 Thousand/uL      RBC 4 21 Million/uL      Hemoglobin 12 5 g/dL      Hematocrit 37 2 %      MCV 88 fL      MCH 29 7 pg      MCHC 33 6 g/dL      RDW 11 9 %      MPV 7 8 fL      Platelets 410 Thousands/uL      nRBC 0 /100 WBCs      Neutrophils Relative 69 %      Immat GRANS % 1 %      Lymphocytes Relative 16 %      Monocytes Relative 11 %      Eosinophils Relative 3 %      Basophils Relative 0 %      Neutrophils Absolute 3 05 Thousands/µL      Immature Grans Absolute 0 03 Thousand/uL      Lymphocytes Absolute 0 72 Thousands/µL      Monocytes Absolute 0 48 Thousand/µL      Eosinophils Absolute 0 13 Thousand/µL      Basophils Absolute 0 01 Thousands/µL                  CT abdomen pelvis with contrast   Final Result by Asia Campbell DO (01/01 9683)      Severe thickening of the sigmoid colon and rectal wall which may represent colitis/proctitis (infection versus inflammatory cannot rule out ischemia)    Follow-up with gastroenterology is recommended  Appendix measuring up to 9 mm with a appendicolith  Findings are equivocal for acute appendicitis      The study was marked in EPIC for immediate notification  Workstation performed: [id number]                    Procedures  Procedures         ED Course               Medical Decision Making  27 y/o female here for lower abdominal pain worsening over the past 5-6 days associated with constipation, nausea  Differential diagnosis including but not limited to: bowel obstructions, acute appendicitis, colitis, diverticulitis, fecal impaction, constipation, cancer recurrence  Assessment: see below  Plan: patient felt slight improvement after toradol and fluids, HR improved from 118 to low 100s  Obtaining surgical evaluation for CT findings  Care transferred to Dr Jocelin Rahman pending surgical treatment decision  Patient made aware of CT findings and need for surgery consult  Acute appendicitis: acute illness or injury     Details: RLQ abd pain on exam, WBC within normal limits, tachycardic  will obtain surgical evaluation  Colitis: acute illness or injury     Details: lower abdominal pain on exam, no BM in 5-6 days  CT cannot rule out inflammation/infection/ischemia  will obtain surgical evaluation   Proctitis: acute illness or injury     Details: lower abdominal pain on exam, no BM in 5-6 days  CT cannot rule out inflammation/infection/ischemia  will obtain surgical evaluation   Urinary tract infection:     Details: although urine sample looks contaminated, patient having urinary symptoms so should be started on antibiotics   Amount and/or Complexity of Data Reviewed  External Data Reviewed: labs and notes  Details: looked at previous gyn onc notes to see prior treatment and surgeries  Labs: ordered  Decision-making details documented in ED Course  Radiology: ordered  Decision-making details documented in ED Course    Discussion of management or test interpretation with external provider(s): Discussed with lorrie villareal surgical AP        Disposition  Final diagnoses:   Colitis   Proctitis     Time reflects when diagnosis was documented in both MDM as applicable and the Disposition within this note     Time User Action Codes Description Comment    1/1/2023 11:47 PM Gurdeep Amend Add [K52 9] Colitis     1/1/2023 11:48 PM Phani Flight [K62 89] Proctitis     1/1/2023 11:48 PM Gurdeep Amend Add [K35 80] Acute appendicitis     1/1/2023 11:48 PM Gurdeep Amend Add [N39 0] Urinary tract infection     1/2/2023 12:33 AM Obie Gray Remove [K35 80] Acute appendicitis     1/2/2023 12:41 AM Obie Gray Remove [N39 0] Urinary tract infection     1/2/2023  1:53 AM Janette Elliott Add [R10 30] Lower abdominal pain       ED Disposition     ED Disposition   Admit    Condition   Stable    Date/Time   Mon Jan 2, 2023 12:33 AM    Comment   Case was discussed with Shelton Alejandro* and the patient's admission status was agreed to be Admission Status: inpatient status to the service of Dr Nima Hays*              Follow-up Information     Follow up With Specialties Details Why Contact Info    Wenceslao Alvarado DO Family Medicine Follow up in 1 week(s)  [address]      Miranda Medrano MD Gastroenterology Follow up  [address] [place] [id number]  [phone]      Radiation oncology  Follow up in 1 day(s)            Discharge Medication List as of 1/2/2023  3:40 PM      START taking these medications    Details   docusate sodium (COLACE) 100 mg capsule Take 1 capsule (100 mg total) by mouth 2 (two) times a day for 10 days, Starting Mon 1/2/2023, Until Thu 1/12/2023, Normal      polyethylene glycol (MIRALAX) 17 g packet Take 17 g by mouth daily for 7 days Do not start before January 3, 2023 , Starting Tue 1/3/2023, Until Tue 1/10/2023, Normal         CONTINUE these medications which have NOT CHANGED    Details   estradiol (VIVELLE-DOT) 0 6082 MG/24HR Place 1 patch on the skin 2 (two) times a week, Starting Thu 12/15/2022, Normal      NON FORMULARY CBD gummies daily, Historical Med             Outpatient Discharge Orders   Discharge Diet     Activity as tolerated     Call provider for:  difficulty breathing, headache or visual disturbances     Call provider for:  severe uncontrolled pain     Call provider for:  persistent nausea or vomiting       PDMP Review       Value Time User    PDMP Reviewed  Yes 1/2/2023  3:08 PM Eugenia Camacho MD          ED Provider  Electronically Signed by           Dedrick Chery PA-C  01/02/23 Abbie Beverly MD, was the attending physician on duty at the time the patient visited the emergency department  The patient was evaluated by the AP  I was personally available for consultation concerning the patient  I did not see the patient nor participate in the medical decision making process of the encounter and the patient was discharged home without my knowledge, unless otherwise documented in the ED course  I am administratively signing the chart after the fact  1  Lower abdominal pain    2  Colitis    3   Proctitis                Pretty Pineda MD  01/03/23 6051

## 2023-01-02 NOTE — UTILIZATION REVIEW
Initial Clinical Review    Admission: Date/Time/Statement:   Admission Orders (From admission, onward)     Ordered        01/02/23 0046  INPATIENT ADMISSION  Once                      Orders Placed This Encounter   Procedures   • INPATIENT ADMISSION     Standing Status:   Standing     Number of Occurrences:   1     Order Specific Question:   Level of Care     Answer:   Med Surg [16]     Order Specific Question:   Estimated length of stay     Answer:   More than 2 Midnights     Order Specific Question:   Certification     Answer:   I certify that inpatient services are medically necessary for this patient for a duration of greater than two midnights  See H&P and MD Progress Notes for additional information about the patient's course of treatment  ED Arrival Information     Expected   -    Arrival   1/1/2023 16:10    Acuity   Urgent            Means of arrival   Walk-In    Escorted by   Family Member    Service   Hospitalist    Admission type   Emergency            Arrival complaint   abd pain, constipation           Chief Complaint   Patient presents with   • Abdominal Pain     Pt recently in remission for cervical cancer since October  Pt says that she has 7/10 lower abd pain for 8 days now  Pt also states she hasnt had a bowl movement in a week and has tried otc medicine  Pt has nausea, denies vomitting  Initial Presentation: 28 y o  female from home to ED 1/1/22 and inpatient order placed 1/2/23 due to abdominal pain  History of stage IIIc cervical cancer with positive lymph node biopsy  Presented due to lower abdominal pain starting about 5 to 6 days prior to arrival, rates as 7/10 from 3/10 baseline  Has intermittent nausea, bladder spasms and incomplete emptying  No flatus or BM in 5 to 6 days  On exam:  Abdominal tenderness in RLQ, suprapubic and LLQ  UA moderate leukocytes  Ct abdomen  Showed severe thickening of sigmoid colon and rectal wall  Appendix with appendicolith    In the ED given 1 liter bolus of IVF x 2, Toradol x 2, started on Miralax and Colace  Plan includes:  Consult surgery  Continue IVF, NPO, pain management  Consult GI  Bowel regimen  Antiemetics as needed  Per Surgery 1/2/23 : patient with lower abdominal pain, is similar to chronic pain  Doubt appendicitis  Recommend to treat for Colitis and consult GI  No surgical intervention  Per GI 1/2/23:  Patient with suspected tenesmus as has chronic abdominal pain with urge of defecation  Ct showed proctitis and colitis and is consistent with radiation damage  Plan is Rowasa enemas    Patient wants to discuss with her radiation oncologist       1/2/22 1552 left AMA      ED Triage Vitals   Temperature Pulse Respirations Blood Pressure SpO2   01/01/23 1706 01/01/23 1706 01/01/23 1706 01/01/23 1706 01/01/23 1707   99 2 °F (37 3 °C) (!) 118 18 135/99 98 %      Temp Source Heart Rate Source Patient Position - Orthostatic VS BP Location FiO2 (%)   01/02/23 1128 01/02/23 0100 01/02/23 0655 01/02/23 0655 --   Oral Monitor Lying Right arm       Pain Score       01/01/23 2204       6          Wt Readings from Last 1 Encounters:   11/18/22 119 kg (262 lb)     Additional Vital Signs:   01/02/23 1128 98 2 °F (36 8 °C) -- -- -- -- -- -- --   01/02/23 0811 -- 84 16 142/92 -- 98 % None (Room air) Sitting   01/02/23 0655 -- 88 16 95/51 70 95 % None (Room air) Lying   01/02/23 0100 -- 91 17 120/70 87 97 % -- --   01/02/23 0030 -- 100 -- 115/62 83 97 % -- --   01/02/23 0000 -- 102 -- 114/65 86 98 % -- --   01/01/23 2330 -- 99 -- 121/61 83 95 % -- --   01/01/23 2300 -- 103 -- 122/56 81 96 % -- --   01/01/23 2230 -- 109 Abnormal  -- 122/69 88 98 % -- --   01/01/23 2204 -- 115 Abnormal  -- 132/76 99 99 % -- --   01/01/23 2130 -- 93 -- 140/81 104 100 %       Pertinent Labs/Diagnostic Test Results:   CT abdomen pelvis with contrast   Final Result by Patricia Brantley DO (01/01 7765)      Severe thickening of the sigmoid colon and rectal wall which may represent colitis/proctitis (infection versus inflammatory cannot rule out ischemia)  Follow-up with gastroenterology is recommended  Appendix measuring up to 9 mm with a appendicolith  Findings are equivocal for acute appendicitis      The study was marked in EPIC for immediate notification         Workstation performed: [id number]             Results from last 7 days   Lab Units 01/02/23  0449 01/01/23  1712   WBC Thousand/uL 3 63* 4 42   HEMOGLOBIN g/dL 9 8* 12 5   HEMATOCRIT % 29 6* 37 2   PLATELETS Thousands/uL 175 252   NEUTROS ABS Thousands/µL 2 22 3 05     Results from last 7 days   Lab Units 01/02/23  0449 01/01/23  1712   SODIUM mmol/L 140 140   POTASSIUM mmol/L 3 7 3 8   CHLORIDE mmol/L 110* 102   CO2 mmol/L 25 31   ANION GAP mmol/L 5 7   BUN mg/dL 15 15   CREATININE mg/dL 0 67 0 80   EGFR ml/min/1 73sq m 116 97   CALCIUM mg/dL 8 0* 8 9   MAGNESIUM mg/dL 1 8  --    PHOSPHORUS mg/dL 3 5  --      Results from last 7 days   Lab Units 01/02/23  0449 01/01/23  1712   AST U/L 8 15   ALT U/L 15 20   ALK PHOS U/L 64 84   TOTAL PROTEIN g/dL 6 1* 8 0   ALBUMIN g/dL 2 7* 3 7   TOTAL BILIRUBIN mg/dL 0 30 0 30     Results from last 7 days   Lab Units 01/02/23  0449 01/01/23  1712   GLUCOSE RANDOM mg/dL 87 103     Results from last 7 days   Lab Units 01/02/23  0449   CK TOTAL U/L 27     Results from last 7 days   Lab Units 01/02/23  0449   PROTIME seconds 14 0   INR  1 00   PTT seconds 31     Results from last 7 days   Lab Units 01/01/23  2354   LACTIC ACID mmol/L 0 3*     Results from last 7 days   Lab Units 01/01/23  1712   LIPASE u/L 79     Results from last 7 days   Lab Units 01/02/23  0449   CRP mg/L 36 2*   SED RATE mm/hour 28*     Results from last 7 days   Lab Units 01/01/23  1712   CLARITY UA  Cloudy   COLOR UA  Yellow   SPEC GRAV UA  1 015   PH UA  8 5*   GLUCOSE UA mg/dl Negative   KETONES UA mg/dl Negative   BLOOD UA  Negative   PROTEIN UA mg/dl Trace*   NITRITE UA  Negative   BILIRUBIN UA  Negative UROBILINOGEN UA (BE) mg/dl <2 0   LEUKOCYTES UA  Moderate*   WBC UA /hpf 4-10*   RBC UA /hpf None Seen   BACTERIA UA /hpf Occasional   EPITHELIAL CELLS WET PREP /hpf Moderate*   MUCUS THREADS  Occasional*       ED Treatment:   Medication Administration from 01/01/2023 1610 to 01/02/2023 1736       Date/Time Order Dose Route Action Comments     01/01/2023 2204 EST sodium chloride 0 9 % bolus 1,000 mL 1,000 mL Intravenous New Bag --     01/01/2023 2206 EST ketorolac (TORADOL) injection 15 mg 15 mg Intravenous Given --     01/01/2023 2315 EST sodium chloride 0 9 % bolus 1,000 mL 1,000 mL Intravenous New Bag --     01/02/2023 0811 EST sodium chloride 0 9 % infusion 125 mL/hr Intravenous New Bag --     01/02/2023 0200 EST sodium chloride 0 9 % infusion 125 mL/hr Intravenous New Bag --     01/02/2023 1119 EST ketorolac (TORADOL) injection 15 mg 15 mg Intravenous Given --     01/02/2023 1119 EST polyethylene glycol (MIRALAX) packet 17 g 17 g Oral Given --     01/02/2023 1119 EST docusate sodium (COLACE) capsule 100 mg 100 mg Oral Given --        Past Medical History:   Diagnosis Date   • Anxiety    • Cervical cancer ([facility] )     receiving chemo and radiation   • COVID     Jan 2022   • Depression    • Diarrhea    • Dizziness    • Frequent headaches    • Obesity      Present on Admission:  • Malignant neoplasm of overlapping sites of cervix ([facility] )  • Obesity (BMI 35 0-39 9 without comorbidity)      Admitting Diagnosis: Abdominal pain [R10 9]  Colitis [K52 9]  Proctitis [K62 89]  Age/Sex: 28 y o  female  Admission Orders:  Scheduled Medications:  docusate sodium, 100 mg, Oral, BID  polyethylene glycol, 17 g, Oral, Daily      Continuous IV Infusions:  sodium chloride, 125 mL/hr, Intravenous, Continuous      PRN Meds:  acetaminophen, 650 mg, Oral, Q6H PRN  ketorolac, 15 mg, Intravenous, Q6H PRN - used x 1 1/2/23  ondansetron, 4 mg, Intravenous, Q4H PRN  simethicone, 80 mg, Oral, Q6H PRN        IP CONSULT TO ACUTE CARE SURGERY  IP CONSULT TO GASTROENTEROLOGY    Network Utilization Review Department  ATTENTION: Please call with any questions or concerns to [phone] and carefully listen to the prompts so that you are directed to the right person  All voicemails are confidential   Ty Limb all requests for admission clinical reviews, approved or denied determinations and any other requests to dedicated fax number below belonging to the campus where the patient is receiving treatment   List of dedicated fax numbers for the Facilities:  [address] DENIALS (Administrative/Medical Necessity) [phone]   [address] (Maternity/NICU/Pediatrics) [phone]   5 [address] Fernández [phone]   [facility] [phone]   [address] [address] [address] 28 [phone]   1557 [facility] 815 [facility] [phone]

## 2023-01-02 NOTE — ED CARE HANDOFF
Emergency Department Sign Out Note        Sign out and transfer of care from WARD Mascorro*  See Separate Emergency Department note  The patient, Blank Wilson, was evaluated by the previous provider for lower abd pain  Workup Completed:  Ct with colitis/ proctitis, appendictis    ED Course / Workup Pending (followup):  Surgical pa evaluating patient                                     Procedures  Medical Decision Making  Differential includes colitis/ proctitis perhaps related to previous radiation therapy  Less likely appendicitis - no focal tenderness  Colitis: complicated acute illness or injury  Proctitis: complicated acute illness or injury  Amount and/or Complexity of Data Reviewed  Labs:  Decision-making details documented in ED Course  Radiology:  Decision-making details documented in ED Course  ECG/medicine tests:  Decision-making details documented in ED Course    Discussion of management or test interpretation with external provider(s): Surgical pa evaluated patient - less likely appendicitis - recommends hold abx and trend serum WBC, keep npo for reassessment            Disposition  Final diagnoses:   Colitis   Proctitis     Time reflects when diagnosis was documented in both MDM as applicable and the Disposition within this note     Time User Action Codes Description Comment    1/1/2023 11:47 PM Judye Kitten Add [K52 9] Colitis     1/1/2023 11:48 PM Ether Yasmeen [K62 89] Proctitis     1/1/2023 11:48 PM Judye Kitten Add [K35 80] Acute appendicitis     1/1/2023 11:48 PM Judye Kitten Add [N39 0] Urinary tract infection     1/2/2023 12:33 AM Jewels Rowels Remove [K35 80] Acute appendicitis     1/2/2023 12:41 AM Jewels Rowels Remove [N39 0] Urinary tract infection     1/2/2023  1:53 AM Fish Vance Add [R10 30] Lower abdominal pain       ED Disposition     ED Disposition   Admit    Condition   Stable    Date/Time   Mon Jan 2, 2023 12:33 AM    Comment   Case was discussed with *L Reta* and the patient's admission status was agreed to be Admission Status: inpatient status to the service of Dr Ashleigh Conner*   Follow-up Information    None       Patient's Medications   Discharge Prescriptions    No medications on file     No discharge procedures on file         ED Provider  Electronically Signed by     Polina Kapadia DO  01/02/23 2180

## 2023-01-02 NOTE — DISCHARGE SUMMARY
[facility]  Discharge- Мария Le 1990, 28 y o  female MRN: [id number]  Unit/Bed#: Z2 H2 Encounter: [id number]  Primary Care Provider: Josy Ozuna DO   Date and time admitted to hospital: 1/1/2023  9:25 PM    Obesity (BMI 35 0-39 9 without comorbidity)  Assessment & Plan  · BMI 38 69  · Encourage lifestyle changes    Malignant neoplasm of overlapping sites of cervix [facility])  Assessment & Plan  · History of stage IIIc cervical cancer with positive lymph node biopsy  · S/p chemo/radiation therapy and hysterectomy  · Follows with gynecology oncology outpatient    * Abdominal pain  Assessment & Plan  · Presented due to worsening lower abdominal pain x 5-6 days  Chronic mild abdominal pain due to history of cervical cancer s/p complete radical hysterectomy  · CT abdomen pelvis  · Severe thickening of the sigmoid colon and rectal wall which may represent colitis/proctitis (infection versus inflammatory cannot rule out ischemia)  Follow-up with gastroenterology is recommended  · Appendix measuring up to 9 mm with a appendicolith  Findings are equivocal for acute appendicitis  · Patient seen by surgical PA in the ER  Minimal RLQ pain  Do not feel patient has acute appendicitis at this time  · Recommended GI evaluation and holding off on antibiotics at this time  · NPO/IVF  · Manage pain  · GI consult appreciated  · GI recommended symptoms are most likely consistent with radiation damage  Treatment is supportive  GI offered budesonide enemas patient states she wants to think about this and discuss with her radiation oncology and wants to get discharged    Patient signed out [address] and is recommended follow-up with radiation oncology and PCP ASAP      Hospital Course:     Мария Le is a 28 y o  female patient who originally presented to the hospital on   Admission Orders (From admission, onward)     Ordered        01/02/23 0046  INPATIENT ADMISSION  Once                     due to worsening lower abdominal pain over the past 5 to 6 days  Denies any vomiting but had some nausea  Patient states she has not had a bowel movement in the past 5 days  Denies any fever, chills, chest pain, dizziness, lightheadedness or any other complaints  CT abdomen pelvis showed- Thickening of the sigmoid colon and rectal wall which may represent colitis/proctitis  Was seen by GI and surgery  Patient was kept n p o  and started on IV fluids  GI recommended budesonide enemas but patient wanted to discuss with her radiation oncologist and stated she wants to get discharged  Patient signed out [address] is recommended outpatient follow-up with PCP and radiation oncologist ASAP  Patient is alert awake oriented x3 and verbalized understanding of risk of worsening abdominal pain causing perforation, sepsis and possible death  Proceeded with signing out [address]    On Exam-  Chest-bilateral air entry, clear to auscultation  Abdomen-soft, nontender  Heart-S1 S2 regular  Extremities-no pedal edema or calf tenderness  Neuro-alert awake oriented x3  No focal deficits    Please see above list of diagnoses and related plan for additional information  Follow-up with PCP and GI ASAP  Follow with radiation oncology  Return to ER with worsening abdominal pain, bleeding or any other alarming symptoms    Condition at Discharge:  good      Discharge instructions/Information to patient and family:   See after visit summary for information provided to patient and family  Provisions for Follow-Up Care:  See after visit summary for information related to follow-up care and any pertinent home health orders  Disposition:     Other: AGAINST MEDICAL ADVICE       Discharge Statement:  I spent 40 minutes discharging the patient  This time was spent on the day of discharge  I had direct contact with the patient on the day of discharge   Greater than 50% of the total time was spent examining patient, answering all patient questions, arranging and discussing plan of care with patient as well as directly providing post-discharge instructions  Additional time then spent on discharge activities  Discharge Medications:  See after visit summary for reconciled discharge medications provided to patient and family        ** Please Note: This note has been constructed using a voice recognition system **

## 2023-01-02 NOTE — CONSULTS
Consultation -General Surgery  Lorrene Mcburney 28 y o  female MRN: [id number]  Unit/Bed#: ED 05 Encounter: [id number]        Consults    ASSESSMENT/PLAN:  Medical Problems     Problem List abdominal pain    Malignant neoplasm of overlapping sites of cervix [facility])    Family history of ovarian cancer    Obesity (BMI 35 0-39 9 without comorbidity) (Chronic)    Anxiety    Depression      29 yo F with h/o stage IIIC cervical cancer with positive lymph node bx  s/p chemo/radiation therapy which she finished 8/22 and who has chronic abdominal pain presents to ED for evaluation of worsening abdominal pain  Her history of abdominal and vaginal XRT could result in radiation injury which could be evaluated by GI  Appendix findings noted in setting of minimal RLQ pain and no leukocytosis  Pain is in same location it has been since August   Will follow with you  Discussed with Dr Kimberli Santiago  · Recommend GI evaluate  · Serial exam  · Ck am labs  · NPO  · IVF    Reason for Consult / Principal Problem: abdominal pain    HPI: Lorrene Mcburney is a 28y o  year old female with h/o stage IIIC cervical cancer with positive lymph node bx  s/p chemo/radiation therapy which she finished 8/22 with resultant chronic abdominal pain presents to ED for evaluation of worsening abdominal pain  At baseline, she has suprapubic abdominal pain,3-4/10 and nausea since the radiation tx  About 10 days ago she had 4 days of diarrhea followed by no BM and increased abdominal pain to 7/10 in same area for the next 6 days  Pain does not radiate and is in the same location it has been since August  She is passing flatus  No sick contacts, fever, chills or vomiting but she is passing flatus  CTAP-1/1- Severe thickening of the sigmoid colon and rectal wall which may represent colitis/proctitis (infection versus inflammatory cannot rule out ischemia)  Follow-up with gastroenterology is recommended    Appendix measuring up to 9 mm with a appendicolith  Findings are equivocal for acute appendicitis      Review of Systems   Constitutional: Negative for activity change, appetite change, chills, fatigue and fever  Respiratory: Negative for cough, chest tightness and shortness of breath  Cardiovascular: Negative for chest pain, palpitations and leg swelling  Gastrointestinal: Positive for abdominal pain (chronic abd pain is 3/10), constipation (no bm for 6 days) and diarrhea (diarrhea 7 through 10 days ago)  Negative for abdominal distention, blood in stool, nausea and vomiting  Genitourinary: Positive for dyspareunia  Negative for difficulty urinating, dysuria and flank pain  Musculoskeletal: Negative for back pain and myalgias  Skin: Negative for color change and wound  Historical Information   Past Medical History:   Diagnosis Date   • Anxiety    • Cervical cancer [facility])     receiving chemo and radiation   • COVID     Jan 2022   • Depression    • Diarrhea    • Dizziness    • Frequent headaches    • Obesity      Past Surgical History:   Procedure Laterality Date   • CERVICAL BIOPSY  W/ LOOP ELECTRODE EXCISION     • LYMPH NODE DISSECTION Bilateral 5/6/2022    Procedure: DISSECTION/STAGING LYMPH NODE PELVIS/ABDOMEN;  Surgeon: Belén Mckee MD;  Location: [facility];  Service: Gynecology Oncology   • VT INSERTION VAGINAL RADIATION DEVICE N/A 7/15/2022    Procedure: INSERTION NADIR SLEEVE VAGINA WITH POST OP BRACHYTHERAPY (IN RADIATION ONCOLOGY);   Surgeon: Belén Mckee MD;  Location: [facility];  Service: Gynecology Oncology   • SALPINGECTOMY Bilateral 5/6/2022    Procedure: SALPINGECTOMY, OVARIAN TRANSPOSITION;  Surgeon: Belén Mckee MD;  Location: [facility];  Service: Gynecology Oncology   • US GUIDANCE  7/15/2022     Social History   Social History     Substance and Sexual Activity   Alcohol Use Not Currently     Social History     Substance and Sexual Activity   Drug Use Never    Comment: CBD Gummies/Anxiety     Social History     Tobacco Use   Smoking Status Never   Smokeless Tobacco Never     Family History   Problem Relation Age of Onset   • Ovarian cancer Maternal Aunt    • Ovarian cancer Maternal Grandmother    • No Known Problems Mother    • Heart disease Father        Meds/Allergies      Home medications:  CBD gummies  Hormone patch  Probiotic x1 day    No Known Allergies    Objective     Blood pressure 122/69, pulse (!) 109, temperature 99 2 °F (37 3 °C), resp  rate 18, last menstrual period 07/27/2022, SpO2 98 %, not currently breastfeeding  No intake or output data in the 24 hours ending 01/02/23 0034    PHYSICAL EXAM  General appearance: alert and oriented, in no acute distress  Skin: Skin color, texture, turgor normal  No rashes or lesions  Head: Normocephalic, without obvious abnormality  Heart: regular rate and rhythm, S1, S2 normal, no murmur, click, rub or gallop  Lungs: clear to auscultation bilaterally  Abdomen: obese and soft, tender BLQ, L>R, no rebound or guarding  +BS   No masses palpable  Back: negative  Rectal: deferred  Neurological: normal without focal findings    Lab Results:   Admission on 01/01/2023   Component Date Value   • WBC 01/01/2023 4 42    • RBC 01/01/2023 4 21    • Hemoglobin 01/01/2023 12 5    • Hematocrit 01/01/2023 37 2    • MCV 01/01/2023 88    • MCH 01/01/2023 29 7    • MCHC 01/01/2023 33 6    • RDW 01/01/2023 11 9    • MPV 01/01/2023 7 8 (L)    • Platelets 94/29/5488 252    • nRBC 01/01/2023 0    • Neutrophils Relative 01/01/2023 69    • Immat GRANS % 01/01/2023 1    • Lymphocytes Relative 01/01/2023 16    • Monocytes Relative 01/01/2023 11    • Eosinophils Relative 01/01/2023 3    • Basophils Relative 01/01/2023 0    • Neutrophils Absolute 01/01/2023 3 05    • Immature Grans Absolute 01/01/2023 0 03    • Lymphocytes Absolute 01/01/2023 0 72    • Monocytes Absolute 01/01/2023 0 48    • Eosinophils Absolute 01/01/2023 0 13    • Basophils Absolute 01/01/2023 0 01    • Sodium 01/01/2023 140    • Potassium 01/01/2023 3 8    • Chloride 01/01/2023 102    • CO2 01/01/2023 31    • ANION GAP 01/01/2023 7    • BUN 01/01/2023 15    • Creatinine 01/01/2023 0 80    • Glucose 01/01/2023 103    • Calcium 01/01/2023 8 9    • AST 01/01/2023 15    • ALT 01/01/2023 20    • Alkaline Phosphatase 01/01/2023 84    • Total Protein 01/01/2023 8 0    • Albumin 01/01/2023 3 7    • Total Bilirubin 01/01/2023 0 30    • eGFR 01/01/2023 97    • Lipase 01/01/2023 79    • Color, UA 01/01/2023 Yellow    • Clarity, UA 01/01/2023 Cloudy    • Specific Gravity, UA 01/01/2023 1 015    • pH, UA 01/01/2023 8 5 (A)    • Leukocytes, UA 01/01/2023 Moderate (A)    • Nitrite, UA 01/01/2023 Negative    • Protein, UA 01/01/2023 Trace (A)    • Glucose, UA 01/01/2023 Negative    • Ketones, UA 01/01/2023 Negative    • Urobilinogen, UA 01/01/2023 <2 0    • Bilirubin, UA 01/01/2023 Negative    • Occult Blood, UA 01/01/2023 Negative    • RBC, UA 01/01/2023 None Seen    • WBC, UA 01/01/2023 4-10 (A)    • Epithelial Cells 01/01/2023 Moderate (A)    • Bacteria, UA 01/01/2023 Occasional    • MUCUS THREADS 01/01/2023 Occasional (A)    • Amorphous Crystals, UA 01/01/2023 Moderate    • LACTIC ACID 01/01/2023 0 3 (L)      Imaging Studies: I have personally reviewed pertinent reports  CTAP-1/1- Severe thickening of the sigmoid colon and rectal wall which may represent colitis/proctitis (infection versus inflammatory cannot rule out ischemia)  Follow-up with gastroenterology is recommended  Appendix measuring up to 9 mm with a appendicolith  Findings are equivocal for acute appendicitis    Counseling / Coordination of Care  Total time spent today  30 minutes  Greater than 50% of total time was spent with the patient and / or family counseling and / or coordination of care

## 2023-01-02 NOTE — ASSESSMENT & PLAN NOTE
· Presented due to worsening lower abdominal pain x 5-6 days  Chronic mild abdominal pain due to history of cervical cancer s/p complete radical hysterectomy  · CT abdomen pelvis  · Severe thickening of the sigmoid colon and rectal wall which may represent colitis/proctitis (infection versus inflammatory cannot rule out ischemia)  Follow-up with gastroenterology is recommended  · Appendix measuring up to 9 mm with a appendicolith  Findings are equivocal for acute appendicitis  · Patient seen by surgical PA in the ER  Minimal RLQ pain  Do not feel patient has acute appendicitis at this time  · Recommended GI evaluation and holding off on antibiotics at this time     · NPO/IVF  · Manage pain  · Consult GI

## 2023-01-02 NOTE — CONSULTS
Consultation - [facility] Gastroenterology     Portillo Rajan 28 y o  female MRN: [id number]  Unit/Bed#: Z2 H2 Encounter: [id number]    Consults    ASSESSMENT and PLAN    3  40-year-old female with a history of cervical cancer treated with chemo and radiation which finished in August 2022 now presents with worsening of chronic lower abdominal pain and feeling that she needs to go  I think she is describing tenesmus  CAT scan seems to show proctitis and colitis which would be most consistent with radiation damage  Treatment is supportive  Budesonide enemas are effective but not available as an inpatient  I did offer her Rowasa enemas which are 5-ASA enemas which are on formulary  She wants to think about this and discuss with radiation  We will continue to follow as needed thanks    Chief Complaint   Patient presents with   • Abdominal Pain     Pt recently in remission for cervical cancer since October  Pt says that she has 7/10 lower abd pain for 8 days now  Pt also states she hasnt had a bowl movement in a week and has tried otc medicine  Pt has nausea, denies vomitting  Physician Requesting Consult: Derrick Villela MD    HPI  Portillo Rajan is a 28y o  year old female with a history of cervical cancer treated with radiation and chemotherapy  Last radiation was August 2022 she is admitted with worsening of her chronic lower abdominal pain  She says she has the feeling that she needs to move her bowels but cannot she feels she is constipated and is not passing much from below  No bleeding no vomiting she is eating normally      Historical Information   Past Medical History:   Diagnosis Date   • Anxiety    • Cervical cancer [facility])     receiving chemo and radiation   • COVID     Jan 2022   • Depression    • Diarrhea    • Dizziness    • Frequent headaches    • Obesity      Past Surgical History:   Procedure Laterality Date   • CERVICAL BIOPSY  W/ LOOP ELECTRODE EXCISION     • LYMPH NODE DISSECTION Bilateral 5/6/2022    Procedure: DISSECTION/STAGING LYMPH NODE PELVIS/ABDOMEN;  Surgeon: Gadiel Tinsley MD;  [facility];  Service: Gynecology Oncology   • GA INSERTION VAGINAL RADIATION DEVICE N/A 7/15/2022    Procedure: INSERTION NADIR SLEEVE VAGINA WITH POST OP BRACHYTHERAPY (IN RADIATION ONCOLOGY); Surgeon: Gadiel Tinsley MD;  [facility];  Service: Gynecology Oncology   • SALPINGECTOMY Bilateral 5/6/2022    Procedure: SALPINGECTOMY, OVARIAN TRANSPOSITION;  Surgeon: Gadiel Tinsley MD;  [facility];  Service: Gynecology Oncology   • US GUIDANCE  7/15/2022     Social History   Social History     Substance and Sexual Activity   Alcohol Use Not Currently     Social History     Substance and Sexual Activity   Drug Use Never    Comment: CBD Gummies/Anxiety     Social History     Tobacco Use   Smoking Status Never   Smokeless Tobacco Never     Family History   Problem Relation Age of Onset   • Ovarian cancer Maternal Aunt    • Ovarian cancer Maternal Grandmother    • No Known Problems Mother    • Heart disease Father        Meds/Allergies     Current Facility-Administered Medications   Medication Dose Route Frequency   • acetaminophen (TYLENOL) tablet 650 mg  650 mg Oral Q6H PRN   • docusate sodium (COLACE) capsule 100 mg  100 mg Oral BID   • ketorolac (TORADOL) injection 15 mg  15 mg Intravenous Q6H PRN   • ondansetron (ZOFRAN) injection 4 mg  4 mg Intravenous Q4H PRN   • polyethylene glycol (MIRALAX) packet 17 g  17 g Oral Daily   • simethicone (MYLICON) chewable tablet 80 mg  80 mg Oral Q6H PRN   • sodium chloride 0 9 % infusion  125 mL/hr Intravenous Continuous     (Not in a hospital admission)      No Known Allergies    PHYSICAL EXAM    Blood pressure 142/92, pulse 84, temperature 98 2 °F (36 8 °C), temperature source Oral, resp  rate 16, last menstrual period 07/27/2022, SpO2 98 %, not currently breastfeeding   There is no height or weight on file to calculate BMI  General Appearance: NAD, cooperative, alert  Eyes: Anicteric, PERRLA, EOMI  ENT:  Normocephalic, atraumatic, normal mucosa  Neck:  Supple, symmetrical, trachea midline  GI:  Soft, non-tender, non-distended; normal bowel sounds; no masses, no organomegaly   Rectal: Deferred  Musculoskeletal: No cyanosis, clubbing or edema  Normal ROM  Skin:  No jaundice, rashes, or lesions   Heme/Lymph: No palpable cervical lymphadenopathy  Psych: Normal affect, good eye contact  Neuro: No gross deficits, AAOx3    Lab Results   Component Value Date    CALCIUM 8 0 (L) 01/02/2023    K 3 7 01/02/2023    CO2 25 01/02/2023     (H) 01/02/2023    BUN 15 01/02/2023    CREATININE 0 67 01/02/2023     Lab Results   Component Value Date    WBC 3 63 (L) 01/02/2023    HGB 9 8 (L) 01/02/2023    HCT 29 6 (L) 01/02/2023    MCV 89 01/02/2023     01/02/2023     Lab Results   Component Value Date    ALT 15 01/02/2023    AST 8 01/02/2023    ALKPHOS 64 01/02/2023     No results found for: AMYLASE  Lab Results   Component Value Date    LIPASE 79 01/01/2023     No results found for: IRON, TIBC, FERRITIN  Lab Results   Component Value Date    INR 1 00 01/02/2023       Imaging Studies: I have personally reviewed pertinent reports  EKG, Pathology, and Other Studies: I have personally reviewed pertinent reports  REVIEW OF SYSTEMS:    CONSTITUTIONAL: Denies any fever, chills, rigors, and weight loss  HEENT: No earache or tinnitus  Denies hearing loss or visual disturbances  CARDIOVASCULAR: No chest pain or palpitations  RESPIRATORY: Denies any cough, hemoptysis, shortness of breath or dyspnea on exertion  GASTROINTESTINAL: As noted in the History of Present Illness  GENITOURINARY: No problems with urination  Denies any hematuria or dysuria  NEUROLOGIC: No dizziness or vertigo, denies headaches  MUSCULOSKELETAL: Denies any muscle or joint pain  SKIN: Denies skin rashes or itching     ENDOCRINE: Denies excessive thirst  Denies intolerance to heat or cold  PSYCHOSOCIAL: Denies depression or anxiety  Denies any recent memory loss

## 2023-01-02 NOTE — DISCHARGE INSTR - AVS FIRST PAGE
Follow-up with PCP and GI ASAP  Follow with radiation oncology  Return to ER with worsening abdominal pain, bleeding or any other alarming symptoms

## 2023-01-02 NOTE — ED NOTES
Attending at bedside to see patient  Agreeable to sign AMA form  Patient tolerating clear liquids  Understands risks to leaving AMA        Antoinette Toussaint RN  01/02/23 4448

## 2023-01-02 NOTE — ED NOTES
Patient received from previous nurse  VSS on RA  C/o mild nausea and abdominal pain 6/10  Does not want any of her PRN's  They were offered  IVF running  Urine to be sent        Mar Durham RN  01/02/23 7048       Mar Durham RN  01/02/23 9217

## 2023-01-02 NOTE — ED NOTES
Patient ambulatory to the bathroom  Seen by surgery and GI and states she wants to leave AMA  Attending messaged        Evie Arellano RN  01/02/23 8316

## 2023-01-02 NOTE — ASSESSMENT & PLAN NOTE
· History of stage IIIc cervical cancer with positive lymph node biopsy      · S/p chemo/radiation therapy and hysterectomy  · Follows with gynecology oncology outpatient

## 2023-01-03 ENCOUNTER — TELEPHONE (OUTPATIENT)
Dept: RADIATION ONCOLOGY | Facility: HOSPITAL | Age: 33
End: 2023-01-03

## 2023-01-03 DIAGNOSIS — C53.8 MALIGNANT NEOPLASM OF OVERLAPPING SITES OF CERVIX (HCC): Primary | ICD-10-CM

## 2023-01-03 LAB
ATRIAL RATE: 87 BPM
BACTERIA UR CULT: NORMAL
P AXIS: 27 DEGREES
PR INTERVAL: 178 MS
QRS AXIS: 34 DEGREES
QRSD INTERVAL: 82 MS
QT INTERVAL: 354 MS
QTC INTERVAL: 425 MS
T WAVE AXIS: 28 DEGREES
VENTRICULAR RATE: 87 BPM

## 2023-01-03 RX ORDER — HYDROCORTISONE 100 MG/60ML
100 SUSPENSION RECTAL
Qty: 14 ENEMA | Refills: 0 | Status: SHIPPED | OUTPATIENT
Start: 2023-01-03 | End: 2023-01-17

## 2023-01-03 NOTE — PROGRESS NOTES
Ms Villa Hoang has symptoms suggestive of mild radiation proctitis  She had GI evaluation while briefly inpatient over the past few days  We reviewed this over the phone and will start with a course of steroid enemas and re-assess  I reviewed that the expected course of relief is slow and steady, over the course of 1-2 weeks

## 2023-01-03 NOTE — TELEPHONE ENCOUNTER
1039 Call to pt  Rx-Cortenema called to [facility] talked w/physician)  Reviewed above info and Rx directions w/pt  Also, suggested baby wipes to rectal area for comfort  Pt verbalizes understanding information above and in agreement w/plan  To call with any further questions/concerns

## 2023-01-05 NOTE — UTILIZATION REVIEW
NOTIFICATION OF ADMISSION DISCHARGE   This is a Notification of Discharge from [address]  Please be advised that this patient has been discharge from our facility  Below you will find the admission and discharge date and time including the patient’s disposition  UTILIZATION REVIEW CONTACT:  Calvin Moore  Utilization   Network Utilization Review Department  Phone: [phone] carefully listen to the prompts  All voicemails are confidential   Email: [email or web address]     ADMISSION INFORMATION  PRESENTATION DATE: 1/1/2023  9:25 PM  OBERVATION ADMISSION DATE:   INPATIENT ADMISSION DATE: 1/2/23 12:47 AM   DISCHARGE DATE: 1/2/2023  3:52 PM   DISPOSITION:Left against medical advice or discontinued care    IMPORTANT INFORMATION:  Send all requests for admission clinical reviews, approved or denied determinations and any other requests to dedicated fax number below belonging to the campus where the patient is receiving treatment   List of dedicated fax numbers:  [address] DENIALS (Administrative/Medical Necessity) [phone]   [address] (Maternity/NICU/Pediatrics) [phone]   [facility] [phone]   [facility] [phone]   [address] [phone]   [address] [facility] [phone]   [address] [place] [address] [phone]   [address] [place] [address] 119 [phone]   [facility]  [phone]   [address] [phone]   412 [facility] [address] [facility] [phone]

## 2023-01-06 ENCOUNTER — TELEPHONE (OUTPATIENT)
Dept: RADIATION ONCOLOGY | Facility: CLINIC | Age: 33
End: 2023-01-06

## 2023-01-06 NOTE — TELEPHONE ENCOUNTER
Patient called to speak with the doctor-would like a call back regarding abdominal/pelvic/rectal pain that she is experiencing

## 2023-01-09 ENCOUNTER — TELEPHONE (OUTPATIENT)
Dept: RADIATION ONCOLOGY | Facility: HOSPITAL | Age: 33
End: 2023-01-09

## 2023-01-10 ENCOUNTER — CLINICAL SUPPORT (OUTPATIENT)
Dept: RADIATION ONCOLOGY | Facility: HOSPITAL | Age: 33
End: 2023-01-10
Attending: INTERNAL MEDICINE

## 2023-01-10 ENCOUNTER — RADIATION ONCOLOGY FOLLOW-UP (OUTPATIENT)
Dept: RADIATION ONCOLOGY | Facility: HOSPITAL | Age: 33
End: 2023-01-10

## 2023-01-10 VITALS
DIASTOLIC BLOOD PRESSURE: 78 MMHG | RESPIRATION RATE: 20 BRPM | TEMPERATURE: 97.6 F | OXYGEN SATURATION: 97 % | SYSTOLIC BLOOD PRESSURE: 124 MMHG | WEIGHT: 259 LBS | HEART RATE: 79 BPM | HEIGHT: 69 IN | BODY MASS INDEX: 38.36 KG/M2

## 2023-01-10 DIAGNOSIS — C53.8 MALIGNANT NEOPLASM OF OVERLAPPING SITES OF CERVIX (HCC): Primary | ICD-10-CM

## 2023-01-10 RX ORDER — LIDOCAINE 50 MG/G
OINTMENT TOPICAL AS NEEDED
Qty: 35.44 G | Refills: 2 | Status: SHIPPED | OUTPATIENT
Start: 2023-01-10

## 2023-01-10 NOTE — PROGRESS NOTES
Follow-up - Radiation Oncology   Evelin Farrell 1990 28 y o  female [id number]    Cancer Staging   Malignant neoplasm of overlapping sites of cervix [facility])  Staging form: Cervix Uteri, AJCC Version 9  - Clinical: No stage assigned - Unsigned  - Pathologic: No stage assigned - Unsigned  - Pathologic: FIGO Stage IIIC1p (pT1b1, cM0) - Signed by Love Patino MD on 5/19/2022  Stage confirmation method: Pathology  Pelvic grady status: Positive  Pelvic grady method of assessment: Lymph node dissection  Para-aortic status: Negative    Assessment/Plan:  Evelin Farrell 1990 is a 28 y o  female with FIGO IIIC1 cervical cancer s/p pelvic lymph node dissection and ovarian translocation (hysterectomy was aborted after finding of grady disease)  She was treated with definitive chemoradiation therapy per multidisciplinary consensus with external beam RT + cervical brachytherapy, completing 6 months ago on 8/3/22  Her post-treatment PET/CT at [facility] was reassuring, no evidence of FDG avid disease and resolution of prior cervical activity  She presents for 6 month follow-up visit  She notes symptoms suggestive of mild to moderate radiation proctitis  She had a GI evaluation while briefly inpatient about 1 5 weeks ago and was recommended steroid enemas  As such, she has now completed approximately 6 days of daily Cortenema with only modest improvement  I advised that she may require up to 4 weeks of continued topical steroid treatment  She also notes severe painless constipation, with little to no stool or gas output despite Senna/Colace/Miralax and the occasional mag citrate  She had a CT in the ER which did not show an obstruction   She notes that she only eats a granola bar for breakfast but does have a consistent dinner consisting of meat/carbohydrates/vegetables   -continue Cortenema for presumed radiation proctitis  -recommend preponing GI visit (initially scheduled in 3 weeks) to sooner date if available  -in the meantime, continue stool softeners and Miralax PRN  -advised increasing fiber intake  -Prescribed lidocaine 5% topical gel as needed for perirectal discomfort  -RTC in May 2023  -She has been referred for pelvic floor PT  2 9 23 Lukasz Seen  Dr Ba Irving MD  Department of [address] [address]    No orders of the defined types were placed in this encounter  History of Present Illness   Interval History:    First follow up visit on 11 18 22  Pt presents today with complaint of vaginal and rectal discomfort        1 3 23 Phone notes/Ulises Wade  Ms Moody has symptoms suggestive of mild radiation proctitis  She had GI evaluation while briefly inpatient over the past few days  (ED visit noted on 1 1 23)  We reviewed this over the phone and will start with a course of steroid enemas and re-assess  I reviewed that the expected course of relief is slow and steady, over the course of 1-2 weeks      Today she notes some blood and mostly mucus per rectum  She notes severe painless constipation, with little to no stool or gas output despite Senna/Colace/Miralax and the occasional mag citrate  She continues working  She notes that the "burning, lava-like" sensation is not frequent and occurs intermittently  Historical Information   Oncology History   Malignant neoplasm of overlapping sites of cervix ([address] )   3/24/2022 Initial Diagnosis    Malignant neoplasm of overlapping sites of cervix ([address] )     5/6/2022 Surgery    Exploratory laparotomy for planned radical hysterectomy, bilateral pelvic lymph node dissection, aborted radical hysterectomy, bilateral ovarian transposition due to positive lymph nodes    1  Two right pelvic lymph nodes positive     5/19/2022 -  Cancer Staged    Staging form: Cervix Uteri, AJCC Version 9  - Pathologic: FIGO Stage IIIC1p (pT1b1, cM0) - Signed by Kristin Jackson MD on 5/19/2022  Stage confirmation method: Pathology  Pelvic grady status: Positive  Pelvic grady method of assessment: Lymph node dissection  Para-aortic status: Negative       6/20/2022 - 7/26/2022 Chemotherapy    palonosetron (ALOXI), 0 25 mg, Intravenous, Once, 6 of 6 cycles  Administration: 0 25 mg (6/20/2022), 0 25 mg (6/27/2022), 0 25 mg (7/6/2022), 0 25 mg (7/11/2022), 0 25 mg (7/19/2022), 0 25 mg (7/26/2022)  CISplatin (PLATINOL) infusion, 70 mg (original dose 40 mg/m2), Intravenous, Once, 6 of 6 cycles  Dose modification: 70 mg (original dose 40 mg/m2, Cycle 1, Reason: Other (Must fill in a comment), Comment: max 70), 70 mg (original dose 40 mg/m2, Cycle 2, Reason: Dose modified as per discussion with consulting physician)  Administration: 70 mg (6/20/2022), 70 mg (6/27/2022), 70 mg (7/6/2022), 70 mg (7/11/2022), 70 mg (7/19/2022), 70 mg (7/26/2022)  aprepitant (CINVANTI) in  mL IVPB, 130 mg, Intravenous, Once, 6 of 6 cycles  Administration: 130 mg (6/20/2022), 130 mg (6/27/2022), 130 mg (7/6/2022), 130 mg (7/11/2022), 130 mg (7/19/2022), 130 mg (7/26/2022)     6/22/2022 - 8/3/2022 Radiation    Course: C1 - EBRT  Plan ID Energy Fractions Dose per Fraction (cGy) Dose Correction (cGy) Total Dose Delivered (cGy) Elapsed Days   Whole Pelvis 10X 25 / 25 180 0 4,500 42       Course: C1 HDR Tandem and Ring Brachytherapy  Plan ID Energy Fractions Dose per Fraction (cGy) Dose Correction (cGy) Total Dose Delivered (cGy) Elapsed Days   HDR1 7-15-22  1 / 1 700 0 700 0   GIG3_6--18-22 1 / 1 700 0 700 0   RBI0_5--21-22 1 / 1 700 0 700 0   HDR4 7-25-22 1 / 1 700 0 700 0           Past Medical History:   Diagnosis Date   • Anxiety    • Cervical cancer ([address] )     receiving chemo and radiation   • COVID     Jan 2022   • Depression    • Diarrhea    • Dizziness    • Frequent headaches    • Obesity      Past Surgical History:   Procedure Laterality Date   • CERVICAL BIOPSY  W/ LOOP ELECTRODE EXCISION     • LYMPH NODE DISSECTION Bilateral 5/6/2022    Procedure: DISSECTION/STAGING LYMPH NODE PELVIS/ABDOMEN;  Surgeon: Melanie Reagan MD;  [facility];  Service: Gynecology Oncology   • AK INSERTION VAGINAL RADIATION DEVICE N/A 7/15/2022    Procedure: INSERTION NADIR SLEEVE VAGINA WITH POST OP BRACHYTHERAPY (IN RADIATION ONCOLOGY); Surgeon: Melanie Reagan MD;  [facility];  Service: Gynecology Oncology   • SALPINGECTOMY Bilateral 5/6/2022    Procedure: SALPINGECTOMY, OVARIAN TRANSPOSITION;  Surgeon: Melanie Reagan MD;  [facility];  Service: Gynecology Oncology   • US GUIDANCE  7/15/2022     Social History   Social History     Substance and Sexual Activity   Alcohol Use Not Currently     Social History     Substance and Sexual Activity   Drug Use Never    Comment: CBD Gummies/Anxiety     Social History     Tobacco Use   Smoking Status Never   Smokeless Tobacco Never       Meds/Allergies     Current Outpatient Medications:   •  docusate sodium (COLACE) 100 mg capsule, Take 1 capsule (100 mg total) by mouth 2 (two) times a day for 10 days, Disp: 20 capsule, Rfl: 0  •  estradiol (VIVELLE-DOT) 0 0375 MG/24HR, Place 1 patch on the skin 2 (two) times a week, Disp: 8 patch, Rfl: 3  •  hydrocortisone (CORTENEMA) 100 mg/60 mL enema, Insert 60 mL (100 mg total) into the rectum daily at bedtime for 14 days Leave enema solution internally for at least 5-10 minutes (ok to keep longer if tolerated) prior to having a bowel movement   Ok to go to sleep, Disp: 14 enema, Rfl: 0  •  lidocaine (XYLOCAINE) 5 % ointment, Apply topically as needed for mild pain, Disp: 35 44 g, Rfl: 2  •  NON FORMULARY, CBD gummies daily, Disp: , Rfl:   •  polyethylene glycol (MIRALAX) 17 g packet, Take 17 g by mouth daily for 7 days Do not start before January 3, 2023 , Disp: 119 g, Rfl: 0  No Known Allergies    Review of Systems  Please refer to nursing note for full ROS    OBJECTIVE:   /78   Pulse 79   Temp 97 6 °F (36 4 °C)   Resp 20   Ht 5' 9" (1 753 m)   Wt 117 kg (259 lb)   LMP 07/27/2022 (Exact Date)   SpO2 97%   BMI 38 25 kg/m²   Pain Assessment:  6  ECOG/Zubrod/WHO: 1 - Symptomatic but completely ambulatory    Physical Exam:   General Appearance:  Alert, cooperative, no distress, appears stated age  Gyn: Vaginal canal normal, no mucosal changes  No obvious masses appreciated  No discharge or bleeding noted  Perirectal skin healthy and intact  No obvious skin breakdown, fissures, or signs of infection  Skin: No generalized rash or dermatitis    Portions of the record may have been created with voice recognition software  Occasional wrong word or "sound a like" substitutions may have occurred due to the inherent limitations of voice recognition software  Read the chart carefully and recognize, using context, where substitutions have occurred

## 2023-01-10 NOTE — PROGRESS NOTES
Gonzalo Jauregui 1990 is a 28 y o  female  with FIGO IIIC1 cervical cancer s/p initial LEEP in March 2022 showing moderately differentiated SCC with extensive LVSI  She had an MRI on 4/1/22 which showed a 1 7 cm lesion in the cervix extending to the right vaginal fornix without parametrial invasion, and a single indeterminate right-sided pelvic LN (between the external and internal iliac chain measuring 1 4 x 1 1 cm in the sagittal plane series 4 image #25 and 11 mm in width series 6 image 24)  She underwent evaluation at [facility] and [facility], and was not deemed a candidate for trachelectomy  She thus was planned for radical hysterectomy  EUA revealed a 3 5cm cervix with no palpable parametrial disease  Gross grady disease was found in the right pelvic node and the hysterectomy was aborted  A staging pelvic LND and ovarian translocation to the paracolic gutters was completed  Pathology revealed 3/10 right-sided nodes (2 right pelvic and 1 right common iliac involved) and 0/5 left-sided pelvic nodes  She had a PET/CT at [facility] on 5/27/22, which showed a 2 3cm long focus of uptake in the right side of the cervix (image 244) with no other foci of abnormal uptake elsewhere  MDT consensus was for definitive concurrent chemoRT  She received 6 cycles of cisplatin concurrent with pelvic RT with brachytherapy boost on 8/3/22  First follow up visit on 11 18 22  Pt presents today with complaint of vaginal and rectal discomfort  1 3 23 Phone notes/Rad Beonit Dhillon  Ms Moody has symptoms suggestive of mild radiation proctitis  She had GI evaluation while briefly inpatient over the past few days  (ED visit noted on 1 1 23)  We reviewed this over the phone and will start with a course of steroid enemas and re-assess  I reviewed that the expected course of relief is slow and steady, over the course of 1-2 weeks      Appt    2 1 23    GI    2 9 23    Gyn Onc-Dr Don Vaz   5 12 23  [address]  Don Nielson            Follow up visit     Oncology History   Malignant neoplasm of overlapping sites of cervix [facility])   3/24/2022 Initial Diagnosis    Malignant neoplasm of overlapping sites of cervix ([address] )     5/6/2022 Surgery    Exploratory laparotomy for planned radical hysterectomy, bilateral pelvic lymph node dissection, aborted radical hysterectomy, bilateral ovarian transposition due to positive lymph nodes    1  Two right pelvic lymph nodes positive     5/19/2022 -  Cancer Staged    Staging form: Cervix Uteri, AJCC Version 9  - Pathologic: FIGO Stage IIIC1p (pT1b1, cM0) - Signed by Emery Fernandez MD on 5/19/2022  Stage confirmation method: Pathology  Pelvic grady status: Positive  Pelvic grady method of assessment: Lymph node dissection  Para-aortic status: Negative       6/20/2022 - 7/26/2022 Chemotherapy    palonosetron (ALOXI), 0 25 mg, Intravenous, Once, 6 of 6 cycles  Administration: 0 25 mg (6/20/2022), 0 25 mg (6/27/2022), 0 25 mg (7/6/2022), 0 25 mg (7/11/2022), 0 25 mg (7/19/2022), 0 25 mg (7/26/2022)  CISplatin (PLATINOL) infusion, 70 mg (original dose 40 mg/m2), Intravenous, Once, 6 of 6 cycles  Dose modification: 70 mg (original dose 40 mg/m2, Cycle 1, Reason: Other (Must fill in a comment), Comment: max 70), 70 mg (original dose 40 mg/m2, Cycle 2, Reason: Dose modified as per discussion with consulting physician)  Administration: 70 mg (6/20/2022), 70 mg (6/27/2022), 70 mg (7/6/2022), 70 mg (7/11/2022), 70 mg (7/19/2022), 70 mg (7/26/2022)  aprepitant (CINVANTI) in  mL IVPB, 130 mg, Intravenous, Once, 6 of 6 cycles  Administration: 130 mg (6/20/2022), 130 mg (6/27/2022), 130 mg (7/6/2022), 130 mg (7/11/2022), 130 mg (7/19/2022), 130 mg (7/26/2022)     6/22/2022 - 8/3/2022 Radiation    Course: C1 - EBRT  Plan ID Energy Fractions Dose per Fraction (cGy) Dose Correction (cGy) Total Dose Delivered (cGy) Elapsed Days   Whole Pelvis 10X 25 / 25 180 0 4,500 42       Course: C1 HDR Tandem and Ring Brachytherapy  Plan ID Energy Fractions Dose per Fraction (cGy) Dose Correction (cGy) Total Dose Delivered (cGy) Elapsed Days   HDR1 7-15-22  1 / 1 700 0 700 0   UTE3_2--18-22 1 / 1 700 0 700 0   LQX8_6--21-22 1 / 1 700 0 700 0   HDR4 7-25-22 1 / 1 700 0 700 0             Review of Systems:  Review of Systems   Constitutional: Negative  HENT: Negative  Eyes: Negative  Respiratory: Negative  Cardiovascular: Negative  Gastrointestinal: Positive for anal bleeding (Minimal blood/mucous x3 wks ), constipation, diarrhea and rectal pain  Endocrine: Negative  Genitourinary: Negative  Musculoskeletal: Positive for back pain (Chronic  LBP)  Skin: Negative  Allergic/Immunologic: Negative  Neurological: Positive for headaches (Often )  Hematological: Negative  Psychiatric/Behavioral: Positive for sleep disturbance (Chronic )         Clinical Trial: no    Teaching    Health Maintenance   Topic Date Due   • Hepatitis C Screening  Never done   • Pneumococcal Vaccine: Pediatrics (0 to 5 Years) and At-Risk Patients (6 to 59 Years) (1 - PCV) Never done   • HIV Screening  Never done   • BMI: Followup Plan  Never done   • Annual Physical  Never done   • DTaP,Tdap,and Td Vaccines (1 - Tdap) Never done   • COVID-19 Vaccine (3 - Moderna risk series) 06/22/2021   • Influenza Vaccine (1) Never done   • BMI: Adult  11/18/2023   • Cervical Cancer Screening  11/12/2026   • HIB Vaccine  Aged Out   • IPV Vaccine  Aged Out   • Hepatitis A Vaccine  Aged Out   • Meningococcal ACWY Vaccine  Aged Out   • HPV Vaccine  Aged Out     Patient Active Problem List   Diagnosis   • Malignant neoplasm of overlapping sites of cervix ([facility] [address] )   • Family history of ovarian cancer   • Obesity (BMI 35 0-39 9 without comorbidity)   • Dysuria   • Anxiety   • Depression   • Dyspareunia due to medical condition in female patient   • Menopausal symptoms   • Abdominal pain     Past Medical History:   Diagnosis Date   • Anxiety    • Cervical cancer [facility])     receiving chemo and radiation   • COVID     Jan 2022   • Depression    • Diarrhea    • Dizziness    • Frequent headaches    • Obesity      Past Surgical History:   Procedure Laterality Date   • CERVICAL BIOPSY  W/ LOOP ELECTRODE EXCISION     • LYMPH NODE DISSECTION Bilateral 5/6/2022    Procedure: DISSECTION/STAGING LYMPH NODE PELVIS/ABDOMEN;  Surgeon: Jennie Deshpande MD;  [facility];  Service: Gynecology Oncology   • PA INSERTION VAGINAL RADIATION DEVICE N/A 7/15/2022    Procedure: INSERTION NADIR SLEEVE VAGINA WITH POST OP BRACHYTHERAPY (IN RADIATION ONCOLOGY); Surgeon: Jennie Deshpande MD;  [facility];  Service: Gynecology Oncology   • SALPINGECTOMY Bilateral 5/6/2022    Procedure: SALPINGECTOMY, OVARIAN TRANSPOSITION;  Surgeon: Jennie Deshpande MD;  [facility];  Service: Gynecology Oncology   • US GUIDANCE  7/15/2022     Family History   Problem Relation Age of Onset   • Ovarian cancer Maternal Aunt    • Ovarian cancer Maternal Grandmother    • No Known Problems Mother    • Heart disease Father      Social History     Socioeconomic History   • Marital status: Single     Spouse name: Not on file   • Number of children: Not on file   • Years of education: Not on file   • Highest education level: Not on file   Occupational History   • Not on file   Tobacco Use   • Smoking status: Never   • Smokeless tobacco: Never   Vaping Use   • Vaping Use: Never used   Substance and Sexual Activity   • Alcohol use: Not Currently   • Drug use: Never     Comment: CBD Gummies/Anxiety   • Sexual activity: Not Currently   Other Topics Concern   • Not on file   Social History Narrative   • Not on file     Social Determinants of Health     Financial Resource Strain: Not on file   Food Insecurity: Not on file   Transportation Needs: No Transportation Needs   • Lack of Transportation (Medical): No   • Lack of Transportation (Non-Medical):  No   Physical Activity: Not on file   Stress: Not on file   Social Connections: Not on file   Intimate Partner Violence: Not on file   Housing Stability: Not on file       Current Outpatient Medications:   •  docusate sodium (COLACE) 100 mg capsule, Take 1 capsule (100 mg total) by mouth 2 (two) times a day for 10 days, Disp: 20 capsule, Rfl: 0  •  estradiol (VIVELLE-DOT) 0 0375 MG/24HR, Place 1 patch on the skin 2 (two) times a week, Disp: 8 patch, Rfl: 3  •  hydrocortisone (CORTENEMA) 100 mg/60 mL enema, Insert 60 mL (100 mg total) into the rectum daily at bedtime for 14 days Leave enema solution internally for at least 5-10 minutes (ok to keep longer if tolerated) prior to having a bowel movement   Ok to go to sleep, Disp: 14 enema, Rfl: 0  •  NON FORMULARY, CBD gummies daily, Disp: , Rfl:   •  polyethylene glycol (MIRALAX) 17 g packet, Take 17 g by mouth daily for 7 days Do not start before January 3, 2023 , Disp: 119 g, Rfl: 0  No Known Allergies  Vitals:    01/10/23 1434   BP: 124/78   Pulse: 79   Resp: 20   Temp: 97 6 °F (36 4 °C)   SpO2: 97%   Weight: 117 kg (259 lb)   Height: 5' 9" (1 753 m)      Pain Score:   6

## 2023-01-11 ENCOUNTER — OFFICE VISIT (OUTPATIENT)
Dept: GASTROENTEROLOGY | Facility: MEDICAL CENTER | Age: 33
End: 2023-01-11

## 2023-01-11 VITALS
WEIGHT: 256.6 LBS | HEART RATE: 88 BPM | TEMPERATURE: 98.1 F | DIASTOLIC BLOOD PRESSURE: 86 MMHG | BODY MASS INDEX: 37.89 KG/M2 | SYSTOLIC BLOOD PRESSURE: 133 MMHG

## 2023-01-11 DIAGNOSIS — R10.30 LOWER ABDOMINAL PAIN: Primary | ICD-10-CM

## 2023-01-11 DIAGNOSIS — K59.01 SLOW TRANSIT CONSTIPATION: ICD-10-CM

## 2023-01-11 DIAGNOSIS — K62.5 RECTAL BLEEDING: ICD-10-CM

## 2023-01-11 RX ORDER — PLECANATIDE 3 MG/1
3 TABLET ORAL DAILY
Qty: 30 TABLET | Refills: 3 | Status: SHIPPED | COMMUNITY
Start: 2023-01-11 | End: 2023-02-10

## 2023-01-11 NOTE — PATIENT INSTRUCTIONS
Scheduled date of Colon (as of today) 1/13/23  Physician performing: Dr Chito Martinez  Location of procedure:  [facility]  Instructions given to patient:  miralax/dulcolax  Clearances: na

## 2023-01-11 NOTE — PROGRESS NOTES
Outpatient Follow up  [address]  [address]  [facility] [address]  Christiano Freeman MD  Ph : [phone]  Fax : [phone]  Mobile : [phone]  Email : [email or web address]  Also available on [employer] Text    Carlin Santos 28 y o  female MRN: [id number]    PCP: Domingo Farfan DO  Referring: DO Wenceslao Casarez  [facility]    Carlin Arayasherri presented for a follow up visit  My recommendations are included  Please do not hesitate to contact me with any questions you may have  ASSESSMENT AND PLAN:      No problem-specific Assessment & Plan notes found for this encounter  Diagnoses and all orders for this visit:    Lower abdominal pain  -     Colonoscopy; Future    Slow transit constipation  -     Colonoscopy; Future  -     Plecanatide (Trulance) 3 MG TABS; Take 3 mg by mouth daily    Rectal bleeding  -     Colonoscopy; Future    Other orders  -     Probiotic Product (PROBIOTIC-10 PO); Take by mouth  -     Diet NPO; Sips with meds; Standing  -     Void on call to OR; Standing  -     Insert peripheral IV; Standing       29-year-old with history of cervical cancer status postradiation, history of chronic constipation and now with rectal bleeding and a CT scan showing colitis  This could be radiation-induced  However due to the ongoing pain and bleeding I would recommend the following  1  Take 1/2 of 238gm miralax bottle (7 scoops) in 32 oz of gatorade and drink tomorrow am  If you start moving your bowels then you do not need to take any further  If you do not then take another 32 oz of gatorade with 7 scoops of miralax in the evening  2  Trulance daily  If this is not approved then we will write for an alternative       3  Colonoscopy 1/13/23      ______________________________________________________________________    SUBJECTIVE:  29 y/o with h/o cervical cancer and s/p radiation who presents for evaluation for abodminal pain  The pain is belwo the umbilicus  She has had pain since her radiation treatment for the cervical cancer  Pain is every day  Waxes and wanes  Laying down may help slightly  She has nausea with it but no vomiting  She has urgency to go but can not go  She takes miralax and colace but can not go  Magnesium citrate helps  She has only scant bleeding when she goes tot he bathroom  She has mucous  She was started on cortenema and that has not helped  She has been on them for the past week  She has never had a colonoscopy done  No urinary symptoms  She has no heartburn or reflux  No dysphagia  She has had constipation for many years  She had it 10 years ago and had Linzess which she stated did not work well and was expensive  The past 2 weeks the pain has been worse  She has worsening constipation as well  Has not gone regularly and only small amount of liquid with magnesium citrate  She feels bloated  Not much flatulence  She went to the emergency room and had a CT scan done  Shows proctitis and colitis which would be consistent with radiation damage      Answers for HPI/ROS submitted by the patient on 1/11/2023  Chronicity: chronic  Onset: more than 1 year ago  Onset quality: undetermined  Frequency: daily  Episode duration: 12 Months  Progression since onset: waxing and waning  Pain location: suprapubic region  Pain - numeric: 6/10  Pain quality: aching, cramping, dull, a sensation of fullness  Radiates to: periumbilical region, suprapubic region, back, pelvis  anorexia: No  arthralgias: Yes  belching: Yes  constipation: Yes  diarrhea: Yes  dysuria: No  fever: No  flatus: No  frequency: No  headaches: Yes  hematochezia: Yes  hematuria: No  melena: No  myalgias: Yes  nausea: Yes  weight loss: No  vomiting: No  Aggravated by: nothing  Relieved by: recumbency  Diagnostic workup: CT scan        REVIEW OF SYSTEMS IS OTHERWISE NEGATIVE  Historical Information   Past Medical History:   Diagnosis Date   • Anxiety    • Cervical cancer [facility])     receiving chemo and radiation   • COVID     Jan 2022   • Depression    • Diarrhea    • Dizziness    • Frequent headaches    • Obesity      Past Surgical History:   Procedure Laterality Date   • CERVICAL BIOPSY  W/ LOOP ELECTRODE EXCISION     • LYMPH NODE DISSECTION Bilateral 5/6/2022    Procedure: DISSECTION/STAGING LYMPH NODE PELVIS/ABDOMEN;  Surgeon: Bruce Raman MD;  [facility];  Service: Gynecology Oncology   • FL INSERTION VAGINAL RADIATION DEVICE N/A 7/15/2022    Procedure: INSERTION NADIR SLEEVE VAGINA WITH POST OP BRACHYTHERAPY (IN RADIATION ONCOLOGY);   Surgeon: Bruce Raman MD;  [facility];  Service: Gynecology Oncology   • SALPINGECTOMY Bilateral 5/6/2022    Procedure: SALPINGECTOMY, OVARIAN TRANSPOSITION;  Surgeon: Bruce Raman MD;  [facility];  Service: Gynecology Oncology   • US GUIDANCE  7/15/2022     Social History   Social History     Substance and Sexual Activity   Alcohol Use Not Currently     Social History     Substance and Sexual Activity   Drug Use Never    Comment: CBD Gummies/Anxiety     Social History     Tobacco Use   Smoking Status Never   Smokeless Tobacco Never     Family History   Problem Relation Age of Onset   • Ovarian cancer Maternal Aunt    • Ovarian cancer Maternal Grandmother    • No Known Problems Mother    • Heart disease Father        Meds/Allergies       Current Outpatient Medications:   •  docusate sodium (COLACE) 100 mg capsule  •  estradiol (VIVELLE-DOT) 0 0375 MG/24HR  •  hydrocortisone (CORTENEMA) 100 mg/60 mL enema  •  lidocaine (XYLOCAINE) 5 % ointment  •  NON FORMULARY  •  Plecanatide (Trulance) 3 MG TABS  •  Probiotic Product (PROBIOTIC-10 PO)  •  polyethylene glycol (MIRALAX) 17 g packet    No Known Allergies        Objective     Blood pressure 133/86, pulse 88, temperature 98 1 °F (36 7 °C), temperature source Tympanic, weight 116 kg (256 lb 9 6 oz), last menstrual period 07/27/2022, not currently breastfeeding  Body mass index is 37 89 kg/m²  PHYSICAL EXAM:      Physical Exam  Constitutional:       Appearance: Normal appearance  She is well-developed  HENT:      Head: Normocephalic and atraumatic  Eyes:      General: No scleral icterus  Conjunctiva/sclera: Conjunctivae normal       Pupils: Pupils are equal, round, and reactive to light  Cardiovascular:      Rate and Rhythm: Normal rate and regular rhythm  Heart sounds: Normal heart sounds  Pulmonary:      Effort: Pulmonary effort is normal  No respiratory distress  Breath sounds: Normal breath sounds  Abdominal:      General: Bowel sounds are normal  There is no distension  Palpations: Abdomen is soft  There is no mass  Tenderness: There is no abdominal tenderness  Hernia: No hernia is present  Musculoskeletal:         General: Normal range of motion  Cervical back: Normal range of motion  Lymphadenopathy:      Cervical: No cervical adenopathy  Skin:     General: Skin is warm  Neurological:      Mental Status: She is alert and oriented to person, place, and time  Psychiatric:         Behavior: Behavior normal          Thought Content: Thought content normal          Lab Results:   No visits with results within 1 Day(s) from this visit     Latest known visit with results is:   Admission on 01/01/2023, Discharged on 01/02/2023   Component Date Value   • WBC 01/01/2023 4 42    • RBC 01/01/2023 4 21    • Hemoglobin 01/01/2023 12 5    • Hematocrit 01/01/2023 37 2    • MCV 01/01/2023 88    • MCH 01/01/2023 29 7    • MCHC 01/01/2023 33 6    • RDW 01/01/2023 11 9    • MPV 01/01/2023 7 8 (L)    • Platelets 52/34/9366 252    • nRBC 01/01/2023 0    • Neutrophils Relative 01/01/2023 69    • Immat GRANS % 01/01/2023 1    • Lymphocytes Relative 01/01/2023 16    • Monocytes Relative 01/01/2023 11 • Eosinophils Relative 01/01/2023 3    • Basophils Relative 01/01/2023 0    • Neutrophils Absolute 01/01/2023 3 05    • Immature Grans Absolute 01/01/2023 0 03    • Lymphocytes Absolute 01/01/2023 0 72    • Monocytes Absolute 01/01/2023 0 48    • Eosinophils Absolute 01/01/2023 0 13    • Basophils Absolute 01/01/2023 0 01    • Sodium 01/01/2023 140    • Potassium 01/01/2023 3 8    • Chloride 01/01/2023 102    • CO2 01/01/2023 31    • ANION GAP 01/01/2023 7    • BUN 01/01/2023 15    • Creatinine 01/01/2023 0 80    • Glucose 01/01/2023 103    • Calcium 01/01/2023 8 9    • AST 01/01/2023 15    • ALT 01/01/2023 20    • Alkaline Phosphatase 01/01/2023 84    • Total Protein 01/01/2023 8 0    • Albumin 01/01/2023 3 7    • Total Bilirubin 01/01/2023 0 30    • eGFR 01/01/2023 97    • Lipase 01/01/2023 79    • Color, UA 01/01/2023 Yellow    • Clarity, UA 01/01/2023 Cloudy    • Specific Gravity, UA 01/01/2023 1 015    • pH, UA 01/01/2023 8 5 (A)    • Leukocytes, UA 01/01/2023 Moderate (A)    • Nitrite, UA 01/01/2023 Negative    • Protein, UA 01/01/2023 Trace (A)    • Glucose, UA 01/01/2023 Negative    • Ketones, UA 01/01/2023 Negative    • Urobilinogen, UA 01/01/2023 <2 0    • Bilirubin, UA 01/01/2023 Negative    • Occult Blood, UA 01/01/2023 Negative    • RBC, UA 01/01/2023 None Seen    • WBC, UA 01/01/2023 4-10 (A)    • Epithelial Cells 01/01/2023 Moderate (A)    • Bacteria, UA 01/01/2023 Occasional    • MUCUS THREADS 01/01/2023 Occasional (A)    • Amorphous Crystals, UA 01/01/2023 Moderate    • LACTIC ACID 01/01/2023 0 3 (L)    • Urine Culture 01/02/2023 20,000-29,000 cfu/ml    • Ventricular Rate 01/02/2023 87    • Atrial Rate 01/02/2023 87    • OH Interval 01/02/2023 178    • QRSD Interval 01/02/2023 82    • QT Interval 01/02/2023 354    • QTC Interval 01/02/2023 425    • P Axis 01/02/2023 27    • QRS Axis 01/02/2023 34    • T Wave Axis 01/02/2023 28    • Sed Rate 01/02/2023 28 (H)    • Total CK 01/02/2023 27    • CRP 01/02/2023 36 2 (H)    • WBC 01/02/2023 3 63 (L)    • RBC 01/02/2023 3 31 (L)    • Hemoglobin 01/02/2023 9 8 (L)    • Hematocrit 01/02/2023 29 6 (L)    • MCV 01/02/2023 89    • MCH 01/02/2023 29 6    • MCHC 01/02/2023 33 1    • RDW 01/02/2023 11 9    • MPV 01/02/2023 7 8 (L)    • Platelets 67/09/7651 175    • nRBC 01/02/2023 0    • Neutrophils Relative 01/02/2023 61    • Immat GRANS % 01/02/2023 1    • Lymphocytes Relative 01/02/2023 20    • Monocytes Relative 01/02/2023 14 (H)    • Eosinophils Relative 01/02/2023 4    • Basophils Relative 01/02/2023 0    • Neutrophils Absolute 01/02/2023 2 22    • Immature Grans Absolute 01/02/2023 0 02    • Lymphocytes Absolute 01/02/2023 0 72    • Monocytes Absolute 01/02/2023 0 52    • Eosinophils Absolute 01/02/2023 0 15    • Basophils Absolute 01/02/2023 0 00    • Sodium 01/02/2023 140    • Potassium 01/02/2023 3 7    • Chloride 01/02/2023 110 (H)    • CO2 01/02/2023 25    • ANION GAP 01/02/2023 5    • BUN 01/02/2023 15    • Creatinine 01/02/2023 0 67    • Glucose 01/02/2023 87    • Calcium 01/02/2023 8 0 (L)    • Corrected Calcium 01/02/2023 9 0    • AST 01/02/2023 8    • ALT 01/02/2023 15    • Alkaline Phosphatase 01/02/2023 64    • Total Protein 01/02/2023 6 1 (L)    • Albumin 01/02/2023 2 7 (L)    • Total Bilirubin 01/02/2023 0 30    • eGFR 01/02/2023 116    • Magnesium 01/02/2023 1 8    • Phosphorus 01/02/2023 3 5    • Protime 01/02/2023 14 0    • INR 01/02/2023 1 00    • PTT 01/02/2023 31          Radiology Results:   CT abdomen pelvis with contrast    Result Date: 1/1/2023  Narrative: CT ABDOMEN AND PELVIS WITH IV CONTRAST INDICATION:   lower abdominal pain no BM  COMPARISON:  None  TECHNIQUE:  CT examination of the abdomen and pelvis was performed  Axial, sagittal, and coronal 2D reformatted images were created from the source data and submitted for interpretation  Radiation dose length product (DLP) for this visit:  1193 mGy-cm     This examination, like all CT scans performed in the [facility], was performed utilizing techniques to minimize radiation dose exposure, including the use of iterative reconstruction and automated exposure control  IV Contrast:  100 mL of iohexol (OMNIPAQUE) Enteric Contrast:  Enteric contrast was not administered  FINDINGS: ABDOMEN LOWER CHEST:  No clinically significant abnormality identified in the visualized lower chest  LIVER/BILIARY TREE:  One or more subcentimeter sharply circumscribed low-density hepatic lesion(s) are noted, too small to accurately characterize, but statistically most likely to represent subcentimeter hepatic cysts  No suspicious solid hepatic lesion is identified  Hepatic contours are normal   No biliary dilatation  GALLBLADDER:  No calcified gallstones  No pericholecystic inflammatory change  SPLEEN:  Unremarkable  PANCREAS:  Unremarkable  ADRENAL GLANDS:  Unremarkable  KIDNEYS/URETERS:  Unremarkable  No hydronephrosis  STOMACH AND BOWEL:  Severe thickening of the sigmoid colon and rectal wall  APPENDIX:  The appendix measures up to 9 mm with a appendicolith  ABDOMINOPELVIC CAVITY:  No ascites  No pneumoperitoneum  No lymphadenopathy  VESSELS:  Unremarkable for patient's age  PELVIS REPRODUCTIVE ORGANS:  Unremarkable for patient's age  URINARY BLADDER:  Unremarkable  ABDOMINAL WALL/INGUINAL REGIONS:  Unremarkable  OSSEOUS STRUCTURES:  No acute fracture or destructive osseous lesion  Impression: Severe thickening of the sigmoid colon and rectal wall which may represent colitis/proctitis (infection versus inflammatory cannot rule out ischemia)  Follow-up with gastroenterology is recommended  Appendix measuring up to 9 mm with a appendicolith  Findings are equivocal for acute appendicitis The study was marked in EPIC for immediate notification   Workstation performed: [id number]

## 2023-01-11 NOTE — H&P (VIEW-ONLY)
Outpatient Follow up  [address]  [address]  [facility] [address]  Merissa Her MD  Ph : [phone]  Fax : [phone]  Mobile : [phone]  Email : [email or web address]  Also available on [employer]    Angel Walter 28 y o  female MRN: [id number]    PCP: Param Rand DO  Referring: DO Wenceslao Diggs [address]    Angel Walter presented for a follow up visit  My recommendations are included  Please do not hesitate to contact me with any questions you may have  ASSESSMENT AND PLAN:      No problem-specific Assessment & Plan notes found for this encounter  Diagnoses and all orders for this visit:    Lower abdominal pain  -     Colonoscopy; Future    Slow transit constipation  -     Colonoscopy; Future  -     Plecanatide (Trulance) 3 MG TABS; Take 3 mg by mouth daily    Rectal bleeding  -     Colonoscopy; Future    Other orders  -     Probiotic Product (PROBIOTIC-10 PO); Take by mouth  -     Diet NPO; Sips with meds; Standing  -     Void on call to OR; Standing  -     Insert peripheral IV; Standing       26-year-old with history of cervical cancer status postradiation, history of chronic constipation and now with rectal bleeding and a CT scan showing colitis  This could be radiation-induced  However due to the ongoing pain and bleeding I would recommend the following  1  Take 1/2 of 238gm miralax bottle (7 scoops) in 32 oz of gatorade and drink tomorrow am  If you start moving your bowels then you do not need to take any further  If you do not then take another 32 oz of gatorade with 7 scoops of miralax in the evening  2  Trulance daily  If this is not approved then we will write for an alternative       3  Colonoscopy 1/13/23      ______________________________________________________________________    SUBJECTIVE:  29 y/o with h/o cervical cancer and s/p radiation who presents for evaluation for abodminal pain  The pain is belwo the umbilicus  She has had pain since her radiation treatment for the cervical cancer  Pain is every day  Waxes and wanes  Laying down may help slightly  She has nausea with it but no vomiting  She has urgency to go but can not go  She takes miralax and colace but can not go  Magnesium citrate helps  She has only scant bleeding when she goes tot he bathroom  She has mucous  She was started on cortenema and that has not helped  She has been on them for the past week  She has never had a colonoscopy done  No urinary symptoms  She has no heartburn or reflux  No dysphagia  She has had constipation for many years  She had it 10 years ago and had Linzess which she stated did not work well and was expensive  The past 2 weeks the pain has been worse  She has worsening constipation as well  Has not gone regularly and only small amount of liquid with magnesium citrate  She feels bloated  Not much flatulence  She went to the emergency room and had a CT scan done  Shows proctitis and colitis which would be consistent with radiation damage      Answers for HPI/ROS submitted by the patient on 1/11/2023  Chronicity: chronic  Onset: more than 1 year ago  Onset quality: undetermined  Frequency: daily  Episode duration: 12 Months  Progression since onset: waxing and waning  Pain location: suprapubic region  Pain - numeric: 6/10  Pain quality: aching, cramping, dull, a sensation of fullness  Radiates to: periumbilical region, suprapubic region, back, pelvis  anorexia: No  arthralgias: Yes  belching: Yes  constipation: Yes  diarrhea: Yes  dysuria: No  fever: No  flatus: No  frequency: No  headaches: Yes  hematochezia: Yes  hematuria: No  melena: No  myalgias: Yes  nausea: Yes  weight loss: No  vomiting: No  Aggravated by: nothing  Relieved by: recumbency  Diagnostic workup: CT scan        REVIEW OF SYSTEMS IS OTHERWISE NEGATIVE  Historical Information   Past Medical History:   Diagnosis Date   • Anxiety    • Cervical cancer [facility])     receiving chemo and radiation   • COVID     Jan 2022   • Depression    • Diarrhea    • Dizziness    • Frequent headaches    • Obesity      Past Surgical History:   Procedure Laterality Date   • CERVICAL BIOPSY  W/ LOOP ELECTRODE EXCISION     • LYMPH NODE DISSECTION Bilateral 5/6/2022    Procedure: DISSECTION/STAGING LYMPH NODE PELVIS/ABDOMEN;  Surgeon: Yesi Elliott MD;  [facility];  Service: Gynecology Oncology   • [place] INSERTION VAGINAL RADIATION DEVICE N/A 7/15/2022    Procedure: INSERTION NADIR SLEEVE VAGINA WITH POST OP BRACHYTHERAPY (IN RADIATION ONCOLOGY);   Surgeon: Yesi Elliott MD;  [facility];  Service: Gynecology Oncology   • SALPINGECTOMY Bilateral 5/6/2022    Procedure: SALPINGECTOMY, OVARIAN TRANSPOSITION;  Surgeon: Yesi Elliott MD;  [facility];  Service: Gynecology Oncology   • US GUIDANCE  7/15/2022     Social History   Social History     Substance and Sexual Activity   Alcohol Use Not Currently     Social History     Substance and Sexual Activity   Drug Use Never    Comment: CBD Gummies/Anxiety     Social History     Tobacco Use   Smoking Status Never   Smokeless Tobacco Never     Family History   Problem Relation Age of Onset   • Ovarian cancer Maternal Aunt    • Ovarian cancer Maternal Grandmother    • No Known Problems Mother    • Heart disease Father        Meds/Allergies       Current Outpatient Medications:   •  docusate sodium (COLACE) 100 mg capsule  •  estradiol (VIVELLE-DOT) 0 0375 MG/24HR  •  hydrocortisone (CORTENEMA) 100 mg/60 mL enema  •  lidocaine (XYLOCAINE) 5 % ointment  •  NON FORMULARY  •  Plecanatide (Trulance) 3 MG TABS  •  Probiotic Product (PROBIOTIC-10 PO)  •  polyethylene glycol (MIRALAX) 17 g packet    No Known Allergies        Objective     Blood pressure 133/86, pulse 88, temperature 98 1 °F (36 7 °C), temperature source Tympanic, weight 116 kg (256 lb 9 6 oz), last menstrual period 07/27/2022, not currently breastfeeding  Body mass index is 37 89 kg/m²  PHYSICAL EXAM:      Physical Exam  Constitutional:       Appearance: Normal appearance  She is well-developed  HENT:      Head: Normocephalic and atraumatic  Eyes:      General: No scleral icterus  Conjunctiva/sclera: Conjunctivae normal       Pupils: Pupils are equal, round, and reactive to light  Cardiovascular:      Rate and Rhythm: Normal rate and regular rhythm  Heart sounds: Normal heart sounds  Pulmonary:      Effort: Pulmonary effort is normal  No respiratory distress  Breath sounds: Normal breath sounds  Abdominal:      General: Bowel sounds are normal  There is no distension  Palpations: Abdomen is soft  There is no mass  Tenderness: There is no abdominal tenderness  Hernia: No hernia is present  Musculoskeletal:         General: Normal range of motion  Cervical back: Normal range of motion  Lymphadenopathy:      Cervical: No cervical adenopathy  Skin:     General: Skin is warm  Neurological:      Mental Status: She is alert and oriented to person, place, and time  Psychiatric:         Behavior: Behavior normal          Thought Content: Thought content normal          Lab Results:   No visits with results within 1 Day(s) from this visit     Latest known visit with results is:   Admission on 01/01/2023, Discharged on 01/02/2023   Component Date Value   • WBC 01/01/2023 4 42    • RBC 01/01/2023 4 21    • Hemoglobin 01/01/2023 12 5    • Hematocrit 01/01/2023 37 2    • MCV 01/01/2023 88    • MCH 01/01/2023 29 7    • MCHC 01/01/2023 33 6    • RDW 01/01/2023 11 9    • MPV 01/01/2023 7 8 (L)    • Platelets 81/94/7232 252    • nRBC 01/01/2023 0    • Neutrophils Relative 01/01/2023 69    • Immat GRANS % 01/01/2023 1    • Lymphocytes Relative 01/01/2023 16    • Monocytes Relative 01/01/2023 11 • Eosinophils Relative 01/01/2023 3    • Basophils Relative 01/01/2023 0    • Neutrophils Absolute 01/01/2023 3 05    • Immature Grans Absolute 01/01/2023 0 03    • Lymphocytes Absolute 01/01/2023 0 72    • Monocytes Absolute 01/01/2023 0 48    • Eosinophils Absolute 01/01/2023 0 13    • Basophils Absolute 01/01/2023 0 01    • Sodium 01/01/2023 140    • Potassium 01/01/2023 3 8    • Chloride 01/01/2023 102    • CO2 01/01/2023 31    • ANION GAP 01/01/2023 7    • BUN 01/01/2023 15    • Creatinine 01/01/2023 0 80    • Glucose 01/01/2023 103    • Calcium 01/01/2023 8 9    • AST 01/01/2023 15    • ALT 01/01/2023 20    • Alkaline Phosphatase 01/01/2023 84    • Total Protein 01/01/2023 8 0    • Albumin 01/01/2023 3 7    • Total Bilirubin 01/01/2023 0 30    • eGFR 01/01/2023 97    • Lipase 01/01/2023 79    • Color, UA 01/01/2023 Yellow    • Clarity, UA 01/01/2023 Cloudy    • Specific Gravity, UA 01/01/2023 1 015    • pH, UA 01/01/2023 8 5 (A)    • Leukocytes, UA 01/01/2023 Moderate (A)    • Nitrite, UA 01/01/2023 Negative    • Protein, UA 01/01/2023 Trace (A)    • Glucose, UA 01/01/2023 Negative    • Ketones, UA 01/01/2023 Negative    • Urobilinogen, UA 01/01/2023 <2 0    • Bilirubin, UA 01/01/2023 Negative    • Occult Blood, UA 01/01/2023 Negative    • RBC, UA 01/01/2023 None Seen    • WBC, UA 01/01/2023 4-10 (A)    • Epithelial Cells 01/01/2023 Moderate (A)    • Bacteria, UA 01/01/2023 Occasional    • MUCUS THREADS 01/01/2023 Occasional (A)    • Amorphous Crystals, UA 01/01/2023 Moderate    • LACTIC ACID 01/01/2023 0 3 (L)    • Urine Culture 01/02/2023 20,000-29,000 cfu/ml    • Ventricular Rate 01/02/2023 87    • Atrial Rate 01/02/2023 87    • VT Interval 01/02/2023 178    • QRSD Interval 01/02/2023 82    • QT Interval 01/02/2023 354    • QTC Interval 01/02/2023 425    • P Axis 01/02/2023 27    • QRS Axis 01/02/2023 34    • T Wave Axis 01/02/2023 28    • Sed Rate 01/02/2023 28 (H)    • Total CK 01/02/2023 27    • CRP 01/02/2023 36 2 (H)    • WBC 01/02/2023 3 63 (L)    • RBC 01/02/2023 3 31 (L)    • Hemoglobin 01/02/2023 9 8 (L)    • Hematocrit 01/02/2023 29 6 (L)    • MCV 01/02/2023 89    • MCH 01/02/2023 29 6    • MCHC 01/02/2023 33 1    • RDW 01/02/2023 11 9    • MPV 01/02/2023 7 8 (L)    • Platelets 12/10/0730 175    • nRBC 01/02/2023 0    • Neutrophils Relative 01/02/2023 61    • Immat GRANS % 01/02/2023 1    • Lymphocytes Relative 01/02/2023 20    • Monocytes Relative 01/02/2023 14 (H)    • Eosinophils Relative 01/02/2023 4    • Basophils Relative 01/02/2023 0    • Neutrophils Absolute 01/02/2023 2 22    • Immature Grans Absolute 01/02/2023 0 02    • Lymphocytes Absolute 01/02/2023 0 72    • Monocytes Absolute 01/02/2023 0 52    • Eosinophils Absolute 01/02/2023 0 15    • Basophils Absolute 01/02/2023 0 00    • Sodium 01/02/2023 140    • Potassium 01/02/2023 3 7    • Chloride 01/02/2023 110 (H)    • CO2 01/02/2023 25    • ANION GAP 01/02/2023 5    • BUN 01/02/2023 15    • Creatinine 01/02/2023 0 67    • Glucose 01/02/2023 87    • Calcium 01/02/2023 8 0 (L)    • Corrected Calcium 01/02/2023 9 0    • AST 01/02/2023 8    • ALT 01/02/2023 15    • Alkaline Phosphatase 01/02/2023 64    • Total Protein 01/02/2023 6 1 (L)    • Albumin 01/02/2023 2 7 (L)    • Total Bilirubin 01/02/2023 0 30    • eGFR 01/02/2023 116    • Magnesium 01/02/2023 1 8    • Phosphorus 01/02/2023 3 5    • Protime 01/02/2023 14 0    • INR 01/02/2023 1 00    • PTT 01/02/2023 31          Radiology Results:   CT abdomen pelvis with contrast    Result Date: 1/1/2023  Narrative: CT ABDOMEN AND PELVIS WITH IV CONTRAST INDICATION:   lower abdominal pain no BM  COMPARISON:  None  TECHNIQUE:  CT examination of the abdomen and pelvis was performed  Axial, sagittal, and coronal 2D reformatted images were created from the source data and submitted for interpretation  Radiation dose length product (DLP) for this visit:  6726 mGy-cm     This examination, like all CT scans performed in the [facility], was performed utilizing techniques to minimize radiation dose exposure, including the use of iterative reconstruction and automated exposure control  IV Contrast:  100 mL of iohexol (OMNIPAQUE) Enteric Contrast:  Enteric contrast was not administered  FINDINGS: ABDOMEN LOWER CHEST:  No clinically significant abnormality identified in the visualized lower chest  LIVER/BILIARY TREE:  One or more subcentimeter sharply circumscribed low-density hepatic lesion(s) are noted, too small to accurately characterize, but statistically most likely to represent subcentimeter hepatic cysts  No suspicious solid hepatic lesion is identified  Hepatic contours are normal   No biliary dilatation  GALLBLADDER:  No calcified gallstones  No pericholecystic inflammatory change  SPLEEN:  Unremarkable  PANCREAS:  Unremarkable  ADRENAL GLANDS:  Unremarkable  KIDNEYS/URETERS:  Unremarkable  No hydronephrosis  STOMACH AND BOWEL:  Severe thickening of the sigmoid colon and rectal wall  APPENDIX:  The appendix measures up to 9 mm with a appendicolith  ABDOMINOPELVIC CAVITY:  No ascites  No pneumoperitoneum  No lymphadenopathy  VESSELS:  Unremarkable for patient's age  PELVIS REPRODUCTIVE ORGANS:  Unremarkable for patient's age  URINARY BLADDER:  Unremarkable  ABDOMINAL WALL/INGUINAL REGIONS:  Unremarkable  OSSEOUS STRUCTURES:  No acute fracture or destructive osseous lesion  Impression: Severe thickening of the sigmoid colon and rectal wall which may represent colitis/proctitis (infection versus inflammatory cannot rule out ischemia)  Follow-up with gastroenterology is recommended  Appendix measuring up to 9 mm with a appendicolith  Findings are equivocal for acute appendicitis The study was marked in EPIC for immediate notification   Workstation performed: [id number]

## 2023-01-13 ENCOUNTER — HOSPITAL ENCOUNTER (OUTPATIENT)
Dept: GASTROENTEROLOGY | Facility: HOSPITAL | Age: 33
Setting detail: OUTPATIENT SURGERY
End: 2023-01-13
Attending: INTERNAL MEDICINE

## 2023-01-13 ENCOUNTER — TELEPHONE (OUTPATIENT)
Dept: GASTROENTEROLOGY | Facility: MEDICAL CENTER | Age: 33
End: 2023-01-13

## 2023-01-13 ENCOUNTER — ANESTHESIA EVENT (OUTPATIENT)
Dept: GASTROENTEROLOGY | Facility: HOSPITAL | Age: 33
End: 2023-01-13

## 2023-01-13 ENCOUNTER — ANESTHESIA (OUTPATIENT)
Dept: GASTROENTEROLOGY | Facility: HOSPITAL | Age: 33
End: 2023-01-13

## 2023-01-13 VITALS
SYSTOLIC BLOOD PRESSURE: 128 MMHG | HEART RATE: 83 BPM | TEMPERATURE: 98.9 F | WEIGHT: 256 LBS | BODY MASS INDEX: 37.8 KG/M2 | DIASTOLIC BLOOD PRESSURE: 90 MMHG | RESPIRATION RATE: 16 BRPM | OXYGEN SATURATION: 98 %

## 2023-01-13 DIAGNOSIS — K59.01 SLOW TRANSIT CONSTIPATION: ICD-10-CM

## 2023-01-13 DIAGNOSIS — R10.30 LOWER ABDOMINAL PAIN: ICD-10-CM

## 2023-01-13 DIAGNOSIS — K62.5 RECTAL BLEEDING: ICD-10-CM

## 2023-01-13 LAB
EXT PREGNANCY TEST URINE: NEGATIVE
EXT. CONTROL: NORMAL

## 2023-01-13 RX ORDER — SODIUM CHLORIDE, SODIUM LACTATE, POTASSIUM CHLORIDE, CALCIUM CHLORIDE 600; 310; 30; 20 MG/100ML; MG/100ML; MG/100ML; MG/100ML
INJECTION, SOLUTION INTRAVENOUS CONTINUOUS PRN
Status: DISCONTINUED | OUTPATIENT
Start: 2023-01-13 | End: 2023-01-13

## 2023-01-13 RX ORDER — PROPOFOL 10 MG/ML
INJECTION, EMULSION INTRAVENOUS AS NEEDED
Status: DISCONTINUED | OUTPATIENT
Start: 2023-01-13 | End: 2023-01-13

## 2023-01-13 RX ORDER — LIDOCAINE HYDROCHLORIDE 10 MG/ML
INJECTION, SOLUTION EPIDURAL; INFILTRATION; INTRACAUDAL; PERINEURAL AS NEEDED
Status: DISCONTINUED | OUTPATIENT
Start: 2023-01-13 | End: 2023-01-13

## 2023-01-13 RX ORDER — PROPOFOL 10 MG/ML
INJECTION, EMULSION INTRAVENOUS CONTINUOUS PRN
Status: DISCONTINUED | OUTPATIENT
Start: 2023-01-13 | End: 2023-01-13

## 2023-01-13 RX ADMIN — LIDOCAINE HYDROCHLORIDE 100 MG: 10 INJECTION, SOLUTION EPIDURAL; INFILTRATION; INTRACAUDAL; PERINEURAL at 12:41

## 2023-01-13 RX ADMIN — PROPOFOL 200 MCG/KG/MIN: 10 INJECTION, EMULSION INTRAVENOUS at 12:41

## 2023-01-13 RX ADMIN — PROPOFOL 100 MG: 10 INJECTION, EMULSION INTRAVENOUS at 12:41

## 2023-01-13 RX ADMIN — SODIUM CHLORIDE, SODIUM LACTATE, POTASSIUM CHLORIDE, AND CALCIUM CHLORIDE: .6; .31; .03; .02 INJECTION, SOLUTION INTRAVENOUS at 12:41

## 2023-01-13 NOTE — ANESTHESIA PREPROCEDURE EVALUATION
Procedure:  COLONOSCOPY    Relevant Problems   GYN   (+) Malignant neoplasm of overlapping sites of cervix (HCC)      NEURO/PSYCH   (+) Anxiety   (+) Depression        Physical Exam    Airway    Mallampati score: II         Dental   No notable dental hx     Cardiovascular  Cardiovascular exam normal    Pulmonary  Pulmonary exam normal     Other Findings     29 y/o with h/o cervical cancer and s/p radiation who presents for evaluation for abodminal pain  The pain is belwo the umbilicus  She has had pain since her radiation treatment for the cervical cancer  Pain is every day  Waxes and wanes  Laying down may help slightly  She has nausea with it but no vomiting  She has urgency to go but can not go  She takes miralax and colace but can not go  Magnesium citrate helps  She has only scant bleeding when she goes tot he bathroom  She has mucous  She was started on cortenema and that has not helped  She has been on them for the past week  She has never had a colonoscopy done  No urinary symptoms  She has no heartburn or reflux  No dysphagia  Anesthesia Plan  ASA Score- 3     Anesthesia Type- IV sedation with anesthesia with ASA Monitors  Additional Monitors:   Airway Plan:           Plan Factors-Exercise tolerance (METS): >4 METS  Induction-     Postoperative Plan-     Informed Consent- Anesthetic plan and risks discussed with patient

## 2023-01-13 NOTE — TELEPHONE ENCOUNTER
----- Message from Janette Faye MD sent at 1/13/2023  1:18 PM EST -----  Please have her seen by julio césar or PA in the next 1 - 2 weeks  Thank you     Espinoza Melendrez

## 2023-01-13 NOTE — ANESTHESIA POSTPROCEDURE EVALUATION
Post-Op Assessment Note    CV Status:  Stable  Pain Score: 0    Pain management: adequate     Mental Status:  Alert and awake   Hydration Status:  Euvolemic and stable   PONV Controlled:  Controlled   Airway Patency:  Patent      Post Op Vitals Reviewed: Yes      Staff: CRNA         No notable events documented      BP   128/83   Temp   98 2   Pulse  100   Resp   16   SpO2   99

## 2023-01-18 ENCOUNTER — TELEPHONE (OUTPATIENT)
Dept: RADIATION ONCOLOGY | Facility: CLINIC | Age: 33
End: 2023-01-18

## 2023-01-18 DIAGNOSIS — C53.8 MALIGNANT NEOPLASM OF OVERLAPPING SITES OF CERVIX (HCC): Primary | ICD-10-CM

## 2023-01-18 RX ORDER — MESALAMINE 4 G/60ML
4 ENEMA RECTAL
Qty: 14 ENEMA | Refills: 1 | Status: SHIPPED | OUTPATIENT
Start: 2023-01-18

## 2023-01-18 NOTE — TELEPHONE ENCOUNTER
0900-Call to pt  Reviewed plan to order   Rawasa Enema-use nightly x 14 days//Refill x 1 -pt will use [facility] per call  Low fiber diet reviewed-Nutritional support service offered to pt  Pt has follow up visit w/GI in   Early Feb  post recent colonoscopy  To call w/any further questions/concerns  Pt verbalizes understanding information above and in agreement w/plan

## 2023-01-26 ENCOUNTER — TELEPHONE (OUTPATIENT)
Dept: SURGICAL ONCOLOGY | Facility: CLINIC | Age: 33
End: 2023-01-26

## 2023-01-26 NOTE — TELEPHONE ENCOUNTER
Patient called in to reschedule follow up appointment with Dr Moctezuma due to work schedule  Rescheduled to 2/16/23 1:45pm at [facility] office  She was agreeable

## 2023-01-27 DIAGNOSIS — K56.699 COLONIC STRICTURE (HCC): ICD-10-CM

## 2023-01-27 DIAGNOSIS — K59.01 SLOW TRANSIT CONSTIPATION: Primary | ICD-10-CM

## 2023-01-27 RX ORDER — LACTULOSE 20 G/30ML
20 SOLUTION ORAL 2 TIMES DAILY
Qty: 600 ML | Refills: 1 | Status: SHIPPED | OUTPATIENT
Start: 2023-01-27 | End: 2023-02-06

## 2023-01-30 ENCOUNTER — TELEPHONE (OUTPATIENT)
Age: 33
End: 2023-01-30

## 2023-01-30 NOTE — TELEPHONE ENCOUNTER
Left PT message to call [facility] to schedule office visit, PT referred by Dr Kolby Arizmendi  Please schedule ASAP  Received: Today  [facility] Rect[facility] [place]  Good morning,   Please assist with scheduling this patient per Dr Kolby Arizmendi ASAP for consultation  Thank you for your help! Silvia Farley           Previous Messages     ----- Message -----   From: Melvi Oconnor MD   Sent: 1/27/2023   5:00 PM EST   To: Gastroenterology [facility]  Can you please help get this patient seen by colorectal surgery soon  Thank you     Emmanuel Contreras

## 2023-02-03 ENCOUNTER — OFFICE VISIT (OUTPATIENT)
Dept: GASTROENTEROLOGY | Facility: MEDICAL CENTER | Age: 33
End: 2023-02-03

## 2023-02-03 VITALS
SYSTOLIC BLOOD PRESSURE: 126 MMHG | DIASTOLIC BLOOD PRESSURE: 78 MMHG | OXYGEN SATURATION: 98 % | HEART RATE: 89 BPM | WEIGHT: 257 LBS | BODY MASS INDEX: 38.06 KG/M2 | HEIGHT: 69 IN

## 2023-02-03 DIAGNOSIS — R10.30 LOWER ABDOMINAL PAIN: ICD-10-CM

## 2023-02-03 DIAGNOSIS — K59.01 SLOW TRANSIT CONSTIPATION: Primary | ICD-10-CM

## 2023-02-03 DIAGNOSIS — K52.9 COLITIS: ICD-10-CM

## 2023-02-03 NOTE — PROGRESS NOTES
Assessment/Plan:     Diagnoses and all orders for this visit:    Slow transit constipation  Lower abdominal pain  Colitis  Patient has a history of cervical cancer, status post hysterectomy and radiation treatment who has been seen for abdominal pain, constipation and rectal bleeding  She underwent imaging in January which showed colitis  She then had a colonoscopy which showed of your colitis with minimal narrowing involving the sigmoid and rectosigmoid  Biopsy showed chronic inactive colitis  This is most likely related to previous radiation therapy  She has been using Arvin enema without significant improvement  I also feel her constipation is playing a role as well  We will attempt to get Trulance approved by her insurance  In the meantime would recommend a partial MiraLAX bowel prep and then drinking twice to 3 times a day as needed for regular bowel movements  She was also referred to colorectal for consultation, I encouraged her to make an appointment  We will see her back in 3 months or sooner if necessary  Subjective:      Patient ID: Issa Lai is a 28 y o  female  HPI     Is a follow-up for constipation, abdominal pain and rectal bleeding  Patient has a history of cervical cancer, diagnosed last year, status post hysterectomy and radiation treatment  Underwent CAT scan in January for abdominal pain and was found to have colitis  It was then recommended that she undergo colonoscopy for further evaluation  She was found to have severe colitis with luminal narrowing involving the sigmoid and rectosigmoid  Biopsies show chronic inactive colitis  Continues to be symptomatic  Continues to complain of constipation, previously tried Linzess which did not work well  She was then prescribed Trulance however it appears this was denied by her her insurance but prior authorization was never obtained    She then started on lactulose however she feels this causes significant abdominal cramping and is minimally effective  She does continue with intermittent rectal bleeding  She has been using Arvin enemas without any significant improvement  Patient Active Problem List   Diagnosis   • Malignant neoplasm of overlapping sites of cervix ([address] )   • Family history of ovarian cancer   • Obesity (BMI 35 0-39 9 without comorbidity)   • Dysuria   • Anxiety   • Depression   • Dyspareunia due to medical condition in female patient   • Menopausal symptoms   • Abdominal pain   • Slow transit constipation   • Colitis     No Known Allergies  Current Outpatient Medications on File Prior to Visit   Medication Sig   • estradiol (VIVELLE-DOT) 0 0375 MG/24HR Place 1 patch on the skin 2 (two) times a week   • lactulose 20 g/30 mL Take 30 mL (20 g total) by mouth 2 (two) times a day for 10 days   • lidocaine (XYLOCAINE) 5 % ointment Apply topically as needed for mild pain   • mesalamine (ROWASA) 4 g Insert 60 mL (4 g total) into the rectum daily at bedtime   • NON FORMULARY CBD gummies daily   • Probiotic Product (PROBIOTIC-10 PO) Take by mouth   • docusate sodium (COLACE) 100 mg capsule Take 1 capsule (100 mg total) by mouth 2 (two) times a day for 10 days   • hydrocortisone (CORTENEMA) 100 mg/60 mL enema Insert 60 mL (100 mg total) into the rectum daily at bedtime for 14 days Leave enema solution internally for at least 5-10 minutes (ok to keep longer if tolerated) prior to having a bowel movement  Ok to go to sleep   • Plecanatide (Trulance) 3 MG TABS Take 3 mg by mouth daily (Patient not taking: Reported on 2/3/2023)   • polyethylene glycol (MIRALAX) 17 g packet Take 17 g by mouth daily for 7 days Do not start before January 3, 2023  No current facility-administered medications on file prior to visit       Family History   Problem Relation Age of Onset   • Ovarian cancer Maternal Aunt    • Ovarian cancer Maternal Grandmother    • No Known Problems Mother    • Heart disease Father      Past Medical History: Diagnosis Date   • Anxiety    • Cervical cancer [facility])     receiving chemo and radiation   • COVID     Jan 2022   • Depression    • Diarrhea    • Dizziness    • Frequent headaches    • Obesity      Social History     Socioeconomic History   • Marital status: Single     Spouse name: None   • Number of children: None   • Years of education: None   • Highest education level: None   Occupational History   • None   Tobacco Use   • Smoking status: Never   • Smokeless tobacco: Never   Vaping Use   • Vaping Use: Never used   Substance and Sexual Activity   • Alcohol use: Not Currently   • Drug use: Never     Comment: CBD Gummies/Anxiety   • Sexual activity: Not Currently   Other Topics Concern   • None   Social History Narrative   • None     Social Determinants of Health     Financial Resource Strain: Not on file   Food Insecurity: Not on file   Transportation Needs: No Transportation Needs   • Lack of Transportation (Medical): No   • Lack of Transportation (Non-Medical): No   Physical Activity: Not on file   Stress: Not on file   Social Connections: Not on file   Intimate Partner Violence: Not on file   Housing Stability: Not on file     Past Surgical History:   Procedure Laterality Date   • CERVICAL BIOPSY  W/ LOOP ELECTRODE EXCISION     • LYMPH NODE DISSECTION Bilateral 5/6/2022    Procedure: DISSECTION/STAGING LYMPH NODE PELVIS/ABDOMEN;  Surgeon: Jean Graves MD;  [facility];  Service: Gynecology Oncology   • CA INSERTION VAGINAL RADIATION DEVICE N/A 7/15/2022    Procedure: INSERTION NADIR SLEEVE VAGINA WITH POST OP BRACHYTHERAPY (IN RADIATION ONCOLOGY);   Surgeon: Jean Graves MD;  [facility];  Service: Gynecology Oncology   • SALPINGECTOMY Bilateral 5/6/2022    Procedure: SALPINGECTOMY, OVARIAN TRANSPOSITION;  Surgeon: Jean Graves MD;  [facility];  Service: Gynecology Oncology   • US GUIDANCE  7/15/2022         Review of Systems   Constitutional: Negative for fever  Gastrointestinal: Positive for abdominal pain, constipation, diarrhea and nausea  Negative for vomiting  Genitourinary: Negative for dysuria, frequency and hematuria  Musculoskeletal: Negative for arthralgias and myalgias  Neurological: Positive for headaches  All other systems reviewed and are negative  Objective:      /78   Pulse 89   Ht 5' 9" (1 753 m)   Wt 117 kg (257 lb)   LMP 07/27/2022 (Exact Date)   SpO2 98%   BMI 37 95 kg/m²          Physical Exam  Constitutional:       Appearance: She is well-developed  She is obese  HENT:      Head: Normocephalic and atraumatic  Eyes:      Conjunctiva/sclera: Conjunctivae normal    Cardiovascular:      Rate and Rhythm: Normal rate and regular rhythm  Pulmonary:      Effort: Pulmonary effort is normal       Breath sounds: Normal breath sounds  Abdominal:      General: Bowel sounds are normal  There is no distension  Palpations: Abdomen is soft  Tenderness: There is abdominal tenderness  Musculoskeletal:         General: Normal range of motion  Cervical back: Normal range of motion  Skin:     General: Skin is warm and dry  Neurological:      Mental Status: She is alert and oriented to person, place, and time     Psychiatric:         Mood and Affect: Mood normal          Behavior: Behavior normal

## 2023-02-16 ENCOUNTER — OFFICE VISIT (OUTPATIENT)
Dept: GYNECOLOGIC ONCOLOGY | Facility: HOSPITAL | Age: 33
End: 2023-02-16

## 2023-02-16 VITALS
HEART RATE: 99 BPM | SYSTOLIC BLOOD PRESSURE: 122 MMHG | DIASTOLIC BLOOD PRESSURE: 64 MMHG | HEIGHT: 69 IN | BODY MASS INDEX: 37.92 KG/M2 | OXYGEN SATURATION: 100 % | WEIGHT: 256 LBS

## 2023-02-16 DIAGNOSIS — C53.8 MALIGNANT NEOPLASM OF OVERLAPPING SITES OF CERVIX (HCC): Primary | ICD-10-CM

## 2023-02-16 DIAGNOSIS — K62.7 RADIATION PROCTITIS: ICD-10-CM

## 2023-02-16 NOTE — ASSESSMENT & PLAN NOTE
43-year-old with stage III C1, cell carcinoma of the cervix  She is clinically and radiologically without evidence of disease recurrence, however, she has radiation proctitis with evidence of stricture  I reviewed her CT abdomen pelvis images, colonoscopy, admission notes her performance status is 0   1   Discussed ongoing treatment options for radiation proctitis with stricture  She will continue to use laxatives and steroid enemas  Bleeding has stopped  She is trying to find a bowel regimen that will work for her  I encouraged her to follow-up with colorectal surgery  In the meantime, she will continue to follow-up with GI   2   Return in 3 months for cervical cancer surveillance

## 2023-02-16 NOTE — PROGRESS NOTES
Assessment/Plan:    Problem List Items Addressed This Visit        Digestive    Radiation proctitis       Genitourinary    Malignant neoplasm of overlapping sites of cervix ([facility] ) - Primary     51-year-old with stage III C1, cell carcinoma of the cervix  She is clinically and radiologically without evidence of disease recurrence, however, she has radiation proctitis with evidence of stricture  I reviewed her CT abdomen pelvis images, colonoscopy, admission notes her performance status is 0   1   Discussed ongoing treatment options for radiation proctitis with stricture  She will continue to use laxatives and steroid enemas  Bleeding has stopped  She is trying to find a bowel regimen that will work for her  I encouraged her to follow-up with colorectal surgery  In the meantime, she will continue to follow-up with GI   2   Return in 3 months for cervical cancer surveillance  CHIEF COMPLAINT: Cervical cancer surveillance, radiation colitis      Problem:  Cancer Staging   Malignant neoplasm of overlapping sites of cervix ([facility] )  Staging form: Cervix Uteri, AJCC Version 9  - Clinical: No stage assigned - Unsigned  - Pathologic: No stage assigned - Unsigned  - Pathologic: FIGO Stage IIIC1p (pT1b1, cM0) - Signed by Caio Champion MD on 5/19/2022        Previous therapy:  Oncology History   Malignant neoplasm of overlapping sites of cervix ([facility] )   3/24/2022 Initial Diagnosis    Malignant neoplasm of overlapping sites of cervix ([facility] )     5/6/2022 Surgery    Exploratory laparotomy for planned radical hysterectomy, bilateral pelvic lymph node dissection, aborted radical hysterectomy, bilateral ovarian transposition due to positive lymph nodes    1  Two right pelvic lymph nodes positive     5/19/2022 -  Cancer Staged    Staging form: Cervix Uteri, AJCC Version 9  - Pathologic: FIGO Stage IIIC1p (pT1b1, cM0) - Signed by Caio Champion MD on 5/19/2022  Stage confirmation method: Pathology  Pelvic grady status: Positive  Pelvic grady method of assessment: Lymph node dissection  Para-aortic status: Negative       6/20/2022 - 7/26/2022 Chemotherapy    palonosetron (ALOXI), 0 25 mg, Intravenous, Once, 6 of 6 cycles  Administration: 0 25 mg (6/20/2022), 0 25 mg (6/27/2022), 0 25 mg (7/6/2022), 0 25 mg (7/11/2022), 0 25 mg (7/19/2022), 0 25 mg (7/26/2022)  CISplatin (PLATINOL) infusion, 70 mg (original dose 40 mg/m2), Intravenous, Once, 6 of 6 cycles  Dose modification: 70 mg (original dose 40 mg/m2, Cycle 1, Reason: Other (Must fill in a comment), Comment: max 70), 70 mg (original dose 40 mg/m2, Cycle 2, Reason: Dose modified as per discussion with consulting physician)  Administration: 70 mg (6/20/2022), 70 mg (6/27/2022), 70 mg (7/6/2022), 70 mg (7/11/2022), 70 mg (7/19/2022), 70 mg (7/26/2022)  aprepitant (CINVANTI) in  mL IVPB, 130 mg, Intravenous, Once, 6 of 6 cycles  Administration: 130 mg (6/20/2022), 130 mg (6/27/2022), 130 mg (7/6/2022), 130 mg (7/11/2022), 130 mg (7/19/2022), 130 mg (7/26/2022)     6/22/2022 - 8/3/2022 Radiation    Course: C1 - EBRT  Plan ID Energy Fractions Dose per Fraction (cGy) Dose Correction (cGy) Total Dose Delivered (cGy) Elapsed Days   Whole Pelvis 10X 25 / 25 180 0 4,500 42       Course: C1 HDR Tandem and Ring Brachytherapy  Plan ID Energy Fractions Dose per Fraction (cGy) Dose Correction (cGy) Total Dose Delivered (cGy) Elapsed Days   [id number] 7-15-22  1 / 1 700 0 700 0   [id number] 1 / 1 700 0 700 0   YYX3_6--21-22 1 / 1 700 0 700 0   HDR4 7-25-22 1 / 1 700 0 700 0               Patient ID: Marques Burris is a 28 y o  female  Who returns for cervical cancer surveillance  She was admitted to the hospital on 1/2/2023 for rectal bleeding  She had a work-up which included CT of abdomen pelvis on 1/1/2023 which revealed severe thickening of the sigmoid colon and rectal wall  Otherwise, there is no evidence of disease  No lymphadenopathy  No ascites  There was a question of appendicitis  She received steroid enemas and had a colonoscopy on 1/13/2023 which revealed severe mucosal changes consistent with colitis  Biopsy revealed minimal chronic inactive colitis  It appeared there was a stricture at the proximal end of the rectum  This is consistent with radiation-induced colitis/proctitis  She has been on a regimen of steroid enemas and takes multiple doses of MiraLAX in the morning so she can move her bowels  He has followed up with GI who encouraged her to make an appoint with colorectal surgery  She has not yet made the appointment  Rectal bleeding has stopped  She has a little bit of urgency  No vaginal bleeding  Her menopausal symptoms are controlled with hormone replacement therapy  The following portions of the patient's history were reviewed and updated as appropriate: allergies, current medications, past family history, past medical history, past social history, past surgical history and problem list     Review of Systems   Constitutional: Negative for activity change and unexpected weight change  HENT: Negative  Eyes: Negative  Respiratory: Negative  Cardiovascular: Negative  Gastrointestinal: Positive for anal bleeding, blood in stool and rectal pain  Negative for abdominal distention and abdominal pain  Endocrine: Negative  Genitourinary: Negative for pelvic pain and vaginal bleeding  Musculoskeletal: Negative  Skin: Negative  Allergic/Immunologic: Negative  Neurological: Negative  Hematological: Negative  Psychiatric/Behavioral: Negative          Current Outpatient Medications   Medication Sig Dispense Refill   • estradiol (VIVELLE-DOT) 0 0375 MG/24HR Place 1 patch on the skin 2 (two) times a week 8 patch 3   • lidocaine (XYLOCAINE) 5 % ointment Apply topically as needed for mild pain 35 44 g 2   • mesalamine (ROWASA) 4 g Insert 60 mL (4 g total) into the rectum daily at bedtime 14 enema 1   • NON FORMULARY CBD gummies daily     • Probiotic Product (PROBIOTIC-10 PO) Take by mouth     • docusate sodium (COLACE) 100 mg capsule Take 1 capsule (100 mg total) by mouth 2 (two) times a day for 10 days 20 capsule 0   • hydrocortisone (CORTENEMA) 100 mg/60 mL enema Insert 60 mL (100 mg total) into the rectum daily at bedtime for 14 days Leave enema solution internally for at least 5-10 minutes (ok to keep longer if tolerated) prior to having a bowel movement  Ok to go to sleep 14 enema 0   • lactulose 20 g/30 mL Take 30 mL (20 g total) by mouth 2 (two) times a day for 10 days 600 mL 1   • polyethylene glycol (MIRALAX) 17 g packet Take 17 g by mouth daily for 7 days Do not start before January 3, 2023  119 g 0     No current facility-administered medications for this visit  Objective:    Blood pressure 122/64, pulse 99, height 5' 9" (1 753 m), weight 116 kg (256 lb), last menstrual period 07/27/2022, SpO2 100 %, not currently breastfeeding  Body mass index is 37 8 kg/m²  Body surface area is 2 29 meters squared  Physical Exam  Vitals reviewed  Exam conducted with a chaperone present  Constitutional:       General: She is not in acute distress  Appearance: Normal appearance  She is well-developed  She is not ill-appearing, toxic-appearing or diaphoretic  HENT:      Head: Normocephalic and atraumatic  Mouth/Throat:      Mouth: Mucous membranes are moist    Eyes:      General: No scleral icterus  Extraocular Movements: Extraocular movements intact  Conjunctiva/sclera: Conjunctivae normal    Neck:      Thyroid: No thyromegaly  Pulmonary:      Effort: Pulmonary effort is normal    Abdominal:      General: There is no distension  Palpations: Abdomen is soft  There is no mass  Tenderness: There is no abdominal tenderness  There is no guarding or rebound  Hernia: No hernia is present  Genitourinary:     Comments: External female genitalia are within normal limits  Speculum examination reveals radiation change to the vagina particularly the vaginal apex  The anterior lip of the cervix is visible and is grossly normal   There is no evidence of mass, lesion, bleeding  Bimanual examination reveals a grossly normal cervix  There is no evidence of parametrial disease  There is evidence of prior radiation therapy, however, the uterus is mobile  No palpable adnexal mass  Musculoskeletal:         General: No swelling or tenderness  Cervical back: Normal range of motion and neck supple  Right lower leg: No edema  Left lower leg: No edema  Lymphadenopathy:      Cervical: No cervical adenopathy  Skin:     General: Skin is warm and dry  Coloration: Skin is not jaundiced or pale  Findings: No lesion or rash  Neurological:      General: No focal deficit present  Mental Status: She is alert and oriented to person, place, and time  Mental status is at baseline  Cranial Nerves: No cranial nerve deficit  Motor: No weakness  Gait: Gait normal    Psychiatric:         Mood and Affect: Mood normal          Behavior: Behavior normal          Thought Content:  Thought content normal          Judgment: Judgment normal          No results found for:   Lab Results   Component Value Date    K 3 7 01/02/2023     (H) 01/02/2023    CO2 25 01/02/2023    BUN 15 01/02/2023    CREATININE 0 67 01/02/2023    CALCIUM 8 0 (L) 01/02/2023    CORRECTEDCA 9 0 01/02/2023    AST 8 01/02/2023    ALT 15 01/02/2023    ALKPHOS 64 01/02/2023    EGFR 116 01/02/2023     Lab Results   Component Value Date    WBC 3 63 (L) 01/02/2023    HGB 9 8 (L) 01/02/2023    HCT 29 6 (L) 01/02/2023    MCV 89 01/02/2023     01/02/2023     Lab Results   Component Value Date    NEUTROABS 2 22 01/02/2023        Trend:  No results found for:

## 2023-02-27 ENCOUNTER — TELEPHONE (OUTPATIENT)
Age: 33
End: 2023-02-27

## 2023-02-27 NOTE — TELEPHONE ENCOUNTER
Spoke with patient on 2/23 regarding scheduling an appointment with our office  Patient stated she would like to reach out to Dr Tierra Barrera or Dr Ashu Gonzalez regarding her options prior to scheduling  I left her a voicemail today, 2/27 to follow up with the patient

## 2023-02-28 ENCOUNTER — TELEPHONE (OUTPATIENT)
Dept: RADIATION ONCOLOGY | Facility: CLINIC | Age: 33
End: 2023-02-28

## 2023-02-28 NOTE — TELEPHONE ENCOUNTER
Spoke to Jah Sierra today in response to her MyChart message regarding hyperbaric oxygen therapy which was suggested as an additional alternative for treatment of proctitis  I reviewed that while hyperbaric oxygen therapy does seem to have some benefit in the treatment of refractory proctitis, at this time I would agree with GIs recommendation for evaluation by colorectal surgery  This is because she appears to have proctitis that is improving, however also perhaps has a stricture  I reviewed that hyperbaric oxygen may not address the stricture the way a surgery would  I also reviewed that hyperbaric oxygen therapy involves multiple sessions and is not always covered by insurance  She understood the above and is agreeable to speak with colorectal surgery  I advised that if ultimately surgical resection is not offered, or if she opts against surgery, referral to wound care for hyperbaric oxygen therapy is an option  That being said it appears her proctitis is slowly improving with time; she has had resolution of her frequent bleeding  I reviewed that perhaps given the location of her proctitis, her enemas may not be fully reaching the irritated mucosa, and as such these areas are slowly healing with time

## 2023-03-20 ENCOUNTER — OFFICE VISIT (OUTPATIENT)
Age: 33
End: 2023-03-20

## 2023-03-20 VITALS — BODY MASS INDEX: 37.47 KG/M2 | HEIGHT: 69 IN | WEIGHT: 253 LBS

## 2023-03-20 DIAGNOSIS — K56.699 COLONIC STRICTURE (HCC): ICD-10-CM

## 2023-03-20 RX ORDER — METRONIDAZOLE 250 MG/1
1000 TABLET ORAL 3 TIMES DAILY
Status: CANCELLED | OUTPATIENT
Start: 2023-03-20 | End: 2023-03-21

## 2023-03-20 RX ORDER — NEOMYCIN SULFATE 500 MG/1
1000 TABLET ORAL 3 TIMES DAILY
Status: CANCELLED | OUTPATIENT
Start: 2023-03-20 | End: 2023-03-21

## 2023-03-20 NOTE — ASSESSMENT & PLAN NOTE
Patient presents for evaluation of colonic stricture  Patient has been treated for cervical cancer  Postoperatively she required treatment with radiation  Radiation was completed last year  Following this, patient has been diagnosed with a colonic stricture felt to be likely secondary to radiation  She was hospitalized for this in January  She underwent CT scan as well as colonoscopy that revealed significant narrowing of the rectosigmoid extending approxi-10 cm proximal   Rectum itself appeared largely unaffected on colonoscopy  She notes since this time she has continued difficult time with bowel movements  She notes abdominal pain  She is taking frequent doses of MiraLAX to continue having bowel movements  Based upon her symptoms, it appears 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  We discussed further work-up could include colonoscopy or repeat CT scan  Given her persistent symptoms and prior exams, this is likely not going to add very much  As this is occurring many months after treatment has been completed, I suspect that this is more of a chronic issue as opposed to an acute issue related to acute components of radiation colitis  As so it is unlikely that this will improve with any other treatment  Patient has had some symptoms that have improved over time to include bleeding and mucus  We discussed that endoscopic care for a long stricture is likely to fail in the long-term to include being of high risk of perforation  Hyperbaric oxygen in the setting of chronic stricture is unlikely to be beneficial   As such surgical resection is indicated  Plan will be for laparoscopic sigmoid colectomy with anastomosis  We discussed that this anastomosis may be at higher risk of leak and if there are any concerning intraoperative findings, diverting loop ileostomy may be entertained in a situation akin to preoperative radiation for rectal cancer    We discussed that it is possible that anastomosis is not technically feasible secondary to radiation disease and then colostomy may be encountered  I think this is less likely given the distance on her imaging and colonoscopy from the anal verge and would not be preferred  We discussed that temporary diversion is a more likely scenario in the form of loop ileostomy  I suspect anastomosis without loop ileostomy should be technically feasible  Laparoscopic and open procedures were discussed  Patient will consider these options and call if she wishes to schedule  Plan will be for laparoscopic sigmoid colectomy with possible diverting loop ileostomy  OR for Colectomy  Risks of surgery, including but not limited to bleeding, infection, anastomotic leak, need for colostomy or enterostomy, injury or dysfunction of the bowel or urinary tract, injury or need for removal of other organs, sexual dysfunction, impotence, bowel obstruction in the future, hernia formation, bowel dysfunction, fecal incontinence, thromboembolic complications (deep vein clots or clots to the lungs), heart failure, heart attack, even death were reviewed  Questions were fully answered

## 2023-03-20 NOTE — PROGRESS NOTES
Diagnoses and all orders for this visit:    Colonic stricture [facility])  -     Ambulatory Referral to Colorectal Surgery       Colonic stricture [facility])  Patient presents for evaluation of colonic stricture  Patient has been treated for cervical cancer  Postoperatively she required treatment with radiation  Radiation was completed last year  Following this, patient has been diagnosed with a colonic stricture felt to be likely secondary to radiation  She was hospitalized for this in January  She underwent CT scan as well as colonoscopy that revealed significant narrowing of the rectosigmoid extending approxi-10 cm proximal   Rectum itself appeared largely unaffected on colonoscopy  She notes since this time she has continued difficult time with bowel movements  She notes abdominal pain  She is taking frequent doses of MiraLAX to continue having bowel movements  Based upon her symptoms, it appears 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  We discussed further work-up could include colonoscopy or repeat CT scan  Given her persistent symptoms and prior exams, this is likely not going to add very much  As this is occurring many months after treatment has been completed, I suspect that this is more of a chronic issue as opposed to an acute issue related to acute components of radiation colitis  As so it is unlikely that this will improve with any other treatment  Patient has had some symptoms that have improved over time to include bleeding and mucus  We discussed that endoscopic care for a long stricture is likely to fail in the long-term to include being of high risk of perforation  Hyperbaric oxygen in the setting of chronic stricture is unlikely to be beneficial   As such surgical resection is indicated  Plan will be for laparoscopic sigmoid colectomy with anastomosis    We discussed that this anastomosis may be at higher risk of leak and if there are any concerning intraoperative findings, diverting loop ileostomy may be entertained in a situation akin to preoperative radiation for rectal cancer  We discussed that it is possible that anastomosis is not technically feasible secondary to radiation disease and then colostomy may be encountered  I think this is less likely given the distance on her imaging and colonoscopy from the anal verge and would not be preferred  We discussed that temporary diversion is a more likely scenario in the form of loop ileostomy  I suspect anastomosis without loop ileostomy should be technically feasible  Laparoscopic and open procedures were discussed  Patient will consider these options and call if she wishes to schedule  Plan will be for laparoscopic sigmoid colectomy with possible diverting loop ileostomy  OR for Colectomy  Risks of surgery, including but not limited to bleeding, infection, anastomotic leak, need for colostomy or enterostomy, injury or dysfunction of the bowel or urinary tract, injury or need for removal of other organs, sexual dysfunction, impotence, bowel obstruction in the future, hernia formation, bowel dysfunction, fecal incontinence, thromboembolic complications (deep vein clots or clots to the lungs), heart failure, heart attack, even death were reviewed  Questions were fully answered  HPI       Boris Wise is here today referred by Dr Aysha Gomez for surgical consultation for colonic stricture  The patient reports constipation and dull, intermittent lower abdominal pain for many years worsening over time, as well as bright red rectal bleeding with bowel movements in the toilet bowl since September, currently happening 1 every 2 weeks; having 4 loose bowel movements a day with daily MiraLax intake  She   has not used Rowasa enemas in the past 2 weeks  Her most recent colonoscopy on 1/13/2023 with Dr Radha Trujillo showed:    Indication: Slow transit constipation, Rectal bleeding, Lower abdominal pain  Impression:   Severe colitis with luminal narrowing (10cm) involving the sigmoid and rectosigmoid area  Biopsies were done  This is likely related to her radiation  Normal descending colon  Biopsies were done  Recommendations: Continue Rowasa enemas  Continue miralax and can take it 2 - 3 times/ day  Low fiber diet  Final Diagnosis   A  Large Intestine, Sigmoid Colon, sigmoid colitis, biopsy:  - Minimal chronic inactive colitis  See Note  -- Minimal glandular architectural disorder with paneth cell metaplasia; no activity  -- No granulomas or organism    - No glandular dysplasia and no evidence of malignancy  -- Pankeratin (CKAE1/3) immunostain confirms no invasive carcinoma        B  Large Intestine, Left/Descending Colon, random biopsy, r/o colitis:  - Benign colonic mucosa with paneth cell metaplasia consistent with minimal chronic inactive colitis  -- No granulomas or organism    - No glandular dysplasia and no evidence of malignancy  The patient has a medical history of cervical cancer diagnosed March 2022; patient received radiation and chemotherapy, finished in August 2022        Past Medical History:   Diagnosis Date   • Anxiety    • Cervical cancer [facility])     receiving chemo and radiation   • COVID     Jan 2022   • Depression    • Diarrhea    • Dizziness    • Frequent headaches    • Obesity      Past Surgical History:   Procedure Laterality Date   • CERVICAL BIOPSY  W/ LOOP ELECTRODE EXCISION     • LYMPH NODE DISSECTION Bilateral 5/6/2022    Procedure: DISSECTION/STAGING LYMPH NODE PELVIS/ABDOMEN;  Surgeon: Kristin Jackson MD;  Location: [facility];  Service: Gynecology Oncology   • HI INSERTION VAGINAL RADIATION DEVICE N/A 7/15/2022    Procedure: INSERTION NADIR SLEEVE VAGINA WITH POST OP BRACHYTHERAPY (IN RADIATION ONCOLOGY);   Surgeon: Kristin Jackson MD;  Location: [facility];  Service: Gynecology Oncology   • SALPINGECTOMY Bilateral 5/6/2022    Procedure: SALPINGECTOMY, OVARIAN TRANSPOSITION;  Surgeon: Kristin Jackson MD;  Location: [facility];  Service: Gynecology Oncology   • US GUIDANCE  7/15/2022       Current Outpatient Medications:   •  estradiol (VIVELLE-DOT) 0 0375 MG/24HR, Place 1 patch on the skin 2 (two) times a week, Disp: 8 patch, Rfl: 3  •  lidocaine (XYLOCAINE) 5 % ointment, Apply topically as needed for mild pain, Disp: 35 44 g, Rfl: 2  •  mesalamine (ROWASA) 4 g, Insert 60 mL (4 g total) into the rectum daily at bedtime, Disp: 14 enema, Rfl: 1  •  NON FORMULARY, CBD gummies daily, Disp: , Rfl:   •  Probiotic Product (PROBIOTIC-10 PO), Take by mouth, Disp: , Rfl:   •  docusate sodium (COLACE) 100 mg capsule, Take 1 capsule (100 mg total) by mouth 2 (two) times a day for 10 days, Disp: 20 capsule, Rfl: 0  •  hydrocortisone (CORTENEMA) 100 mg/60 mL enema, Insert 60 mL (100 mg total) into the rectum daily at bedtime for 14 days Leave enema solution internally for at least 5-10 minutes (ok to keep longer if tolerated) prior to having a bowel movement  Ok to go to sleep, Disp: 14 enema, Rfl: 0  •  lactulose 20 g/30 mL, Take 30 mL (20 g total) by mouth 2 (two) times a day for 10 days, Disp: 600 mL, Rfl: 1  •  polyethylene glycol (MIRALAX) 17 g packet, Take 17 g by mouth daily for 7 days Do not start before January 3, 2023 , Disp: 119 g, Rfl: 0  Allergies as of 03/20/2023   • (No Known Allergies)     Review of Systems   Gastrointestinal: Positive for abdominal pain and constipation  All other systems reviewed and are negative  There were no vitals filed for this visit  Physical Exam  Constitutional:       Appearance: Normal appearance  HENT:      Head: Normocephalic and atraumatic  Eyes:      Extraocular Movements: Extraocular movements intact  Pupils: Pupils are equal, round, and reactive to light  Pulmonary:      Effort: Pulmonary effort is normal    Musculoskeletal:         General: Normal range of motion  Cervical back: Normal range of motion  Skin:     General: Skin is warm and dry  Neurological:      General: No focal deficit present  Mental Status: She is alert and oriented to person, place, and time  Psychiatric:         Mood and Affect: Mood normal          Behavior: Behavior normal          Thought Content:  Thought content normal          Judgment: Judgment normal

## 2023-03-21 DIAGNOSIS — K56.699 COLONIC STRICTURE (HCC): Primary | ICD-10-CM

## 2023-03-21 NOTE — TELEPHONE ENCOUNTER
----- Message from Kristen Lowry MD sent at 3/20/2023  6:50 PM EDT -----  Patient will call if she wishes to schedule laparoscopic sigmoid colectomy

## 2023-03-21 NOTE — LETTER
[address] [facility]  [address] [address]        ------------------------------------------------------------------------------------------------------------    3/22/2023    Dear [address],    Your surgery is scheduled for: 5/10/2023   The hospital will call the evening prior to your surgery with your expected arrival time  Location:[place] [address] , [facility] [address]    CHECK LIST PRIOR TO INPATIENT SURGERY  It is your responsibility to obtain any/all referrals needed for your surgery if required by your insurance  Our office will contact you to discuss your insurance coverage for this procedure  Special instructions required if you are taking any blood thinners  Please verify with the prescribing physician  Examples include Coumadin, Plavix, Xarelto, Eliquis, Pradaxa, etc     Please check with your family physician if you are taking the following medications: Aspirin or any Aspirin containing medication, Gingko biloba, Ginseng, Feverfew, and/or St  Dread’s Wort  We suggest stopping these for 3 days  The night before and the day of your surgery, wash from your neck to groin with chlorhexidine soap  This soap is available at most retail pharmacies under such brand names as Hibiclens, Endure or Aplicare  Pre-admission testing Required: YES x NO     If Yes, what type:  BLOOD-WORK  x EKG   CHEST X-RAY         Other Clearances/ Additional Testing: NONE    BOWEL PREPARATION REQUIRED: YES x NO   If yes, start date: 5/9/2023   Please see attached bowel preparation instructions  NOTHING TO EAT OR DRINK AFTER MIDNIGHT PRIOR TO SURGERY  Please do not hesitate to call our office with any questions regarding your surgery                 MIRALAX/GATORADE SURGERY PREPARATION INSTRUCTIONS    Purchase:   (1) 238g bottle of MiraLax or Glycolax - no prescription needed   4 Dulcolax Laxative Tablets - no prescription needed   64 oz  bottle of Gatorade (NOT RED), Crystal Light, or another clear liquid of  choice  **REMEMBER** A prescription for antibiotics has been called into your pharmacy for  prior to your surgery  One Day Prior to Surgery  • Have a normal breakfast, light lunch, and clear liquids thereafter  It is important that you drink plenty of clear liquids throughout the day to prevent dehydration  CLEAR LIQUIDS INCLUDE:  Water, Clear Fruit Juice (Apple, Cranberry, Grape), Black Coffee, Tea, Ginger Ale, Gatorade, Robert-Aid, Hi-C, plain Jello, Ice Pops, Clear Broth or Bouillon, Crystal Light, Lemonade, Soda (regular or diet)  No Milk Products! • At 1:00 pm, take (4) Dulcolax Tablets with 8 oz  of water  Swallow the tablets whole with a full glass of water  The package may direct you not to exceed (2) tablets at any time but for the purpose of this examination, you should take (4)  • At 1:00 pm, take your first dose of antibiotic as prescribed  • At 1:00 pm, Mix the 238g bottle of MiraLax in 64 oz  of Gatorade and shake the solution until the MiraLax is dissolved  Drink 8 oz  glassfuls at your own pace  It may take 3-4 hours to drink all the solution  • At 10:00 pm, take your last dose of antibiotic as prescribed  • REMEMBER TO STAY CLOSE TO TOILET FACILITIES  The Day of Surgery  • Take nothing by mouth until after surgery

## 2023-03-22 ENCOUNTER — PREP FOR PROCEDURE (OUTPATIENT)
Age: 33
End: 2023-03-22

## 2023-03-22 DIAGNOSIS — K56.699 COLONIC STRICTURE (HCC): Primary | ICD-10-CM

## 2023-03-22 RX ORDER — NEOMYCIN SULFATE 500 MG/1
TABLET ORAL
Qty: 4 TABLET | Refills: 0 | Status: SHIPPED | OUTPATIENT
Start: 2023-05-01 | End: 2023-05-01

## 2023-03-22 RX ORDER — METRONIDAZOLE 500 MG/1
TABLET ORAL
Qty: 2 TABLET | Refills: 0 | Status: SHIPPED | OUTPATIENT
Start: 2023-05-01 | End: 2023-05-01

## 2023-03-22 NOTE — TELEPHONE ENCOUNTER
Patient scheduled for surgery  5/10/2023  at [facility] OR   Instructions and PAT's gone over with and mailed to the patient      Pre op meds routed to Dr Elicia Hodgkins

## 2023-04-04 DIAGNOSIS — N95.1 MENOPAUSAL SYMPTOMS: ICD-10-CM

## 2023-04-04 RX ORDER — ESTRADIOL 0.04 MG/D
FILM, EXTENDED RELEASE TRANSDERMAL
Qty: 8 PATCH | Refills: 3 | Status: SHIPPED | OUTPATIENT
Start: 2023-04-04

## 2023-04-14 NOTE — PRE-PROCEDURE INSTRUCTIONS
Pre-Surgery Instructions:   Medication Instructions   • docusate sodium (COLACE) 100 mg capsule Hold day of surgery  • estradiol (VIVELLE-DOT) 0 0375 MG/24HR Take day of surgery  • lidocaine (XYLOCAINE) 5 % ointment Uses PRN- DO NOT take day of surgery   • [START ON 5/1/2023] metroNIDAZOLE (FLAGYL) 500 mg tablet Per MD   • [START ON 5/1/2023] neomycin (MYCIFRADIN) 500 mg tablet Per MD   • NON FORMULARY Hold day of surgery  • polyethylene glycol (MIRALAX) 17 g packet Hold day of surgery  • Probiotic Product (PROBIOTIC-10 PO) Hold day of surgery  Medication instructions for day surgery reviewed  Please use only a sip of water to take your instructed medications  Avoid all over the counter vitamins, supplements and ASA & NSAIDS for one week prior to surgery per anesthesia guidelines  Tylenol is ok to take as needed  You will receive a call one business day prior to surgery with an arrival time and hospital directions  If your surgery is scheduled on a Monday, the hospital will be calling you on the Friday prior to your surgery  If you have not heard from anyone by 8pm, please call the hospital supervisor through the hospital  at [phone]  Do not eat or drink anything after midnight the night before your surgery, including candy, mints, lifesavers, or chewing gum  Do not drink alcohol 24hrs before your surgery  Try not to smoke at least 24hrs before your surgery  Follow the pre surgery showering instructions as listed in the [facility] and the [facility] Surgical Experience Booklet” or otherwise provided by your surgeon's office  Do not shave the surgical area 24 hours before surgery  Do not apply any lotions, creams, including makeup, cologne, deodorant, or perfumes after showering on the day of your surgery  No contact lenses, eye make-up, or artificial eyelashes  Remove nail polish, including gel polish, and any artificial, gel, or acrylic nails if possible   Remove all jewelry including rings and body piercing jewelry  Wear causal clothing that is easy to take on and off  Consider your type of surgery  Keep any valuables, jewelry, piercings at home  Please bring any specially ordered equipment (sling, braces) if indicated  Arrange for a responsible person to drive you to and from the hospital on the day of your surgery  Visitor Guidelines discussed  Call the surgeon's office with any new illnesses, exposures, or additional questions prior to surgery  Please reference your [facility] Surgical Experience Booklet” for additional information to prepare for your upcoming surgery

## 2023-05-02 LAB
ABO GROUP BLD: NORMAL
BASOPHILS # BLD AUTO: 0 X10E3/UL (ref 0–0.2)
BASOPHILS NFR BLD AUTO: 0 %
BLD GP AB SCN SERPL QL: NEGATIVE
BUN SERPL-MCNC: 15 MG/DL (ref 6–20)
BUN/CREAT SERPL: 21 (ref 9–23)
CALCIUM SERPL-MCNC: 9.7 MG/DL (ref 8.7–10.2)
CHLORIDE SERPL-SCNC: 103 MMOL/L (ref 96–106)
CO2 SERPL-SCNC: 23 MMOL/L (ref 20–29)
CREAT SERPL-MCNC: 0.73 MG/DL (ref 0.57–1)
EGFR: 112 ML/MIN/1.73
EOSINOPHIL # BLD AUTO: 0.1 X10E3/UL (ref 0–0.4)
EOSINOPHIL NFR BLD AUTO: 3 %
ERYTHROCYTE [DISTWIDTH] IN BLOOD BY AUTOMATED COUNT: 13.5 % (ref 11.7–15.4)
EST. AVERAGE GLUCOSE BLD GHB EST-MCNC: 117 MG/DL
GLUCOSE SERPL-MCNC: 89 MG/DL (ref 70–99)
HBA1C MFR BLD: 5.7 % (ref 4.8–5.6)
HCT VFR BLD AUTO: 38.9 % (ref 34–46.6)
HGB BLD-MCNC: 13.1 G/DL (ref 11.1–15.9)
IMM GRANULOCYTES # BLD: 0 X10E3/UL (ref 0–0.1)
IMM GRANULOCYTES NFR BLD: 0 %
LYMPHOCYTES # BLD AUTO: 0.7 X10E3/UL (ref 0.7–3.1)
LYMPHOCYTES NFR BLD AUTO: 18 %
MCH RBC QN AUTO: 29.4 PG (ref 26.6–33)
MCHC RBC AUTO-ENTMCNC: 33.7 G/DL (ref 31.5–35.7)
MCV RBC AUTO: 87 FL (ref 79–97)
MONOCYTES # BLD AUTO: 0.4 X10E3/UL (ref 0.1–0.9)
MONOCYTES NFR BLD AUTO: 10 %
NEUTROPHILS # BLD AUTO: 2.6 X10E3/UL (ref 1.4–7)
NEUTROPHILS NFR BLD AUTO: 69 %
PLATELET # BLD AUTO: 291 X10E3/UL (ref 150–450)
POTASSIUM SERPL-SCNC: 4.5 MMOL/L (ref 3.5–5.2)
RBC # BLD AUTO: 4.46 X10E6/UL (ref 3.77–5.28)
RH BLD: POSITIVE
SODIUM SERPL-SCNC: 142 MMOL/L (ref 134–144)
WBC # BLD AUTO: 3.9 X10E3/UL (ref 3.4–10.8)

## 2023-05-09 ENCOUNTER — ANESTHESIA EVENT (OUTPATIENT)
Dept: PERIOP | Facility: HOSPITAL | Age: 33
End: 2023-05-09

## 2023-05-09 PROBLEM — Z90.710 HISTORY OF HYSTERECTOMY: Status: ACTIVE | Noted: 2023-05-09

## 2023-05-09 NOTE — ANESTHESIA PREPROCEDURE EVALUATION
Procedure:  RESECTION COLON SIGMOID LAPAROSCOPIC, pos  diverting loop ileostomy (Abdomen)    Relevant Problems   ANESTHESIA (within normal limits)   (-) History of anesthesia complications      CARDIO (within normal limits)      ENDO (within normal limits)      GI/HEPATIC (within normal limits)      /RENAL (within normal limits)      GYN   (+) Malignant neoplasm of overlapping sites of cervix (HCC)   (-) Currently pregnant      HEMATOLOGY (within normal limits)      MUSCULOSKELETAL (within normal limits)      NEURO/PSYCH   (+) Anxiety   (+) Depression      PULMONARY (within normal limits)      Digestive   (+) Colonic stricture (HCC)      Other   (+) Obesity (BMI 35 0-39 9 without comorbidity)        Physical Exam    Airway    Mallampati score: II  TM Distance: >3 FB  Neck ROM: full     Dental   No notable dental hx     Cardiovascular  Rhythm: regular, Rate: normal, Cardiovascular exam normal    Pulmonary  Pulmonary exam normal Breath sounds clear to auscultation,     Other Findings        Anesthesia Plan  ASA Score- 2     Anesthesia Type- general with ASA Monitors  Additional Monitors:   Airway Plan: ETT  Plan Factors-    Chart reviewed  EKG reviewed  Imaging results reviewed  Existing labs reviewed  Patient summary reviewed  Patient is not a current smoker  Patient did not smoke on day of surgery  Induction- intravenous  Postoperative Plan-   Planned trial extubation    Informed Consent- Anesthetic plan and risks discussed with patient  I personally reviewed this patient with the CRNA  Discussed and agreed on the Anesthesia Plan with the CRNA             NPO verified  NKDA  Patient received PO Tylenol preoperatively this morning  Urine pregnancy test negative today, 5/10/23  Plan:  GETA    Risks and benefits of general anesthesia were discussed with the patient    Discussed risks of anesthesia including, but not limited to, the risk of dental injury, n/v, sore throat, corneal abrasions, and arrhythmias  All questions were answered  Anesthesia consent was obtained from the patient

## 2023-05-10 ENCOUNTER — ANESTHESIA (OUTPATIENT)
Dept: PERIOP | Facility: HOSPITAL | Age: 33
End: 2023-05-10

## 2023-05-10 ENCOUNTER — HOSPITAL ENCOUNTER (INPATIENT)
Facility: HOSPITAL | Age: 33
LOS: 8 days | Discharge: HOME | End: 2023-05-18
Attending: COLON & RECTAL SURGERY | Admitting: COLON & RECTAL SURGERY

## 2023-05-10 DIAGNOSIS — K56.699 COLONIC STRICTURE (HCC): ICD-10-CM

## 2023-05-10 LAB
ABO GROUP BLD: NORMAL
BLD GP AB SCN SERPL QL: NEGATIVE
EXT PREGNANCY TEST URINE: NEGATIVE
EXT. CONTROL: NORMAL
GLUCOSE SERPL-MCNC: 86 MG/DL (ref 65–140)
RH BLD: POSITIVE
SPECIMEN EXPIRATION DATE: NORMAL

## 2023-05-10 PROCEDURE — 0DTN4ZZ RESECTION OF SIGMOID COLON, PERCUTANEOUS ENDOSCOPIC APPROACH: ICD-10-PCS | Performed by: COLON & RECTAL SURGERY

## 2023-05-10 PROCEDURE — 0DTP4ZZ RESECTION OF RECTUM, PERCUTANEOUS ENDOSCOPIC APPROACH: ICD-10-PCS | Performed by: COLON & RECTAL SURGERY

## 2023-05-10 PROCEDURE — 0DJD8ZZ INSPECTION OF LOWER INTESTINAL TRACT, VIA NATURAL OR ARTIFICIAL OPENING ENDOSCOPIC: ICD-10-PCS | Performed by: COLON & RECTAL SURGERY

## 2023-05-10 RX ORDER — SODIUM CHLORIDE, SODIUM LACTATE, POTASSIUM CHLORIDE, CALCIUM CHLORIDE 600; 310; 30; 20 MG/100ML; MG/100ML; MG/100ML; MG/100ML
100 INJECTION, SOLUTION INTRAVENOUS CONTINUOUS
Status: DISCONTINUED | OUTPATIENT
Start: 2023-05-10 | End: 2023-05-11

## 2023-05-10 RX ORDER — FENTANYL CITRATE/PF 50 MCG/ML
50 SYRINGE (ML) INJECTION
Status: DISCONTINUED | OUTPATIENT
Start: 2023-05-10 | End: 2023-05-10 | Stop reason: HOSPADM

## 2023-05-10 RX ORDER — ONDANSETRON 2 MG/ML
4 INJECTION INTRAMUSCULAR; INTRAVENOUS EVERY 4 HOURS PRN
Status: DISCONTINUED | OUTPATIENT
Start: 2023-05-10 | End: 2023-05-18 | Stop reason: HOSPADM

## 2023-05-10 RX ORDER — ROCURONIUM BROMIDE 10 MG/ML
INJECTION, SOLUTION INTRAVENOUS AS NEEDED
Status: DISCONTINUED | OUTPATIENT
Start: 2023-05-10 | End: 2023-05-10

## 2023-05-10 RX ORDER — SODIUM CHLORIDE, SODIUM LACTATE, POTASSIUM CHLORIDE, CALCIUM CHLORIDE 600; 310; 30; 20 MG/100ML; MG/100ML; MG/100ML; MG/100ML
125 INJECTION, SOLUTION INTRAVENOUS CONTINUOUS
Status: DISCONTINUED | OUTPATIENT
Start: 2023-05-10 | End: 2023-05-11

## 2023-05-10 RX ORDER — METRONIDAZOLE 500 MG/100ML
500 INJECTION, SOLUTION INTRAVENOUS
Status: COMPLETED | OUTPATIENT
Start: 2023-05-10 | End: 2023-05-10

## 2023-05-10 RX ORDER — CEFAZOLIN SODIUM 2 G/50ML
2000 SOLUTION INTRAVENOUS
Status: COMPLETED | OUTPATIENT
Start: 2023-05-10 | End: 2023-05-10

## 2023-05-10 RX ORDER — NEOSTIGMINE METHYLSULFATE 1 MG/ML
INJECTION INTRAVENOUS AS NEEDED
Status: DISCONTINUED | OUTPATIENT
Start: 2023-05-10 | End: 2023-05-10

## 2023-05-10 RX ORDER — ONDANSETRON 2 MG/ML
4 INJECTION INTRAMUSCULAR; INTRAVENOUS ONCE AS NEEDED
Status: COMPLETED | OUTPATIENT
Start: 2023-05-10 | End: 2023-05-10

## 2023-05-10 RX ORDER — HEPARIN SODIUM 5000 [USP'U]/ML
5000 INJECTION, SOLUTION INTRAVENOUS; SUBCUTANEOUS EVERY 8 HOURS SCHEDULED
Status: DISCONTINUED | OUTPATIENT
Start: 2023-05-10 | End: 2023-05-17

## 2023-05-10 RX ORDER — SIMETHICONE 80 MG
80 TABLET,CHEWABLE ORAL EVERY 6 HOURS PRN
Status: DISCONTINUED | OUTPATIENT
Start: 2023-05-10 | End: 2023-05-18 | Stop reason: HOSPADM

## 2023-05-10 RX ORDER — PROPOFOL 10 MG/ML
INJECTION, EMULSION INTRAVENOUS AS NEEDED
Status: DISCONTINUED | OUTPATIENT
Start: 2023-05-10 | End: 2023-05-10

## 2023-05-10 RX ORDER — HYDROMORPHONE HCL/PF 1 MG/ML
0.25 SYRINGE (ML) INJECTION
Status: DISCONTINUED | OUTPATIENT
Start: 2023-05-10 | End: 2023-05-10 | Stop reason: HOSPADM

## 2023-05-10 RX ORDER — ONDANSETRON 2 MG/ML
INJECTION INTRAMUSCULAR; INTRAVENOUS AS NEEDED
Status: DISCONTINUED | OUTPATIENT
Start: 2023-05-10 | End: 2023-05-10

## 2023-05-10 RX ORDER — ACETAMINOPHEN 325 MG/1
975 TABLET ORAL ONCE
Status: COMPLETED | OUTPATIENT
Start: 2023-05-10 | End: 2023-05-10

## 2023-05-10 RX ORDER — LIDOCAINE HYDROCHLORIDE 20 MG/ML
INJECTION, SOLUTION EPIDURAL; INFILTRATION; INTRACAUDAL; PERINEURAL AS NEEDED
Status: DISCONTINUED | OUTPATIENT
Start: 2023-05-10 | End: 2023-05-10

## 2023-05-10 RX ORDER — LIDOCAINE HYDROCHLORIDE 10 MG/ML
0.5 INJECTION, SOLUTION EPIDURAL; INFILTRATION; INTRACAUDAL; PERINEURAL ONCE AS NEEDED
Status: DISCONTINUED | OUTPATIENT
Start: 2023-05-10 | End: 2023-05-18 | Stop reason: HOSPADM

## 2023-05-10 RX ORDER — PROMETHAZINE HYDROCHLORIDE 25 MG/ML
12.5 INJECTION, SOLUTION INTRAMUSCULAR; INTRAVENOUS ONCE AS NEEDED
Status: DISCONTINUED | OUTPATIENT
Start: 2023-05-10 | End: 2023-05-10 | Stop reason: HOSPADM

## 2023-05-10 RX ORDER — FENTANYL CITRATE 50 UG/ML
INJECTION, SOLUTION INTRAMUSCULAR; INTRAVENOUS AS NEEDED
Status: DISCONTINUED | OUTPATIENT
Start: 2023-05-10 | End: 2023-05-10

## 2023-05-10 RX ORDER — MIDAZOLAM HYDROCHLORIDE 2 MG/2ML
INJECTION, SOLUTION INTRAMUSCULAR; INTRAVENOUS AS NEEDED
Status: DISCONTINUED | OUTPATIENT
Start: 2023-05-10 | End: 2023-05-10

## 2023-05-10 RX ORDER — MAGNESIUM HYDROXIDE 1200 MG/15ML
LIQUID ORAL AS NEEDED
Status: DISCONTINUED | OUTPATIENT
Start: 2023-05-10 | End: 2023-05-10 | Stop reason: HOSPADM

## 2023-05-10 RX ORDER — GLYCOPYRROLATE 0.2 MG/ML
INJECTION INTRAMUSCULAR; INTRAVENOUS AS NEEDED
Status: DISCONTINUED | OUTPATIENT
Start: 2023-05-10 | End: 2023-05-10

## 2023-05-10 RX ORDER — HEPARIN SODIUM 5000 [USP'U]/ML
5000 INJECTION, SOLUTION INTRAVENOUS; SUBCUTANEOUS
Status: COMPLETED | OUTPATIENT
Start: 2023-05-10 | End: 2023-05-10

## 2023-05-10 RX ORDER — DEXAMETHASONE SODIUM PHOSPHATE 10 MG/ML
INJECTION, SOLUTION INTRAMUSCULAR; INTRAVENOUS AS NEEDED
Status: DISCONTINUED | OUTPATIENT
Start: 2023-05-10 | End: 2023-05-10

## 2023-05-10 RX ORDER — SODIUM CHLORIDE 9 MG/ML
INJECTION, SOLUTION INTRAVENOUS CONTINUOUS PRN
Status: DISCONTINUED | OUTPATIENT
Start: 2023-05-10 | End: 2023-05-10

## 2023-05-10 RX ORDER — HYDROMORPHONE HCL 110MG/55ML
PATIENT CONTROLLED ANALGESIA SYRINGE INTRAVENOUS AS NEEDED
Status: DISCONTINUED | OUTPATIENT
Start: 2023-05-10 | End: 2023-05-10

## 2023-05-10 RX ADMIN — ROCURONIUM BROMIDE 20 MG: 50 INJECTION INTRAVENOUS at 12:59

## 2023-05-10 RX ADMIN — HYDROMORPHONE HYDROCHLORIDE 0.5 MG: 2 INJECTION, SOLUTION INTRAMUSCULAR; INTRAVENOUS; SUBCUTANEOUS at 12:22

## 2023-05-10 RX ADMIN — HEPARIN SODIUM 5000 UNITS: 5000 INJECTION INTRAVENOUS; SUBCUTANEOUS at 10:43

## 2023-05-10 RX ADMIN — HYDROMORPHONE HYDROCHLORIDE 0.5 MG: 2 INJECTION, SOLUTION INTRAMUSCULAR; INTRAVENOUS; SUBCUTANEOUS at 14:35

## 2023-05-10 RX ADMIN — ROCURONIUM BROMIDE 10 MG: 50 INJECTION INTRAVENOUS at 13:45

## 2023-05-10 RX ADMIN — FENTANYL CITRATE 50 MCG: 50 INJECTION, SOLUTION INTRAMUSCULAR; INTRAVENOUS at 10:51

## 2023-05-10 RX ADMIN — ACETAMINOPHEN 975 MG: 325 TABLET ORAL at 10:02

## 2023-05-10 RX ADMIN — FENTANYL CITRATE 50 MCG: 50 INJECTION INTRAMUSCULAR; INTRAVENOUS at 15:39

## 2023-05-10 RX ADMIN — DEXAMETHASONE SODIUM PHOSPHATE 10 MG: 10 INJECTION, SOLUTION INTRAMUSCULAR; INTRAVENOUS at 12:12

## 2023-05-10 RX ADMIN — HEPARIN SODIUM 5000 UNITS: 5000 INJECTION INTRAVENOUS; SUBCUTANEOUS at 17:38

## 2023-05-10 RX ADMIN — ROCURONIUM BROMIDE 30 MG: 50 INJECTION INTRAVENOUS at 11:44

## 2023-05-10 RX ADMIN — ROCURONIUM BROMIDE 50 MG: 50 INJECTION INTRAVENOUS at 10:52

## 2023-05-10 RX ADMIN — NEOSTIGMINE METHYLSULFATE 4 MG: 1 INJECTION INTRAVENOUS at 14:26

## 2023-05-10 RX ADMIN — ONDANSETRON 4 MG: 2 INJECTION INTRAMUSCULAR; INTRAVENOUS at 13:50

## 2023-05-10 RX ADMIN — MIDAZOLAM 2 MG: 1 INJECTION INTRAMUSCULAR; INTRAVENOUS at 10:47

## 2023-05-10 RX ADMIN — GLYCOPYRROLATE 0.8 MG: 0.2 INJECTION, SOLUTION INTRAMUSCULAR; INTRAVENOUS at 14:26

## 2023-05-10 RX ADMIN — SODIUM CHLORIDE, SODIUM LACTATE, POTASSIUM CHLORIDE, AND CALCIUM CHLORIDE 100 ML/HR: .6; .31; .03; .02 INJECTION, SOLUTION INTRAVENOUS at 10:08

## 2023-05-10 RX ADMIN — SODIUM CHLORIDE, SODIUM LACTATE, POTASSIUM CHLORIDE, AND CALCIUM CHLORIDE 125 ML/HR: .6; .31; .03; .02 INJECTION, SOLUTION INTRAVENOUS at 21:52

## 2023-05-10 RX ADMIN — HEPARIN SODIUM 5000 UNITS: 5000 INJECTION INTRAVENOUS; SUBCUTANEOUS at 21:52

## 2023-05-10 RX ADMIN — SODIUM CHLORIDE 0.2 MCG/KG/HR: 9 INJECTION, SOLUTION INTRAVENOUS at 11:23

## 2023-05-10 RX ADMIN — METRONIDAZOLE: 500 SOLUTION INTRAVENOUS at 11:10

## 2023-05-10 RX ADMIN — PROPOFOL 200 MG: 10 INJECTION, EMULSION INTRAVENOUS at 10:51

## 2023-05-10 RX ADMIN — CEFAZOLIN SODIUM 2000 MG: 2 SOLUTION INTRAVENOUS at 10:51

## 2023-05-10 RX ADMIN — FENTANYL CITRATE 50 MCG: 50 INJECTION INTRAMUSCULAR; INTRAVENOUS at 14:51

## 2023-05-10 RX ADMIN — SODIUM CHLORIDE: 0.9 INJECTION, SOLUTION INTRAVENOUS at 11:09

## 2023-05-10 RX ADMIN — ROCURONIUM BROMIDE 20 MG: 50 INJECTION INTRAVENOUS at 12:31

## 2023-05-10 RX ADMIN — ONDANSETRON 4 MG: 2 INJECTION INTRAMUSCULAR; INTRAVENOUS at 14:49

## 2023-05-10 RX ADMIN — LIDOCAINE HYDROCHLORIDE 100 MG: 20 INJECTION, SOLUTION EPIDURAL; INFILTRATION; INTRACAUDAL; PERINEURAL at 10:51

## 2023-05-10 RX ADMIN — HYDROMORPHONE HYDROCHLORIDE: 10 INJECTION INTRAMUSCULAR; INTRAVENOUS; SUBCUTANEOUS at 15:45

## 2023-05-10 NOTE — OP NOTE
OPERATIVE REPORT  PATIENT NAME: Sher Cruz    :  1990  MRN: [id number]  Pt Location: [facility] OR ROOM 07    SURGERY DATE: 5/10/2023    Surgeon(s) and Role:     * Gopi Baldwin MD - Primary     * Cullen Smiley MD - Assisting    Preop Diagnosis:  Colonic stricture [facility]) [K56 699]    Post-Op Diagnosis Codes:     * Colonic stricture ([address] ) [W35 910]    Procedure(s):  RESECTION COLON SIGMOID LAPAROSCOPIC  SIGMOIDOSCOPY FLEXIBLE  TAKEDOWN SPLEENIC FLEXURE    Specimen(s):  ID Type Source Tests Collected by Time Destination   1 :  Tissue Large Intestine, Sigmoid Colon TISSUE EXAM Gopi Baldwin MD 5/10/2023 1317        Estimated Blood Loss:   100 mL    Drains:  NG/OG/Enteral Tube Orogastric 16 Fr Center mouth (Active)   Number of days: 0       Urethral Catheter Non-latex;Straight-tip 16 Fr  (Active)   Number of days: 0       Anesthesia Type:   General    Operative Indications:  Colonic stricture ([address] ) [K56 699]      Operative Findings:      Complications:   None    Procedure and Technique:  After preoperative identification in the preoperative holding area the patient was taken the operating room placed in the supine position  After satisfactory induction of general endotracheal anesthesia appropriate placement of anesthesia monitors, patient was placed in a modified low lithotomy position  Patient was secured to the operating room table  Patient was prepped and draped in usual sterile fashion  Antibiotics were given prior to incision  Time-out was undertaken procedure begun  Supra umbilical incision was made  This was taken down to level of fascia  5 mm trocar was placed using Optiview technique  And was insufflated to 15 mmHg  As inspected for signs of injury upon entry  None were seen  Abdomen was then inspected  No signs of metastatic disease present  Lower transverse incision had some adhesions but otherwise no anterior abdominal wall adhesions    Separate trocars were placed in the right upper and right lower quadrant  Pfannenstiel incision was made through which a HandPort was placed  Inspection was noted  Patient had significant adhesions to the left lateral sidewall as this had been dissected free  Dissection has been taken down and the left horn of the uterus was somewhat stuck with the anterior lateral site of the rectosigmoid junction  Below this the rectum itself appeared relatively soft  As such we prepared for our dissection  First restarted in the left lower quadrant  The true retroperitoneal dissection was undertaken and the left ureter was swept into the retroperitoneum  We are able to find soft supple descending colon  This dissection was taken cephalad around the splenic flexure so the entire left colon laterally and superiorly was completely mobilized  Medial to lateral dissection was undertaken  This was quite difficult as the left colon and sigmoid colon were stuck to the left lateral pelvic sidewall  Retrorectal space was entered however, we able to dissect down well below the peritoneal reflection  Right-sided dissection was taken down to the peritoneal reflection  Left lateral dissection was then performed  Left ureter was again swept into the retroperitoneum  There is a very thinned out wall of the colon itself that appeared to be adherent to itself  This was dissected free sharply  With this a small rent in the colon was created  This would be considered unavoidable secondary to adhesions and prior radiation  This is likely the site of the patient's stricture  We are able to dissect down below this  We are eventually able to get down to the true peritoneal reflection  This was dissected to allow the vagina and the rectum to be completely   With this we able to find soft supple rectum  This would be considered just below the peritoneal reflection  This area was cleared of the mesorectum    This was done and we prepared for distal transection  More proximally the inferior mesenteric artery was divided using the Enseal after again identifying the left ureter  Left-sided retroperitoneal structures was dissected free off of the left colon  Dissection was taken up to the duodenum on the medial side  The inferior mesenteric vein was not divided  With this a good soft supple descending colon that would be out of the patient's radiation field was able to come down into the pelvis under no tension  As such we prepared for specimen removal and anastomosis  Through the Pfannenstiel incision, elevating the uterus and vagina, a firing of the contour stapler was used to transect the rectum  This was an area soft and supple  This was just below the peritoneal reflection  By eventual measurements this appeared to be between 7 and 8 cm from the anal verge  The specimen was then removed from the Pfannenstiel incision  Emenari soft supple descending colon, mesentery was cleared and a pursestring was created around a 29 mm CDH stapler anvil  No gaps were noted  Bleeding edges were encountered upon transection  This came into the pelvis under no tension  Perineal  then brought the stapler up to the transected rectal staple line  Bimal was deployed just posterior to the staple line  This was then affixed to the anvil  Anvil was slowly closed  Care is taken not to involve any extraneous structures to include the anterior vaginal wall or mesenteric fat within our anastomosis  Care is taken to ensure that there is no twist in the mesentery  With this the stapler was completely closed and fired  Staple was removed from the anus  2 full-thickness anastomotic rings were noted both proximally and distally  Pelvis was filled with saline  Flexible sigmoidoscopy was undertaken  The anastomosis was noted to be circumferentially intact with healthy pink mucosa both proximally and distally  Underwater testing revealed no air leak  As such we prepared to close  All gown and gloves were changed  Needle sponge and instrument counts were correct  And was inspected for hemostasis  This noted to be excellent  All ports were removed under vision  HandPort was removed  Peritoneum was closed using #1 single-stranded Vicryl  Fascia was closed using #1 PDS  Skin was closed using 4-0 Monocryl and Exofin  Patient was awakened from anesthesia and transferred to recovery room in stable condition  I was present for the entire procedure      Patient Disposition:  PACU     This procedure was not performed to treat colon cancer through resection      SIGNATURE: Adriana Fabry, MD  DATE: May 10, 2023  TIME: 2:13 PM

## 2023-05-10 NOTE — PLAN OF CARE
Problem: PAIN - ADULT  Goal: Verbalizes/displays adequate comfort level or baseline comfort level  Description: Interventions:  - Encourage patient to monitor pain and request assistance  - Assess pain using appropriate pain scale  - Administer analgesics based on type and severity of pain and evaluate response  - Implement non-pharmacological measures as appropriate and evaluate response  - Consider cultural and social influences on pain and pain management  - Notify physician/advanced practitioner if interventions unsuccessful or patient reports new pain  Outcome: Progressing     Problem: INFECTION - ADULT  Goal: Absence or prevention of progression during hospitalization  Description: INTERVENTIONS:  - Assess and monitor for signs and symptoms of infection  - Monitor lab/diagnostic results  - Monitor all insertion sites, i e  indwelling lines, tubes, and drains  - Monitor endotracheal if appropriate and nasal secretions for changes in amount and color  - [place] appropriate cooling/warming therapies per order  - Administer medications as ordered  - Instruct and encourage patient and family to use good hand hygiene technique  - Identify and instruct in appropriate isolation precautions for identified infection/condition  Outcome: Progressing  Goal: Absence of fever/infection during neutropenic period  Description: INTERVENTIONS:  - Monitor WBC    Outcome: Progressing     Problem: SAFETY ADULT  Goal: Patient will remain free of falls  Description: INTERVENTIONS:  - Educate patient/family on patient safety including physical limitations  - Instruct patient to call for assistance with activity   - Consult OT/PT to assist with strengthening/mobility   - Keep Call bell within reach  - Keep bed low and locked with side rails adjusted as appropriate  - Keep care items and personal belongings within reach  - Initiate and maintain comfort rounds  - Make Fall Risk Sign visible to staff  - Apply yellow socks and bracelet for high fall risk patients  - Consider moving patient to room near nurses station  Outcome: Progressing  Goal: Maintain or return to baseline ADL function  Description: INTERVENTIONS:  -  Assess patient's ability to carry out ADLs; assess patient's baseline for ADL function and identify physical deficits which impact ability to perform ADLs (bathing, care of mouth/teeth, toileting, grooming, dressing, etc )  - Assess/evaluate cause of self-care deficits   - Assess range of motion  - Assess patient's mobility; develop plan if impaired  - Assess patient's need for assistive devices and provide as appropriate  - Encourage maximum independence but intervene and supervise when necessary  - Involve family in performance of ADLs  - Assess for home care needs following discharge   - Consider OT consult to assist with ADL evaluation and planning for discharge  - Provide patient education as appropriate  Outcome: Progressing  Goal: Maintains/Returns to pre admission functional level  Description: INTERVENTIONS:  - Perform BMAT or MOVE assessment daily    - Set and communicate daily mobility goal to care team and patient/family/caregiver     - Collaborate with rehabilitation services on mobility goals if consulted  - Out of bed for toileting  - Record patient progress and toleration of activity level   Outcome: Progressing     Problem: DISCHARGE PLANNING  Goal: Discharge to home or other facility with appropriate resources  Description: INTERVENTIONS:  - Identify barriers to discharge w/patient and caregiver  - Arrange for needed discharge resources and transportation as appropriate  - Identify discharge learning needs (meds, wound care, etc )  - Arrange for interpretive services to assist at discharge as needed  - Refer to Case Management Department for coordinating discharge planning if the patient needs post-hospital services based on physician/advanced practitioner order or complex needs related to functional status, cognitive ability, or social support system  Outcome: Progressing     Problem: Knowledge Deficit  Goal: Patient/family/caregiver demonstrates understanding of disease process, treatment plan, medications, and discharge instructions  Description: Complete learning assessment and assess knowledge base    Interventions:  - Provide teaching at level of understanding  - Provide teaching via preferred learning methods  Outcome: Progressing

## 2023-05-10 NOTE — H&P
History and Physical   Colon and Rectal Surgery   Guanaco Beckman 28 y o  female MRN: [id number]  Unit/Bed#: OR [place] Encounter: [id number]  05/10/23   @NOW    No chief complaint on file  History of Present Illness   HPI:  Guanaco Beckman is a 28 y o  female who presents for surgical treatment of sigmoid stricture secondary to radiation      Historical Information   Past Medical History:   Diagnosis Date   • Anxiety    • Cancer [facility])     cervix   • Cervical cancer ([address] )     receiving chemo and radiation   • COVID     Jan 2022   • Depression    • Diarrhea    • Dizziness    • Frequent headaches    • Hx of bleeding disorder     vaginal bleeding   • Obesity      Past Surgical History:   Procedure Laterality Date   • CERVICAL BIOPSY  W/ LOOP ELECTRODE EXCISION     • LYMPH NODE DISSECTION Bilateral 5/6/2022    Procedure: DISSECTION/STAGING LYMPH NODE PELVIS/ABDOMEN;  Surgeon: Abdon Thomas MD;  [facility];  Service: Gynecology Oncology   • ME INSERTION VAGINAL RADIATION DEVICE N/A 7/15/2022    Procedure: INSERTION NADIR SLEEVE VAGINA WITH POST OP BRACHYTHERAPY (IN RADIATION ONCOLOGY); Surgeon: Abdon Thomas MD;  Location: [facility];  Service: Gynecology Oncology   • SALPINGECTOMY Bilateral 5/6/2022    Procedure: SALPINGECTOMY, OVARIAN TRANSPOSITION;  Surgeon: Abdon Thomas MD;  Location: [facility];  Service: Gynecology Oncology   • US GUIDANCE  7/15/2022       Meds/Allergies     Medications Prior to Admission   Medication   • docusate sodium (COLACE) 100 mg capsule   • estradiol (VIVELLE-DOT) 0 0375 MG/24HR   • lidocaine (XYLOCAINE) 5 % ointment   • NON FORMULARY   • polyethylene glycol (MIRALAX) 17 g packet   • Probiotic Product (PROBIOTIC-10 PO)       No current facility-administered medications for this encounter      No Known Allergies      Social History   Social History     Substance and Sexual Activity   Alcohol Use Not Currently     Social History     Substance "and Sexual Activity   Drug Use Never    Comment: CBD Gummies/Anxiety     Social History     Tobacco Use   Smoking Status Never   Smokeless Tobacco Never         Family History:   Family History   Problem Relation Age of Onset   • Ovarian cancer Maternal Aunt    • Ovarian cancer Maternal Grandmother    • No Known Problems Mother    • Heart disease Father          Objective     Current Vitals:   Height: 5' 9\" (175 3 cm) (04/14/23 1321)  Weight - Scale: 115 kg (253 lb) (04/14/23 1321)  No intake or output data in the 24 hours ending 05/10/23 0847    Physical Exam:  General:  Resting comfortably in bed   Eyes:Sclera anicteric  ENT: Trachea midline  Pulm:  Symmetric chest raise  No respiratory Distress  CV:  Regular on monitor  Abdomen:  Soft NT ND  Extremities:  No clubbing/ cyanosis/ edema    Lab Results: I have personally reviewed pertinent lab results  Imaging: I have personally reviewed pertinent reports  ASSESSMENT:  Lissette Frye is a 28 y o  female who presents for surgical treatment of sigmoid stricture  Plan is for laparoscopic possible open sigmoid colectomy, possible stoma  OR for Colectomy  Risks of surgery, including but not limited to bleeding, infection, anastomotic leak, need for colostomy or enterostomy, injury or dysfunction of the bowel or urinary tract, injury or need for removal of other organs, sexual dysfunction, impotence, bowel obstruction in the future, hernia formation, bowel dysfunction, fecal incontinence, thromboembolic complications (deep vein clots or clots to the lungs), heart failure, heart attack, even death were reviewed  Questions were fully answered        "

## 2023-05-10 NOTE — ANESTHESIA POSTPROCEDURE EVALUATION
Post-Op Assessment Note    CV Status:  Stable  Pain Score: 3    Pain management: adequate     Mental Status:  Alert and awake   Hydration Status:  Euvolemic   PONV Controlled:  Controlled   Airway Patency:  Patent      Post Op Vitals Reviewed: Yes      Staff: CRNA         No notable events documented      BP   93/61   Temp   99 0   Pulse  85   Resp  14   SpO2   98

## 2023-05-11 LAB
ANION GAP SERPL CALCULATED.3IONS-SCNC: 4 MMOL/L (ref 4–13)
BASOPHILS # BLD AUTO: 0.01 THOUSANDS/ÂΜL (ref 0–0.1)
BASOPHILS NFR BLD AUTO: 0 % (ref 0–1)
BUN SERPL-MCNC: 7 MG/DL (ref 5–25)
CALCIUM SERPL-MCNC: 8.3 MG/DL (ref 8.3–10.1)
CHLORIDE SERPL-SCNC: 101 MMOL/L (ref 96–108)
CO2 SERPL-SCNC: 24 MMOL/L (ref 21–32)
CREAT SERPL-MCNC: 0.6 MG/DL (ref 0.6–1.3)
EOSINOPHIL # BLD AUTO: 0 THOUSAND/ÂΜL (ref 0–0.61)
EOSINOPHIL NFR BLD AUTO: 0 % (ref 0–6)
ERYTHROCYTE [DISTWIDTH] IN BLOOD BY AUTOMATED COUNT: 12.9 % (ref 11.6–15.1)
GFR SERPL CREATININE-BSD FRML MDRD: 120 ML/MIN/1.73SQ M
GLUCOSE SERPL-MCNC: 121 MG/DL (ref 65–140)
HCT VFR BLD AUTO: 31.9 % (ref 34.8–46.1)
HGB BLD-MCNC: 10.2 G/DL (ref 11.5–15.4)
IMM GRANULOCYTES # BLD AUTO: 0.03 THOUSAND/UL (ref 0–0.2)
IMM GRANULOCYTES NFR BLD AUTO: 1 % (ref 0–2)
LYMPHOCYTES # BLD AUTO: 0.65 THOUSANDS/ÂΜL (ref 0.6–4.47)
LYMPHOCYTES NFR BLD AUTO: 10 % (ref 14–44)
MAGNESIUM SERPL-MCNC: 1.7 MG/DL (ref 1.6–2.6)
MCH RBC QN AUTO: 29 PG (ref 26.8–34.3)
MCHC RBC AUTO-ENTMCNC: 32 G/DL (ref 31.4–37.4)
MCV RBC AUTO: 91 FL (ref 82–98)
MONOCYTES # BLD AUTO: 0.72 THOUSAND/ÂΜL (ref 0.17–1.22)
MONOCYTES NFR BLD AUTO: 11 % (ref 4–12)
NEUTROPHILS # BLD AUTO: 5.22 THOUSANDS/ÂΜL (ref 1.85–7.62)
NEUTS SEG NFR BLD AUTO: 78 % (ref 43–75)
NRBC BLD AUTO-RTO: 0 /100 WBCS
PHOSPHATE SERPL-MCNC: 2.7 MG/DL (ref 2.7–4.5)
PLATELET # BLD AUTO: 207 THOUSANDS/UL (ref 149–390)
PMV BLD AUTO: 8.2 FL (ref 8.9–12.7)
POTASSIUM SERPL-SCNC: 3.8 MMOL/L (ref 3.5–5.3)
RBC # BLD AUTO: 3.52 MILLION/UL (ref 3.81–5.12)
SODIUM SERPL-SCNC: 129 MMOL/L (ref 135–147)
WBC # BLD AUTO: 6.63 THOUSAND/UL (ref 4.31–10.16)

## 2023-05-11 RX ORDER — DEXTROSE, SODIUM CHLORIDE, AND POTASSIUM CHLORIDE 5; .9; .15 G/100ML; G/100ML; G/100ML
75 INJECTION INTRAVENOUS CONTINUOUS
Status: DISCONTINUED | OUTPATIENT
Start: 2023-05-11 | End: 2023-05-15

## 2023-05-11 RX ORDER — GABAPENTIN 100 MG/1
100 CAPSULE ORAL 3 TIMES DAILY
Status: DISCONTINUED | OUTPATIENT
Start: 2023-05-11 | End: 2023-05-16

## 2023-05-11 RX ORDER — PROMETHAZINE HYDROCHLORIDE 25 MG/ML
12.5 INJECTION, SOLUTION INTRAMUSCULAR; INTRAVENOUS EVERY 6 HOURS PRN
Status: DISCONTINUED | OUTPATIENT
Start: 2023-05-11 | End: 2023-05-18 | Stop reason: HOSPADM

## 2023-05-11 RX ORDER — HYDROMORPHONE HCL/PF 1 MG/ML
0.5 SYRINGE (ML) INJECTION
Status: DISCONTINUED | OUTPATIENT
Start: 2023-05-11 | End: 2023-05-15

## 2023-05-11 RX ORDER — ESTRADIOL 0.04 MG/D
1 FILM, EXTENDED RELEASE TRANSDERMAL 2 TIMES WEEKLY
Status: DISCONTINUED | OUTPATIENT
Start: 2023-05-11 | End: 2023-05-18 | Stop reason: HOSPADM

## 2023-05-11 RX ORDER — ACETAMINOPHEN 325 MG/1
975 TABLET ORAL EVERY 8 HOURS SCHEDULED
Status: DISCONTINUED | OUTPATIENT
Start: 2023-05-11 | End: 2023-05-16

## 2023-05-11 RX ORDER — METHOCARBAMOL 500 MG/1
500 TABLET, FILM COATED ORAL EVERY 6 HOURS SCHEDULED
Status: DISCONTINUED | OUTPATIENT
Start: 2023-05-11 | End: 2023-05-16

## 2023-05-11 RX ADMIN — GABAPENTIN 100 MG: 100 CAPSULE ORAL at 08:35

## 2023-05-11 RX ADMIN — SIMETHICONE 80 MG: 80 TABLET, CHEWABLE ORAL at 21:55

## 2023-05-11 RX ADMIN — ONDANSETRON 4 MG: 2 INJECTION INTRAMUSCULAR; INTRAVENOUS at 08:35

## 2023-05-11 RX ADMIN — PROMETHAZINE HYDROCHLORIDE 12.5 MG: 25 INJECTION INTRAMUSCULAR; INTRAVENOUS at 21:55

## 2023-05-11 RX ADMIN — ONDANSETRON 4 MG: 2 INJECTION INTRAMUSCULAR; INTRAVENOUS at 19:29

## 2023-05-11 RX ADMIN — PROMETHAZINE HYDROCHLORIDE 12.5 MG: 25 INJECTION INTRAMUSCULAR; INTRAVENOUS at 10:54

## 2023-05-11 RX ADMIN — GABAPENTIN 100 MG: 100 CAPSULE ORAL at 21:56

## 2023-05-11 RX ADMIN — HYDROMORPHONE HYDROCHLORIDE 0.5 MG: 1 INJECTION, SOLUTION INTRAMUSCULAR; INTRAVENOUS; SUBCUTANEOUS at 22:17

## 2023-05-11 RX ADMIN — METHOCARBAMOL 500 MG: 500 TABLET ORAL at 17:14

## 2023-05-11 RX ADMIN — METHOCARBAMOL 500 MG: 500 TABLET ORAL at 11:57

## 2023-05-11 RX ADMIN — HEPARIN SODIUM 5000 UNITS: 5000 INJECTION INTRAVENOUS; SUBCUTANEOUS at 21:56

## 2023-05-11 RX ADMIN — METHOCARBAMOL 500 MG: 500 TABLET ORAL at 09:33

## 2023-05-11 RX ADMIN — SODIUM CHLORIDE, SODIUM LACTATE, POTASSIUM CHLORIDE, AND CALCIUM CHLORIDE 125 ML/HR: .6; .31; .03; .02 INJECTION, SOLUTION INTRAVENOUS at 05:55

## 2023-05-11 RX ADMIN — DEXTROSE, SODIUM CHLORIDE, AND POTASSIUM CHLORIDE 125 ML/HR: 5; .9; .15 INJECTION INTRAVENOUS at 10:54

## 2023-05-11 RX ADMIN — HEPARIN SODIUM 5000 UNITS: 5000 INJECTION INTRAVENOUS; SUBCUTANEOUS at 14:28

## 2023-05-11 RX ADMIN — ACETAMINOPHEN 975 MG: 325 TABLET ORAL at 09:33

## 2023-05-11 RX ADMIN — ACETAMINOPHEN 975 MG: 325 TABLET ORAL at 21:56

## 2023-05-11 RX ADMIN — HEPARIN SODIUM 5000 UNITS: 5000 INJECTION INTRAVENOUS; SUBCUTANEOUS at 05:51

## 2023-05-11 RX ADMIN — SIMETHICONE 80 MG: 80 TABLET, CHEWABLE ORAL at 08:35

## 2023-05-11 RX ADMIN — SIMETHICONE 80 MG: 80 TABLET, CHEWABLE ORAL at 00:32

## 2023-05-11 RX ADMIN — GABAPENTIN 100 MG: 100 CAPSULE ORAL at 17:00

## 2023-05-11 NOTE — QUICK NOTE
Postoperative evaluation    Pt is a 27 yo female with pmhx of Cervical cancer s/p chemo (7/2022) & XRT (8/2022), now s/p Lap LAR  Having some abdominal pain and chest pain  Denies nausea or vomiting  AVSS on 2 L nc  Abdomen soft, nondistended, appropriately tender  Incisions clean, dry and intact        Plan  Clears  [email or web address]  PCA for pain control  [place] until POD2 at least  Encourage IS use, may need flutter valve      Carla Wise MD  8:13 PM  05/10/23

## 2023-05-11 NOTE — RESTORATIVE TECHNICIAN NOTE
Restorative Technician Note      Patient Name: Isabela Pierce     Restorative Tech Visit Date: 05/11/23  Note Type: Mobility  Patient Position Upon Consult: Bedside chair  Activity Performed: Ambulated; Other (Comment) (only able to take few steps; significant nausea; seated therex)  Assistive Device: Standard walker  Education Provided: Yes  Patient Position at End of Consult: Bedside chair;  All needs within reach    Loyd SHETHT, Restorative Technician

## 2023-05-11 NOTE — PLAN OF CARE
Problem: PAIN - ADULT  Goal: Verbalizes/displays adequate comfort level or baseline comfort level  Description: Interventions:  - Encourage patient to monitor pain and request assistance  - Assess pain using appropriate pain scale  - Administer analgesics based on type and severity of pain and evaluate response  - Implement non-pharmacological measures as appropriate and evaluate response  - Consider cultural and social influences on pain and pain management  - Notify physician/advanced practitioner if interventions unsuccessful or patient reports new pain  Outcome: Progressing     Problem: INFECTION - ADULT  Goal: Absence or prevention of progression during hospitalization  Description: INTERVENTIONS:  - Assess and monitor for signs and symptoms of infection  - Monitor lab/diagnostic results  - Monitor all insertion sites, i e  indwelling lines, tubes, and drains  - Monitor endotracheal if appropriate and nasal secretions for changes in amount and color  - [place] appropriate cooling/warming therapies per order  - Administer medications as ordered  - Instruct and encourage patient and family to use good hand hygiene technique  - Identify and instruct in appropriate isolation precautions for identified infection/condition  Outcome: Progressing  Goal: Absence of fever/infection during neutropenic period  Description: INTERVENTIONS:  - Monitor WBC    Outcome: Progressing     Problem: SAFETY ADULT  Goal: Patient will remain free of falls  Description: INTERVENTIONS:  - Educate patient/family on patient safety including physical limitations  - Instruct patient to call for assistance with activity   - Consult OT/PT to assist with strengthening/mobility   - Keep Call bell within reach  - Keep bed low and locked with side rails adjusted as appropriate  - Keep care items and personal belongings within reach  - Initiate and maintain comfort rounds  - Make Fall Risk Sign visible to staff  - Apply yellow socks and bracelet for high fall risk patients  - Consider moving patient to room near nurses station  Outcome: Progressing  Goal: Maintain or return to baseline ADL function  Description: INTERVENTIONS:  -  Assess patient's ability to carry out ADLs; assess patient's baseline for ADL function and identify physical deficits which impact ability to perform ADLs (bathing, care of mouth/teeth, toileting, grooming, dressing, etc )  - Assess/evaluate cause of self-care deficits   - Assess range of motion  - Assess patient's mobility; develop plan if impaired  - Assess patient's need for assistive devices and provide as appropriate  - Encourage maximum independence but intervene and supervise when necessary  - Involve family in performance of ADLs  - Assess for home care needs following discharge   - Consider OT consult to assist with ADL evaluation and planning for discharge  - Provide patient education as appropriate  Outcome: Progressing  Goal: Maintains/Returns to pre admission functional level  Description: INTERVENTIONS:  - Perform BMAT or MOVE assessment daily    - Set and communicate daily mobility goal to care team and patient/family/caregiver     - Collaborate with rehabilitation services on mobility goals if consulted  - Out of bed for toileting  - Record patient progress and toleration of activity level   Outcome: Progressing     Problem: Prexisting or High Potential for Compromised Skin Integrity  Goal: Skin integrity is maintained or improved  Description: INTERVENTIONS:  - Identify patients at risk for skin breakdown  - Assess and monitor skin integrity  - Assess and monitor nutrition and hydration status  - Monitor labs   - Assess for incontinence   - Turn and reposition patient  - Assist with mobility/ambulation  - Relieve pressure over bony prominences  - Avoid friction and shearing  - Provide appropriate hygiene as needed including keeping skin clean and dry  - Evaluate need for skin moisturizer/barrier cream  - Collaborate with interdisciplinary team   - Patient/family teaching  - Consider wound care consult   Outcome: Progressing     Problem: MOBILITY - ADULT  Goal: Maintain or return to baseline ADL function  Description: INTERVENTIONS:  -  Assess patient's ability to carry out ADLs; assess patient's baseline for ADL function and identify physical deficits which impact ability to perform ADLs (bathing, care of mouth/teeth, toileting, grooming, dressing, etc )  - Assess/evaluate cause of self-care deficits   - Assess range of motion  - Assess patient's mobility; develop plan if impaired  - Assess patient's need for assistive devices and provide as appropriate  - Encourage maximum independence but intervene and supervise when necessary  - Involve family in performance of ADLs  - Assess for home care needs following discharge   - Consider OT consult to assist with ADL evaluation and planning for discharge  - Provide patient education as appropriate  Outcome: Progressing  Goal: Maintains/Returns to pre admission functional level  Description: INTERVENTIONS:  - Perform BMAT or MOVE assessment daily    - Set and communicate daily mobility goal to care team and patient/family/caregiver     - Collaborate with rehabilitation services on mobility goals if consulted  - Out of bed for toileting  - Record patient progress and toleration of activity level   Outcome: Progressing

## 2023-05-11 NOTE — PROGRESS NOTES
"Progress Note - Colorectal   IDANIA Resident on 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, POD1 lap LAR 5/10  Exquisite tenderness and pain at rest, continue monitoring  Afebrile  VSS  Urine output: 1 1L  Labs pending    Plan:  Ellis [email or web address]    PCA  Maintain paul  DVT ppx  Encourage IS  OOB and ambulating    Subjective/Objective     Subjective: 35yo F PMH radiation proctitis 2/2 cervical cancer treatment POD1 lap LAR who reports significant diffuse abdominal tenderness, chest pain when breathing in, rare SOB, and nausea, exacerbated by occasional dry cough  She does not report fever or chills, vomiting  She has not passed gas or had a bowel movement  Paul is in place and draining clear yellow urine  Difficulty using IS due to reported inability to breathe deeply  Objective:     Blood pressure 110/76, pulse 99, temperature 98 9 °F (37 2 °C), temperature source Oral, resp  rate 20, height 5' 9\" (1 753 m), weight 115 kg (253 lb), last menstrual period 07/27/2022, SpO2 95 %, not currently breastfeeding  ,Body mass index is 37 36 kg/m²  Intake/Output Summary (Last 24 hours) at 5/11/2023 0532  Last data filed at 5/10/2023 2300  Gross per 24 hour   Intake 2993 38 ml   Output 475 ml   Net 2518 38 ml       Invasive Devices     Peripheral Intravenous Line  Duration           Peripheral IV 05/10/23 Dorsal (posterior); Right Hand <1 day    Peripheral IV 05/10/23 Right Forearm <1 day          Drain  Duration           Urethral Catheter Non-latex;Straight-tip 16 Fr  <1 day                General: Appears ill and uncomfortable  HENT: NCAT MMM  Neck: supple, no JVD  CV: nl rate  Lungs: nl wob  No resp distress  ABD: Soft, distended, exquisite tenderness diffusely to light palpation, abdominal exam limited due to patient pain   wounds CDI,   Extrem: No " CCE  Neuro: AAOx3       Scheduled Meds:  Current Facility-Administered Medications   Medication Dose Route Frequency Provider Last Rate   • heparin (porcine)  5,000 Units Subcutaneous Q8H MD Keegan     • HYDROmorphone   Intravenous Continuous Behzad Ramirez MD     • lactated ringers  100 mL/hr Intravenous Continuous Behzad Ramirez MD Stopped (05/10/23 1415)   • lactated ringers  125 mL/hr Intravenous Continuous Behzad Ramirez  mL/hr (05/10/23 2152)   • lidocaine (PF)  0 5 mL Infiltration Once PRN Behzad Ramirez MD     • ondansetron  4 mg Intravenous Q4H PRN Behzad Ramirez MD     • simethicone  80 mg Oral Q6H PRN Roland Zamora MD       Continuous Infusions:HYDROmorphone,   lactated ringers, 100 mL/hr, Last Rate: Stopped (05/10/23 1415)  lactated ringers, 125 mL/hr, Last Rate: 125 mL/hr (05/10/23 2152)      PRN Meds: •  lidocaine (PF)  •  ondansetron  •  simethicone      Lab, Imaging and other studies:I have personally reviewed pertinent lab results      VTE Pharmacologic Prophylaxis: Heparin  VTE Mechanical Prophylaxis: sequential compression device      Kamran Coelho  5/11/2023 5:32 AM

## 2023-05-11 NOTE — UTILIZATION REVIEW
"Initial Clinical Review    Elective Inpatient surgical procedure  Age/Sex: 28 y o  female  Surgery Date: 5/10/23  Procedure: RESECTION COLON SIGMOID LAPAROSCOPIC  SIGMOIDOSCOPY FLEXIBLE  TAKEDOWN SPLEENIC FLEXURE  Anesthesia: general    POD#1 Progress Note:  Reports significant diffuse abdominal tenderness, chest pain when breathing in, rare SOB, and nausea, exacerbated by occasional dry cough  She has not passed gas or had a bowel movement  Marshall is in place and draining clear yellow urine  Difficulty using IS due to reported inability to breathe deeply  Abdomen is soft, distended, exquisite tenderness diffusely to light palpation, abdominal exam limited due to patient pain  wounds CDI, Continue clear liquid diet and IVF, PCA, maintain Marshall, encourage inc spirometry  Admission Orders: Date/Time/Statement:   Admission Orders (From admission, onward)     Ordered        05/10/23 1442  Inpatient Admission  Once                      Orders Placed This Encounter   Procedures   • Inpatient Admission     Standing Status:   Standing     Number of Occurrences:   1     Order Specific Question:   Level of Care     Answer:   Med Surg [16]     Order Specific Question:   Estimated length of stay     Answer:   More than 2 Midnights     Order Specific Question:   Certification     Answer:   I certify that inpatient services are medically necessary for this patient for a duration of greater than two midnights  See H&P and MD Progress Notes for additional information about the patient's course of treatment       Vital Signs: /75   Pulse 97   Temp 98 5 °F (36 9 °C) (Oral)   Resp 16   Ht 5' 9\" (1 753 m)   Wt 115 kg (253 lb)   LMP 07/27/2022 (Exact Date)   SpO2 90%   BMI 37 36 kg/m²     Pertinent Labs/Diagnostic Test Results:   No orders to display         Results from last 7 days   Lab Units 05/11/23  0618   WBC Thousand/uL 6 63   HEMOGLOBIN g/dL 10 2*   HEMATOCRIT % 31 9*   PLATELETS Thousands/uL 207   NEUTROS ABS " Thousands/µL 5 22         Results from last 7 days   Lab Units 05/11/23  0618   SODIUM mmol/L 129*   POTASSIUM mmol/L 3 8   CHLORIDE mmol/L 101   CO2 mmol/L 24   ANION GAP mmol/L 4   BUN mg/dL 7   CREATININE mg/dL 0 60   EGFR ml/min/1 73sq m 120   CALCIUM mg/dL 8 3   MAGNESIUM mg/dL 1 7   PHOSPHORUS mg/dL 2 7         Results from last 7 days   Lab Units 05/10/23  0948   POC GLUCOSE mg/dl 86     Results from last 7 days   Lab Units 05/11/23  0618   GLUCOSE RANDOM mg/dL 121       Diet: clear liquid  Mobility: OOB and ambulation  DVT Prophylaxis: heparin, scd, ambulatory    Medications/Pain Control:   Scheduled Medications:  acetaminophen, 975 mg, Oral, Q8H KATYA  gabapentin, 100 mg, Oral, TID  heparin (porcine), 5,000 Units, Subcutaneous, Q8H KATYA  methocarbamol, 500 mg, Oral, Q6H [facility]      Continuous IV Infusions:  dextrose 5 % and sodium chloride 0 9 % with KCl 20 mEq/L, 125 mL/hr, Intravenous, Continuous  HYDROmorphone, , Intravenous, Continuous  lactated ringers, 100 mL/hr, Intravenous, Continuous      PRN Meds:  HYDROmorphone, 0 5 mg, Intravenous, Q3H PRN  lidocaine (PF), 0 5 mL, Infiltration, Once PRN  ondansetron, 4 mg, Intravenous, Q4H PRN  simethicone, 80 mg, Oral, Q6H PRN        Network Utilization Review Department  ATTENTION: Please call with any questions or concerns to [phone] and carefully listen to the prompts so that you are directed to the right person  All voicemails are confidential   Danny Mcclure all requests for admission clinical reviews, approved or denied determinations and any other requests to dedicated fax number below belonging to the campus where the patient is receiving treatment   List of dedicated fax numbers for the Facilities:  [address] DENIALS (Administrative/Medical Necessity) [phone]   [address] (Maternity/NICU/Pediatrics) [phone]   [facility] Tova Fernández [phone]   [facility] [phone] [address] [place] [address] Seth [address] [facility] [address] [facility] 310 [facility] [address] [phone]

## 2023-05-11 NOTE — CASE MANAGEMENT
Case Management Assessment & Discharge Planning Note    Patient name Yandel Lazo  [facility]N [id number]  : 1990 Date 2023       Current Admission Date: 5/10/2023  Current Admission Diagnosis:Radiation proctitis   Patient Active Problem List    Diagnosis Date Noted   • Colonic stricture ([address] ) 2023   • Radiation proctitis 2023   • Slow transit constipation 2023   • Abdominal pain 2023   • Dyspareunia due to medical condition in female patient 11/10/2022   • Menopausal symptoms 11/10/2022   • Anxiety    • Depression    • Dysuria 2022   • Obesity (BMI 35 0-39 9 without comorbidity) 2022   • Malignant ne[facility] of overlapping sites [facility] ) 2022   • Family history of ovarian cancer 2022      LOS (days): 1  Geometric Mean LOS (GMLOS) (days): 3 00  Days to GMLOS:1 9     OBJECTIVE:    Risk of Unplanned Readmission Score: 12 06         Current admission status: Inpatient       Preferred Pharmacy:   [address] [facility]  [address] [facility]  [facility] [address]  Phone: [phone] Fax: [address] [facility] [facility] [address] [facility], [address] [address]  Phone: [phone] Fax: [phone]    Primary Care Provider: Rhona Blue DO    Primary Insurance: BLUE CROSS  Secondary Insurance:     ASSESSMENT:   [address]  [employer] [place] Representative - Significant Other   Primary Phone: [phone] (Mobile)                         Readmission Root Cause  30 Day Readmission: No    Patient Information  Admitted from[de-identified] Home  Mental Status: Alert  During Assessment patient was accompanied by: Not accompanied during assessment  Assessment information provided by[de-identified] Patient  Primary Caregiver: Self  Support Systems: Spouse/significant other  [place] of Residence:  [facility] do you live in?: [address] [place] [address] entry access options  Select all that apply : Stairs  Number of steps to enter home : 3  Do the steps have railings?: No  Type of Current Residence: Other (Comment) (bilevel duplex)  In the last 12 months, was there a time when you were not able to pay the mortgage or rent on time?: No  In the last 12 months, how many places have you lived?: 1  In the last 12 months, was there a time when you did not have a steady place to sleep or slept in a shelter (including now)?: No  Homeless/housing insecurity resource given?: N/A  Living Arrangements: Lives w/ Spouse/significant other    Activities of Daily Living Prior to Admission  Functional Status: Independent  Completes ADLs independently?: Yes  Ambulates independently?: Yes  Does patient use assisted devices?: No  Does patient currently own DME?: No  Does patient have a history of Outpatient Therapy (PT/OT)?: No  Does the patient have a history of Short-Term Rehab?: No  Does patient have a history of HHC?: No  Does patient currently have [employer]?: No         Patient Information Continued  Income Source: Employed  Does patient have prescription coverage?: Yes  Within the past 12 months, you worried that your food would run out before you got the money to buy more : Never true  Within the past 12 months, the food you bought just didn't last and you didn't have money to get more : Never true  Food insecurity resource given?: N/A  Does patient receive dialysis treatments?: No  Does patient have a history of substance abuse?: No  Does patient have a history of Mental Health Diagnosis?: Yes  Is patient receiving treatment for mental health?: No  Treatment options were provided    Has patient received inpatient treatment related to mental health in the last 2 years?: No         Means of Transportation  Means of Transport to Appts[de-identified] Drives Self  In the past 12 months, has lack of transportation kept you from medical appointments or from getting medications?: No  In the past 12 months, has lack of transportation kept you from meetings, work, or from getting things needed for daily living?: No  Was application for public transport provided?: N/A        DISCHARGE DETAILS:    Discharge planning discussed with[de-identified] pt  Freedom of Choice: Yes     CM contacted family/caregiver?: No- see comments (pt alert and oriented)  Were Treatment Team discharge recommendations reviewed with patient/caregiver?: Yes  Did patient/caregiver verbalize understanding of patient care needs?: Yes  Were patient/caregiver advised of the risks associated with not following Treatment Team discharge recommendations?: Yes    Contacts  Patient Contacts: Glen Molina  Relationship to Patient[de-identified] Other (Comment)  Contact Method: Phone  Phone Number: [phone]  Reason/Outcome: Continuity of Care, Discharge Planning, Emergency [address]         Is the patient interested in [facility] at discharge? :  (pending pt recs)    DME Referral Provided  Referral made for DME?:  (pending pt reqs)    Other Referral/Resources/Interventions Provided:  Interventions: None Indicated  Referral Comments: pending pt reqs               Transport at Discharge : [facility] [address] MikeThird Floor

## 2023-05-12 LAB
ANION GAP SERPL CALCULATED.3IONS-SCNC: 2 MMOL/L (ref 4–13)
BASOPHILS # BLD AUTO: 0.01 THOUSANDS/ÂΜL (ref 0–0.1)
BASOPHILS NFR BLD AUTO: 0 % (ref 0–1)
BUN SERPL-MCNC: 3 MG/DL (ref 5–25)
CALCIUM SERPL-MCNC: 8.5 MG/DL (ref 8.3–10.1)
CHLORIDE SERPL-SCNC: 103 MMOL/L (ref 96–108)
CO2 SERPL-SCNC: 27 MMOL/L (ref 21–32)
CREAT SERPL-MCNC: 0.49 MG/DL (ref 0.6–1.3)
EOSINOPHIL # BLD AUTO: 0.01 THOUSAND/ÂΜL (ref 0–0.61)
EOSINOPHIL NFR BLD AUTO: 0 % (ref 0–6)
ERYTHROCYTE [DISTWIDTH] IN BLOOD BY AUTOMATED COUNT: 13 % (ref 11.6–15.1)
GFR SERPL CREATININE-BSD FRML MDRD: 129 ML/MIN/1.73SQ M
GLUCOSE SERPL-MCNC: 143 MG/DL (ref 65–140)
HCT VFR BLD AUTO: 29.5 % (ref 34.8–46.1)
HGB BLD-MCNC: 10.1 G/DL (ref 11.5–15.4)
IMM GRANULOCYTES # BLD AUTO: 0.07 THOUSAND/UL (ref 0–0.2)
IMM GRANULOCYTES NFR BLD AUTO: 1 % (ref 0–2)
LYMPHOCYTES # BLD AUTO: 0.47 THOUSANDS/ÂΜL (ref 0.6–4.47)
LYMPHOCYTES NFR BLD AUTO: 5 % (ref 14–44)
MCH RBC QN AUTO: 29.8 PG (ref 26.8–34.3)
MCHC RBC AUTO-ENTMCNC: 34.2 G/DL (ref 31.4–37.4)
MCV RBC AUTO: 87 FL (ref 82–98)
MONOCYTES # BLD AUTO: 0.84 THOUSAND/ÂΜL (ref 0.17–1.22)
MONOCYTES NFR BLD AUTO: 9 % (ref 4–12)
NEUTROPHILS # BLD AUTO: 7.77 THOUSANDS/ÂΜL (ref 1.85–7.62)
NEUTS SEG NFR BLD AUTO: 85 % (ref 43–75)
NRBC BLD AUTO-RTO: 0 /100 WBCS
PLATELET # BLD AUTO: 225 THOUSANDS/UL (ref 149–390)
PMV BLD AUTO: 8.3 FL (ref 8.9–12.7)
POTASSIUM SERPL-SCNC: 3.8 MMOL/L (ref 3.5–5.3)
RBC # BLD AUTO: 3.39 MILLION/UL (ref 3.81–5.12)
SODIUM SERPL-SCNC: 132 MMOL/L (ref 135–147)
WBC # BLD AUTO: 9.17 THOUSAND/UL (ref 4.31–10.16)

## 2023-05-12 RX ORDER — LIDOCAINE 50 MG/G
2 PATCH TOPICAL DAILY
Status: DISCONTINUED | OUTPATIENT
Start: 2023-05-12 | End: 2023-05-18 | Stop reason: HOSPADM

## 2023-05-12 RX ADMIN — ACETAMINOPHEN 975 MG: 325 TABLET ORAL at 13:36

## 2023-05-12 RX ADMIN — ESTRADIOL 1 PATCH: 0.04 PATCH, EXTENDED RELEASE TRANSDERMAL at 00:44

## 2023-05-12 RX ADMIN — METHOCARBAMOL 500 MG: 500 TABLET ORAL at 11:00

## 2023-05-12 RX ADMIN — ACETAMINOPHEN 975 MG: 325 TABLET ORAL at 05:23

## 2023-05-12 RX ADMIN — LIDOCAINE 5% 2 PATCH: 700 PATCH TOPICAL at 08:30

## 2023-05-12 RX ADMIN — HEPARIN SODIUM 5000 UNITS: 5000 INJECTION INTRAVENOUS; SUBCUTANEOUS at 21:11

## 2023-05-12 RX ADMIN — ONDANSETRON 4 MG: 2 INJECTION INTRAMUSCULAR; INTRAVENOUS at 05:26

## 2023-05-12 RX ADMIN — DEXTROSE, SODIUM CHLORIDE, AND POTASSIUM CHLORIDE 20 ML/HR: 5; .9; .15 INJECTION INTRAVENOUS at 15:32

## 2023-05-12 RX ADMIN — GABAPENTIN 100 MG: 100 CAPSULE ORAL at 15:53

## 2023-05-12 RX ADMIN — ONDANSETRON 4 MG: 2 INJECTION INTRAMUSCULAR; INTRAVENOUS at 09:27

## 2023-05-12 RX ADMIN — GABAPENTIN 100 MG: 100 CAPSULE ORAL at 20:49

## 2023-05-12 RX ADMIN — PROMETHAZINE HYDROCHLORIDE 12.5 MG: 25 INJECTION INTRAMUSCULAR; INTRAVENOUS at 08:25

## 2023-05-12 RX ADMIN — HEPARIN SODIUM 5000 UNITS: 5000 INJECTION INTRAVENOUS; SUBCUTANEOUS at 05:23

## 2023-05-12 RX ADMIN — METHOCARBAMOL 500 MG: 500 TABLET ORAL at 00:44

## 2023-05-12 RX ADMIN — ONDANSETRON 4 MG: 2 INJECTION INTRAMUSCULAR; INTRAVENOUS at 13:39

## 2023-05-12 RX ADMIN — PROMETHAZINE HYDROCHLORIDE 12.5 MG: 25 INJECTION INTRAMUSCULAR; INTRAVENOUS at 15:51

## 2023-05-12 RX ADMIN — ACETAMINOPHEN 975 MG: 325 TABLET ORAL at 21:11

## 2023-05-12 RX ADMIN — GABAPENTIN 100 MG: 100 CAPSULE ORAL at 08:29

## 2023-05-12 RX ADMIN — METHOCARBAMOL 500 MG: 500 TABLET ORAL at 17:47

## 2023-05-12 RX ADMIN — HEPARIN SODIUM 5000 UNITS: 5000 INJECTION INTRAVENOUS; SUBCUTANEOUS at 13:36

## 2023-05-12 RX ADMIN — METHOCARBAMOL 500 MG: 500 TABLET ORAL at 05:23

## 2023-05-12 NOTE — RESTORATIVE TECHNICIAN NOTE
Restorative Technician Note      Patient Name: Fabian Hickey     Restorative Tech Visit Date: 05/12/23  Note Type: Mobility  Patient Position Upon Consult: Supine  Activity Performed: Dangled; Stood; Ambulated  Assistive Device: Standard walker; Other (Comment) (chair follow due to nausea and some dizziness)  Education Provided: Yes  Patient Position at End of Consult: Bedside chair;  All needs within reach    Jerris Chuy  DPT, Restorative Technician

## 2023-05-12 NOTE — QUICK NOTE
Hyponatremia s/p LAR for rectosigmoid stricture a/e/b Na 129 treated with IVF, lab monitoring and VS monitoring

## 2023-05-12 NOTE — PROGRESS NOTES
Progress Note - Colorectal Surgery  : IDANIA [facility] Surgery Resident on [place]oles [place] 28 y o  female MRN: [id number]  Unit/Bed#: [facility] 808-01 Encounter: [id number]      Assessment:  28 y o  female s/p 5/10 LAR for stricture       Plan:  CLD, advance today  Off IVF  Anticipate transition PCA to PO  Encourage OOB/ambulation  Multimodal analgesia  Marshall removal  DVT ppx        Subjective: Tolerating CLD but no BF, not ambulating      Objective:     Physical Exam:  GEN: NAD   Ab: Soft, mildly tender/ND, CDI  Lung: Normal effort on RA  CV: RRR   Extrem: No CCE   Neuro: A+Ox3       I/O       05/10 0701  05/11 0700 05/11 0701  05/12 0700    P  O   840    I V  (mL/kg) 3902 2 (33 9) 1587 3 (13 8)    IV Piggyback 100     Total Intake(mL/kg) 4002 2 (34 8) 2427 3 (21 1)    Urine (mL/kg/hr) 375 5200 (1 9)    Blood 100     Total Output 475 5200    Net +3527 2 -2772 7                Lab, Imaging and other studies: I have personally reviewed pertinent reports    , CBC with diff: No results found for: WBC, HGB, HCT, MCV, PLT, ADJUSTEDWBC, MCH, MCHC, RDW, MPV, NRBC, BMP/CMP: No results found for: SODIUM, K, CL, CO2, ANIONGAP, BUN, CREATININE, GLUCOSE, CALCIUM, AST, ALT, ALKPHOS, PROT, BILITOT, EGFR      VTE Pharmacologic Prophylaxis: Heparin        Jorge Luis Stewart MD  5/12/2023 6:20 AM

## 2023-05-13 ENCOUNTER — APPOINTMENT (OUTPATIENT)
Dept: RADIOLOGY | Facility: HOSPITAL | Age: 33
End: 2023-05-13

## 2023-05-13 LAB
ANION GAP SERPL CALCULATED.3IONS-SCNC: 2 MMOL/L (ref 4–13)
BASOPHILS # BLD AUTO: 0.01 THOUSANDS/ÂΜL (ref 0–0.1)
BASOPHILS NFR BLD AUTO: 0 % (ref 0–1)
BUN SERPL-MCNC: 4 MG/DL (ref 5–25)
CALCIUM SERPL-MCNC: 8.6 MG/DL (ref 8.3–10.1)
CHLORIDE SERPL-SCNC: 102 MMOL/L (ref 96–108)
CO2 SERPL-SCNC: 31 MMOL/L (ref 21–32)
CREAT SERPL-MCNC: 0.68 MG/DL (ref 0.6–1.3)
EOSINOPHIL # BLD AUTO: 0.02 THOUSAND/ÂΜL (ref 0–0.61)
EOSINOPHIL NFR BLD AUTO: 0 % (ref 0–6)
ERYTHROCYTE [DISTWIDTH] IN BLOOD BY AUTOMATED COUNT: 13.2 % (ref 11.6–15.1)
GFR SERPL CREATININE-BSD FRML MDRD: 116 ML/MIN/1.73SQ M
GLUCOSE SERPL-MCNC: 121 MG/DL (ref 65–140)
HCT VFR BLD AUTO: 30.7 % (ref 34.8–46.1)
HGB BLD-MCNC: 10.2 G/DL (ref 11.5–15.4)
IMM GRANULOCYTES # BLD AUTO: 0.06 THOUSAND/UL (ref 0–0.2)
IMM GRANULOCYTES NFR BLD AUTO: 1 % (ref 0–2)
LYMPHOCYTES # BLD AUTO: 0.6 THOUSANDS/ÂΜL (ref 0.6–4.47)
LYMPHOCYTES NFR BLD AUTO: 6 % (ref 14–44)
MCH RBC QN AUTO: 29.3 PG (ref 26.8–34.3)
MCHC RBC AUTO-ENTMCNC: 33.2 G/DL (ref 31.4–37.4)
MCV RBC AUTO: 88 FL (ref 82–98)
MONOCYTES # BLD AUTO: 0.72 THOUSAND/ÂΜL (ref 0.17–1.22)
MONOCYTES NFR BLD AUTO: 8 % (ref 4–12)
NEUTROPHILS # BLD AUTO: 8.09 THOUSANDS/ÂΜL (ref 1.85–7.62)
NEUTS SEG NFR BLD AUTO: 85 % (ref 43–75)
NRBC BLD AUTO-RTO: 0 /100 WBCS
PLATELET # BLD AUTO: 248 THOUSANDS/UL (ref 149–390)
PMV BLD AUTO: 8 FL (ref 8.9–12.7)
POTASSIUM SERPL-SCNC: 3.4 MMOL/L (ref 3.5–5.3)
RBC # BLD AUTO: 3.48 MILLION/UL (ref 3.81–5.12)
SODIUM SERPL-SCNC: 135 MMOL/L (ref 135–147)
WBC # BLD AUTO: 9.5 THOUSAND/UL (ref 4.31–10.16)

## 2023-05-13 RX ORDER — POTASSIUM CHLORIDE 20 MEQ/1
40 TABLET, EXTENDED RELEASE ORAL ONCE
Status: COMPLETED | OUTPATIENT
Start: 2023-05-13 | End: 2023-05-13

## 2023-05-13 RX ADMIN — HEPARIN SODIUM 5000 UNITS: 5000 INJECTION INTRAVENOUS; SUBCUTANEOUS at 14:23

## 2023-05-13 RX ADMIN — ONDANSETRON 4 MG: 2 INJECTION INTRAMUSCULAR; INTRAVENOUS at 01:40

## 2023-05-13 RX ADMIN — LIDOCAINE 5% 2 PATCH: 700 PATCH TOPICAL at 09:38

## 2023-05-13 RX ADMIN — HEPARIN SODIUM 5000 UNITS: 5000 INJECTION INTRAVENOUS; SUBCUTANEOUS at 21:45

## 2023-05-13 RX ADMIN — GABAPENTIN 100 MG: 100 CAPSULE ORAL at 09:38

## 2023-05-13 RX ADMIN — HEPARIN SODIUM 5000 UNITS: 5000 INJECTION INTRAVENOUS; SUBCUTANEOUS at 05:20

## 2023-05-13 RX ADMIN — ACETAMINOPHEN 975 MG: 325 TABLET ORAL at 21:45

## 2023-05-13 RX ADMIN — HYDROMORPHONE HYDROCHLORIDE 0.5 MG: 1 INJECTION, SOLUTION INTRAMUSCULAR; INTRAVENOUS; SUBCUTANEOUS at 09:39

## 2023-05-13 RX ADMIN — METHOCARBAMOL 500 MG: 500 TABLET ORAL at 11:06

## 2023-05-13 RX ADMIN — METHOCARBAMOL 500 MG: 500 TABLET ORAL at 18:29

## 2023-05-13 RX ADMIN — ONDANSETRON 4 MG: 2 INJECTION INTRAMUSCULAR; INTRAVENOUS at 18:29

## 2023-05-13 RX ADMIN — ACETAMINOPHEN 975 MG: 325 TABLET ORAL at 14:23

## 2023-05-13 RX ADMIN — PROMETHAZINE HYDROCHLORIDE 12.5 MG: 25 INJECTION INTRAMUSCULAR; INTRAVENOUS at 03:20

## 2023-05-13 RX ADMIN — GABAPENTIN 100 MG: 100 CAPSULE ORAL at 21:45

## 2023-05-13 RX ADMIN — POTASSIUM CHLORIDE 40 MEQ: 1500 TABLET, EXTENDED RELEASE ORAL at 11:06

## 2023-05-13 RX ADMIN — ONDANSETRON 4 MG: 2 INJECTION INTRAMUSCULAR; INTRAVENOUS at 06:10

## 2023-05-13 RX ADMIN — GABAPENTIN 100 MG: 100 CAPSULE ORAL at 18:29

## 2023-05-13 NOTE — PROGRESS NOTES
"Progress Note - Colorectal Surgery   Rafi Gonzalez 28 y o  female MRN: [id number]  Unit/Bed#: [facility] 808-01 Encounter: [id number]    Assessment:  Rectosigmoid stricture, POD#3 s/p laparoscopic low anterior resection    Plan:  • Check KUB this morning given episode of vomiting  • NPO for now pending results of above  • Discontinue PCA, start oral PRN analgesic regimen  • Needs to ambulate as frequently as possible  • Needs to use incentive spirometry frequently  • DVT PPx    Subjective/Objective     Subjective:   Vomited 225 cc of bilious emesis overnight  States she had otherwise been tolerating clears throughout the day yesterday  Not using IS much due to pain  Ambulated in halls yesterday  Objective:    Blood pressure 139/86, pulse (!) 109, temperature 98 8 °F (37 1 °C), resp  rate 12, height 5' 9\" (1 753 m), weight 115 kg (253 lb), last menstrual period 07/27/2022, SpO2 94 %, not currently breastfeeding  ,Body mass index is 37 36 kg/m²        Intake/Output Summary (Last 24 hours) at 5/13/2023 0840  Last data filed at 5/13/2023 4340  Gross per 24 hour   Intake 0 6 ml   Output 2675 ml   Net -2674 4 ml       Invasive Devices     Peripheral Intravenous Line  Duration           Peripheral IV 05/10/23 Right Forearm 2 days                Physical Exam:   Gen:  NAD, sitting back in chair  CV:  mildly tachycardic, regular rhythm  Lungs: nl effort  Abd:  soft, no significant distension, appropriately tender, incisions C/D/I   Ext:  no CCE  Neuro: A&Ox3     Results from last 7 days   Lab Units 05/12/23  0554 05/11/23  0618   WBC Thousand/uL 9 17 6 63   HEMOGLOBIN g/dL 10 1* 10 2*   HEMATOCRIT % 29 5* 31 9*   PLATELETS Thousands/uL 225 207     Results from last 7 days   Lab Units 05/12/23  0554 05/11/23  0618   POTASSIUM mmol/L 3 8 3 8   CHLORIDE mmol/L 103 101   CO2 mmol/L 27 24   BUN mg/dL 3* 7   CREATININE mg/dL 0 49* 0 60   CALCIUM mg/dL 8 5 8 3               "

## 2023-05-13 NOTE — PROGRESS NOTES
Patient nauseous, not passing gas and just vomited 225 ml's of bile  White surgery notified  Per Kamilla Stanley MD, nursing is to keep patient NPO and will notify white surgery if the patient vomits again  Patient encouraged to be OOB and ambulating this morning

## 2023-05-13 NOTE — PHYSICAL THERAPY NOTE
Physical Therapy Screen    Patient's Name: Azam Macedo       05/13/23 1030   PT Last Visit   PT Visit Date 05/13/23   Note Type   Note type Screen   Additional Comments Chart reviewed, pt s/p LAR on 5/10  Pt observed ambulating unit with nursing, independent with mobility  Discussed with RN, pt primarily limited by pain, no functional mobility concerns  No acute PT needs identified  Will complete PT order  Thank you           Bessy Bustillos, PT, DPT, GCS

## 2023-05-13 NOTE — PLAN OF CARE
Problem: PAIN - ADULT  Goal: Verbalizes/displays adequate comfort level or baseline comfort level  Description: Interventions:  - Encourage patient to monitor pain and request assistance  - Assess pain using appropriate pain scale  - Administer analgesics based on type and severity of pain and evaluate response  - Implement non-pharmacological measures as appropriate and evaluate response  - Consider cultural and social influences on pain and pain management  - Notify physician/advanced practitioner if interventions unsuccessful or patient reports new pain  Outcome: Progressing     Problem: INFECTION - ADULT  Goal: Absence or prevention of progression during hospitalization  Description: INTERVENTIONS:  - Assess and monitor for signs and symptoms of infection  - Monitor lab/diagnostic results  - Monitor all insertion sites, i e  indwelling lines, tubes, and drains  - Monitor endotracheal if appropriate and nasal secretions for changes in amount and color  - [place] appropriate cooling/warming therapies per order  - Administer medications as ordered  - Instruct and encourage patient and family to use good hand hygiene technique  - Identify and instruct in appropriate isolation precautions for identified infection/condition  Outcome: Progressing  Goal: Absence of fever/infection during neutropenic period  Description: INTERVENTIONS:  - Monitor WBC    Outcome: Progressing     Problem: SAFETY ADULT  Goal: Patient will remain free of falls  Description: INTERVENTIONS:  - Educate patient/family on patient safety including physical limitations  - Instruct patient to call for assistance with activity   - Consult OT/PT to assist with strengthening/mobility   - Keep Call bell within reach  - Keep bed low and locked with side rails adjusted as appropriate  - Keep care items and personal belongings within reach  - Initiate and maintain comfort rounds  - Make Fall Risk Sign visible to staff  - Apply yellow socks and bracelet for high fall risk patients  - Consider moving patient to room near nurses station  Outcome: Progressing  Goal: Maintain or return to baseline ADL function  Description: INTERVENTIONS:  -  Assess patient's ability to carry out ADLs; assess patient's baseline for ADL function and identify physical deficits which impact ability to perform ADLs (bathing, care of mouth/teeth, toileting, grooming, dressing, etc )  - Assess/evaluate cause of self-care deficits   - Assess range of motion  - Assess patient's mobility; develop plan if impaired  - Assess patient's need for assistive devices and provide as appropriate  - Encourage maximum independence but intervene and supervise when necessary  - Involve family in performance of ADLs  - Assess for home care needs following discharge   - Consider OT consult to assist with ADL evaluation and planning for discharge  - Provide patient education as appropriate  Outcome: Progressing  Goal: Maintains/Returns to pre admission functional level  Description: INTERVENTIONS:  - Perform BMAT or MOVE assessment daily    - Set and communicate daily mobility goal to care team and patient/family/caregiver     - Collaborate with rehabilitation services on mobility goals if consulted  - Out of bed for toileting  - Record patient progress and toleration of activity level   Outcome: Progressing     Problem: DISCHARGE PLANNING  Goal: Discharge to home or other facility with appropriate resources  Description: INTERVENTIONS:  - Identify barriers to discharge w/patient and caregiver  - Arrange for needed discharge resources and transportation as appropriate  - Identify discharge learning needs (meds, wound care, etc )  - Arrange for interpretive services to assist at discharge as needed  - Refer to Case Management Department for coordinating discharge planning if the patient needs post-hospital services based on physician/advanced practitioner order or complex needs related to functional status, cognitive ability, or social support system  Outcome: Progressing     Problem: Knowledge Deficit  Goal: Patient/family/caregiver demonstrates understanding of disease process, treatment plan, medications, and discharge instructions  Description: Complete learning assessment and assess knowledge base    Interventions:  - Provide teaching at level of understanding  - Provide teaching via preferred learning methods  Outcome: Progressing     Problem: Prexisting or High Potential for Compromised Skin Integrity  Goal: Skin integrity is maintained or improved  Description: INTERVENTIONS:  - Identify patients at risk for skin breakdown  - Assess and monitor skin integrity  - Assess and monitor nutrition and hydration status  - Monitor labs   - Assess for incontinence   - Turn and reposition patient  - Assist with mobility/ambulation  - Relieve pressure over bony prominences  - Avoid friction and shearing  - Provide appropriate hygiene as needed including keeping skin clean and dry  - Evaluate need for skin moisturizer/barrier cream  - Collaborate with interdisciplinary team   - Patient/family teaching  - Consider wound care consult   Outcome: Progressing     Problem: MOBILITY - ADULT  Goal: Maintain or return to baseline ADL function  Description: INTERVENTIONS:  -  Assess patient's ability to carry out ADLs; assess patient's baseline for ADL function and identify physical deficits which impact ability to perform ADLs (bathing, care of mouth/teeth, toileting, grooming, dressing, etc )  - Assess/evaluate cause of self-care deficits   - Assess range of motion  - Assess patient's mobility; develop plan if impaired  - Assess patient's need for assistive devices and provide as appropriate  - Encourage maximum independence but intervene and supervise when necessary  - Involve family in performance of ADLs  - Assess for home care needs following discharge   - Consider OT consult to assist with ADL evaluation and planning for discharge  - Provide patient education as appropriate  Outcome: Progressing  Goal: Maintains/Returns to pre admission functional level  Description: INTERVENTIONS:  - Perform BMAT or MOVE assessment daily    - Set and communicate daily mobility goal to care team and patient/family/caregiver     - Collaborate with rehabilitation services on mobility goals if consulted  - Out of bed for toileting  - Record patient progress and toleration of activity level   Outcome: Progressing

## 2023-05-14 LAB
ANION GAP SERPL CALCULATED.3IONS-SCNC: 0 MMOL/L (ref 4–13)
BASOPHILS # BLD AUTO: 0.01 THOUSANDS/ÂΜL (ref 0–0.1)
BASOPHILS NFR BLD AUTO: 0 % (ref 0–1)
BUN SERPL-MCNC: 4 MG/DL (ref 5–25)
CALCIUM SERPL-MCNC: 8.4 MG/DL (ref 8.3–10.1)
CHLORIDE SERPL-SCNC: 105 MMOL/L (ref 96–108)
CO2 SERPL-SCNC: 29 MMOL/L (ref 21–32)
CREAT SERPL-MCNC: 0.61 MG/DL (ref 0.6–1.3)
EOSINOPHIL # BLD AUTO: 0.11 THOUSAND/ÂΜL (ref 0–0.61)
EOSINOPHIL NFR BLD AUTO: 2 % (ref 0–6)
ERYTHROCYTE [DISTWIDTH] IN BLOOD BY AUTOMATED COUNT: 13.4 % (ref 11.6–15.1)
GFR SERPL CREATININE-BSD FRML MDRD: 120 ML/MIN/1.73SQ M
GLUCOSE SERPL-MCNC: 117 MG/DL (ref 65–140)
HCT VFR BLD AUTO: 25.6 % (ref 34.8–46.1)
HGB BLD-MCNC: 8.1 G/DL (ref 11.5–15.4)
IMM GRANULOCYTES # BLD AUTO: 0.04 THOUSAND/UL (ref 0–0.2)
IMM GRANULOCYTES NFR BLD AUTO: 1 % (ref 0–2)
LYMPHOCYTES # BLD AUTO: 0.61 THOUSANDS/ÂΜL (ref 0.6–4.47)
LYMPHOCYTES NFR BLD AUTO: 12 % (ref 14–44)
MCH RBC QN AUTO: 29.1 PG (ref 26.8–34.3)
MCHC RBC AUTO-ENTMCNC: 31.6 G/DL (ref 31.4–37.4)
MCV RBC AUTO: 92 FL (ref 82–98)
MONOCYTES # BLD AUTO: 0.62 THOUSAND/ÂΜL (ref 0.17–1.22)
MONOCYTES NFR BLD AUTO: 13 % (ref 4–12)
NEUTROPHILS # BLD AUTO: 3.57 THOUSANDS/ÂΜL (ref 1.85–7.62)
NEUTS SEG NFR BLD AUTO: 72 % (ref 43–75)
NRBC BLD AUTO-RTO: 0 /100 WBCS
PLATELET # BLD AUTO: 223 THOUSANDS/UL (ref 149–390)
PMV BLD AUTO: 8.3 FL (ref 8.9–12.7)
POTASSIUM SERPL-SCNC: 3.6 MMOL/L (ref 3.5–5.3)
RBC # BLD AUTO: 2.78 MILLION/UL (ref 3.81–5.12)
SODIUM SERPL-SCNC: 134 MMOL/L (ref 135–147)
WBC # BLD AUTO: 4.96 THOUSAND/UL (ref 4.31–10.16)

## 2023-05-14 RX ORDER — OXYCODONE HYDROCHLORIDE 5 MG/1
5 TABLET ORAL EVERY 4 HOURS PRN
Status: DISCONTINUED | OUTPATIENT
Start: 2023-05-14 | End: 2023-05-15

## 2023-05-14 RX ORDER — OXYCODONE HYDROCHLORIDE 10 MG/1
10 TABLET ORAL EVERY 4 HOURS PRN
Status: DISCONTINUED | OUTPATIENT
Start: 2023-05-14 | End: 2023-05-15

## 2023-05-14 RX ORDER — POTASSIUM CHLORIDE 20 MEQ/1
40 TABLET, EXTENDED RELEASE ORAL ONCE
Status: COMPLETED | OUTPATIENT
Start: 2023-05-14 | End: 2023-05-14

## 2023-05-14 RX ADMIN — ACETAMINOPHEN 975 MG: 325 TABLET ORAL at 05:10

## 2023-05-14 RX ADMIN — POTASSIUM CHLORIDE 40 MEQ: 1500 TABLET, EXTENDED RELEASE ORAL at 10:21

## 2023-05-14 RX ADMIN — GABAPENTIN 100 MG: 100 CAPSULE ORAL at 20:48

## 2023-05-14 RX ADMIN — GABAPENTIN 100 MG: 100 CAPSULE ORAL at 08:22

## 2023-05-14 RX ADMIN — METHOCARBAMOL 500 MG: 500 TABLET ORAL at 16:48

## 2023-05-14 RX ADMIN — ACETAMINOPHEN 975 MG: 325 TABLET ORAL at 20:48

## 2023-05-14 RX ADMIN — DEXTROSE, SODIUM CHLORIDE, AND POTASSIUM CHLORIDE 100 ML/HR: 5; .9; .15 INJECTION INTRAVENOUS at 08:06

## 2023-05-14 RX ADMIN — METHOCARBAMOL 500 MG: 500 TABLET ORAL at 11:56

## 2023-05-14 RX ADMIN — HYDROMORPHONE HYDROCHLORIDE 0.5 MG: 1 INJECTION, SOLUTION INTRAMUSCULAR; INTRAVENOUS; SUBCUTANEOUS at 16:48

## 2023-05-14 RX ADMIN — GABAPENTIN 100 MG: 100 CAPSULE ORAL at 16:48

## 2023-05-14 RX ADMIN — HEPARIN SODIUM 5000 UNITS: 5000 INJECTION INTRAVENOUS; SUBCUTANEOUS at 20:49

## 2023-05-14 RX ADMIN — HEPARIN SODIUM 5000 UNITS: 5000 INJECTION INTRAVENOUS; SUBCUTANEOUS at 14:12

## 2023-05-14 RX ADMIN — OXYCODONE HYDROCHLORIDE 5 MG: 5 TABLET ORAL at 20:48

## 2023-05-14 RX ADMIN — HEPARIN SODIUM 5000 UNITS: 5000 INJECTION INTRAVENOUS; SUBCUTANEOUS at 05:10

## 2023-05-14 RX ADMIN — ONDANSETRON 4 MG: 2 INJECTION INTRAMUSCULAR; INTRAVENOUS at 12:13

## 2023-05-14 RX ADMIN — ACETAMINOPHEN 975 MG: 325 TABLET ORAL at 14:12

## 2023-05-14 RX ADMIN — LIDOCAINE 5% 2 PATCH: 700 PATCH TOPICAL at 08:22

## 2023-05-14 RX ADMIN — HYDROMORPHONE HYDROCHLORIDE 0.5 MG: 1 INJECTION, SOLUTION INTRAMUSCULAR; INTRAVENOUS; SUBCUTANEOUS at 08:23

## 2023-05-14 RX ADMIN — OXYCODONE HYDROCHLORIDE 5 MG: 5 TABLET ORAL at 14:57

## 2023-05-14 RX ADMIN — DEXTROSE, SODIUM CHLORIDE, AND POTASSIUM CHLORIDE 75 ML/HR: 5; .9; .15 INJECTION INTRAVENOUS at 22:00

## 2023-05-14 RX ADMIN — OXYCODONE HYDROCHLORIDE 10 MG: 10 TABLET ORAL at 10:50

## 2023-05-14 RX ADMIN — HYDROMORPHONE HYDROCHLORIDE 0.5 MG: 1 INJECTION, SOLUTION INTRAMUSCULAR; INTRAVENOUS; SUBCUTANEOUS at 12:22

## 2023-05-14 RX ADMIN — METHOCARBAMOL 500 MG: 500 TABLET ORAL at 05:10

## 2023-05-14 NOTE — PROGRESS NOTES
"Progress Note - Colorectal Surgery   Gael Campbell 28 y o  female MRN: [id number]  Unit/Bed#: [facility] 808-01 Encounter: [id number]    Assessment:  Rectosigmoid stricture, POD#4 s/p laparoscopic low anterior resection    Plan:  • Clears/toast/crackers  • mIVF until tolerating more PO  • Discontinue PCA, start oral PRN regimen  • Ambulate frequently in halls  • Incentive spirometry   • DVT PPx    Subjective/Objective     Subjective:   No acute events overnight  Had bowel movement last night  No further vomiting, nausea improved but still occasionally present  Still having pain, but controlled  Objective:    Blood pressure 135/81, pulse (!) 111, temperature 98 7 °F (37 1 °C), resp  rate 17, height 5' 9\" (1 753 m), weight 115 kg (253 lb), last menstrual period 07/27/2022, SpO2 96 %, not currently breastfeeding  ,Body mass index is 37 36 kg/m²        Intake/Output Summary (Last 24 hours) at 5/14/2023 0907  Last data filed at 5/14/2023 0804  Gross per 24 hour   Intake 1000 ml   Output 250 ml   Net 750 ml       Invasive Devices     Peripheral Intravenous Line  Duration           Peripheral IV 05/14/23 Left Antecubital <1 day                Physical Exam:   Gen:  NAD  CV:  warm, well-perfused  Lungs: nl effort on RA  Abd:  soft, non-distended, corey-incisional tenderness, incisions C/D/I   Ext:  no CCE  Neuro: A&Ox3     Results from last 7 days   Lab Units 05/14/23  0504 05/13/23  0900 05/12/23  0554   WBC Thousand/uL 4 96 9 50 9 17   HEMOGLOBIN g/dL 8 1* 10 2* 10 1*   HEMATOCRIT % 25 6* 30 7* 29 5*   PLATELETS Thousands/uL 223 248 225     Results from last 7 days   Lab Units 05/14/23  0504 05/13/23  0900 05/12/23  0554   POTASSIUM mmol/L 3 6 3 4* 3 8   CHLORIDE mmol/L 105 102 103   CO2 mmol/L 29 31 27   BUN mg/dL 4* 4* 3*   CREATININE mg/dL 0 61 0 68 0 49*   CALCIUM mg/dL 8 4 8 6 8 5               "

## 2023-05-15 ENCOUNTER — APPOINTMENT (OUTPATIENT)
Dept: RADIOLOGY | Facility: HOSPITAL | Age: 33
End: 2023-05-15

## 2023-05-15 LAB
ANION GAP SERPL CALCULATED.3IONS-SCNC: 3 MMOL/L (ref 4–13)
BASOPHILS # BLD AUTO: 0 THOUSANDS/ÂΜL (ref 0–0.1)
BASOPHILS NFR BLD AUTO: 0 % (ref 0–1)
BUN SERPL-MCNC: 3 MG/DL (ref 5–25)
CALCIUM SERPL-MCNC: 8.5 MG/DL (ref 8.3–10.1)
CHLORIDE SERPL-SCNC: 106 MMOL/L (ref 96–108)
CO2 SERPL-SCNC: 26 MMOL/L (ref 21–32)
CREAT SERPL-MCNC: 0.58 MG/DL (ref 0.6–1.3)
EOSINOPHIL # BLD AUTO: 0.06 THOUSAND/ÂΜL (ref 0–0.61)
EOSINOPHIL NFR BLD AUTO: 2 % (ref 0–6)
ERYTHROCYTE [DISTWIDTH] IN BLOOD BY AUTOMATED COUNT: 13.6 % (ref 11.6–15.1)
GFR SERPL CREATININE-BSD FRML MDRD: 122 ML/MIN/1.73SQ M
GLUCOSE SERPL-MCNC: 118 MG/DL (ref 65–140)
HCT VFR BLD AUTO: 26.5 % (ref 34.8–46.1)
HGB BLD-MCNC: 8.4 G/DL (ref 11.5–15.4)
IMM GRANULOCYTES # BLD AUTO: 0.03 THOUSAND/UL (ref 0–0.2)
IMM GRANULOCYTES NFR BLD AUTO: 1 % (ref 0–2)
LYMPHOCYTES # BLD AUTO: 0.52 THOUSANDS/ÂΜL (ref 0.6–4.47)
LYMPHOCYTES NFR BLD AUTO: 13 % (ref 14–44)
MAGNESIUM SERPL-MCNC: 1.9 MG/DL (ref 1.6–2.6)
MCH RBC QN AUTO: 28.8 PG (ref 26.8–34.3)
MCHC RBC AUTO-ENTMCNC: 31.7 G/DL (ref 31.4–37.4)
MCV RBC AUTO: 91 FL (ref 82–98)
MONOCYTES # BLD AUTO: 0.57 THOUSAND/ÂΜL (ref 0.17–1.22)
MONOCYTES NFR BLD AUTO: 14 % (ref 4–12)
NEUTROPHILS # BLD AUTO: 2.95 THOUSANDS/ÂΜL (ref 1.85–7.62)
NEUTS SEG NFR BLD AUTO: 70 % (ref 43–75)
NRBC BLD AUTO-RTO: 0 /100 WBCS
PLATELET # BLD AUTO: 221 THOUSANDS/UL (ref 149–390)
PMV BLD AUTO: 8 FL (ref 8.9–12.7)
POTASSIUM SERPL-SCNC: 3.7 MMOL/L (ref 3.5–5.3)
RBC # BLD AUTO: 2.92 MILLION/UL (ref 3.81–5.12)
SODIUM SERPL-SCNC: 135 MMOL/L (ref 135–147)
WBC # BLD AUTO: 4.13 THOUSAND/UL (ref 4.31–10.16)

## 2023-05-15 RX ORDER — HYDROMORPHONE HYDROCHLORIDE 4 MG/1
4 TABLET ORAL EVERY 4 HOURS PRN
Status: DISCONTINUED | OUTPATIENT
Start: 2023-05-15 | End: 2023-05-16

## 2023-05-15 RX ORDER — HYDROMORPHONE HYDROCHLORIDE 2 MG/1
2 TABLET ORAL EVERY 4 HOURS PRN
Status: DISCONTINUED | OUTPATIENT
Start: 2023-05-15 | End: 2023-05-16

## 2023-05-15 RX ORDER — POTASSIUM CHLORIDE 20 MEQ/1
40 TABLET, EXTENDED RELEASE ORAL ONCE
Status: COMPLETED | OUTPATIENT
Start: 2023-05-15 | End: 2023-05-15

## 2023-05-15 RX ORDER — DEXTROSE, SODIUM CHLORIDE, AND POTASSIUM CHLORIDE 5; .45; .15 G/100ML; G/100ML; G/100ML
100 INJECTION INTRAVENOUS CONTINUOUS
Status: DISCONTINUED | OUTPATIENT
Start: 2023-05-15 | End: 2023-05-17

## 2023-05-15 RX ADMIN — HYDROMORPHONE HYDROCHLORIDE 4 MG: 4 TABLET ORAL at 11:30

## 2023-05-15 RX ADMIN — SIMETHICONE 80 MG: 80 TABLET, CHEWABLE ORAL at 05:07

## 2023-05-15 RX ADMIN — METHOCARBAMOL 500 MG: 500 TABLET ORAL at 11:30

## 2023-05-15 RX ADMIN — GABAPENTIN 100 MG: 100 CAPSULE ORAL at 16:01

## 2023-05-15 RX ADMIN — OXYCODONE HYDROCHLORIDE 10 MG: 10 TABLET ORAL at 05:04

## 2023-05-15 RX ADMIN — ONDANSETRON 4 MG: 2 INJECTION INTRAMUSCULAR; INTRAVENOUS at 14:15

## 2023-05-15 RX ADMIN — HYDROMORPHONE HYDROCHLORIDE 0.5 MG: 1 INJECTION, SOLUTION INTRAMUSCULAR; INTRAVENOUS; SUBCUTANEOUS at 06:28

## 2023-05-15 RX ADMIN — ESTRADIOL 1 PATCH: 0.04 PATCH, EXTENDED RELEASE TRANSDERMAL at 08:55

## 2023-05-15 RX ADMIN — HEPARIN SODIUM 5000 UNITS: 5000 INJECTION INTRAVENOUS; SUBCUTANEOUS at 21:06

## 2023-05-15 RX ADMIN — METHOCARBAMOL 500 MG: 500 TABLET ORAL at 05:04

## 2023-05-15 RX ADMIN — HEPARIN SODIUM 5000 UNITS: 5000 INJECTION INTRAVENOUS; SUBCUTANEOUS at 05:04

## 2023-05-15 RX ADMIN — SIMETHICONE 80 MG: 80 TABLET, CHEWABLE ORAL at 16:01

## 2023-05-15 RX ADMIN — LIDOCAINE 5% 2 PATCH: 700 PATCH TOPICAL at 08:55

## 2023-05-15 RX ADMIN — GABAPENTIN 100 MG: 100 CAPSULE ORAL at 08:55

## 2023-05-15 RX ADMIN — HEPARIN SODIUM 5000 UNITS: 5000 INJECTION INTRAVENOUS; SUBCUTANEOUS at 14:15

## 2023-05-15 RX ADMIN — ONDANSETRON 4 MG: 2 INJECTION INTRAMUSCULAR; INTRAVENOUS at 05:07

## 2023-05-15 RX ADMIN — METHOCARBAMOL 500 MG: 500 TABLET ORAL at 18:00

## 2023-05-15 RX ADMIN — ACETAMINOPHEN 975 MG: 325 TABLET ORAL at 14:14

## 2023-05-15 RX ADMIN — POTASSIUM CHLORIDE 40 MEQ: 1500 TABLET, EXTENDED RELEASE ORAL at 08:55

## 2023-05-15 RX ADMIN — DEXTROSE, SODIUM CHLORIDE, AND POTASSIUM CHLORIDE 100 ML/HR: 5; .45; .15 INJECTION INTRAVENOUS at 23:19

## 2023-05-15 RX ADMIN — ACETAMINOPHEN 975 MG: 325 TABLET ORAL at 05:04

## 2023-05-15 RX ADMIN — PROMETHAZINE HYDROCHLORIDE 12.5 MG: 25 INJECTION INTRAMUSCULAR; INTRAVENOUS at 21:11

## 2023-05-15 NOTE — PROGRESS NOTES
"Progress Note - Colorectal Surgery   Kurt Centeno 28 y o  female MRN: [id number]  Unit/Bed#: [facility] 808-01 Encounter: [id number]    Assessment:  35yo F with rectosigmoid stricture s/p laparoscopic low anterior resection 5/10    Intermittently tachycardic to 110s  hgb 8 4 (8 1)    Plan:  • cont CLD tst/crx  • mIVF until tolerating more PO  • Cont PO pain regimen and nausea control PRN  • Encourage ambulation and OOB  • Incentive spirometry   • DVT PPx    Subjective/Objective     Subjective:   No acute events overnight  Passing flatus, but no more BM since 2 days ago  Feeling bloated, belching more  Has significant pain this morning but still ambulating  Objective:    Blood pressure 119/76, pulse 102, temperature 98 1 °F (36 7 °C), resp  rate 16, height 5' 9\" (1 753 m), weight 115 kg (253 lb), last menstrual period 07/27/2022, SpO2 94 %, not currently breastfeeding  ,Body mass index is 37 36 kg/m²  Intake/Output Summary (Last 24 hours) at 5/15/2023 0630  Last data filed at 5/15/2023 9807  Gross per 24 hour   Intake 2932 92 ml   Output 1200 ml   Net 1732 92 ml       Invasive Devices     Peripheral Intravenous Line  Duration           Peripheral IV 05/14/23 Left Antecubital <1 day                Physical Exam:  General: No acute distress  Neuro: alert and oriented  HEENT: moist mucous membranes  CV: Well perfused, regular rate and rhythm  Lungs: Normal work of breathing, no increased respiratory effort  Abdomen: Soft, non-tender, slightly distended  Incisions clean, dry and intact    Extremities: No edema, clubbing or cyanosis  Skin: Warm, dry      Results from last 7 days   Lab Units 05/14/23  0504 05/13/23  0900 05/12/23  0554   WBC Thousand/uL 4 96 9 50 9 17   HEMOGLOBIN g/dL 8 1* 10 2* 10 1*   HEMATOCRIT % 25 6* 30 7* 29 5*   PLATELETS Thousands/uL 223 248 225     Results from last 7 days   Lab Units 05/14/23  0504 05/13/23  0900 05/12/23  0554   POTASSIUM mmol/L 3 6 3 4* 3 8   CHLORIDE mmol/L 105 102 103 " CO2 mmol/L 29 31 27   BUN mg/dL 4* 4* 3*   CREATININE mg/dL 0 61 0 68 0 49*   CALCIUM mg/dL 8 4 8 6 8 5

## 2023-05-15 NOTE — RESTORATIVE TECHNICIAN NOTE
Restorative Technician Note      Patient Name: Maranda Orellana     Restorative Tech Visit Date: 05/15/23  Note Type: Mobility  Patient Position Upon Consult: Bedside chair  Activity Performed: Ambulated  Assistive Device: Other (Comment) (no AD)  Education Provided: Yes  Patient Position at End of Consult: Bedside chair;  All needs within reach    Gabriela Blood  DPT, Restorative Technician

## 2023-05-15 NOTE — OCCUPATIONAL THERAPY NOTE
Occupational Therapy Screen       05/15/23 1331   OT Last Visit   OT Visit Date 05/15/23   Note Type   Note type Screen       OT orders received and chart reviewed  Pt is currently independent with ADLs and functional mobility with no AD  No acute OT needs at this time  DC skilled OT services  Thank you        Kirti Randall, MS, OTR/L

## 2023-05-15 NOTE — PROGRESS NOTES
Acute blood loss anemia due to expected blood loss s/p LAR for stricture a/e/b Hgb 10 1 treated with lab monitoring and VS monitoring        Findings:  5/1 Hgb 13 1

## 2023-05-15 NOTE — PLAN OF CARE
Problem: PAIN - ADULT  Goal: Verbalizes/displays adequate comfort level or baseline comfort level  Description: Interventions:  - Encourage patient to monitor pain and request assistance  - Assess pain using appropriate pain scale  - Administer analgesics based on type and severity of pain and evaluate response  - Implement non-pharmacological measures as appropriate and evaluate response  - Consider cultural and social influences on pain and pain management  - Notify physician/advanced practitioner if interventions unsuccessful or patient reports new pain  Outcome: Progressing     Problem: INFECTION - ADULT  Goal: Absence or prevention of progression during hospitalization  Description: INTERVENTIONS:  - Assess and monitor for signs and symptoms of infection  - Monitor lab/diagnostic results  - Monitor all insertion sites, i e  indwelling lines, tubes, and drains  - Monitor endotracheal if appropriate and nasal secretions for changes in amount and color  - [place] appropriate cooling/warming therapies per order  - Administer medications as ordered  - Instruct and encourage patient and family to use good hand hygiene technique  - Identify and instruct in appropriate isolation precautions for identified infection/condition  Outcome: Progressing  Goal: Absence of fever/infection during neutropenic period  Description: INTERVENTIONS:  - Monitor WBC    Outcome: Progressing     Problem: SAFETY ADULT  Goal: Maintain or return to baseline ADL function  Description: INTERVENTIONS:  -  Assess patient's ability to carry out ADLs; assess patient's baseline for ADL function and identify physical deficits which impact ability to perform ADLs (bathing, care of mouth/teeth, toileting, grooming, dressing, etc )  - Assess/evaluate cause of self-care deficits   - Assess range of motion  - Assess patient's mobility; develop plan if impaired  - Assess patient's need for assistive devices and provide as appropriate  - Encourage maximum independence but intervene and supervise when necessary  - Involve family in performance of ADLs  - Assess for home care needs following discharge   - Consider OT consult to assist with ADL evaluation and planning for discharge  - Provide patient education as appropriate  Outcome: Progressing  Goal: Maintains/Returns to pre admission functional level  Description: INTERVENTIONS:  - Perform BMAT or MOVE assessment daily    - Set and communicate daily mobility goal to care team and patient/family/caregiver     - Collaborate with rehabilitation services on mobility goals if consulted  - Out of bed for toileting  - Record patient progress and toleration of activity level   Outcome: Progressing     Problem: DISCHARGE PLANNING  Goal: Discharge to home or other facility with appropriate resources  Description: INTERVENTIONS:  - Identify barriers to discharge w/patient and caregiver  - Arrange for needed discharge resources and transportation as appropriate  - Identify discharge learning needs (meds, wound care, etc )  - Arrange for interpretive services to assist at discharge as needed  - Refer to Case Management Department for coordinating discharge planning if the patient needs post-hospital services based on physician/advanced practitioner order or complex needs related to functional status, cognitive ability, or social support system  Outcome: Progressing

## 2023-05-16 LAB
ANION GAP SERPL CALCULATED.3IONS-SCNC: 2 MMOL/L (ref 4–13)
BUN SERPL-MCNC: 4 MG/DL (ref 5–25)
CALCIUM SERPL-MCNC: 8.5 MG/DL (ref 8.3–10.1)
CHLORIDE SERPL-SCNC: 104 MMOL/L (ref 96–108)
CO2 SERPL-SCNC: 28 MMOL/L (ref 21–32)
CREAT SERPL-MCNC: 0.56 MG/DL (ref 0.6–1.3)
GFR SERPL CREATININE-BSD FRML MDRD: 123 ML/MIN/1.73SQ M
GLUCOSE SERPL-MCNC: 120 MG/DL (ref 65–140)
MAGNESIUM SERPL-MCNC: 1.9 MG/DL (ref 1.6–2.6)
PHOSPHATE SERPL-MCNC: 3.8 MG/DL (ref 2.7–4.5)
POTASSIUM SERPL-SCNC: 3.4 MMOL/L (ref 3.5–5.3)
SODIUM SERPL-SCNC: 134 MMOL/L (ref 135–147)

## 2023-05-16 RX ORDER — METHOCARBAMOL 100 MG/ML
500 INJECTION, SOLUTION INTRAMUSCULAR; INTRAVENOUS EVERY 8 HOURS SCHEDULED
Status: DISPENSED | OUTPATIENT
Start: 2023-05-16 | End: 2023-05-16

## 2023-05-16 RX ORDER — HYDROMORPHONE HCL IN WATER/PF 6 MG/30 ML
0.2 PATIENT CONTROLLED ANALGESIA SYRINGE INTRAVENOUS
Status: DISCONTINUED | OUTPATIENT
Start: 2023-05-16 | End: 2023-05-18 | Stop reason: HOSPADM

## 2023-05-16 RX ORDER — HYDROMORPHONE HCL/PF 1 MG/ML
0.5 SYRINGE (ML) INJECTION
Status: DISCONTINUED | OUTPATIENT
Start: 2023-05-16 | End: 2023-05-18 | Stop reason: HOSPADM

## 2023-05-16 RX ORDER — POTASSIUM CHLORIDE 14.9 MG/ML
20 INJECTION INTRAVENOUS EVERY 4 HOURS
Status: COMPLETED | OUTPATIENT
Start: 2023-05-16 | End: 2023-05-17

## 2023-05-16 RX ADMIN — HEPARIN SODIUM 5000 UNITS: 5000 INJECTION INTRAVENOUS; SUBCUTANEOUS at 05:54

## 2023-05-16 RX ADMIN — LIDOCAINE 5% 2 PATCH: 700 PATCH TOPICAL at 10:54

## 2023-05-16 RX ADMIN — METHOCARBAMOL 500 MG: 1000 INJECTION, SOLUTION INTRAMUSCULAR; INTRAVENOUS at 05:54

## 2023-05-16 RX ADMIN — POTASSIUM CHLORIDE 20 MEQ: 14.9 INJECTION, SOLUTION INTRAVENOUS at 21:02

## 2023-05-16 RX ADMIN — METHOCARBAMOL 500 MG: 1000 INJECTION, SOLUTION INTRAMUSCULAR; INTRAVENOUS at 14:39

## 2023-05-16 RX ADMIN — HEPARIN SODIUM 5000 UNITS: 5000 INJECTION INTRAVENOUS; SUBCUTANEOUS at 14:39

## 2023-05-16 RX ADMIN — POTASSIUM CHLORIDE 20 MEQ: 14.9 INJECTION, SOLUTION INTRAVENOUS at 10:34

## 2023-05-16 RX ADMIN — DEXTROSE, SODIUM CHLORIDE, AND POTASSIUM CHLORIDE 100 ML/HR: 5; .45; .15 INJECTION INTRAVENOUS at 21:02

## 2023-05-16 RX ADMIN — HYDROMORPHONE HYDROCHLORIDE 0.5 MG: 1 INJECTION, SOLUTION INTRAMUSCULAR; INTRAVENOUS; SUBCUTANEOUS at 00:56

## 2023-05-16 RX ADMIN — HEPARIN SODIUM 5000 UNITS: 5000 INJECTION INTRAVENOUS; SUBCUTANEOUS at 21:02

## 2023-05-16 RX ADMIN — DEXTROSE, SODIUM CHLORIDE, AND POTASSIUM CHLORIDE 100 ML/HR: 5; .45; .15 INJECTION INTRAVENOUS at 10:34

## 2023-05-16 RX ADMIN — ONDANSETRON 4 MG: 2 INJECTION INTRAMUSCULAR; INTRAVENOUS at 00:57

## 2023-05-16 RX ADMIN — POTASSIUM CHLORIDE 20 MEQ: 14.9 INJECTION, SOLUTION INTRAVENOUS at 15:39

## 2023-05-16 NOTE — PLAN OF CARE
Problem: PAIN - ADULT  Goal: Verbalizes/displays adequate comfort level or baseline comfort level  Description: Interventions:  - Encourage patient to monitor pain and request assistance  - Assess pain using appropriate pain scale  - Administer analgesics based on type and severity of pain and evaluate response  - Implement non-pharmacological measures as appropriate and evaluate response  - Consider cultural and social influences on pain and pain management  - Notify physician/advanced practitioner if interventions unsuccessful or patient reports new pain  Outcome: Progressing     Problem: INFECTION - ADULT  Goal: Absence or prevention of progression during hospitalization  Description: INTERVENTIONS:  - Assess and monitor for signs and symptoms of infection  - Monitor lab/diagnostic results  - Monitor all insertion sites, i e  indwelling lines, tubes, and drains  - Monitor endotracheal if appropriate and nasal secretions for changes in amount and color  - [place] appropriate cooling/warming therapies per order  - Administer medications as ordered  - Instruct and encourage patient and family to use good hand hygiene technique  - Identify and instruct in appropriate isolation precautions for identified infection/condition  Outcome: Progressing  Goal: Absence of fever/infection during neutropenic period  Description: INTERVENTIONS:  - Monitor WBC    Outcome: Progressing     Problem: SAFETY ADULT  Goal: Patient will remain free of falls  Description: INTERVENTIONS:  - Educate patient/family on patient safety including physical limitations  - Instruct patient to call for assistance with activity   - Consult OT/PT to assist with strengthening/mobility   - Keep Call bell within reach  - Keep bed low and locked with side rails adjusted as appropriate  - Keep care items and personal belongings within reach  - Initiate and maintain comfort rounds  - Make Fall Risk Sign visible to staff  - Apply yellow socks and bracelet for high fall risk patients  - Consider moving patient to room near nurses station  Outcome: Progressing  Goal: Maintain or return to baseline ADL function  Description: INTERVENTIONS:  -  Assess patient's ability to carry out ADLs; assess patient's baseline for ADL function and identify physical deficits which impact ability to perform ADLs (bathing, care of mouth/teeth, toileting, grooming, dressing, etc )  - Assess/evaluate cause of self-care deficits   - Assess range of motion  - Assess patient's mobility; develop plan if impaired  - Assess patient's need for assistive devices and provide as appropriate  - Encourage maximum independence but intervene and supervise when necessary  - Involve family in performance of ADLs  - Assess for home care needs following discharge   - Consider OT consult to assist with ADL evaluation and planning for discharge  - Provide patient education as appropriate  Outcome: Progressing  Goal: Maintains/Returns to pre admission functional level  Description: INTERVENTIONS:  - Perform BMAT or MOVE assessment daily    - Set and communicate daily mobility goal to care team and patient/family/caregiver     - Collaborate with rehabilitation services on mobility goals if consulted  - Out of bed for toileting  - Record patient progress and toleration of activity level   Outcome: Progressing     Problem: DISCHARGE PLANNING  Goal: Discharge to home or other facility with appropriate resources  Description: INTERVENTIONS:  - Identify barriers to discharge w/patient and caregiver  - Arrange for needed discharge resources and transportation as appropriate  - Identify discharge learning needs (meds, wound care, etc )  - Arrange for interpretive services to assist at discharge as needed  - Refer to Case Management Department for coordinating discharge planning if the patient needs post-hospital services based on physician/advanced practitioner order or complex needs related to functional status, cognitive ability, or social support system  Outcome: Progressing     Problem: Knowledge Deficit  Goal: Patient/family/caregiver demonstrates understanding of disease process, treatment plan, medications, and discharge instructions  Description: Complete learning assessment and assess knowledge base    Interventions:  - Provide teaching at level of understanding  - Provide teaching via preferred learning methods  Outcome: Progressing     Problem: Prexisting or High Potential for Compromised Skin Integrity  Goal: Skin integrity is maintained or improved  Description: INTERVENTIONS:  - Identify patients at risk for skin breakdown  - Assess and monitor skin integrity  - Assess and monitor nutrition and hydration status  - Monitor labs   - Assess for incontinence   - Turn and reposition patient  - Assist with mobility/ambulation  - Relieve pressure over bony prominences  - Avoid friction and shearing  - Provide appropriate hygiene as needed including keeping skin clean and dry  - Evaluate need for skin moisturizer/barrier cream  - Collaborate with interdisciplinary team   - Patient/family teaching  - Consider wound care consult   Outcome: Progressing     Problem: MOBILITY - ADULT  Goal: Maintain or return to baseline ADL function  Description: INTERVENTIONS:  -  Assess patient's ability to carry out ADLs; assess patient's baseline for ADL function and identify physical deficits which impact ability to perform ADLs (bathing, care of mouth/teeth, toileting, grooming, dressing, etc )  - Assess/evaluate cause of self-care deficits   - Assess range of motion  - Assess patient's mobility; develop plan if impaired  - Assess patient's need for assistive devices and provide as appropriate  - Encourage maximum independence but intervene and supervise when necessary  - Involve family in performance of ADLs  - Assess for home care needs following discharge   - Consider OT consult to assist with ADL evaluation and planning for discharge  - Provide patient education as appropriate  Outcome: Progressing  Goal: Maintains/Returns to pre admission functional level  Description: INTERVENTIONS:  - Perform BMAT or MOVE assessment daily    - Set and communicate daily mobility goal to care team and patient/family/caregiver     - Collaborate with rehabilitation services on mobility goals if consulted  - Out of bed for toileting  - Record patient progress and toleration of activity level   Outcome: Progressing

## 2023-05-16 NOTE — RESTORATIVE TECHNICIAN NOTE
Restorative Technician Note      Patient Name: Gael Campbell     Restorative Tech Visit Date: 05/16/23  Note Type: Mobility  Patient Position Upon Consult: Bedside chair  Activity Performed: Ambulated  Education Provided: Yes  Patient Position at End of Consult: Bedside chair;  All needs within reach    Jeb Malone  DPT, Restorative Technician

## 2023-05-16 NOTE — PROGRESS NOTES
"Progress Note - Colorectal Surgery   Ronald Wilson 28 y o  female MRN: [id number]  Unit/Bed#: [facility] 808-01 Encounter: [id number]    Assessment:  35yo F with rectosigmoid stricture s/p laparoscopic low anterior resection 5/10 now w/ileus    NGT 0 cc    Plan:  · NPO/NGT  · Will likely remove NGT today  • mIVF  • Pain and nausea control  • Encourage ambulation and OOB  • Incentive spirometry   • DVT PPx    Subjective/Objective     Subjective:   NGT placed overnight due to bloating, belching, and increasing nausea  Passing flatus, no BMs  Vomited a few times overnight after placement of NGT  Objective:    Blood pressure 160/84, pulse (!) 117, temperature (!) 97 2 °F (36 2 °C), resp  rate 16, height 5' 9\" (1 753 m), weight 115 kg (253 lb), last menstrual period 07/27/2022, SpO2 98 %, not currently breastfeeding  ,Body mass index is 37 36 kg/m²  Intake/Output Summary (Last 24 hours) at 5/16/2023 0029  Last data filed at 5/15/2023 1210  Gross per 24 hour   Intake 1007 5 ml   Output 2100 ml   Net -1092 5 ml       Invasive Devices     Peripheral Intravenous Line  Duration           Peripheral IV 05/14/23 Left Antecubital 1 day          Drain  Duration           NG/OG/Enteral Tube Nasogastric Right nare <1 day                Physical Exam:  General: No acute distress  Neuro: alert and oriented  HEENT: moist mucous membranes, NGT in place  CV: Well perfused, regular rate and rhythm  Lungs: Normal work of breathing, no increased respiratory effort  Abdomen: Soft, non-tender, distended  Incisions clean, dry and intact    Extremities: No edema, clubbing or cyanosis  Skin: Warm, dry      Results from last 7 days   Lab Units 05/15/23  0510 05/14/23  0504 05/13/23  0900   WBC Thousand/uL 4 13* 4 96 9 50   HEMOGLOBIN g/dL 8 4* 8 1* 10 2*   HEMATOCRIT % 26 5* 25 6* 30 7*   PLATELETS Thousands/uL 221 223 248     Results from last 7 days   Lab Units 05/15/23  0510 05/14/23  0504 05/13/23  0900   POTASSIUM mmol/L 3 7 3 6 3 4* " CHLORIDE mmol/L 106 105 102   CO2 mmol/L 26 29 31   BUN mg/dL 3* 4* 4*   CREATININE mg/dL 0 58* 0 61 0 68   CALCIUM mg/dL 8 5 8 4 8 6

## 2023-05-17 LAB
ANION GAP SERPL CALCULATED.3IONS-SCNC: 2 MMOL/L (ref 4–13)
BUN SERPL-MCNC: 3 MG/DL (ref 5–25)
CALCIUM SERPL-MCNC: 9 MG/DL (ref 8.3–10.1)
CHLORIDE SERPL-SCNC: 106 MMOL/L (ref 96–108)
CO2 SERPL-SCNC: 24 MMOL/L (ref 21–32)
CREAT SERPL-MCNC: 0.51 MG/DL (ref 0.6–1.3)
GFR SERPL CREATININE-BSD FRML MDRD: 127 ML/MIN/1.73SQ M
GLUCOSE SERPL-MCNC: 129 MG/DL (ref 65–140)
POTASSIUM SERPL-SCNC: 4.3 MMOL/L (ref 3.5–5.3)
SODIUM SERPL-SCNC: 132 MMOL/L (ref 135–147)

## 2023-05-17 RX ORDER — DEXTROSE, SODIUM CHLORIDE, AND POTASSIUM CHLORIDE 5; .45; .15 G/100ML; G/100ML; G/100ML
84 INJECTION INTRAVENOUS CONTINUOUS
Status: DISCONTINUED | OUTPATIENT
Start: 2023-05-17 | End: 2023-05-18

## 2023-05-17 RX ORDER — ENOXAPARIN SODIUM 100 MG/ML
40 INJECTION SUBCUTANEOUS
Status: DISCONTINUED | OUTPATIENT
Start: 2023-05-17 | End: 2023-05-18 | Stop reason: HOSPADM

## 2023-05-17 RX ADMIN — ENOXAPARIN SODIUM 40 MG: 40 INJECTION SUBCUTANEOUS at 12:15

## 2023-05-17 RX ADMIN — DEXTROSE, SODIUM CHLORIDE, AND POTASSIUM CHLORIDE 84 ML/HR: 5; .45; .15 INJECTION INTRAVENOUS at 09:35

## 2023-05-17 RX ADMIN — HEPARIN SODIUM 5000 UNITS: 5000 INJECTION INTRAVENOUS; SUBCUTANEOUS at 05:41

## 2023-05-17 NOTE — PLAN OF CARE
Problem: PAIN - ADULT  Goal: Verbalizes/displays adequate comfort level or baseline comfort level  Description: Interventions:  - Encourage patient to monitor pain and request assistance  - Assess pain using appropriate pain scale  - Administer analgesics based on type and severity of pain and evaluate response  - Implement non-pharmacological measures as appropriate and evaluate response  - Consider cultural and social influences on pain and pain management  - Notify physician/advanced practitioner if interventions unsuccessful or patient reports new pain  Outcome: Progressing     Problem: INFECTION - ADULT  Goal: Absence or prevention of progression during hospitalization  Description: INTERVENTIONS:  - Assess and monitor for signs and symptoms of infection  - Monitor lab/diagnostic results  - Monitor all insertion sites, i e  indwelling lines, tubes, and drains  - Monitor endotracheal if appropriate and nasal secretions for changes in amount and color  - [place] appropriate cooling/warming therapies per order  - Administer medications as ordered  - Instruct and encourage patient and family to use good hand hygiene technique  - Identify and instruct in appropriate isolation precautions for identified infection/condition  Outcome: Progressing  Goal: Absence of fever/infection during neutropenic period  Description: INTERVENTIONS:  - Monitor WBC    Outcome: Progressing     Problem: SAFETY ADULT  Goal: Patient will remain free of falls  Description: INTERVENTIONS:  - Educate patient/family on patient safety including physical limitations  - Instruct patient to call for assistance with activity   - Consult OT/PT to assist with strengthening/mobility   - Keep Call bell within reach  - Keep bed low and locked with side rails adjusted as appropriate  - Keep care items and personal belongings within reach  - Initiate and maintain comfort rounds  - Make Fall Risk Sign visible to staff  - Apply yellow socks and bracelet for high fall risk patients  - Consider moving patient to room near nurses station  Outcome: Progressing  Goal: Maintain or return to baseline ADL function  Description: INTERVENTIONS:  -  Assess patient's ability to carry out ADLs; assess patient's baseline for ADL function and identify physical deficits which impact ability to perform ADLs (bathing, care of mouth/teeth, toileting, grooming, dressing, etc )  - Assess/evaluate cause of self-care deficits   - Assess range of motion  - Assess patient's mobility; develop plan if impaired  - Assess patient's need for assistive devices and provide as appropriate  - Encourage maximum independence but intervene and supervise when necessary  - Involve family in performance of ADLs  - Assess for home care needs following discharge   - Consider OT consult to assist with ADL evaluation and planning for discharge  - Provide patient education as appropriate  Outcome: Progressing  Goal: Maintains/Returns to pre admission functional level  Description: INTERVENTIONS:  - Perform BMAT or MOVE assessment daily    - Set and communicate daily mobility goal to care team and patient/family/caregiver     - Collaborate with rehabilitation services on mobility goals if consulted  - Out of bed for toileting  - Record patient progress and toleration of activity level   Outcome: Progressing     Problem: DISCHARGE PLANNING  Goal: Discharge to home or other facility with appropriate resources  Description: INTERVENTIONS:  - Identify barriers to discharge w/patient and caregiver  - Arrange for needed discharge resources and transportation as appropriate  - Identify discharge learning needs (meds, wound care, etc )  - Arrange for interpretive services to assist at discharge as needed  - Refer to Case Management Department for coordinating discharge planning if the patient needs post-hospital services based on physician/advanced practitioner order or complex needs related to functional status, cognitive ability, or social support system  Outcome: Progressing     Problem: Knowledge Deficit  Goal: Patient/family/caregiver demonstrates understanding of disease process, treatment plan, medications, and discharge instructions  Description: Complete learning assessment and assess knowledge base  Interventions:  - Provide teaching at level of understanding  - Provide teaching via preferred learning methods  Outcome: Progressing     Problem: MOBILITY - ADULT  Goal: Maintain or return to baseline ADL function  Description: INTERVENTIONS:  -  Assess patient's ability to carry out ADLs; assess patient's baseline for ADL function and identify physical deficits which impact ability to perform ADLs (bathing, care of mouth/teeth, toileting, grooming, dressing, etc )  - Assess/evaluate cause of self-care deficits   - Assess range of motion  - Assess patient's mobility; develop plan if impaired  - Assess patient's need for assistive devices and provide as appropriate  - Encourage maximum independence but intervene and supervise when necessary  - Involve family in performance of ADLs  - Assess for home care needs following discharge   - Consider OT consult to assist with ADL evaluation and planning for discharge  - Provide patient education as appropriate  Outcome: Progressing  Goal: Maintains/Returns to pre admission functional level  Description: INTERVENTIONS:  - Perform BMAT or MOVE assessment daily    - Set and communicate daily mobility goal to care team and patient/family/caregiver     - Collaborate with rehabilitation services on mobility goals if consulted  - Out of bed for toileting  - Record patient progress and toleration of activity level   Outcome: Progressing     Problem: GASTROINTESTINAL - ADULT  Goal: Minimal or absence of nausea and/or vomiting  Description: INTERVENTIONS:  - Administer IV fluids if ordered to ensure adequate hydration  - Maintain NPO status until nausea and vomiting are resolved  - Nasogastric tube if ordered  - Administer ordered antiemetic medications as needed  - Provide nonpharmacologic comfort measures as appropriate  - Advance diet as tolerated, if ordered  - Consider nutrition services referral to assist patient with adequate nutrition and appropriate food choices  Outcome: Progressing  Goal: Maintains or returns to baseline bowel function  Description: INTERVENTIONS:  - Assess bowel function  - Encourage oral fluids to ensure adequate hydration  - Administer IV fluids if ordered to ensure adequate hydration  - Administer ordered medications as needed  - Encourage mobilization and activity  - Consider nutritional services referral to assist patient with adequate nutrition and appropriate food choices  Outcome: Progressing     Problem: METABOLIC, FLUID AND ELECTROLYTES - ADULT  Goal: Electrolytes maintained within normal limits  Description: INTERVENTIONS:  - Monitor labs and assess patient for signs and symptoms of electrolyte imbalances  - Administer electrolyte replacement as ordered  - Monitor response to electrolyte replacements, including repeat lab results as appropriate  - Instruct patient on fluid and nutrition as appropriate  Outcome: Progressing     Problem: SKIN/TISSUE INTEGRITY - ADULT  Goal: Incision(s), wounds(s) or drain site(s) healing without S/S of infection  Description: INTERVENTIONS  - Assess and document dressing, incision, wound bed, drain sites and surrounding tissue  - Provide patient and family education  - Perform skin care/dressing changes every per shift  Outcome: Progressing

## 2023-05-17 NOTE — PROGRESS NOTES
"Progress Note - Colorectal   Yandel Lazo 28 y o  female MRN: [id number]  Unit/Bed#: [facility] 808-01 Encounter: [id number]      Objective: Doing well this am, small mucus bowel movement last evening, passing flatus  Is OOB and ambulating halls  Nausea at times, no emesis  Does not feel bloated  Anxious to have crackers  Would like to try liquids  Would like to shower  No urine output recorded over last 24 hours    Blood pressure 135/82, pulse 95, temperature 98 4 °F (36 9 °C), resp  rate 20, height 5' 9\" (1 753 m), weight 115 kg (253 lb), last menstrual period 07/27/2022, SpO2 96 %, not currently breastfeeding  ,Body mass index is 37 36 kg/m²  Intake/Output Summary (Last 24 hours) at 5/17/2023 0539  Last data filed at 5/16/2023 2102  Gross per 24 hour   Intake 2171 67 ml   Output --   Net 2171 67 ml       Invasive Devices     Peripheral Intravenous Line  Duration           Peripheral IV 05/14/23 Left Antecubital 2 days                Physical Exam:   Awake, orientated x 3, in no distress, speaking louder, pleasant  Abdomen: soft, does not appear distended, pfannenstiel incision clean and dry, no erythema, trochar sites clean and dry  minimal abdominal tenderness  Extremities: no calf tenderness bilaterally, no pedal edema    Lab, Imaging and other studies:  BMP pending  VTE Pharmacologic Prophylaxis: Heparin  VTE Mechanical Prophylaxis: sequential compression device    Assessment:  POD # 7 Laparoscopic hand assisted low anterior resection    Plan:  1  Clears/toast/crackers  2  Continue OOB and ambulating halls  3  Need better documentation of I/O's as ordered  4  Switch SQH to Lovenox 40 mg SQ daily  5  Check BMP today  6  Continue with incentive spirometry  7  May shower  8   Decrease IV fluids to 84/hr          "

## 2023-05-18 VITALS
HEART RATE: 95 BPM | RESPIRATION RATE: 16 BRPM | DIASTOLIC BLOOD PRESSURE: 77 MMHG | OXYGEN SATURATION: 96 % | BODY MASS INDEX: 37.47 KG/M2 | WEIGHT: 253 LBS | HEIGHT: 69 IN | TEMPERATURE: 98.2 F | SYSTOLIC BLOOD PRESSURE: 130 MMHG

## 2023-05-18 RX ORDER — ENOXAPARIN SODIUM 100 MG/ML
40 INJECTION SUBCUTANEOUS
Qty: 7.6 ML | Refills: 0 | Status: SHIPPED | OUTPATIENT
Start: 2023-05-19 | End: 2023-06-07

## 2023-05-18 RX ORDER — OXYCODONE HYDROCHLORIDE 5 MG/1
5 TABLET ORAL EVERY 6 HOURS PRN
Qty: 16 TABLET | Refills: 0 | Status: SHIPPED | OUTPATIENT
Start: 2023-05-18 | End: 2023-05-22

## 2023-05-18 RX ADMIN — ENOXAPARIN SODIUM 40 MG: 40 INJECTION SUBCUTANEOUS at 10:11

## 2023-05-18 NOTE — DISCHARGE INSTR - AVS FIRST PAGE
DISCHARGE INSTRUCTIONS    Follow Up: Follow up with Dr Ricki Ch in 2-3 weeks   Lovenox 40mg Injection daily until 6/7    Diet: Continue low residue diet for 5 more days, then may resume regular diet    Pain: Oxycodone 5mg as needed for moderate/severe every 6 hours  Tylenol scheduled every 8 hours for mild pain for 7days    Shower: You may shower over the wound  Do not bathe or use a pool or hot tub until cleared by the physician  Activity: As tolerated  You may go up and down stairs, walk as much as you are comfortable, but walk at least 3 times each day  Do not lift anything heavier than 15 pounds for at least 2-4 weeks, unless cleared by the doctor  Driving: Do not drive or make any important decisions while on narcotic pain medication  Generally, you may drive when your off all narcotic pain medications  Medications: Resume all of your previous medications, unless told otherwise by the doctor  Tylenol is always fine  You do not need to take the narcotic pain medications unless you are having significant pain and discomfort  Call the office: If you are experiencing any of the following, fevers above 101 5° or chills, significant nausea or vomiting, increase in abdominal pain, if the wound develops drainage and/or is excessive redness around the wound, or if you have significant diarrhea or other worsening symptoms

## 2023-05-18 NOTE — PLAN OF CARE
Problem: PAIN - ADULT  Goal: Verbalizes/displays adequate comfort level or baseline comfort level  Description: Interventions:  - Encourage patient to monitor pain and request assistance  - Assess pain using appropriate pain scale  - Administer analgesics based on type and severity of pain and evaluate response  - Implement non-pharmacological measures as appropriate and evaluate response  - Consider cultural and social influences on pain and pain management  - Notify physician/advanced practitioner if interventions unsuccessful or patient reports new pain  Outcome: Progressing     Problem: INFECTION - ADULT  Goal: Absence or prevention of progression during hospitalization  Description: INTERVENTIONS:  - Assess and monitor for signs and symptoms of infection  - Monitor lab/diagnostic results  - Monitor all insertion sites, i e  indwelling lines, tubes, and drains  - Monitor endotracheal if appropriate and nasal secretions for changes in amount and color  - [place] appropriate cooling/warming therapies per order  - Administer medications as ordered  - Instruct and encourage patient and family to use good hand hygiene technique  - Identify and instruct in appropriate isolation precautions for identified infection/condition  Outcome: Progressing

## 2023-05-18 NOTE — DISCHARGE SUMMARY
Discharge Summary - Colorectal Surgery   Loc Lemos 28 y o  female MRN: [id number]  [facility] Encounter: [id number]    Admission Date: 5/10/2023     Discharge Date: 5/18/2023    Admitting Diagnosis: Colonic stricture [facility]) [[id number]]    Discharge Diagnosis: Colonic stricture s/p lap LAR by Dr Lisa Santillan    Attending and Service: Dr Lisa Santillan, Colorectal Surgical Services  Consulting Physician(s): None    Imaging and Procedures Performed:   5/10 Lap LAR - Dr Lisa Santillan    XR abdomen 1 view kub    Result Date: 5/16/2023  Impression: New moderate gaseous distention of the stomach  Ongoing gaseous distention of large and small bowel favoring ileus pattern Workstation performed: [id number]     XR abdomen 1 view kub    Result Date: 5/16/2023  Impression: Nasogastric tube tip in the gastric fundus Workstation performed: [id number]     XR abdomen 1 view kub    Result Date: 5/14/2023  Impression: Postop ileus versus obstruction  Workstation performed: [id number]     Pathology: Pending    Hospital Course: Loc Lemos is a 40-year-old fm with PMHx of cervical ca who presented for elective surgical intervention including Lap LAR by Dr Lisa Santillan  Patient underwent surgical intervention without complications  Postoperatively she was transferred to the medical surgical floor where she was started on a clear liquid diet, maintained on IV fluids, had a PCA for pain control, Marshall in place, DVT prophylaxis, as needed antiemetics, and was encouraged to use her incentive spirometer as well as ambulation  Her Marshall was removed on postoperative day 2  Her Diet was slowly advanced as tolerated throughout her hospital stay  Unfortunately she had some nausea, and distention and developed a postoperative ileus  Unfortunately with bowel rest and taking her diet slowly she was unable to overcome ileus, an NG tube was placed on postoperative day 5    This was subsequently removed on postoperative day 6 secondary to significant discomfort  She was kept n p o  for an additional 24 hours post NG tube removal   Once her distention had subsided and she felt better, her diet was again slowly advanced until she tolerated a low residue low fiber diet  She was cleared for discharge on postoperative day 8 as she was tolerating diet without nausea or vomiting, out of bed and ambulating without any difficulties, utilizing her incentive spirometer, and her pain remained controlled on a p o  pain control regimen  All discharge instructions discussed with the patient and her significant other at bedside, both are in agreement with plan  All questions answered  On discharge, the patient is instructed to follow-up with the patient's primary care provider within the next 2 weeks to review the events of the recent hospitalization  The patient is instructed to follow-up with Dr Naomi John in 2 to 3 weeks  The patient is instructed to follow the provided discharge instructions  Condition at Discharge: good     Discharge instructions/Information to patient and family:   See after visit summary for information provided to patient and family  Provisions for Follow-Up Care:  See after visit summary for information related to follow-up care and any pertinent home health orders  Disposition: Home    Planned Readmission: No    Discharge Statement   I spent 30 minutes discharging the patient  This time was spent on the day of discharge  I had direct contact with the patient on the day of discharge  Additional documentation is required if more than 30 minutes were spent on discharge  Discharge Medications:  See after visit summary for reconciled discharge medications provided to patient and family      Mariaelena Rojas PA-C  5/18/2023  1:21 PM

## 2023-05-18 NOTE — DISCHARGE INSTRUCTIONS
Low Fiber Diet   WHAT YOU NEED TO KNOW:   A low-fiber diet limits foods that are high in fiber  Fiber is the part of fruits, vegetables, and grains that is not broken down by your body  You may need to follow this diet after surgery on your intestines  You may also need to follow this diet for certain conditions such as Crohn disease or ulcerative colitis  Ask your healthcare provider or dietitian how much fiber you can have each day  DISCHARGE INSTRUCTIONS:   Foods to include:  Read food labels to check the amount of fiber that are found in foods  Some foods that are low in fiber include the following:  Grains:  Choose grains that have less than 2 grams of fiber in each serving  Examples include the following:    Cream of wheat and finely ground grits    Dry cereal made from rice    White bread, white pasta, and white rice    Crackers, bagels, and rolls made from white or refined flour    Other foods:      Canned and well-cooked fruit without skins or seeds, and juice without pulp    Ripe bananas and melons    Canned and well-cooked vegetables without skins or seeds, and vegetable juice    Cow's milk, lactose-free milk, soy milk, and rice milk    Yogurt without nuts, fruit, or granola    Eggs, poultry (such as chicken and turkey), fish, and tender, ground, well-cooked beef    Tofu and smooth peanut butter    Broth and strained soups made of low-fiber foods    Foods to avoid:   Breads, cereals, crackers, and pasta made with whole wheat or whole grains (such as whole oats)    [employer], wild rice, quinoa, kasha, and barley    All fresh fruit with skin, except banana and melons    Dried fruits and fruit juice with pulp    Canned pineapple    Raw vegetables    Nuts, seeds, and popcorn    Beans, nuts, peas, and lentils    Tough meats    Coconut and avocado    What else you should know about a low-fiber diet:  A low-fiber diet can decrease the amount of bowel movements you have   Drink liquids as directed to avoid constipation  Ask how much liquid to drink each day and which liquids are best for you  © Copyright [facility] 2022 Information is for End User's use only and may not be sold, redistributed or otherwise used for commercial purposes  The above information is an  only  It is not intended as medical advice for individual conditions or treatments  Talk to your doctor, nurse or pharmacist before following any medical regimen to see if it is safe and effective for you

## 2023-05-18 NOTE — PROGRESS NOTES
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 5/10 now w/ileus    Plan:  · Advance diet today  • Discontinue mIVF  • Pain and nausea control  • Encourage ambulation and OOB  • Incentive spirometry   • DVT PPx  • dispo planning, possibly home later today if tolerating diet  Subjective/Objective     Subjective:   No acute events overnight  Passing flatus, having BMs  No n/v  Tolerating CLD  Wants to go home today    Objective:    Blood pressure 125/84, pulse 76, temperature 98 7 °F (37 1 °C), resp  rate 16, height 5' 9\" (1 753 m), weight 115 kg (253 lb), last menstrual period 07/27/2022, SpO2 98 %, not currently breastfeeding  ,Body mass index is 37 36 kg/m²  Intake/Output Summary (Last 24 hours) at 5/17/2023 2342  Last data filed at 5/17/2023 1833  Gross per 24 hour   Intake 1637 27 ml   Output --   Net 1637 27 ml       Invasive Devices     Peripheral Intravenous Line  Duration           Peripheral IV 05/17/23 Right;Ventral (anterior) Forearm <1 day                Physical Exam:  General: No acute distress  Neuro: alert and oriented  HEENT: moist mucous membranes  CV: Well perfused, regular rate and rhythm  Lungs: Normal work of breathing, no increased respiratory effort  Abdomen: Soft, non-tender, non-distended  Incisions clean, dry and intact    Extremities: No edema, clubbing or cyanosis  Skin: Warm, dry      Results from last 7 days   Lab Units 05/15/23  0510 05/14/23  0504 05/13/23  0900   WBC Thousand/uL 4 13* 4 96 9 50   HEMOGLOBIN g/dL 8 4* 8 1* 10 2*   HEMATOCRIT % 26 5* 25 6* 30 7*   PLATELETS Thousands/uL 221 223 248     Results from last 7 days   Lab Units 05/17/23  0514 05/16/23  0849 05/15/23  0510   POTASSIUM mmol/L 4 3 3 4* 3 7   CHLORIDE mmol/L 106 104 106   CO2 mmol/L 24 28 26   BUN mg/dL 3* 4* 3*   CREATININE mg/dL " 0 51* 0 56* 0 58*   CALCIUM mg/dL 9 0 8 5 8 5

## 2023-06-03 ENCOUNTER — PATIENT MESSAGE (OUTPATIENT)
Age: 33
End: 2023-06-03

## 2023-06-05 ENCOUNTER — TELEPHONE (OUTPATIENT)
Age: 33
End: 2023-06-05

## 2023-06-05 NOTE — TELEPHONE ENCOUNTER
Pt notes irregular b/m constipation/diarrhea  Wondering if she can try Metamucil  Left v/m for pt advising Metamucil is ok; if symptoms do not improve or worsen she is to call ofc back to further discuss

## 2023-06-13 ENCOUNTER — CLINICAL SUPPORT (OUTPATIENT)
Dept: RADIATION ONCOLOGY | Facility: CLINIC | Age: 33
End: 2023-06-13
Attending: INTERNAL MEDICINE
Payer: COMMERCIAL

## 2023-06-13 VITALS
SYSTOLIC BLOOD PRESSURE: 134 MMHG | HEIGHT: 69 IN | WEIGHT: 250 LBS | TEMPERATURE: 98.8 F | BODY MASS INDEX: 37.03 KG/M2 | DIASTOLIC BLOOD PRESSURE: 86 MMHG | HEART RATE: 105 BPM | OXYGEN SATURATION: 99 %

## 2023-06-13 DIAGNOSIS — C53.8 MALIGNANT NEOPLASM OF OVERLAPPING SITES OF CERVIX (HCC): Primary | ICD-10-CM

## 2023-06-13 PROCEDURE — 99214 OFFICE O/P EST MOD 30 MIN: CPT | Performed by: INTERNAL MEDICINE

## 2023-06-13 PROCEDURE — 99211 OFF/OP EST MAY X REQ PHY/QHP: CPT | Performed by: INTERNAL MEDICINE

## 2023-06-13 NOTE — PROGRESS NOTES
Follow-up - Radiation Oncology   Kimberly Narayan 1990 28 y o  female [id number]    Cancer Staging   Malignant neoplasm of overlapping sites of cervix [facility])  Staging form: Cervix Uteri, AJCC Version 9  - Clinical: No stage assigned - Unsigned  - Pathologic: No stage assigned - Unsigned  - Pathologic: FIGO Stage IIIC1p (pT1b1, cM0) - Signed by Jefe Fitzpatrick MD on 5/19/2022  Stage confirmation method: Pathology  Pelvic grady status: Positive  Pelvic grady method of assessment: Lymph node dissection  Para-aortic status: Negative    Assessment/Plan:  Kimberly Narayan 1990 is a 28 y o  female with FIGO IIIC1 cervical cancer s/p pelvic lymph node dissection and ovarian translocation (hysterectomy was aborted after finding of grady disease)  She was treated with definitive chemoradiation therapy per multidisciplinary consensus with external beam RT + cervical brachytherapy, completing on 8/3/22  Her post-treatment PET/CT at [facility] was reassuring, no evidence of FDG avid disease and resolution of prior cervical activity  She developed proctitis and stricture s/p laparoscopic resection on 5/10/23 with significant relief of bowel symptoms  She presents today for routine follow-up visit now 10 months after completion of radiation therapy  Her GI symptoms have significantly improved after recent resection, with mild residual symptoms including frequent small bowel movements, but no pain or bleeding  She notes occasional urinary dribbling/incontinence after her recent surgery  She denies vaginal pain or bleeding  She declined pelvic exam today because she is worried about pain with examinations and has an upcoming gynecology evaluation next week  She notes not using the vaginal dilator with everything going on   I reviewed that this is understandable and reinforced vaginal dilator teaching and provided new samples    -Continue clinical surveillance  -6/15/23  Dr Javier Miranda  -6/22/23  Dr Britton Romero  -RTC in March 2024    Brayden Crane MD  Department of [address] [place] [address] 12    No orders of the defined types were placed in this encounter  History of Present Illness   Interval History:  Wilfrid Bumpers 1990 is a 28 y o  female [address] cervical cancer s/p initial LEEP in March 2022 showing moderately differentiated SCC with extensive LVSI  MDT consensus was for definitive concurrent chemoRT   She received 6 cycles of cisplatin concurrent with pelvic RT with brachytherapy boost on 8/3/22  She was last seen by radiation oncology on 1/10/23 and presents for follow up today     1/11/23 Colonoscopy  FINDINGS:  Severe erythematous and friable mucosa in the sigmoid colon and rectosigmoid 13 cm from the anal verge; performed cold forceps biopsy  Area of colitis was seen in the 13 cm from the anal verge   This was the proximal rectum/distal sigmoid   This could not be traversed using a pediatric colonoscope or gastroscope   A slim endoscope was used   The area of inflammation extended for approximately 10 cm   The mucosa was very friable   Slight ulcerations were also seen   Proximal to this the colon was normal   The descending colon appeared normal  Performed random biopsy using biopsy forceps  Normal terminal ileum  The scope was not extended any further at this time       2/16/23  Katie Seals, GYN/Onc  Clinically without disease  Has radiation proctitis with evidence of stricture    Will f/u with colorectal surgery       5/10/23  [address]  Procedure(s):  RESECTION COLON SIGMOID LAPAROSCOPIC  SIGMOIDOSCOPY FLEXIBLE  TAKEDOWN SPLEENIC FLEXURE  Final Diagnosis   A  Large Intestine, Sigmoid Colon :  - Portion of colon with mucosal erosion, paneth cell metaplasia, reactive changes and marked serosal adhesions   - Additional portions of colon with no pathologic abnormality   - Negative for malignancy      Comment: Immunohistochemistry for AE1/3, calretinin, D2-40, and p16 are performed on tissue block A4 to help in the assessment of this case  Today Radha Richey feels well overall  She has had significant improvement in bowel symptoms after surgery  She notes mild urinary dribbling since the operation  She denies significant vaginal pain, discharge, or bleeding  She presents with her significant other  She denies vaginal dilator use recently  Historical Information   Oncology History   Malignant neoplasm of overlapping sites of cervix ([facility] [address] )   3/24/2022 Initial Diagnosis    Malignant neoplasm of overlapping sites of cervix ([facility] [address] )     5/6/2022 Surgery    Exploratory laparotomy for planned radical hysterectomy, bilateral pelvic lymph node dissection, aborted radical hysterectomy, bilateral ovarian transposition due to positive lymph nodes    1  Two right pelvic lymph nodes positive     5/19/2022 -  Cancer Staged    Staging form: Cervix Uteri, AJCC Version 9  - Pathologic: FIGO Stage IIIC1p (pT1b1, cM0) - Signed by Vicky Power MD on 5/19/2022  Stage confirmation method: Pathology  Pelvic grady status: Positive  Pelvic grady method of assessment: Lymph node dissection  Para-aortic status: Negative       6/20/2022 - 7/26/2022 Chemotherapy    palonosetron (ALOXI), 0 25 mg, Intravenous, Once, 6 of 6 cycles  Administration: 0 25 mg (6/20/2022), 0 25 mg (6/27/2022), 0 25 mg (7/6/2022), 0 25 mg (7/11/2022), 0 25 mg (7/19/2022), 0 25 mg (7/26/2022)  CISplatin (PLATINOL) infusion, 70 mg (original dose 40 mg/m2), Intravenous, Once, 6 of 6 cycles  Dose modification: 70 mg (original dose 40 mg/m2, Cycle 1, Reason: Other (Must fill in a comment), Comment: max 70), 70 mg (original dose 40 mg/m2, Cycle 2, Reason: Dose modified as per discussion with consulting physician)  Administration: 70 mg (6/20/2022), 70 mg (6/27/2022), 70 mg (7/6/2022), 70 mg (7/11/2022), 70 mg (7/19/2022), 70 mg (7/26/2022)  aprepitant (CINVANTI) in  mL IVPB, 130 mg, Intravenous, Once, 6 of 6 cycles  Administration: 130 mg (6/20/2022), 130 mg (6/27/2022), 130 mg (7/6/2022), 130 mg (7/11/2022), 130 mg (7/19/2022), 130 mg (7/26/2022)     6/22/2022 - 8/3/2022 Radiation    Course: C1 - EBRT  Plan ID Energy Fractions Dose per Fraction (cGy) Dose Correction (cGy) Total Dose Delivered (cGy) Elapsed Days   Whole Pelvis 10X 25 / 25 180 0 4,500 42       Course: C1 HDR Tandem and Ring Brachytherapy  Plan ID Energy Fractions Dose per Fraction (cGy) Dose Correction (cGy) Total Dose Delivered (cGy) Elapsed Days   HDR1 7-15-22  1 / 1 700 0 700 0   AJJ4_5--18-22 1 / 1 700 0 700 0   HMO9_0--21-22 1 / 1 700 0 700 0   HDR4 7-25-22 1 / 1 700 0 700 0           Past Medical History:   Diagnosis Date   • Anxiety    • Cancer [facility])     cervix   • Cervical cancer (HCC)     receiving chemo and radiation   • COVID     Jan 2022   • Depression    • Diarrhea    • Dizziness    • Frequent headaches    • Hx of bleeding disorder     vaginal bleeding   • Obesity      Past Surgical History:   Procedure Laterality Date   • CERVICAL BIOPSY  W/ LOOP ELECTRODE EXCISION     • LYMPH NODE DISSECTION Bilateral 5/6/2022    Procedure: DISSECTION/STAGING LYMPH NODE PELVIS/ABDOMEN;  Surgeon: Dirk Ibarra MD;  [facility];  Service: Gynecology Oncology   • IA INSERTION VAGINAL RADIATION DEVICE N/A 7/15/2022    Procedure: INSERTION NADIR SLEEVE VAGINA WITH POST OP BRACHYTHERAPY (IN RADIATION ONCOLOGY);   Surgeon: Dirk Ibarra MD;  Location: [facility];  Service: Gynecology Oncology   • IA LAPAROSCOPY COLECTOMY PARTIAL W/ANASTOMOSIS N/A 5/10/2023    Procedure: RESECTION COLON SIGMOID LAPAROSCOPIC;  Surgeon: Rakel Fernández MD;  Location: [facility];  Service: Colorectal   • IA SIGMOIDOSCOPY FLX DX W/COLLJ SPEC BR/WA IF PFRMD N/A 5/10/2023    Procedure: SIGMOIDOSCOPY FLEXIBLE;  Surgeon: Rakel Fernández MD;  Location: [facility];  Service: Colorectal   • SALPINGECTOMY Bilateral 5/6/2022    Procedure: "SALPINGECTOMY, OVARIAN TRANSPOSITION;  Surgeon: Garrison Lara MD;  Location[facility];  Service: Gynecology Oncology   • US GUIDANCE  7/15/2022     Social History   Social History     Substance and Sexual Activity   Alcohol Use Not Currently     Social History     Substance and Sexual Activity   Drug Use Never    Comment: CBD Gummies/Anxiety     Social History     Tobacco Use   Smoking Status Never   Smokeless Tobacco Never       Meds/Allergies     Current Outpatient Medications:   •  estradiol (VIVELLE-DOT) 0 0375 MG/24HR, PLACE 1 PATCH ON THE SKIN 2 TIMES A WEEK , Disp: 8 patch, Rfl: 3  •  lidocaine (XYLOCAINE) 5 % ointment, Apply topically as needed for mild pain, Disp: 35 44 g, Rfl: 2  •  NON FORMULARY, CBD gummies daily, Disp: , Rfl:   •  enoxaparin (LOVENOX) 40 mg/0 4 mL, Inject 0 4 mL (40 mg total) under the skin every 24 hours for 19 days Do not start before May 19, 2023 , Disp: 7 6 mL, Rfl: 0  •  Probiotic Product (PROBIOTIC-10 PO), Take by mouth (Patient not taking: Reported on 6/13/2023), Disp: , Rfl:   No Known Allergies    Review of Systems  Please refer to nursing note for full ROS    OBJECTIVE:   /86 (BP Location: Right arm, Patient Position: Sitting, Cuff Size: Large)   Pulse 105   Temp 98 8 °F (37 1 °C) (Temporal)   Ht 5' 9\" (1 753 m)   Wt 113 kg (250 lb)   LMP 07/27/2022 (Exact Date)   SpO2 99%   BMI 36 92 kg/m²   Pain Assessment:  6  ECOG/Zubrod/WHO: 1 - Symptomatic but completely ambulatory    Physical Exam:   General Appearance:  Alert, cooperative, no distress, appears stated age  Lungs: Respirations unlabored  Gynecologic: Patient elected to defer gyn exam given upcoming gynecology appt and concern due to pain with pelvic exam  Skin: No generalized rash or dermatitis  Neurologic: ANOx3, speech and cognition intact  Portions of the record may have been created with voice recognition software    Occasional wrong word or \"sound a like\" substitutions may have occurred " due to the inherent limitations of voice recognition software  Read the chart carefully and recognize, using context, where substitutions have occurred

## 2023-06-13 NOTE — PROGRESS NOTES
Bobby Villegas 1990 is a 28 y o  female  with FIGO IIIC1 cervical cancer s/p initial LEEP in 2022 showing moderately differentiated SCC with extensive LVSI  MDT consensus was for definitive concurrent chemoRT  She received 6 cycles of cisplatin concurrent with pelvic RT with brachytherapy boost on 8/3/22  She was last seen by radiation oncology on 1/10/23 and presents for follow up today    23 Colonoscopy  FINDINGS:  • Severe erythematous and friable mucosa in the sigmoid colon and rectosigmoid 13 cm from the anal verge; performed cold forceps biopsy  Area of colitis was seen in the 13 cm from the anal verge  This was the proximal rectum/distal sigmoid  This could not be traversed using a pediatric colonoscope or gastroscope  A slim endoscope was used  The area of inflammation extended for approximately 10 cm  The mucosa was very friable  Slight ulcerations were also seen  Proximal to this the colon was normal   • The descending colon appeared normal  Performed random biopsy using biopsy forceps  Normal terminal ileum  • The scope was not extended any further at this time  23  Primo Daly, GYN/Onc  Clinically without disease  Has radiation proctitis with evidence of stricture  Will f/u with colorectal surgery  5/10/23  [facility]  Procedure(s):  RESECTION COLON SIGMOID LAPAROSCOPIC  SIGMOIDOSCOPY FLEXIBLE  TAKEDOWN SPLEENIC FLEXURE    Upcomin/15/23  Dr Joseph Garcia  23  Dr Tamara Luis        Follow up visit     Oncology History   Malignant neoplasm of overlapping sites of cervix [facility])   3/24/2022 Initial Diagnosis    Malignant neoplasm of overlapping sites of cervix ([facility] [address] )     2022 Surgery    Exploratory laparotomy for planned radical hysterectomy, bilateral pelvic lymph node dissection, aborted radical hysterectomy, bilateral ovarian transposition due to positive lymph nodes    1  Two right pelvic lymph nodes positive     2022 -  Cancer Staged    Staging form: Cervix Uteri, AJCC Version 9  - Pathologic: FIGO Stage IIIC1p (pT1b1, cM0) - Signed by Chio Mike MD on 5/19/2022  Stage confirmation method: Pathology  Pelvic grady status: Positive  Pelvic grady method of assessment: Lymph node dissection  Para-aortic status: Negative       6/20/2022 - 7/26/2022 Chemotherapy    palonosetron (ALOXI), 0 25 mg, Intravenous, Once, 6 of 6 cycles  Administration: 0 25 mg (6/20/2022), 0 25 mg (6/27/2022), 0 25 mg (7/6/2022), 0 25 mg (7/11/2022), 0 25 mg (7/19/2022), 0 25 mg (7/26/2022)  CISplatin (PLATINOL) infusion, 70 mg (original dose 40 mg/m2), Intravenous, Once, 6 of 6 cycles  Dose modification: 70 mg (original dose 40 mg/m2, Cycle 1, Reason: Other (Must fill in a comment), Comment: max 70), 70 mg (original dose 40 mg/m2, Cycle 2, Reason: Dose modified as per discussion with consulting physician)  Administration: 70 mg (6/20/2022), 70 mg (6/27/2022), 70 mg (7/6/2022), 70 mg (7/11/2022), 70 mg (7/19/2022), 70 mg (7/26/2022)  aprepitant (CINVANTI) in  mL IVPB, 130 mg, Intravenous, Once, 6 of 6 cycles  Administration: 130 mg (6/20/2022), 130 mg (6/27/2022), 130 mg (7/6/2022), 130 mg (7/11/2022), 130 mg (7/19/2022), 130 mg (7/26/2022)     6/22/2022 - 8/3/2022 Radiation    Course: C1 - EBRT  Plan ID Energy Fractions Dose per Fraction (cGy) Dose Correction (cGy) Total Dose Delivered (cGy) Elapsed Days   Whole Pelvis 10X 25 / 25 180 0 4,500 42       Course: C1 HDR Tandem and Ring Brachytherapy  Plan ID Energy Fractions Dose per Fraction (cGy) Dose Correction (cGy) Total Dose Delivered (cGy) Elapsed Days   HDR1 7-15-22  1 / 1 700 0 700 0   XTT0_2--18-22 1 / 1 700 0 700 0   BBT8_7--21-22 1 / 1 700 0 700 0   HDR4 7-25-22 1 / 1 700 0 700 0             Review of Systems:  Review of Systems   Constitutional: Positive for fatigue  Negative for unexpected weight change  HENT: Positive for sore throat (r/t NG tube)  Eyes: Negative           Has a chalazion bilaterally Respiratory: Negative  Cardiovascular: Negative  Gastrointestinal: Positive for abdominal distention, abdominal pain, constipation, diarrhea, nausea and rectal pain  Negative for blood in stool and vomiting  Endocrine: Negative  Genitourinary: Positive for pelvic pain  Negative for vaginal bleeding, vaginal discharge and vaginal pain  Occasionally leaks urine   Musculoskeletal: Positive for back pain (Chronic  LBP)  Skin: Positive for wound (glue is comingoff)  Allergic/Immunologic: Negative  Neurological: Positive for weakness, numbness (left leg since surgery) and headaches (Often )  Hematological: Negative  Psychiatric/Behavioral: Positive for sleep disturbance (Chronic )  The patient is nervous/anxious          Clinical Trial: no    Teaching completed    Health Maintenance   Topic Date Due   • Hepatitis C Screening  Never done   • Pneumococcal Vaccine: Pediatrics (0 to 5 Years) and At-Risk Patients (6 to 59 Years) (1 - PCV) Never done   • HIV Screening  Never done   • BMI: Followup Plan  Never done   • Annual Physical  Never done   • DTaP,Tdap,and Td Vaccines (1 - Tdap) Never done   • COVID-19 Vaccine (3 - Moderna risk series) 06/22/2021   • Influenza Vaccine (Season Ended) 09/01/2023   • BMI: Adult  05/10/2024   • Cervical Cancer Screening  11/12/2026   • HIB Vaccine  Aged Out   • IPV Vaccine  Aged Out   • Hepatitis A Vaccine  Aged Out   • Meningococcal ACWY Vaccine  Aged Out   • HPV Vaccine  Aged Out     Patient Active Problem List   Diagnosis   • Malignant neoplasm of overlapping sites of cervix ([facility] )   • Family history of ovarian cancer   • Obesity (BMI 35 0-39 9 without comorbidity)   • Dysuria   • Anxiety   • Depression   • Dyspareunia due to medical condition in female patient   • Menopausal symptoms   • Abdominal pain   • Slow transit constipation   • Radiation proctitis   • Colonic stricture [facility])     Past Medical History:   Diagnosis Date   • Anxiety    • Cancer ([facility] ) cervix   • Cervical cancer [facility])     receiving chemo and radiation   • COVID     Jan 2022   • Depression    • Diarrhea    • Dizziness    • Frequent headaches    • Hx of bleeding disorder     vaginal bleeding   • Obesity      Past Surgical History:   Procedure Laterality Date   • CERVICAL BIOPSY  W/ LOOP ELECTRODE EXCISION     • LYMPH NODE DISSECTION Bilateral 5/6/2022    Procedure: DISSECTION/STAGING LYMPH NODE PELVIS/ABDOMEN;  Surgeon: Fazal Guallpa MD;  [facility];  Service: Gynecology Oncology   • UT INSERTION VAGINAL RADIATION DEVICE N/A 7/15/2022    Procedure: INSERTION NADIR SLEEVE VAGINA WITH POST OP BRACHYTHERAPY (IN RADIATION ONCOLOGY);   Surgeon: Fazal Guallpa MD;  Location: [facility];  Service: Gynecology Oncology   • UT LAPAROSCOPY COLECTOMY PARTIAL W/ANASTOMOSIS N/A 5/10/2023    Procedure: RESECTION COLON SIGMOID LAPAROSCOPIC;  Surgeon: Rocky Zhou MD;  Location: [facility];  Service: Colorectal   • UT SIGMOIDOSCOPY FLX DX W/COLLJ SPEC BR/WA IF PFRMD N/A 5/10/2023    Procedure: Nely Crouch;  Surgeon: Rocky Zhou MD;  Location: [facility];  Service: Colorectal   • SALPINGECTOMY Bilateral 5/6/2022    Procedure: SALPINGECTOMY, OVARIAN TRANSPOSITION;  Surgeon: Fazal Guallpa MD;  [facility]: [facility];  Service: Gynecology Oncology   • US GUIDANCE  7/15/2022     Family History   Problem Relation Age of Onset   • Ovarian cancer Maternal Aunt    • Ovarian cancer Maternal Grandmother    • No Known Problems Mother    • Heart disease Father      Social History     Socioeconomic History   • Marital status: Single     Spouse name: Not on file   • Number of children: Not on file   • Years of education: Not on file   • Highest education level: Not on file   Occupational History   • Not on file   Tobacco Use   • Smoking status: Never   • Smokeless tobacco: Never   Vaping Use   • Vaping Use: Never used   Substance and Sexual Activity   • Alcohol use: Not Currently   • Drug use: Never     Comment: CBD Gummies/Anxiety   • Sexual activity: Not Currently   Other Topics Concern   • Not on file   Social History Narrative   • Not on file     Social Determinants of Health     Financial Resource Strain: Not on file   Food Insecurity: No Food Insecurity (5/11/2023)    Hunger Vital Sign    • Worried About Running Out of Food in the Last Year: Never true    • Ran Out of Food in the Last Year: Never true   Transportation Needs: No Transportation Needs (5/11/2023)    PRAPARE - Transportation    • Lack of Transportation (Medical): No    • Lack of Transportation (Non-Medical): No   Physical Activity: Not on file   Stress: Not on file   Social Connections: Not on file   Intimate Partner Violence: Not on file   Housing Stability: Low Risk  (5/11/2023)    Housing Stability Vital Sign    • Unable to Pay for Housing in the Last Year: No    • Number of Places Lived in the Last Year: 1    • Unstable Housing in the Last Year: No       Current Outpatient Medications:   •  enoxaparin (LOVENOX) 40 mg/0 4 mL, Inject 0 4 mL (40 mg total) under the skin every 24 hours for 19 days Do not start before May 19, 2023 , Disp: 7 6 mL, Rfl: 0  •  estradiol (VIVELLE-DOT) 0 0375 MG/24HR, PLACE 1 PATCH ON THE SKIN 2 TIMES A WEEK , Disp: 8 patch, Rfl: 3  •  lidocaine (XYLOCAINE) 5 % ointment, Apply topically as needed for mild pain, Disp: 35 44 g, Rfl: 2  •  NON FORMULARY, CBD gummies daily, Disp: , Rfl:   •  Probiotic Product (PROBIOTIC-10 PO), Take by mouth, Disp: , Rfl:   No Known Allergies  There were no vitals filed for this visit

## 2023-06-13 NOTE — PROGRESS NOTES
There are no diagnoses linked to this encounter  No problem-specific Assessment & Plan notes found for this encounter  ZACHERY Hickman is here today for a post operative evaluation  The patient is status post laparoscopic sigmoid colon resection, takedown of splenic flexure on 5/10/2023  Post-Op Diagnosis Codes:     * Colonic stricture     Surgical pathology:  A  Large Intestine, Sigmoid Colon :  - Portion of colon with mucosal erosion, paneth cell metaplasia, reactive changes and marked serosal adhesions   - Additional portions of colon with no pathologic abnormality   - Negative for malignancy  XR of the abdomen on 5/13/2023 showed postop ileus versus obstruction  XR of the abdomen on 5/15/2023 showed New moderate gaseous distention of the stomach  Ongoing gaseous distention of large and small bowel favoring ileus pattern  Most recent XR of the abdomen on 5/15/2023: Nasogastric tube tip in the gastric fundus       Past Medical History:   Diagnosis Date   • Anxiety    • Cancer [facility])     cervix   • Cervical cancer ([address] )     receiving chemo and radiation   • COVID     Jan 2022   • Depression    • Diarrhea    • Dizziness    • Frequent headaches    • Hx of bleeding disorder     vaginal bleeding   • Obesity      Past Surgical History:   Procedure Laterality Date   • CERVICAL BIOPSY  W/ LOOP ELECTRODE EXCISION     • LYMPH NODE DISSECTION Bilateral 5/6/2022    Procedure: DISSECTION/STAGING LYMPH NODE PELVIS/ABDOMEN;  Surgeon: Dejon Santoro MD;  Location: [facility];  Service: Gynecology Oncology   • MS INSERTION VAGINAL RADIATION DEVICE N/A 7/15/2022    Procedure: INSERTION NADIR SLEEVE VAGINA WITH POST OP BRACHYTHERAPY (IN RADIATION ONCOLOGY);   Surgeon: Dejon Santoro MD;  Location: [facility];  Service: Gynecology Oncology   • MS LAPAROSCOPY COLECTOMY PARTIAL W/ANASTOMOSIS N/A 5/10/2023    Procedure: RESECTION COLON SIGMOID LAPAROSCOPIC;  Surgeon: Russell Ruvalcaba Britney Cassidy MD;  Location: [facility];  Service: Colorectal   • OK SIGMOIDOSCOPY FLX DX W/COLLJ SPEC BR/WA IF PFRMD N/A 5/10/2023    Procedure: Angelic Mendoza;  Surgeon: Karyn Mancia MD;  Location: [facility];  Service: Colorectal   • SALPINGECTOMY Bilateral 5/6/2022    Procedure: SALPINGECTOMY, OVARIAN TRANSPOSITION;  Surgeon: Art Salinas MD;  Location: [facility];  Service: Gynecology Oncology   • US GUIDANCE  7/15/2022       Current Outpatient Medications:   •  enoxaparin (LOVENOX) 40 mg/0 4 mL, Inject 0 4 mL (40 mg total) under the skin every 24 hours for 19 days Do not start before May 19, 2023 , Disp: 7 6 mL, Rfl: 0  •  estradiol (VIVELLE-DOT) 0 0375 MG/24HR, PLACE 1 PATCH ON THE SKIN 2 TIMES A WEEK , Disp: 8 patch, Rfl: 3  •  lidocaine (XYLOCAINE) 5 % ointment, Apply topically as needed for mild pain, Disp: 35 44 g, Rfl: 2  •  NON FORMULARY, CBD gummies daily, Disp: , Rfl:   •  Probiotic Product (PROBIOTIC-10 PO), Take by mouth (Patient not taking: Reported on 6/13/2023), Disp: , Rfl:   Allergies as of 06/15/2023   • (No Known Allergies)     Review of Systems  There were no vitals filed for this visit    Physical Exam

## 2023-06-15 ENCOUNTER — TELEPHONE (OUTPATIENT)
Age: 33
End: 2023-06-15

## 2023-06-15 ENCOUNTER — OFFICE VISIT (OUTPATIENT)
Age: 33
End: 2023-06-15

## 2023-06-15 VITALS — BODY MASS INDEX: 37.47 KG/M2 | WEIGHT: 253 LBS | HEIGHT: 69 IN

## 2023-06-15 DIAGNOSIS — K56.699 COLONIC STRICTURE (HCC): Primary | ICD-10-CM

## 2023-06-15 PROCEDURE — 99024 POSTOP FOLLOW-UP VISIT: CPT | Performed by: COLON & RECTAL SURGERY

## 2023-06-15 NOTE — ASSESSMENT & PLAN NOTE
Patient presents for follow-up after laparoscopic sigmoid colectomy for radiation related stricture  Overall she is feeling well  She still notes some urgency for bowel movements with some degree of clustering in the morning  She also notes some gassiness  After her initial episodes in the morning this seems to level out over time  She is tolerating diet and having bowel movements  Incisions are clean dry and intact  We discussed that it is normal to expect some functional issues for the next 3 to 6 months  If she has persistent bloating thereafter and follow-up sigmoidoscopy does not show mechanical concerns, GI evaluation may be required  Otherwise, I recommend flexible sigmoidoscopy for anastomotic follow-up in 6 months  She will return to see me as needed

## 2023-06-15 NOTE — PROGRESS NOTES
Diagnoses and all orders for this visit:    Colonic stricture [facility])       Colonic stricture [facility])  Patient presents for follow-up after laparoscopic sigmoid colectomy for radiation related stricture  Overall she is feeling well  She still notes some urgency for bowel movements with some degree of clustering in the morning  She also notes some gassiness  After her initial episodes in the morning this seems to level out over time  She is tolerating diet and having bowel movements  Incisions are clean dry and intact  We discussed that it is normal to expect some functional issues for the next 3 to 6 months  If she has persistent bloating thereafter and follow-up sigmoidoscopy does not show mechanical concerns, GI evaluation may be required  Otherwise, I recommend flexible sigmoidoscopy for anastomotic follow-up in 6 months  She will return to see me as needed  HPI        Manan Thomas is here today for a post operative evaluation       The patient is status post laparoscopic sigmoid colon resection, takedown of splenic flexure on 5/10/2023       Post-Op Diagnosis Codes:     * Colonic stricture      Surgical pathology:  A  Large Intestine, Sigmoid Colon :  - Portion of colon with mucosal erosion, paneth cell metaplasia, reactive changes and marked serosal adhesions   - Additional portions of colon with no pathologic abnormality   - Negative for malignancy      XR of the abdomen on 5/13/2023 showed postop ileus versus obstruction      XR of the abdomen on 5/15/2023 showed New moderate gaseous distention of the stomach   Ongoing gaseous distention of large and small bowel favoring ileus pattern      Most recent XR of the abdomen on 5/15/2023: Nasogastric tube tip in the gastric fundus       Past Medical History:   Diagnosis Date   • Anxiety    • Cancer [facility])     cervix   • Cervical cancer [facility])     receiving chemo and radiation   • COVID     Jan 2022   • Depression    • Diarrhea    • Dizziness    • Frequent headaches    • Hx of bleeding disorder     vaginal bleeding   • Obesity      Past Surgical History:   Procedure Laterality Date   • CERVICAL BIOPSY  W/ LOOP ELECTRODE EXCISION     • LYMPH NODE DISSECTION Bilateral 5/6/2022    Procedure: DISSECTION/STAGING LYMPH NODE PELVIS/ABDOMEN;  Surgeon: Chio Mike MD;  [facility];  Service: Gynecology Oncology   • NM INSERTION VAGINAL RADIATION DEVICE N/A 7/15/2022    Procedure: INSERTION NADIR SLEEVE VAGINA WITH POST OP BRACHYTHERAPY (IN RADIATION ONCOLOGY); Surgeon: Chio Mike MD;  [facility];  Service: Gynecology Oncology   • NM LAPAROSCOPY COLECTOMY PARTIAL W/ANASTOMOSIS N/A 5/10/2023    Procedure: RESECTION COLON SIGMOID LAPAROSCOPIC;  Surgeon: Emiliano Rosenthal MD;  [facility];  Service: Colorectal   • NM SIGMOIDOSCOPY FLX DX W/COLLJ SPEC BR/WA IF PFRMD N/A 5/10/2023    Procedure: Freeda Hard;  Surgeon: Emiliano Rosenthal MD;  [facility];  Service: Colorectal   • SALPINGECTOMY Bilateral 5/6/2022    Procedure: SALPINGECTOMY, OVARIAN TRANSPOSITION;  Surgeon: Chio Mike MD;  [facility];  Service: Gynecology Oncology   • US GUIDANCE  7/15/2022       Current Outpatient Medications:   •  enoxaparin (LOVENOX) 40 mg/0 4 mL, Inject 0 4 mL (40 mg total) under the skin every 24 hours for 19 days Do not start before May 19, 2023 , Disp: 7 6 mL, Rfl: 0  •  estradiol (VIVELLE-DOT) 0 0375 MG/24HR, PLACE 1 PATCH ON THE SKIN 2 TIMES A WEEK , Disp: 8 patch, Rfl: 3  •  lidocaine (XYLOCAINE) 5 % ointment, Apply topically as needed for mild pain, Disp: 35 44 g, Rfl: 2  •  NON FORMULARY, CBD gummies daily, Disp: , Rfl:   •  Probiotic Product (PROBIOTIC-10 PO), Take by mouth (Patient not taking: Reported on 6/13/2023), Disp: , Rfl:   Allergies as of 06/15/2023   • (No Known Allergies)     Review of Systems  There were no vitals filed for this visit    Physical Exam  Constitutional:       Appearance: Normal appearance  HENT:      Head: Normocephalic and atraumatic  Eyes:      Extraocular Movements: Extraocular movements intact  Pupils: Pupils are equal, round, and reactive to light  Abdominal:      General: Abdomen is flat  Palpations: Abdomen is soft  Comments: Incisions clean dry and intact   Musculoskeletal:         General: Normal range of motion  Skin:     General: Skin is warm and dry  Neurological:      General: No focal deficit present  Mental Status: She is alert and oriented to person, place, and time  Psychiatric:         Mood and Affect: Mood normal          Behavior: Behavior normal          Thought Content:  Thought content normal          Judgment: Judgment normal

## 2023-06-15 NOTE — TELEPHONE ENCOUNTER
Attempted to contact patient to schedule Flexible sigmoidoscopy, no answer  Left v/m requesting call back  Waiting on response from patient      DB OV 6/15/2023 status post laparoscopic sigmoid colon resection, takedown of splenic flexure on 5/10/2023  6 Month Flexible sigmoidoscopy

## 2023-06-15 NOTE — LETTER
[address] [facility]  [address] [facility] [id number]      FLEXIBLE SIGMOIDOSCOPY WITH SEDATION INSTRUCTIONS  PLEASE NOTE…  AS OF JUNE 1, 2014, OUR OFFICE REQUIRES 50 HOURS NOTICE OF CANCELLATION/RESCHEDULE OF A PROCEDURE TO AVOID INCURRING A MISSED APPOINTMENT FEE  Your Sigmoidoscopy Procedure has been scheduled at:  [address] [facility]  The Date of your Procedure is: December 4, 2023   Patient is to have nothing to eat or drink after midnight  Also in preparation for this procedure, take 2 Fleet Enemas 2 hours prior to the appointment  These enemas can be purchased over the counter in most pharmacies  Follow the directions on the box  The facility will call one business day prior with your arrival and procedure times  Special instructions may be needed if you are taking aspirin or any aspirin-containing medication  Check with your family physician  Check with your family doctor if you are taking Coumadin (a blood thinner), Plavix, Gingko biloba, Ginseng, Feverfew, and/or St  Dread's Wort  We suggest stopping these for 3 days  If you are on DIABETIC MEDICATION (tablets or insulin) your doctor may make changes in your preparation  Take all medications usual unless otherwise instructed  As always, if you have any questions or concerns, feel free to call the office  Our  staff will be happy to connect you with one of the nurses to answer any questions or address any concerns you have regarding your procedure  Arrangements must be made for a responsible party to drive you home from the procedure  If you do not have a responsible family member or friend to drive you home, the procedure will not be done  Vast or a taxi is not acceptable  It is important you notify our office of any insurance changes prior to your procedure and, if necessary, supply us with referrals from your primary care physician

## 2023-06-16 ENCOUNTER — PREP FOR PROCEDURE (OUTPATIENT)
Age: 33
End: 2023-06-16

## 2023-06-16 DIAGNOSIS — K56.699 COLONIC STRICTURE (HCC): Primary | ICD-10-CM

## 2023-06-16 DIAGNOSIS — K62.7 RADIATION PROCTITIS: ICD-10-CM

## 2023-06-16 DIAGNOSIS — Z80.41 FAMILY HISTORY OF OVARIAN CANCER: ICD-10-CM

## 2023-06-16 NOTE — TELEPHONE ENCOUNTER
DB OV 6/15/2023 status post laparoscopic sigmoid colon resection, takedown of splenic flexure on 5/10/2023  6 Month Flexible sigmoidoscopy  BMI 37 36    SLBO DB 12/4/2023    Mailed patient information

## 2023-07-10 ENCOUNTER — TELEPHONE (OUTPATIENT)
Dept: FAMILY MEDICINE CLINIC | Facility: CLINIC | Age: 33
End: 2023-07-10

## 2023-07-10 DIAGNOSIS — G44.89 OTHER HEADACHE SYNDROME: ICD-10-CM

## 2023-07-10 DIAGNOSIS — C53.8 MALIGNANT NEOPLASM OF OVERLAPPING SITES OF CERVIX (HCC): Primary | ICD-10-CM

## 2023-07-11 DIAGNOSIS — C53.8 MALIGNANT NEOPLASM OF OVERLAPPING SITES OF CERVIX (HCC): ICD-10-CM

## 2023-07-11 DIAGNOSIS — C53.8 MALIGNANT NEOPLASM OF OVERLAPPING SITES OF CERVIX (HCC): Primary | ICD-10-CM

## 2023-07-11 RX ORDER — DIAZEPAM 2 MG/1
TABLET ORAL
Qty: 2 TABLET | Refills: 0 | Status: SHIPPED | OUTPATIENT
Start: 2023-07-11

## 2023-07-11 RX ORDER — DIAZEPAM 2 MG/1
TABLET ORAL
Qty: 1 TABLET | Refills: 0 | Status: SHIPPED | OUTPATIENT
Start: 2023-07-11 | End: 2023-07-11 | Stop reason: SDUPTHER

## 2023-07-23 ENCOUNTER — TELEPHONE (OUTPATIENT)
Dept: MRI IMAGING | Facility: HOSPITAL | Age: 33
End: 2023-07-23

## 2023-07-24 ENCOUNTER — TELEPHONE (OUTPATIENT)
Dept: HEMATOLOGY ONCOLOGY | Facility: CLINIC | Age: 33
End: 2023-07-24

## 2023-07-24 ENCOUNTER — TELEPHONE (OUTPATIENT)
Age: 33
End: 2023-07-24

## 2023-07-24 NOTE — TELEPHONE ENCOUNTER
Appointment Change  Cancel, Reschedule, Change to Virtual      Who are you speaking with? Patient   If it is not the patient, are they listed on an active communication consent form? N/A   Which provider is the appointment scheduled with? Dr. Alvena Moritz   When is the appointment scheduled? Please list date and time  7/27/23 @ 3:15pm   At which location is the appointment scheduled to take place? [place]   Was the appointment rescheduled or changed from an in person visit to a virtual visit? If so, please list the details of the change. Yes 8/17/23 @ 4pm   What is the reason for the appointment change? Patient had death in her family   Was STAR transport scheduled for this visit? no   Does STAR transport need to be scheduled for the new visit (if applicable) no   Does the patient need an infusion appointment rescheduled? no   Does the patient have an infusion appointment scheduled? If so, when? no   Is the patient undergoing chemotherapy? no   Was the no-show policy reviewed for appointments being changed with less then 24 hours of notice?  yes

## 2023-07-31 DIAGNOSIS — R39.89 SENSATION OF PRESSURE IN BLADDER AREA: ICD-10-CM

## 2023-07-31 DIAGNOSIS — R30.0 DYSURIA: Primary | ICD-10-CM

## 2023-08-01 RX ORDER — ERYTHROMYCIN 5 MG/G
OINTMENT OPHTHALMIC
COMMUNITY
Start: 2023-07-19

## 2023-08-02 ENCOUNTER — TELEPHONE (OUTPATIENT)
Dept: FAMILY MEDICINE CLINIC | Facility: CLINIC | Age: 33
End: 2023-08-02

## 2023-08-02 ENCOUNTER — OFFICE VISIT (OUTPATIENT)
Dept: FAMILY MEDICINE CLINIC | Facility: CLINIC | Age: 33
End: 2023-08-02
Payer: COMMERCIAL

## 2023-08-02 VITALS
TEMPERATURE: 97.9 F | WEIGHT: 258 LBS | HEIGHT: 70 IN | OXYGEN SATURATION: 100 % | HEART RATE: 97 BPM | DIASTOLIC BLOOD PRESSURE: 96 MMHG | SYSTOLIC BLOOD PRESSURE: 138 MMHG | RESPIRATION RATE: 16 BRPM | BODY MASS INDEX: 36.94 KG/M2

## 2023-08-02 DIAGNOSIS — F41.9 ANXIETY: Primary | ICD-10-CM

## 2023-08-02 DIAGNOSIS — Z13.6 SCREENING FOR CARDIOVASCULAR CONDITION: ICD-10-CM

## 2023-08-02 DIAGNOSIS — Z13.1 SCREENING FOR DIABETES MELLITUS: ICD-10-CM

## 2023-08-02 DIAGNOSIS — F32.9 REACTIVE DEPRESSION: ICD-10-CM

## 2023-08-02 DIAGNOSIS — Z11.59 ENCOUNTER FOR HEPATITIS C SCREENING TEST FOR LOW RISK PATIENT: ICD-10-CM

## 2023-08-02 DIAGNOSIS — Z11.4 SCREENING FOR HIV (HUMAN IMMUNODEFICIENCY VIRUS): ICD-10-CM

## 2023-08-02 PROBLEM — H00.016 HORDEOLUM EXTERNUM OF LEFT EYE: Status: ACTIVE | Noted: 2023-08-02

## 2023-08-02 PROCEDURE — 99204 OFFICE O/P NEW MOD 45 MIN: CPT | Performed by: FAMILY MEDICINE

## 2023-08-02 NOTE — PROGRESS NOTES
Assessment/Plan:  Problem List Items Addressed This Visit        Other    Anxiety - Primary     Patient is very hesitant to start a medication for her anxiety. I did recommend BuSpar as an option or Lexapro and she is going to consider these at this time and will get back to us. In the meantime, she will go for the lab work as ordered and we will follow-up with the results. She will call with any worsening symptoms. Relevant Orders    CBC and differential (Completed)    Comprehensive metabolic panel (Completed)    LDL cholesterol, direct (Completed)    Lipid panel (Completed)    TSH, 3rd generation with Free T4 reflex (Completed)    UA (URINE) with reflex to Scope (Completed)    Depression     The patient is currently not suicidal.  She is not interested in medication at this time and is going to think about it. We will monitor her closely.          Relevant Orders    CBC and differential (Completed)    Comprehensive metabolic panel (Completed)    LDL cholesterol, direct (Completed)    Lipid panel (Completed)    TSH, 3rd generation with Free T4 reflex (Completed)    UA (URINE) with reflex to Scope (Completed)   Other Visit Diagnoses     Screening for HIV (human immunodeficiency virus)        Relevant Orders    HIV 1/2 AG/AB W REFLEX [facility] and [facility] only (Completed)    Encounter for hepatitis C screening test for low risk patient        Relevant Orders    Hepatitis C antibody (Completed)    Screening for cardiovascular condition        Relevant Orders    CBC and differential (Completed)    Comprehensive metabolic panel (Completed)    LDL cholesterol, direct (Completed)    Lipid panel (Completed)    TSH, 3rd generation with Free T4 reflex (Completed)    UA (URINE) with reflex to Scope (Completed)    Screening for diabetes mellitus        Relevant Orders    CBC and differential (Completed)    Comprehensive metabolic panel (Completed)    LDL cholesterol, direct (Completed)    Lipid panel (Completed)    TSH, 3rd generation with Free T4 reflex (Completed)    UA (URINE) with reflex to Scope (Completed)          Return in about 3 months (around 11/2/2023) for Annual physical.   I spent 20 minutes during the visit reviewing the history from the patient, performing the examination, discussing the findings with the patient, providing counseling and education, and making a plan. I spent 15 minutes ordering referrals and testing and documenting. Subjective:   Chief Complaint   Patient presents with   • New Patient Visit   • Anxiety        Patient ID: Debora Crews is a 35 y.o. female presents today for a new patient checkup. Debora Crews is a 35 y.o. female who presents today with increased symptoms of anxiety. She has had this for some time. There are occasional panic attacks and she is always worried. She is not sure if she wants to start medication at this point. She has a history of anxiety- she was prescribed an SSRI by er oncologist- but she never filled it because she was afraid to take it. She is having a hard time- Survivor guilt. It is worse since she was diagnosed with cancer. There are some panic attacks and she goes numb. She is having   She is being evaluated for HA's and Urinary symptoms. She is scheduled for a brain MRI and UA. The patient denies any chest pain, shortness of breath, or palpitations. There is no edema. There are no headaches or visual changes. There is no lightheadedness, dizziness, or fainting spells. There is fear about her cancer returning. She has been given Prn medications for anxiety. Her stomach is uncomfortable   Anxiety  Presents for follow-up visit. Symptoms include decreased concentration, excessive worry, hyperventilation, irritability and nervous/anxious behavior.  Patient reports no chest pain, compulsions, confusion, depressed mood, dizziness, dry mouth, feeling of choking, impotence, insomnia, malaise, muscle tension, nausea, obsessions, palpitations, panic, restlessness, shortness of breath or suicidal ideas. The following portions of the patient's history were reviewed and updated as appropriate: allergies, current medications, past family history, past medical history, past social history, past surgical history and problem list.  Patient Active Problem List   Diagnosis   • Malignant neoplasm of overlapping sites of cervix ([address])   • Family history of ovarian cancer   • Obesity (BMI 35.0-39.9 without comorbidity)   • Dysuria   • Anxiety   • Depression   • Dyspareunia due to medical condition in female patient   • Menopausal symptoms   • Abdominal pain   • Slow transit constipation   • Radiation proctitis   • Colonic stricture (HCC)   • Hordeolum externum of left eye     Past Medical History:   Diagnosis Date   • Anxiety    • Cancer [facility])     cervix   • Cervical cancer ([address] )     receiving chemo and radiation   • COVID     Jan 2022   • Depression    • Diarrhea    • Dizziness    • Frequent headaches    • Hx of bleeding disorder     vaginal bleeding   • Obesity      Past Surgical History:   Procedure Laterality Date   • CERVICAL BIOPSY  W/ LOOP ELECTRODE EXCISION     • LYMPH NODE DISSECTION Bilateral 5/6/2022    Procedure: DISSECTION/STAGING LYMPH NODE PELVIS/ABDOMEN;  Surgeon: Danika Burgess MD;  [facility];  Service: Gynecology Oncology   • TX INSERTION VAGINAL RADIATION DEVICE N/A 7/15/2022    Procedure: INSERTION NADIR SLEEVE VAGINA WITH POST OP BRACHYTHERAPY (IN RADIATION ONCOLOGY);   Surgeon: Danika Burgess MD;  [facility];  Service: Gynecology Oncology   • TX LAPAROSCOPY COLECTOMY PARTIAL W/ANASTOMOSIS N/A 5/10/2023    Procedure: RESECTION COLON SIGMOID LAPAROSCOPIC;  Surgeon: Todd Maldonado MD;  [facility];  Service: Colorectal   • TX SIGMOIDOSCOPY FLX DX W/COLLJ SPEC BR/WA IF PFRMD N/A 5/10/2023    Procedure: Barry Pantoja;  Surgeon: Todd Maldonado MD;  [facility];  Service: Colorectal   • SALPINGECTOMY Bilateral 5/6/2022    Procedure: SALPINGECTOMY, OVARIAN TRANSPOSITION;  Surgeon: Natalie Barrios MD;  [facility];  Service: Gynecology Oncology   • US GUIDANCE  7/15/2022     No Known Allergies  Family History   Problem Relation Age of Onset   • Ovarian cancer Maternal Aunt    • Ovarian cancer Maternal Grandmother    • No Known Problems Mother    • Heart disease Father      Social History     Socioeconomic History   • Marital status: Single     Spouse name: Not on file   • Number of children: Not on file   • Years of education: Not on file   • Highest education level: Not on file   Occupational History   • Not on file   Tobacco Use   • Smoking status: Never     Passive exposure: Current (very mild/social)   • Smokeless tobacco: Never   Vaping Use   • Vaping Use: Never used   Substance and Sexual Activity   • Alcohol use: Not Currently   • Drug use: Never     Comment: CBD Gummies/Anxiety   • Sexual activity: Not Currently   Other Topics Concern   • Not on file   Social History Narrative   • Not on file     Social Determinants of Health     Financial Resource Strain: Not on file   Food Insecurity: No Food Insecurity (5/11/2023)    Hunger Vital Sign    • Worried About Running Out of Food in the Last Year: Never true    • Ran Out of Food in the Last Year: Never true   Transportation Needs: No Transportation Needs (5/11/2023)    PRAPARE - Transportation    • Lack of Transportation (Medical): No    • Lack of Transportation (Non-Medical):  No   Physical Activity: Not on file   Stress: Not on file   Social Connections: Not on file   Intimate Partner Violence: Not At Risk (8/2/2023)    Humiliation, Afraid, Rape, and Kick questionnaire    • Fear of Current or Ex-Partner: No    • Emotionally Abused: No    • Physically Abused: No    • Sexually Abused: No   Housing Stability: Low Risk  (5/11/2023)    Housing Stability Vital Sign    • Unable to Pay for Housing in the Last Year: No    • Number of Places Lived in the Last Year: 1    • Unstable Housing in the Last Year: No     Current Outpatient Medications on File Prior to Visit   Medication Sig Dispense Refill   • diazepam (VALIUM) 2 mg tablet Take 2 tablets PO 30 min prior to MRI 2 tablet 0   • erythromycin (ILOTYCIN) ophthalmic ointment 1 APPLICATION INTO BOTH EYES 6 TIMES PER DAY     • lidocaine (XYLOCAINE) 5 % ointment Apply topically as needed for mild pain 35.44 g 2   • NON FORMULARY CBD gummies daily     • Probiotic Product (PROBIOTIC-10 PO) Take by mouth Not consistently       No current facility-administered medications on file prior to visit. Review of Systems   Constitutional: Positive for irritability. HENT: Negative. Eyes: Negative. Respiratory: Negative. Negative for shortness of breath. Cardiovascular: Negative. Negative for chest pain and palpitations. Gastrointestinal: Negative. Negative for nausea. Endocrine: Negative. Genitourinary: Negative. Negative for impotence. Musculoskeletal: Negative. Skin: Negative. Allergic/Immunologic: Negative. Neurological: Negative. Negative for dizziness. Hematological: Negative. Psychiatric/Behavioral: Positive for decreased concentration. Negative for confusion and suicidal ideas. The patient is nervous/anxious. The patient does not have insomnia. Objective:  Vitals:    08/02/23 1535   BP: 138/96   BP Location: Left arm   Patient Position: Sitting   Cuff Size: Large   Pulse: 97   Resp: 16   Temp: 97.9 °F (36.6 °C)   SpO2: 100%   Weight: 117 kg (258 lb)   Height: 5' 9.5" (1.765 m)     Body mass index is 37.55 kg/m². Physical Exam  Constitutional:       Appearance: She is well-developed. HENT:      Head: Normocephalic and atraumatic. Mouth/Throat:      Pharynx: No oropharyngeal exudate. Eyes:      Conjunctiva/sclera: Conjunctivae normal.      Pupils: Pupils are equal, round, and reactive to light.    Neck: Thyroid: No thyromegaly. Vascular: No JVD. Trachea: No tracheal deviation. Cardiovascular:      Rate and Rhythm: Normal rate and regular rhythm. Heart sounds: Normal heart sounds. No murmur heard. No friction rub. No gallop. Pulmonary:      Effort: Pulmonary effort is normal. No respiratory distress. Breath sounds: Normal breath sounds. No stridor. No wheezing or rales. Chest:      Chest wall: No tenderness. Abdominal:      General: Bowel sounds are normal. There is no distension. Palpations: Abdomen is soft. There is no mass. Tenderness: There is no abdominal tenderness. There is no guarding or rebound. Musculoskeletal:         General: No tenderness or deformity. Normal range of motion. Cervical back: Normal range of motion. Lymphadenopathy:      Cervical: No cervical adenopathy. Skin:     General: Skin is warm and dry. Neurological:      Mental Status: She is alert and oriented to person, place, and time. Cranial Nerves: No cranial nerve deficit. Motor: No abnormal muscle tone. Coordination: Coordination normal.      Deep Tendon Reflexes: Reflexes are normal and symmetric. Reflexes normal.           BMI Counseling: Body mass index is 37.55 kg/m². The BMI is above normal. Nutrition recommendations include decreasing portion sizes, encouraging healthy choices of fruits and vegetables, decreasing fast food intake, consuming healthier snacks, limiting drinks that contain sugar, moderation in carbohydrate intake, increasing intake of lean protein, reducing intake of saturated and trans fat and reducing intake of cholesterol. Exercise recommendations include moderate physical activity 150 minutes/week and exercising 3-5 times per week. Rationale for BMI follow-up plan is due to patient being overweight or obese.

## 2023-08-03 DIAGNOSIS — N95.1 MENOPAUSAL SYMPTOMS: ICD-10-CM

## 2023-08-04 RX ORDER — ESTRADIOL 0.04 MG/D
FILM, EXTENDED RELEASE TRANSDERMAL
Qty: 8 PATCH | Refills: 3 | Status: SHIPPED | OUTPATIENT
Start: 2023-08-04

## 2023-08-05 ENCOUNTER — PATIENT MESSAGE (OUTPATIENT)
Dept: FAMILY MEDICINE CLINIC | Facility: CLINIC | Age: 33
End: 2023-08-05

## 2023-08-05 DIAGNOSIS — R31.9 URINARY TRACT INFECTION WITH HEMATURIA, SITE UNSPECIFIED: Primary | ICD-10-CM

## 2023-08-05 DIAGNOSIS — N39.0 URINARY TRACT INFECTION WITH HEMATURIA, SITE UNSPECIFIED: Primary | ICD-10-CM

## 2023-08-05 LAB
ALBUMIN SERPL-MCNC: 4.5 G/DL (ref 3.9–4.9)
ALBUMIN/GLOB SERPL: 1.6 {RATIO} (ref 1.2–2.2)
ALP SERPL-CCNC: 85 IU/L (ref 44–121)
ALT SERPL-CCNC: 14 IU/L (ref 0–32)
APPEARANCE UR: CLEAR
AST SERPL-CCNC: 12 IU/L (ref 0–40)
BACTERIA UR CULT: NORMAL
BACTERIA URNS QL MICRO: ABNORMAL
BASOPHILS # BLD AUTO: 0 X10E3/UL (ref 0–0.2)
BASOPHILS NFR BLD AUTO: 0 %
BILIRUB SERPL-MCNC: 0.3 MG/DL (ref 0–1.2)
BILIRUB UR QL STRIP: NEGATIVE
BUN SERPL-MCNC: 10 MG/DL (ref 6–20)
BUN/CREAT SERPL: 12 (ref 9–23)
CALCIUM SERPL-MCNC: 9.6 MG/DL (ref 8.7–10.2)
CASTS URNS QL MICRO: ABNORMAL /LPF
CHLORIDE SERPL-SCNC: 102 MMOL/L (ref 96–106)
CHOLEST SERPL-MCNC: 193 MG/DL (ref 100–199)
CHOLEST/HDLC SERPL: 3.6 RATIO (ref 0–4.4)
CO2 SERPL-SCNC: 23 MMOL/L (ref 20–29)
COLOR UR: YELLOW
CREAT SERPL-MCNC: 0.83 MG/DL (ref 0.57–1)
EGFR: 95 ML/MIN/1.73
EOSINOPHIL # BLD AUTO: 0.1 X10E3/UL (ref 0–0.4)
EOSINOPHIL NFR BLD AUTO: 1 %
EPI CELLS #/AREA URNS HPF: ABNORMAL /HPF (ref 0–10)
ERYTHROCYTE [DISTWIDTH] IN BLOOD BY AUTOMATED COUNT: 15 % (ref 11.7–15.4)
GLOBULIN SER-MCNC: 2.8 G/DL (ref 1.5–4.5)
GLUCOSE SERPL-MCNC: 87 MG/DL (ref 70–99)
GLUCOSE UR QL: NEGATIVE
HCT VFR BLD AUTO: 35 % (ref 34–46.6)
HCV AB S/CO SERPL IA: NON REACTIVE
HDLC SERPL-MCNC: 54 MG/DL
HGB BLD-MCNC: 11.2 G/DL (ref 11.1–15.9)
HGB UR QL STRIP: ABNORMAL
HIV 1+2 AB+HIV1 P24 AG SERPL QL IA: NON REACTIVE
IMM GRANULOCYTES # BLD: 0 X10E3/UL (ref 0–0.1)
IMM GRANULOCYTES NFR BLD: 1 %
KETONES UR QL STRIP: NEGATIVE
LDLC SERPL CALC-MCNC: 127 MG/DL (ref 0–99)
LDLC SERPL DIRECT ASSAY-MCNC: 132 MG/DL (ref 0–99)
LEUKOCYTE ESTERASE UR QL STRIP: ABNORMAL
LYMPHOCYTES # BLD AUTO: 0.8 X10E3/UL (ref 0.7–3.1)
LYMPHOCYTES NFR BLD AUTO: 21 %
Lab: NORMAL
MCH RBC QN AUTO: 26.7 PG (ref 26.6–33)
MCHC RBC AUTO-ENTMCNC: 32 G/DL (ref 31.5–35.7)
MCV RBC AUTO: 84 FL (ref 79–97)
MICRO URNS: ABNORMAL
MONOCYTES # BLD AUTO: 0.3 X10E3/UL (ref 0.1–0.9)
MONOCYTES NFR BLD AUTO: 8 %
NEUTROPHILS # BLD AUTO: 2.8 X10E3/UL (ref 1.4–7)
NEUTROPHILS NFR BLD AUTO: 69 %
NITRITE UR QL STRIP: NEGATIVE
PH UR STRIP: 5 [PH] (ref 5–7.5)
PLATELET # BLD AUTO: 295 X10E3/UL (ref 150–450)
POTASSIUM SERPL-SCNC: 4.1 MMOL/L (ref 3.5–5.2)
PROT SERPL-MCNC: 7.3 G/DL (ref 6–8.5)
PROT UR QL STRIP: ABNORMAL
RBC # BLD AUTO: 4.19 X10E6/UL (ref 3.77–5.28)
RBC #/AREA URNS HPF: ABNORMAL /HPF (ref 0–2)
SL AMB VLDL CHOLESTEROL CALC: 12 MG/DL (ref 5–40)
SODIUM SERPL-SCNC: 142 MMOL/L (ref 134–144)
SP GR UR: 1.02 (ref 1–1.03)
TRIGL SERPL-MCNC: 67 MG/DL (ref 0–149)
TSH SERPL DL<=0.005 MIU/L-ACNC: 1.45 UIU/ML (ref 0.45–4.5)
UROBILINOGEN UR STRIP-ACNC: 0.2 MG/DL (ref 0.2–1)
WBC # BLD AUTO: 4 X10E3/UL (ref 3.4–10.8)
WBC #/AREA URNS HPF: ABNORMAL /HPF (ref 0–5)

## 2023-08-05 RX ORDER — NITROFURANTOIN 25; 75 MG/1; MG/1
100 CAPSULE ORAL 2 TIMES DAILY
Qty: 10 CAPSULE | Refills: 0 | Status: SHIPPED | OUTPATIENT
Start: 2023-08-05 | End: 2023-08-10

## 2023-08-05 NOTE — ASSESSMENT & PLAN NOTE
Patient is very hesitant to start a medication for her anxiety. I did recommend BuSpar as an option or Lexapro and she is going to consider these at this time and will get back to us. In the meantime, she will go for the lab work as ordered and we will follow-up with the results. She will call with any worsening symptoms.

## 2023-08-05 NOTE — TELEPHONE ENCOUNTER
From: Jose Rueda  To: Stalin Melgar  Sent: 8/5/2023 3:02 PM EDT  Subject: Sophy Ferraro,  I wanted you to know that I reviewed your recent lab work and for the most part everything looked good. Your blood sugar was normal and your kidney and liver function studies were normal. Your thyroid is normal and the routine screening for HIV and hepatitis C were both negative. With your cholesterol panel your LDL cholesterol which is your bad cholesterol is mildly elevated but not at the level that we would start you on medication to treat this. Work on [employer]. Try to decrease your intake of processed foods and fatty foods and eat more fresh fruits and vegetables and increase your exercise. O so the urinalysis had a trace amount of microscopic blood in it-when was your last period? That was probably the most likely source. So the best thing to do is to improve your diet and exercise and we will see you back as scheduled. Let me know when you decide if you do want to start a medication for your anxiety. Take care and have a nice weekend.     Dr. Radha Tinoco

## 2023-08-05 NOTE — ASSESSMENT & PLAN NOTE
The patient is currently not suicidal.  She is not interested in medication at this time and is going to think about it. We will monitor her closely.

## 2023-08-09 ENCOUNTER — PATIENT MESSAGE (OUTPATIENT)
Dept: FAMILY MEDICINE CLINIC | Facility: CLINIC | Age: 33
End: 2023-08-09

## 2023-08-09 DIAGNOSIS — F41.9 ANXIETY: Primary | ICD-10-CM

## 2023-08-10 ENCOUNTER — OFFICE VISIT (OUTPATIENT)
Age: 33
End: 2023-08-10
Payer: COMMERCIAL

## 2023-08-10 VITALS
DIASTOLIC BLOOD PRESSURE: 84 MMHG | HEART RATE: 102 BPM | OXYGEN SATURATION: 99 % | RESPIRATION RATE: 16 BRPM | HEIGHT: 70 IN | TEMPERATURE: 98 F | SYSTOLIC BLOOD PRESSURE: 142 MMHG | WEIGHT: 253.4 LBS | BODY MASS INDEX: 36.28 KG/M2

## 2023-08-10 DIAGNOSIS — F41.9 ANXIETY: ICD-10-CM

## 2023-08-10 DIAGNOSIS — C53.8 MALIGNANT NEOPLASM OF OVERLAPPING SITES OF CERVIX (HCC): Primary | ICD-10-CM

## 2023-08-10 PROCEDURE — 99214 OFFICE O/P EST MOD 30 MIN: CPT | Performed by: OBSTETRICS & GYNECOLOGY

## 2023-08-10 RX ORDER — DIAZEPAM 2 MG/1
TABLET ORAL
Qty: 2 TABLET | Refills: 0 | Status: SHIPPED | OUTPATIENT
Start: 2023-08-10

## 2023-08-10 NOTE — ASSESSMENT & PLAN NOTE
60-year-old with stage III C1 cervical cancer. She has pelvic pain that started approximately 3 weeks ago. She has anxiety which is affecting her quality of life. She also has headaches. MRI brain is pending. She is clinically without evidence of disease recurrence. Her performance status is 0.  1.  PET/CT imaging to evaluate disease recurrence as a cause of her pelvic pain. Valium prescribed to reduce anxiety for the scan. 2.  Will follow-up MRI brain  3. We discussed adding ibuprofen 600 mg every 6 hours for pelvic pain. Will discuss narcotics in the event that Tylenol and ibuprofen are not helpful. 4.  Return in 3 months for cervical cancer surveillance.

## 2023-08-10 NOTE — PROGRESS NOTES
Assessment/Plan:    Problem List Items Addressed This Visit        Genitourinary    Malignant neoplasm of overlapping sites of cervix ([address]) - Primary     77-year-old with stage III C1 cervical cancer. She has pelvic pain that started approximately 3 weeks ago. She has anxiety which is affecting her quality of life. She also has headaches. MRI brain is pending. She is clinically without evidence of disease recurrence. Her performance status is 0.  1.  PET/CT imaging to evaluate disease recurrence as a cause of her pelvic pain. Valium prescribed to reduce anxiety for the scan. 2.  Will follow-up MRI brain  3. We discussed adding ibuprofen 600 mg every 6 hours for pelvic pain. Will discuss narcotics in the event that Tylenol and ibuprofen are not helpful. 4.  Return in 3 months for cervical cancer surveillance. Relevant Medications    diazepam (VALIUM) 2 mg tablet    Other Relevant Orders    NM PET CT skull base to mid thigh       Other    Anxiety     I encouraged her to start her BuSpar. We discussed the risks of benzodiazepine therapy. She has an appointment with a therapist in 2 weeks. CHIEF COMPLAINT: Headaches, sensitivity to sound, pelvic pain, anxiety      Problem:  Cancer Staging   Malignant neoplasm of overlapping sites of cervix ([address])  Staging form: Cervix Uteri, AJCC Version 9  - Clinical: No stage assigned - Unsigned  - Pathologic: No stage assigned - Unsigned  - Pathologic: FIGO Stage IIIC1p (pT1b1, cM0) - Signed by Zack Frazier MD on 5/19/2022        Previous therapy:  Oncology History   Malignant neoplasm of overlapping sites of cervix ([address])   3/24/2022 Initial Diagnosis    Malignant neoplasm of overlapping sites of cervix ([address])     5/6/2022 Surgery    Exploratory laparotomy for planned radical hysterectomy, bilateral pelvic lymph node dissection, aborted radical hysterectomy, bilateral ovarian transposition due to positive lymph nodes.   1. Two right pelvic lymph nodes positive     5/19/2022 -  Cancer Staged    Staging form: Cervix Uteri, AJCC Version 9  - Pathologic: FIGO Stage IIIC1p (pT1b1, cM0) - Signed by Johan Rowley MD on 5/19/2022  Stage confirmation method: Pathology  Pelvic grady status: Positive  Pelvic grady method of assessment: Lymph node dissection  Para-aortic status: Negative       6/20/2022 - 7/26/2022 Chemotherapy    palonosetron (ALOXI), 0.25 mg, Intravenous, Once, 6 of 6 cycles  Administration: 0.25 mg (6/20/2022), 0.25 mg (6/27/2022), 0.25 mg (7/6/2022), 0.25 mg (7/11/2022), 0.25 mg (7/19/2022), 0.25 mg (7/26/2022)  CISplatin (PLATINOL) infusion, 70 mg (original dose 40 mg/m2), Intravenous, Once, 6 of 6 cycles  Dose modification: 70 mg (original dose 40 mg/m2, Cycle 1, Reason: Other (Must fill in a comment), Comment: max 70), 70 mg (original dose 40 mg/m2, Cycle 2, Reason: Dose modified as per discussion with consulting physician)  Administration: 70 mg (6/20/2022), 70 mg (6/27/2022), 70 mg (7/6/2022), 70 mg (7/11/2022), 70 mg (7/19/2022), 70 mg (7/26/2022)  aprepitant (CINVANTI) in  mL IVPB, 130 mg, Intravenous, Once, 6 of 6 cycles  Administration: 130 mg (6/20/2022), 130 mg (6/27/2022), 130 mg (7/6/2022), 130 mg (7/11/2022), 130 mg (7/19/2022), 130 mg (7/26/2022)     6/22/2022 - 8/3/2022 Radiation    Course: C1 - EBRT  Plan ID Energy Fractions Dose per Fraction (cGy) Dose Correction (cGy) Total Dose Delivered (cGy) Elapsed Days   Whole Pelvis 10X 25 / 25 180 0 4,500 42       Course: C1 HDR Tandem and Ring Brachytherapy  Plan ID Energy Fractions Dose per Fraction (cGy) Dose Correction (cGy) Total Dose Delivered (cGy) Elapsed Days   [id number] 7-15-22  1 / 1 700 0 700 0   [id number] 1 / 1 700 0 700 0   PTU6_6--21-22 1 / 1 700 0 700 0   HDR4 7-25-22 1 / 1 700 0 700 0               Patient ID: Sunny Ledesma is a 35 y.o. female  Who returns for cervical cancer surveillance.   She has been having headaches that are unremitting as well as difficulty dealing with loud noises. She needs to wear earplugs in restaurants. Her niece recently  at age 9 and this has amplified her anxiety. She has seen her primary care physician who prescribed BuSpar. She is concerned about taking the BuSpar due to potential side effects. She is status post laparoscopic LAR on 2023 for radiation stricture of the sigmoid colon. She is recovering well from that surgery. Bowel function is unpredictable. Sometimes constipation, sometimes diarrhea. She is not having vaginal bleeding but does have pelvic pain which started 3 weeks ago. She has been taking Tylenol without relief. The following portions of the patient's history were reviewed and updated as appropriate: allergies, current medications, past family history, past medical history, past social history, past surgical history and problem list.    Review of Systems   Constitutional: Negative for activity change and unexpected weight change. HENT: Negative. Eyes: Negative. Respiratory: Negative. Cardiovascular: Negative. Gastrointestinal: Positive for constipation and diarrhea. Negative for abdominal distention and abdominal pain. Endocrine: Negative. Genitourinary: Positive for pelvic pain. Negative for vaginal bleeding. Musculoskeletal: Negative. Skin: Negative. Allergic/Immunologic: Negative. Neurological: Negative. Hematological: Negative. Psychiatric/Behavioral: The patient is nervous/anxious. Current Outpatient Medications   Medication Sig Dispense Refill   • diazepam (VALIUM) 2 mg tablet Take 2 tablets PO 30 min prior to MRI 2 tablet 0   • estradiol (VIVELLE-DOT) 0.0375 MG/24HR PLACE 1 PATCH ON THE SKIN 2 TIMES A WEEK.  8 patch 3   • lidocaine (XYLOCAINE) 5 % ointment Apply topically as needed for mild pain 35.44 g 2   • NON FORMULARY CBD gummies daily     • Probiotic Product (PROBIOTIC-10 PO) Take by mouth Not consistently     • erythromycin (ILOTYCIN) ophthalmic ointment 1 APPLICATION INTO BOTH EYES 6 TIMES PER DAY (Patient not taking: Reported on 8/10/2023)     • nitrofurantoin (MACROBID) 100 mg capsule Take 1 capsule (100 mg total) by mouth 2 (two) times a day for 5 days (Patient not taking: Reported on 8/10/2023) 10 capsule 0     No current facility-administered medications for this visit. Objective:    Blood pressure 142/84, pulse 102, temperature 98 °F (36.7 °C), temperature source Temporal, resp. rate 16, height 5' 9.5" (1.765 m), weight 115 kg (253 lb 6.4 oz), last menstrual period 07/27/2022, SpO2 99 %, not currently breastfeeding. Body mass index is 36.88 kg/m². Body surface area is 2.3 meters squared. Physical Exam  Vitals reviewed. Exam conducted with a chaperone present. Constitutional:       General: She is not in acute distress. Appearance: Normal appearance. She is well-developed. She is obese. She is not ill-appearing, toxic-appearing or diaphoretic. HENT:      Head: Normocephalic and atraumatic. Eyes:      General: No scleral icterus. Extraocular Movements: Extraocular movements intact. Conjunctiva/sclera: Conjunctivae normal.   Neck:      Thyroid: No thyromegaly. Pulmonary:      Effort: Pulmonary effort is normal.   Abdominal:      General: There is no distension. Palpations: Abdomen is soft. There is no mass. Tenderness: There is abdominal tenderness. There is no guarding or rebound. Hernia: No hernia is present. Comments: Tender bilateral lower quadrants   Genitourinary:     Comments: External female genitalia are normal.  There is no visible mass or lesion. Speculum examination reveals an atrophic vagina with radiation change. The cervix is not visible as a separate anatomic entity at the vaginal apex. There is no mass or bleeding. Bimanual examination reveals immobility of the pelvic floor likely secondary to prior radiation therapy.   The uterus/adnexa are tender to palpation. Musculoskeletal:         General: No swelling or tenderness. Cervical back: Normal range of motion and neck supple. Right lower leg: No edema. Left lower leg: No edema. Lymphadenopathy:      Cervical: No cervical adenopathy. Skin:     General: Skin is warm and dry. Coloration: Skin is not jaundiced or pale. Findings: No lesion or rash. Neurological:      General: No focal deficit present. Mental Status: She is alert and oriented to person, place, and time. Mental status is at baseline. Cranial Nerves: No cranial nerve deficit. Motor: No weakness. Gait: Gait normal.   Psychiatric:         Mood and Affect: Mood normal.         Behavior: Behavior normal.         Thought Content:  Thought content normal.         Judgment: Judgment normal.         No results found for: ""  Lab Results   Component Value Date    K 4.1 08/04/2023     08/04/2023    CO2 23 08/04/2023    BUN 10 08/04/2023    CREATININE 0.83 08/04/2023    CALCIUM 9.0 05/17/2023    CORRECTEDCA 9.0 01/02/2023    AST 12 08/04/2023    ALT 14 08/04/2023    ALKPHOS 64 01/02/2023    EGFR 95 08/04/2023     Lab Results   Component Value Date    WBC 4.0 08/04/2023    HGB 11.2 08/04/2023    HCT 35.0 08/04/2023    MCV 84 08/04/2023     08/04/2023     Lab Results   Component Value Date    NEUTROABS 2.8 08/04/2023        Trend:  No results found for: ""

## 2023-08-10 NOTE — ASSESSMENT & PLAN NOTE
I encouraged her to start her BuSpar. We discussed the risks of benzodiazepine therapy. She has an appointment with a therapist in 2 weeks.

## 2023-08-11 RX ORDER — BUSPIRONE HYDROCHLORIDE 7.5 MG/1
7.5 TABLET ORAL 2 TIMES DAILY
Qty: 60 TABLET | Refills: 3 | Status: SHIPPED | OUTPATIENT
Start: 2023-08-11

## 2023-08-11 NOTE — TELEPHONE ENCOUNTER
From: Stalin Melgar  To: Jose Rueda  Sent: 8/9/2023 6:26 AM EDT  Subject: Antibiotics     I have one day left of antibiotics sadly I still have pain, burning, frequent urination, pressure. I took azo and have been drinking cranberry juice along with the antibiotics. Not sure if there’s more then one thing going on at once like inflammation from the previous surgery since my bowel movements are still not regular. Goes from constipation for days to diarrhea or loose stool 10-15  times a day. The pain is starting to radiating up my lower stomach and lower back causing cramps like a period. Thank you.

## 2023-08-14 ENCOUNTER — TELEPHONE (OUTPATIENT)
Dept: UROLOGY | Facility: MEDICAL CENTER | Age: 33
End: 2023-08-14

## 2023-08-14 ENCOUNTER — TELEPHONE (OUTPATIENT)
Dept: GYNECOLOGIC ONCOLOGY | Facility: CLINIC | Age: 33
End: 2023-08-14

## 2023-08-14 DIAGNOSIS — R39.89 BLADDER PAIN: ICD-10-CM

## 2023-08-14 DIAGNOSIS — R10.2 PELVIC PAIN: Primary | ICD-10-CM

## 2023-08-14 DIAGNOSIS — R39.89 BLADDER PAIN: Primary | ICD-10-CM

## 2023-08-14 RX ORDER — OXYCODONE HYDROCHLORIDE 5 MG/1
5 TABLET ORAL EVERY 6 HOURS PRN
Qty: 10 TABLET | Refills: 0 | Status: SHIPPED | OUTPATIENT
Start: 2023-08-14

## 2023-08-14 NOTE — TELEPHONE ENCOUNTER
Called to inform patient that I called the urology office and the soonest appointment was scheduled for the patient. Date, time and location were provided to patient. Office staff also stated they are putting patient on wait list and sending to clinical pool to move patient's appointment up. This was relayed to the patient as well.

## 2023-08-14 NOTE — TELEPHONE ENCOUNTER
New Patient Triage  Please Triage:   New Patient-   What is the reason for the patients appointment? Bladder pain    Imaging/Lab Results: Insurance:   Do we accept the pt's insurance or is the patient Self-Pay? Provider & Plan:  [facility]   Member ID#: [id number]    History  Has the pt had any previous urologist? no    Have pt records been requested?  No    Has the pt had any outside testing done? no    Does the pt have a personal history of cancer?: yes cervical     Pt scheduled for 10/5 in [place] with Dr. Pineda Balderas    Asking for sooner jennifer in [place] if possible

## 2023-08-17 ENCOUNTER — TELEPHONE (OUTPATIENT)
Dept: GYNECOLOGIC ONCOLOGY | Facility: CLINIC | Age: 33
End: 2023-08-17

## 2023-08-17 DIAGNOSIS — R10.2 PELVIC PAIN: ICD-10-CM

## 2023-08-17 DIAGNOSIS — C53.8 MALIGNANT NEOPLASM OF OVERLAPPING SITES OF CERVIX (HCC): Primary | ICD-10-CM

## 2023-08-17 NOTE — TELEPHONE ENCOUNTER
Spoke with Patient about scheduling a MRI Pelvis. Tried to add it on this weekend with her MRI of Brain, but  did not have time. Patient was scheduled for 9/3 at 9 am in [place]. Per Ashleigh Menon that was ok for the MRI be that far out. Patient agreed with the time, Date, and location.

## 2023-08-19 ENCOUNTER — HOSPITAL ENCOUNTER (OUTPATIENT)
Dept: MRI IMAGING | Facility: HOSPITAL | Age: 33
Discharge: HOME | End: 2023-08-19
Payer: COMMERCIAL

## 2023-08-19 PROCEDURE — 70553 MRI BRAIN STEM W/O & W/DYE: CPT

## 2023-08-19 PROCEDURE — G1004 CDSM NDSC: HCPCS

## 2023-08-19 PROCEDURE — A9585 GADOBUTROL INJECTION: HCPCS | Performed by: PHYSICIAN ASSISTANT

## 2023-08-19 RX ORDER — GADOBUTROL 604.72 MG/ML
11 INJECTION INTRAVENOUS
Status: COMPLETED | OUTPATIENT
Start: 2023-08-19 | End: 2023-08-19

## 2023-08-19 RX ADMIN — GADOBUTROL 11 ML: 604.72 INJECTION INTRAVENOUS at 08:09

## 2023-09-02 DIAGNOSIS — F41.9 ANXIETY: ICD-10-CM

## 2023-09-03 ENCOUNTER — HOSPITAL ENCOUNTER (OUTPATIENT)
Dept: MRI IMAGING | Facility: HOSPITAL | Age: 33
Discharge: HOME | End: 2023-09-03
Payer: COMMERCIAL

## 2023-09-03 DIAGNOSIS — C53.8 MALIGNANT NEOPLASM OF OVERLAPPING SITES OF CERVIX (HCC): ICD-10-CM

## 2023-09-03 DIAGNOSIS — R10.2 PELVIC PAIN: ICD-10-CM

## 2023-09-03 PROCEDURE — 72197 MRI PELVIS W/O & W/DYE: CPT

## 2023-09-03 PROCEDURE — A9585 GADOBUTROL INJECTION: HCPCS | Performed by: PHYSICIAN ASSISTANT

## 2023-09-03 PROCEDURE — G1004 CDSM NDSC: HCPCS

## 2023-09-03 RX ORDER — BUSPIRONE HYDROCHLORIDE 7.5 MG/1
7.5 TABLET ORAL 2 TIMES DAILY
Qty: 180 TABLET | Refills: 1 | Status: SHIPPED | OUTPATIENT
Start: 2023-09-03

## 2023-09-03 RX ORDER — GADOBUTROL 604.72 MG/ML
11 INJECTION INTRAVENOUS
Status: COMPLETED | OUTPATIENT
Start: 2023-09-03 | End: 2023-09-03

## 2023-09-03 RX ADMIN — GADOBUTROL 11 ML: 604.72 INJECTION INTRAVENOUS at 10:12

## 2023-09-21 ENCOUNTER — TELEPHONE (OUTPATIENT)
Dept: UROLOGY | Facility: AMBULATORY SURGERY CENTER | Age: 33
End: 2023-09-21

## 2023-09-21 NOTE — TELEPHONE ENCOUNTER
Hello,    Patient is going to be seen at [facility]) Urology (NPI: [id number]) on 10/5, and a insurance referral is needed. The diagnosis for the appointment is R39.8, and the procedure code needed. Is [id number]. Thank you for your time and help.

## 2023-09-28 ENCOUNTER — OFFICE VISIT (OUTPATIENT)
Dept: FAMILY MEDICINE CLINIC | Facility: CLINIC | Age: 33
End: 2023-09-28
Payer: COMMERCIAL

## 2023-09-28 VITALS
SYSTOLIC BLOOD PRESSURE: 118 MMHG | DIASTOLIC BLOOD PRESSURE: 84 MMHG | OXYGEN SATURATION: 99 % | RESPIRATION RATE: 16 BRPM | BODY MASS INDEX: 35.62 KG/M2 | TEMPERATURE: 97.8 F | WEIGHT: 248.8 LBS | HEART RATE: 77 BPM | HEIGHT: 70 IN

## 2023-09-28 DIAGNOSIS — F41.9 ANXIETY: Primary | ICD-10-CM

## 2023-09-28 DIAGNOSIS — F32.9 REACTIVE DEPRESSION: ICD-10-CM

## 2023-09-28 PROCEDURE — 99214 OFFICE O/P EST MOD 30 MIN: CPT | Performed by: FAMILY MEDICINE

## 2023-09-28 NOTE — PROGRESS NOTES
Assessment/Plan:  Problem List Items Addressed This Visit        Other    Anxiety - Primary     The patient is doing well on the BuSpar. She has had an improvement in her anxiety. She is not interested in increasing the medication at this time since she just started on it about 2 weeks ago. She wishes to remain on this current dose and monitor her symptoms. We will see her back in the office as scheduled. Depression     The patient has seen an improvement in her depression symptoms as well. We will continue to monitor closely. We will see her back as scheduled. Return for Next scheduled follow up. I spent 15 minutes during the visit reviewing the history from the patient, performing the examination, discussing the findings with the patient, providing counseling and education, and making a plan. I spent 15 minutes ordering referrals and testing and documenting. Subjective:   Chief Complaint   Patient presents with   • Anxiety     Pt reports she has noticed a positive difference being on the buspar in the last week or so. Patient ID: Scottie Wellington is a 35 y.o. female presents today for for a follow-up of her anxiety. Scottie Wellington is a 35 y.o. female who presents today for follow-up of her anxiety. At her last visit, we started her on buspirone to help with her anxiety and she is seen in noticeable improvement. She was late in starting the medication however and has only been on it for 2 weeks. She is still getting some anxiety symptoms but it is much improved. There are less panic attacks over the past 3 weeks. She is sleeping well. There is increased worry all the time, but not as much. There is less depression. She has lost 5 lbs. The patient denies any chest pain, shortness of breath, or palpitations. There is no edema. There are no headaches or visual changes. There is no lightheadedness, dizziness, or fainting spells.   The patient currently denies any nausea, vomiting, or GERD symptoms. she has normal bowel movements and normal urine output. she has a normal appetite. Anxiety  Presents for follow-up visit. Symptoms include excessive worry. Patient reports no chest pain, compulsions, confusion, decreased concentration, depressed mood, dizziness, dry mouth, feeling of choking, hyperventilation, impotence, insomnia, irritability, malaise, muscle tension, nausea, nervous/anxious behavior, obsessions, palpitations, panic, restlessness, shortness of breath or suicidal ideas.          The following portions of the patient's history were reviewed and updated as appropriate: allergies, current medications, past family history, past medical history, past social history, past surgical history and problem list.  Patient Active Problem List   Diagnosis   • Malignant neoplasm of overlapping sites of cervix ([address])   • Family history of ovarian cancer   • Obesity (BMI 35.0-39.9 without comorbidity)   • Dysuria   • Anxiety   • Depression   • Dyspareunia due to medical condition in female patient   • Menopausal symptoms   • Abdominal pain   • Slow transit constipation   • Radiation proctitis   • Colonic stricture (HCC)   • Hordeolum externum of left eye     Past Medical History:   Diagnosis Date   • Anxiety    • Cancer [facility])     cervix   • Cervical cancer ([address])     receiving chemo and radiation   • COVID     Jan 2022   • Depression    • Diarrhea    • Dizziness    • Frequent headaches    • Hx of bleeding disorder     vaginal bleeding   • Obesity      Past Surgical History:   Procedure Laterality Date   • CERVICAL BIOPSY  W/ LOOP ELECTRODE EXCISION     • LYMPH NODE DISSECTION Bilateral 5/6/2022    Procedure: DISSECTION/STAGING LYMPH NODE PELVIS/ABDOMEN;  Surgeon: Camron Bagley MD;  [facility]: [facility];  Service: Gynecology Oncology   • CO INSERTION VAGINAL RADIATION DEVICE N/A 7/15/2022    Procedure: INSERTION NADIR SLEEVE VAGINA WITH POST OP BRACHYTHERAPY (IN RADIATION ONCOLOGY); Surgeon: Azul Russell MD;  Location: [facility];  Service: Gynecology Oncology   • PA LAPAROSCOPY COLECTOMY PARTIAL W/ANASTOMOSIS N/A 5/10/2023    Procedure: RESECTION COLON SIGMOID LAPAROSCOPIC;  Surgeon: Andrew Melara MD;  Location: [facility];  Service: Colorectal   • PA SIGMOIDOSCOPY FLX DX W/COLLJ SPEC BR/WA IF PFRMD N/A 5/10/2023    Procedure: Valeda Grout;  Surgeon: Andrew Melara MD;  [facility]: [facility];  Service: Colorectal   • SALPINGECTOMY Bilateral 5/6/2022    Procedure: SALPINGECTOMY, OVARIAN TRANSPOSITION;  Surgeon: Azul Russell MD;  [facility];  Service: Gynecology Oncology   • US GUIDANCE  7/15/2022     No Known Allergies  Family History   Problem Relation Age of Onset   • Ovarian cancer Maternal Aunt    • Ovarian cancer Maternal Grandmother    • No Known Problems Mother    • Heart disease Father      Social History     Socioeconomic History   • Marital status: Single     Spouse name: Not on file   • Number of children: Not on file   • Years of education: Not on file   • Highest education level: Not on file   Occupational History   • Not on file   Tobacco Use   • Smoking status: Never     Passive exposure: Current (very mild/social)   • Smokeless tobacco: Never   Vaping Use   • Vaping Use: Never used   Substance and Sexual Activity   • Alcohol use: Not Currently   • Drug use: Never     Comment: CBD Gummies/Anxiety   • Sexual activity: Not Currently   Other Topics Concern   • Not on file   Social History Narrative   • Not on file     Social Determinants of Health     Financial Resource Strain: Not on file   Food Insecurity: No Food Insecurity (5/11/2023)    Hunger Vital Sign    • Worried About Running Out of Food in the Last Year: Never true    • Ran Out of Food in the Last Year: Never true   Transportation Needs: No Transportation Needs (5/11/2023)    PRAPARE - Transportation    • Lack of Transportation (Medical):  No • Lack of Transportation (Non-Medical): No   Physical Activity: Not on file   Stress: Not on file   Social Connections: Not on file   Intimate Partner Violence: Not At Risk (8/2/2023)    Humiliation, Afraid, Rape, and Kick questionnaire    • Fear of Current or Ex-Partner: No    • Emotionally Abused: No    • Physically Abused: No    • Sexually Abused: No   Housing Stability: Low Risk  (5/11/2023)    Housing Stability Vital Sign    • Unable to Pay for Housing in the Last Year: No    • Number of [address] in the Last Year: 1    • Unstable Housing in the Last Year: No     Current Outpatient Medications on File Prior to Visit   Medication Sig Dispense Refill   • busPIRone (BUSPAR) 7.5 mg tablet TAKE 1 TABLET BY MOUTH 2 TIMES A DAY. 180 tablet 1   • estradiol (VIVELLE-DOT) 0.0375 MG/24HR PLACE 1 PATCH ON THE SKIN 2 TIMES A WEEK. 8 patch 3     No current facility-administered medications on file prior to visit. Review of Systems   Constitutional: Negative. Negative for irritability. HENT: Negative. Eyes: Negative. Respiratory: Negative. Negative for shortness of breath. Cardiovascular: Negative. Negative for chest pain and palpitations. Gastrointestinal: Negative. Negative for nausea. Endocrine: Negative. Genitourinary: Negative. Negative for impotence. Musculoskeletal: Negative. Skin: Negative. Allergic/Immunologic: Negative. Neurological: Negative. Negative for dizziness. Hematological: Negative. Psychiatric/Behavioral: Negative. Negative for confusion, decreased concentration and suicidal ideas. The patient is not nervous/anxious and does not have insomnia. Objective:  Vitals:    09/28/23 1707   BP: 118/84   BP Location: Right arm   Patient Position: Sitting   Cuff Size: Large   Pulse: 77   Resp: 16   Temp: 97.8 °F (36.6 °C)   TempSrc: Tympanic   SpO2: 99%   Weight: 113 kg (248 lb 12.8 oz)   Height: 5' 9.5" (1.765 m)     Body mass index is 36.21 kg/m². Physical Exam  Constitutional:       Appearance: She is well-developed. HENT:      Head: Normocephalic and atraumatic. Mouth/Throat:      Pharynx: No oropharyngeal exudate. Eyes:      Conjunctiva/sclera: Conjunctivae normal.      Pupils: Pupils are equal, round, and reactive to light. Neck:      Thyroid: No thyromegaly. Vascular: No JVD. Trachea: No tracheal deviation. Cardiovascular:      Rate and Rhythm: Normal rate and regular rhythm. Heart sounds: Normal heart sounds. No murmur heard. No friction rub. No gallop. Pulmonary:      Effort: Pulmonary effort is normal. No respiratory distress. Breath sounds: Normal breath sounds. No stridor. No wheezing or rales. Chest:      Chest wall: No tenderness. Abdominal:      General: Bowel sounds are normal. There is no distension. Palpations: Abdomen is soft. There is no mass. Tenderness: There is no abdominal tenderness. There is no guarding or rebound. Musculoskeletal:         General: No tenderness or deformity. Normal range of motion. Cervical back: Normal range of motion. Lymphadenopathy:      Cervical: No cervical adenopathy. Skin:     General: Skin is warm and dry. Neurological:      Mental Status: She is alert and oriented to person, place, and time. Cranial Nerves: No cranial nerve deficit. Motor: No abnormal muscle tone. Coordination: Coordination normal.      Deep Tendon Reflexes: Reflexes are normal and symmetric.  Reflexes normal.

## 2023-10-02 NOTE — ASSESSMENT & PLAN NOTE
The patient is doing well on the BuSpar. She has had an improvement in her anxiety. She is not interested in increasing the medication at this time since she just started on it about 2 weeks ago. She wishes to remain on this current dose and monitor her symptoms. We will see her back in the office as scheduled.

## 2023-10-02 NOTE — ASSESSMENT & PLAN NOTE
The patient has seen an improvement in her depression symptoms as well. We will continue to monitor closely. We will see her back as scheduled.

## 2023-10-05 ENCOUNTER — CONSULT (OUTPATIENT)
Dept: UROLOGY | Facility: MEDICAL CENTER | Age: 33
End: 2023-10-05
Payer: COMMERCIAL

## 2023-10-05 VITALS
HEIGHT: 70 IN | WEIGHT: 245 LBS | OXYGEN SATURATION: 99 % | SYSTOLIC BLOOD PRESSURE: 118 MMHG | BODY MASS INDEX: 35.07 KG/M2 | HEART RATE: 68 BPM | DIASTOLIC BLOOD PRESSURE: 80 MMHG

## 2023-10-05 DIAGNOSIS — R39.89 BLADDER PAIN: Primary | ICD-10-CM

## 2023-10-05 LAB
POST-VOID RESIDUAL VOLUME, ML POC: 236 ML
SL AMB  POCT GLUCOSE, UA: NORMAL
SL AMB LEUKOCYTE ESTERASE,UA: NORMAL
SL AMB POCT BILIRUBIN,UA: NORMAL
SL AMB POCT BLOOD,UA: NORMAL
SL AMB POCT CLARITY,UA: CLEAR
SL AMB POCT COLOR,UA: CLEAR
SL AMB POCT KETONES,UA: NORMAL
SL AMB POCT NITRITE,UA: NORMAL
SL AMB POCT PH,UA: 5.5
SL AMB POCT SPECIFIC GRAVITY,UA: <=1.005
SL AMB POCT URINE PROTEIN: NORMAL
SL AMB POCT UROBILINOGEN: 0.2

## 2023-10-05 PROCEDURE — 51798 US URINE CAPACITY MEASURE: CPT | Performed by: UROLOGY

## 2023-10-05 PROCEDURE — 81003 URINALYSIS AUTO W/O SCOPE: CPT | Performed by: UROLOGY

## 2023-10-05 PROCEDURE — 99204 OFFICE O/P NEW MOD 45 MIN: CPT | Performed by: UROLOGY

## 2023-10-05 NOTE — PROGRESS NOTES
HISTORY:    1.  Bladder wall thickening on recent MRI    2. Urinary complaints of urgency and passive incontinence, variable bladder emptying, wears diapers and pads    3 months ago she had onset of severe bladder pain, she says that was a result of severe stress from a cousin  dying of cancer. Pain is much better, see chart regarding treatment details for her cervical cancer, colon surgery etc.             ASSESSMENT / PLAN:    1. Emptying is borderline 240 mL residual.    2.  We discussed possible treatment with intermittent self-catheterization, trying to keep the bladder empty and therefore less leakage. If she were to catheterize 3-4 times a day, and still leak, we might add an overactive bladder medication. She currently drinks 1 gallon of liquids a day, she feels it helps keep her bladder flexible less pain. 3.  No microscopic hematuria on urine samples, no need for cystoscopy at present    4 follow-up 6 months, she will call if she wants further discussion of intermittent self cath    The following portions of the patient's history were reviewed and updated as appropriate: allergies, current medications, past family history, past medical history, past social history, past surgical history, and problem list.    Review of Systems      Objective:     Physical Exam  Constitutional:       Appearance: Normal appearance. She is obese. Abdominal:      Comments: Soft nontender   Neurological:      Mental Status: She is alert.            No results found for: "PSA"]  BUN   Date Value Ref Range Status   08/04/2023 10 6 - 20 mg/dL Final     Creatinine   Date Value Ref Range Status   08/04/2023 0.83 0.57 - 1.00 mg/dL Final   05/17/2023 0.51 (L) 0.60 - 1.30 mg/dL Final     Comment:     Standardized to IDMS reference method     No components found for: "CBC"      Patient Active Problem List   Diagnosis    Malignant neoplasm of overlapping sites of cervix [facility])    Family history of ovarian cancer    Obesity (BMI 35.0-39.9 without comorbidity)    Dysuria    Anxiety    Depression    Dyspareunia due to medical condition in female patient    Menopausal symptoms    Abdominal pain    Slow transit constipation    Radiation proctitis    Colonic stricture (HCC)    Hordeolum externum of left eye        Diagnoses and all orders for this visit:    Bladder pain  -     Ambulatory referral to Urology  -     POCT urine dip auto non-scope  -     POCT Measure PVR           Patient ID: Faith Blair is a 35 y.o. female. Current Outpatient Medications:     busPIRone (BUSPAR) 7.5 mg tablet, TAKE 1 TABLET BY MOUTH 2 TIMES A DAY., Disp: 180 tablet, Rfl: 1    estradiol (VIVELLE-DOT) 0.0375 MG/24HR, PLACE 1 PATCH ON THE SKIN 2 TIMES A WEEK., Disp: 8 patch, Rfl: 3    Past Medical History:   Diagnosis Date    Anxiety     Cancer ([address])     cervix    Cervical cancer ([address])     receiving chemo and radiation    COVID     Jan 2022    Depression     Diarrhea     Dizziness     Frequent headaches     Hx of bleeding disorder     vaginal bleeding    Obesity        Past Surgical History:   Procedure Laterality Date    CERVICAL BIOPSY  W/ LOOP ELECTRODE EXCISION      LYMPH NODE DISSECTION Bilateral 5/6/2022    Procedure: DISSECTION/STAGING LYMPH NODE PELVIS/ABDOMEN;  Surgeon: Marie James MD;  [facility];  Service: Gynecology Oncology    ME INSERTION VAGINAL RADIATION DEVICE N/A 7/15/2022    Procedure: INSERTION NADIR SLEEVE VAGINA WITH POST OP BRACHYTHERAPY (IN RADIATION ONCOLOGY);   Surgeon: Marie James MD;  [facility];  Service: Gynecology Oncology    ME LAPAROSCOPY COLECTOMY PARTIAL W/ANASTOMOSIS N/A 5/10/2023    Procedure: RESECTION COLON SIGMOID LAPAROSCOPIC;  Surgeon: Lakisha Dobson MD;  [facility];  Service: Colorectal    ME SIGMOIDOSCOPY FLX DX W/COLLJ SPEC BR/WA IF PFRMD N/A 5/10/2023    Procedure: [place];  Surgeon: Lakisha Dobson MD;  [facility]; Service: Colorectal    SALPINGECTOMY Bilateral 5/6/2022    Procedure: SALPINGECTOMY, OVARIAN TRANSPOSITION;  Surgeon: Freeman Crews MD;  [facility];  Service: Gynecology Oncology    US GUIDANCE  7/15/2022       Social History

## 2023-10-05 NOTE — PATIENT INSTRUCTIONS
Consider intermittent self-catheterization to keep the bladder emptier.   Sometimes we add a medicine to relax the bladder to cut down the leakage

## 2023-11-02 ENCOUNTER — OFFICE VISIT (OUTPATIENT)
Dept: FAMILY MEDICINE CLINIC | Facility: CLINIC | Age: 33
End: 2023-11-02
Payer: COMMERCIAL

## 2023-11-02 VITALS
TEMPERATURE: 98 F | DIASTOLIC BLOOD PRESSURE: 82 MMHG | OXYGEN SATURATION: 99 % | RESPIRATION RATE: 16 BRPM | SYSTOLIC BLOOD PRESSURE: 114 MMHG | BODY MASS INDEX: 35.43 KG/M2 | WEIGHT: 239.2 LBS | HEIGHT: 69 IN | HEART RATE: 67 BPM

## 2023-11-02 DIAGNOSIS — R10.2 PELVIC PAIN: ICD-10-CM

## 2023-11-02 DIAGNOSIS — N94.19 DYSPAREUNIA DUE TO MEDICAL CONDITION IN FEMALE PATIENT: ICD-10-CM

## 2023-11-02 DIAGNOSIS — Z00.00 ANNUAL PHYSICAL EXAM: Primary | ICD-10-CM

## 2023-11-02 DIAGNOSIS — D22.9 MULTIPLE NEVI: ICD-10-CM

## 2023-11-02 DIAGNOSIS — Z23 ENCOUNTER FOR IMMUNIZATION: ICD-10-CM

## 2023-11-02 PROCEDURE — 90686 IIV4 VACC NO PRSV 0.5 ML IM: CPT

## 2023-11-02 PROCEDURE — 90471 IMMUNIZATION ADMIN: CPT

## 2023-11-02 PROCEDURE — 99395 PREV VISIT EST AGE 18-39: CPT | Performed by: FAMILY MEDICINE

## 2023-11-02 NOTE — PATIENT INSTRUCTIONS
Wellness Visit for Adults   AMBULATORY CARE:   A wellness visit  is when you see your healthcare provider to get screened for health problems. Your healthcare provider will also give you advice on how to stay healthy. Write down your questions so you remember to ask them. Ask your healthcare provider how often you should have a wellness visit. What happens at a wellness visit:  Your healthcare provider will ask about your health, and your family history of health problems. This includes high blood pressure, heart disease, and cancer. He or she will ask if you have symptoms that concern you, if you smoke, and about your mood. You may also be asked about your intake of medicines, supplements, food, and alcohol. Any of the following may be done: Your weight  will be checked. Your height may also be checked so your body mass index (BMI) can be calculated. Your BMI shows if you are at a healthy weight. Your blood pressure  and heart rate will be checked. Your temperature may also be checked. Blood and urine tests  may be done. Blood tests may be done to check your cholesterol levels. Abnormal cholesterol levels increase your risk for heart disease and stroke. You may also need a blood or urine test to check for diabetes if you are at increased risk. Urine tests may be done to look for signs of an infection or kidney disease. A physical exam  includes checking your heartbeat and lungs with a stethoscope. Your healthcare provider may also check your skin to look for sun damage. Screening tests  may be recommended. A screening test is done to check for diseases that may not cause symptoms. The screening tests you may need depend on your age, gender, family history, and lifestyle habits. For example, colorectal screening may be recommended if you are 48years old or older. Screening tests you need if you are a woman:   A Pap smear  is used to screen for cervical cancer.  Pap smears are usually done every 3 to 5 years depending on your age. You may need them more often if you have had abnormal Pap smear test results in the past. Ask your healthcare provider how often you should have a Pap smear. A mammogram  is an x-ray of your breasts to screen for breast cancer. Experts recommend mammograms every 2 years starting at age 48 years. You may need a mammogram at age 52 years or younger if you have an increased risk for breast cancer. Talk to your healthcare provider about when you should start having mammograms and how often you need them. Vaccines you may need:   Get an influenza vaccine  every year. The influenza vaccine protects you from the flu. Several types of viruses cause the flu. The viruses change over time, so new vaccines are made each year. Get a tetanus-diphtheria (Td) booster vaccine  every 10 years. This vaccine protects you against tetanus and diphtheria. Tetanus is a severe infection that may cause painful muscle spasms and lockjaw. Diphtheria is a severe bacterial infection that causes a thick covering in the back of your mouth and throat. Get a human papillomavirus (HPV) vaccine  if you are female and aged 23 to 32 or male 23 to 24 and never received it. This vaccine protects you from HPV infection. HPV is the most common infection spread by sexual contact. HPV may also cause vaginal, penile, and anal cancers. Get a pneumococcal vaccine  if you are aged 72 years or older. The pneumococcal vaccine is an injection given to protect you from pneumococcal disease. Pneumococcal disease is an infection caused by pneumococcal bacteria. The infection may cause pneumonia, meningitis, or an ear infection. Get a shingles vaccine  if you are 60 or older, even if you have had shingles before. The shingles vaccine is an injection to protect you from the varicella-zoster virus. This is the same virus that causes chickenpox.  Shingles is a painful rash that develops in people who had chickenpox or have been exposed to the virus. How to eat healthy:  My Plate is a model for planning healthy meals. It shows the types and amounts of foods that should go on your plate. Fruits and vegetables make up about half of your plate, and grains and protein make up the other half. A serving of dairy is included on the side of your plate. The amount of calories and serving sizes you need depends on your age, gender, weight, and height. Examples of healthy foods are listed below:  Eat a variety of vegetables  such as dark green, red, and orange vegetables. You can also include canned vegetables low in sodium (salt) and frozen vegetables without added butter or sauces. Eat a variety of fresh fruits , canned fruit in 100% juice, frozen fruit, and dried fruit. Include whole grains. At least half of the grains you eat should be whole grains. Examples include whole-wheat bread, wheat pasta, brown rice, and whole-grain cereals such as oatmeal.    Eat a variety of protein foods such as seafood (fish and shellfish), lean meat, and poultry without skin (turkey and chicken). Examples of lean meats include pork leg, shoulder, or tenderloin, and beef round, sirloin, tenderloin, and extra lean ground beef. Other protein foods include eggs and egg substitutes, beans, peas, soy products, nuts, and seeds. Choose low-fat dairy products such as skim or 1% milk or low-fat yogurt, cheese, and cottage cheese. Limit unhealthy fats  such as butter, hard margarine, and shortening. Exercise:  Exercise at least 30 minutes per day on most days of the week. Some examples of exercise include walking, biking, dancing, and swimming. You can also fit in more physical activity by taking the stairs instead of the elevator or parking farther away from stores. Include muscle strengthening activities 2 days each week. Regular exercise provides many health benefits.  It helps you manage your weight, and decreases your risk for type 2 diabetes, heart disease, stroke, and high blood pressure. Exercise can also help improve your mood. Ask your healthcare provider about the best exercise plan for you. General health and safety guidelines:   Do not smoke. Nicotine and other chemicals in cigarettes and cigars can cause lung damage. Ask your healthcare provider for information if you currently smoke and need help to quit. E-cigarettes or smokeless tobacco still contain nicotine. Talk to your healthcare provider before you use these products. Limit alcohol. A drink of alcohol is 12 ounces of beer, 5 ounces of wine, or 1½ ounces of liquor. Lose weight, if needed. Being overweight increases your risk of certain health conditions. These include heart disease, high blood pressure, type 2 diabetes, and certain types of cancer. Protect your skin. Do not sunbathe or use tanning beds. Use sunscreen with a SPF 15 or higher. Apply sunscreen at least 15 minutes before you go outside. Reapply sunscreen every 2 hours. Wear protective clothing, hats, and sunglasses when you are outside. Drive safely. Always wear your seatbelt. Make sure everyone in your car wears a seatbelt. A seatbelt can save your life if you are in an accident. Do not use your cell phone when you are driving. This could distract you and cause an accident. Pull over if you need to make a call or send a text message. Practice safe sex. Use latex condoms if are sexually active and have more than one partner. Your healthcare provider may recommend screening tests for sexually transmitted infections (STIs). Wear helmets, lifejackets, and protective gear. Always wear a helmet when you ride a bike or motorcycle, go skiing, or play sports that could cause a head injury. Wear protective equipment when you play sports. Wear a lifejacket when you are on a boat or doing water sports.     © Copyright Ry[employer] Hernandez 2023 Information is for End User's use only and may not be sold, redistributed or otherwise used for commercial purposes. The above information is an  only. It is not intended as medical advice for individual conditions or treatments. Talk to your doctor, nurse or pharmacist before following any medical regimen to see if it is safe and effective for you.

## 2023-11-02 NOTE — PROGRESS NOTES
[address] [facility]    NAME: Jordana Arevalo  AGE: 35 y.o. SEX: female  : 1990     DATE: 2023     Assessment and Plan:     Problem List Items Addressed This Visit        Other    Dyspareunia due to medical condition in female patient    Relevant Orders    Ambulatory Referral to Physical Therapy   Other Visit Diagnoses     Annual physical exam    -  Primary    Encounter for immunization        Relevant Orders    influenza vaccine, quadrivalent, 0.5 mL, preservative-free, for adult and pediatric patients 6 mos+ (AFLURIA, FLUARIX, FLULAVAL, FLUZONE) (Completed)    Pelvic pain        Relevant Orders    Ambulatory Referral to Physical Therapy    Multiple nevi        Relevant Orders    Ambulatory Referral to Dermatology      The patient had a normal exam today in the office. We will refer her to dermatology physical therapy for the pelvic floor therapy as indicated. She will continue to follow-up with her specialist as scheduled. We will see her back in the office as scheduled. Immunizations and preventive care screenings were discussed with patient today. Appropriate education was printed on patient's after visit summary. Counseling:  Dental Health: discussed importance of regular tooth brushing, flossing, and dental visits. Injury prevention: discussed safety/seat belts, safety helmets, smoke detectors, carbon dioxide detectors, and smoking near bedding or upholstery. Exercise: the importance of regular exercise/physical activity was discussed. Recommend exercise 3-5 times per week for at least 30 minutes. Return in about 6 months (around 2024) for Recheck. Chief Complaint:     Chief Complaint   Patient presents with   • Annual Exam     Complete Physical      History of Present Illness:     Adult Annual Physical   Patient here for a comprehensive physical exam. The patient reports no problems.   She is doing well on the Buspar- there is less anxiety. She is seeing Urology- she is having some urinary retention. She is trying to get in for pelvic therapy. The patient denies any chest pain, shortness of breath, or palpitations. There is no edema. There are no headaches or visual changes. There is no lightheadedness, dizziness, or fainting spells. The patient currently denies any nausea, vomiting, or GERD symptoms. she has normal bowel movements and normal urine output. she has a normal appetite. She is still having some ongoing issues with urianry retetion. Diet and Physical Activity  Diet/Nutrition: well balanced diet, limited junk food, low carb diet, and consuming 3-5 servings of fruits/vegetables daily. Exercise: moderate cardiovascular exercise. Depression Screening  PHQ-2/9 Depression Screening    Little interest or pleasure in doing things: 0 - not at all  Feeling down, depressed, or hopeless: 0 - not at all  Trouble falling or staying asleep, or sleeping too much: 0 - not at all  Feeling tired or having little energy: 0 - not at all  Poor appetite or overeatin - not at all  Feeling bad about yourself - or that you are a failure or have let yourself or your family down: 0 - not at all  Trouble concentrating on things, such as reading the newspaper or watching television: 0 - not at all  Moving or speaking so slowly that other people could have noticed. Or the opposite - being so fidgety or restless that you have been moving around a lot more than usual: 0 - not at all  Thoughts that you would be better off dead, or of hurting yourself in some way: 0 - not at all  PHQ-9 Score: 0   PHQ-9 Interpretation: No or Minimal depression        General Health  Sleep: sleeps well. Hearing: normal - bilateral.  Vision: no vision problems and the patient needs an eye exam  .   Dental: no dental visits for >1 year and brushes teeth twice daily. /GYN Health  Sees GYN oncology. Review of Systems:     Review of Systems   Constitutional: Negative. HENT: Negative. Eyes: Negative. Respiratory: Negative. Cardiovascular: Negative. Gastrointestinal: Negative. Endocrine: Negative. Genitourinary: Negative. Musculoskeletal: Negative. Skin: Negative. Allergic/Immunologic: Negative. Neurological: Negative. Hematological: Negative. Psychiatric/Behavioral: Negative. Past Medical History:     Past Medical History:   Diagnosis Date   • Anxiety    • Cancer [facility])     cervix   • Cervical cancer ([address])     receiving chemo and radiation   • COVID     Jan 2022   • Depression    • Diarrhea    • Dizziness    • Frequent headaches    • Hx of bleeding disorder     vaginal bleeding   • Obesity       Past Surgical History:     Past Surgical History:   Procedure Laterality Date   • CERVICAL BIOPSY  W/ LOOP ELECTRODE EXCISION     • LYMPH NODE DISSECTION Bilateral 5/6/2022    Procedure: DISSECTION/STAGING LYMPH NODE PELVIS/ABDOMEN;  Surgeon: Ted Ashton MD;  [facility];  Service: Gynecology Oncology   • NH INSERTION VAGINAL RADIATION DEVICE N/A 7/15/2022    Procedure: INSERTION NADIR SLEEVE VAGINA WITH POST OP BRACHYTHERAPY (IN RADIATION ONCOLOGY);   Surgeon: Ted Ashton MD;  [facility];  Service: Gynecology Oncology   • NH LAPAROSCOPY COLECTOMY PARTIAL W/ANASTOMOSIS N/A 5/10/2023    Procedure: RESECTION COLON SIGMOID LAPAROSCOPIC;  Surgeon: Ferny Mccurdy MD;  [facility];  Service: Colorectal   • NH SIGMOIDOSCOPY FLX DX W/COLLJ SPEC BR/WA IF PFRMD N/A 5/10/2023    Procedure: [place];  Surgeon: Ferny Mccurdy MD;  [facility];  Service: Colorectal   • SALPINGECTOMY Bilateral 5/6/2022    Procedure: SALPINGECTOMY, OVARIAN TRANSPOSITION;  Surgeon: Ted Ashton MD;  [facility];  Service: Gynecology Oncology   • US GUIDANCE  7/15/2022      Social History:     Social History     Socioeconomic History   • Marital status: Single     Spouse name: None   • Number of children: None   • Years of education: None   • Highest education level: None   Occupational History   • None   Tobacco Use   • Smoking status: Never     Passive exposure: Current (very mild/social)   • Smokeless tobacco: Never   Vaping Use   • Vaping Use: Never used   Substance and Sexual Activity   • Alcohol use: Not Currently   • Drug use: Never   • Sexual activity: Not Currently   Other Topics Concern   • None   Social History Narrative   • None     Social Determinants of Health     Financial Resource Strain: Not on file   Food Insecurity: No Food Insecurity (5/11/2023)    Hunger Vital Sign    • Worried About Running Out of Food in the Last Year: Never true    • Ran Out of Food in the Last Year: Never true   Transportation Needs: No Transportation Needs (5/11/2023)    PRAPARE - Transportation    • Lack of Transportation (Medical): No    • Lack of Transportation (Non-Medical): No   Physical Activity: Not on file   Stress: Not on file   Social Connections: Not on file   Intimate Partner Violence: Not At Risk (11/2/2023)    Humiliation, Afraid, Rape, and Kick questionnaire    • Fear of Current or Ex-Partner: No    • Emotionally Abused: No    • Physically Abused: No    • Sexually Abused: No   Housing Stability: Low Risk  (5/11/2023)    Housing Stability Vital Sign    • Unable to Pay for Housing in the Last Year: No    • Number of Places Lived in the Last Year: 1    • Unstable Housing in the Last Year: No      Family History:     Family History   Problem Relation Age of Onset   • Ovarian cancer Maternal Aunt    • Ovarian cancer Maternal Grandmother    • No Known Problems Mother    • Heart disease Father       Current Medications:     Current Outpatient Medications   Medication Sig Dispense Refill   • busPIRone (BUSPAR) 7.5 mg tablet TAKE 1 TABLET BY MOUTH 2 TIMES A DAY.  180 tablet 1   • estradiol (VIVELLE-DOT) 0.0375 MG/24HR PLACE 1 PATCH ON THE SKIN 2 TIMES A WEEK. 8 patch 3     No current facility-administered medications for this visit. Allergies:     No Known Allergies   Physical Exam:     /82 (BP Location: Left arm, Patient Position: Sitting, Cuff Size: Large)   Pulse 67   Temp 98 °F (36.7 °C) (Tympanic)   Resp 16   Ht 5' 9" (1.753 m)   Wt 109 kg (239 lb 3.2 oz)   LMP 07/27/2022 (Exact Date)   SpO2 99%   BMI 35.32 kg/m²     Physical Exam  Constitutional:       General: She is not in acute distress. Appearance: Normal appearance. She is well-developed. She is not diaphoretic. HENT:      Head: Normocephalic and atraumatic. Right Ear: Tympanic membrane, ear canal and external ear normal.      Left Ear: Tympanic membrane, ear canal and external ear normal.      Nose: Nose normal.      Mouth/Throat:      Mouth: Mucous membranes are moist.      Pharynx: Oropharynx is clear. No oropharyngeal exudate. Eyes:      General: No scleral icterus. Right eye: No discharge. Left eye: No discharge. Extraocular Movements: Extraocular movements intact. Pupils: Pupils are equal, round, and reactive to light. Neck:      Thyroid: No thyromegaly. Vascular: No JVD. Trachea: No tracheal deviation. Cardiovascular:      Rate and Rhythm: Normal rate and regular rhythm. Heart sounds: Normal heart sounds. No murmur heard. No friction rub. No gallop. Pulmonary:      Effort: Pulmonary effort is normal. No respiratory distress. Breath sounds: Normal breath sounds. No wheezing or rales. Chest:      Chest wall: No tenderness. Abdominal:      General: Bowel sounds are normal. There is no distension. Palpations: Abdomen is soft. There is no mass. Tenderness: There is no abdominal tenderness. There is no guarding or rebound. Hernia: No hernia is present. Musculoskeletal:         General: No tenderness or deformity. Normal range of motion.       Cervical back: Normal range of motion and neck supple. Lymphadenopathy:      Cervical: No cervical adenopathy. Skin:     General: Skin is warm and dry. Coloration: Skin is not pale. Findings: No erythema or rash. Neurological:      Mental Status: She is alert and oriented to person, place, and time. Cranial Nerves: No cranial nerve deficit. Sensory: No sensory deficit. Motor: No abnormal muscle tone. Coordination: Coordination normal.      Deep Tendon Reflexes: Reflexes normal.   Psychiatric:         Mood and Affect: Mood normal.         Behavior: Behavior normal.         Thought Content:  Thought content normal.         Judgment: Judgment normal.          Jessica Stubbs DO   [address] [facility]

## 2023-11-10 DIAGNOSIS — H00.016 HORDEOLUM EXTERNUM OF LEFT EYE, UNSPECIFIED EYELID: Primary | ICD-10-CM

## 2023-11-15 LAB — SCAN RESULT: NORMAL

## 2023-11-16 ENCOUNTER — OFFICE VISIT (OUTPATIENT)
Age: 33
End: 2023-11-16
Payer: COMMERCIAL

## 2023-11-16 VITALS
OXYGEN SATURATION: 98 % | SYSTOLIC BLOOD PRESSURE: 140 MMHG | HEART RATE: 91 BPM | RESPIRATION RATE: 17 BRPM | WEIGHT: 232.8 LBS | HEIGHT: 69 IN | BODY MASS INDEX: 34.48 KG/M2 | DIASTOLIC BLOOD PRESSURE: 80 MMHG

## 2023-11-16 DIAGNOSIS — R39.15 URGENCY OF URINATION: ICD-10-CM

## 2023-11-16 DIAGNOSIS — R32 URINARY INCONTINENCE, UNSPECIFIED TYPE: ICD-10-CM

## 2023-11-16 DIAGNOSIS — N94.19 DYSPAREUNIA DUE TO MEDICAL CONDITION IN FEMALE PATIENT: Primary | ICD-10-CM

## 2023-11-16 DIAGNOSIS — C53.8 MALIGNANT NEOPLASM OF OVERLAPPING SITES OF CERVIX (HCC): Primary | ICD-10-CM

## 2023-11-16 PROCEDURE — 99214 OFFICE O/P EST MOD 30 MIN: CPT | Performed by: OBSTETRICS & GYNECOLOGY

## 2023-11-16 RX ORDER — INCONT DEVICE,MUSCLE TONER,ELT
1 EACH VAGINAL AS NEEDED
Qty: 1 EACH | Refills: 0 | Status: SHIPPED | OUTPATIENT
Start: 2023-11-16

## 2023-11-16 RX ORDER — DOXYCYCLINE HYCLATE 100 MG
TABLET ORAL
COMMUNITY
Start: 2023-11-14

## 2023-11-16 NOTE — ASSESSMENT & PLAN NOTE
26-year-old with stage III C1 cervical cancer who completed curative intent chemotherapy and radiation in August 2022. She has pelvic pain and dyspareunia likely secondary to radiation therapy. I reviewed her MRI pelvis, MRI brain. She is on Vivelle for menopausal symptoms. She has urinary retention and symptomatic styes. She is clinically and radiologically without evidence of disease recurrence. Her performance status is 0.  1. She has follow-up with ophthalmology for her frequent styes. We discussed increasing estrogen replacement therapy to help with pelvic symptoms and she is concerned that increasing estrogen may worsen her styes. 2.  We discussed the possibility of starting bethanechol for urinary retention. She would prefer to wait until she has an evaluation with pelvic physiotherapy prior to starting additional medication. 3.  Pelvic physiotherapy for dyspareunia, pelvic pain  4. Return in 3 months for cervical cancer surveillance.

## 2023-11-16 NOTE — PROGRESS NOTES
Assessment/Plan:    Problem List Items Addressed This Visit          Genitourinary    Malignant neoplasm of overlapping sites of cervix [facility]) - Primary     69-year-old with stage III C1 cervical cancer who completed curative intent chemotherapy and radiation in August 2022. She has pelvic pain and dyspareunia likely secondary to radiation therapy. I reviewed her MRI pelvis, MRI brain. She is on Vivelle for menopausal symptoms. She has urinary retention and symptomatic styes. She is clinically and radiologically without evidence of disease recurrence. Her performance status is 0.  1. She has follow-up with ophthalmology for her frequent styes. We discussed increasing estrogen replacement therapy to help with pelvic symptoms and she is concerned that increasing estrogen may worsen her styes. 2.  We discussed the possibility of starting bethanechol for urinary retention. She would prefer to wait until she has an evaluation with pelvic physiotherapy prior to starting additional medication. 3.  Pelvic physiotherapy for dyspareunia, pelvic pain  4. Return in 3 months for cervical cancer surveillance. CHIEF COMPLAINT: Cervical cancer surveillance, dyspareunia, pelvic pain      Problem:  Cancer Staging   Malignant neoplasm of overlapping sites of cervix ([address])  Staging form: Cervix Uteri, AJCC Version 9  - Clinical: No stage assigned - Unsigned  - Pathologic: No stage assigned - Unsigned  - Pathologic: FIGO Stage IIIC1p (pT1b1, cM0) - Signed by Ja Winters MD on 5/19/2022        Previous therapy:  Oncology History   Malignant neoplasm of overlapping sites of cervix ([address])   3/24/2022 Initial Diagnosis    Malignant neoplasm of overlapping sites of cervix ([address])     5/6/2022 Surgery    Exploratory laparotomy for planned radical hysterectomy, bilateral pelvic lymph node dissection, aborted radical hysterectomy, bilateral ovarian transposition due to positive lymph nodes.   1. Two right pelvic lymph nodes positive     5/19/2022 -  Cancer Staged    Staging form: Cervix Uteri, AJCC Version 9  - Pathologic: FIGO Stage IIIC1p (pT1b1, cM0) - Signed by Antony Kuhn MD on 5/19/2022  Stage confirmation method: Pathology  Pelvic grady status: Positive  Pelvic grady method of assessment: Lymph node dissection  Para-aortic status: Negative       6/20/2022 - 7/26/2022 Chemotherapy    palonosetron (ALOXI), 0.25 mg, Intravenous, Once, 6 of 6 cycles  Administration: 0.25 mg (6/20/2022), 0.25 mg (6/27/2022), 0.25 mg (7/6/2022), 0.25 mg (7/11/2022), 0.25 mg (7/19/2022), 0.25 mg (7/26/2022)  CISplatin (PLATINOL) infusion, 70 mg (original dose 40 mg/m2), Intravenous, Once, 6 of 6 cycles  Dose modification: 70 mg (original dose 40 mg/m2, Cycle 1, Reason: Other (Must fill in a comment), Comment: max 70), 70 mg (original dose 40 mg/m2, Cycle 2, Reason: Dose modified as per discussion with consulting physician)  Administration: 70 mg (6/20/2022), 70 mg (6/27/2022), 70 mg (7/6/2022), 70 mg (7/11/2022), 70 mg (7/19/2022), 70 mg (7/26/2022)  aprepitant (CINVANTI) in  mL IVPB, 130 mg, Intravenous, Once, 6 of 6 cycles  Administration: 130 mg (6/20/2022), 130 mg (6/27/2022), 130 mg (7/6/2022), 130 mg (7/11/2022), 130 mg (7/19/2022), 130 mg (7/26/2022)     6/22/2022 - 8/3/2022 Radiation    Course: C1 - EBRT  Plan ID Energy Fractions Dose per Fraction (cGy) Dose Correction (cGy) Total Dose Delivered (cGy) Elapsed Days   Whole Pelvis 10X 25 / 25 180 0 4,500 42       Course: C1 HDR Tandem and Ring Brachytherapy  Plan ID Energy Fractions Dose per Fraction (cGy) Dose Correction (cGy) Total Dose Delivered (cGy) Elapsed Days   HDR1 7-15-22  1 / 1 700 0 700 0   GEC7_6--18-22 1 / 1 700 0 700 0   UJD6_8--21-22 1 / 1 700 0 700 0   HDR4 7-25-22 1 / 1 700 0 700 0               Patient ID: Tyrone Tejada is a 35 y.o. female  Who returns for cervical cancer surveillance. She has had follow-up with ophthalmology.   She is undergoing multiple treatments for recurrent styes. She continues to have dyspareunia and is no longer having intercourse. She is working full-time. She has lost weight intentionally. MRI of the brain on 8/19/2023 did not reveal any evidence of metastatic disease. MRI of the pelvis 9/3/2023 revealed mild thickening of the bladder wall consistent with radiation therapy. There is no evidence of recurrence. She does not have vaginal bleeding. She is able to perform her activities of daily living without difficulty. She has an initial appointment with pelvic physical therapy in December. The following portions of the patient's history were reviewed and updated as appropriate: allergies, current medications, past family history, past medical history, past social history, past surgical history, and problem list.    Review of Systems   Constitutional:  Negative for activity change and unexpected weight change. HENT: Negative. Eyes:  Positive for pain. Respiratory: Negative. Cardiovascular: Negative. Gastrointestinal:  Negative for abdominal distention and abdominal pain. Endocrine: Negative. Genitourinary:  Positive for dyspareunia and pelvic pain. Negative for vaginal bleeding. Musculoskeletal: Negative. Skin: Negative. Allergic/Immunologic: Negative. Neurological: Negative. Hematological: Negative. Psychiatric/Behavioral: Negative. Current Outpatient Medications   Medication Sig Dispense Refill    busPIRone (BUSPAR) 7.5 mg tablet TAKE 1 TABLET BY MOUTH 2 TIMES A DAY. 180 tablet 1    doxycycline hyclate (VIBRA-TABS) 100 mg tablet       estradiol (VIVELLE-DOT) 0.0375 MG/24HR PLACE 1 PATCH ON THE SKIN 2 TIMES A WEEK. 8 patch 3     No current facility-administered medications for this visit. Objective:    Blood pressure 140/80, pulse 91, resp.  rate 17, height 5' 9" (1.753 m), weight 106 kg (232 lb 12.8 oz), last menstrual period 07/27/2022, SpO2 98 %, not currently breastfeeding. Body mass index is 34.38 kg/m². Body surface area is 2.21 meters squared. Physical Exam  Vitals reviewed. Exam conducted with a chaperone present. Constitutional:       General: She is not in acute distress. Appearance: Normal appearance. She is well-developed. She is not ill-appearing, toxic-appearing or diaphoretic. HENT:      Head: Normocephalic and atraumatic. Eyes:      General: No scleral icterus. Extraocular Movements: Extraocular movements intact. Conjunctiva/sclera: Conjunctivae normal.   Neck:      Thyroid: No thyromegaly. Pulmonary:      Effort: Pulmonary effort is normal.   Abdominal:      General: There is no distension. Palpations: Abdomen is soft. There is no mass. Tenderness: There is no abdominal tenderness. There is no guarding or rebound. Hernia: No hernia is present. Genitourinary:     Comments: The external female genitalia is normal. The bartholin's, uretheral and skenes glands are normal. The urethral meatus is normal (midline with no lesions). Anus without fissure or lesion. Speculum exam reveals a grossly normal vagina. The cervix is not visible. No masses, lesions,discharge or bleeding. No significant cystocele or rectocele noted. Bimanual exam notes a fixed pelvis. Tender to palpation    Musculoskeletal:         General: No swelling or tenderness. Cervical back: Normal range of motion and neck supple. Lymphadenopathy:      Cervical: No cervical adenopathy. Skin:     General: Skin is warm and dry. Coloration: Skin is not jaundiced or pale. Findings: No lesion or rash. Neurological:      General: No focal deficit present. Mental Status: She is alert and oriented to person, place, and time. Mental status is at baseline. Cranial Nerves: No cranial nerve deficit. Motor: No weakness.       Gait: Gait normal.   Psychiatric:         Mood and Affect: Mood normal.         Behavior: Behavior normal. Thought Content:  Thought content normal.         Judgment: Judgment normal.         No results found for: ""  Lab Results   Component Value Date    K 4.1 08/04/2023     08/04/2023    CO2 23 08/04/2023    BUN 10 08/04/2023    CREATININE 0.83 08/04/2023    CALCIUM 9.0 05/17/2023    CORRECTEDCA 9.0 01/02/2023    AST 12 08/04/2023    ALT 14 08/04/2023    ALKPHOS 64 01/02/2023    EGFR 95 08/04/2023     Lab Results   Component Value Date    WBC 4.0 08/04/2023    HGB 11.2 08/04/2023    HCT 35.0 08/04/2023    MCV 84 08/04/2023     08/04/2023     Lab Results   Component Value Date    NEUTROABS 2.8 08/04/2023        Trend:  No results found for: ""

## 2023-11-21 ENCOUNTER — TELEPHONE (OUTPATIENT)
Age: 33
End: 2023-11-21

## 2023-12-03 DIAGNOSIS — N95.1 MENOPAUSAL SYMPTOMS: ICD-10-CM

## 2023-12-03 RX ORDER — ESTRADIOL 0.04 MG/D
FILM, EXTENDED RELEASE TRANSDERMAL
Qty: 8 PATCH | Refills: 3 | Status: SHIPPED | OUTPATIENT
Start: 2023-12-03

## 2023-12-04 ENCOUNTER — ANESTHESIA (OUTPATIENT)
Dept: GASTROENTEROLOGY | Facility: HOSPITAL | Age: 33
End: 2023-12-04

## 2023-12-04 ENCOUNTER — ANESTHESIA EVENT (OUTPATIENT)
Dept: GASTROENTEROLOGY | Facility: HOSPITAL | Age: 33
End: 2023-12-04

## 2023-12-04 ENCOUNTER — HOSPITAL ENCOUNTER (OUTPATIENT)
Dept: GASTROENTEROLOGY | Facility: HOSPITAL | Age: 33
Setting detail: OUTPATIENT SURGERY
Discharge: HOME | End: 2023-12-04
Attending: COLON & RECTAL SURGERY | Admitting: COLON & RECTAL SURGERY
Payer: COMMERCIAL

## 2023-12-04 VITALS
HEIGHT: 69 IN | BODY MASS INDEX: 33.47 KG/M2 | DIASTOLIC BLOOD PRESSURE: 70 MMHG | RESPIRATION RATE: 16 BRPM | HEART RATE: 76 BPM | WEIGHT: 226 LBS | OXYGEN SATURATION: 97 % | TEMPERATURE: 97.7 F | SYSTOLIC BLOOD PRESSURE: 107 MMHG

## 2023-12-04 DIAGNOSIS — Z80.41 FAMILY HISTORY OF OVARIAN CANCER: ICD-10-CM

## 2023-12-04 DIAGNOSIS — K56.699 COLONIC STRICTURE (HCC): ICD-10-CM

## 2023-12-04 DIAGNOSIS — K62.7 RADIATION PROCTITIS: ICD-10-CM

## 2023-12-04 PROCEDURE — 45330 DIAGNOSTIC SIGMOIDOSCOPY: CPT | Performed by: COLON & RECTAL SURGERY

## 2023-12-04 RX ORDER — PROPOFOL 10 MG/ML
INJECTION, EMULSION INTRAVENOUS AS NEEDED
Status: DISCONTINUED | OUTPATIENT
Start: 2023-12-04 | End: 2023-12-04

## 2023-12-04 RX ORDER — MIDAZOLAM HYDROCHLORIDE 2 MG/2ML
INJECTION, SOLUTION INTRAMUSCULAR; INTRAVENOUS AS NEEDED
Status: DISCONTINUED | OUTPATIENT
Start: 2023-12-04 | End: 2023-12-04

## 2023-12-04 RX ORDER — SODIUM CHLORIDE 9 MG/ML
INJECTION, SOLUTION INTRAVENOUS CONTINUOUS PRN
Status: DISCONTINUED | OUTPATIENT
Start: 2023-12-04 | End: 2023-12-04

## 2023-12-04 RX ADMIN — MIDAZOLAM 2 MG: 1 INJECTION INTRAMUSCULAR; INTRAVENOUS at 08:36

## 2023-12-04 RX ADMIN — PROPOFOL 80 MG: 10 INJECTION, EMULSION INTRAVENOUS at 08:51

## 2023-12-04 RX ADMIN — SODIUM CHLORIDE: 0.9 INJECTION, SOLUTION INTRAVENOUS at 08:38

## 2023-12-04 NOTE — H&P
History and Physical   Colon and Rectal Surgery   Roula De Souza 35 y.o. female MRN: [id number]  Unit/Bed#:  Encounter: [id number]  12/04/23   @NOW    No chief complaint on file. History of Present Illness   HPI:  Roula De Souza is a 35 y.o. female who presents for follow-up for history of sigmoid colectomy for radiation-induced stricture. Historical Information   Past Medical History:   Diagnosis Date    Anxiety     Cancer ([address])     cervix    Cervical cancer ([address])     receiving chemo and radiation    COVID     Jan 2022    Depression     Diarrhea     Dizziness     Frequent headaches     Hx of bleeding disorder     vaginal bleeding    Obesity      Past Surgical History:   Procedure Laterality Date    CERVICAL BIOPSY  W/ LOOP ELECTRODE EXCISION      LYMPH NODE DISSECTION Bilateral 5/6/2022    Procedure: DISSECTION/STAGING LYMPH NODE PELVIS/ABDOMEN;  Surgeon: Obed Mckeon MD;  Location: [facility];  Service: Gynecology Oncology    MS INSERTION VAGINAL RADIATION DEVICE N/A 7/15/2022    Procedure: INSERTION NADIR SLEEVE VAGINA WITH POST OP BRACHYTHERAPY (IN RADIATION ONCOLOGY);   Surgeon: Obed Mckeon MD;  Location: [facility];  Service: Gynecology Oncology    MS LAPAROSCOPY COLECTOMY PARTIAL W/ANASTOMOSIS N/A 5/10/2023    Procedure: RESECTION COLON SIGMOID LAPAROSCOPIC;  Surgeon: Comfort Silveira MD;  Location: [facility];  Service: Colorectal    MS SIGMOIDOSCOPY FLX DX W/COLLJ SPEC BR/WA IF PFRMD N/A 5/10/2023    Procedure: Xavier Fish;  Surgeon: Comfort Silveira MD;  Location: [facility];  Service: Colorectal    SALPINGECTOMY Bilateral 5/6/2022    Procedure: SALPINGECTOMY, OVARIAN TRANSPOSITION;  Surgeon: Obed Mckeon MD;  Location: [facility];  Service: Gynecology Oncology    US GUIDANCE  7/15/2022       Meds/Allergies     (Not in a hospital admission)        Current Outpatient Medications:     busPIRone (BUSPAR) 7.5 mg tablet, TAKE 1 TABLET BY MOUTH 2 TIMES A DAY., Disp: 180 tablet, Rfl: 1    doxycycline hyclate (VIBRA-TABS) 100 mg tablet, , Disp: , Rfl:     estradiol (VIVELLE-DOT) 0.0375 MG/24HR, PLACE 1 PATCH ON THE SKIN 2 TIMES A WEEK., Disp: 8 patch, Rfl: 3    Misc. Devices (Kegel Toner Pelvic Trainer) MISC, Use 1 each if needed (for strengthening the pelvic floor muscles to treat incontinence and increased urgency), Disp: 1 each, Rfl: 0    No Known Allergies      Social History   Social History     Substance and Sexual Activity   Alcohol Use Not Currently     Social History     Substance and Sexual Activity   Drug Use Never     Social History     Tobacco Use   Smoking Status Never    Passive exposure: Current (very mild/social)   Smokeless Tobacco Never         Family History:   Family History   Problem Relation Age of Onset    Ovarian cancer Maternal Aunt     Ovarian cancer Maternal Grandmother     No Known Problems Mother     Heart disease Father          Objective     Current Vitals:      No intake or output data in the 24 hours ending 12/04/23 0809    Physical Exam:  General:  Resting comfortably in bed   Eyes:Sclera anicteric  ENT: Trachea midline  Pulm:  Symmetric chest raise. No respiratory Distress  CV:  Regular on monitor  Abdomen:  Soft NT ND  Extremities:  No clubbing/ cyanosis/ edema    Lab Results: I have personally reviewed pertinent lab results. Imaging: I have personally reviewed pertinent reports. ASSESSMENT:  Belén Ward is a 35 y.o. female who presents for outpatient sigmoidoscopy. PLAN:  For sigmoidoscopy    Risks/ Benefits reviewed to include but not limited to anesthesia, bleeding, missed lesions, and colonoscopic perforation requiring surgery.

## 2023-12-04 NOTE — ANESTHESIA PREPROCEDURE EVALUATION
Procedure:  FLEXIBLE SIGMOIDOSCOPY    Relevant Problems   GYN   (+) Malignant neoplasm of overlapping sites of cervix ([address])      NEURO/PSYCH   (+) Anxiety   (+) Depression     Denies anes problems  Denies CP/SOB  Post menopausal  Denies GERD  Some easy bruising    EKG 1/23  Narrative & Impression    Normal sinus rhythm  Normal ECG  No previous ECGs available  Confirmed by Sammie Winkler ([id number]) on 1/3/2023 12:21:25 PM     Cr 0.83  Hgb 11.2  Plt 295       Physical Exam    Airway    Mallampati score: I         Dental   No notable dental hx     Cardiovascular  Rhythm: regular, Cardiovascular exam normal    Pulmonary   Breath sounds clear to auscultation    Other Findings  post-pubertal.      Anesthesia Plan  ASA Score- 3     Anesthesia Type- IV sedation with anesthesia with ASA Monitors. Additional Monitors:     Airway Plan:            Plan Factors-Exercise tolerance (METS): >4 METS. Chart reviewed. EKG reviewed. Patient is not a current smoker. Induction- intravenous. Postoperative Plan-     Informed Consent- Anesthetic plan and risks discussed with patient. I personally reviewed this patient with the CRNA. Discussed and agreed on the Anesthesia Plan with the CRNA. Steve Odonnell

## 2023-12-04 NOTE — ANESTHESIA POSTPROCEDURE EVALUATION
Post-Op Assessment Note    CV Status:  Stable    Pain management: adequate       Mental Status:  Alert   Hydration Status:  Stable   PONV Controlled:  None   Airway Patency:  Patent     Post Op Vitals Reviewed: Yes    No anethesia notable event occurred.     Staff: Anesthesiologist               BP   108/56   Temp   97.7   Pulse  72   Resp   15   SpO2   99

## 2023-12-13 ENCOUNTER — EVALUATION (OUTPATIENT)
Dept: PHYSICAL THERAPY | Facility: CLINIC | Age: 33
End: 2023-12-13
Payer: COMMERCIAL

## 2023-12-13 DIAGNOSIS — R32 URINARY INCONTINENCE IN FEMALE: ICD-10-CM

## 2023-12-13 DIAGNOSIS — R19.8 ABNORMAL BOWEL HABITS: ICD-10-CM

## 2023-12-13 DIAGNOSIS — N94.19 DYSPAREUNIA DUE TO MEDICAL CONDITION IN FEMALE PATIENT: Primary | ICD-10-CM

## 2023-12-13 DIAGNOSIS — Z85.41 HISTORY OF CERVICAL CANCER: ICD-10-CM

## 2023-12-13 DIAGNOSIS — R10.84 GENERALIZED ABDOMINAL PAIN: ICD-10-CM

## 2023-12-13 DIAGNOSIS — R10.2 PELVIC PAIN: ICD-10-CM

## 2023-12-13 PROCEDURE — 97110 THERAPEUTIC EXERCISES: CPT

## 2023-12-13 PROCEDURE — 97163 PT EVAL HIGH COMPLEX 45 MIN: CPT

## 2023-12-13 PROCEDURE — 97140 MANUAL THERAPY 1/> REGIONS: CPT

## 2023-12-13 NOTE — PROGRESS NOTES
PT Evaluation     Today's date: 2023  Patient name: Belén Ward  : 1990  MRN: [id number]  Referring provider: Starla Butler DO  Dx:   Encounter Diagnosis     ICD-10-CM    1. Dyspareunia due to medical condition in female patient  N94.19 Ambulatory Referral to Physical Therapy      2. Pelvic pain  R10.2 Ambulatory Referral to Physical Therapy      3. Abnormal bowel habits  R19.8       4. Urinary incontinence in female  R32       5. Generalized abdominal pain  R10.84       6. History of cervical cancer  Z85.41           Start Time: 1530  Stop Time: 1632  Total time in clinic (min): 62 minutes    Assessment  Assessment details: The pt is a 35year old female who presents to skilled physical therapy services due to a decline in functional status related to cervical cancer, chemo/radiation treatments, and partial colectomy. Upon completion of the IE, the pt presents with impairments in pain (abdominal, vaginal, rectal), scar/fascial restrictions, abnormal bowel habits, fatigue, as well as bowel/bladder incontinence. As a result of these impairments, the pt has difficulty with the following functional activities: bowel function/continence, bladder function/continence, activity tolerance for ADLs/IADLs/work responsibilities, sexual intimacy, as well as poor tolerance with contact to the perineal region. POC will include manual therapy for flexibility/mobility and symptom modulation, modalities (PRN), TE/TA/NR interventions for functional strength and neuromuscular control, HEP, and pt education. The pt will continue to benefit from skilled physical therapy services to address impairments in order to return to her PLOF without limitations due to pain and/or urinary/fecal incontinence.   Impairments: abnormal muscle firing, abnormal or restricted ROM, activity intolerance, impaired physical strength, lacks appropriate home exercise program, pain with function and poor posture     Symptom irritability: moderateBarriers to therapy: PMH Cervical Cancer with treatment side-effects; Chronicity of symptoms; PMH of HPV  Understanding of Dx/Px/POC: good   Prognosis: good    Goals  - Pt will present with 50% (achieve within 8 week) and by at least 80% (achieve within 16 weeks) decrease discomfort with pelvic touch for improved tolerance to manual therapy and self-treatment techniques. - Pt will report 50% reduction (achieve within 8 week) and at least 80% (achieve within 16 weeks) reduction in discomfort with either palpation or intimacy in order to improve quality of life as well as to tolerate gynecological examination and/or intimacy. - Pt will be able to tolerate vaginal penetration with 0-2/10 pain for improved tolerance for gynecological examination and/or intercourse. - Pt is able to cough, laugh, and sneeze with 50% reduction (achieve within 8 week) and at least 90% (achieve within 16 weeks) reduction bladder leakage in order to improve quality of life and decrease need/dependency on panty liners. - Pt reports 50% reduction (achieve within 8 week) and at least 90% (achieve within 16 weeks) reduction in urinary incontinence indicating decreased effect of urinary incontinence on function.  - Pt has implemented urge suppression strategies throughout the day and reports average voiding interval is 3-4 hours upon discharge in order to have more functional bladder functioning.  - Decrease nocturia to 1-2 voids/night for more thorough sleep.      Plan  Patient would benefit from: skilled physical therapy  Referral necessary: Yes  Planned therapy interventions: abdominal trunk stabilization, joint mobilization, manual therapy, body mechanics training, breathing training, neuromuscular re-education, patient education, postural training, self care, strengthening, stretching, flexibility, functional ROM exercises, therapeutic activities, therapeutic exercise and home exercise program  Frequency: 1x week  Duration in visits: 16  Plan of Care beginning date: 12/13/2023  Plan of Care expiration date: 4/3/2024  Treatment plan discussed with: patient      PT Pelvic Floor Subjective:   History of Present Illness:   - Has not had sex for 1 year  - physician wants her to consider self catheter due to incomplete emptying of bladder with urination     - Bowel: urgency; occational incontinence  (after radiation -- 1/3 colon removed in 05/2023); nausea (intermittent, no consistent pattern); abdominal pain (lower abdominal - central, daily - particularly in morning when wake up)    Date of onset: 6/1/2022  Mechanism of injury: surgery and trauma    Chemo/radiation (internal/external) treatment for cervical cancer: June 2022 - August 2022        Recurrent probem      Social Support:     Lives with:  Significant other    Relationship status: domestic partnership    Work status: employed full time and employed part time (Full-time: ; [employer]: ; Volunteering: EMT)    Life stress level: 6    History of Depression: yes  Diet and Exercise:    Diet:balanced nutrition    [employer] Exercise Classes    Exercise frequency: 2-3 times per week    Not exercising due to: lack of time and bladder incontinence  OB/ gyn History    Gestational History:     Prior Pregnancy: No      Menstrual History:      Menopausal: menopause    Birth control: no contraception  hormone replacement therapyForm of hormone replacement therapy: patch  Bladder Function:     Voiding Difficulties positive for: urgency, frequent urination, hesitancy, straining and incomplete emptying       Voiding Difficulties comments:     Voiding frequency: every 1-2 hours    Urinary leakage: urine leakage    Urinary leakage aggravated by: coughing, sneezing, bending, exercise, standing up, walking to the bathroom, key-in-the-door syndrome, post-void dribble and laughing    Urinary leakage not aggravated by: hearing running water and seeing a toilet    Nocturia (episodes per night): 3 and 4    Painful urination: Yes (Intermittent)      Fluid Intake Type: Water and coffee    Intake (ounces): Coffee: 12,     Intake (ounces) comment: If not drinking enough water - severe pain with urination  Incontinence Management:     Pads/Diaper Use:  24 hours and dayTried Melba-fit (too painful) : Bowel Function:     Voiding DIfficulties: urgency, stool frequency abnormal, painful defecating, unfinished feeling after defecating and constipation      Bowel frequency: multiple times a day and every 3 days    Stool softener use: no stool softeners    Uses "squatty potty": no Squatty Potty  Sexual Function:     Sexually Active:  Not sexually active (Has not been sexually active for approximately 1 year due to chemo/radiation and diagnosis of HPV)    pain causes abstinenceSexual function: vulvar pain and vaginal painPt was informed by her physician that HPV lead to cancer:  Pain:     Current pain ratin    At best pain ratin    At worst pain ratin    Onset:  1-2 years ago    Quality:  Burning, dull ache, cramping and numbness    Aggravating factors:   Bowel movements, intercourse, walking and urination (Frequent breaks at work to sit)    Duration of symptoms:  Does not go away    Relieving factors:  Change in position    Progression:  Worsening  Treatments:     Previous treatment:  Medication (Chemo; Radiation)    Current treatment: physical therapy    Patient Goals:     Patient goals for therapy:  Improved pain management, improved quality of life, relaxation, improved comfort, improved bladder or bowel function, fully empty bladder or bowels, decreased pain and decreased interpersonal problems    Other patient goals:  Avoid self catheterization      Objective     Strength/Myotome Testing     Left Hip   Planes of Motion   Flexion: 4 (Pain - anterior thigh)    Right Hip   Planes of Motion   Flexion: 4 (Pain - Anterior thigh)    Left Knee   Flexion: 4+  Extension: 5    Right Knee   Flexion: 4+  Extension: 5    Left Ankle/Foot   Dorsiflexion: 5    Right Ankle/Foot   Dorsiflexion: 5      Abdominal Assessment:      Position: supine exam    Diastatis   Diastasis recti present? no     Skin inspection:   scars present. Number of scars: 4  Scar 1 location: Anterior lower abdomen. Sensitivity level low Restriction level medium   Scar 2 location: Small scars at R/L/Central abdomen s/p colectectomy, salpinectomy, lymph node dissection, sigmoidoscopy.  Sensitivity level low Restriction level medium       General Perineum Exam:     General perineum exam comments: Deferred perineal and internal vaginal/rectal exams to follow-up appointments       Pelvic Floor Muscle Exam:             pelvic floor exam consent given by patient       Graphical documentation:     Pt was informed by her physician that HPV lead to cancer             Precautions: PMH: HPV, Chemo/Radiation (internal/external) treatments in 2022 to treat cervical cancer, Partial Colectomy (05/10/2023)       12/13            Manuals             Fascial mobilization KS                                                   Neuro Re-Ed             Kegel/PF Contraction Edu             TA Contraction Ed             Kegel - Hook-lying                                                                 Ther Ex                                                    POC; PF Physical therapy KS            Anatomy/physiology as it relates to pt's symptoms/presentation KS            Address pt's questions PRN KS            Examination findings KS            HEP KS                         Ther Activity                                       Gait Training                                       Modalities

## 2023-12-20 ENCOUNTER — OFFICE VISIT (OUTPATIENT)
Dept: PHYSICAL THERAPY | Facility: CLINIC | Age: 33
End: 2023-12-20
Payer: COMMERCIAL

## 2023-12-20 DIAGNOSIS — R10.2 PELVIC PAIN: ICD-10-CM

## 2023-12-20 DIAGNOSIS — R19.8 ABNORMAL BOWEL HABITS: ICD-10-CM

## 2023-12-20 DIAGNOSIS — R32 URINARY INCONTINENCE IN FEMALE: ICD-10-CM

## 2023-12-20 DIAGNOSIS — Z85.41 HISTORY OF CERVICAL CANCER: ICD-10-CM

## 2023-12-20 DIAGNOSIS — N94.19 DYSPAREUNIA DUE TO MEDICAL CONDITION IN FEMALE PATIENT: Primary | ICD-10-CM

## 2023-12-20 DIAGNOSIS — R10.84 GENERALIZED ABDOMINAL PAIN: ICD-10-CM

## 2023-12-20 PROCEDURE — 97110 THERAPEUTIC EXERCISES: CPT

## 2023-12-20 PROCEDURE — 97140 MANUAL THERAPY 1/> REGIONS: CPT

## 2023-12-20 NOTE — PROGRESS NOTES
"Daily Note     Today's date: 2023  Patient name: Margot Moody  : 1990  MRN: [id number]  Referring provider: Demi Mandel DO  Dx:   Encounter Diagnosis     ICD-10-CM    1. Dyspareunia due to medical condition in female patient  N94.19       2. Generalized abdominal pain  R10.84       3. History of cervical cancer  Z85.41       4. Pelvic pain  R10.2       5. Abnormal bowel habits  R19.8       6. Urinary incontinence in female  R32           Start Time: 1546  Stop Time: 1630  Total time in clinic (min): 44 minutes    Subjective: Pt reports abdominal pain everyday. She notes that she had inconsistencies with medications (Doxycyclin and probiotic) over the past week due to supply concerns and returned to prior medication routine as of yesterday. Pt reports 6/10-7/10 pain in lower abdomen upon arrival. -- \"A little bit\" of urinary leakage this week. She guesstimates urinary incontinence 7 times daily        Objective: See treatment diary below      Assessment: The pt participated in a physical therapy treatment session that focused on manual intervention and pt education. With pt consent, internal pelvic floor treatment was performed. During internal manual assessment/STM, she presented with 2/5 MMT strength of pelvic floor musculature with contraction and significant difficulty/delay of relaxation of pelvic floor musculature. Pt denies pain/tenderness with internal vaginal examination. Fascial and scar mobilization were provided to address the pt's chronic symptoms of abdominal pain. She presented with significant tissue restrictions in all planes (superior, inferior, R/L laterally) throughout the scar along the lower abdomen/anterior pelvis. Considering the pt's presentation, it is likely that fascial restrictions are contributing the pt's symptoms of centralized lower abdominal pain and urinary incontinence via increased intra-abdominal/pelvic pressure. She tolerated treatment well. The pt would " benefit from continued PT to address impairments in order to improve her quality of life, bowel/bladder function, urinary continence, as well as to decrease symptoms pf chronic pain.      Plan: Continue per plan of care.      Precautions: PMH: HPV, Chemo/Radiation (internal/external) treatments in 2022 to treat cervical cancer, Partial Colectomy (05/10/2023)       12/13 12/20           Manuals             Fascial mobilization KS KS           Scar mobilization  KS           Abdominal massage  KS           STM - Internal Vaginal  KS                        Neuro Re-Ed             Kegel/PF Contraction Edu             TA Contraction Ed             Thiago - Hook-lying                                                                 Ther Ex                                                    POC; PF Physical therapy KS KS           Anatomy/physiology as it relates to pt's symptoms/presentation KS KS           Address pt's questions PRN KS KS           Examination findings KS KS           HEP KS                         Ther Activity                                       Gait Training                                       Modalities

## 2023-12-27 ENCOUNTER — APPOINTMENT (OUTPATIENT)
Dept: PHYSICAL THERAPY | Facility: CLINIC | Age: 33
End: 2023-12-27
Payer: COMMERCIAL

## 2024-01-03 ENCOUNTER — OFFICE VISIT (OUTPATIENT)
Dept: PHYSICAL THERAPY | Facility: CLINIC | Age: 34
End: 2024-01-03
Payer: COMMERCIAL

## 2024-01-03 DIAGNOSIS — R10.84 GENERALIZED ABDOMINAL PAIN: ICD-10-CM

## 2024-01-03 DIAGNOSIS — Z85.41 HISTORY OF CERVICAL CANCER: ICD-10-CM

## 2024-01-03 DIAGNOSIS — R19.8 ABNORMAL BOWEL HABITS: ICD-10-CM

## 2024-01-03 DIAGNOSIS — R10.2 PELVIC PAIN: ICD-10-CM

## 2024-01-03 DIAGNOSIS — R32 URINARY INCONTINENCE IN FEMALE: ICD-10-CM

## 2024-01-03 DIAGNOSIS — N94.19 DYSPAREUNIA DUE TO MEDICAL CONDITION IN FEMALE PATIENT: Primary | ICD-10-CM

## 2024-01-03 PROCEDURE — 97110 THERAPEUTIC EXERCISES: CPT

## 2024-01-03 PROCEDURE — 97140 MANUAL THERAPY 1/> REGIONS: CPT

## 2024-01-03 NOTE — PROGRESS NOTES
Daily Note     Today's date: 1/3/2024  Patient name: Margot Moody  : 1990  MRN: [id number]  Referring provider: Demi Mandel DO  Dx:   Encounter Diagnosis     ICD-10-CM    1. Dyspareunia due to medical condition in female patient  N94.19       2. Abnormal bowel habits  R19.8       3. Urinary incontinence in female  R32       4. Generalized abdominal pain  R10.84       5. Pelvic pain  R10.2       6. History of cervical cancer  Z85.41           Start Time: 1633  Stop Time: 1717  Total time in clinic (min): 44 minutes    Subjective: The pt reports that the past few weeks have been difficult. She explains that she had a lot of changes in her routine and an increase in stress over the holidays. She took some time off of work and traveled to see family. She notes blood (bright red) in her stool with changes in her diet as well as constant abdominal pain (5/10-6/10 pain).     Objective: See treatment diary below      Assessment: The pt participated in a physical therapy treatment session that focused on manual intervention and relaxation of the PF musculature. The pt presented with fascial restrictions within the lower abdominal and anterior pelvis bilaterally. Deep issue restrictions were also noted at scar tissue along the lower abdomen inferior to the navel. Fascia and scar tissue were treated with manual intervention in order to decrease intra-abdominal pressures caused by tissue restrictions. Stretches were introduced to decreased tension within the PF musculature. She tolerated treatment well. The pt would benefit from continued PT to address impairments in order to improve her quality of life and bladder function/continence as well as to decrease pain.      Plan: Continue per plan of care.  Progress treatment as tolerated.       Precautions: PMH: HPV, Chemo/Radiation (internal/external) treatments in  to treat cervical cancer, Partial Colectomy (05/10/2023)                "  Manuals             Fascial mobilization KS KS KS          Scar mobilization  KS KS          Abdominal massage  KS KS          STM - Internal Vaginal  KS                        Neuro Re-Ed             Kegel/PF Contraction Edu             TA Contraction Ed             Kegel - Hook-lying                                                                 Ther Ex             Happy Baby Stretch   Seated: 2x30\"          Butterfly Stretch   2x30\"                       POC; PF Physical therapy KS KS           Anatomy/physiology as it relates to pt's symptoms/presentation KS KS KS          Address pt's questions PRN KS KS KS          Examination findings KS KS           HEP KS            Update on pt' symptoms/status   KS                       Ther Activity                                       Gait Training                                       Modalities                                              "

## 2024-01-10 ENCOUNTER — OFFICE VISIT (OUTPATIENT)
Dept: PHYSICAL THERAPY | Facility: CLINIC | Age: 34
End: 2024-01-10
Payer: COMMERCIAL

## 2024-01-10 DIAGNOSIS — Z85.41 HISTORY OF CERVICAL CANCER: ICD-10-CM

## 2024-01-10 DIAGNOSIS — R10.84 GENERALIZED ABDOMINAL PAIN: ICD-10-CM

## 2024-01-10 DIAGNOSIS — N94.19 DYSPAREUNIA DUE TO MEDICAL CONDITION IN FEMALE PATIENT: Primary | ICD-10-CM

## 2024-01-10 DIAGNOSIS — R10.2 PELVIC PAIN: ICD-10-CM

## 2024-01-10 DIAGNOSIS — R32 URINARY INCONTINENCE IN FEMALE: ICD-10-CM

## 2024-01-10 DIAGNOSIS — R19.8 ABNORMAL BOWEL HABITS: ICD-10-CM

## 2024-01-10 PROCEDURE — 97140 MANUAL THERAPY 1/> REGIONS: CPT

## 2024-01-10 PROCEDURE — 97535 SELF CARE MNGMENT TRAINING: CPT

## 2024-01-10 PROCEDURE — 97110 THERAPEUTIC EXERCISES: CPT
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
## 2024-01-16 NOTE — PROGRESS NOTES
PT Progress Report    Today's date: 2024  Patient name: Margot Moody  : 1990  MRN: [id number]  Referring provider: Demi Mandel DO  Dx:   Encounter Diagnosis     ICD-10-CM    1. Dyspareunia due to medical condition in female patient  N94.19       2. Pelvic pain  R10.2       3. Generalized abdominal pain  R10.84       4. Urinary incontinence in female  R32       5. Abnormal bowel habits  R19.8       6. History of cervical cancer  Z85.41             Start Time: 1548  Stop Time: 1632  Total time in clinic (min): 44 minutes    Assessment  Assessment details: The pt is a 33 year old female who presents to skilled physical therapy services due to a decline in functional status related to cervical cancer, chemo/radiation treatments, and partial colectomy. At this point in her POC, she has received 5 skilled therapy visits. Upon completion of the CO, the pt presents with improvements in quantity of urinary leakage, daily pad use, nocturia (decrease from 3-4 to 2-3), as well as decreased sensitivity with STM/scar massage to abdomen as noted by subjective report and physical assessment. A 2 point decrease (IE: 16; CO:14) in her Vulvar Pain Functional Questionnaire also reflects a decrease in symptom severity. She continues to present with impairments in pain (abdominal, vaginal, rectal), scar/fascial restrictions, abnormal bowel habits, fatigue, as well as bowel/bladder incontinence. As a result of these impairments, the pt has difficulty with the following functional activities: bowel function/continence, bladder function/continence, activity tolerance for ADLs/IADLs/work responsibilities, sexual intimacy, as well as poor tolerance with contact to the perineal region. POC will include manual therapy for flexibility/mobility and symptom modulation, modalities (PRN), TE/TA/NR interventions for functional strength and neuromuscular control, HEP, and pt education. The pt will continue to benefit from  skilled physical therapy services to address impairments in order to return to her PLOF without limitations due to pain and/or urinary/fecal incontinence.    Thank you for the referral.  Impairments: abnormal muscle firing, abnormal or restricted ROM, activity intolerance, impaired physical strength, lacks appropriate home exercise program, pain with function and poor posture     Symptom irritability: moderateBarriers to therapy: PMH Cervical Cancer with treatment side-effects; Chronicity of symptoms; PMH of HPV  Understanding of Dx/Px/POC: good   Prognosis: good    Goals  - Pt will present with 50% (achieve within 8 week) and by at least 80% (achieve within 16 weeks) decrease discomfort with pelvic touch for improved tolerance to manual therapy and self-treatment techniques.  - Pt will report 50% reduction (achieve within 8 week) and at least 80% (achieve within 16 weeks) reduction in discomfort with either palpation or intimacy in order to improve quality of life as well as to tolerate gynecological examination and/or intimacy.  - Pt will be able to tolerate vaginal penetration with 0-2/10 pain for improved tolerance for gynecological examination and/or intercourse.  - Pt is able to cough, laugh, and sneeze with 50% reduction (achieve within 8 week) and at least 90% (achieve within 16 weeks) reduction bladder leakage in order to improve quality of life and decrease need/dependency on panty liners.  - Pt reports 50% reduction (achieve within 8 week) and at least 90% (achieve within 16 weeks) reduction in urinary incontinence indicating decreased effect of urinary incontinence on function.  - Pt has implemented urge suppression strategies throughout the day and reports average voiding interval is 3-4 hours upon discharge in order to have more functional bladder functioning.  - Decrease nocturia to 1-2 voids/night for more thorough sleep.     Plan  Patient would benefit from: skilled physical therapy  Referral  "necessary: Yes  Planned therapy interventions: abdominal trunk stabilization, joint mobilization, manual therapy, body mechanics training, breathing training, neuromuscular re-education, patient education, postural training, self care, strengthening, stretching, flexibility, functional ROM exercises, therapeutic activities, therapeutic exercise and home exercise program  Frequency: 1x week  Duration in visits: 16  Plan of Care beginning date: 12/13/2023  Plan of Care expiration date: 4/3/2024  Treatment plan discussed with: patient      PT Pelvic Floor Subjective:   History of Present Illness:     PROGRESS REPORT:   Regarding leakage, \"a little less.\" Pt notes a decreased use of pads over the past 2 weeks, since she returned from [place]. -- Pt notes an improvement in flow with urinating. \"Before I was like straining to pee.\"      INITIAL EVALUATION:  - Has not had sex for 1 year  - physician wants her to consider self catheter due to incomplete emptying of bladder with urination     - Bowel: urgency; occational incontinence  (after radiation -- 1/3 colon removed in 05/2023); nausea (intermittent, no consistent pattern); abdominal pain (lower abdominal - central, daily - particularly in morning when wake up)    Date of onset: 6/1/2022  Mechanism of injury: surgery and trauma    Chemo/radiation (internal/external) treatment for cervical cancer: June 2022 - August 2022        Recurrent probem      Social Support:     Lives with:  Significant other    Relationship status: domestic partnership    Work status: employed full time and employed part time (Full-time: ; [employer]: ; Volunteering: EMT)    Life stress level: 6    History of Depression: yes  Diet and Exercise:    Diet:balanced nutrition    Group Exercise Classes    Exercise frequency: 2-3 times per week    Recently not had time for exercise due to physician appointments and work    Not exercising due to: lack of time and bladder " "incontinence  OB/ gyn History    Gestational History:     Prior Pregnancy: No      Menstrual History:      Menopausal: menopause    Birth control: no contraception  hormone replacement therapyForm of hormone replacement therapy: patch  Bladder Function:     Voiding Difficulties positive for: urgency, frequent urination and incomplete emptying      Voiding Difficulties negative for: hesitancy and straining (\"Not like I used to\")       Voiding Difficulties comments:     Voiding frequency: every 1-2 hours    Urinary leakage: urine leakage (Difficult to clarify frequency, but few drops throughout day)    Urinary leakage aggravated by: coughing, sneezing, bending, exercise, standing up, walking to the bathroom, hearing running water, key-in-the-door syndrome, post-void dribble and laughing    Urinary leakage not aggravated by: seeing a toilet    Nocturia (episodes per night): 3 and 2    Painful urination: Yes (IA: \"Just burning\" \"Minor\" decrease over past 2 weeks -- IE: Intermittent)      Fluid Intake Type:  Water and coffee    Intake (ounces): Water: 80, Coffee: 12,     Intake (ounces) comment: If not drinking enough water - severe pain with urination      Incontinence Management:     Pads/Diaper Use:  24 hours and dayTried Melba-fit (too painful) :  Bowel Function:     Voiding DIfficulties: stool frequency abnormal, painful defecating, unfinished feeling after defecating and constipation      Bowel Function comments:  Pt not eating regularly -- Pt does not eat at work -- Pain/nausea with eating and activity    Bowel frequency: more than every 4 days and every 3 days (1-2x/week over past 2 weeks)    Stool softener use: no stool softeners    Uses \"squatty potty\": no Squatty Potty  Sexual Function:     Sexually Active:  Not sexually active (Has not been sexually active for approximately 1 year due to chemo/radiation and diagnosis of HPV)    pain causes abstinenceSexual function: vulvar pain and vaginal painPt was informed " by her physician that HPV lead to cancer    -- Pain with physician examinations:  Pain:     Current pain ratin    At best pain ratin    At worst pain ratin    Location:  Abdomen: 4/10 pain currently; Low back: 6/10; Pelvis: 3/10-4/10 pain (constant burning unless she does not think about it)    Onset:  1-2 years ago    Quality:  Burning, dull ache, cramping and numbness (Numbness: stomach and down left leg from prior surgeries)    Aggravating factors:  Bowel movements, intercourse, walking and urination (Frequent breaks at work to sit)    Duration of symptoms:  Does not go away    Relieving factors:  Change in position  Treatments:     Previous treatment:  Medication (Chemo; Radiation)    Current treatment: medication and physical therapy    Patient Goals:     Patient goals for therapy:  Improved pain management, improved quality of life, relaxation, improved comfort, improved bladder or bowel function, fully empty bladder or bowels, decreased pain and decreased interpersonal problems    Other patient goals:  Avoid self catheterization      Objective     Strength/Myotome Testing     Left Hip   Planes of Motion   Flexion: 4 (Pain - anterior thigh)    Right Hip   Planes of Motion   Flexion: 4 (Pain - Anterior thigh)    Left Knee   Flexion: 4+  Extension: 5    Right Knee   Flexion: 4+  Extension: 5    Left Ankle/Foot   Dorsiflexion: 5    Right Ankle/Foot   Dorsiflexion: 5      Abdominal Assessment:      Position: supine exam    Diastatis   Diastasis recti present? no     Skin inspection:   scars present.   Scar 1 location: Anterior lower abdomen - 2 long horizontal scars. Sensitivity level medium Restriction level high   Scar 2 location: 3 Small scars at R/L/Central abdomen s/p colectectomy, salpinectomy, lymph node dissection, sigmoidoscopy. Sensitivity level low Restriction level low       General Perineum Exam:     General perineum exam comments: Deferred perineal and internal vaginal/rectal exams to  "follow-up appointments       Pelvic Floor Muscle Exam:             pelvic floor exam consent given by patient       Graphical documentation:     Pt was informed by her physician that HPV lead to cancer    -- Pain with physician examinations             Precautions: PMH: HPV, Chemo/Radiation (internal/external) treatments in 2022 to treat cervical cancer, Partial Colectomy (05/10/2023)       12/13 12/20 01/03 01/10 1/17        Manuals             Fascial mobilization KS KS KS KS KS        Scar mobilization  KS KS  KS        Abdominal massage  KS KS          STM - Internal Vaginal  KS           STM    KS (Back, posterior pelvis) KS (UT; Shoulders - tension in upper trunk impacting PF)                     Neuro Re-Ed             Kegel/PF Contraction Edu             TA Contraction Ed             Kegel - Hook-lying                                                                 Ther Ex             Happy Baby Stretch   Seated: 2x30\"          Butterfly Stretch   2x30\"                       POC; PF Physical therapy KS KS   KS        Anatomy/physiology as it relates to pt's symptoms/presentation KS KS KS  KS        Address pt's questions PRN KS KS KS KS KS        Examination findings KS KS   KS        HEP KS   KS         Update on pt' symptoms/status   KS KS KS                     Pt Edu regarding stress management/relaxation    KS KS                     Ther Activity             PF Questionnaires     KS        MS: Update on pt's symptoms/status/function     KS                     Gait Training                                       Modalities                                            "

## 2024-01-17 ENCOUNTER — OFFICE VISIT (OUTPATIENT)
Dept: PHYSICAL THERAPY | Facility: CLINIC | Age: 34
End: 2024-01-17
Payer: COMMERCIAL

## 2024-01-17 DIAGNOSIS — N94.19 DYSPAREUNIA DUE TO MEDICAL CONDITION IN FEMALE PATIENT: Primary | ICD-10-CM

## 2024-01-17 DIAGNOSIS — Z85.41 HISTORY OF CERVICAL CANCER: ICD-10-CM

## 2024-01-17 DIAGNOSIS — R32 URINARY INCONTINENCE IN FEMALE: ICD-10-CM

## 2024-01-17 DIAGNOSIS — R10.84 GENERALIZED ABDOMINAL PAIN: ICD-10-CM

## 2024-01-17 DIAGNOSIS — R19.8 ABNORMAL BOWEL HABITS: ICD-10-CM

## 2024-01-17 DIAGNOSIS — R10.2 PELVIC PAIN: ICD-10-CM

## 2024-01-17 PROCEDURE — 97110 THERAPEUTIC EXERCISES: CPT

## 2024-01-17 PROCEDURE — 97140 MANUAL THERAPY 1/> REGIONS: CPT

## 2024-01-17 PROCEDURE — 97530 THERAPEUTIC ACTIVITIES: CPT

## 2024-01-24 ENCOUNTER — OFFICE VISIT (OUTPATIENT)
Dept: PHYSICAL THERAPY | Facility: CLINIC | Age: 34
End: 2024-01-24
Payer: COMMERCIAL

## 2024-01-24 DIAGNOSIS — R19.8 ABNORMAL BOWEL HABITS: ICD-10-CM

## 2024-01-24 DIAGNOSIS — R32 URINARY INCONTINENCE IN FEMALE: ICD-10-CM

## 2024-01-24 DIAGNOSIS — R10.84 GENERALIZED ABDOMINAL PAIN: ICD-10-CM

## 2024-01-24 DIAGNOSIS — Z85.41 HISTORY OF CERVICAL CANCER: ICD-10-CM

## 2024-01-24 DIAGNOSIS — R10.2 PELVIC PAIN: ICD-10-CM

## 2024-01-24 DIAGNOSIS — N94.19 DYSPAREUNIA DUE TO MEDICAL CONDITION IN FEMALE PATIENT: Primary | ICD-10-CM

## 2024-01-24 PROCEDURE — 97140 MANUAL THERAPY 1/> REGIONS: CPT

## 2024-01-24 PROCEDURE — 97110 THERAPEUTIC EXERCISES: CPT

## 2024-01-24 PROCEDURE — 97530 THERAPEUTIC ACTIVITIES: CPT

## 2024-01-24 NOTE — PROGRESS NOTES
"Daily Note     Today's date: 2024  Patient name: Margot Moody  : 1990  MRN: [id number]  Referring provider: Demi Mandel DO  Dx:   Encounter Diagnosis     ICD-10-CM    1. Dyspareunia due to medical condition in female patient  N94.19       2. Pelvic pain  R10.2       3. History of cervical cancer  Z85.41       4. Generalized abdominal pain  R10.84       5. Abnormal bowel habits  R19.8       6. Urinary incontinence in female  R32           Start Time: 1546  Stop Time: 1630  Total time in clinic (min): 44 minutes    Subjective: Pt notes that the pain in her stomach has not been as bad this past week. -- Pt denies practicing breathing this past week. \"I don't really have much time to do anything.\" Pt explains that she is typically busy with work, meal prep, and medical appointments.       Objective: See treatment diary below      Assessment: The pt participated in a skilled physical therapy treatment session that focused on manual intervention and pt education. Pt was educated on use types, use, and sizes of dilators to address vulvar/vaginal pain with insertion and tolerance for penetration. She verbalized good understanding and was informed that, if she preferred, could bring dilators to future sessions to address any additional questions and treatment techniques. Fascial and scar mobilization were used to address scars and tissue adhesions at the lower abdominal/anterior-superior pelvic region. Improved tolerance with touch/mobilization was noted and pt reported decreased pain/sensitivity. She tolerated treatment well. The pt would benefit from continued PT to address impairments in order to improve her quality of life, bladder/bowel function, and return to her PLOF without limitations due to pain.      Plan: Continue per plan of care.      Precautions: PMH: HPV, Chemo/Radiation (internal/external) treatments in  to treat cervical cancer, Partial Colectomy (05/10/2023)        " "01/03 01/10 1/17 1/24       Manuals             Fascial mobilization KS KS KS KS KS KS       Scar mobilization  KS KS  KS KS       Abdominal massage  KS KS          STM - Internal Vaginal  KS           STM    KS (Back, posterior pelvis) KS (UT; Shoulders - tension in upper trunk impacting PF)                     Neuro Re-Ed             Kegel/PF Contraction Edu             TA Contraction Ed             Kegel - Hook-lying                                                                 Ther Ex             Happy Baby Stretch   Seated: 2x30\"          Butterfly Stretch   2x30\"                       POC; PF Physical therapy KS KS   KS KS       Anatomy/physiology as it relates to pt's symptoms/presentation KS KS KS  KS KS       Address pt's questions PRN KS KS KS KS KS KS       Examination findings KS KS   KS KS       HEP KS   KS  KS       Update on pt' symptoms/status   KS KS KS KS                    Pt Edu regarding stress management/relaxation    KS KS KS                    Ther Activity             PF Questionnaires     KS        SC: Update on pt's symptoms/status/function     KS        Pt edu: Use of dilators for symptom management      KS       Pt edu: Pelvic floor tools for home treatment/symptoms mangement      KS                    Gait Training                                       Modalities                                            "

## 2024-01-31 ENCOUNTER — OFFICE VISIT (OUTPATIENT)
Dept: PHYSICAL THERAPY | Facility: CLINIC | Age: 34
End: 2024-01-31
Payer: COMMERCIAL

## 2024-01-31 DIAGNOSIS — R32 URINARY INCONTINENCE IN FEMALE: ICD-10-CM

## 2024-01-31 DIAGNOSIS — Z85.41 HISTORY OF CERVICAL CANCER: ICD-10-CM

## 2024-01-31 DIAGNOSIS — N94.19 DYSPAREUNIA DUE TO MEDICAL CONDITION IN FEMALE PATIENT: Primary | ICD-10-CM

## 2024-01-31 DIAGNOSIS — R10.84 GENERALIZED ABDOMINAL PAIN: ICD-10-CM

## 2024-01-31 DIAGNOSIS — R10.2 PELVIC PAIN: ICD-10-CM

## 2024-01-31 DIAGNOSIS — R19.8 ABNORMAL BOWEL HABITS: ICD-10-CM

## 2024-01-31 PROCEDURE — 97530 THERAPEUTIC ACTIVITIES: CPT

## 2024-01-31 PROCEDURE — 97140 MANUAL THERAPY 1/> REGIONS: CPT

## 2024-01-31 PROCEDURE — 97110 THERAPEUTIC EXERCISES: CPT

## 2024-01-31 NOTE — PROGRESS NOTES
"Daily Note     Today's date: 2024  Patient name: Margot Moody  : 1990  MRN: [id number]  Referring provider: Demi Mandel DO  Dx:   Encounter Diagnosis     ICD-10-CM    1. Dyspareunia due to medical condition in female patient  N94.19       2. Abnormal bowel habits  R19.8       3. Pelvic pain  R10.2       4. Urinary incontinence in female  R32       5. Generalized abdominal pain  R10.84       6. History of cervical cancer  Z85.41           Start Time: 1550  Stop Time: 1630  Total time in clinic (min): 40 minutes    Subjective: The pt notes that she started drinking aloe vera past 2-3 day. Pt notes some decrease in abdominal pain, but adds that she is not sure whether or not it is placebo. -- Stress: \"about the same.\" Pt notes that she stopped using the estrogen patches 2-3 weeks ago to help manage styes on eyelids. Pt notes difficulty sleeping at night and intermittent hot flashes since she stopped using the estrogen patches. -- Pt notes broken sleep partly as a result of hot flashes.     MRI in 2023: mild inflammation of posterior wall of bladder       Objective: See treatment diary below      Assessment: The pt participated in a skilled physical therapy treatment session that focused on manual intervention and pt education. Additional pt education was provided to address questions regarding use of dilators to treat dyspareunia and scar tissue s/p radiation. In response to the pt's report of stress/tension within the neck and shoulders (chronic), manual therapy was provided to address impairments to aid in stress management. It remains important to further address muscle tension within the neck and shoulders since intra-thoracic tension can effect intra-abdominal and pelvic floor tension/function. Postural abnormalities (rounded, elevated shoulders) can contribute to pressure further down the trunk and into the pelvic floor. This additional pressure from above impacts the pressure within the " "lower trunk/pelvis and is likely contributing to impairments in pelvic floor function. She tolerated treatment well. The pt would benefit from continued PT to address impairments in order to improve her quality of life, bladder/bowel function, and return to her PLOF without limitations due to pain.       Plan: Continue per plan of care.      Precautions: PMH: HPV, Chemo/Radiation (internal/external) treatments in 2022 to treat cervical cancer, Partial Colectomy (05/10/2023)       12/13 12/20 01/03 01/10 1/17 1/24 1/31      Manuals             Fascial mobilization KS KS KS KS KS KS       Scar mobilization  KS KS  KS KS       Abdominal massage  KS KS          STM - Internal Vaginal  KS           STM    KS (Back, posterior pelvis) KS (UT; Shoulders - tension in upper trunk impacting PF)  KS (UT; Shoulders - tension in upper trunk impacting PF)                   Neuro Re-Ed             Kegel/PF Contraction Edu             TA Contraction Ed             Kegel - Hook-lying                                                                 Ther Ex             Happy Baby Stretch   Seated: 2x30\"          Butterfly Stretch   2x30\"                       POC; PF Physical therapy KS KS   KS KS       Anatomy/physiology as it relates to pt's symptoms/presentation KS KS KS  KS KS KS      Address pt's questions PRN KS KS KS KS KS KS KS      Examination findings KS KS   KS KS       HEP KS   KS  KS KS      Update on pt' symptoms/status   KS KS KS KS KS                   Pt Edu regarding stress management/relaxation    KS KS KS KS                   Ther Activity             PF Questionnaires     KS        GA: Update on pt's symptoms/status/function     KS        Pt edu: Use of dilators for symptom management      KS KS      Pt edu: Pelvic floor tools for home treatment/symptoms mangement      KS                    Gait Training                                       Modalities                                              "

## 2024-02-07 ENCOUNTER — APPOINTMENT (OUTPATIENT)
Dept: PHYSICAL THERAPY | Facility: CLINIC | Age: 34
End: 2024-02-07
Payer: COMMERCIAL

## 2024-02-09 ENCOUNTER — HOSPITAL ENCOUNTER (EMERGENCY)
Facility: HOSPITAL | Age: 34
Discharge: HOME | End: 2024-02-09
Attending: EMERGENCY MEDICINE
Payer: COMMERCIAL

## 2024-02-09 ENCOUNTER — APPOINTMENT (EMERGENCY)
Dept: CT IMAGING | Facility: HOSPITAL | Age: 34
End: 2024-02-09
Payer: COMMERCIAL

## 2024-02-09 VITALS
RESPIRATION RATE: 16 BRPM | OXYGEN SATURATION: 99 % | HEART RATE: 73 BPM | DIASTOLIC BLOOD PRESSURE: 63 MMHG | SYSTOLIC BLOOD PRESSURE: 120 MMHG | TEMPERATURE: 98.6 F

## 2024-02-09 DIAGNOSIS — R10.9 ABDOMINAL PAIN: Primary | ICD-10-CM

## 2024-02-09 DIAGNOSIS — R93.5 ABNORMAL CT OF THE ABDOMEN: ICD-10-CM

## 2024-02-09 DIAGNOSIS — N13.30 HYDROURETERONEPHROSIS: ICD-10-CM

## 2024-02-09 LAB
ALBUMIN SERPL BCP-MCNC: 4.2 G/DL (ref 3.5–5)
ALP SERPL-CCNC: 56 U/L (ref 34–104)
ALT SERPL W P-5'-P-CCNC: 10 U/L (ref 7–52)
ANION GAP SERPL CALCULATED.3IONS-SCNC: 9 MMOL/L
AST SERPL W P-5'-P-CCNC: 11 U/L (ref 13–39)
BACTERIA UR QL AUTO: NORMAL /HPF
BASOPHILS # BLD AUTO: 0.01 THOUSANDS/ÂΜL (ref 0–0.1)
BASOPHILS NFR BLD AUTO: 0 % (ref 0–1)
BILIRUB SERPL-MCNC: 0.34 MG/DL (ref 0.2–1)
BILIRUB UR QL STRIP: NEGATIVE
BUN SERPL-MCNC: 13 MG/DL (ref 5–25)
CALCIUM SERPL-MCNC: 9.6 MG/DL (ref 8.4–10.2)
CHLORIDE SERPL-SCNC: 103 MMOL/L (ref 96–108)
CLARITY UR: CLEAR
CO2 SERPL-SCNC: 27 MMOL/L (ref 21–32)
COLOR UR: ABNORMAL
CREAT SERPL-MCNC: 0.84 MG/DL (ref 0.6–1.3)
EOSINOPHIL # BLD AUTO: 0.04 THOUSAND/ÂΜL (ref 0–0.61)
EOSINOPHIL NFR BLD AUTO: 1 % (ref 0–6)
ERYTHROCYTE [DISTWIDTH] IN BLOOD BY AUTOMATED COUNT: 12.6 % (ref 11.6–15.1)
EXT PREGNANCY TEST URINE: NEGATIVE
EXT. CONTROL: NORMAL
GFR SERPL CREATININE-BSD FRML MDRD: 91 ML/MIN/1.73SQ M
GLUCOSE SERPL-MCNC: 105 MG/DL (ref 65–140)
GLUCOSE UR STRIP-MCNC: NEGATIVE MG/DL
HCT VFR BLD AUTO: 37.2 % (ref 34.8–46.1)
HGB BLD-MCNC: 12.4 G/DL (ref 11.5–15.4)
HGB UR QL STRIP.AUTO: NEGATIVE
IMM GRANULOCYTES # BLD AUTO: 0.02 THOUSAND/UL (ref 0–0.2)
IMM GRANULOCYTES NFR BLD AUTO: 0 % (ref 0–2)
KETONES UR STRIP-MCNC: NEGATIVE MG/DL
LEUKOCYTE ESTERASE UR QL STRIP: ABNORMAL
LIPASE SERPL-CCNC: <6 U/L (ref 11–82)
LYMPHOCYTES # BLD AUTO: 1.05 THOUSANDS/ÂΜL (ref 0.6–4.47)
LYMPHOCYTES NFR BLD AUTO: 17 % (ref 14–44)
MCH RBC QN AUTO: 29.7 PG (ref 26.8–34.3)
MCHC RBC AUTO-ENTMCNC: 33.3 G/DL (ref 31.4–37.4)
MCV RBC AUTO: 89 FL (ref 82–98)
MONOCYTES # BLD AUTO: 0.58 THOUSAND/ÂΜL (ref 0.17–1.22)
MONOCYTES NFR BLD AUTO: 10 % (ref 4–12)
NEUTROPHILS # BLD AUTO: 4.38 THOUSANDS/ÂΜL (ref 1.85–7.62)
NEUTS SEG NFR BLD AUTO: 72 % (ref 43–75)
NITRITE UR QL STRIP: NEGATIVE
NON-SQ EPI CELLS URNS QL MICRO: NORMAL /HPF
NRBC BLD AUTO-RTO: 0 /100 WBCS
PH UR STRIP.AUTO: 7.5 [PH]
PLATELET # BLD AUTO: 215 THOUSANDS/UL (ref 149–390)
PMV BLD AUTO: 8.4 FL (ref 8.9–12.7)
POTASSIUM SERPL-SCNC: 4.1 MMOL/L (ref 3.5–5.3)
PROT SERPL-MCNC: 7.7 G/DL (ref 6.4–8.4)
PROT UR STRIP-MCNC: NEGATIVE MG/DL
RBC # BLD AUTO: 4.17 MILLION/UL (ref 3.81–5.12)
RBC #/AREA URNS AUTO: NORMAL /HPF
SODIUM SERPL-SCNC: 139 MMOL/L (ref 135–147)
SP GR UR STRIP.AUTO: 1.01 (ref 1–1.03)
UROBILINOGEN UR STRIP-ACNC: <2 MG/DL
WBC # BLD AUTO: 6.08 THOUSAND/UL (ref 4.31–10.16)
WBC #/AREA URNS AUTO: NORMAL /HPF

## 2024-02-09 PROCEDURE — 83690 ASSAY OF LIPASE: CPT | Performed by: EMERGENCY MEDICINE

## 2024-02-09 PROCEDURE — 85025 COMPLETE CBC W/AUTO DIFF WBC: CPT | Performed by: EMERGENCY MEDICINE

## 2024-02-09 PROCEDURE — 36415 COLL VENOUS BLD VENIPUNCTURE: CPT

## 2024-02-09 PROCEDURE — 81001 URINALYSIS AUTO W/SCOPE: CPT | Performed by: EMERGENCY MEDICINE

## 2024-02-09 PROCEDURE — G1004 CDSM NDSC: HCPCS

## 2024-02-09 PROCEDURE — 80053 COMPREHEN METABOLIC PANEL: CPT | Performed by: EMERGENCY MEDICINE

## 2024-02-09 PROCEDURE — 96361 HYDRATE IV INFUSION ADD-ON: CPT

## 2024-02-09 PROCEDURE — 99284 EMERGENCY DEPT VISIT MOD MDM: CPT

## 2024-02-09 PROCEDURE — 96375 TX/PRO/DX INJ NEW DRUG ADDON: CPT

## 2024-02-09 PROCEDURE — 96374 THER/PROPH/DIAG INJ IV PUSH: CPT

## 2024-02-09 PROCEDURE — 99285 EMERGENCY DEPT VISIT HI MDM: CPT | Performed by: EMERGENCY MEDICINE

## 2024-02-09 PROCEDURE — 81025 URINE PREGNANCY TEST: CPT | Performed by: EMERGENCY MEDICINE

## 2024-02-09 PROCEDURE — 74177 CT ABD & PELVIS W/CONTRAST: CPT

## 2024-02-09 RX ORDER — LORAZEPAM 2 MG/ML
0.5 INJECTION INTRAMUSCULAR ONCE
Status: COMPLETED | OUTPATIENT
Start: 2024-02-09 | End: 2024-02-09

## 2024-02-09 RX ORDER — ONDANSETRON 2 MG/ML
4 INJECTION INTRAMUSCULAR; INTRAVENOUS ONCE
Status: COMPLETED | OUTPATIENT
Start: 2024-02-09 | End: 2024-02-09

## 2024-02-09 RX ADMIN — LORAZEPAM 0.5 MG: 2 INJECTION INTRAMUSCULAR; INTRAVENOUS at 20:40

## 2024-02-09 RX ADMIN — SODIUM CHLORIDE 500 ML: 0.9 INJECTION, SOLUTION INTRAVENOUS at 19:51

## 2024-02-09 RX ADMIN — IOHEXOL 100 ML: 350 INJECTION, SOLUTION INTRAVENOUS at 20:07

## 2024-02-09 RX ADMIN — ONDANSETRON 4 MG: 2 INJECTION INTRAMUSCULAR; INTRAVENOUS at 19:52

## 2024-02-10 NOTE — ED NOTES
Patient advised, she's feeling very anxious. Provider made aware.     Ashwini Bain  02/09/24 2037

## 2024-02-10 NOTE — ED PROVIDER NOTES
History  Chief Complaint   Patient presents with    Abdominal Pain     P to ED c/o abd pain x 3 days. Pt had colon resection in May from radiation damage. Pt states she is feeling constipated x 7 days, took miralax yesterday with no relief. C/o nausea & vomiting.      Patient is a 33-year-old female who presents with abdominal pain, nausea, vomiting and constipation.  Patient reports that she has not had a bowel movement in 6 to 7 days.  Today, she took 3 doses of MiraLAX at around 4 PM.  She states that after taking that, she vomited shortly thereafter.  She states that she has been having abdominal pain for the last 3 to 4 days, constant, generalized but mostly in the right upper and epigastrium.  Denies any fevers, chills, diarrhea, blood in the stools.             Prior to Admission Medications   Prescriptions Last Dose Informant Patient Reported? Taking?   Misc. Devices (Kegel Toner Pelvic Trainer) MISC   No No   Sig: Use 1 each if needed (for strengthening the pelvic floor muscles to treat incontinence and increased urgency)   busPIRone (BUSPAR) 7.5 mg tablet  Self No No   Sig: TAKE 1 TABLET BY MOUTH 2 TIMES A DAY.   doxycycline hyclate (VIBRA-TABS) 100 mg tablet   Yes No   estradiol (VIVELLE-DOT) 0.0375 MG/24HR   No No   Sig: PLACE 1 PATCH ON THE SKIN 2 TIMES A WEEK.      Facility-Administered Medications: None       Past Medical History:   Diagnosis Date    Anxiety     Cancer (HCC)     cervix    Cervical cancer (HCC)     receiving chemo and radiation    COVID     Jan 2022    Depression     Diarrhea     Dizziness     Frequent headaches     Hx of bleeding disorder     vaginal bleeding    Obesity        Past Surgical History:   Procedure Laterality Date    CERVICAL BIOPSY  W/ LOOP ELECTRODE EXCISION      LYMPH NODE DISSECTION Bilateral 5/6/2022    Procedure: DISSECTION/STAGING LYMPH NODE PELVIS/ABDOMEN;  Surgeon: Parminder Moctezuma MD;  [facility]: [facility];  Service: Gynecology Oncology    KS INSERTION  VAGINAL RADIATION DEVICE N/A 7/15/2022    Procedure: INSERTION NADIR SLEEVE VAGINA WITH POST OP BRACHYTHERAPY (IN RADIATION ONCOLOGY);  Surgeon: Parminder Moctezuma MD;  [facility];  Service: Gynecology Oncology    MT LAPAROSCOPY COLECTOMY PARTIAL W/ANASTOMOSIS N/A 5/10/2023    Procedure: RESECTION COLON SIGMOID LAPAROSCOPIC;  Surgeon: Marin Salinas MD;  [facility];  Service: Colorectal    MT SIGMOIDOSCOPY FLX DX W/COLLJ SPEC BR/WA IF PFRMD N/A 5/10/2023    Procedure: SIGMOIDOSCOPY FLEXIBLE;  Surgeon: Marin Salinas MD;  [facility];  Service: Colorectal    SALPINGECTOMY Bilateral 5/6/2022    Procedure: SALPINGECTOMY, OVARIAN TRANSPOSITION;  Surgeon: Parminder Moctezuma MD;  [facility];  Service: Gynecology Oncology    US GUIDANCE  7/15/2022       Family History   Problem Relation Age of Onset    Ovarian cancer Maternal Aunt     Ovarian cancer Maternal Grandmother     No Known Problems Mother     Heart disease Father      I have reviewed and agree with the history as documented.    E-Cigarette/Vaping    E-Cigarette Use Never User      E-Cigarette/Vaping Substances    Nicotine No     THC No     CBD No     Flavoring No     Other No     Unknown No      Social History     Tobacco Use    Smoking status: Never     Passive exposure: Current (very mild/social)    Smokeless tobacco: Never   Vaping Use    Vaping status: Never Used   Substance Use Topics    Alcohol use: Not Currently    Drug use: Never       Review of Systems   Constitutional:  Negative for chills and fever.   Respiratory:  Negative for shortness of breath.    Cardiovascular:  Negative for chest pain.   Gastrointestinal:  Positive for abdominal pain, constipation, nausea and vomiting. Negative for blood in stool and diarrhea.   Genitourinary:  Negative for dysuria, flank pain, frequency and urgency.       Physical Exam  Physical Exam  Vitals and nursing note reviewed.   Constitutional:        General: She is not in acute distress.     Appearance: Normal appearance. She is well-developed. She is not ill-appearing, toxic-appearing or diaphoretic.   HENT:      Head: Normocephalic and atraumatic.      Mouth/Throat:      Mouth: Mucous membranes are moist.   Eyes:      Conjunctiva/sclera: Conjunctivae normal.      Pupils: Pupils are equal, round, and reactive to light.   Cardiovascular:      Rate and Rhythm: Normal rate and regular rhythm.      Pulses: Normal pulses.      Heart sounds: Normal heart sounds. No murmur heard.  Pulmonary:      Effort: Pulmonary effort is normal. No respiratory distress.      Breath sounds: Normal breath sounds. No stridor. No wheezing, rhonchi or rales.   Chest:      Chest wall: No tenderness.   Abdominal:      General: Bowel sounds are normal. There is no distension.      Palpations: Abdomen is soft.      Tenderness: There is generalized abdominal tenderness. There is no right CVA tenderness, left CVA tenderness, guarding or rebound. Negative signs include Jarrett's sign, Rovsing's sign, McBurney's sign and psoas sign.   Skin:     General: Skin is warm and dry.   Neurological:      General: No focal deficit present.      Mental Status: She is alert and oriented to person, place, and time. Mental status is at baseline.         Vital Signs  ED Triage Vitals [02/09/24 1824]   Temperature Pulse Respirations Blood Pressure SpO2   98.6 °F (37 °C) 94 18 150/95 99 %      Temp src Heart Rate Source Patient Position - Orthostatic VS BP Location FiO2 (%)   -- Monitor Sitting Left arm --      Pain Score       5           Vitals:    02/09/24 1824 02/09/24 2034   BP: 150/95 120/63   Pulse: 94 73   Patient Position - Orthostatic VS: Sitting Lying         Visual Acuity      ED Medications  Medications   ondansetron (ZOFRAN) injection 4 mg (4 mg Intravenous Given 2/9/24 1952)   sodium chloride 0.9 % bolus 500 mL (0 mL Intravenous Stopped 2/9/24 2104)   iohexol (OMNIPAQUE) 350 MG/ML injection  (MULTI-DOSE) 100 mL (100 mL Intravenous Given 2/9/24 2007)   LORazepam (ATIVAN) injection 0.5 mg (0.5 mg Intravenous Given 2/9/24 2040)       Diagnostic Studies  Results Reviewed       Procedure Component Value Units Date/Time    Urine Microscopic [[id number]]  (Normal) Collected: 02/09/24 1919    Lab Status: Final result Specimen: Urine, Clean Catch Updated: 02/09/24 2018     RBC, UA 0-1 /hpf      WBC, UA 2-4 /hpf      Epithelial Cells Occasional /hpf      Bacteria, UA Occasional /hpf     UA (URINE) with reflex to Scope [[id number]]  (Abnormal) Collected: 02/09/24 1919    Lab Status: Final result Specimen: Urine, Clean Catch Updated: 02/09/24 1933     Color, UA Light Yellow     Clarity, UA Clear     Specific Gravity, UA 1.010     pH, UA 7.5     Leukocytes, UA Small     Nitrite, UA Negative     Protein, UA Negative mg/dl      Glucose, UA Negative mg/dl      Ketones, UA Negative mg/dl      Urobilinogen, UA <2.0 mg/dl      Bilirubin, UA Negative     Occult Blood, UA Negative    POCT pregnancy, urine [[id number]]  (Normal) Resulted: 02/09/24 1932    Lab Status: Final result Specimen: Urine Updated: 02/09/24 1932     EXT Preg Test, Ur Negative     Control Valid    Comprehensive metabolic panel [[id number]]  (Abnormal) Collected: 02/09/24 1826    Lab Status: Final result Specimen: Blood from Arm, Right Updated: 02/09/24 1853     Sodium 139 mmol/L      Potassium 4.1 mmol/L      Chloride 103 mmol/L      CO2 27 mmol/L      ANION GAP 9 mmol/L      BUN 13 mg/dL      Creatinine 0.84 mg/dL      Glucose 105 mg/dL      Calcium 9.6 mg/dL      AST 11 U/L      ALT 10 U/L      Alkaline Phosphatase 56 U/L      Total Protein 7.7 g/dL      Albumin 4.2 g/dL      Total Bilirubin 0.34 mg/dL      eGFR 91 ml/min/1.73sq m     Narrative:      National Kidney Disease Foundation guidelines for Chronic Kidney Disease (CKD):     Stage 1 with normal or high GFR (GFR > 90 mL/min/1.73 square meters)    Stage 2 Mild CKD (GFR = 60-89 mL/min/1.73 square  meters)    Stage 3A Moderate CKD (GFR = 45-59 mL/min/1.73 square meters)    Stage 3B Moderate CKD (GFR = 30-44 mL/min/1.73 square meters)    Stage 4 Severe CKD (GFR = 15-29 mL/min/1.73 square meters)    Stage 5 End Stage CKD (GFR <15 mL/min/1.73 square meters)  Note: GFR calculation is accurate only with a steady state creatinine    Lipase [[id number]]  (Abnormal) Collected: 02/09/24 1826    Lab Status: Final result Specimen: Blood from Arm, Right Updated: 02/09/24 1853     Lipase <6 u/L     CBC and differential [[id number]]  (Abnormal) Collected: 02/09/24 1826    Lab Status: Final result Specimen: Blood from Arm, Right Updated: 02/09/24 1833     WBC 6.08 Thousand/uL      RBC 4.17 Million/uL      Hemoglobin 12.4 g/dL      Hematocrit 37.2 %      MCV 89 fL      MCH 29.7 pg      MCHC 33.3 g/dL      RDW 12.6 %      MPV 8.4 fL      Platelets 215 Thousands/uL      nRBC 0 /100 WBCs      Neutrophils Relative 72 %      Immat GRANS % 0 %      Lymphocytes Relative 17 %      Monocytes Relative 10 %      Eosinophils Relative 1 %      Basophils Relative 0 %      Neutrophils Absolute 4.38 Thousands/µL      Immature Grans Absolute 0.02 Thousand/uL      Lymphocytes Absolute 1.05 Thousands/µL      Monocytes Absolute 0.58 Thousand/µL      Eosinophils Absolute 0.04 Thousand/µL      Basophils Absolute 0.01 Thousands/µL                    CT abdomen pelvis with contrast   Final Result by Patricio Santiago MD (02/09 2036)      Mild right hydroureteronephrosis and perinephric fat stranding, without ureteral stone. Findings could be due to pyelonephritis, or alternatively distal ureteral stricture, especially given history of pelvic radiation.      Colorectal anastomosis noted. No evidence of bowel obstruction.         Workstation performed: [id number]                    Procedures  Procedures         ED Course  ED Course as of 02/09/24 2120 Fri Feb 09, 2024 2041 IMPRESSION:  Mild right hydroureteronephrosis and perinephric fat stranding,  without ureteral stone. Findings could be due to pyelonephritis, or alternatively distal ureteral stricture, especially given history of pelvic radiation.     2052 TT Meredith Tavares, urology to review CT findings and recommendations for plan of care.   2104 Reviewed CT imaging and clinical presentation with urology PA. She recommends Outpt follow up in office with provider who can evaluate further to rule out stricture. As patient has no MODESTA, no wbc , no infection. Recommend antiemetics pain control. Could be infection with with normal UA and no leukocytosis, could just be inflammation from prior rad on CT. she had some thickening of posterior bladder on pelvic MRI back in September. May all be related to prior radiation   2115 Reviewed urology recommendations with the patient, reviewed strict RTED precautions which patient verbalized understanding of.     2120 Explained to the patient why I am currently not suspicious of infection and what to come back for.                               SBIRT 20yo+      Flowsheet Row Most Recent Value   Initial Alcohol Screen: US AUDIT-C     1. How often do you have a drink containing alcohol? 0 Filed at: 02/09/2024 1825   2. How many drinks containing alcohol do you have on a typical day you are drinking?  0 Filed at: 02/09/2024 1825   3a. Male UNDER 65: How often do you have five or more drinks on one occasion? 0 Filed at: 02/09/2024 1825   3b. FEMALE Any Age, or MALE 65+: How often do you have 4 or more drinks on one occassion? 0 Filed at: 02/09/2024 1825   Audit-C Score 0 Filed at: 02/09/2024 1825   ERIC: How many times in the past year have you...    Used an illegal drug or used a prescription medication for non-medical reasons? Never Filed at: 02/09/2024 1825                      Medical Decision Making  Assessment and plan:  Differential includes small bowel obstruction versus ileus versus constipation versus biliary colic versus cholecystitis versus pancreatitis versus  gastritis.  Check labs to evaluate for leukocytosis, anemia, electrolyte abnormalities, kidney and liver function; urinalysis to rule out pregnancy and urinary tract infection; CT scan of the abdomen/pelvis to evaluate for forementioned differential.  Treat with fluids and Zofran.  Reassess.    Review of medical records, specifically from discharge summary from colorectal surgery from 5/10/2023 shows that the patient has a past medical history significant for colonic stricture status post lap LAR by Dr. Salinas.  Additionally, patient has a past medical history significant for cervical cancer.     Amount and/or Complexity of Data Reviewed  Labs: ordered.  Radiology: ordered.    Risk  Prescription drug management.             Disposition  Final diagnoses:   Abdominal pain   Hydroureteronephrosis   Abnormal CT of the abdomen     Time reflects when diagnosis was documented in both MDM as applicable and the Disposition within this note       Time User Action Codes Description Comment    2/9/2024  9:17 PM Felipa Lafleur [R10.9] Abdominal pain     2/9/2024  9:17 PM Felipa Lafleur [N13.30] Hydroureteronephrosis     2/9/2024  9:18 PM Felipa Lafleur [R93.5] Abnormal CT of the abdomen           ED Disposition       ED Disposition   Discharge    Condition   Stable    Date/Time   Fri Feb 9, 2024 2111    Comment   Margot Moody discharge to home/self care.                   Follow-up Information       Follow up With Specialties Details Why Contact Info Additional Information    [facility] Schedule an appointment as soon as possible for a visit in 3 days for re-evaluation [address]  [facility] [address]  [facility] [id number] [phone] [facility], [address], [facility] [address], [place], [place], [address]   [phone]    Demi Mandel DO Family Medicine Schedule an appointment as soon as possible for a visit in 1 week for  re-evaluation [address] [address]  [phone]        [facility] Emergency Medicine Go to  As needed, If symptoms worsen, for re-evaluation [address] [facility] [address] [phone] [facility], [address] [place], [place] [address]            Patient's Medications   Discharge Prescriptions    No medications on file           PDMP Review         Value Time User    PDMP Reviewed  Yes 8/14/2023  2:32 PM Dunia Nunez PA-C            ED Provider  Electronically Signed by             Felipa Lafleur,   02/09/24 2120       Felipa Lafleur,   02/09/24 2121

## 2024-02-11 ENCOUNTER — APPOINTMENT (EMERGENCY)
Dept: RADIOLOGY | Facility: HOSPITAL | Age: 34
DRG: 694 | End: 2024-02-11
Payer: COMMERCIAL

## 2024-02-11 ENCOUNTER — HOSPITAL ENCOUNTER (INPATIENT)
Facility: HOSPITAL | Age: 34
LOS: 3 days | Discharge: HOME | DRG: 694 | End: 2024-02-14
Attending: EMERGENCY MEDICINE | Admitting: INTERNAL MEDICINE
Payer: COMMERCIAL

## 2024-02-11 DIAGNOSIS — N13.30 HYDRONEPHROSIS, UNSPECIFIED HYDRONEPHROSIS TYPE: ICD-10-CM

## 2024-02-11 DIAGNOSIS — E83.42 HYPOMAGNESEMIA: ICD-10-CM

## 2024-02-11 DIAGNOSIS — R11.2 NAUSEA AND VOMITING: ICD-10-CM

## 2024-02-11 DIAGNOSIS — R10.9 ABDOMINAL PAIN: ICD-10-CM

## 2024-02-11 DIAGNOSIS — N12 PYELONEPHRITIS: Primary | ICD-10-CM

## 2024-02-11 LAB
ALBUMIN SERPL BCP-MCNC: 4.2 G/DL (ref 3.5–5)
ALP SERPL-CCNC: 50 U/L (ref 34–104)
ALT SERPL W P-5'-P-CCNC: 11 U/L (ref 7–52)
ANION GAP SERPL CALCULATED.3IONS-SCNC: 9 MMOL/L
AST SERPL W P-5'-P-CCNC: 31 U/L (ref 13–39)
BACTERIA UR QL AUTO: ABNORMAL /HPF
BASOPHILS # BLD AUTO: 0.01 THOUSANDS/ÂΜL (ref 0–0.1)
BASOPHILS NFR BLD AUTO: 0 % (ref 0–1)
BILIRUB SERPL-MCNC: 0.52 MG/DL (ref 0.2–1)
BILIRUB UR QL STRIP: NEGATIVE
BUN SERPL-MCNC: 10 MG/DL (ref 5–25)
CALCIUM SERPL-MCNC: 9.4 MG/DL (ref 8.4–10.2)
CHLORIDE SERPL-SCNC: 101 MMOL/L (ref 96–108)
CLARITY UR: ABNORMAL
CO2 SERPL-SCNC: 26 MMOL/L (ref 21–32)
COLOR UR: YELLOW
CREAT SERPL-MCNC: 0.75 MG/DL (ref 0.6–1.3)
EOSINOPHIL # BLD AUTO: 0.01 THOUSAND/ÂΜL (ref 0–0.61)
EOSINOPHIL NFR BLD AUTO: 0 % (ref 0–6)
ERYTHROCYTE [DISTWIDTH] IN BLOOD BY AUTOMATED COUNT: 12.7 % (ref 11.6–15.1)
GFR SERPL CREATININE-BSD FRML MDRD: 105 ML/MIN/1.73SQ M
GLUCOSE SERPL-MCNC: 101 MG/DL (ref 65–140)
GLUCOSE UR STRIP-MCNC: NEGATIVE MG/DL
HCG SERPL QL: NEGATIVE
HCT VFR BLD AUTO: 34.1 % (ref 34.8–46.1)
HGB BLD-MCNC: 11.6 G/DL (ref 11.5–15.4)
HGB UR QL STRIP.AUTO: NEGATIVE
IMM GRANULOCYTES # BLD AUTO: 0.01 THOUSAND/UL (ref 0–0.2)
IMM GRANULOCYTES NFR BLD AUTO: 0 % (ref 0–2)
KETONES UR STRIP-MCNC: ABNORMAL MG/DL
LACTATE SERPL-SCNC: 0.7 MMOL/L (ref 0.5–2)
LEUKOCYTE ESTERASE UR QL STRIP: ABNORMAL
LIPASE SERPL-CCNC: <6 U/L (ref 11–82)
LYMPHOCYTES # BLD AUTO: 0.65 THOUSANDS/ÂΜL (ref 0.6–4.47)
LYMPHOCYTES NFR BLD AUTO: 12 % (ref 14–44)
MCH RBC QN AUTO: 30.4 PG (ref 26.8–34.3)
MCHC RBC AUTO-ENTMCNC: 34 G/DL (ref 31.4–37.4)
MCV RBC AUTO: 89 FL (ref 82–98)
MONOCYTES # BLD AUTO: 0.42 THOUSAND/ÂΜL (ref 0.17–1.22)
MONOCYTES NFR BLD AUTO: 8 % (ref 4–12)
MUCOUS THREADS UR QL AUTO: ABNORMAL
NEUTROPHILS # BLD AUTO: 4.52 THOUSANDS/ÂΜL (ref 1.85–7.62)
NEUTS SEG NFR BLD AUTO: 80 % (ref 43–75)
NITRITE UR QL STRIP: NEGATIVE
NON-SQ EPI CELLS URNS QL MICRO: ABNORMAL /HPF
NRBC BLD AUTO-RTO: 0 /100 WBCS
PH UR STRIP.AUTO: 6 [PH]
PLATELET # BLD AUTO: 201 THOUSANDS/UL (ref 149–390)
PMV BLD AUTO: 8.5 FL (ref 8.9–12.7)
POTASSIUM SERPL-SCNC: 5 MMOL/L (ref 3.5–5.3)
PROT SERPL-MCNC: 8 G/DL (ref 6.4–8.4)
PROT UR STRIP-MCNC: ABNORMAL MG/DL
RBC # BLD AUTO: 3.82 MILLION/UL (ref 3.81–5.12)
RBC #/AREA URNS AUTO: ABNORMAL /HPF
SODIUM SERPL-SCNC: 136 MMOL/L (ref 135–147)
SP GR UR STRIP.AUTO: 1.02 (ref 1–1.03)
UROBILINOGEN UR STRIP-ACNC: <2 MG/DL
WBC # BLD AUTO: 5.62 THOUSAND/UL (ref 4.31–10.16)
WBC #/AREA URNS AUTO: ABNORMAL /HPF

## 2024-02-11 PROCEDURE — 99284 EMERGENCY DEPT VISIT MOD MDM: CPT

## 2024-02-11 PROCEDURE — 87086 URINE CULTURE/COLONY COUNT: CPT

## 2024-02-11 PROCEDURE — 96365 THER/PROPH/DIAG IV INF INIT: CPT

## 2024-02-11 PROCEDURE — 99285 EMERGENCY DEPT VISIT HI MDM: CPT | Performed by: EMERGENCY MEDICINE

## 2024-02-11 PROCEDURE — 83690 ASSAY OF LIPASE: CPT

## 2024-02-11 PROCEDURE — 74177 CT ABD & PELVIS W/CONTRAST: CPT

## 2024-02-11 PROCEDURE — 96361 HYDRATE IV INFUSION ADD-ON: CPT

## 2024-02-11 PROCEDURE — G1004 CDSM NDSC: HCPCS

## 2024-02-11 PROCEDURE — 36415 COLL VENOUS BLD VENIPUNCTURE: CPT

## 2024-02-11 PROCEDURE — 83605 ASSAY OF LACTIC ACID: CPT

## 2024-02-11 PROCEDURE — 84703 CHORIONIC GONADOTROPIN ASSAY: CPT

## 2024-02-11 PROCEDURE — 99223 1ST HOSP IP/OBS HIGH 75: CPT | Performed by: INTERNAL MEDICINE

## 2024-02-11 PROCEDURE — 85025 COMPLETE CBC W/AUTO DIFF WBC: CPT

## 2024-02-11 PROCEDURE — 81001 URINALYSIS AUTO W/SCOPE: CPT

## 2024-02-11 PROCEDURE — 87040 BLOOD CULTURE FOR BACTERIA: CPT | Performed by: INTERNAL MEDICINE

## 2024-02-11 PROCEDURE — 96375 TX/PRO/DX INJ NEW DRUG ADDON: CPT

## 2024-02-11 PROCEDURE — 80053 COMPREHEN METABOLIC PANEL: CPT

## 2024-02-11 RX ORDER — HYDROMORPHONE HCL/PF 1 MG/ML
0.5 SYRINGE (ML) INJECTION EVERY 4 HOURS PRN
Status: DISCONTINUED | OUTPATIENT
Start: 2024-02-11 | End: 2024-02-14 | Stop reason: HOSPADM

## 2024-02-11 RX ORDER — KETOROLAC TROMETHAMINE 30 MG/ML
15 INJECTION, SOLUTION INTRAMUSCULAR; INTRAVENOUS ONCE
Status: COMPLETED | OUTPATIENT
Start: 2024-02-11 | End: 2024-02-11

## 2024-02-11 RX ORDER — OXYCODONE HYDROCHLORIDE 5 MG/1
5 TABLET ORAL EVERY 4 HOURS PRN
Status: DISCONTINUED | OUTPATIENT
Start: 2024-02-11 | End: 2024-02-14 | Stop reason: HOSPADM

## 2024-02-11 RX ORDER — ONDANSETRON 2 MG/ML
4 INJECTION INTRAMUSCULAR; INTRAVENOUS ONCE
Status: COMPLETED | OUTPATIENT
Start: 2024-02-11 | End: 2024-02-11

## 2024-02-11 RX ORDER — LORAZEPAM 2 MG/ML
0.5 INJECTION INTRAMUSCULAR ONCE
Status: COMPLETED | OUTPATIENT
Start: 2024-02-11 | End: 2024-02-11

## 2024-02-11 RX ORDER — HYDROMORPHONE HCL/PF 1 MG/ML
1 SYRINGE (ML) INJECTION ONCE
Status: COMPLETED | OUTPATIENT
Start: 2024-02-11 | End: 2024-02-11

## 2024-02-11 RX ORDER — BUSPIRONE HYDROCHLORIDE 5 MG/1
7.5 TABLET ORAL 2 TIMES DAILY
Status: DISCONTINUED | OUTPATIENT
Start: 2024-02-11 | End: 2024-02-14 | Stop reason: HOSPADM

## 2024-02-11 RX ORDER — ONDANSETRON 2 MG/ML
4 INJECTION INTRAMUSCULAR; INTRAVENOUS EVERY 6 HOURS PRN
Status: DISCONTINUED | OUTPATIENT
Start: 2024-02-11 | End: 2024-02-14 | Stop reason: HOSPADM

## 2024-02-11 RX ORDER — ACETAMINOPHEN 325 MG/1
650 TABLET ORAL EVERY 6 HOURS PRN
Status: DISCONTINUED | OUTPATIENT
Start: 2024-02-11 | End: 2024-02-14 | Stop reason: HOSPADM

## 2024-02-11 RX ORDER — SODIUM CHLORIDE 9 MG/ML
125 INJECTION, SOLUTION INTRAVENOUS CONTINUOUS
Status: DISCONTINUED | OUTPATIENT
Start: 2024-02-11 | End: 2024-02-14 | Stop reason: HOSPADM

## 2024-02-11 RX ADMIN — CEFTRIAXONE SODIUM 1000 MG: 10 INJECTION, POWDER, FOR SOLUTION INTRAVENOUS at 16:34

## 2024-02-11 RX ADMIN — ONDANSETRON 4 MG: 2 INJECTION INTRAMUSCULAR; INTRAVENOUS at 14:25

## 2024-02-11 RX ADMIN — SODIUM CHLORIDE 125 ML/HR: 0.9 INJECTION, SOLUTION INTRAVENOUS at 19:47

## 2024-02-11 RX ADMIN — IOHEXOL 85 ML: 350 INJECTION, SOLUTION INTRAVENOUS at 15:55

## 2024-02-11 RX ADMIN — LORAZEPAM 0.5 MG: 2 INJECTION INTRAMUSCULAR; INTRAVENOUS at 14:25

## 2024-02-11 RX ADMIN — SODIUM CHLORIDE 1000 ML: 0.9 INJECTION, SOLUTION INTRAVENOUS at 14:20

## 2024-02-11 RX ADMIN — KETOROLAC TROMETHAMINE 15 MG: 30 INJECTION, SOLUTION INTRAMUSCULAR; INTRAVENOUS at 14:25

## 2024-02-11 RX ADMIN — HYDROMORPHONE HYDROCHLORIDE 1 MG: 1 INJECTION, SOLUTION INTRAMUSCULAR; INTRAVENOUS; SUBCUTANEOUS at 14:26

## 2024-02-11 RX ADMIN — BUSPIRONE HYDROCHLORIDE 7.5 MG: 5 TABLET ORAL at 23:45

## 2024-02-11 NOTE — ASSESSMENT & PLAN NOTE
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    Treatment complicated by radiation proctitis   Note CT findings concerning for possible ureteral stricture - pt has this concern   Patient has followed urology previously and was instructed to self cath however she has been very reticent to proceed with this

## 2024-02-11 NOTE — ASSESSMENT & PLAN NOTE
Intravenous fluid resuscitation  Intravenous antibiotic therapy  Consult urology regarding pyelonephritis.  Noted CT findings regarding possible stricture?  Monitor creatinine closely  Of note patient has chronic doxycycline ordered for reported eye surgery for an infected stye.  She has had intermittent compliance with the Doxy due to symptoms of nausea and vomiting since this past Saturday.  May partially impact culture results?

## 2024-02-11 NOTE — ED PROVIDER NOTES
History  Chief Complaint   Patient presents with    Abdominal Pain     Pt presents ambulatory with c/o R side abdominal pain radiating to back ,nausea, vomiting      HPI  MDM  Pt is a 33 Y O F hx of cervical cancer, s/p radiation s/p colectomy in the past for radiation injury presents for right flank pain radiating to the RLQ, she was evaluated for similar symptoms 2 days ago , had mild symptoms at that point.  Her CT 2 days back was remarkable for mild perinephric edema without renal stones, UA unremarkable at that point.  Patient was discharged with urology follow-up and pain meds.  She continued to have worsening Ramiro pain and flank pain, and has been unable to tolerate anything p.o.  Last bowel movement was 4 days ago.  States in the last 2 hours she was not able to pee as well, denies any burning or blood in the urine.    Initial presentation pt is uncomfortable secondary to pain, tearful, nontoxic    On exam   General: VSS, NAD, awake, alert.  Uncomfortable secondary to pain,  Head: Normocephalic, atraumatic, nontender.  Eyes: EOM-No subconjunctival hemorrhages.  ENT: Nose atraumatic. MMM  No malocclusion. No stridor. Normal phonation. No drooling. Normal swallowing.   Neck: Trachea midline. No JVD.  CV: RRR.   Lungs: CTAB No tachypnea. No paradoxical motion.  Abd: +BS, soft, right lower quadrant tenderness, right-sided CVA tenderness. No guarding/rigidity.  No rebound, no signs of peritonitis.  MSK: Full ROM throughout. No lower extremity edema.   Skin: Dry, intact.   Neuro: AAOx3, GCS 15, CN II-XII grossly intact. Motor/sensory grossly intact.  Psychiatric/Behavioral: mood/affect normal; behavior normal; thought content normal; judgement normal   Exam: deferred      Ddx: Pyelo-, renal colic, appendicitis, abscess, intra-abdominal abscess, colitis, bowel obstruction, low concern for bowel ischemia but will get lactate given her history.    Plan: Patient was evaluated with abdominal labs, hCG level,  lactate, UA.  Lactate was normal, other evaluated with CT abdomen pelvis which was remarkable for worsening right-sided perinephric stranding with no obvious renal stone, mild hydronephrosis noted.  UA remarkable for large leuks with innumerable leukocytes, nitrate negative.  She was given IV dose of Rocephin.  Workup and symptoms consistent with John, given worsening symptoms and worsening imaging results and patient's pain will admit to medicine for further evaluation management.  Patient was treated in the ED with ceftriaxone, Dilaudid, Toradol, Zofran and Ativan.  Hemodynamically stable at the time of admission      Prior to Admission Medications   Prescriptions Last Dose Informant Patient Reported? Taking?   Misc. Devices (Kegel Toner Pelvic Trainer) MISC   No No   Sig: Use 1 each if needed (for strengthening the pelvic floor muscles to treat incontinence and increased urgency)   busPIRone (BUSPAR) 7.5 mg tablet  Self No No   Sig: TAKE 1 TABLET BY MOUTH 2 TIMES A DAY.   doxycycline hyclate (VIBRA-TABS) 100 mg tablet   Yes No   estradiol (VIVELLE-DOT) 0.0375 MG/24HR   No No   Sig: PLACE 1 PATCH ON THE SKIN 2 TIMES A WEEK.      Facility-Administered Medications: None       Past Medical History:   Diagnosis Date    Anxiety     Cancer (HCC)     cervix    Cervical cancer (HCC)     receiving chemo and radiation    COVID     Jan 2022    Depression     Diarrhea     Dizziness     Frequent headaches     Hx of bleeding disorder     vaginal bleeding    Obesity        Past Surgical History:   Procedure Laterality Date    CERVICAL BIOPSY  W/ LOOP ELECTRODE EXCISION      LYMPH NODE DISSECTION Bilateral 5/6/2022    Procedure: DISSECTION/STAGING LYMPH NODE PELVIS/ABDOMEN;  Surgeon: Parminder Moctezuma MD;  [facility];  Service: Gynecology Oncology    PA INSERTION VAGINAL RADIATION DEVICE N/A 7/15/2022    Procedure: INSERTION NADIR SLEEVE VAGINA WITH POST OP BRACHYTHERAPY (IN RADIATION ONCOLOGY);  Surgeon: Parminder  Miguel Moctezuma MD;  [facility];  Service: Gynecology Oncology    UT LAPAROSCOPY COLECTOMY PARTIAL W/ANASTOMOSIS N/A 5/10/2023    Procedure: RESECTION COLON SIGMOID LAPAROSCOPIC;  Surgeon: Marin Salinas MD;  [facility];  Service: Colorectal    UT SIGMOIDOSCOPY FLX DX W/COLLJ SPEC BR/WA IF PFRMD N/A 5/10/2023    Procedure: SIGMOIDOSCOPY FLEXIBLE;  Surgeon: Marin Salinas MD;  [facility];  Service: Colorectal    SALPINGECTOMY Bilateral 5/6/2022    Procedure: SALPINGECTOMY, OVARIAN TRANSPOSITION;  Surgeon: Parminder Moctezuma MD;  [facility];  Service: Gynecology Oncology    US GUIDANCE  7/15/2022       Family History   Problem Relation Age of Onset    Ovarian cancer Maternal Aunt     Ovarian cancer Maternal Grandmother     No Known Problems Mother     Heart disease Father      I have reviewed and agree with the history as documented.    E-Cigarette/Vaping    E-Cigarette Use Never User      E-Cigarette/Vaping Substances    Nicotine No     THC No     CBD No     Flavoring No     Other No     Unknown No      Social History     Tobacco Use    Smoking status: Never     Passive exposure: Current (very mild/social)    Smokeless tobacco: Never   Vaping Use    Vaping status: Never Used   Substance Use Topics    Alcohol use: Not Currently    Drug use: Never        Review of Systems    Physical Exam  ED Triage Vitals   Temperature Pulse Respirations Blood Pressure SpO2   02/11/24 1337 02/11/24 1337 02/11/24 1337 02/11/24 1338 02/11/24 1337   98.9 °F (37.2 °C) 79 20 147/68 100 %      Temp Source Heart Rate Source Patient Position - Orthostatic VS BP Location FiO2 (%)   02/11/24 1337 02/11/24 1337 02/11/24 1445 02/11/24 1445 --   Temporal Monitor Sitting Left arm       Pain Score       02/11/24 1337       7             Orthostatic Vital Signs  Vitals:    02/11/24 1530 02/11/24 1600 02/11/24 1700 02/11/24 1730   BP: 120/66 126/66 111/66 (!) 97/49   Pulse: 73 70 75 73    Patient Position - Orthostatic VS: Sitting Sitting Sitting Sitting       Physical Exam    ED Medications  Medications   sodium chloride 0.9 % bolus 1,000 mL (0 mL Intravenous Stopped 2/11/24 1539)   HYDROmorphone (DILAUDID) injection 1 mg (1 mg Intravenous Given 2/11/24 1426)   ketorolac (TORADOL) injection 15 mg (15 mg Intravenous Given 2/11/24 1425)   ondansetron (ZOFRAN) injection 4 mg (4 mg Intravenous Given 2/11/24 1425)   LORazepam (ATIVAN) injection 0.5 mg (0.5 mg Intravenous Given 2/11/24 1425)   iohexol (OMNIPAQUE) 350 MG/ML injection (MULTI-DOSE) 85 mL (85 mL Intravenous Given 2/11/24 1555)   ceftriaxone (ROCEPHIN) 1 g/50 mL in dextrose IVPB (0 mg Intravenous Stopped 2/11/24 1709)       Diagnostic Studies  Results Reviewed       Procedure Component Value Units Date/Time    Blood culture [[id number]] Collected: 02/11/24 1746    Lab Status: No result Specimen: Blood from Arm, Left     Blood culture [[id number]] Collected: 02/11/24 1746    Lab Status: No result Specimen: Blood from Arm, Right     CBC and differential [[id number]]  (Abnormal) Collected: 02/11/24 1453    Lab Status: Final result Specimen: Blood from Arm, Right Updated: 02/11/24 1742     WBC 5.62 Thousand/uL      RBC 3.82 Million/uL      Hemoglobin 11.6 g/dL      Hematocrit 34.1 %      MCV 89 fL      MCH 30.4 pg      MCHC 34.0 g/dL      RDW 12.7 %      MPV 8.5 fL      Platelets 201 Thousands/uL      nRBC 0 /100 WBCs      Neutrophils Relative 80 %      Immat GRANS % 0 %      Lymphocytes Relative 12 %      Monocytes Relative 8 %      Eosinophils Relative 0 %      Basophils Relative 0 %      Neutrophils Absolute 4.52 Thousands/µL      Immature Grans Absolute 0.01 Thousand/uL      Lymphocytes Absolute 0.65 Thousands/µL      Monocytes Absolute 0.42 Thousand/µL      Eosinophils Absolute 0.01 Thousand/µL      Basophils Absolute 0.01 Thousands/µL     hCG, qualitative pregnancy [[id number]]  (Normal) Collected: 02/11/24 1422    Lab Status: Final result  Specimen: Blood from Arm, Right Updated: 02/11/24 1620     Preg, Serum Negative    Urine Microscopic [[id number]]  (Abnormal) Collected: 02/11/24 1542    Lab Status: Final result Specimen: Urine, Clean Catch Updated: 02/11/24 1604     RBC, UA 2-4 /hpf      WBC, UA Innumerable /hpf      Epithelial Cells Occasional /hpf      Bacteria, UA None Seen /hpf      MUCUS THREADS Innumerable    Urine culture [[id number]] Collected: 02/11/24 1542    Lab Status: In process Specimen: Urine, Clean Catch Updated: 02/11/24 1604    UA w Reflex to Microscopic w Reflex to Culture [[id number]]  (Abnormal) Collected: 02/11/24 1542    Lab Status: Final result Specimen: Urine, Clean Catch Updated: 02/11/24 1556     Color, UA Yellow     Clarity, UA Turbid     Specific Gravity, UA 1.017     pH, UA 6.0     Leukocytes, UA Large     Nitrite, UA Negative     Protein, UA 30 (1+) mg/dl      Glucose, UA Negative mg/dl      Ketones, UA 80 (3+) mg/dl      Urobilinogen, UA <2.0 mg/dl      Bilirubin, UA Negative     Occult Blood, UA Negative    Comprehensive metabolic panel [[id number]] Collected: 02/11/24 1422    Lab Status: Final result Specimen: Blood from Arm, Right Updated: 02/11/24 1510     Sodium 136 mmol/L      Potassium 5.0 mmol/L      Chloride 101 mmol/L      CO2 26 mmol/L      ANION GAP 9 mmol/L      BUN 10 mg/dL      Creatinine 0.75 mg/dL      Glucose 101 mg/dL      Calcium 9.4 mg/dL      AST 31 U/L      ALT 11 U/L      Alkaline Phosphatase 50 U/L      Total Protein 8.0 g/dL      Albumin 4.2 g/dL      Total Bilirubin 0.52 mg/dL      eGFR 105 ml/min/1.73sq m     Narrative:      National Kid[employer] Foundation guidelines for Chronic Kidney Disease (CKD):     Stage 1 with normal or high GFR (GFR > 90 mL/min/1.73 square meters)    Stage 2 Mild CKD (GFR = 60-89 mL/min/1.73 square meters)    Stage 3A Moderate CKD (GFR = 45-59 mL/min/1.73 square meters)    Stage 3B Moderate CKD (GFR = 30-44 mL/min/1.73 square meters)    Stage 4 Severe CKD (GFR =  15-29 mL/min/1.73 square meters)    Stage 5 End Stage CKD (GFR <15 mL/min/1.73 square meters)  Note: GFR calculation is accurate only with a steady state creatinine    Lipase [[id number]]  (Abnormal) Collected: 02/11/24 1422    Lab Status: Final result Specimen: Blood from Arm, Right Updated: 02/11/24 1510     Lipase <6 u/L     Lactic acid, plasma (w/reflex if result > 2.0) [[id number]]  (Normal) Collected: 02/11/24 1422    Lab Status: Final result Specimen: Blood from Arm, Right Updated: 02/11/24 1452     LACTIC ACID 0.7 mmol/L     Narrative:      Result may be elevated if tourniquet was used during collection.                   CT abdomen pelvis with contrast   Final Result by Alistair Delgado MD (02/11 1619)      1. Persistent right-sided hydronephrosis with perinephric and periureteral edema mildly worsened from the prior examination. No radiopaque urinary tract calculus. As noted on prior study, findings are suspicious for pyelonephritis although distal    ureteral stricture given history of prior radiation is also in the differential.   2. Bladder decompressed but with suspicion of bladder wall thickening as well   3. Small amount of free fluid in the pelvis, unchanged            Workstation performed: [id number]               Procedures  Procedures      ED Course  ED Course as of 02/11/24 1748   Sun Feb 11, 2024   1506 Lactic normal, pending labs and imaging   1737 Texted Slim service                             SBIRT 22yo+      Flowsheet Row Most Recent Value   Initial Alcohol Screen: US AUDIT-C     1. How often do you have a drink containing alcohol? 0 Filed at: 02/11/2024 1543   2. How many drinks containing alcohol do you have on a typical day you are drinking?  0 Filed at: 02/11/2024 1543   3a. Male UNDER 65: How often do you have five or more drinks on one occasion? 0 Filed at: 02/11/2024 1543   3b. FEMALE Any Age, or MALE 65+: How often do you have 4 or more drinks on one occassion? 0 Filed at: 02/11/2024  1543   Audit-C Score 0 Filed at: 02/11/2024 1543   ERIC: How many times in the past year have you...    Used an illegal drug or used a prescription medication for non-medical reasons? Never Filed at: 02/11/2024 1543                  Medical Decision Making  Amount and/or Complexity of Data Reviewed  Labs: ordered.  Radiology: ordered.    Risk  Prescription drug management.  Decision regarding hospitalization.          Disposition  Final diagnoses:   Pyelonephritis   Nausea and vomiting   Abdominal pain     Time reflects when diagnosis was documented in both MDM as applicable and the Disposition within this note       Time User Action Codes Description Comment    2/11/2024  5:37 PM Ct Lei S Add [N12] Pyelonephritis     2/11/2024  5:39 PM Sadia Reinoso Modify [N12] Pyelonephritis     2/11/2024  5:39 PM Sadia Reinoso Add [R11.2] Nausea and vomiting     2/11/2024  5:39 PM Sadia Reinoso Add [R10.9] Abdominal pain           ED Disposition       ED Disposition   Admit    Condition   Stable    Date/Time   Sun Feb 11, 2024 8660    Comment   --             Follow-up Information    None         Patient's Medications   Discharge Prescriptions    No medications on file     No discharge procedures on file.    PDMP Review         Value Time User    PDMP Reviewed  Yes 8/14/2023  2:32 PM Dunia Nunez PA-C             ED Provider  Attending physically available and evaluated Margot Moody. I managed the patient along with the ED Attending.    Electronically Signed by           Sadia Reinoso DO  02/11/24 1197

## 2024-02-11 NOTE — ED ATTENDING ATTESTATION
2/11/2024  I, Jf Deshpande MD, saw and evaluated the patient. I have discussed the patient with the resident/non-physician practitioner and agree with the resident's/non-physician practitioner's findings, Plan of Care, and MDM as documented in the resident's/non-physician practitioner's note, except where noted. All available labs and Radiology studies were reviewed.  I was present for key portions of any procedure(s) performed by the resident/non-physician practitioner and I was immediately available to provide assistance.       At this point I agree with the current assessment done in the Emergency Department.  I have conducted an independent evaluation of this patient a history and physical is as follows:    ED Course         Critical Care Time  Procedures    32 yo female with hx of cervical ca, and colectomy due to radiation to area, started with right sided abdominal pain few days ago. Pt seen at [facility] few days ago and had negative workup. Pt now with n/v/ and trouble with urination. No fever, no hematemesis.  No diarrhea.  Vss, afebrile, moderate distress, lungs cta, rrr, abdomen soft tender rlq, no rebound.  Labs, ct a/p, lactate, pain meds, zofran, ivf.

## 2024-02-11 NOTE — ASSESSMENT & PLAN NOTE
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  Treatment complicated by radiation proctitis

## 2024-02-11 NOTE — ASSESSMENT & PLAN NOTE
"Intravenous fluid resuscitation  Intravenous antibiotic therapy,   Pending urine culture  Pending blood cultures in process   Pt systemically well, afebrile, no leukocytosis, today subtle leukopenia no burning   Consult urology regarding ct imaging, with concern for stricture .     Ct abdomen and pelvis w /contrast: \"1. Persistent right-sided hydronephrosis with perinephric and periureteral edema mildly worsened from the prior examination. No radiopaque urinary tract calculus. As noted on prior study, findings are suspicious for pyelonephritis although distal ureteral stricture given history of prior radiation is also in the differential.   2. Bladder decompressed but with suspicion of bladder wall thickening as well   3. Small amount of free fluid in the pelvis, unchanged \"  Monitor creatinine closely - remains stable   Per Urology Dr Becerra - recommending OR tomorrow   Keep NPO post midnight   Of note patient has chronic doxycycline ordered for reported eye surgery for an infected stye.  She has had intermittent compliance with the Doxy due to symptoms of nausea and vomiting since this past Saturday.  May partially impact culture results?   "

## 2024-02-11 NOTE — H&P
[facility]  H&P  Name: Margot Moody 33 y.o. female I MRN: [id number]  Unit/Bed#: ED 06 I Date of Admission: 2/11/2024   Date of Service: 2/11/2024 I Hospital Day: 0      Assessment/Plan   * Pyelonephritis  Assessment & Plan  Intravenous fluid resuscitation  Intravenous antibiotic therapy  Consult urology regarding pyelonephritis.  Noted CT findings regarding possible stricture?  Monitor creatinine closely  Of note patient has chronic doxycycline ordered for reported eye surgery for an infected stye.  She has had intermittent compliance with the Doxy due to symptoms of nausea and vomiting since this past Saturday.  May partially impact culture results?    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  Treatment complicated by radiation proctitis   Note CT findings concerning for possible ureteral stricture.  Patient has followed urology previously and was instructed to self cath however she has been very reticent to proceed with this.           VTE Prophylaxis:  amb   / sequential compression device   Code Status: fc  POLST: There is no POLST form on file for this patient (pre-hospital)  Discussion with family:family at bedside    Anticipated Length of Stay:  Patient will be admitted on an Inpatient basis with an anticipated length of stay of  > 2 midnights.   Justification for Hospital Stay: need to monitor symptoms    Total Time for Visit, including Counseling / Coordination of Care:  85 .  Greater than 50% of this total time spent on direct patient counseling and coordination of care.  CT imaging suggestive of right-sided hydronephrosis with perinephric and periureteral edema mildly worsening from prior exam.  There is no urinary tract calculus noted.  Findings were consistent with pyelonephritis versus distal ureteral  stricture given history of prior radiation.  abx  Chief Complaint: Flank pain    History of Present Illness:    Margot Moody is a 33 y.o. female who presents with right-sided abdominal pain rating to back with associated nausea and vomiting.  Patient presents with suspected pyelonephritis.  Patient reports symptoms of abdominal pain starting on Saturday with with nausea and vomiting symptoms as well.  She notes this developed into radiation to her right side flank.  She presents to the hospital    Review of Systems:    Review of Systems   Constitutional:  Positive for chills. Negative for appetite change, fatigue and fever.   HENT:  Negative for congestion.    Respiratory:  Negative for apnea.    Gastrointestinal:  Negative for abdominal distention and abdominal pain.   Musculoskeletal:  Negative for arthralgias.   Neurological:  Negative for dizziness and headaches.   All other systems reviewed and are negative.      Past Medical and Surgical History:     Past Medical History:   Diagnosis Date    Anxiety     Cancer (HCC)     cervix    Cervical cancer (HCC)     receiving chemo and radiation    COVID     Jan 2022    Depression     Diarrhea     Dizziness     Frequent headaches     Hx of bleeding disorder     vaginal bleeding    Obesity        Past Surgical History:   Procedure Laterality Date    CERVICAL BIOPSY  W/ LOOP ELECTRODE EXCISION      LYMPH NODE DISSECTION Bilateral 5/6/2022    Procedure: DISSECTION/STAGING LYMPH NODE PELVIS/ABDOMEN;  Surgeon: Parminder Moctezuma MD;  [facility];  Service: Gynecology Oncology    PA INSERTION VAGINAL RADIATION DEVICE N/A 7/15/2022    Procedure: INSERTION NADIR SLEEVE VAGINA WITH POST OP BRACHYTHERAPY (IN RADIATION ONCOLOGY);  Surgeon: Parminder Moctezuma MD;  [facility];  Service: Gynecology Oncology    PA LAPAROSCOPY COLECTOMY PARTIAL W/ANASTOMOSIS N/A 5/10/2023    Procedure: RESECTION COLON SIGMOID LAPAROSCOPIC;  Surgeon: Marin Salinas  MD;  [facility];  Service: Colorectal    [place] SIGMOIDOSCOPY FLX DX W/COLLJ SPEC BR/WA IF PFRMD N/A 5/10/2023    Procedure: SIGMOIDOSCOPY FLEXIBLE;  Surgeon: Marin Salinas MD;  [facility];  Service: Colorectal    SALPINGECTOMY Bilateral 5/6/2022    Procedure: SALPINGECTOMY, OVARIAN TRANSPOSITION;  Surgeon: Parminder Moctezuma MD;  [facility];  Service: Gynecology Oncology    US GUIDANCE  7/15/2022       Meds/Allergies:    Prior to Admission medications    Medication Sig Start Date End Date Taking? Authorizing Provider   busPIRone (BUSPAR) 7.5 mg tablet TAKE 1 TABLET BY MOUTH 2 TIMES A DAY. 9/3/23   Demi Mandel DO   doxycycline hyclate (VIBRA-TABS) 100 mg tablet  11/14/23   Historical Provider, MD   estradiol (VIVELLE-DOT) 0.0375 MG/24HR PLACE 1 PATCH ON THE SKIN 2 TIMES A WEEK. 12/3/23   Parminder Moctezuma MD   Misc. Devices (Kegel Toner Pelvic Trainer) MISC Use 1 each if needed (for strengthening the pelvic floor muscles to treat incontinence and increased urgency) 11/16/23   Demi Mandel DO     I have reviewed home medications with patient personally.    Allergies: No Known Allergies    Social History:     Marital Status: Single   Occupation: full code  Patient Pre-hospital Living Situation: home   Patient Pre-hospital Level of Mobility: amb  Patient Pre-hospital Diet Restrictions: denied  Substance Use History:   Social History     Substance and Sexual Activity   Alcohol Use Not Currently     Social History     Tobacco Use   Smoking Status Never    Passive exposure: Current (very mild/social)   Smokeless Tobacco Never     Social History     Substance and Sexual Activity   Drug Use Never       Family History:    non-contributory    Physical Exam:     Vitals:   Blood Pressure: 125/69 (02/11/24 1800)  Pulse: 77 (02/11/24 1800)  Temperature: 98.9 °F (37.2 °C) (02/11/24 1337)  Temp Source: Temporal (02/11/24 1337)  Respirations: 18 (02/11/24 1800)  SpO2: 98 %  (02/11/24 1800)    Physical Exam  Constitutional:       Appearance: Normal appearance.   Cardiovascular:      Rate and Rhythm: Normal rate and regular rhythm.   Pulmonary:      Effort: Pulmonary effort is normal.   Abdominal:      General: Abdomen is flat.      Palpations: Abdomen is soft.   Musculoskeletal:         General: No swelling or tenderness.   Neurological:      General: No focal deficit present.      Mental Status: She is alert and oriented to person, place, and time.   Psychiatric:         Mood and Affect: Mood normal.           Additional Data:     Lab Results: I have personally reviewed pertinent reports.      Results from last 7 days   Lab Units 02/11/24  1453   WBC Thousand/uL 5.62   HEMOGLOBIN g/dL 11.6   HEMATOCRIT % 34.1*   PLATELETS Thousands/uL 201   NEUTROS PCT % 80*   LYMPHS PCT % 12*   MONOS PCT % 8   EOS PCT % 0     Results from last 7 days   Lab Units 02/11/24  1422   SODIUM mmol/L 136   POTASSIUM mmol/L 5.0   CHLORIDE mmol/L 101   CO2 mmol/L 26   BUN mg/dL 10   CREATININE mg/dL 0.75   ANION GAP mmol/L 9   CALCIUM mg/dL 9.4   ALBUMIN g/dL 4.2   TOTAL BILIRUBIN mg/dL 0.52   ALK PHOS U/L 50   ALT U/L 11   AST U/L 31   GLUCOSE RANDOM mg/dL 101                 Results from last 7 days   Lab Units 02/11/24  1422   LACTIC ACID mmol/L 0.7       Imaging: I have personally reviewed pertinent reports.      CT abdomen pelvis with contrast   Final Result by Alistair Delgado MD (02/11 1619)      1. Persistent right-sided hydronephrosis with perinephric and periureteral edema mildly worsened from the prior examination. No radiopaque urinary tract calculus. As noted on prior study, findings are suspicious for pyelonephritis although distal    ureteral stricture given history of prior radiation is also in the differential.   2. Bladder decompressed but with suspicion of bladder wall thickening as well   3. Small amount of free fluid in the pelvis, unchanged            Workstation performed: [id number]              ** Please Note: This note has been constructed using a voice recognition system. **

## 2024-02-12 LAB
ALBUMIN SERPL BCP-MCNC: 3.3 G/DL (ref 3.5–5)
ALP SERPL-CCNC: 42 U/L (ref 34–104)
ALT SERPL W P-5'-P-CCNC: 8 U/L (ref 7–52)
ANION GAP SERPL CALCULATED.3IONS-SCNC: 8 MMOL/L
AST SERPL W P-5'-P-CCNC: 9 U/L (ref 13–39)
BACTERIA UR CULT: NORMAL
BASOPHILS # BLD AUTO: 0.01 THOUSANDS/ÂΜL (ref 0–0.1)
BASOPHILS NFR BLD AUTO: 0 % (ref 0–1)
BILIRUB SERPL-MCNC: 0.37 MG/DL (ref 0.2–1)
BUN SERPL-MCNC: 11 MG/DL (ref 5–25)
CALCIUM ALBUM COR SERPL-MCNC: 8.8 MG/DL (ref 8.3–10.1)
CALCIUM SERPL-MCNC: 8.2 MG/DL (ref 8.4–10.2)
CHLORIDE SERPL-SCNC: 105 MMOL/L (ref 96–108)
CO2 SERPL-SCNC: 26 MMOL/L (ref 21–32)
CREAT SERPL-MCNC: 0.67 MG/DL (ref 0.6–1.3)
EOSINOPHIL # BLD AUTO: 0.05 THOUSAND/ÂΜL (ref 0–0.61)
EOSINOPHIL NFR BLD AUTO: 1 % (ref 0–6)
ERYTHROCYTE [DISTWIDTH] IN BLOOD BY AUTOMATED COUNT: 12.8 % (ref 11.6–15.1)
GFR SERPL CREATININE-BSD FRML MDRD: 115 ML/MIN/1.73SQ M
GLUCOSE SERPL-MCNC: 86 MG/DL (ref 65–140)
HCT VFR BLD AUTO: 32.6 % (ref 34.8–46.1)
HGB BLD-MCNC: 11 G/DL (ref 11.5–15.4)
IMM GRANULOCYTES # BLD AUTO: 0.02 THOUSAND/UL (ref 0–0.2)
IMM GRANULOCYTES NFR BLD AUTO: 1 % (ref 0–2)
LYMPHOCYTES # BLD AUTO: 0.77 THOUSANDS/ÂΜL (ref 0.6–4.47)
LYMPHOCYTES NFR BLD AUTO: 18 % (ref 14–44)
MAGNESIUM SERPL-MCNC: 1.6 MG/DL (ref 1.9–2.7)
MCH RBC QN AUTO: 30.6 PG (ref 26.8–34.3)
MCHC RBC AUTO-ENTMCNC: 33.7 G/DL (ref 31.4–37.4)
MCV RBC AUTO: 91 FL (ref 82–98)
MONOCYTES # BLD AUTO: 0.46 THOUSAND/ÂΜL (ref 0.17–1.22)
MONOCYTES NFR BLD AUTO: 11 % (ref 4–12)
NEUTROPHILS # BLD AUTO: 2.88 THOUSANDS/ÂΜL (ref 1.85–7.62)
NEUTS SEG NFR BLD AUTO: 69 % (ref 43–75)
NRBC BLD AUTO-RTO: 0 /100 WBCS
PHOSPHATE SERPL-MCNC: 3.3 MG/DL (ref 2.7–4.5)
PLATELET # BLD AUTO: 187 THOUSANDS/UL (ref 149–390)
PMV BLD AUTO: 8.7 FL (ref 8.9–12.7)
POTASSIUM SERPL-SCNC: 3.4 MMOL/L (ref 3.5–5.3)
PROT SERPL-MCNC: 6.2 G/DL (ref 6.4–8.4)
RBC # BLD AUTO: 3.59 MILLION/UL (ref 3.81–5.12)
SODIUM SERPL-SCNC: 139 MMOL/L (ref 135–147)
WBC # BLD AUTO: 4.19 THOUSAND/UL (ref 4.31–10.16)

## 2024-02-12 PROCEDURE — 99232 SBSQ HOSP IP/OBS MODERATE 35: CPT | Performed by: NURSE PRACTITIONER

## 2024-02-12 PROCEDURE — 85025 COMPLETE CBC W/AUTO DIFF WBC: CPT | Performed by: INTERNAL MEDICINE

## 2024-02-12 PROCEDURE — 99222 1ST HOSP IP/OBS MODERATE 55: CPT | Performed by: UROLOGY

## 2024-02-12 PROCEDURE — 83735 ASSAY OF MAGNESIUM: CPT | Performed by: INTERNAL MEDICINE

## 2024-02-12 PROCEDURE — 80053 COMPREHEN METABOLIC PANEL: CPT | Performed by: INTERNAL MEDICINE

## 2024-02-12 PROCEDURE — 84100 ASSAY OF PHOSPHORUS: CPT | Performed by: INTERNAL MEDICINE

## 2024-02-12 RX ORDER — POLYETHYLENE GLYCOL 3350 17 G/17G
17 POWDER, FOR SOLUTION ORAL DAILY
Status: DISCONTINUED | OUTPATIENT
Start: 2024-02-12 | End: 2024-02-14 | Stop reason: HOSPADM

## 2024-02-12 RX ORDER — LACTULOSE 10 G/15ML
30 SOLUTION ORAL ONCE
Status: COMPLETED | OUTPATIENT
Start: 2024-02-12 | End: 2024-02-12

## 2024-02-12 RX ORDER — POTASSIUM CHLORIDE 20 MEQ/1
40 TABLET, EXTENDED RELEASE ORAL ONCE
Status: COMPLETED | OUTPATIENT
Start: 2024-02-12 | End: 2024-02-12

## 2024-02-12 RX ADMIN — SODIUM CHLORIDE 125 ML/HR: 0.9 INJECTION, SOLUTION INTRAVENOUS at 21:47

## 2024-02-12 RX ADMIN — POLYETHYLENE GLYCOL 3350 17 G: 17 POWDER, FOR SOLUTION ORAL at 12:18

## 2024-02-12 RX ADMIN — SODIUM CHLORIDE 125 ML/HR: 0.9 INJECTION, SOLUTION INTRAVENOUS at 13:14

## 2024-02-12 RX ADMIN — POTASSIUM CHLORIDE 40 MEQ: 1500 TABLET, EXTENDED RELEASE ORAL at 09:35

## 2024-02-12 RX ADMIN — CEFTRIAXONE SODIUM 1000 MG: 10 INJECTION, POWDER, FOR SOLUTION INTRAVENOUS at 16:50

## 2024-02-12 RX ADMIN — ONDANSETRON 4 MG: 2 INJECTION INTRAMUSCULAR; INTRAVENOUS at 05:28

## 2024-02-12 RX ADMIN — ONDANSETRON 4 MG: 2 INJECTION INTRAMUSCULAR; INTRAVENOUS at 22:07

## 2024-02-12 RX ADMIN — OXYCODONE HYDROCHLORIDE 5 MG: 5 TABLET ORAL at 13:25

## 2024-02-12 RX ADMIN — BUSPIRONE HYDROCHLORIDE 7.5 MG: 5 TABLET ORAL at 18:00

## 2024-02-12 RX ADMIN — LACTULOSE 30 G: 20 SOLUTION ORAL at 12:17

## 2024-02-12 RX ADMIN — ONDANSETRON 4 MG: 2 INJECTION INTRAMUSCULAR; INTRAVENOUS at 13:25

## 2024-02-12 RX ADMIN — BUSPIRONE HYDROCHLORIDE 7.5 MG: 5 TABLET ORAL at 09:35

## 2024-02-12 RX ADMIN — OXYCODONE HYDROCHLORIDE 5 MG: 5 TABLET ORAL at 22:06

## 2024-02-12 NOTE — CONSULTS
Consultation - Urology   Margot Moody 33 y.o. female MRN: [id number]  Unit/Bed#: -01 Encounter: [id number] 2/12/2024           Assessment/Plan      Assessment:  Right hydronephrosis-symptomatic.  Delayed excretion is noted on her most recent CAT scan.  Plan:  Options were discussed with the patient.  She remains symptomatic.  I recommended we proceed with cystoscopy, right retrograde pyelography and right stent placement.  The procedure was described.  She understands that if the right ureteral orifice is not identifiable she will likely need percutaneous nephrostomy tube placement.    History of Present Illness   33 year old female with history of cervical cancer receiving chemo/xrt admitted with right sided abdominal and flank pain.  She has long history of difficulty voiding with incontinence and frequency. No dysuria or gross hematuria. Pain is located right UQ and right flank. No fever. + nausea.  CT scan shows right sided hydro with delay. Urology has been consulted for the pain, hydro and for further eval and therapy.   Attending: Anusha Gooden MD  Reason for Consult / Principal Problem:    Patient Active Problem List   Diagnosis    Malignant neoplasm of overlapping sites of cervix (HCC)    Family history of ovarian cancer    Obesity (BMI 35.0-39.9 without comorbidity)    Dysuria    Anxiety    Depression    Dyspareunia due to medical condition in female patient    Menopausal symptoms    Abdominal pain    Slow transit constipation    Radiation proctitis    Colonic stricture (HCC)    Hordeolum externum of left eye    Pyelonephritis       Inpatient consult to Urology  Consult performed by: Christiano Becerra MD  Consult ordered by: Ct Lei DO          Review of Systems   Constitutional:  Negative for activity change, appetite change, fatigue and fever.   HENT: Negative.     Eyes: Negative.    Respiratory: Negative.     Cardiovascular: Negative.    Gastrointestinal:  Positive for abdominal pain and  nausea. Negative for blood in stool, constipation, diarrhea and vomiting.   Endocrine: Negative.    Genitourinary:         See HPI   Musculoskeletal: Negative.    Skin: Negative.    Allergic/Immunologic: Negative.    Neurological: Negative.    Hematological: Negative.    Psychiatric/Behavioral: Negative.         Historical Information   No Known Allergies  Past Medical History:   Diagnosis Date    Anxiety     Cancer (HCC)     cervix    Cervical cancer (HCC)     receiving chemo and radiation    COVID     Jan 2022    Depression     Diarrhea     Dizziness     Frequent headaches     Hx of bleeding disorder     vaginal bleeding    Obesity      Past Surgical History:   Procedure Laterality Date    CERVICAL BIOPSY  W/ LOOP ELECTRODE EXCISION      LYMPH NODE DISSECTION Bilateral 5/6/2022    Procedure: DISSECTION/STAGING LYMPH NODE PELVIS/ABDOMEN;  Surgeon: Parminder Moctezuma MD;  Location: [facility];  Service: Gynecology Oncology    MS INSERTION VAGINAL RADIATION DEVICE N/A 7/15/2022    Procedure: INSERTION NADIR SLEEVE VAGINA WITH POST OP BRACHYTHERAPY (IN RADIATION ONCOLOGY);  Surgeon: Parminder Moctezuma MD;  Location: [facility];  Service: Gynecology Oncology    MS LAPAROSCOPY COLECTOMY PARTIAL W/ANASTOMOSIS N/A 5/10/2023    Procedure: RESECTION COLON SIGMOID LAPAROSCOPIC;  Surgeon: Marin Salinas MD;  Location: [facility];  Service: Colorectal    MS SIGMOIDOSCOPY FLX DX W/COLLJ SPEC BR/WA IF PFRMD N/A 5/10/2023    Procedure: SIGMOIDOSCOPY FLEXIBLE;  Surgeon: Marin Salinas MD;  Location: [facility];  Service: Colorectal    SALPINGECTOMY Bilateral 5/6/2022    Procedure: SALPINGECTOMY, OVARIAN TRANSPOSITION;  Surgeon: Parminder Moctezuma MD;  Location: [facility];  Service: Gynecology Oncology    US GUIDANCE  7/15/2022     Social History   Social History     Substance and Sexual Activity   Alcohol Use Not Currently     Social History     Substance and Sexual Activity   Drug Use Never      Social History     Tobacco Use   Smoking Status Never    Passive exposure: Current (very mild/social)   Smokeless Tobacco Never     Family History: non-contributory    Meds/Allergies   all current active meds have been reviewed    Current Facility-Administered Medications:     acetaminophen (TYLENOL) tablet 650 mg, 650 mg, Oral, Q6H PRN, Hetul Lei, DO    busPIRone (BUSPAR) tablet 7.5 mg, 7.5 mg, Oral, BID, Hetul Lei, DO, 7.5 mg at 02/12/24 0935    ceftriaxone (ROCEPHIN) 1 g/50 mL in dextrose IVPB, 1,000 mg, Intravenous, Q24H, Hetul Lei, DO    HYDROmorphone (DILAUDID) injection 0.5 mg, 0.5 mg, Intravenous, Q4H PRN, Hetul Lei, DO    ondansetron (ZOFRAN) injection 4 mg, 4 mg, Intravenous, Q6H PRN, Hetul Lei, DO, 4 mg at 02/12/24 1325    oxyCODONE (ROXICODONE) IR tablet 5 mg, 5 mg, Oral, Q4H PRN, Hetul Lei, DO, 5 mg at 02/12/24 1325    polyethylene glycol (MIRALAX) packet 17 g, 17 g, Oral, Daily, CORDELL Keys, 17 g at 02/12/24 1218    sodium chloride 0.9 % infusion, 125 mL/hr, Intravenous, Continuous, Hetul Lei, DO, Last Rate: 125 mL/hr at 02/12/24 1314, 125 mL/hr at 02/12/24 1314  Current Facility-Administered Medications   Medication Dose Route Frequency Provider Last Rate    acetaminophen  650 mg Oral Q6H PRN Hetul Lei, DO      busPIRone  7.5 mg Oral BID Hetul Lei, DO      cefTRIAXone  1,000 mg Intravenous Q24H Hetul Lei, DO      HYDROmorphone  0.5 mg Intravenous Q4H PRN Hetul Lei, DO      ondansetron  4 mg Intravenous Q6H PRN Hetul Lei, DO      oxyCODONE  5 mg Oral Q4H PRN Hetul Lei, DO      polyethylene glycol  17 g Oral Daily CORDELL Keys      sodium chloride  125 mL/hr Intravenous Continuous Hetul Lei,  mL/hr (02/12/24 1314)       acetaminophen    HYDROmorphone    ondansetron    oxyCODONE  sodium chloride, 125 mL/hr, Last Rate: 125 mL/hr (02/12/24 1314)        Objective   Vitals: Blood pressure 105/62, pulse 90, temperature 98.3 °F (36.8 °C), resp. rate 16,  "height 5' 9\" (1.753 m), weight 96.6 kg (213 lb), last menstrual period 07/27/2022, SpO2 97%, not currently breastfeeding.    I/O last 24 hours:  In: 3231.3 [I.V.:2181.3; IV Piggyback:1050]  Out: -     Invasive Devices       Peripheral Intravenous Line  Duration             Peripheral IV 02/11/24 Right Antecubital 1 day                    Physical Exam  Vitals reviewed.   Constitutional:       General: She is in acute distress.      Appearance: Normal appearance. She is well-developed. She is not ill-appearing, toxic-appearing or diaphoretic.   HENT:      Head: Normocephalic and atraumatic.   Eyes:      General: No scleral icterus.     Conjunctiva/sclera: Conjunctivae normal.   Cardiovascular:      Rate and Rhythm: Normal rate and regular rhythm.   Pulmonary:      Effort: Pulmonary effort is normal.   Abdominal:      General: There is no distension.      Palpations: Abdomen is soft. There is no mass.      Tenderness: There is no abdominal tenderness. There is right CVA tenderness. There is no left CVA tenderness, guarding or rebound.      Hernia: No hernia is present.   Musculoskeletal:         General: Normal range of motion.      Cervical back: Neck supple.   Skin:     General: Skin is warm and dry.   Neurological:      General: No focal deficit present.      Mental Status: She is alert and oriented to person, place, and time.   Psychiatric:         Mood and Affect: Mood normal.         Behavior: Behavior normal.         Thought Content: Thought content normal.         Judgment: Judgment normal.         Lab Results: I have personally reviewed pertinent reports.    CBC:   Lab Results   Component Value Date    WBC 4.19 (L) 02/12/2024    HGB 11.0 (L) 02/12/2024    HCT 32.6 (L) 02/12/2024    MCV 91 02/12/2024     02/12/2024    RBC 3.59 (L) 02/12/2024    MCH 30.6 02/12/2024    MCHC 33.7 02/12/2024    RDW 12.8 02/12/2024    MPV 8.7 (L) 02/12/2024    NRBC 0 02/12/2024     CMP:   Lab Results   Component Value Date "    SODIUM 139 02/12/2024     02/12/2024    CO2 26 02/12/2024    BUN 11 02/12/2024    CREATININE 0.67 02/12/2024    CALCIUM 8.2 (L) 02/12/2024    AST 9 (L) 02/12/2024    ALT 8 02/12/2024    ALKPHOS 42 02/12/2024    EGFR 115 02/12/2024     Urinalysis:   Lab Results   Component Value Date    COLORU Yellow 02/11/2024    CLARITYU Turbid 02/11/2024    SPECGRAV 1.017 02/11/2024    PHUR 6.0 02/11/2024    LEUKOCYTESUR Large (A) 02/11/2024    NITRITE Negative 02/11/2024    GLUCOSEU Negative 02/11/2024    KETONESU 80 (3+) (A) 02/11/2024    BILIRUBINUR Negative 02/11/2024    BLOODU Negative 02/11/2024     Urine culture mixed    Imaging Studies: I have personally reviewed pertinent films in PACS  CT abdomen pelvis with contrast    Result Date: 2/11/2024  Narrative: CT ABDOMEN AND PELVIS WITH IV CONTRAST INDICATION: RLQ abdominal pain (Age >= 14y) abdominal pain. History of cervical cancer, prior colon surgery, radiation. Nausea and vomiting, difficulty urinating. COMPARISON: 2/9/2024 TECHNIQUE: CT examination of the abdomen and pelvis was performed. Multiplanar 2D reformatted images were created from the source data. This examination, like all CT scans performed in the [facility], was performed utilizing techniques to minimize radiation dose exposure, including the use of iterative reconstruction and automated exposure control. Radiation dose length product (DLP) for this visit: 810.28 mGy-cm IV Contrast: 85 mL of iohexol (OMNIPAQUE) Enteric Contrast: Not administered. FINDINGS: ABDOMEN LOWER CHEST: No clinically significant abnormality in the visualized lower chest. LIVER/BILIARY TREE: Subcentimeter hypoattenuating lesion(s), too small to characterize but statistically likely benign, which do not require follow-up (ACR White Paper 2017). No suspicious mass. Normal hepatic contours. No biliary dilation. GALLBLADDER: No calcified gallstones. No pericholecystic inflammatory change. SPLEEN: Unremarkable.  PANCREAS: Unremarkable. ADRENAL GLANDS: Unremarkable. KIDNEYS/URETERS: Right-sided hydronephrosis, with perinephric and periureteral edema, and delayed enhancement of the renal pyramids. No evidence of a calcified urinary tract calculus. Degree of soft tissue stranding about the right kidney has mildly worsened. Dilated ureter extends into the pelvis. Left kidney is unremarkable. STOMACH AND BOWEL: Postsurgical changes at the rectosigmoid junction. No bowel obstruction. Moderately increased stool in the colon. APPENDIX: No findings to suggest appendicitis. ABDOMINOPELVIC CAVITY: Small amount of free fluid in the pelvis. VESSELS: Unremarkable for patient's age. PELVIS REPRODUCTIVE ORGANS: Unremarkable for patient's age. URINARY BLADDER: Limited assessment as it is almost empty. There is suspicion however for bladder wall thickening. ABDOMINAL WALL/INGUINAL REGIONS: Unremarkable. BONES: No acute fracture or suspicious osseous lesion.     Impression: 1. Persistent right-sided hydronephrosis with perinephric and periureteral edema mildly worsened from the prior examination. No radiopaque urinary tract calculus. As noted on prior study, findings are suspicious for pyelonephritis although distal ureteral stricture given history of prior radiation is also in the differential. 2. Bladder decompressed but with suspicion of bladder wall thickening as well 3. Small amount of free fluid in the pelvis, unchanged Workstation performed: [id number]     CT abdomen pelvis with contrast    Result Date: 2/9/2024  Narrative: CT ABDOMEN AND PELVIS WITH IV CONTRAST INDICATION: Bowel obstruction suspected abdominal pain, r/o SBO. Dr. Lafleur's note from 2/9/2024 was reviewed. Patient has abdominal pain for 3 days. Status post colon resection in May 2024. Patient has history of cervical cancer receiving chemoradiation. COMPARISON: Abdomen and pelvic CT from 1/1/2023. TECHNIQUE: CT examination of the abdomen and pelvis was performed.  Multiplanar 2D reformatted images were created from the source data. This examination, like all CT scans performed in the [facility], was performed utilizing techniques to minimize radiation dose exposure, including the use of iterative reconstruction and automated exposure control. Radiation dose length product (DLP) for this visit: 934.28 mGy-cm IV Contrast: 100 mL of iohexol (OMNIPAQUE) Enteric Contrast: Not administered. FINDINGS: ABDOMEN LOWER CHEST: No clinically significant abnormality in the visualized lower chest. LIVER/BILIARY TREE: Subcentimeter hypoattenuating lesion(s), too small to characterize but statistically likely benign, which do not require follow-up (ACR White Paper 2017). No suspicious mass. Normal hepatic contours. No biliary dilation. GALLBLADDER: No calcified gallstones. No pericholecystic inflammatory change. SPLEEN: Unremarkable. PANCREAS: Unremarkable. ADRENAL GLANDS: Unremarkable. KIDNEYS/URETERS: Mild right hydroureteronephrosis and perinephric fat stranding, without ureteral stone. Findings could be due to pyelonephritis, or alternatively distal ureteral stricture, especially given history of pelvic radiation. STOMACH AND BOWEL: Colorectal anastomosis noted. No evidence of bowel obstruction. APPENDIX: No findings to suggest appendicitis. ABDOMINOPELVIC CAVITY: Small amount of ascites in the pelvis. No pneumoperitoneum. No lymphadenopathy. VESSELS: Unremarkable for patient's age. PELVIS REPRODUCTIVE ORGANS: Unremarkable for patient's age. URINARY BLADDER: Unremarkable. ABDOMINAL WALL/INGUINAL REGIONS: Unremarkable. BONES: No acute fracture or suspicious osseous lesion.     Impression: Mild right hydroureteronephrosis and perinephric fat stranding, without ureteral stone. Findings could be due to pyelonephritis, or alternatively distal ureteral stricture, especially given history of pelvic radiation. Colorectal anastomosis noted. No evidence of bowel obstruction.  Workstation performed: [id number]           Code Status: Level 1 - Full Code       Christiano Becerra MD

## 2024-02-12 NOTE — PLAN OF CARE
Problem: PAIN - ADULT  Goal: Verbalizes/displays adequate comfort level or baseline comfort level  Description: Interventions:  - Encourage patient to monitor pain and request assistance  - Assess pain using appropriate pain scale  - Administer analgesics based on type and severity of pain and evaluate response  - Implement non-pharmacological measures as appropriate and evaluate response  - Consider cultural and social influences on pain and pain management  - Notify physician/advanced practitioner if interventions unsuccessful or patient reports new pain  Outcome: Progressing     Problem: SAFETY ADULT  Goal: Patient will remain free of falls  Description: INTERVENTIONS:  - Educate patient/family on patient safety including physical limitations  - Instruct patient to call for assistance with activity   - Consult OT/PT to assist with strengthening/mobility   - Keep Call bell within reach  - Keep bed low and locked with side rails adjusted as appropriate  - Keep care items and personal belongings within reach  - Initiate and maintain comfort rounds  - Make Fall Risk Sign visible to staff  - Offer Toileting every 2 Hours, in advance of need  - Initiate/Maintain alarm  - Obtain necessary fall risk management equipment:   - Apply yellow socks and bracelet for high fall risk patients  - Consider moving patient to room near nurses station  Outcome: Progressing

## 2024-02-12 NOTE — UTILIZATION REVIEW
Initial Clinical Review    Admission: Date/Time/Statement:   Admission Orders (From admission, onward)       Ordered        02/11/24 1737  Inpatient Admission  Once                          Orders Placed This Encounter   Procedures    Inpatient Admission     Standing Status:   Standing     Number of Occurrences:   1     Order Specific Question:   Level of Care     Answer:   Med Surg [16]     Order Specific Question:   Estimated length of stay     Answer:   More than 2 Midnights     Order Specific Question:   Certification     Answer:   I certify that inpatient services are medically necessary for this patient for a duration of greater than two midnights. See H&P and MD Progress Notes for additional information about the patient's course of treatment.     ED Arrival Information       Expected   -    Arrival   2/11/2024 13:35    Acuity   Urgent              Means of arrival   Walk-In    Escorted by   Self    Service   Hospitalist    Admission type   Emergency              Arrival complaint   abdominal pain             Chief Complaint   Patient presents with    Abdominal Pain     Pt presents ambulatory with c/o R side abdominal pain radiating to back ,nausea, vomiting        Initial Presentation: 33 y.o. female to ED presents for right-sided abdominal pain radiating to back with nausea/ vomiting, started Saturday. Suspected pyelonephritis. PMH for Depression and Malignant neoplasm of overlapping sites of cervix.   Admit Inpatient level of care for Pyelonephritis. IVF and Iv antibiotics. Urology consult. Monitor creat closely.  Of not pt has chronic doxycycline ordered for reported eye surgery for an infected stye.     Date: 2/12   Day 2:   Progress notes; Bld and urine cultures pending. Plan for OR tomorrow with Urology. Keep NPO post MN. Continue Iv antibiotics.  Pt reports she is having pain generalized over entire abdomen and lower lumbar back area now, she is constipated we discussed some bowel prep. Pt also  reports having had a stye removed from her left eye removed this last Tuesday.    Urology cons; Right hydronephrosis-symptomatic.  Delayed excretion is noted on her most recent CAT scan.   Pt remains symptomatic. Plan for OR.     2/13 OR - S/p Right - CYSTOSCOPY WITH RETROGRADE PYELOGRAM  Right - INSERTION STENT URETERAL  Operative Findings:  Mild right-sided hydronephrosis secondary to a 3 cm distal right ureteral stricture likely secondary to radiation    ED Triage Vitals   Temperature Pulse Respirations Blood Pressure SpO2   02/11/24 1337 02/11/24 1337 02/11/24 1337 02/11/24 1338 02/11/24 1337   98.9 °F (37.2 °C) 79 20 147/68 100 %      Temp Source Heart Rate Source Patient Position - Orthostatic VS BP Location FiO2 (%)   02/11/24 1337 02/11/24 1337 02/11/24 1445 02/11/24 1445 --   Temporal Monitor Sitting Left arm       Pain Score       02/11/24 1337       7          Wt Readings from Last 1 Encounters:   02/11/24 96.6 kg (213 lb)     Additional Vital Signs:   02/12/24 07:11:47 98.3 °F (36.8 °C) 90 16 105/62 76 97 % -- --   02/11/24 20:27:31 98.1 °F (36.7 °C) 70 16 109/67 81 97 % -- --   02/11/24 1930 -- 67 18 106/68 82 96 % None (Room air) --   02/11/24 1830 -- 70 18 128/65 92 97 % None (Room air) Sitting   02/11/24 1800 -- 77 18 125/69 92 98 % None (Room air) Sitting   02/11/24 1730 -- 73 18 97/49 Abnormal  71 96 % None (Room air) Sitting   02/11/24 1700 -- 75 18 111/66 84 96 % None (Room air) Sitting   02/11/24 1600 -- 70 18 126/66 88 99 % None (Room air) Sitting     Pertinent Labs/Diagnostic Test Results:   CT abdomen pelvis with contrast   Final Result by Alistair Delgado MD (02/11 1619)      1. Persistent right-sided hydronephrosis with perinephric and periureteral edema mildly worsened from the prior examination. No radiopaque urinary tract calculus. As noted on prior study, findings are suspicious for pyelonephritis although distal    ureteral stricture given history of prior radiation is also in the  differential.   2. Bladder decompressed but with suspicion of bladder wall thickening as well   3. Small amount of free fluid in the pelvis, unchanged            Workstation performed: [id number]               Results from last 7 days   Lab Units 02/12/24  0614 02/11/24  1453   WBC Thousand/uL 4.19* 5.62   HEMOGLOBIN g/dL 11.0* 11.6   HEMATOCRIT % 32.6* 34.1*   PLATELETS Thousands/uL 187 201   NEUTROS ABS Thousands/µL 2.88 4.52         Results from last 7 days   Lab Units 02/12/24  0614 02/11/24  1422   SODIUM mmol/L 139 136   POTASSIUM mmol/L 3.4* 5.0   CHLORIDE mmol/L 105 101   CO2 mmol/L 26 26   ANION GAP mmol/L 8 9   BUN mg/dL 11 10   CREATININE mg/dL 0.67 0.75   EGFR ml/min/1.73sq m 115 105   CALCIUM mg/dL 8.2* 9.4   MAGNESIUM mg/dL 1.6*  --    PHOSPHORUS mg/dL 3.3  --      Results from last 7 days   Lab Units 02/12/24  0614 02/11/24  1422   AST U/L 9* 31   ALT U/L 8 11   ALK PHOS U/L 42 50   TOTAL PROTEIN g/dL 6.2* 8.0   ALBUMIN g/dL 3.3* 4.2   TOTAL BILIRUBIN mg/dL 0.37 0.52         Results from last 7 days   Lab Units 02/12/24  0614 02/11/24  1422   GLUCOSE RANDOM mg/dL 86 101       Results from last 7 days   Lab Units 02/11/24  1422   LACTIC ACID mmol/L 0.7       Results from last 7 days   Lab Units 02/11/24  1422   LIPASE u/L <6*       Results from last 7 days   Lab Units 02/11/24  1542   CLARITY UA  Turbid   COLOR UA  Yellow   SPEC GRAV UA  1.017   PH UA  6.0   GLUCOSE UA mg/dl Negative   KETONES UA mg/dl 80 (3+)*   BLOOD UA  Negative   PROTEIN UA mg/dl 30 (1+)*   NITRITE UA  Negative   BILIRUBIN UA  Negative   UROBILINOGEN UA (BE) mg/dl <2.0   LEUKOCYTES UA  Large*   WBC UA /hpf Innumerable*   RBC UA /hpf 2-4*   BACTERIA UA /hpf None Seen   EPITHELIAL CELLS WET PREP /hpf Occasional   MUCUS THREADS  Innumerable*           Results from last 7 days   Lab Units 02/11/24  1746   BLOOD CULTURE  Received in Microbiology Lab. Culture in Progress.  Received in Microbiology Lab. Culture in Progress.         ED  Treatment:   Medication Administration from 02/11/2024 1335 to 02/11/2024 2020         Date/Time Order Dose Route Action     02/11/2024 1420 EST sodium chloride 0.9 % bolus 1,000 mL 1,000 mL Intravenous New Bag     02/11/2024 1426 EST HYDROmorphone (DILAUDID) injection 1 mg 1 mg Intravenous Given     02/11/2024 1425 EST ketorolac (TORADOL) injection 15 mg 15 mg Intravenous Given     02/11/2024 1425 EST ondansetron (ZOFRAN) injection 4 mg 4 mg Intravenous Given     02/11/2024 1425 EST LORazepam (ATIVAN) injection 0.5 mg 0.5 mg Intravenous Given     02/11/2024 1555 EST iohexol (OMNIPAQUE) 350 MG/ML injection (MULTI-DOSE) 85 mL 85 mL Intravenous Given     02/11/2024 1634 EST ceftriaxone (ROCEPHIN) 1 g/50 mL in dextrose IVPB 1,000 mg Intravenous New Bag     02/11/2024 1947 EST sodium chloride 0.9 % infusion 125 mL/hr Intravenous New Bag          Past Medical History:   Diagnosis Date    Anxiety     Cancer (HCC)     cervix    Cervical cancer (HCC)     receiving chemo and radiation    COVID     Jan 2022    Depression     Diarrhea     Dizziness     Frequent headaches     Hx of bleeding disorder     vaginal bleeding    Obesity      Present on Admission:   Depression   Malignant neoplasm of overlapping sites of cervix (HCC)      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 female    Admission Orders:  Scheduled Medications:  busPIRone, 7.5 mg, Oral, BID  cefTRIAXone, 1,000 mg, Intravenous, Q24H  lactulose, 30 g, Oral, Once  polyethylene glycol, 17 g, Oral, Daily      Continuous IV Infusions:  sodium chloride, 125 mL/hr, Intravenous, Continuous      PRN Meds:  acetaminophen, 650 mg, Oral, Q6H PRN  HYDROmorphone, 0.5 mg, Intravenous, Q4H PRN  ondansetron, 4 mg, Intravenous, Q6H PRN 2/12 x4  oxyCODONE, 5 mg, Oral, Q4H PRN 2/12 x2      Bld culture x2  IP CONSULT TO UROLOGY    Network Utilization Review  Department  ATTENTION: Please call with any questions or concerns to [phone] and carefully listen to the prompts so that you are directed to the right person. All voicemails are confidential.   For Discharge needs, contact Care Management DC Support Team at [phone] opt. [phone]  Send all requests for admission clinical reviews, approved or denied determinations and any other requests to dedicated fax number below belonging to the campus where the patient is receiving treatment. List of dedicated fax numbers for the Facilities:  FACILITY NAME UR FAX NUMBER   ADMISSION DENIALS (Administrative/Medical Necessity) [phone]   DISCHARGE SUPPORT TEAM (NETWORK) [phone]   PARENT CHILD HEALTH (Maternity/NICU/Pediatrics) [phone]   [facility] [phone]   [facility] [phone]   [facility] [phone]   [facility] [phone]   Critical access hospital [phone]   [facility] [phone]   [facility] [phone]   [facility] [phone]   [facility] [phone]   [facility] [phone]   [facility] [phone]   [facility] [phone]

## 2024-02-12 NOTE — PLAN OF CARE
Problem: PAIN - ADULT  Goal: Verbalizes/displays adequate comfort level or baseline comfort level  Description: Interventions:  - Encourage patient to monitor pain and request assistance  - Assess pain using appropriate pain scale  - Administer analgesics based on type and severity of pain and evaluate response  - Implement non-pharmacological measures as appropriate and evaluate response  - Consider cultural and social influences on pain and pain management  - Notify physician/advanced practitioner if interventions unsuccessful or patient reports new pain  Outcome: Progressing     Problem: SAFETY ADULT  Goal: Patient will remain free of falls  Description: INTERVENTIONS:  - Educate patient/family on patient safety including physical limitations  - Instruct patient to call for assistance with activity   - Consult OT/PT to assist with strengthening/mobility   - Keep Call bell within reach  - Keep bed low and locked with side rails adjusted as appropriate  - Keep care items and personal belongings within reach  - Initiate and maintain comfort rounds  - Make Fall Risk Sign visible to staff  - Offer Toileting every 2 Hours, in advance of need  - Initiate/Maintain alarm  - Obtain necessary fall risk management equipment:   - Apply yellow socks and bracelet for high fall risk patients  - Consider moving patient to room near nurses station  Outcome: Progressing  Goal: Maintain or return to baseline ADL function  Description: INTERVENTIONS:  -  Assess patient's ability to carry out ADLs; assess patient's baseline for ADL function and identify physical deficits which impact ability to perform ADLs (bathing, care of mouth/teeth, toileting, grooming, dressing, etc.)  - Assess/evaluate cause of self-care deficits   - Assess range of motion  - Assess patient's mobility; develop plan if impaired  - Assess patient's need for assistive devices and provide as appropriate  - Encourage maximum independence but intervene and  supervise when necessary  - Involve family in performance of ADLs  - Assess for home care needs following discharge   - Consider OT consult to assist with ADL evaluation and planning for discharge  - Provide patient education as appropriate  Outcome: Progressing  Goal: Maintains/Returns to pre admission functional level  Description: INTERVENTIONS:  - Perform AM-PAC 6 Click Basic Mobility/ Daily Activity assessment daily.  - Set and communicate daily mobility goal to care team and patient/family/caregiver.   - Collaborate with rehabilitation services on mobility goals if consulted  - Perform Range of Motion 3 times a day.  - Reposition patient every 2 hours.  - Dangle patient 3 times a day  - Stand patient 3 times a day  - Ambulate patient 3 times a day  - Out of bed to chair 3 times a day   - Out of bed for meals 3 times a day  - Out of bed for toileting  - Record patient progress and toleration of activity level   Outcome: Progressing

## 2024-02-12 NOTE — CASE MANAGEMENT
Case Management Assessment & Discharge Planning Note    Patient name Margot Moody  Location /-01 MRN [id number]  : 1990 Date 2024       Current Admission Date: 2024  Current Admission Diagnosis:Pyelonephritis   Patient Active Problem List    Diagnosis Date Noted    Pyelonephritis 2024    Hordeolum externum of left eye 2023    Colonic stricture (HCC) 2023    Radiation proctitis 2023    Slow transit constipation 2023    Abdominal pain 2023    Dyspareunia due to medical condition in female patient 11/10/2022    Menopausal symptoms 11/10/2022    Anxiety     Depression     Dysuria 2022    Obesity (BMI 35.0-39.9 without comorbidity) 2022    Malignant neoplasm of overlapping sites of cervix (HCC) 2022    Family history of ovarian cancer 2022      LOS (days): 1  Geometric Mean LOS (GMLOS) (days):   Days to GMLOS:     OBJECTIVE:    Risk of Unplanned Readmission Score: 9.32         Current admission status: Inpatient       Preferred Pharmacy:   [facility]/pharmacy #[id number] [place] [id number]  Phone: [phone] Fax: [phone]    [facility] [place] [address]  Phone: [phone] Fax: [phone]    Primary Care Provider: Demi Mandel DO    Primary Insurance: BLUE CROSS  Secondary Insurance: [employer]    ASSESSMENT:  Active Health Care Proxies       Marcelino Shay Health Care Representative - Significant Other   Primary Phone: [phone] (Mobile)                           Readmission Root Cause  30 Day Readmission: No    Patient Information  Admitted from:: Home  Mental Status: Alert  During Assessment patient was accompanied by: Not accompanied during assessment  Assessment information provided by:: Patient  Primary Caregiver: Self  Support Systems: Self, Spouse/significant other  [facility]  Residence: [place]  What [facility] do you live in?: [place]  Home entry access options. Select all that apply.: Stairs  Number of steps to enter home.: 3  Do the steps have railings?: Yes  Type of Current Residence: 2 story home  Upon entering residence, is there a bedroom on the main floor (no further steps)?: No  A bedroom is located on the following floor levels of residence (select all that apply):: 2nd Floor  Upon entering residence, is there a bathroom on the main floor (no further steps)?: Yes  Number of steps to 2nd floor from main floor: One Flight  Living Arrangements: Lives w/ Spouse/significant other  Is patient a ?: No    Activities of Daily Living Prior to Admission  Functional Status: Independent  Completes ADLs independently?: Yes  Ambulates independently?: Yes  Does patient use assisted devices?: No  Does patient currently own DME?: No  Does patient have a history of Outpatient Therapy (PT/OT)?: Yes (Per Pt PT once a week)  Does the patient have a history of Short-Term Rehab?: No  Does patient have a history of HHC?: No  Does patient currently have HHC?: No         Patient Information Continued  Income Source: Employed  Does patient have prescription coverage?: Yes  Does patient receive dialysis treatments?: No  Does patient have a history of substance abuse?: No  Does patient have a history of Mental Health Diagnosis?: Yes  Is patient receiving treatment for mental health?: Yes  Has patient received inpatient treatment related to mental health in the last 2 years?: No         Means of Transportation  Means of Transport to Appts:: Drives Self      Housing Stability: Low Risk  (2/12/2024)    Housing Stability Vital Sign     Unable to Pay for Housing in the Last Year: No     Number of Places Lived in the Last Year: 1     Unstable Housing in the Last Year: No   Food Insecurity: No Food Insecurity (2/12/2024)    Hunger Vital Sign     Worried About Running Out of Food in the Last Year: Never true      Ran Out of Food in the Last Year: Never true   Transportation Needs: No Transportation Needs (2/12/2024)    PRAPARE - Transportation     Lack of Transportation (Medical): No     Lack of Transportation (Non-Medical): No   Utilities: Not At Risk (2/12/2024)    [facility] Utilities     Threatened with loss of utilities: No       DISCHARGE DETAILS:    Discharge planning discussed with:: pt at bedside  [place] of Choice: Yes  Comments - Freedom of Choice: No discharge recommendations discussed with pt at this time  CM contacted family/caregiver?: No- see comments  Were Treatment Team discharge recommendations reviewed with patient/caregiver?: Yes  Did patient/caregiver verbalize understanding of patient care needs?: Yes  Were patient/caregiver advised of the risks associated with not following Treatment Team discharge recommendations?: Yes    Contacts  Patient Contacts: Marcelino Shay  Relationship to Patient:: Other (Comment)  Contact Method: Phone  Phone Number: [phone]  Reason/Outcome: Continuity of Care, Discharge Planning, Emergency Contact    Requested Home Health Care         Is the patient interested in HHC at discharge?: No    DME Referral Provided  Referral made for DME?: No         Would you like to participate in our [facility] service program?  : No - Declined    Treatment Team Recommendation: Home  Discharge Destination Plan:: Home  Transport at Discharge : Family                                      Additional Comments: CM introduced herself and role to pt at bedside. Pt stated she is independent at baseline with ADLS/IADLS. Pt stated once medically cleared and ready for discharge SO will provide transport once medically cleared and ready for discharge. CM will continue to follow as needed.

## 2024-02-12 NOTE — OCCUPATIONAL THERAPY NOTE
Occupational Therapy Screen     02/12/24 9184   OT Last Visit   OT Visit Date 02/12/24   Note Type   Note type Screen   Additional Comments OT orders recieved, chart reviewed. Spoke with NSG who reports pt is independent with all mobility and self cares in room. Currently funcitoning at baseline level. No OT needs at this time. OT will sign off.

## 2024-02-12 NOTE — PROGRESS NOTES
"[facility]  Progress Note  Name: Margot Moody I  MRN: [id number]  Unit/Bed#: -01 I Date of Admission: 2/11/2024   Date of Service: 2/12/2024 I Hospital Day: 1    Assessment/Plan   * Pyelonephritis  Assessment & Plan  Intravenous fluid resuscitation  Intravenous antibiotic therapy,   Pending urine culture  Pending blood cultures in process   Pt systemically well, afebrile, no leukocytosis, today subtle leukopenia no burning   Consult urology regarding ct imaging, with concern for stricture .     Ct abdomen and pelvis w /contrast: \"1. Persistent right-sided hydronephrosis with perinephric and periureteral edema mildly worsened from the prior examination. No radiopaque urinary tract calculus. As noted on prior study, findings are suspicious for pyelonephritis although distal ureteral stricture given history of prior radiation is also in the differential.   2. Bladder decompressed but with suspicion of bladder wall thickening as well   3. Small amount of free fluid in the pelvis, unchanged \"  Monitor creatinine closely - remains stable   Per Urology Dr Becerra - recommending OR tomorrow   Keep NPO post midnight   Of note patient has chronic doxycycline ordered for reported eye surgery for an infected stye.  She has had intermittent compliance with the Doxy due to symptoms of nausea and vomiting since this past Saturday.  May partially impact culture results?     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    Treatment complicated by radiation proctitis   Note CT findings concerning for possible ureteral stricture - pt has this concern   Patient has followed urology previously and was instructed to self cath however she has been very reticent to proceed with this             VTE Pharmacologic Prophylaxis:   Low Risk (Score " 0-2) - Encourage Ambulation.    Mobility:   Basic Mobility Inpatient Raw Score: 24  JH-HLM Goal: 8: Walk 250 feet or more  JH-HLM Achieved: 8: Walk 250 feet ot more  HLM Goal achieved. Continue to encourage appropriate mobility.    Patient Centered Rounds: I performed bedside rounds with nursing staff today.   Discussions with Specialists or Other Care Team Provider: nursing     Education and Discussions with Family / Patient:  patient .     Total Time Spent on Date of Encounter in care of patient: 30 mins. This time was spent on one or more of the following: performing physical exam; counseling and coordination of care; obtaining or reviewing history; documenting in the medical record; reviewing/ordering tests, medications or procedures; communicating with other healthcare professionals and discussing with patient's family/caregivers.    Current Length of Stay: 1 day(s)  Current Patient Status: Inpatient   Certification Statement: The patient will continue to require additional inpatient hospital stay due to pending  Discharge Plan: Anticipate discharge in 24-48 hrs to home.    Code Status: Level 1 - Full Code    Subjective:   Pt reports that she is having pain generalized over entire abdomen and lower lumbar back area now, she is constipated we discussed some bowel prep. Pt also reports having had a stye removed from her left eye removed this last tuesday    Objective:     Vitals:   Temp (24hrs), Av.2 °F (36.8 °C), Min:98.1 °F (36.7 °C), Max:98.3 °F (36.8 °C)    Temp:  [98.1 °F (36.7 °C)-98.3 °F (36.8 °C)] 98.3 °F (36.8 °C)  HR:  [67-90] 90  Resp:  [16-18] 16  BP: ()/(49-69) 105/62  SpO2:  [96 %-99 %] 97 %  Body mass index is 31.45 kg/m².     Input and Output Summary (last 24 hours):     Intake/Output Summary (Last 24 hours) at 2024 1442  Last data filed at 2024 1314  Gross per 24 hour   Intake 3231.25 ml   Output --   Net 3231.25 ml       Physical Exam:   Physical Exam  Constitutional:        General: She is not in acute distress.     Appearance: She is not ill-appearing, toxic-appearing or diaphoretic.   HENT:      Head: Normocephalic and atraumatic.   Eyes:      General:         Right eye: No discharge.         Left eye: No discharge.   Cardiovascular:      Rate and Rhythm: Normal rate.      Heart sounds: No murmur heard.     No friction rub. No gallop.   Pulmonary:      Effort: No respiratory distress.      Breath sounds: No stridor. No wheezing, rhonchi or rales.   Chest:      Chest wall: No tenderness.   Abdominal:      General: There is no distension.      Palpations: Abdomen is soft. There is no mass.      Tenderness: There is abdominal tenderness. There is no guarding or rebound.      Hernia: No hernia is present.   Musculoskeletal:         General: No swelling, tenderness, deformity or signs of injury.      Right lower leg: No edema.      Left lower leg: No edema.   Skin:     Coloration: Skin is not jaundiced or pale.      Findings: No bruising, erythema, lesion or rash.   Neurological:      Mental Status: She is alert and oriented to person, place, and time.          Additional Data:     Labs:  Results from last 7 days   Lab Units 02/12/24  0614   WBC Thousand/uL 4.19*   HEMOGLOBIN g/dL 11.0*   HEMATOCRIT % 32.6*   PLATELETS Thousands/uL 187   NEUTROS PCT % 69   LYMPHS PCT % 18   MONOS PCT % 11   EOS PCT % 1     Results from last 7 days   Lab Units 02/12/24  0614   SODIUM mmol/L 139   POTASSIUM mmol/L 3.4*   CHLORIDE mmol/L 105   CO2 mmol/L 26   BUN mg/dL 11   CREATININE mg/dL 0.67   ANION GAP mmol/L 8   CALCIUM mg/dL 8.2*   ALBUMIN g/dL 3.3*   TOTAL BILIRUBIN mg/dL 0.37   ALK PHOS U/L 42   ALT U/L 8   AST U/L 9*   GLUCOSE RANDOM mg/dL 86                 Results from last 7 days   Lab Units 02/11/24  1422   LACTIC ACID mmol/L 0.7       Lines/Drains:  Invasive Devices       Peripheral Intravenous Line  Duration             Peripheral IV 02/11/24 Right Antecubital 1 day                           Imaging: Reviewed radiology reports from this admission including: abdominal/pelvic CT    Recent Cultures (last 7 days):   Results from last 7 days   Lab Units 02/11/24  1746 02/11/24  1542   BLOOD CULTURE  Received in Microbiology Lab. Culture in Progress.  Received in Microbiology Lab. Culture in Progress.  --    URINE CULTURE   --  20,000-29,000 cfu/ml       Last 24 Hours Medication List:   Current Facility-Administered Medications   Medication Dose Route Frequency Provider Last Rate    acetaminophen  650 mg Oral Q6H PRN Hetul Lei, DO      busPIRone  7.5 mg Oral BID Hetul Lei, DO      cefTRIAXone  1,000 mg Intravenous Q24H Hetul Lei, DO      HYDROmorphone  0.5 mg Intravenous Q4H PRN Hetul Lei, DO      ondansetron  4 mg Intravenous Q6H PRN Hetul Lei, DO      oxyCODONE  5 mg Oral Q4H PRN Hetul Lei, DO      polyethylene glycol  17 g Oral Daily CORDELL Keys      sodium chloride  125 mL/hr Intravenous Continuous Hetul Lei,  mL/hr (02/12/24 1314)        Today, Patient Was Seen By: CORDELL Keys    **Please Note: This note may have been constructed using a voice recognition system.**

## 2024-02-13 ENCOUNTER — ANESTHESIA EVENT (INPATIENT)
Dept: PERIOP | Facility: HOSPITAL | Age: 34
DRG: 694 | End: 2024-02-13
Payer: COMMERCIAL

## 2024-02-13 ENCOUNTER — ANESTHESIA (INPATIENT)
Dept: PERIOP | Facility: HOSPITAL | Age: 34
DRG: 694 | End: 2024-02-13
Payer: COMMERCIAL

## 2024-02-13 ENCOUNTER — APPOINTMENT (INPATIENT)
Dept: RADIOLOGY | Facility: HOSPITAL | Age: 34
DRG: 694 | End: 2024-02-13
Payer: COMMERCIAL

## 2024-02-13 PROBLEM — N13.30 HYDRONEPHROSIS: Status: ACTIVE | Noted: 2024-02-13

## 2024-02-13 PROBLEM — E87.8 ELECTROLYTE ABNORMALITY: Status: ACTIVE | Noted: 2024-02-13

## 2024-02-13 LAB
ALBUMIN SERPL BCP-MCNC: 3.5 G/DL (ref 3.5–5)
ALP SERPL-CCNC: 47 U/L (ref 34–104)
ALT SERPL W P-5'-P-CCNC: 8 U/L (ref 7–52)
ANION GAP SERPL CALCULATED.3IONS-SCNC: 8 MMOL/L
AST SERPL W P-5'-P-CCNC: 10 U/L (ref 13–39)
BASOPHILS # BLD AUTO: 0.01 THOUSANDS/ÂΜL (ref 0–0.1)
BASOPHILS NFR BLD AUTO: 0 % (ref 0–1)
BILIRUB SERPL-MCNC: 0.32 MG/DL (ref 0.2–1)
BUN SERPL-MCNC: 6 MG/DL (ref 5–25)
CALCIUM SERPL-MCNC: 8.2 MG/DL (ref 8.4–10.2)
CHLORIDE SERPL-SCNC: 107 MMOL/L (ref 96–108)
CO2 SERPL-SCNC: 27 MMOL/L (ref 21–32)
CREAT SERPL-MCNC: 0.65 MG/DL (ref 0.6–1.3)
EOSINOPHIL # BLD AUTO: 0.08 THOUSAND/ÂΜL (ref 0–0.61)
EOSINOPHIL NFR BLD AUTO: 2 % (ref 0–6)
ERYTHROCYTE [DISTWIDTH] IN BLOOD BY AUTOMATED COUNT: 12.9 % (ref 11.6–15.1)
GFR SERPL CREATININE-BSD FRML MDRD: 117 ML/MIN/1.73SQ M
GLUCOSE SERPL-MCNC: 94 MG/DL (ref 65–140)
HCT VFR BLD AUTO: 32.8 % (ref 34.8–46.1)
HGB BLD-MCNC: 11.1 G/DL (ref 11.5–15.4)
IMM GRANULOCYTES # BLD AUTO: 0.01 THOUSAND/UL (ref 0–0.2)
IMM GRANULOCYTES NFR BLD AUTO: 0 % (ref 0–2)
LYMPHOCYTES # BLD AUTO: 0.74 THOUSANDS/ÂΜL (ref 0.6–4.47)
LYMPHOCYTES NFR BLD AUTO: 17 % (ref 14–44)
MCH RBC QN AUTO: 30.6 PG (ref 26.8–34.3)
MCHC RBC AUTO-ENTMCNC: 33.8 G/DL (ref 31.4–37.4)
MCV RBC AUTO: 90 FL (ref 82–98)
MONOCYTES # BLD AUTO: 0.49 THOUSAND/ÂΜL (ref 0.17–1.22)
MONOCYTES NFR BLD AUTO: 12 % (ref 4–12)
NEUTROPHILS # BLD AUTO: 2.92 THOUSANDS/ÂΜL (ref 1.85–7.62)
NEUTS SEG NFR BLD AUTO: 69 % (ref 43–75)
NRBC BLD AUTO-RTO: 0 /100 WBCS
PLATELET # BLD AUTO: 195 THOUSANDS/UL (ref 149–390)
PMV BLD AUTO: 8.7 FL (ref 8.9–12.7)
POTASSIUM SERPL-SCNC: 3.6 MMOL/L (ref 3.5–5.3)
PROT SERPL-MCNC: 6.3 G/DL (ref 6.4–8.4)
RBC # BLD AUTO: 3.63 MILLION/UL (ref 3.81–5.12)
SODIUM SERPL-SCNC: 142 MMOL/L (ref 135–147)
WBC # BLD AUTO: 4.25 THOUSAND/UL (ref 4.31–10.16)

## 2024-02-13 PROCEDURE — C1769 GUIDE WIRE: HCPCS | Performed by: UROLOGY

## 2024-02-13 PROCEDURE — 99232 SBSQ HOSP IP/OBS MODERATE 35: CPT | Performed by: UROLOGY

## 2024-02-13 PROCEDURE — 85025 COMPLETE CBC W/AUTO DIFF WBC: CPT | Performed by: NURSE PRACTITIONER

## 2024-02-13 PROCEDURE — 87086 URINE CULTURE/COLONY COUNT: CPT | Performed by: UROLOGY

## 2024-02-13 PROCEDURE — C2617 STENT, NON-COR, TEM W/O DEL: HCPCS | Performed by: UROLOGY

## 2024-02-13 PROCEDURE — C1758 CATHETER, URETERAL: HCPCS | Performed by: UROLOGY

## 2024-02-13 PROCEDURE — 99232 SBSQ HOSP IP/OBS MODERATE 35: CPT | Performed by: INTERNAL MEDICINE

## 2024-02-13 PROCEDURE — BT1D1ZZ FLUOROSCOPY OF RIGHT KIDNEY, URETER AND BLADDER USING LOW OSMOLAR CONTRAST: ICD-10-PCS | Performed by: INTERNAL MEDICINE

## 2024-02-13 PROCEDURE — 80053 COMPREHEN METABOLIC PANEL: CPT | Performed by: NURSE PRACTITIONER

## 2024-02-13 PROCEDURE — 74420 UROGRAPHY RTRGR +-KUB: CPT

## 2024-02-13 PROCEDURE — 52332 CYSTOSCOPY AND TREATMENT: CPT | Performed by: UROLOGY

## 2024-02-13 PROCEDURE — 0T768DZ DILATION OF RIGHT URETER WITH INTRALUMINAL DEVICE, VIA NATURAL OR ARTIFICIAL OPENING ENDOSCOPIC: ICD-10-PCS | Performed by: UROLOGY

## 2024-02-13 DEVICE — INLAY OPTIMA URETERAL STENT W/O GUIDEWIRE
Type: IMPLANTABLE DEVICE | Site: URETER | Status: FUNCTIONAL
Brand: BARD® INLAY OPTIMA® URETERAL STENT

## 2024-02-13 RX ORDER — SUCCINYLCHOLINE/SOD CL,ISO/PF 100 MG/5ML
SYRINGE (ML) INTRAVENOUS AS NEEDED
Status: DISCONTINUED | OUTPATIENT
Start: 2024-02-13 | End: 2024-02-13

## 2024-02-13 RX ORDER — FENTANYL CITRATE/PF 50 MCG/ML
25 SYRINGE (ML) INJECTION
Status: DISCONTINUED | OUTPATIENT
Start: 2024-02-13 | End: 2024-02-13 | Stop reason: HOSPADM

## 2024-02-13 RX ORDER — PROPOFOL 10 MG/ML
INJECTION, EMULSION INTRAVENOUS AS NEEDED
Status: DISCONTINUED | OUTPATIENT
Start: 2024-02-13 | End: 2024-02-13

## 2024-02-13 RX ORDER — DEXAMETHASONE SODIUM PHOSPHATE 10 MG/ML
INJECTION, SOLUTION INTRAMUSCULAR; INTRAVENOUS AS NEEDED
Status: DISCONTINUED | OUTPATIENT
Start: 2024-02-13 | End: 2024-02-13

## 2024-02-13 RX ORDER — METOCLOPRAMIDE HYDROCHLORIDE 5 MG/ML
10 INJECTION INTRAMUSCULAR; INTRAVENOUS ONCE
Status: COMPLETED | OUTPATIENT
Start: 2024-02-13 | End: 2024-02-13

## 2024-02-13 RX ORDER — ONDANSETRON 2 MG/ML
INJECTION INTRAMUSCULAR; INTRAVENOUS AS NEEDED
Status: DISCONTINUED | OUTPATIENT
Start: 2024-02-13 | End: 2024-02-13

## 2024-02-13 RX ORDER — LIDOCAINE HYDROCHLORIDE 10 MG/ML
INJECTION, SOLUTION EPIDURAL; INFILTRATION; INTRACAUDAL; PERINEURAL AS NEEDED
Status: DISCONTINUED | OUTPATIENT
Start: 2024-02-13 | End: 2024-02-13

## 2024-02-13 RX ORDER — SODIUM CHLORIDE, SODIUM LACTATE, POTASSIUM CHLORIDE, CALCIUM CHLORIDE 600; 310; 30; 20 MG/100ML; MG/100ML; MG/100ML; MG/100ML
INJECTION, SOLUTION INTRAVENOUS CONTINUOUS PRN
Status: DISCONTINUED | OUTPATIENT
Start: 2024-02-13 | End: 2024-02-13

## 2024-02-13 RX ORDER — MAGNESIUM SULFATE HEPTAHYDRATE 40 MG/ML
2 INJECTION, SOLUTION INTRAVENOUS ONCE
Status: COMPLETED | OUTPATIENT
Start: 2024-02-13 | End: 2024-02-13

## 2024-02-13 RX ORDER — MIDAZOLAM HYDROCHLORIDE 2 MG/2ML
INJECTION, SOLUTION INTRAMUSCULAR; INTRAVENOUS AS NEEDED
Status: DISCONTINUED | OUTPATIENT
Start: 2024-02-13 | End: 2024-02-13

## 2024-02-13 RX ORDER — FENTANYL CITRATE 50 UG/ML
INJECTION, SOLUTION INTRAMUSCULAR; INTRAVENOUS AS NEEDED
Status: DISCONTINUED | OUTPATIENT
Start: 2024-02-13 | End: 2024-02-13

## 2024-02-13 RX ORDER — MIDAZOLAM HYDROCHLORIDE 2 MG/2ML
1 INJECTION, SOLUTION INTRAMUSCULAR; INTRAVENOUS ONCE AS NEEDED
Status: COMPLETED | OUTPATIENT
Start: 2024-02-13 | End: 2024-02-13

## 2024-02-13 RX ORDER — DIPHENHYDRAMINE HYDROCHLORIDE 50 MG/ML
12.5 INJECTION INTRAMUSCULAR; INTRAVENOUS ONCE AS NEEDED
Status: DISCONTINUED | OUTPATIENT
Start: 2024-02-13 | End: 2024-02-13 | Stop reason: HOSPADM

## 2024-02-13 RX ORDER — HYDROMORPHONE HCL IN WATER/PF 6 MG/30 ML
0.2 PATIENT CONTROLLED ANALGESIA SYRINGE INTRAVENOUS
Status: DISCONTINUED | OUTPATIENT
Start: 2024-02-13 | End: 2024-02-13 | Stop reason: HOSPADM

## 2024-02-13 RX ADMIN — FENTANYL CITRATE 25 MCG: 50 INJECTION INTRAMUSCULAR; INTRAVENOUS at 14:52

## 2024-02-13 RX ADMIN — MIDAZOLAM 2 MG: 1 INJECTION INTRAMUSCULAR; INTRAVENOUS at 14:02

## 2024-02-13 RX ADMIN — METOCLOPRAMIDE 10 MG: 5 INJECTION, SOLUTION INTRAMUSCULAR; INTRAVENOUS at 14:51

## 2024-02-13 RX ADMIN — CEFTRIAXONE SODIUM 1000 MG: 10 INJECTION, POWDER, FOR SOLUTION INTRAVENOUS at 15:00

## 2024-02-13 RX ADMIN — SODIUM CHLORIDE 125 ML/HR: 0.9 INJECTION, SOLUTION INTRAVENOUS at 23:30

## 2024-02-13 RX ADMIN — SODIUM CHLORIDE 125 ML/HR: 0.9 INJECTION, SOLUTION INTRAVENOUS at 06:41

## 2024-02-13 RX ADMIN — ONDANSETRON 4 MG: 2 INJECTION INTRAMUSCULAR; INTRAVENOUS at 02:06

## 2024-02-13 RX ADMIN — PROPOFOL 200 MG: 10 INJECTION, EMULSION INTRAVENOUS at 14:08

## 2024-02-13 RX ADMIN — OXYCODONE HYDROCHLORIDE 5 MG: 5 TABLET ORAL at 11:44

## 2024-02-13 RX ADMIN — ONDANSETRON 4 MG: 2 INJECTION INTRAMUSCULAR; INTRAVENOUS at 09:39

## 2024-02-13 RX ADMIN — OXYCODONE HYDROCHLORIDE 5 MG: 5 TABLET ORAL at 02:06

## 2024-02-13 RX ADMIN — FENTANYL CITRATE 50 MCG: 50 INJECTION INTRAMUSCULAR; INTRAVENOUS at 14:18

## 2024-02-13 RX ADMIN — FENTANYL CITRATE 50 MCG: 50 INJECTION INTRAMUSCULAR; INTRAVENOUS at 14:03

## 2024-02-13 RX ADMIN — Medication 100 MG: at 14:09

## 2024-02-13 RX ADMIN — SODIUM CHLORIDE, SODIUM LACTATE, POTASSIUM CHLORIDE, AND CALCIUM CHLORIDE: .6; .31; .03; .02 INJECTION, SOLUTION INTRAVENOUS at 14:01

## 2024-02-13 RX ADMIN — MIDAZOLAM 1 MG: 1 INJECTION INTRAMUSCULAR; INTRAVENOUS at 15:06

## 2024-02-13 RX ADMIN — DEXAMETHASONE SODIUM PHOSPHATE 10 MG: 10 INJECTION, SOLUTION INTRAMUSCULAR; INTRAVENOUS at 14:14

## 2024-02-13 RX ADMIN — ONDANSETRON 4 MG: 2 INJECTION INTRAMUSCULAR; INTRAVENOUS at 13:38

## 2024-02-13 RX ADMIN — BUSPIRONE HYDROCHLORIDE 7.5 MG: 5 TABLET ORAL at 18:27

## 2024-02-13 RX ADMIN — LIDOCAINE HYDROCHLORIDE 5 ML: 10 INJECTION, SOLUTION EPIDURAL; INFILTRATION; INTRACAUDAL; PERINEURAL at 14:06

## 2024-02-13 RX ADMIN — BUSPIRONE HYDROCHLORIDE 7.5 MG: 5 TABLET ORAL at 09:38

## 2024-02-13 RX ADMIN — FENTANYL CITRATE 25 MCG: 50 INJECTION INTRAMUSCULAR; INTRAVENOUS at 14:43

## 2024-02-13 RX ADMIN — MAGNESIUM SULFATE HEPTAHYDRATE 2 G: 40 INJECTION, SOLUTION INTRAVENOUS at 13:01

## 2024-02-13 NOTE — ASSESSMENT & PLAN NOTE
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

## 2024-02-13 NOTE — DISCHARGE INSTR - AVS FIRST PAGE
You underwent cystoscopy (looking inside the bladder) and  ureteroscopy (looking inside the  ureter) and placement of ureteral stent.    WHAT IS A STENT?  At the end of the procedure, your doctor may place a stent into your ureter. A stent is a thin, flexible piece of plastic that will hold open your ureter. This allows your kidney to drain easily. The stent is about 12 inches long and looks and feels like a thin piece of spaghetti.    AFTER THE PROCEDURE  After the procedure you may experience the following symptoms. All of these are normal and should resolve within 1 or 2 days after your stent is removed.  1) Urinary frequency (urinating more often than usual)  2) Urinary urgency (the sensation that you need to urinate right away)  3) Painful urination (this can be pain in your bladder or in your back when  you urinate)  4) Blood in your urine ( a stent can irritate the lining of your bladder causing it to bleed)  5) Back/Flank pain, especially with urination    ° Your stent may cause you a lot of discomfort or blood in the urine this is normal  ° Please take oxybutynin 3 times a day as needed for discomfort  ° You can take Pyridium 3 times a day as needed for discomfort, this will color your urine yellow-orange  ° Please take Flomax daily to help relax the ureter  ° You can take additional ibuprofen if needed for any discomfort  ° Make sure you are drinking at least 2 L of water a day unless you have a fluid restriction    General Instructions   1. Stent: You have a stent in your  ureter.    2. Bathing: You may shower and bathe as per usual.   3. Diet: You may resume your pre-operative diet   4. Activity: Walk as much as possible. No strenuous exercise or work for the first 5 days. From then on, slowly increase your level of activity back to normal.   5. If you currently smoke or use tobacco product, consider quitting. There are resources available to help you stop. Please ask your provider.   6. Report fever  101.5 or higher, pain not controlled by medications, or anything you believe warrants medical attention

## 2024-02-13 NOTE — ASSESSMENT & PLAN NOTE
As evidenced on CT imaging with associated perinephric/periureteral edema  Appreciate urology input -> plan for cystoscopy today with tentative stenting for decompression  Renal function intact

## 2024-02-13 NOTE — ANESTHESIA PREPROCEDURE EVALUATION
Procedure:  CYSTOSCOPY WITH RETROGRADE PYELOGRAM (Right: Bladder)  INSERTION STENT URETERAL (Right: Ureter)    Relevant Problems   ANESTHESIA (within normal limits)      CARDIO (within normal limits)      ENDO (within normal limits)      GI/HEPATIC (within normal limits)      /RENAL   (+) Hydronephrosis of right kidney      GYN   (+) Malignant neoplasm of overlapping sites of cervix (HCC)      HEMATOLOGY (within normal limits)      NEURO/PSYCH   (+) Anxiety   (+) Depression with anxiety      PULMONARY (within normal limits)      Digestive   (+) Colonic stricture (HCC)      Genitourinary   (+) Pyelonephritis      CTAP 2/11/2024:  1. Persistent right-sided hydronephrosis with perinephric and periureteral edema mildly worsened from the prior examination. No radiopaque urinary tract calculus. As noted on prior study, findings are suspicious for pyelonephritis although distal   ureteral stricture given history of prior radiation is also in the differential.  2. Bladder decompressed but with suspicion of bladder wall thickening as well  3. Small amount of free fluid in the pelvis, unchanged    EKG 1/2/2023:  Normal sinus rhythm  Normal ECG  No previous ECGs available    Lab Results   Component Value Date    WBC 4.25 (L) 02/13/2024    HGB 11.1 (L) 02/13/2024    HCT 32.8 (L) 02/13/2024    MCV 90 02/13/2024     02/13/2024     Lab Results   Component Value Date    SODIUM 142 02/13/2024    K 3.6 02/13/2024     02/13/2024    CO2 27 02/13/2024    BUN 6 02/13/2024    CREATININE 0.65 02/13/2024    GLUC 94 02/13/2024    CALCIUM 8.2 (L) 02/13/2024     Lab Results   Component Value Date    INR 1.00 01/02/2023    PROTIME 14.0 01/02/2023     Lab Results   Component Value Date    HGBA1C 5.7 (H) 05/01/2023          Physical Exam    Airway    Mallampati score: I  TM Distance: >3 FB  Neck ROM: full     Dental   No notable dental hx     Cardiovascular  Cardiovascular exam normal    Pulmonary  Pulmonary exam normal     Other  Findings  post-pubertal.      Anesthesia Plan  ASA Score- 2 Emergent    Anesthesia Type- general with ASA Monitors.         Additional Monitors:     Airway Plan: ETT.    Comment: Patient with active nausea. Vomiting for several days, last 2 days ago. Will plan for GETA with RSI.    Discussed with patient plan for anesthesia, as well as risks/benefits, including likely chance of PONV and sore throat, as well as rare possibilities of dental/oropharyngeal/ocular injuries, aspiration, and severe/life-threatening surgical and anesthetic emergencies.  Patient expressed understanding and agreement.  .       Plan Factors-    Chart reviewed. EKG reviewed.  Existing labs reviewed. Patient summary reviewed.                  Induction- intravenous and rapid sequence induction.    Postoperative Plan- . Planned trial extubation    Informed Consent- Anesthetic plan and risks discussed with patient.  I personally reviewed this patient with the CRNA. Discussed and agreed on the Anesthesia Plan with the CRNA..

## 2024-02-13 NOTE — PROGRESS NOTES
"[facility]  Progress Note  Name: Margot Moody I  MRN: [id number]  Unit/Bed#: OR POOL I Date of Admission: 2/11/2024   Date of Service: 2/13/2024 I Hospital Day: 2      Assessment & Plan:    * Pyelonephritis  Assessment & Plan  As evidenced on CT imaging with: \"persistent right-sided hydronephrosis with perinephric and periureteral edema mildly worsened from the prior examination. No radiopaque urinary tract calculus. As noted on prior study, findings are suspicious for pyelonephritis although distal ureteral stricture given history of prior radiation is also in the differential. Bladder decompressed but with suspicion of bladder wall thickening as well. 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   Status post prior salpingectomy and chemotherapy/radiation - treatment complicated by radiation proctitis      Note CT findings concerning for possible ureteral stricture - see plan for hydronephrosis above  Patient has followed urology previously and was instructed to self cath however she has been very reticent to proceed with this  Outpatient follow-up with gynecologic oncology     Depression with anxiety  Assessment & Plan  Continue BuSpar    Electrolyte abnormalities  Assessment & Plan  Monitor/replete serum potassium/magnesium deficiencies if present      DVT Prophylaxis:  SCDs    AM-PAC Basic Mobility:  Basic Mobility Inpatient Raw Score: 24  JH-HLM Achieved: 8: Walk 250 feet ot " more  [facility] Goal: 8: Walk 250 feet or more    Patient Centered Rounds:  I have performed bedside rounds and discussed plan of care with nursing today.  Discussions with Specialists or Other Care Team Provider:  see above assessments if applicable    Education and Discussions with Family / Patient:  Patient at bedside, who will self update contacts as necessary    Time Spent for Care:  35 minutes. More than 50% of total time spent on counseling and coordination of care, on one or more of the following: performing physical exam; counseling and coordination of care, obtaining or reviewing history, documenting in the medical record, reviewing/ordering tests/medications/procedures, and communicating with other healthcare professionals.    Current Length of Stay: 2 day(s)  Current Patient Status: Inpatient   Certification Statement:  Patient will continue to require additional hospital stay due to assessments as noted above.    Code Status: Level 1 - Full Code        Subjective:     Encountered earlier in the day.  Resting in bed still complaining of intermittent nausea, exacerbated during times of urination.  Pain remains localized to the right flank with radiation to the back.        Objective:     Vitals:   Temp (24hrs), Av.7 °F (37.1 °C), Min:98.3 °F (36.8 °C), Max:99.6 °F (37.6 °C)    Temp:  [98.3 °F (36.8 °C)-99.6 °F (37.6 °C)] 98.4 °F (36.9 °C)  HR:  [70-82] 82  Resp:  [16] 16  BP: (105-111)/(68-71) 105/68  SpO2:  [97 %-99 %] 98 %  Body mass index is 31.45 kg/m².     Input and Output Summary (last 24 hours):       Intake/Output Summary (Last 24 hours) at 2024 1340  Last data filed at 2024 2147  Gross per 24 hour   Intake 1000 ml   Output --   Net 1000 ml       Physical Exam:     GENERAL Mildly weak/fatigued   HEAD   Normocephalic - atraumatic   EYES Nonicteric   MOUTH   Mucosa moist   NECK   Supple - full range of motion   CARDIAC Rate controlled - S1/S2 positive   PULMONARY    Clear breath sounds  bilaterally - nonlabored respirations   ABDOMEN   Soft - right flank tenderness with radiation to back   MUSCULOSKELETAL   Motor strength/range of motion intact   NEUROLOGIC   Alert/oriented at baseline   SKIN   Chronic wrinkles/blemishes    PSYCHIATRIC   Mood/affect stable         Additional Data:     Labs & Recent Cultures:    Results from last 7 days   Lab Units 02/13/24  0508   WBC Thousand/uL 4.25*   HEMOGLOBIN g/dL 11.1*   HEMATOCRIT % 32.8*   PLATELETS Thousands/uL 195   NEUTROS PCT % 69   LYMPHS PCT % 17   MONOS PCT % 12   EOS PCT % 2     Results from last 7 days   Lab Units 02/13/24  0508   POTASSIUM mmol/L 3.6   CHLORIDE mmol/L 107   CO2 mmol/L 27   BUN mg/dL 6   CREATININE mg/dL 0.65   CALCIUM mg/dL 8.2*   ALK PHOS U/L 47   ALT U/L 8   AST U/L 10*                 Results from last 7 days   Lab Units 02/11/24  1422   LACTIC ACID mmol/L 0.7         Results from last 7 days   Lab Units 02/11/24  1746 02/11/24  1542   BLOOD CULTURE  No Growth at 24 hrs.  No Growth at 24 hrs.  --    URINE CULTURE   --  20,000-29,000 cfu/ml         Lines/Drains:  Invasive Devices       Peripheral Intravenous Line  Duration             Peripheral IV 02/11/24 Right Antecubital 1 day                      Last 24 Hours Medication List:   Current Facility-Administered Medications   Medication Dose Route Frequency Provider Last Rate    [Transfer Hold] acetaminophen  650 mg Oral Q6H PRN Hetul Lei, DO      [Transfer Hold] busPIRone  7.5 mg Oral BID Hetul Lei, DO      [Transfer Hold] cefTRIAXone  1,000 mg Intravenous Q24H Hetul Lei, DO 1,000 mg (02/12/24 1650)    [Transfer Hold] HYDROmorphone  0.5 mg Intravenous Q4H PRN Hetul Lei, DO      magnesium sulfate  2 g Intravenous Once Louise Charles MD 2 g (02/13/24 1301)    [Transfer Hold] ondansetron  4 mg Intravenous Q6H PRN Hetul Lei, DO      [Transfer Hold] oxyCODONE  5 mg Oral Q4H PRN Hetul Lei, DO      [Transfer Hold] polyethylene glycol  17 g Oral Daily CORDELL Keys       sodium chloride  125 mL/hr Intravenous Continuous Hetul Lei,  mL/hr (02/13/24 0641)     Facility-Administered Medications Ordered in Other Encounters   Medication Dose Route Frequency Provider Last Rate    ondansetron   Intravenous PRN MD JHON De La Cruz MD   Hospitalist - [facility]        ** Please Note: This note is constructed using a voice recognition dictation system.  An occasional wrong word/phrase or “sound-a-like” substitution may have been picked up by dictation device due to the inherent limitations of voice recognition software.  Read the chart carefully and recognize, using reasonable context, where substitutions may have occurred.**

## 2024-02-13 NOTE — PHYSICAL THERAPY NOTE
Physical Therapy Screen    Patient Name: Margot Moody    Today's Date: 2/13/2024     Problem List  Principal Problem:    Pyelonephritis  Active Problems:    Malignant neoplasm of overlapping sites of cervix (HCC)    Depression       Past Medical History  Past Medical History:   Diagnosis Date    Anxiety     Cancer (HCC)     cervix    Cervical cancer (HCC)     receiving chemo and radiation    COVID     Jan 2022    Depression     Diarrhea     Dizziness     Frequent headaches     Hx of bleeding disorder     vaginal bleeding    Obesity         Past Surgical History  Past Surgical History:   Procedure Laterality Date    CERVICAL BIOPSY  W/ LOOP ELECTRODE EXCISION      LYMPH NODE DISSECTION Bilateral 5/6/2022    Procedure: DISSECTION/STAGING LYMPH NODE PELVIS/ABDOMEN;  Surgeon: Parminder Moctezuma MD;  Location: [facility];  Service: Gynecology Oncology    SD INSERTION VAGINAL RADIATION DEVICE N/A 7/15/2022    Procedure: INSERTION NADIR SLEEVE VAGINA WITH POST OP BRACHYTHERAPY (IN RADIATION ONCOLOGY);  Surgeon: Parminder Moctezuma MD;  Location: [facility];  Service: Gynecology Oncology    SD LAPAROSCOPY COLECTOMY PARTIAL W/ANASTOMOSIS N/A 5/10/2023    Procedure: RESECTION COLON SIGMOID LAPAROSCOPIC;  Surgeon: Marin Salinas MD;  Location: [facility];  Service: Colorectal    SD SIGMOIDOSCOPY FLX DX W/COLLJ SPEC BR/WA IF PFRMD N/A 5/10/2023    Procedure: SIGMOIDOSCOPY FLEXIBLE;  Surgeon: Marin Salinas MD;  Location: [facility];  Service: Colorectal    SALPINGECTOMY Bilateral 5/6/2022    Procedure: SALPINGECTOMY, OVARIAN TRANSPOSITION;  Surgeon: Parminder Moctezuma MD;  Location: [facility];  Service: Gynecology Oncology    US GUIDANCE  7/15/2022      PT orders received and chart reviewed. Per nsg pt is independent with all mobility and functioning at baseline level. No acute care PT needs. Will d/c PT.    Ankit Chauhan, PT, DPT

## 2024-02-13 NOTE — ASSESSMENT & PLAN NOTE
CT:Persistent right-sided hydronephrosis with perinephric and periureteral edema mildly worsened from the prior examination. No radiopaque urinary tract calculus   WBC 4.25  Creat 0.65  Afebrile, vital signs stable  Procedural urine culture pending  S/p right ureteral stent insertion 2/13  Follow-up with urology in the office in the next few weeks to discuss next steps  Okay for discharge from urological standpoint when medically cleared

## 2024-02-13 NOTE — ASSESSMENT & PLAN NOTE
"As evidenced on CT imaging with: \"persistent right-sided hydronephrosis with perinephric and periureteral edema mildly worsened from the prior examination. No radiopaque urinary tract calculus. As noted on prior study, findings are suspicious for pyelonephritis although distal ureteral stricture given history of prior radiation is also in the differential. Bladder decompressed but with suspicion of bladder wall thickening as well. Small amount of free fluid in the pelvis, unchanged.\"  Continue IV Rocephin  Optimize pain/emesis control  See plan for hydronephrosis below  "

## 2024-02-13 NOTE — PROGRESS NOTES
Progress Note - Urology  Margot Moody 1990, 33 y.o. female MRN: [id number]    Unit/Bed#: -[facility] Encounter: [id number]    Hydronephrosis  Assessment & Plan  CT:Persistent right-sided hydronephrosis with perinephric and periureteral edema mildly worsened from the prior examination. No radiopaque urinary tract calculus   WBC 4.25  Creat 0.65  Afebrile, vital signs stable  Urine culture mixed contaminants x 3  N.p.o.  Plan for cystoscopy, right retrograde pyelography and right stent placement  Surgical consent obtained and placed in chart    * Pyelonephritis  Assessment & Plan  Urine culture, mixed contaminants  On ceftriaxone  Medical optimization per primary team        Subjective: 33-year-old female with history of stage III cervical cancer treated with chemo and radiation in 2022.  Treatment complicated by radiation proctitis.  She had colon resection in 2023.  Admitted with right-sided abdominal and flank pain.  Long history of difficulty urinating with incontinence and frequency.  No dysuria or gross hematuria.  History of urinary retention but previously declined CIC.  Will check PVR    24 HR EVENTS:   no significant events.      Patient has  complaints of abdominal pain and nausea.       Review of Systems   Constitutional:  Negative for activity change, appetite change, chills, fatigue, fever and unexpected weight change.   HENT:  Negative for facial swelling.    Eyes:  Negative for discharge.   Respiratory: Negative.  Negative for cough and shortness of breath.    Cardiovascular:  Negative for chest pain and leg swelling.   Gastrointestinal:  Positive for abdominal pain and nausea. Negative for abdominal distention, constipation, diarrhea and vomiting.   Endocrine: Negative.    Genitourinary:  Negative for decreased urine volume, difficulty urinating, dysuria, enuresis, flank pain, frequency, genital sores, hematuria and urgency.   Musculoskeletal:  Negative for back pain and myalgias.   Skin:   "Negative for pallor and rash.   Allergic/Immunologic: Negative.  Negative for immunocompromised state.   Neurological:  Negative for facial asymmetry and speech difficulty.   Psychiatric/Behavioral:  Negative for agitation and confusion.        Objective:    Vitals: Blood pressure 105/68, pulse 82, temperature 98.4 °F (36.9 °C), resp. rate 16, height 5' 9\" (1.753 m), weight 96.6 kg (213 lb), last menstrual period 07/27/2022, SpO2 98%, not currently breastfeeding.,Body mass index is 31.45 kg/m².  INS & OUTS:  I/O last 24 hours:  In: 3181.3 [I.V.:3181.3]  Out: -     Physical Exam  Vitals and nursing note reviewed.   Constitutional:       General: She is not in acute distress.     Appearance: Normal appearance. She is not ill-appearing, toxic-appearing or diaphoretic.   HENT:      Head: Normocephalic.   Cardiovascular:      Rate and Rhythm: Normal rate.      Heart sounds: Normal heart sounds. No murmur heard.  Pulmonary:      Effort: Pulmonary effort is normal. No respiratory distress.      Breath sounds: Normal breath sounds. No wheezing, rhonchi or rales.   Abdominal:      General: Abdomen is flat. There is no distension.      Palpations: Abdomen is soft.      Tenderness: There is no guarding or rebound.   Musculoskeletal:         General: No swelling.      Cervical back: Normal range of motion.   Skin:     General: Skin is warm and dry.   Neurological:      General: No focal deficit present.      Mental Status: She is alert and oriented to person, place, and time.   Psychiatric:         Mood and Affect: Mood normal.         Behavior: Behavior normal.         Thought Content: Thought content normal.         Judgment: Judgment normal.         Imaging:  CT ABDOMEN AND PELVIS WITH IV CONTRAST     INDICATION: RLQ abdominal pain (Age >= 14y)  abdominal pain. History of cervical cancer, prior colon surgery, radiation. Nausea and vomiting, difficulty urinating.     COMPARISON: 2/9/2024     TECHNIQUE: CT examination of the " abdomen and pelvis was performed. Multiplanar 2D reformatted images were created from the source data.     This examination, like all CT scans performed in the [facility], was performed utilizing techniques to minimize radiation dose exposure, including the use of iterative reconstruction and automated exposure control. Radiation dose length   product (DLP) for this visit: 810.28 mGy-cm     IV Contrast: 85 mL of iohexol (OMNIPAQUE)  Enteric Contrast: Not administered.     FINDINGS:     ABDOMEN     LOWER CHEST: No clinically significant abnormality in the visualized lower chest.     LIVER/BILIARY TREE: Subcentimeter hypoattenuating lesion(s), too small to characterize but statistically likely benign, which do not require follow-up (ACR White Paper 2017). No suspicious mass. Normal hepatic contours. No biliary dilation.     GALLBLADDER: No calcified gallstones. No pericholecystic inflammatory change.     SPLEEN: Unremarkable.     PANCREAS: Unremarkable.     ADRENAL GLANDS: Unremarkable.     KIDNEYS/URETERS: Right-sided hydronephrosis, with perinephric and periureteral edema, and delayed enhancement of the renal pyramids. No evidence of a calcified urinary tract calculus. Degree of soft tissue stranding about the right kidney has mildly   worsened. Dilated ureter extends into the pelvis. Left kidney is unremarkable.     STOMACH AND BOWEL: Postsurgical changes at the rectosigmoid junction. No bowel obstruction. Moderately increased stool in the colon.     APPENDIX: No findings to suggest appendicitis.     ABDOMINOPELVIC CAVITY: Small amount of free fluid in the pelvis.     VESSELS: Unremarkable for patient's age.     PELVIS     REPRODUCTIVE ORGANS: Unremarkable for patient's age.     URINARY BLADDER: Limited assessment as it is almost empty. There is suspicion however for bladder wall thickening.     ABDOMINAL WALL/INGUINAL REGIONS: Unremarkable.     BONES: No acute fracture or suspicious osseous  lesion.     IMPRESSION:     1. Persistent right-sided hydronephrosis with perinephric and periureteral edema mildly worsened from the prior examination. No radiopaque urinary tract calculus. As noted on prior study, findings are suspicious for pyelonephritis although distal   ureteral stricture given history of prior radiation is also in the differential.  2. Bladder decompressed but with suspicion of bladder wall thickening as well  3. Small amount of free fluid in the pelvis, unchanged           Workstation performed: [id number]  Imaging reviewed - both report and images personally reviewed.     Labs:  Recent Labs     02/11/24  1453 02/12/24  0614 02/13/24  0508   WBC 5.62 4.19* 4.25*       Recent Labs     02/11/24  1453 02/12/24  0614 02/13/24  0508   HGB 11.6 11.0* 11.1*     Recent Labs     02/11/24  1453 02/12/24  0614 02/13/24  0508   HCT 34.1* 32.6* 32.8*     Recent Labs     02/11/24  1422 02/12/24  0614 02/13/24  0508   CREATININE 0.75 0.67 0.65     Lab Results   Component Value Date    HGB 11.1 (L) 02/13/2024    HCT 32.8 (L) 02/13/2024    WBC 4.25 (L) 02/13/2024     02/13/2024     Lab Results   Component Value Date    K 3.6 02/13/2024     02/13/2024    CO2 27 02/13/2024    BUN 6 02/13/2024    CREATININE 0.65 02/13/2024    CALCIUM 8.2 (L) 02/13/2024     Urinalysis: Innumerable WBC's per HPF and 2-4 RBC's per HPF  Urine Culture: Growth: Mixed contaminants x 3    History:    Past Medical History:   Diagnosis Date    Anxiety     Cancer (HCC)     cervix    Cervical cancer (HCC)     receiving chemo and radiation    COVID     Jan 2022    Depression     Diarrhea     Dizziness     Frequent headaches     Hx of bleeding disorder     vaginal bleeding    Obesity      Past Surgical History:   Procedure Laterality Date    CERVICAL BIOPSY  W/ LOOP ELECTRODE EXCISION      LYMPH NODE DISSECTION Bilateral 5/6/2022    Procedure: DISSECTION/STAGING LYMPH NODE PELVIS/ABDOMEN;  Surgeon: Parminder Moctezuma MD;   [facility];  Service: Gynecology Oncology    NJ INSERTION VAGINAL RADIATION DEVICE N/A 7/15/2022    Procedure: INSERTION NADIR SLEEVE VAGINA WITH POST OP BRACHYTHERAPY (IN RADIATION ONCOLOGY);  Surgeon: Parminder Moctezuma MD;  [facility];  Service: Gynecology Oncology    NJ LAPAROSCOPY COLECTOMY PARTIAL W/ANASTOMOSIS N/A 5/10/2023    Procedure: RESECTION COLON SIGMOID LAPAROSCOPIC;  Surgeon: Marin Salinas MD;  Location: [facility];  Service: Colorectal    NJ SIGMOIDOSCOPY FLX DX W/COLLJ SPEC BR/WA IF PFRMD N/A 5/10/2023    Procedure: SIGMOIDOSCOPY FLEXIBLE;  Surgeon: Marin Salinas MD;  [facility];  Service: Colorectal    SALPINGECTOMY Bilateral 5/6/2022    Procedure: SALPINGECTOMY, OVARIAN TRANSPOSITION;  Surgeon: Parminder Moctezuma MD;  [facility];  Service: Gynecology Oncology    US GUIDANCE  7/15/2022     Family History   Problem Relation Age of Onset    Ovarian cancer Maternal Aunt     Ovarian cancer Maternal Grandmother     No Known Problems Mother     Heart disease Father      Social History     Socioeconomic History    Marital status: Single     Spouse name: None    Number of children: None    Years of education: None    Highest education level: None   Occupational History    None   Tobacco Use    Smoking status: Never     Passive exposure: Current (very mild/social)    Smokeless tobacco: Never   Vaping Use    Vaping status: Never Used   Substance and Sexual Activity    Alcohol use: Not Currently    Drug use: Never    Sexual activity: Not Currently   Other Topics Concern    None   Social History Narrative    None     Social Determinants of Health     Financial Resource Strain: Not on file   Food Insecurity: No Food Insecurity (2/12/2024)    Hunger Vital Sign     Worried About Running Out of Food in the Last Year: Never true     Ran Out of Food in the Last Year: Never true   Transportation Needs: No Transportation Needs (2/12/2024)    PRAPARE -  Transportation     Lack of Transportation (Medical): No     Lack of Transportation (Non-Medical): No   Physical Activity: Not on file   Stress: Not on file   Social Connections: Not on file   Intimate Partner Violence: Not At Risk (11/2/2023)    Humiliation, Afraid, Rape, and Kick questionnaire     Fear of Current or Ex-Partner: No     Emotionally Abused: No     Physically Abused: No     Sexually Abused: No   Housing Stability: Low Risk  (2/12/2024)    Housing Stability Vital Sign     Unable to Pay for Housing in the Last Year: No     Number of Places Lived in the Last Year: 1     Unstable Housing in the Last Year: No       The following portions of the patient's history were reviewed and updated as appropriate: allergies, current medications, past family history, past medical history, past social history, past surgical history and problem list    CORDELL Salinas  Date: 2/13/2024 Time: 9:21 AM

## 2024-02-13 NOTE — OP NOTE
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    Procedure(s):  Right - CYSTOSCOPY WITH RETROGRADE PYELOGRAM  Right - INSERTION STENT URETERAL    Specimen(s):  ID Type Source Tests Collected by Time Destination   A : urine culture Urine Urine, Cystoscopic URINE CULTURE Chuck Campo MD 2024 1418        Estimated Blood Loss:   Minimal    Drains:  Right 24-6 [place] double-J ureteral stent without string    Anesthesia Type:   Choice    Operative Indications:  Pyelonephritis [N12]  Hydronephrosis, unspecified hydronephrosis type [N13.30]      Operative Findings:  Mild right-sided hydronephrosis secondary to a 3 cm distal right ureteral stricture likely secondary to radiation    Complications:   None    Procedure and Technique:  Audrey is a 33-year-old female who approximately 2 years ago had radiation for stage III cervical cancer.  She now presents with right flank pain.  CT scan shows right-sided hydronephrosis to the level of the bladder where the distal ureter tapers  I am concerned about possible radiation induced stricture.  Risk and benefits of cystoscopy with right retrograde pyelography and right ureteral stent insertion were discussed and reviewed.  Informed consent was obtained.  Patient is brought to the operating room on 2024.  After the smooth induction of general LMA anesthesia, patient was placed in dorsolithotomy position.  Her genitalia prepped and draped in sterile fashion.  Intravenous antibiotics were administered.  A timeout was performed  with all members the operative team confirming the patient's identity, procedure to be performed, and laterality of the case.    A 22 [place] rigid cystoscope with 30 degree lens was inserted.  The bladder was thoroughly inspected.  Changes along the posterior wall were highly concerning for radiation induced changes of the bladder.  The right ureteral orifice was read and appeared somewhat swollen.    A 5 [place] open-ended catheter was inserted with the assistance of a Bard Solo plus wire.  A right retrograde pyelogram showed narrow tapering of the distal 3 cm of the ureter.  The region proximal this was slightly dilated.  There is mild right-sided hydronephrosis.  A wire was inserted all the way into the upper pole calyx and a right 24-6 [place] double-J ureteral stent without string was then placed.  The proximal coil was appreciated within the right renal pelvis and the distal coil was visualized within the bladder.  There was no string left in place.  The bladder was emptied and the cystoscope was removed.    Overall the patient tolerated the procedure well and there were no complications.  The patient was extubated in the operating room and transferred the PACU in stable condition at the conclusion of the case.    Patient Disposition:  PACU         SIGNATURE: Chuck Campo MD  DATE: February 13, 2024  TIME: 2:48 PM

## 2024-02-13 NOTE — ANESTHESIA POSTPROCEDURE EVALUATION
Post-Op Assessment Note    CV Status:  Stable  Pain Score: 0    Pain management: adequate       Mental Status:  Sleepy and arousable   Hydration Status:  Stable   PONV Controlled:  None   Airway Patency:  Patent  Two or more mitigation strategies used for obstructive sleep apnea   Post Op Vitals Reviewed: Yes    No anethesia notable event occurred.    Staff: SUSIE               /82 (02/13/24 1435)    Temp 98 °F (36.7 °C) (02/13/24 1435)    Pulse 87   Resp 16 (02/13/24 1435)    SpO2 100 % (02/13/24 1435)

## 2024-02-14 ENCOUNTER — APPOINTMENT (OUTPATIENT)
Dept: PHYSICAL THERAPY | Facility: CLINIC | Age: 34
End: 2024-02-14
Payer: COMMERCIAL

## 2024-02-14 ENCOUNTER — TELEPHONE (OUTPATIENT)
Dept: UROLOGY | Facility: AMBULATORY SURGERY CENTER | Age: 34
End: 2024-02-14

## 2024-02-14 ENCOUNTER — TELEPHONE (OUTPATIENT)
Dept: FAMILY MEDICINE CLINIC | Facility: CLINIC | Age: 34
End: 2024-02-14

## 2024-02-14 ENCOUNTER — TRANSITIONAL CARE MANAGEMENT (OUTPATIENT)
Dept: FAMILY MEDICINE CLINIC | Facility: CLINIC | Age: 34
End: 2024-02-14

## 2024-02-14 VITALS
OXYGEN SATURATION: 97 % | HEART RATE: 72 BPM | TEMPERATURE: 98.1 F | RESPIRATION RATE: 17 BRPM | WEIGHT: 213 LBS | HEIGHT: 69 IN | BODY MASS INDEX: 31.55 KG/M2 | DIASTOLIC BLOOD PRESSURE: 75 MMHG | SYSTOLIC BLOOD PRESSURE: 118 MMHG

## 2024-02-14 LAB
ANION GAP SERPL CALCULATED.3IONS-SCNC: 8 MMOL/L
BASOPHILS # BLD AUTO: 0 THOUSANDS/ÂΜL (ref 0–0.1)
BASOPHILS NFR BLD AUTO: 0 % (ref 0–1)
BUN SERPL-MCNC: 7 MG/DL (ref 5–25)
CALCIUM SERPL-MCNC: 8.6 MG/DL (ref 8.4–10.2)
CHLORIDE SERPL-SCNC: 106 MMOL/L (ref 96–108)
CO2 SERPL-SCNC: 26 MMOL/L (ref 21–32)
CREAT SERPL-MCNC: 0.6 MG/DL (ref 0.6–1.3)
EOSINOPHIL # BLD AUTO: 0 THOUSAND/ÂΜL (ref 0–0.61)
EOSINOPHIL NFR BLD AUTO: 0 % (ref 0–6)
ERYTHROCYTE [DISTWIDTH] IN BLOOD BY AUTOMATED COUNT: 12.5 % (ref 11.6–15.1)
GFR SERPL CREATININE-BSD FRML MDRD: 120 ML/MIN/1.73SQ M
GLUCOSE SERPL-MCNC: 108 MG/DL (ref 65–140)
HCT VFR BLD AUTO: 34.5 % (ref 34.8–46.1)
HGB BLD-MCNC: 11.5 G/DL (ref 11.5–15.4)
IMM GRANULOCYTES # BLD AUTO: 0.01 THOUSAND/UL (ref 0–0.2)
IMM GRANULOCYTES NFR BLD AUTO: 0 % (ref 0–2)
LYMPHOCYTES # BLD AUTO: 0.74 THOUSANDS/ÂΜL (ref 0.6–4.47)
LYMPHOCYTES NFR BLD AUTO: 15 % (ref 14–44)
MAGNESIUM SERPL-MCNC: 1.8 MG/DL (ref 1.9–2.7)
MCH RBC QN AUTO: 30.1 PG (ref 26.8–34.3)
MCHC RBC AUTO-ENTMCNC: 33.3 G/DL (ref 31.4–37.4)
MCV RBC AUTO: 90 FL (ref 82–98)
MONOCYTES # BLD AUTO: 0.36 THOUSAND/ÂΜL (ref 0.17–1.22)
MONOCYTES NFR BLD AUTO: 7 % (ref 4–12)
NEUTROPHILS # BLD AUTO: 3.87 THOUSANDS/ÂΜL (ref 1.85–7.62)
NEUTS SEG NFR BLD AUTO: 78 % (ref 43–75)
NRBC BLD AUTO-RTO: 0 /100 WBCS
PLATELET # BLD AUTO: 203 THOUSANDS/UL (ref 149–390)
PMV BLD AUTO: 8.5 FL (ref 8.9–12.7)
POTASSIUM SERPL-SCNC: 3.7 MMOL/L (ref 3.5–5.3)
RBC # BLD AUTO: 3.82 MILLION/UL (ref 3.81–5.12)
SODIUM SERPL-SCNC: 140 MMOL/L (ref 135–147)
WBC # BLD AUTO: 4.98 THOUSAND/UL (ref 4.31–10.16)

## 2024-02-14 PROCEDURE — 99232 SBSQ HOSP IP/OBS MODERATE 35: CPT | Performed by: NURSE PRACTITIONER

## 2024-02-14 PROCEDURE — 83735 ASSAY OF MAGNESIUM: CPT | Performed by: UROLOGY

## 2024-02-14 PROCEDURE — 99239 HOSP IP/OBS DSCHRG MGMT >30: CPT | Performed by: INTERNAL MEDICINE

## 2024-02-14 PROCEDURE — 85025 COMPLETE CBC W/AUTO DIFF WBC: CPT | Performed by: UROLOGY

## 2024-02-14 PROCEDURE — 80048 BASIC METABOLIC PNL TOTAL CA: CPT | Performed by: UROLOGY

## 2024-02-14 RX ORDER — CHLORAL HYDRATE 500 MG
2000 CAPSULE ORAL DAILY
COMMUNITY

## 2024-02-14 RX ORDER — POLYETHYLENE GLYCOL 3350 17 G/17G
17 POWDER, FOR SOLUTION ORAL DAILY PRN
COMMUNITY

## 2024-02-14 RX ORDER — OXYCODONE HYDROCHLORIDE 5 MG/1
5 TABLET ORAL EVERY 4 HOURS PRN
Qty: 15 TABLET | Refills: 0 | Status: SHIPPED | OUTPATIENT
Start: 2024-02-14

## 2024-02-14 RX ORDER — CEFDINIR 300 MG/1
300 CAPSULE ORAL EVERY 12 HOURS SCHEDULED
Qty: 8 CAPSULE | Refills: 0 | Status: SHIPPED | OUTPATIENT
Start: 2024-02-14 | End: 2024-02-18

## 2024-02-14 RX ORDER — LANOLIN ALCOHOL/MO/W.PET/CERES
400 CREAM (GRAM) TOPICAL 2 TIMES DAILY
Qty: 6 TABLET | Refills: 0 | Status: SHIPPED | OUTPATIENT
Start: 2024-02-14 | End: 2024-02-17

## 2024-02-14 RX ORDER — ONDANSETRON 4 MG/1
4 TABLET, ORALLY DISINTEGRATING ORAL EVERY 6 HOURS PRN
Qty: 20 TABLET | Refills: 0 | Status: SHIPPED | OUTPATIENT
Start: 2024-02-14 | End: 2024-02-19 | Stop reason: SDUPTHER

## 2024-02-14 RX ORDER — PHENAZOPYRIDINE HYDROCHLORIDE 100 MG/1
100 TABLET, FILM COATED ORAL 3 TIMES DAILY PRN
Status: DISCONTINUED | OUTPATIENT
Start: 2024-02-14 | End: 2024-02-14 | Stop reason: HOSPADM

## 2024-02-14 RX ORDER — OXYBUTYNIN CHLORIDE 5 MG/1
5 TABLET ORAL 3 TIMES DAILY PRN
Qty: 15 TABLET | Refills: 0 | Status: SHIPPED | OUTPATIENT
Start: 2024-02-14

## 2024-02-14 RX ORDER — LANOLIN ALCOHOL/MO/W.PET/CERES
400 CREAM (GRAM) TOPICAL 2 TIMES DAILY
Status: DISCONTINUED | OUTPATIENT
Start: 2024-02-14 | End: 2024-02-14 | Stop reason: HOSPADM

## 2024-02-14 RX ORDER — ERYTHROMYCIN 5 MG/G
OINTMENT OPHTHALMIC
COMMUNITY
Start: 2024-02-08

## 2024-02-14 RX ORDER — CEFDINIR 300 MG/1
300 CAPSULE ORAL EVERY 12 HOURS SCHEDULED
Qty: 8 CAPSULE | Refills: 0 | Status: DISCONTINUED | OUTPATIENT
Start: 2024-02-14 | End: 2024-02-14 | Stop reason: HOSPADM

## 2024-02-14 RX ORDER — DOXYCYCLINE 50 MG/1
50 TABLET ORAL DAILY
COMMUNITY
Start: 2024-01-15 | End: 2024-02-19

## 2024-02-14 RX ORDER — OXYBUTYNIN CHLORIDE 5 MG/1
5 TABLET ORAL 3 TIMES DAILY PRN
Status: DISCONTINUED | OUTPATIENT
Start: 2024-02-14 | End: 2024-02-14 | Stop reason: HOSPADM

## 2024-02-14 RX ORDER — PHENAZOPYRIDINE HYDROCHLORIDE 100 MG/1
100 TABLET, FILM COATED ORAL 3 TIMES DAILY PRN
Qty: 10 TABLET | Refills: 0 | Status: SHIPPED | OUTPATIENT
Start: 2024-02-14

## 2024-02-14 RX ORDER — ZINC OXIDE 13 %
CREAM (GRAM) TOPICAL
COMMUNITY

## 2024-02-14 RX ADMIN — CEFDINIR 300 MG: 300 CAPSULE ORAL at 10:31

## 2024-02-14 RX ADMIN — ONDANSETRON 4 MG: 2 INJECTION INTRAMUSCULAR; INTRAVENOUS at 09:58

## 2024-02-14 RX ADMIN — BUSPIRONE HYDROCHLORIDE 7.5 MG: 5 TABLET ORAL at 08:45

## 2024-02-14 RX ADMIN — MAGNESIUM OXIDE TAB 400 MG (241.3 MG ELEMENTAL MG) 400 MG: 400 (241.3 MG) TAB at 09:55

## 2024-02-14 RX ADMIN — POLYETHYLENE GLYCOL 3350 17 G: 17 POWDER, FOR SOLUTION ORAL at 08:45

## 2024-02-14 NOTE — TELEPHONE ENCOUNTER
Patient had procedure with Dr. Campo 2/13. 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 Right 24-6 [place]ian double-J ureteral stent without string was placed. No follow up written.

## 2024-02-14 NOTE — DISCHARGE SUMMARY
"      Discharge Summary - [facility] Internal Medicine  Patient: Margot Moody 33 y.o. female   MRN: [id number]  PCP: Demi Mandel DO  Unit/Bed#: [facility] Encounter: [id number]            Discharging Physician / Practitioner: Louise Charles MD  PCP: Demi Mandel DO  Admission Date:   Admission Orders (From admission, onward)       Ordered        02/11/24 1737  Inpatient Admission  Once                          Discharge Date: 02/14/24      Reason for Admission:  Flank pain      Discharge Diagnoses:     Principal Problem:    Hydronephrosis of right kidney    Active Problems:    Pyelonephritis    Malignant neoplasm of overlapping sites of cervix (HCC)    Depression with anxiety    Electrolyte abnormalities      Consultations During Hospital Stay:  Urology      Hospital Course:     * Pyelonephritis  Assessment & Plan  As evidenced on CT imaging with: \"persistent right-sided hydronephrosis with perinephric and periureteral edema mildly worsened from the prior examination. No radiopaque urinary tract calculus. As noted on prior study, findings are suspicious for pyelonephritis although distal ureteral stricture given history of prior radiation is also in the differential. Bladder decompressed but with suspicion of bladder wall thickening as well. Small amount of free fluid in the pelvis, unchanged.\"  IV Rocephin transitioned to oral Omnicef on discharge to complete course  Optimized pain/emesis control  See plan for hydronephrosis below  Plan for discharge home today     Hydronephrosis of right kidney  Assessment & Plan  As evidenced on CT imaging with associated perinephric/periureteral edema  Appreciate urology input -> status post cystoscopy with retrograde pyelogram yesterday with ureteral stenting for decompression -> outpatient follow-up with urology  Renal function intact     Malignant neoplasm of overlapping sites of cervix (HCC)  Assessment & Plan  Previously diagnosed in 2022 with 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   Status post prior salpingectomy and chemotherapy/radiation - treatment complicated by radiation proctitis      Note CT findings concerning for possible ureteral stricture - see plan for hydronephrosis above  Patient has followed urology previously and was instructed to self cath however she has been very reticent to proceed with this  Outpatient follow-up with gynecologic oncology      Depression with anxiety  Assessment & Plan  Continue Buspar     Electrolyte abnormalities  Assessment & Plan  Monitored/repleted serum potassium/magnesium deficiencies if present  Provided prescription for oral magnesium supplementation over the next few days       Condition at Discharge: fair       Discharge Day Visit / Exam:     Vitals:  Blood Pressure: 118/75 (02/14/24 0725)  Pulse: 72 (02/14/24 0725)  Temperature: 98.1 °F (36.7 °C) (02/14/24 0725)  Temp Source: Oral (02/11/24 2027)  Respirations: 17 (02/14/24 0725)  Height: 5' 9\" (175.3 cm) (02/11/24 2027)  Weight - Scale: 96.6 kg (213 lb) (02/11/24 2027)  SpO2: 97 % (02/14/24 0725)      Physical exam:  I had a face-to-face encounter with the patient on day of discharge.      Discussion with Patient and/or Family:  The patient has been advised to return to the ER immediately if any symptoms recur or worsen.       Discharge instructions/Information to Patient and/or Family:   See after visit summary for information provided to patient and/or family.        Provisions for Follow-Up Care:  See after visit summary for information related to follow-up care and any pertinent home health orders.        Disposition:   Home      Discharge Medications:  See after visit summary for reconciled discharge medications provided to patient and/or family.        Discharge Statement:  I spent 38 minutes discharging the patient. This time was spent on the day of discharge. I had direct contact with the patient on the day of discharge. Greater than 50% of the total time was " spent examining patient, answering all patient questions, arranging and discussing plan of care with patient as well as directly providing post-discharge instructions.  Additional time then spent on discharge activities.           JHON SHIPMAN MD   Hospitalist - [facility] Internal Medicine        ** Please Note: This note is constructed using a voice recognition dictation system.  An occasional wrong word/phrase or “sound-a-like” substitution may have been picked up by dictation device due to the inherent limitations of voice recognition software.  Read the chart carefully and recognize, using reasonable context, where substitutions may have occurred.**

## 2024-02-14 NOTE — PLAN OF CARE
Problem: PAIN - ADULT  Goal: Verbalizes/displays adequate comfort level or baseline comfort level  Description: Interventions:  - Encourage patient to monitor pain and request assistance  - Assess pain using appropriate pain scale  - Administer analgesics based on type and severity of pain and evaluate response  - Implement non-pharmacological measures as appropriate and evaluate response  - Consider cultural and social influences on pain and pain management  - Notify physician/advanced practitioner if interventions unsuccessful or patient reports new pain  Outcome: Completed     Problem: SAFETY ADULT  Goal: Patient will remain free of falls  Description: INTERVENTIONS:  - Educate patient/family on patient safety including physical limitations  - Instruct patient to call for assistance with activity   - Consult OT/PT to assist with strengthening/mobility   - Keep Call bell within reach  - Keep bed low and locked with side rails adjusted as appropriate  - Keep care items and personal belongings within reach  - Initiate and maintain comfort rounds  - Make Fall Risk Sign visible to staff  - Offer Toileting every 2 Hours, in advance of need  - Initiate/Maintain alarm  - Obtain necessary fall risk management equipment:   - Apply yellow socks and bracelet for high fall risk patients  - Consider moving patient to room near nurses station  Outcome: Completed  Goal: Maintain or return to baseline ADL function  Description: INTERVENTIONS:  -  Assess patient's ability to carry out ADLs; assess patient's baseline for ADL function and identify physical deficits which impact ability to perform ADLs (bathing, care of mouth/teeth, toileting, grooming, dressing, etc.)  - Assess/evaluate cause of self-care deficits   - Assess range of motion  - Assess patient's mobility; develop plan if impaired  - Assess patient's need for assistive devices and provide as appropriate  - Encourage maximum independence but intervene and supervise  when necessary  - Involve family in performance of ADLs  - Assess for home care needs following discharge   - Consider OT consult to assist with ADL evaluation and planning for discharge  - Provide patient education as appropriate  Outcome: Completed  Goal: Maintains/Returns to pre admission functional level  Description: INTERVENTIONS:  - Perform AM-PAC 6 Click Basic Mobility/ Daily Activity assessment daily.  - Set and communicate daily mobility goal to care team and patient/family/caregiver.   - Collaborate with rehabilitation services on mobility goals if consulted  - Perform Range of Motion 3 times a day.  - Reposition patient every 2 hours.  - Dangle patient 3 times a day  - Stand patient 3 times a day  - Ambulate patient 3 times a day  - Out of bed to chair 3 times a day   - Out of bed for meals 3 times a day  - Out of bed for toileting  - Record patient progress and toleration of activity level   Outcome: Completed

## 2024-02-14 NOTE — PROGRESS NOTES
Progress Note - Urology  Margot Fransisco 1990, 33 y.o. female MRN: [id number]    Unit/Bed#: -[facility] Encounter: [id number]    Pyelonephritis  Assessment & Plan  Urine culture, mixed contaminants  On ceftriaxone  Medical optimization per primary team    * Hydronephrosis of right kidney  Assessment & Plan  CT:Persistent right-sided hydronephrosis with perinephric and periureteral edema mildly worsened from the prior examination. No radiopaque urinary tract calculus   WBC 4.25  Creat 0.65  Afebrile, vital signs stable  Procedural urine culture pending  S/p right ureteral stent insertion 2/13  Follow-up with urology in the office in the next few weeks to discuss next steps  Okay for discharge from urological standpoint when medically cleared      Urology will sign off but remain available for any further inpatient needs. Please feel free to contact the provider currently covering the [facility]rology [facility] role for this campus with questions or concerns.     Subjective: Patient sitting out of bed in chair.  Complains of mild soreness and abdominal discomfort.  Voiding without difficulty with intermittent hematuria which is expected after stent placement.  We reviewed signs and symptoms of stent discomfort and this will be included in her discharge summary.    24 HR EVENTS:   no significant events.      Patient has  complaints of mild soreness and abdominal discomfort.       Review of Systems   Constitutional:  Negative for activity change, appetite change, chills, fatigue, fever and unexpected weight change.   HENT:  Negative for facial swelling.    Eyes:  Negative for discharge.   Respiratory: Negative.  Negative for cough and shortness of breath.    Cardiovascular:  Negative for chest pain and leg swelling.   Gastrointestinal: Negative.  Negative for abdominal distention, abdominal pain, constipation, diarrhea, nausea and vomiting.   Endocrine: Negative.    Genitourinary: Negative.  Negative for decreased urine  "volume, difficulty urinating, dysuria, enuresis, flank pain, frequency, genital sores, hematuria and urgency.        Mild flank discomfort with mild intermittent hematuria   Musculoskeletal:  Negative for back pain and myalgias.   Skin:  Negative for pallor and rash.   Allergic/Immunologic: Negative.  Negative for immunocompromised state.   Neurological:  Negative for facial asymmetry and speech difficulty.   Psychiatric/Behavioral:  Negative for agitation and confusion.        Objective:  Nursing Rounds: Flori  Vitals: Blood pressure 118/75, pulse 72, temperature 98.1 °F (36.7 °C), resp. rate 17, height 5' 9\" (1.753 m), weight 96.6 kg (213 lb), last menstrual period 07/27/2022, SpO2 97%, not currently breastfeeding.,Body mass index is 31.45 kg/m².  INS & OUTS:  I/O last 24 hours:  In: 3104.2 [I.V.:3104.2]  Out: -     Physical Exam  Vitals and nursing note reviewed.   Constitutional:       General: She is not in acute distress.     Appearance: Normal appearance. She is not ill-appearing, toxic-appearing or diaphoretic.   HENT:      Head: Normocephalic.   Cardiovascular:      Rate and Rhythm: Normal rate.   Pulmonary:      Effort: Pulmonary effort is normal. No respiratory distress.   Abdominal:      General: Abdomen is flat. There is no distension.   Musculoskeletal:         General: No swelling.      Cervical back: Normal range of motion.   Skin:     General: Skin is warm and dry.   Neurological:      General: No focal deficit present.      Mental Status: She is alert and oriented to person, place, and time.   Psychiatric:         Mood and Affect: Mood normal.         Behavior: Behavior normal.         Thought Content: Thought content normal.         Judgment: Judgment normal.         Labs:  Recent Labs     02/11/24  1453 02/12/24  0614 02/13/24  0508 02/14/24  0606   WBC 5.62 4.19* 4.25* 4.98       Recent Labs     02/11/24  1453 02/12/24  0614 02/13/24  0508 02/14/24  0606   HGB 11.6 11.0* 11.1* 11.5     Recent " Labs     02/11/24  1453 02/12/24  0614 02/13/24  0508 02/14/24  0606   HCT 34.1* 32.6* 32.8* 34.5*     Recent Labs     02/11/24  1422 02/12/24  0614 02/13/24  0508 02/14/24  0606   CREATININE 0.75 0.67 0.65 0.60     Lab Results   Component Value Date    HGB 11.5 02/14/2024    HCT 34.5 (L) 02/14/2024    WBC 4.98 02/14/2024     02/14/2024     Lab Results   Component Value Date    K 3.7 02/14/2024     02/14/2024    CO2 26 02/14/2024    BUN 7 02/14/2024    CREATININE 0.60 02/14/2024    CALCIUM 8.6 02/14/2024     Urine Culture: Growth: Mixed contaminants    History:    Past Medical History:   Diagnosis Date    Anxiety     Cancer (HCC)     cervix    Cervical cancer (HCC)     receiving chemo and radiation    COVID     Jan 2022    Depression     Diarrhea     Dizziness     Frequent headaches     Hx of bleeding disorder     vaginal bleeding    Obesity      Past Surgical History:   Procedure Laterality Date    CERVICAL BIOPSY  W/ LOOP ELECTRODE EXCISION      FL RETROGRADE PYELOGRAM  2/13/2024    LYMPH NODE DISSECTION Bilateral 5/6/2022    Procedure: DISSECTION/STAGING LYMPH NODE PELVIS/ABDOMEN;  Surgeon: Parminder Moctezuma MD;  [facility]: [facility];  Service: Gynecology Oncology    CA INSERTION VAGINAL RADIATION DEVICE N/A 7/15/2022    Procedure: INSERTION NADIR SLEEVE VAGINA WITH POST OP BRACHYTHERAPY (IN RADIATION ONCOLOGY);  Surgeon: Parminder Moctezuma MD;  Location: [facility];  Service: Gynecology Oncology    CA LAPAROSCOPY COLECTOMY PARTIAL W/ANASTOMOSIS N/A 5/10/2023    Procedure: RESECTION COLON SIGMOID LAPAROSCOPIC;  Surgeon: Marin Salinas MD;  Location: [facility];  Service: Colorectal    CA SIGMOIDOSCOPY FLX DX W/COLLJ SPEC BR/WA IF PFRMD N/A 5/10/2023    Procedure: SIGMOIDOSCOPY FLEXIBLE;  Surgeon: Marin Salinas MD;  [facility]: [facility];  Service: Colorectal    SALPINGECTOMY Bilateral 5/6/2022    Procedure: SALPINGECTOMY, OVARIAN TRANSPOSITION;  Surgeon: Parminder Rivera  MD Rhianna;  Location: [facility];  Service: Gynecology Oncology    US GUIDANCE  7/15/2022     Family History   Problem Relation Age of Onset    Ovarian cancer Maternal Aunt     Ovarian cancer Maternal Grandmother     No Known Problems Mother     Heart disease Father      Social History     Socioeconomic History    Marital status: Single     Spouse name: None    Number of children: None    Years of education: None    Highest education level: None   Occupational History    None   Tobacco Use    Smoking status: Never     Passive exposure: Current (very mild/social)    Smokeless tobacco: Never   Vaping Use    Vaping status: Never Used   Substance and Sexual Activity    Alcohol use: Not Currently    Drug use: Never    Sexual activity: Not Currently   Other Topics Concern    None   Social History Narrative    None     Social Determinants of Health     Financial Resource Strain: Not on file   Food Insecurity: No Food Insecurity (2/12/2024)    Hunger Vital Sign     Worried About Running Out of Food in the Last Year: Never true     Ran Out of Food in the Last Year: Never true   Transportation Needs: No Transportation Needs (2/12/2024)    PRAPARE - Transportation     Lack of Transportation (Medical): No     Lack of Transportation (Non-Medical): No   Physical Activity: Not on file   Stress: Not on file   Social Connections: Not on file   Intimate Partner Violence: Not At Risk (11/2/2023)    Humiliation, Afraid, Rape, and Kick questionnaire     Fear of Current or Ex-Partner: No     Emotionally Abused: No     Physically Abused: No     Sexually Abused: No   Housing Stability: Low Risk  (2/12/2024)    Housing Stability Vital Sign     Unable to Pay for Housing in the Last Year: No     Number of Places Lived in the Last Year: 1     Unstable Housing in the Last Year: No       The following portions of the patient's history were reviewed and updated as appropriate: allergies, current medications, past family history, past medical  history, past social history, past surgical history and problem list    CORDELL Salinas  Date: 2/14/2024 Time: 8:52 AM

## 2024-02-14 NOTE — PLAN OF CARE
Problem: PAIN - ADULT  Goal: Verbalizes/displays adequate comfort level or baseline comfort level  Description: Interventions:  - Encourage patient to monitor pain and request assistance  - Assess pain using appropriate pain scale  - Administer analgesics based on type and severity of pain and evaluate response  - Implement non-pharmacological measures as appropriate and evaluate response  - Consider cultural and social influences on pain and pain management  - Notify physician/advanced practitioner if interventions unsuccessful or patient reports new pain  Outcome: Progressing     Problem: SAFETY ADULT  Goal: Patient will remain free of falls  Description: INTERVENTIONS:  - Educate patient/family on patient safety including physical limitations  - Instruct patient to call for assistance with activity   - Consult OT/PT to assist with strengthening/mobility   - Keep Call bell within reach  - Keep bed low and locked with side rails adjusted as appropriate  - Keep care items and personal belongings within reach  - Initiate and maintain comfort rounds  - Make Fall Risk Sign visible to staff  - Offer Toileting every 2 Hours, in advance of need  - Initiate/Maintain 24/7 alarm  - Obtain necessary fall risk management equipment: RW  - Apply yellow socks and bracelet for high fall risk patients  - Consider moving patient to room near nurses station  Outcome: Progressing

## 2024-02-15 ENCOUNTER — OFFICE VISIT (OUTPATIENT)
Age: 34
End: 2024-02-15
Payer: COMMERCIAL

## 2024-02-15 VITALS
BODY MASS INDEX: 34.21 KG/M2 | HEIGHT: 69 IN | RESPIRATION RATE: 16 BRPM | HEART RATE: 84 BPM | SYSTOLIC BLOOD PRESSURE: 130 MMHG | DIASTOLIC BLOOD PRESSURE: 82 MMHG | TEMPERATURE: 98.1 F | OXYGEN SATURATION: 100 % | WEIGHT: 231 LBS

## 2024-02-15 DIAGNOSIS — N95.1 MENOPAUSAL SYMPTOMS: ICD-10-CM

## 2024-02-15 DIAGNOSIS — N13.30 HYDRONEPHROSIS OF RIGHT KIDNEY: ICD-10-CM

## 2024-02-15 DIAGNOSIS — C53.8 MALIGNANT NEOPLASM OF OVERLAPPING SITES OF CERVIX (HCC): Primary | ICD-10-CM

## 2024-02-15 LAB — BACTERIA UR CULT: NORMAL

## 2024-02-15 PROCEDURE — 99214 OFFICE O/P EST MOD 30 MIN: CPT | Performed by: OBSTETRICS & GYNECOLOGY

## 2024-02-15 NOTE — PROGRESS NOTES
Assessment/Plan:    Problem List Items Addressed This Visit          Genitourinary    Malignant neoplasm of overlapping sites of cervix (HCC) - Primary     33-year-old with stage III C1 cervical cancer status post aborted radical hysterectomy and status post curative intent chemotherapy and radiation that completed 8/3/2022.  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  She is status post right ureteral stent placement on 2/13/2024.  I reviewed CT abdomen pelvis images from 2/11/2024 and discussed the results with radiology.  I also reviewed CBC and BMP from 2/14/2024.  There is no measurable disease by CT imaging and exam is consistent with radiation change however, recurrent cervical cancer causing the ureteral obstruction is in the differential.  Her performance status is 0.  1.  PET/CT imaging to assess whether there is any evidence of recurrent disease causing her distal right ureteral obstruction.  2.We further discussed the use of ctDNA and that it can be measured to assess the absence or presence of molecular residual disease. The sensitivity of this assay in gyn cancers has not been readily established but is utilized in other disease sites to monitor progression and treatment response. As a result, it may or may not give us clinical information that is useful at this time but may provide added insight for the future.  She will consider MRD testing and let me know if she would like to proceed.  3.  Return in 3 months for cervical cancer surveillance pending pet imaging.           Relevant Orders    NM PET CT skull base to mid thigh    Hydronephrosis of right kidney     Complicated by pyelonephritis and likely secondary to radiation therapy.  She is status post stent placement and has follow-up with urology in the office.            Other    Menopausal symptoms     Continue Vivelle              CHIEF COMPLAINT: Follow-up  after hospital admission, cervical cancer surveillance      Problem:  Cancer Staging   Malignant neoplasm of overlapping sites of cervix (HCC)  Staging form: Cervix Uteri, AJCC Version 9  - Clinical: No stage assigned - Unsigned  - Pathologic: No stage assigned - Unsigned  - Pathologic: FIGO Stage IIIC1p (pT1b1, cM0) - Signed by Parminder Moctezuma MD on 5/19/2022        Previous therapy:  Oncology History   Malignant neoplasm of overlapping sites of cervix (HCC)   3/24/2022 Initial Diagnosis    Malignant neoplasm of overlapping sites of cervix (HCC)     5/6/2022 Surgery    Exploratory laparotomy for planned radical hysterectomy, bilateral pelvic lymph node dissection, aborted radical hysterectomy, bilateral ovarian transposition due to positive lymph nodes.  1. Two right pelvic lymph nodes positive     5/19/2022 -  Cancer Staged    Staging form: Cervix Uteri, AJCC Version 9  - Pathologic: FIGO Stage IIIC1p (pT1b1, cM0) - Signed by Parminder Moctezuma MD on 5/19/2022  Stage confirmation method: Pathology  Pelvic grady status: Positive  Pelvic grady method of assessment: Lymph node dissection  Para-aortic status: Negative       6/20/2022 - 7/26/2022 Chemotherapy    palonosetron (ALOXI), 0.25 mg, Intravenous, Once, 6 of 6 cycles  Administration: 0.25 mg (6/20/2022), 0.25 mg (6/27/2022), 0.25 mg (7/6/2022), 0.25 mg (7/11/2022), 0.25 mg (7/19/2022), 0.25 mg (7/26/2022)  CISplatin (PLATINOL) infusion, 70 mg (original dose 40 mg/m2), Intravenous, Once, 6 of 6 cycles  Dose modification: 70 mg (original dose 40 mg/m2, Cycle 1, Reason: Other (Must fill in a comment), Comment: max 70), 70 mg (original dose 40 mg/m2, Cycle 2, Reason: Dose modified as per discussion with consulting physician)  Administration: 70 mg (6/20/2022), 70 mg (6/27/2022), 70 mg (7/6/2022), 70 mg (7/11/2022), 70 mg (7/19/2022), 70 mg (7/26/2022)  aprepitant (CINVANTI) in  mL IVPB, 130 mg, Intravenous, Once, 6 of 6 cycles  Administration:  130 mg (6/20/2022), 130 mg (6/27/2022), 130 mg (7/6/2022), 130 mg (7/11/2022), 130 mg (7/19/2022), 130 mg (7/26/2022)     6/22/2022 - 8/3/2022 Radiation    Course: C1 - EBRT  Plan ID Energy Fractions Dose per Fraction (cGy) Dose Correction (cGy) Total Dose Delivered (cGy) Elapsed Days   Whole Pelvis 10X 25 / 25 180 0 4,500 42       Course: C1 HDR Tandem and Ring Brachytherapy  Plan ID Energy Fractions Dose per Fraction (cGy) Dose Correction (cGy) Total Dose Delivered (cGy) Elapsed Days   HDR1 7-15-22 1 / 1 700 0 700 0   QDH6_9--18-22 1 / 1 700 0 700 0   BMM1_7--21-22 1 / 1 700 0 700 0   HDR4 7-25-22 1 / 1 700 0 700 0               Patient ID: Margot Moody is a 33 y.o. female  Who returns after hospital admission from 2/11/2024 to 2/14/2024.  She had flank pain and had a CT scan of the abdomen pelvis on 2/11/2024 that revealed new right hydronephrosis with periureteral edema in the pelvis.  There was no evidence of lymphadenopathy.  Small amount of trace free fluid.  There was a collection present in the left gutter adjacent to a clip which has been present since at least May 2022.  I reviewed the images with radiology.  Based on the appearance over time with her multiple imaging studies, it is likely benign.  In May 2022, it was not FDG avid.  She had a right ureteral stent placed on 2/13/2024 as well as a retrograde pyelogram which revealed a 3 cm distal right ureteral stricture which was likely secondary to radiation therapy based on cystoscopy findings of radiation change in the bladder.  Labs from 2/14/2024 revealed a total white blood cell count of 4.98, hemoglobin 11.5 g/dL, platelet count 203 K.  BMP was normal with a serum creatinine of 0.6.  She is currently finishing up a course of Omnicef.  She does note occasional blood in the urine.  No vaginal bleeding.        The following portions of the patient's history were reviewed and updated as appropriate: allergies, current medications, past family  history, past medical history, past social history, past surgical history, and problem list.    Review of Systems   Constitutional:  Positive for fatigue. Negative for activity change and unexpected weight change.   HENT: Negative.     Eyes: Negative.    Respiratory: Negative.     Cardiovascular: Negative.    Gastrointestinal:  Negative for abdominal distention and abdominal pain.   Endocrine: Negative.    Genitourinary:  Positive for hematuria and pelvic pain. Negative for vaginal bleeding.   Musculoskeletal: Negative.    Skin: Negative.    Allergic/Immunologic: Negative.    Neurological: Negative.    Hematological: Negative.    Psychiatric/Behavioral: Negative.         Current Outpatient Medications   Medication Sig Dispense Refill    busPIRone (BUSPAR) 7.5 mg tablet TAKE 1 TABLET BY MOUTH 2 TIMES A DAY. 180 tablet 1    cefdinir (OMNICEF) 300 mg capsule Take 1 capsule (300 mg total) by mouth every 12 (twelve) hours for 8 doses 8 capsule 0    erythromycin (ILOTYCIN) ophthalmic ointment APPLY 1 APPLICATION TO THE RIGHT EYE DAILY AT BEDTIME      estradiol (VIVELLE-DOT) 0.0375 MG/24HR PLACE 1 PATCH ON THE SKIN 2 TIMES A WEEK. 8 patch 3    Misc. Devices (Kegel Toner Pelvic Trainer) MISC Use 1 each if needed (for strengthening the pelvic floor muscles to treat incontinence and increased urgency) 1 each 0    Omega-3 Fatty Acids (fish oil) 1,000 mg Take 2,000 mg by mouth daily      ondansetron (ZOFRAN-ODT) 4 mg disintegrating tablet Take 1 tablet (4 mg total) by mouth every 6 (six) hours as needed for nausea or vomiting 20 tablet 0    oxybutynin (DITROPAN) 5 mg tablet Take 1 tablet (5 mg total) by mouth 3 (three) times a day as needed (Stent discomfort, bladder spasms) 15 tablet 0    oxyCODONE (ROXICODONE) 5 immediate release tablet Take 1 tablet (5 mg total) by mouth every 4 (four) hours as needed for moderate pain or severe pain Max Daily Amount: 30 mg 15 tablet 0    phenazopyridine (PYRIDIUM) 100 mg tablet Take 1  "tablet (100 mg total) by mouth 3 (three) times a day as needed for bladder spasms 10 tablet 0    polyethylene glycol (MiraLax) 17 g packet Take 17 g by mouth daily as needed      Probiotic Product (Probiotic Daily) CAPS Take by mouth      doxycycline (ADOXA) 50 MG tablet Take 50 mg by mouth daily (Patient not taking: Reported on 2/14/2024)      magnesium Oxide (MAG-OX) 400 mg TABS Take 1 tablet (400 mg total) by mouth 2 (two) times a day for 6 doses (Patient not taking: Reported on 2/14/2024) 6 tablet 0     No current facility-administered medications for this visit.           Objective:    Blood pressure 130/82, pulse 84, temperature 98.1 °F (36.7 °C), temperature source Temporal, resp. rate 16, height 5' 9\" (1.753 m), weight 105 kg (231 lb), last menstrual period 07/27/2022, SpO2 100%, not currently breastfeeding.  Body mass index is 34.11 kg/m².  Body surface area is 2.2 meters squared.    Physical Exam  Vitals reviewed. Exam conducted with a chaperone present.   Constitutional:       General: She is not in acute distress.     Appearance: Normal appearance. She is well-developed. She is not ill-appearing, toxic-appearing or diaphoretic.   HENT:      Head: Normocephalic and atraumatic.   Eyes:      General: No scleral icterus.     Extraocular Movements: Extraocular movements intact.      Conjunctiva/sclera: Conjunctivae normal.   Neck:      Thyroid: No thyromegaly.   Pulmonary:      Effort: Pulmonary effort is normal.   Abdominal:      General: There is no distension.      Palpations: Abdomen is soft. There is no mass.      Tenderness: There is no abdominal tenderness. There is no guarding or rebound.      Hernia: No hernia is present.   Genitourinary:     Comments: External female genitalia are normal.  No evidence of mass, lesion.  Speculum examination reveals a grossly normal vagina.  There is radiation change present at the apex of the vagina.  The cervix is not visible secondary to radiation stenosis.  No " "bleeding.  Bimanual examination reveals a firm, fixed pelvis particularly on the right.  Musculoskeletal:         General: No swelling or tenderness.      Cervical back: Normal range of motion and neck supple.      Right lower leg: No edema.      Left lower leg: No edema.   Lymphadenopathy:      Cervical: No cervical adenopathy.   Skin:     General: Skin is warm and dry.      Coloration: Skin is not jaundiced or pale.      Findings: No lesion or rash.   Neurological:      General: No focal deficit present.      Mental Status: She is alert and oriented to person, place, and time. Mental status is at baseline.      Cranial Nerves: No cranial nerve deficit.      Motor: No weakness.      Gait: Gait normal.   Psychiatric:         Mood and Affect: Mood normal.         Behavior: Behavior normal.         Thought Content: Thought content normal.         Judgment: Judgment normal.         No results found for: \"\"  Lab Results   Component Value Date    K 3.7 02/14/2024     02/14/2024    CO2 26 02/14/2024    BUN 7 02/14/2024    CREATININE 0.60 02/14/2024    CALCIUM 8.6 02/14/2024    CORRECTEDCA 8.8 02/12/2024    AST 10 (L) 02/13/2024    ALT 8 02/13/2024    ALKPHOS 47 02/13/2024    EGFR 120 02/14/2024     Lab Results   Component Value Date    WBC 4.98 02/14/2024    HGB 11.5 02/14/2024    HCT 34.5 (L) 02/14/2024    MCV 90 02/14/2024     02/14/2024     Lab Results   Component Value Date    NEUTROABS 3.87 02/14/2024     CT abdomen pelvis with contrast  Addendum: ADDENDUM:     Additional findings noted is a hypodense nodule in the left lower quadrant  (for example image 3/130) which measures 3.3 x 3.0 cm in size. It  increased in size since 2/9/2024 at which time it measured 2.7 x 2.5 cm.  However, on CT radiation planning    examination of 7/21/2022 it measured 2.9 x 2.6 cm and on 7/18/2022, 3.5 x  2.3 cm. Given waxing and waning size and lack of activity on an outside  institution PET/CT from 5/27/2022, a benign " etiology is favored, such as  waxing and waning size of a    seroma/lymphocele. Follow-up PET/CT is planned in the near future. Careful  reassessment is recommended on that study.     Findings discussed with Dr. Moctezuma, 2/15/2024, 1:15 p.m.  Narrative: CT ABDOMEN AND PELVIS WITH IV CONTRAST    INDICATION: RLQ abdominal pain (Age >= 14y)  abdominal pain. History of cervical cancer, prior colon surgery, radiation. Nausea and vomiting, difficulty urinating.    COMPARISON: 2/9/2024    TECHNIQUE: CT examination of the abdomen and pelvis was performed. Multiplanar 2D reformatted images were created from the source data.    This examination, like all CT scans performed in the On license of [facility], was performed utilizing techniques to minimize radiation dose exposure, including the use of iterative reconstruction and automated exposure control. Radiation dose length   product (DLP) for this visit: 810.28 mGy-cm    IV Contrast: 85 mL of iohexol (OMNIPAQUE)  Enteric Contrast: Not administered.    FINDINGS:    ABDOMEN    LOWER CHEST: No clinically significant abnormality in the visualized lower chest.    LIVER/BILIARY TREE: Subcentimeter hypoattenuating lesion(s), too small to characterize but statistically likely benign, which do not require follow-up (ACR White Paper 2017). No suspicious mass. Normal hepatic contours. No biliary dilation.    GALLBLADDER: No calcified gallstones. No pericholecystic inflammatory change.    SPLEEN: Unremarkable.    PANCREAS: Unremarkable.    ADRENAL GLANDS: Unremarkable.    KIDNEYS/URETERS: Right-sided hydronephrosis, with perinephric and periureteral edema, and delayed enhancement of the renal pyramids. No evidence of a calcified urinary tract calculus. Degree of soft tissue stranding about the right kidney has mildly   worsened. Dilated ureter extends into the pelvis. Left kidney is unremarkable.    STOMACH AND BOWEL: Postsurgical changes at the rectosigmoid junction. No bowel  obstruction. Moderately increased stool in the colon.    APPENDIX: No findings to suggest appendicitis.    ABDOMINOPELVIC CAVITY: Small amount of free fluid in the pelvis.    VESSELS: Unremarkable for patient's age.    PELVIS    REPRODUCTIVE ORGANS: Unremarkable for patient's age.    URINARY BLADDER: Limited assessment as it is almost empty. There is suspicion however for bladder wall thickening.    ABDOMINAL WALL/INGUINAL REGIONS: Unremarkable.    BONES: No acute fracture or suspicious osseous lesion.  Impression: 1. Persistent right-sided hydronephrosis with perinephric and periureteral edema mildly worsened from the prior examination. No radiopaque urinary tract calculus. As noted on prior study, findings are suspicious for pyelonephritis although distal   ureteral stricture given history of prior radiation is also in the differential.  2. Bladder decompressed but with suspicion of bladder wall thickening as well  3. Small amount of free fluid in the pelvis, unchanged    Workstation performed: [id number]

## 2024-02-15 NOTE — ASSESSMENT & PLAN NOTE
33-year-old with stage III C1 cervical cancer status post aborted radical hysterectomy and status post curative intent chemotherapy and radiation that completed 8/3/2022.  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  She is status post right ureteral stent placement on 2/13/2024.  I reviewed CT abdomen pelvis images from 2/11/2024 and discussed the results with radiology.  I also reviewed CBC and BMP from 2/14/2024.  There is no measurable disease by CT imaging and exam is consistent with radiation change however, recurrent cervical cancer causing the ureteral obstruction is in the differential.  Her performance status is 0.  1.  PET/CT imaging to assess whether there is any evidence of recurrent disease causing her distal right ureteral obstruction.  2.We further discussed the use of ctDNA and that it can be measured to assess the absence or presence of molecular residual disease. The sensitivity of this assay in gyn cancers has not been readily established but is utilized in other disease sites to monitor progression and treatment response. As a result, it may or may not give us clinical information that is useful at this time but may provide added insight for the future.  She will consider MRD testing and let me know if she would like to proceed.  3.  Return in 3 months for cervical cancer surveillance pending pet imaging.

## 2024-02-15 NOTE — TELEPHONE ENCOUNTER
Vitor Fields  Thanks much, obviously happy to see her  Adding my nurse, Chritsina to message in case she needs to help with scheduling        Previous Messages       ----- Message -----  From: Chuck Campo MD    Please make sure that Audrey sees me or Dr. Fields.  She is 33 and had stage III cervical with XRT. She has a 2-3 cm distal R ureteral stricture and looks like a good candidate for robotic R re-implant as long as she SYLWIA from cancer standpoint.  For now she has a R stent in place.    FT

## 2024-02-15 NOTE — ASSESSMENT & PLAN NOTE
Complicated by pyelonephritis and likely secondary to radiation therapy.  She is status post stent placement and has follow-up with urology in the office.

## 2024-02-15 NOTE — TELEPHONE ENCOUNTER
Called Audrey and scheduled her with Dr Fields on 2/26 @1:30 in [place]. She doesn't drive and will need a ride

## 2024-02-15 NOTE — TELEPHONE ENCOUNTER
"Per inpatient documentation \"Follow-up with urology in the office in the next few weeks to discuss next steps\". Should set up with MD  "

## 2024-02-16 LAB
BACTERIA BLD CULT: NORMAL
BACTERIA BLD CULT: NORMAL

## 2024-02-19 ENCOUNTER — OFFICE VISIT (OUTPATIENT)
Dept: FAMILY MEDICINE CLINIC | Facility: CLINIC | Age: 34
End: 2024-02-19
Payer: COMMERCIAL

## 2024-02-19 VITALS
HEART RATE: 87 BPM | SYSTOLIC BLOOD PRESSURE: 112 MMHG | OXYGEN SATURATION: 99 % | RESPIRATION RATE: 17 BRPM | BODY MASS INDEX: 32.58 KG/M2 | TEMPERATURE: 98.1 F | DIASTOLIC BLOOD PRESSURE: 68 MMHG | HEIGHT: 69 IN | WEIGHT: 220 LBS

## 2024-02-19 DIAGNOSIS — N12 PYELONEPHRITIS: ICD-10-CM

## 2024-02-19 DIAGNOSIS — N13.30 HYDRONEPHROSIS OF RIGHT KIDNEY: ICD-10-CM

## 2024-02-19 DIAGNOSIS — C53.8 MALIGNANT NEOPLASM OF OVERLAPPING SITES OF CERVIX (HCC): Primary | ICD-10-CM

## 2024-02-19 DIAGNOSIS — R11.2 NAUSEA AND VOMITING: ICD-10-CM

## 2024-02-19 PROCEDURE — 99495 TRANSJ CARE MGMT MOD F2F 14D: CPT | Performed by: FAMILY MEDICINE

## 2024-02-19 RX ORDER — ONDANSETRON 4 MG/1
4 TABLET, ORALLY DISINTEGRATING ORAL EVERY 6 HOURS PRN
Qty: 30 TABLET | Refills: 0 | Status: SHIPPED | OUTPATIENT
Start: 2024-02-19

## 2024-02-19 NOTE — PROGRESS NOTES
Assessment/Plan:       Diagnoses and all orders for this visit:    Malignant neoplasm of overlapping sites of cervix (HCC)    Hydronephrosis of right kidney    Pyelonephritis    Nausea and vomiting  -     ondansetron (ZOFRAN-ODT) 4 mg disintegrating tablet; Take 1 tablet (4 mg total) by mouth every 6 (six) hours as needed for nausea or vomiting        Zofran refilled  Patient to follow-up with urology and Gynoxin  Follow-up with me as needed    Subjective:     Chief Complaint   Patient presents with    Transition of Care Management     [facility] 2/11-2/14 Hydronephrosis of right kidney. Pt c/o right sided abdominal pain and back pain.         Patient ID: Margot Moody is a 33 y.o. female.    Patient was today for transition care after hospitalization  Patient was hospitalized for hydronephrosis from ureteral stricture patient is also undergoing treatment for cervical cancer  Her pain is manageable at a level of 5  She would like a refill for her Zofran for nausea  She is following closely with Gyn-onc and urology        The following portions of the patient's history were reviewed and updated as appropriate: allergies, current medications, past family history, past medical history, past social history, past surgical history and problem list.    Review of Systems   Constitutional: Negative.    HENT: Negative.     Eyes: Negative.    Respiratory: Negative.     Cardiovascular: Negative.    Gastrointestinal: Negative.    Endocrine: Negative.    Genitourinary:  Positive for flank pain.   Skin: Negative.    Allergic/Immunologic: Negative.    Neurological: Negative.    Hematological: Negative.    Psychiatric/Behavioral: Negative.     All other systems reviewed and are negative.        Objective:    Vitals:    02/19/24 1624   BP: 112/68   BP Location: Left arm   Patient Position: Sitting   Cuff Size: Large   Pulse: 87   Resp: 17   Temp: 98.1 °F (36.7 °C)   TempSrc: Tympanic   SpO2: 99%   Weight: 99.8 kg (220 lb)  "  Height: 5' 9\" (1.753 m)          Physical Exam  Vitals and nursing note reviewed.   Constitutional:       Appearance: Normal appearance. She is well-developed.   HENT:      Head: Normocephalic and atraumatic.      Right Ear: External ear normal.      Left Ear: External ear normal.   Eyes:      Conjunctiva/sclera: Conjunctivae normal.      Pupils: Pupils are equal, round, and reactive to light.   Cardiovascular:      Rate and Rhythm: Normal rate and regular rhythm.      Heart sounds: Normal heart sounds.   Pulmonary:      Effort: Pulmonary effort is normal.      Breath sounds: Normal breath sounds.   Abdominal:      General: Bowel sounds are normal.      Palpations: Abdomen is soft.   Musculoskeletal:         General: Normal range of motion.      Cervical back: Normal range of motion.   Skin:     General: Skin is warm and dry.   Neurological:      General: No focal deficit present.      Mental Status: She is alert and oriented to person, place, and time.      Deep Tendon Reflexes: Reflexes are normal and symmetric.   Psychiatric:         Behavior: Behavior normal.         Thought Content: Thought content normal.         Judgment: Judgment normal.         "

## 2024-02-21 ENCOUNTER — OFFICE VISIT (OUTPATIENT)
Dept: PHYSICAL THERAPY | Facility: CLINIC | Age: 34
End: 2024-02-21
Payer: COMMERCIAL

## 2024-02-21 DIAGNOSIS — R10.2 PELVIC PAIN: ICD-10-CM

## 2024-02-21 DIAGNOSIS — R19.8 ABNORMAL BOWEL HABITS: ICD-10-CM

## 2024-02-21 DIAGNOSIS — R32 URINARY INCONTINENCE IN FEMALE: ICD-10-CM

## 2024-02-21 DIAGNOSIS — R10.84 GENERALIZED ABDOMINAL PAIN: ICD-10-CM

## 2024-02-21 DIAGNOSIS — N94.19 DYSPAREUNIA DUE TO MEDICAL CONDITION IN FEMALE PATIENT: Primary | ICD-10-CM

## 2024-02-21 DIAGNOSIS — Z85.41 HISTORY OF CERVICAL CANCER: ICD-10-CM

## 2024-02-21 PROBLEM — N12 PYELONEPHRITIS: Status: RESOLVED | Noted: 2024-02-11 | Resolved: 2024-02-21

## 2024-02-21 PROCEDURE — 97140 MANUAL THERAPY 1/> REGIONS: CPT

## 2024-02-21 PROCEDURE — 97110 THERAPEUTIC EXERCISES: CPT

## 2024-02-21 NOTE — PROGRESS NOTES
Daily Note     Today's date: 2024  Patient name: Margot Moody  : 1990  MRN: [id number]  Referring provider: Demi Mandel DO  Dx:   Encounter Diagnosis     ICD-10-CM    1. Dyspareunia due to medical condition in female patient  N94.19       2. Abnormal bowel habits  R19.8       3. History of cervical cancer  Z85.41       4. Generalized abdominal pain  R10.84       5. Pelvic pain  R10.2       6. Urinary incontinence in female  R32           Start Time: 1505  Stop Time: 1545  Total time in clinic (min): 40 minutes    Subjective: Pt reports that she was in the hospital. Went to ER on  for nausea, vomiting, right side low back pain, and dizziness . She was discharged and was instructed to follow-up with urologist. -- The following day the pt was unable to empty bladder and had difficulty with urinating. 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 Blood in urine noted intermittently s/p placement of stent if pt has increased activity. -- Difficulty sleeping last night with burning pain B/L leg pain. -- Abdomen: sore raw, tender. -- Does not want to take oxicodone because she has concerns with drug dependency.      Objective: See treatment diary below      Assessment: The pt participated in a skilled physical therapy treatment session that focused on manual intervention and pt education. Considering the pt's presentation of recent surgery and significant back/abdominal pain, today's treatment session was modified with a focus on manual intervention to assist with symptom modulation. She tolerated treatment well. The pt would benefit from continued PT to address impairments in order to improve her quality of life, bladder/bowel function, and return to her PLOF without limitations due to pain.          Plan: Continue per plan of care.      Precautions: PMH: HPV, Chemo/Radiation (internal/external) treatments in  to treat cervical cancer, Partial  "Colectomy (05/10/2023)       12/13 12/20 01/03 01/10 1/17 1/24 1/31 2/21     Manuals             Fascial mobilization KS KS KS KS KS KS  KS     Scar mobilization  KS KS  KS KS       Abdominal massage  KS KS     KS     STM - Internal Vaginal  KS           STM    KS (Back, posterior pelvis) KS (UT; Shoulders - tension in upper trunk impacting PF)  KS (UT; Shoulders - tension in upper trunk impacting PF) KS (Back, posterior pelvis)                  Neuro Re-Ed             Kegel/PF Contraction Edu             TA Contraction Ed             Kegel - Hook-lying                                                                 Ther Ex             Happy Baby Stretch   Seated: 2x30\"          Butterfly Stretch   2x30\"                       POC; PF Physical therapy KS KS   KS KS       Anatomy/physiology as it relates to pt's symptoms/presentation KS KS KS  KS KS KS      Address pt's questions PRN KS KS KS KS KS KS KS KS     Examination findings KS KS   KS KS       HEP KS   KS  KS KS      Update on pt' symptoms/status   KS KS KS KS KS KS                  Pt Edu regarding stress management/relaxation    KS KS KS KS KS                  Ther Activity             PF Questionnaires     KS        SD: Update on pt's symptoms/status/function     KS        Pt edu: Use of dilators for symptom management      KS KS      Pt edu: Pelvic floor tools for home treatment/symptoms mangement      KS                    Gait Training                                       Modalities                                                "

## 2024-02-26 ENCOUNTER — OFFICE VISIT (OUTPATIENT)
Dept: UROLOGY | Facility: AMBULATORY SURGERY CENTER | Age: 34
End: 2024-02-26
Payer: COMMERCIAL

## 2024-02-26 VITALS
OXYGEN SATURATION: 98 % | BODY MASS INDEX: 32.19 KG/M2 | HEART RATE: 104 BPM | SYSTOLIC BLOOD PRESSURE: 100 MMHG | DIASTOLIC BLOOD PRESSURE: 74 MMHG | WEIGHT: 218 LBS

## 2024-02-26 DIAGNOSIS — N13.5 URETERAL STRICTURE, RIGHT: ICD-10-CM

## 2024-02-26 DIAGNOSIS — T83.84XD PAIN DUE TO URETERAL STENT, SUBSEQUENT ENCOUNTER: Primary | ICD-10-CM

## 2024-02-26 DIAGNOSIS — R39.89 BLADDER PAIN: ICD-10-CM

## 2024-02-26 DIAGNOSIS — N13.30 HYDRONEPHROSIS, UNSPECIFIED HYDRONEPHROSIS TYPE: ICD-10-CM

## 2024-02-26 PROBLEM — T83.84XA PAIN DUE TO URETERAL STENT (HCC): Status: ACTIVE | Noted: 2023-01-02

## 2024-02-26 LAB — POST-VOID RESIDUAL VOLUME, ML POC: 0 ML

## 2024-02-26 PROCEDURE — 99215 OFFICE O/P EST HI 40 MIN: CPT | Performed by: UROLOGY

## 2024-02-26 PROCEDURE — 51798 US URINE CAPACITY MEASURE: CPT | Performed by: UROLOGY

## 2024-02-26 RX ORDER — KETOROLAC TROMETHAMINE 10 MG/1
10 TABLET, FILM COATED ORAL EVERY 6 HOURS PRN
Qty: 45 TABLET | Refills: 1 | Status: SHIPPED | OUTPATIENT
Start: 2024-02-26 | End: 2024-03-27

## 2024-02-26 RX ORDER — TAMSULOSIN HYDROCHLORIDE 0.4 MG/1
0.4 CAPSULE ORAL
Qty: 90 EACH | Refills: 3 | Status: SHIPPED | OUTPATIENT
Start: 2024-02-26 | End: 2025-02-20

## 2024-02-26 RX ORDER — OXYBUTYNIN CHLORIDE 5 MG/1
5 TABLET ORAL 3 TIMES DAILY PRN
Qty: 30 TABLET | Refills: 5 | Status: SHIPPED | OUTPATIENT
Start: 2024-02-26

## 2024-02-26 NOTE — ASSESSMENT & PLAN NOTE
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  She is managed with a stent currently.  We had a long discussion regarding the role for stents versus ureteral reimplant surgery.  I think she be best served by ureteral reimplant surgery.  We attempted do this robotically but knowing her past surgical history there are much higher than normal odds of conversion to open surgery because of adhesions.  We also discussed the risks of bowel injury and that her reimplant scars down because of poor tissue quality.  To facilitate surgery I think she would be best served by removing her stent and placing a PCN tube a few weeks before surgery to help better define normal versus abnormal ureter for reimplantation.    At this point she is considering her options and would like to go on vacation in June or July.  If she is going to hold off on surgery until August would plan to exchange her stent before vacation.  If she is going to have surgery in July I do not think her stent needs to be exchanged and we will send a plan to remove it and place a PCN 2 to 3 weeks before surgery to allow ureteral rest and increase the ability for us to define abnormal versus normal ureter and operating room to facilitate reimplant surgery.    She will call us back after she has thought further about when she would like to move forward surgery.  In the meantime I have set up an appointment for to see us back in a few months and we will plan for stent exchange if she is not ready move forward with surgery then.    Will also have her PET scan in the coming weeks and this could influence our decision process but hoping she has evidence of continued disease-free recurrence

## 2024-02-26 NOTE — ASSESSMENT & PLAN NOTE
The patient is bothered by her right ureteral stent with both abdominal pain as well as bladder urgency and frequency.  We discussed options.  I refilled her Ditropan.  I written for Flomax.  We will try Toradol as this is often more effective than narcotics.

## 2024-02-26 NOTE — PROGRESS NOTES
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  She is managed with a stent currently.  We had a long discussion regarding the role for stents versus ureteral reimplant surgery.  I think she be best served by ureteral reimplant surgery.  We attempted do this robotically but knowing her past surgical history there are much higher than normal odds of conversion to open surgery because of adhesions.  We also discussed the risks of bowel injury and that her reimplant scars down because of poor tissue quality.  To facilitate surgery I think she would be best served by removing her stent and placing a PCN tube a few weeks before surgery to help better define normal versus abnormal ureter for reimplantation.    At this point she is considering her options and would like to go on vacation in June or July.  If she is going to hold off on surgery until August would plan to exchange her stent before vacation.  If she is going to have surgery in July I do not think her stent needs to be exchanged and we will send a plan to remove it and place a PCN 2 to 3 weeks before surgery to allow ureteral rest and increase the ability for us to define abnormal versus normal ureter and operating room to facilitate reimplant surgery.    She will call us back after she has thought further about when she would like to move forward surgery.  In the meantime I have set up an appointment for to see us back in a few months and we will plan for stent exchange if she is not ready move forward with surgery then.    Will also have her PET scan in the coming weeks and this could influence our decision process but hoping she has evidence of continued disease-free recurrence    Pain due to ureteral stent (HCC)  The patient is bothered by her right ureteral stent with both abdominal pain as well as bladder urgency and frequency.  We discussed options.  I  refilled her Ditropan.  I written for Flomax.  We will try Toradol as this is often more effective than narcotics.    I spent 45 minutes going over the patient's prior imaging and operative notes and generating her history to help facilitate conversation with her and her partner about treatment options.      Subjective:      Patient ID: Margot Moody is a 33 y.o. female.    HPI    33-year-old with stage III C1 cervical cancer status post aborted radical hysterectomy (because of grossly palpable lymph node disease which was resected) and status post curative intent chemotherapy and radiation that completed 8/3/2022 now with right sided hydronephrosis.    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  Surgical notes are notable for adhesions and radiation changes.  After surgery she had noticed increased difficulty with voiding.  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 She is status post right ureteral stent placement on 2/13/2024 with Dr. 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  The right orifice appeared erythematous and swollen.  Retrograde suggested that distal 3 cm of ureter or tapered with proximal dilation and mild hydronephrosis        She scheduled for PET scan to evaluate for recurrent disease.    She is bothered by her stent as it causes some day pain and also urinary frequency and urgency.  She has tried taking Ditropan and Pyridium to help with this.  She also has oxycodone but this causes constipation issues    Past Surgical History:   Procedure Laterality Date    CERVICAL BIOPSY  W/ LOOP ELECTRODE EXCISION      FL RETROGRADE PYELOGRAM  2/13/2024    LYMPH NODE DISSECTION Bilateral 5/6/2022    Procedure: DISSECTION/STAGING LYMPH NODE  PELVIS/ABDOMEN;  Surgeon: Parminder Moctezuma MD;  Location: [facility];  Service: Gynecology Oncology    NJ CYSTO BLADDER W/URETERAL CATHETERIZATION Right 2/13/2024    Procedure: CYSTOSCOPY WITH RETROGRADE PYELOGRAM;  Surgeon: Chuck Campo MD;  Location: [facility];  Service: Urology    NJ INSERTION VAGINAL RADIATION DEVICE N/A 7/15/2022    Procedure: INSERTION NADIR SLEEVE VAGINA WITH POST OP BRACHYTHERAPY (IN RADIATION ONCOLOGY);  Surgeon: Parminder Moctezuma MD;  Location: [facility];  Service: Gynecology Oncology    NJ LAPAROSCOPY COLECTOMY PARTIAL W/ANASTOMOSIS N/A 5/10/2023    Procedure: RESECTION COLON SIGMOID LAPAROSCOPIC;  Surgeon: Marin Salinas MD;  [facility]: [facility];  Service: Colorectal    NJ SIGMOIDOSCOPY FLX DX W/COLLJ SPEC BR/WA IF PFRMD N/A 5/10/2023    Procedure: SIGMOIDOSCOPY FLEXIBLE;  Surgeon: Marin Salinas MD;  [facility]: [facility];  Service: Colorectal    SALPINGECTOMY Bilateral 5/6/2022    Procedure: SALPINGECTOMY, OVARIAN TRANSPOSITION;  Surgeon: Parminder Moctezuma MD;  Location: [facility];  Service: Gynecology Oncology    URETERAL STENT PLACEMENT Right 2/13/2024    Procedure: INSERTION STENT URETERAL;  Surgeon: Chuck Campo MD;  Location: [facility];  Service: Urology    US GUIDANCE  7/15/2022        Past Medical History:   Diagnosis Date    Anxiety     Cancer (HCC)     cervix    Cervical cancer (HCC)     receiving chemo and radiation    COVID     Jan 2022    Depression     Diarrhea     Dizziness     Frequent headaches     Hx of bleeding disorder     vaginal bleeding    Obesity              Review of Systems   Constitutional:  Negative for chills and fever.   HENT:  Negative for ear pain and sore throat.    Eyes:  Negative for pain and visual disturbance.   Respiratory:  Negative for cough and shortness of breath.    Cardiovascular:  Negative for chest pain and palpitations.   Gastrointestinal:  Negative for abdominal pain and vomiting.  "  Genitourinary:  Positive for flank pain and frequency. Negative for dysuria and hematuria.   Musculoskeletal:  Negative for arthralgias and back pain.   Skin:  Negative for color change and rash.   Neurological:  Negative for seizures and syncope.   All other systems reviewed and are negative.        Objective:      /74 (BP Location: Left arm, Patient Position: Sitting, Cuff Size: Large)   Pulse 104   Wt 98.9 kg (218 lb)   LMP 07/27/2022 (Exact Date)   SpO2 98%   BMI 32.19 kg/m²     No results found for: \"PSA\"       Physical Exam  Vitals reviewed.   Constitutional:       General: She is not in acute distress.     Appearance: Normal appearance. She is not ill-appearing, toxic-appearing or diaphoretic.   HENT:      Head: Normocephalic and atraumatic.   Eyes:      Extraocular Movements: Extraocular movements intact.      Pupils: Pupils are equal, round, and reactive to light.   Pulmonary:      Effort: Pulmonary effort is normal.   Abdominal:      General: Abdomen is flat. There is no distension.      Palpations: Abdomen is soft. There is no mass.      Tenderness: There is no abdominal tenderness. There is no guarding or rebound.      Hernia: No hernia is present.      Comments: There are several laparoscopic incisions located near her bellybutton and in the right left lower quadrant.  There is also an obvious Pfannenstiel incision which has healed well.   Skin:     General: Skin is warm.   Neurological:      General: No focal deficit present.      Mental Status: She is alert and oriented to person, place, and time. Mental status is at baseline.   Psychiatric:         Mood and Affect: Mood normal.         Behavior: Behavior normal.         Thought Content: Thought content normal.           ADDENDUM:     Also in the differential, thought most likely, is a transposed left ovary, image 2/131   Addended by Alistair Delgado MD on 2/19/2024  9:53 AM   ADDENDUM:     Additional findings noted is a hypodense nodule in " the left lower quadrant (for example image 3/130) which measures 3.3 x 3.0 cm in size. It increased in size since 2/9/2024 at which time it measured 2.7 x 2.5 cm. However, on CT radiation planning   examination of 7/21/2022 it measured 2.9 x 2.6 cm and on 7/18/2022, 3.5 x 2.3 cm. Given waxing and waning size and lack of activity on an outside institution PET/CT from 5/27/2022, a benign etiology is favored, such as waxing and waning size of a   seroma/lymphocele. Follow-up PET/CT is planned in the near future. Careful reassessment is recommended on that study.     Findings discussed with Dr. Moctezuma, 2/15/2024, 1:15 p.m.      Addended by Alistair Delgado MD on 2/15/2024  1:22 PM     Study Result    Narrative & Impression   CT ABDOMEN AND PELVIS WITH IV CONTRAST     INDICATION: RLQ abdominal pain (Age >= 14y)  abdominal pain. History of cervical cancer, prior colon surgery, radiation. Nausea and vomiting, difficulty urinating.     COMPARISON: 2/9/2024     TECHNIQUE: CT examination of the abdomen and pelvis was performed. Multiplanar 2D reformatted images were created from the source data.     This examination, like all CT scans performed in the [facility], was performed utilizing techniques to minimize radiation dose exposure, including the use of iterative reconstruction and automated exposure control. Radiation dose length   product (DLP) for this visit: 810.28 mGy-cm     IV Contrast: 85 mL of iohexol (OMNIPAQUE)  Enteric Contrast: Not administered.     FINDINGS:     ABDOMEN     LOWER CHEST: No clinically significant abnormality in the visualized lower chest.     LIVER/BILIARY TREE: Subcentimeter hypoattenuating lesion(s), too small to characterize but statistically likely benign, which do not require follow-up (ACR White Paper 2017). No suspicious mass. Normal hepatic contours. No biliary dilation.     GALLBLADDER: No calcified gallstones. No pericholecystic inflammatory change.     SPLEEN:  Unremarkable.     PANCREAS: Unremarkable.     ADRENAL GLANDS: Unremarkable.     KIDNEYS/URETERS: Right-sided hydronephrosis, with perinephric and periureteral edema, and delayed enhancement of the renal pyramids. No evidence of a calcified urinary tract calculus. Degree of soft tissue stranding about the right kidney has mildly   worsened. Dilated ureter extends into the pelvis. Left kidney is unremarkable.     STOMACH AND BOWEL: Postsurgical changes at the rectosigmoid junction. No bowel obstruction. Moderately increased stool in the colon.     APPENDIX: No findings to suggest appendicitis.     ABDOMINOPELVIC CAVITY: Small amount of free fluid in the pelvis.     VESSELS: Unremarkable for patient's age.     PELVIS     REPRODUCTIVE ORGANS: Unremarkable for patient's age.     URINARY BLADDER: Limited assessment as it is almost empty. There is suspicion however for bladder wall thickening.     ABDOMINAL WALL/INGUINAL REGIONS: Unremarkable.     BONES: No acute fracture or suspicious osseous lesion.     IMPRESSION:     1. Persistent right-sided hydronephrosis with perinephric and periureteral edema mildly worsened from the prior examination. No radiopaque urinary tract calculus. As noted on prior study, findings are suspicious for pyelonephritis although distal   ureteral stricture given history of prior radiation is also in the differential.  2. Bladder decompressed but with suspicion of bladder wall thickening as well  3. Small amount of free fluid in the pelvis, unchanged             Orders  Orders Placed This Encounter   Procedures    POCT Measure PVR

## 2024-02-28 ENCOUNTER — OFFICE VISIT (OUTPATIENT)
Dept: PHYSICAL THERAPY | Facility: CLINIC | Age: 34
End: 2024-02-28
Payer: COMMERCIAL

## 2024-02-28 DIAGNOSIS — Z85.41 HISTORY OF CERVICAL CANCER: ICD-10-CM

## 2024-02-28 DIAGNOSIS — N94.19 DYSPAREUNIA DUE TO MEDICAL CONDITION IN FEMALE PATIENT: ICD-10-CM

## 2024-02-28 DIAGNOSIS — R19.8 ABNORMAL BOWEL HABITS: ICD-10-CM

## 2024-02-28 DIAGNOSIS — R32 URINARY INCONTINENCE IN FEMALE: ICD-10-CM

## 2024-02-28 DIAGNOSIS — R10.2 PELVIC PAIN: Primary | ICD-10-CM

## 2024-02-28 DIAGNOSIS — R10.84 GENERALIZED ABDOMINAL PAIN: ICD-10-CM

## 2024-02-28 DIAGNOSIS — C53.8 MALIGNANT NEOPLASM OF OVERLAPPING SITES OF CERVIX (HCC): Primary | ICD-10-CM

## 2024-02-28 PROCEDURE — 97530 THERAPEUTIC ACTIVITIES: CPT

## 2024-02-28 PROCEDURE — 97140 MANUAL THERAPY 1/> REGIONS: CPT

## 2024-02-28 PROCEDURE — 97110 THERAPEUTIC EXERCISES: CPT

## 2024-02-28 RX ORDER — DIAZEPAM 5 MG/1
TABLET ORAL
Qty: 1 TABLET | Refills: 0 | Status: SHIPPED | OUTPATIENT
Start: 2024-02-28

## 2024-02-28 NOTE — PROGRESS NOTES
PT Discharge Summary    Today's date: 2024  Patient name: Margot Moody  : 1990  MRN: [id number]  Referring provider: Demi Mandel DO  Dx:   Encounter Diagnosis     ICD-10-CM    1. Pelvic pain  R10.2       2. Dyspareunia due to medical condition in female patient  N94.19       3. Urinary incontinence in female  R32       4. Abnormal bowel habits  R19.8       5. Generalized abdominal pain  R10.84       6. History of cervical cancer  Z85.41               Start Time: 1505  Stop Time: 1545  Total time in clinic (min): 40 minutes    Assessment  Assessment details: The pt is a 33 year old female who presents to skilled physical therapy services due to a decline in functional status related to cervical cancer, chemo/radiation treatments, and partial colectomy. At this point in her POC, she has received 9 skilled therapy visits. Upon completion of the TX, the pt presents with a decline/regression in status as noted by subjective report and PFDI. Regression is related to the recent decline in medical status.  She continues to present with impairments in pain (abdominal, vaginal, rectal), scar/fascial restrictions, abnormal bowel habits, fatigue, as well as bowel/bladder incontinence. As a result of these impairments, the pt has difficulty with the following functional activities: bowel function/continence, bladder function/continence, activity tolerance for ADLs/IADLs/work responsibilities, sexual intimacy, as well as poor tolerance with contact to the perineal region. At this point in time, the pt will be discharged from her current PT POC as a result of discontinued insurance coverage (pt is unable to transition to self-pay due to financial contraints) as well as changes in medical status. She is referred to continued care by her medical physicians. The pt was educated to contact the clinic with any follow-up questions and/or need for continued care s/p surgery/procedures. She verbalized good  understanding.    Thank you for the referral.      Impairments: abnormal muscle firing, abnormal or restricted ROM, activity intolerance, impaired physical strength, lacks appropriate home exercise program, pain with function and poor posture     Symptom irritability: highBarriers to therapy: PMH Cervical Cancer with treatment side-effects; Chronicity of symptoms; PMH of HPV  Understanding of Dx/Px/POC: good   Prognosis: good    Goals  - Pt will present with 50% (achieve within 8 week) and by at least 80% (achieve within 16 weeks) decrease discomfort with pelvic touch for improved tolerance to manual therapy and self-treatment techniques.  - Pt will report 50% reduction (achieve within 8 week) and at least 80% (achieve within 16 weeks) reduction in discomfort with either palpation or intimacy in order to improve quality of life as well as to tolerate gynecological examination and/or intimacy.  - Pt will be able to tolerate vaginal penetration with 0-2/10 pain for improved tolerance for gynecological examination and/or intercourse.  - Pt is able to cough, laugh, and sneeze with 50% reduction (achieve within 8 week) and at least 90% (achieve within 16 weeks) reduction bladder leakage in order to improve quality of life and decrease need/dependency on panty liners.  - Pt reports 50% reduction (achieve within 8 week) and at least 90% (achieve within 16 weeks) reduction in urinary incontinence indicating decreased effect of urinary incontinence on function.  - Pt has implemented urge suppression strategies throughout the day and reports average voiding interval is 3-4 hours upon discharge in order to have more functional bladder functioning.  - Decrease nocturia to 1-2 voids/night for more thorough sleep.     Plan  Patient would benefit from: skilled physical therapy  Referral necessary: Yes  Planned therapy interventions: abdominal trunk stabilization, joint mobilization, manual therapy, body mechanics training,  "breathing training, neuromuscular re-education, patient education, postural training, self care, strengthening, stretching, flexibility, functional ROM exercises, therapeutic activities, therapeutic exercise and home exercise program  Frequency: 1x week  Duration in visits: 16  Plan of Care beginning date: 12/13/2023  Plan of Care expiration date: 4/3/2024  Treatment plan discussed with: patient      PT Pelvic Floor Subjective:   History of Present Illness:     PROGRESS REPORT:   Pt reports that she had a follow-up with urology. She reports that she plans to have a surgery in early July and will have a nephrostomy tube for 3 weeks post-op. -- PET scan schedule for March 7. -- Pt describes that her urine is the color of Iced tea. -- Pt reports worse abdominal pain as well as back and neck/shoulder pain. --- Regarding PF and recent symptoms: \"I don't want to talk about it anymore.\"      INITIAL EVALUATION:  - Has not had sex for 1 year  - physician wants her to consider self catheter due to incomplete emptying of bladder with urination     - Bowel: urgency; occational incontinence  (after radiation -- 1/3 colon removed in 05/2023); nausea (intermittent, no consistent pattern); abdominal pain (lower abdominal - central, daily - particularly in morning when wake up)    Date of onset: 6/1/2022  Mechanism of injury: surgery and trauma    Chemo/radiation (internal/external) treatment for cervical cancer: June 2022 - August 2022        Recurrent probem      Social Support:     Lives with:  Significant other    Relationship status: domestic partnership    Work status: employed full time and employed part time (Full-time: ; [employer]: ; Volunteering: EMT)    Life stress level: 6    History of Depression: yes  Diet and Exercise:    Diet:balanced nutrition    Not at this time s/p procedure 2/2024    Not exercising due to: lack of time and bladder incontinence  OB/ gyn History    Gestational History:     Prior " "Pregnancy: No      Menstrual History:      Menopausal: menopause    Birth control: no contraception  no hormone replacement therapy (Not currrently -- Pt did not inform physician)  Bladder Function:     Voiding Difficulties positive for: urgency, frequent urination, straining and incomplete emptying      Voiding Difficulties negative for: hesitancy       Voiding Difficulties comments:     Voiding frequency: every 1-2 hours    Urinary leakage: urine leakage (Difficult to clarify frequency, but few drops throughout day)    Urinary leakage aggravated by: coughing, sneezing, bending, exercise, standing up, walking to the bathroom, hearing running water, key-in-the-door syndrome, post-void dribble and laughing    Urinary leakage not aggravated by: seeing a toilet    Nocturia (episodes per night): 3 and 2    Painful urination: Yes (Intermittent)      Fluid Intake Type:  Water and coffee    Intake (ounces): Water: 80, Coffee: 12,     Intake (ounces) comment: If not drinking enough water - severe pain with urination      Incontinence Management:     Pads/Diaper Use:  24 hours and dayTried Melba-fit (too painful) :  Bowel Function:     Voiding DIfficulties: stool frequency abnormal, painful defecating, unfinished feeling after defecating and constipation      Bowel Function comments:  Pt not eating regularly -- Pt does not eat at work -- Pain/nausea with eating and activity    Bowel frequency: more than every 4 days and every 3 days (1-2x/week over past 2 weeks)    Stool softener use: no stool softeners    Uses \"squatty potty\": no Squatty Potty  Sexual Function:     Sexually Active:  Not sexually active (Has not been sexually active for approximately 1 year due to chemo/radiation and diagnosis of HPV)    pain causes abstinenceSexual function: vulvar pain and vaginal painPt was informed by her physician that HPV lead to cancer    -- Pain with physician examinations:  Pain:     Current pain ratin    At best pain rating:  " 2    At worst pain ratin    Location:  Abdomen: 4/10 pain currently; Low back: 6/10; Pelvis: 3/10-4/10 pain (constant burning unless she does not think about it)    Onset:  1-2 years ago    Quality:  Burning, dull ache, cramping and numbness (Numbness: stomach and down left leg from prior surgeries)    Aggravating factors:  Bowel movements, intercourse, walking and urination (Frequent breaks at work to sit)    Duration of symptoms:  Does not go away    Relieving factors:  Change in position  Treatments:     Previous treatment:  Medication (Chemo; Radiation)    Current treatment: medication and physical therapy    Patient Goals:     Patient goals for therapy:  Improved pain management, improved quality of life, relaxation, improved comfort, improved bladder or bowel function, fully empty bladder or bowels, decreased pain and decreased interpersonal problems    Other patient goals:  Avoid self catheterization      Objective     Strength/Myotome Testing     Left Hip   Planes of Motion   Flexion: 4 (Pain - anterior thigh)    Right Hip   Planes of Motion   Flexion: 4 (Pain - Anterior thigh)    Left Knee   Flexion: 4+  Extension: 5    Right Knee   Flexion: 4+  Extension: 5    Left Ankle/Foot   Dorsiflexion: 5    Right Ankle/Foot   Dorsiflexion: 5      Abdominal Assessment:      Position: supine exam    Diastatis   Diastasis recti present? no     Skin inspection:   scars present.   Scar 1 location: Anterior lower abdomen - 2 long horizontal scars. Sensitivity level medium Restriction level high   Scar 2 location: 3 Small scars at R/L/Central abdomen s/p colectectomy, salpinectomy, lymph node dissection, sigmoidoscopy. Sensitivity level low Restriction level low       General Perineum Exam:     General perineum exam comments: Not performed at DC per pt preference     Visual Inspection of Perineum:   PFM Contraction Comments: Not performed at DC per pt preference     Pelvic Organ Prolapse   Comments: Not performed at DC  "per pt preference         Pelvic Floor Muscle Exam:             pelvic floor exam consent given by patient       Graphical documentation:     Pt was informed by her physician that HPV lead to cancer    -- Pain with physician examinations             Precautions: PMH: HPV, Chemo/Radiation (internal/external) treatments in 2022 to treat cervical cancer, Partial Colectomy (05/10/2023)       12/13 12/20 01/03 01/10 1/17 1/24 1/31 2/21 2/28    Manuals             Fascial mobilization KS KS KS KS KS KS  KS     Scar mobilization  KS KS  KS KS       Abdominal massage  KS KS     KS     STM - Internal Vaginal  KS           STM    KS (Back, posterior pelvis) KS (UT; Shoulders - tension in upper trunk impacting PF)  KS (UT; Shoulders - tension in upper trunk impacting PF) KS (Back, posterior pelvis) KS (UT; Shoulders - tension in upper trunk impacting PF)                 Neuro Re-Ed             Kegel/PF Contraction Edu             TA Contraction Ed             Kegel - Hook-lying                                                                 Ther Ex             Happy Baby Stretch   Seated: 2x30\"          Butterfly Stretch   2x30\"                       POC; PF Physical therapy KS KS   KS KS   KS    Anatomy/physiology as it relates to pt's symptoms/presentation KS KS KS  KS KS KS  KS    Address pt's questions PRN KS KS KS KS KS KS KS KS KS    Examination findings KS KS   KS KS       HEP KS   KS  KS KS      Update on pt' symptoms/status   KS KS KS KS KS KS KS                 Pt Edu regarding stress management/relaxation    KS KS KS KS KS KS                 Ther Activity             PF Questionnaires     KS    KS    KS: Update on pt's symptoms/status/function     KS    KS    Pt edu: Use of dilators for symptom management      KS KS      Pt edu: Pelvic floor tools for home treatment/symptoms mangement      KS                    Gait Training                                       Modalities                                            "

## 2024-03-05 DIAGNOSIS — F41.9 ANXIETY: ICD-10-CM

## 2024-03-05 RX ORDER — BUSPIRONE HYDROCHLORIDE 7.5 MG/1
7.5 TABLET ORAL 2 TIMES DAILY
Qty: 60 TABLET | Refills: 5 | Status: SHIPPED | OUTPATIENT
Start: 2024-03-05 | End: 2024-03-14 | Stop reason: SDUPTHER

## 2024-03-07 ENCOUNTER — HOSPITAL ENCOUNTER (OUTPATIENT)
Dept: RADIOLOGY | Age: 34
Discharge: HOME | End: 2024-03-07
Payer: COMMERCIAL

## 2024-03-07 DIAGNOSIS — C53.8 MALIGNANT NEOPLASM OF OVERLAPPING SITES OF CERVIX (HCC): ICD-10-CM

## 2024-03-07 LAB — GLUCOSE SERPL-MCNC: 77 MG/DL (ref 65–140)

## 2024-03-07 PROCEDURE — 82948 REAGENT STRIP/BLOOD GLUCOSE: CPT

## 2024-03-07 PROCEDURE — 78815 PET IMAGE W/CT SKULL-THIGH: CPT

## 2024-03-07 PROCEDURE — G1004 CDSM NDSC: HCPCS

## 2024-03-07 PROCEDURE — A9552 F18 FDG: HCPCS

## 2024-03-14 ENCOUNTER — TELEMEDICINE (OUTPATIENT)
Dept: FAMILY MEDICINE CLINIC | Facility: CLINIC | Age: 34
End: 2024-03-14
Payer: COMMERCIAL

## 2024-03-14 VITALS — BODY MASS INDEX: 30.96 KG/M2 | HEIGHT: 69 IN | WEIGHT: 209 LBS

## 2024-03-14 DIAGNOSIS — F41.9 ANXIETY: Primary | ICD-10-CM

## 2024-03-14 PROBLEM — H00.016 HORDEOLUM EXTERNUM OF LEFT EYE: Status: RESOLVED | Noted: 2023-08-02 | Resolved: 2024-03-14

## 2024-03-14 PROCEDURE — 99213 OFFICE O/P EST LOW 20 MIN: CPT | Performed by: FAMILY MEDICINE

## 2024-03-14 RX ORDER — BUSPIRONE HYDROCHLORIDE 15 MG/1
15 TABLET ORAL 2 TIMES DAILY
Qty: 60 TABLET | Refills: 3 | Status: SHIPPED | OUTPATIENT
Start: 2024-03-14

## 2024-03-14 NOTE — ASSESSMENT & PLAN NOTE
The patient is having worsening anxiety symptoms and is interested in increasing her buspirone.  We will increase the dose to 15 mg twice a day as indicated.  We will monitor closely.  Patient will report back in 2 to 3 weeks as to how she is feeling.  I reassured the patient that she will hear back from her surgical oncologist that she needs to give them time to formulate her treatment plan.  I advised her that I did not see anything emergent on the report that cannot wait.  The patient was given opportunity to ask questions.  We will see her back as scheduled.

## 2024-03-14 NOTE — PROGRESS NOTES
Virtual Regular Visit    Verification of patient location:    Patient is located at Home in the following state in which I hold an active license [place]      Assessment/Plan:    Problem List Items Addressed This Visit        Behavioral Health    Anxiety - Primary     The patient is having worsening anxiety symptoms and is interested in increasing her buspirone.  We will increase the dose to 15 mg twice a day as indicated.  We will monitor closely.  Patient will report back in 2 to 3 weeks as to how she is feeling.  I reassured the patient that she will hear back from her surgical oncologist that she needs to give them time to formulate her treatment plan.  I advised her that I did not see anything emergent on the report that cannot wait.  The patient was given opportunity to ask questions.  We will see her back as scheduled.         Relevant Medications    busPIRone (BUSPAR) 15 mg tablet            Reason for visit is   Chief Complaint   Patient presents with   • Anxiety     Unsure if she needs increase on Buspar or if she needs an alternative   • Virtual Regular Visit        Encounter provider Demi Mandel DO    Provider located at [address]  [facility] [address] [phone]      Recent Visits  No visits were found meeting these conditions.  Showing recent visits within past 7 days and meeting all other requirements  Today's Visits  Date Type Provider Dept   03/14/24 Telemedicine Demi Mandel DO [facility]   Showing today's visits and meeting all other requirements  Future Appointments  No visits were found meeting these conditions.  Showing future appointments within next 150 days and meeting all other requirements       The patient was identified by name and date of birth. Margot Moody was informed that this is a telemedicine visit and that the visit is being conducted through the Epic Embedded platform. She agrees to  proceed..  My office door was closed. No one else was in the room.  She acknowledged consent and understanding of privacy and security of the video platform. The patient has agreed to participate and understands they can discontinue the visit at any time.    Patient is aware this is a billable service.     Subjective  Margot Moody is a 33 y.o. female who presents today for follow-up of her anxiety.      Margot Moody is a 33 y.o. female who presents today for virtual video visit to discuss her anxiety.  The patient had a PET scan performed last week for restaging of her cervical cancer and is very anxious because she has not heard back from her surgical oncologist about the results.  She had called that he is out of the office and she is questioning why another doctor can read her report.  It did show some nonspecific areas of inflammation in the patient looked it up on [place] and determined it is possible could be metastatic disease.  She is very anxious and worried about the results all the time.  She was reassured by her oncologist office they would be following up with her.  In the meantime she is having increasing worry and panic attacks and is anxious all the time.  She is interested in possibly increasing her buspirone or looking for another alternative.  She is anxious because she states that she is going to be having surgery in July and she is unsure what the next step is.  She currently does not see a therapist and states she had looked into it but there is no availability.  She currently does not exercise due to hematuria she is experiencing that is being worked up by urology.  She denies any chest pain or shortness of breath or palpitations.  She is taking her other medications.    Anxiety  Presents for follow-up visit. Symptoms include depressed mood, excessive worry, irritability and nervous/anxious behavior. Patient reports no chest pain, compulsions, confusion, decreased concentration,  dizziness, dry mouth, feeling of choking, hyperventilation, impotence, insomnia, malaise, muscle tension, nausea, obsessions, palpitations, panic, restlessness, shortness of breath or suicidal ideas.            Past Medical History:   Diagnosis Date   • Anxiety    • Cancer (HCC)     cervix   • Cervical cancer (HCC)     receiving chemo and radiation   • COVID     Jan 2022   • Depression    • Diarrhea    • Dizziness    • Frequent headaches    • Hx of bleeding disorder     vaginal bleeding   • Obesity        Past Surgical History:   Procedure Laterality Date   • CERVICAL BIOPSY  W/ LOOP ELECTRODE EXCISION     • COLON SURGERY  Colon resection    May 2023   • FL RETROGRADE PYELOGRAM  02/13/2024   • LYMPH NODE DISSECTION Bilateral 05/06/2022    Procedure: DISSECTION/STAGING LYMPH NODE PELVIS/ABDOMEN;  Surgeon: Parminder Moctezuma MD;  [facility]: [facility];  Service: Gynecology Oncology   • AR CYSTO BLADDER W/URETERAL CATHETERIZATION Right 02/13/2024    Procedure: CYSTOSCOPY WITH RETROGRADE PYELOGRAM;  Surgeon: Chuck Campo MD;  [facility];  Service: Urology   • AR INSERTION VAGINAL RADIATION DEVICE N/A 07/15/2022    Procedure: INSERTION NADIR SLEEVE VAGINA WITH POST OP BRACHYTHERAPY (IN RADIATION ONCOLOGY);  Surgeon: Parminder Moctezuma MD;  [facility];  Service: Gynecology Oncology   • AR LAPAROSCOPY COLECTOMY PARTIAL W/ANASTOMOSIS N/A 05/10/2023    Procedure: RESECTION COLON SIGMOID LAPAROSCOPIC;  Surgeon: Marin Salinas MD;  [facility];  Service: Colorectal   • AR SIGMOIDOSCOPY FLX DX W/COLLJ SPEC BR/WA IF PFRMD N/A 05/10/2023    Procedure: SIGMOIDOSCOPY FLEXIBLE;  Surgeon: Marin Salinas MD;  [facility];  Service: Colorectal   • SALPINGECTOMY Bilateral 05/06/2022    Procedure: SALPINGECTOMY, OVARIAN TRANSPOSITION;  Surgeon: Parminder Moctezuma MD;  [facility];  Service: Gynecology Oncology   • URETERAL STENT PLACEMENT Right  02/13/2024    Procedure: INSERTION STENT URETERAL;  Surgeon: Chuck Campo MD;  [facility];  Service: Urology   • US GUIDANCE  07/15/2022       Current Outpatient Medications   Medication Sig Dispense Refill   • busPIRone (BUSPAR) 15 mg tablet Take 1 tablet (15 mg total) by mouth 2 (two) times a day 60 tablet 3   • diazepam (VALIUM) 5 mg tablet Take 1 tablet PO 30 min prior to imaging study. 1 tablet 0   • estradiol (VIVELLE-DOT) 0.0375 MG/24HR PLACE 1 PATCH ON THE SKIN 2 TIMES A WEEK. 8 patch 3   • ketorolac (TORADOL) 10 mg tablet Take 1 tablet (10 mg total) by mouth every 6 (six) hours as needed for moderate pain 45 tablet 1   • Omega-3 Fatty Acids (fish oil) 1,000 mg Take 2,000 mg by mouth daily     • ondansetron (ZOFRAN-ODT) 4 mg disintegrating tablet Take 1 tablet (4 mg total) by mouth every 6 (six) hours as needed for nausea or vomiting 30 tablet 0   • oxybutynin (DITROPAN) 5 mg tablet Take 1 tablet (5 mg total) by mouth 3 (three) times a day as needed (Stent discomfort, bladder spasms) 30 tablet 5   • oxyCODONE (ROXICODONE) 5 immediate release tablet Take 1 tablet (5 mg total) by mouth every 4 (four) hours as needed for moderate pain or severe pain Max Daily Amount: 30 mg 15 tablet 0   • phenazopyridine (PYRIDIUM) 100 mg tablet Take 1 tablet (100 mg total) by mouth 3 (three) times a day as needed for bladder spasms 10 tablet 0   • polyethylene glycol (MiraLax) 17 g packet Take 17 g by mouth daily as needed     • Probiotic Product (Probiotic Daily) CAPS Take by mouth     • tamsulosin (FLOMAX) 0.4 mg Take 1 capsule (0.4 mg total) by mouth daily at bedtime 90 each 3     No current facility-administered medications for this visit.        No Known Allergies    Review of Systems   Constitutional:  Positive for irritability. Negative for chills and fever.   HENT:  Negative for ear pain and sore throat.    Eyes:  Negative for pain and visual disturbance.   Respiratory:  Negative for cough and  "shortness of breath.    Cardiovascular:  Negative for chest pain and palpitations.   Gastrointestinal:  Negative for abdominal pain, nausea and vomiting.   Genitourinary:  Negative for dysuria, hematuria and impotence.   Musculoskeletal:  Negative for arthralgias and back pain.   Skin:  Negative for color change and rash.   Neurological:  Negative for dizziness, seizures and syncope.   Psychiatric/Behavioral:  Negative for confusion, decreased concentration and suicidal ideas. The patient is nervous/anxious. The patient does not have insomnia.    All other systems reviewed and are negative.      Video Exam    Vitals:    03/14/24 1629   Weight: 94.8 kg (209 lb)   Height: 5' 9\" (1.753 m)       Physical Exam  Constitutional:       General: She is not in acute distress.     Appearance: Normal appearance. She is well-developed. She is not diaphoretic.   HENT:      Head: Normocephalic and atraumatic.   Eyes:      Extraocular Movements: Extraocular movements intact.      Pupils: Pupils are equal, round, and reactive to light.   Pulmonary:      Effort: Pulmonary effort is normal.      Breath sounds: Normal breath sounds.   Musculoskeletal:      Cervical back: Normal range of motion and neck supple.   Neurological:      Mental Status: She is alert and oriented to person, place, and time.   Psychiatric:         Mood and Affect: Mood normal.         Behavior: Behavior normal.         Thought Content: Thought content normal.         Judgment: Judgment normal.          Visit Time  Total Visit Duration: 15        "

## 2024-03-15 ENCOUNTER — RADIATION ONCOLOGY FOLLOW-UP (OUTPATIENT)
Dept: RADIATION ONCOLOGY | Facility: CLINIC | Age: 34
End: 2024-03-15
Attending: INTERNAL MEDICINE
Payer: COMMERCIAL

## 2024-03-15 VITALS
SYSTOLIC BLOOD PRESSURE: 111 MMHG | WEIGHT: 221.5 LBS | OXYGEN SATURATION: 99 % | BODY MASS INDEX: 32.71 KG/M2 | TEMPERATURE: 97.6 F | DIASTOLIC BLOOD PRESSURE: 76 MMHG

## 2024-03-15 DIAGNOSIS — C53.8 MALIGNANT NEOPLASM OF OVERLAPPING SITES OF CERVIX (HCC): Primary | ICD-10-CM

## 2024-03-15 PROCEDURE — 99214 OFFICE O/P EST MOD 30 MIN: CPT | Performed by: INTERNAL MEDICINE

## 2024-03-15 PROCEDURE — 99211 OFF/OP EST MAY X REQ PHY/QHP: CPT | Performed by: INTERNAL MEDICINE

## 2024-03-15 NOTE — PROGRESS NOTES
Follow-up - Radiation Oncology   Margot Moody 1990 33 y.o. female [id number]    Cancer Staging   Malignant neoplasm of overlapping sites of cervix (HCC)  Staging form: Cervix Uteri, AJCC Version 9  - Clinical: No stage assigned - Unsigned  - Pathologic: No stage assigned - Unsigned  - Pathologic: FIGO Stage IIIC1p (pT1b1, cM0) - Signed by Parminder Moctezuma MD on 5/19/2022  Stage confirmation method: Pathology  Pelvic grady status: Positive  Pelvic grady method of assessment: Lymph node dissection  Para-aortic status: Negative    Assessment/Plan:  Margot Moody 1990 is a 32 y.o. female with FIGO IIIC1 cervical cancer s/p pelvic lymph node dissection and ovarian translocation (hysterectomy was aborted after finding of grady disease). She was treated with definitive chemoradiation therapy per multidisciplinary consensus with external beam RT + cervical brachytherapy, completing on 8/3/22. Her post-treatment PET/CT at [place] was reassuring, no evidence of FDG avid disease and resolution of prior cervical activity. She developed proctitis and stricture s/p laparoscopic resection on 5/10/23.    She presents today for routine follow-up visit now 1 year and 7 months after completion of radiation therapy. In the interim she required ureteral stenting and has followed with Urology to discuss stent vs ureteral reimplant surgery. She had a PET/CT on 3/7/24 which showed no suspicious activity at the cervix. There is patchy uptake in pelvic fluid; the fluid is decreasing from recent CT. There is uptake along the right ureter which may be related to recent stenting. There is a small focus of uptake in the right internal iliac region without a CT correlate. The differential for this includes inflammatory or reactive node from recent procedures, nonspecific findings, or early grady disease. No distant disease.   I have sent a message to her GynOnc team for further review of these findings.  Recommend  continued vaginal dilator use if not sexually active.  5/8/24  Dr. Fields  5/16/24  Dr. Moctezuma  -[facility] in 6 months    Bing Stark MD  Department of Radiation Oncology  [facility]    Total Time Spent  31 minutes spent reviewing EMR in preparation for visit, with the patient, coordination with other providers, review of radiology, and documentation.    No orders of the defined types were placed in this encounter.     History of Present Illness   Interval History:  Margot Moody 1990 is a 32 y.o. female  with FIGO IIIC1 cervical cancer s/p initial LEEP in March 2022 showing moderately differentiated SCC with extensive LVSI. MDT consensus was for definitive concurrent chemoRT.  She received 6 cycles of cisplatin concurrent with pelvic RT with brachytherapy boost on 8/3/22.  She was last seen by radiation oncology on 06/13/23 and presents for follow up today     2/9/24  CT abdomen and pelvis  IMPRESSION:  Mild right hydroureteronephrosis and perinephric fat stranding, without ureteral stone. Findings could be due to pyelonephritis, or alternatively distal ureteral stricture, especially given history of pelvic radiation.  ADDENDUM:   Additional findings noted is a hypodense nodule in the left lower quadrant (for example image 3/130) which measures 3.3 x 3.0 cm in size. It increased in size since 2/9/2024 at which time it measured 2.7 x 2.5 cm. However, on CT radiation planning examination of 7/21/2022 it measured 2.9 x 2.6 cm and on 7/18/2022, 3.5 x 2.3 cm. Given waxing and waning size and lack of activity on an outside institution PET/CT from 5/27/2022, a benign etiology is favored, such as waxing and waning size of a seroma/lymphocele. Follow-up PET/CT is planned in the near future. Careful reassessment is recommended on that study.  Colorectal anastomosis noted. No evidence of bowel obstruction.    2/15/24  Dr. 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 She is status post right ureteral stent placement on 2024. Reviewed recent CT scans., no measurable disease. Will order PET CT    24  Dr. Fields   Currently managed with stent.  Discussed stents versus ureteral reimplant surgery. Will notify office of decision.  Appointment scheduled for stent replacement in the interim.     3/7/24  PET CT  IMPRESSION:     1. No suspicious residual FDG activity at the cervix.  2. However compared to prior PET/CT there is new patchy FDG uptake in pelvic fluid extending to the presacral region, nonspecific could be inflammatory, but malignant ascites and intraperitoneal disease should be considered. Overall fluid in this region   has decreased from recent CT of 2024.  3. Mild curvilinear uptake along the right distal ureter, nonspecific could be inflammatory but again metastatic disease is not excluded.  4. Small focus of uptake in the right internal iliac region for which grady disease is not excluded.  5. Smaller fluid pocket in the left lower quadrant with minimal FDG uptake, nonspecific.  6. Incidentally noted is focal uptake at the base of the cecum/proximal appendix with appearance of a chronically thickened appendix, in retrospect appears similar to prior PET/CT of 2022. Question related to chronic reactive changes with metastatic disease not excluded.  7. Otherwise no findings for hypermetabolic metastasis to the neck, chest or upper abdomen.      Upcomin24  Dr. Fields  24  Dr. Moctezuma    She notes some pelvic pain and back pain. She has some discomfort with urination. She is not sexually active and not using a dilator. She alternates between diarrhea and constipation.    Historical Information   Oncology History   Malignant neoplasm of overlapping sites of cervix (HCC)   3/24/2022 Initial Diagnosis    Malignant neoplasm of overlapping sites of cervix (HCC)     2022 Surgery     Exploratory laparotomy for planned radical hysterectomy, bilateral pelvic lymph node dissection, aborted radical hysterectomy, bilateral ovarian transposition due to positive lymph nodes.  1. Two right pelvic lymph nodes positive     5/19/2022 -  Cancer Staged    Staging form: Cervix Uteri, AJCC Version 9  - Pathologic: FIGO Stage IIIC1p (pT1b1, cM0) - Signed by Parminder Moctezuma MD on 5/19/2022  Stage confirmation method: Pathology  Pelvic grady status: Positive  Pelvic grady method of assessment: Lymph node dissection  Para-aortic status: Negative       6/20/2022 - 7/26/2022 Chemotherapy    palonosetron (ALOXI), 0.25 mg, Intravenous, Once, 6 of 6 cycles  Administration: 0.25 mg (6/20/2022), 0.25 mg (6/27/2022), 0.25 mg (7/6/2022), 0.25 mg (7/11/2022), 0.25 mg (7/19/2022), 0.25 mg (7/26/2022)  CISplatin (PLATINOL) infusion, 70 mg (original dose 40 mg/m2), Intravenous, Once, 6 of 6 cycles  Dose modification: 70 mg (original dose 40 mg/m2, Cycle 1, Reason: Other (Must fill in a comment), Comment: max 70), 70 mg (original dose 40 mg/m2, Cycle 2, Reason: Dose modified as per discussion with consulting physician)  Administration: 70 mg (6/20/2022), 70 mg (6/27/2022), 70 mg (7/6/2022), 70 mg (7/11/2022), 70 mg (7/19/2022), 70 mg (7/26/2022)  aprepitant (CINVANTI) in  mL IVPB, 130 mg, Intravenous, Once, 6 of 6 cycles  Administration: 130 mg (6/20/2022), 130 mg (6/27/2022), 130 mg (7/6/2022), 130 mg (7/11/2022), 130 mg (7/19/2022), 130 mg (7/26/2022)     6/22/2022 - 8/3/2022 Radiation    Course: C1 - EBRT  Plan ID Energy Fractions Dose per Fraction (cGy) Dose Correction (cGy) Total Dose Delivered (cGy) Elapsed Days   Whole Pelvis 10X 25 / 25 180 0 4,500 42       Course: C1 HDR Tandem and Ring Brachytherapy  Plan ID Energy Fractions Dose per Fraction (cGy) Dose Correction (cGy) Total Dose Delivered (cGy) Elapsed Days   HDR1 7-15-22 1 / 1 700 0 700 0   [id number]  1 / 1 700 0 700 0   [id number]  1 / 1 700 0  700 0   HDR4 7-25-22 1 / 1 700 0 700 0           Past Medical History:   Diagnosis Date   • Anxiety    • Cancer (HCC)     cervix   • Cervical cancer (HCC)     receiving chemo and radiation   • COVID     Jan 2022   • Depression    • Diarrhea    • Dizziness    • Frequent headaches    • Hx of bleeding disorder     vaginal bleeding   • Obesity      Past Surgical History:   Procedure Laterality Date   • CERVICAL BIOPSY  W/ LOOP ELECTRODE EXCISION     • COLON SURGERY  Colon resection    May 2023   • FL RETROGRADE PYELOGRAM  02/13/2024   • LYMPH NODE DISSECTION Bilateral 05/06/2022    Procedure: DISSECTION/STAGING LYMPH NODE PELVIS/ABDOMEN;  Surgeon: Parminder Moctezuma MD;  [facility]: [facility];  Service: Gynecology Oncology   • DC CYSTO BLADDER W/URETERAL CATHETERIZATION Right 02/13/2024    Procedure: CYSTOSCOPY WITH RETROGRADE PYELOGRAM;  Surgeon: Chuck Campo MD;  [facility];  Service: Urology   • DC INSERTION VAGINAL RADIATION DEVICE N/A 07/15/2022    Procedure: INSERTION NADIR SLEEVE VAGINA WITH POST OP BRACHYTHERAPY (IN RADIATION ONCOLOGY);  Surgeon: Parminder Moctezuma MD;  [facility];  Service: Gynecology Oncology   • DC LAPAROSCOPY COLECTOMY PARTIAL W/ANASTOMOSIS N/A 05/10/2023    Procedure: RESECTION COLON SIGMOID LAPAROSCOPIC;  Surgeon: Marin Salinas MD;  [facility];  Service: Colorectal   • DC SIGMOIDOSCOPY FLX DX W/COLLJ SPEC BR/WA IF PFRMD N/A 05/10/2023    Procedure: SIGMOIDOSCOPY FLEXIBLE;  Surgeon: Marin Salinas MD;  [facility];  Service: Colorectal   • SALPINGECTOMY Bilateral 05/06/2022    Procedure: SALPINGECTOMY, OVARIAN TRANSPOSITION;  Surgeon: Parminder Moctezuma MD;  [facility];  Service: Gynecology Oncology   • URETERAL STENT PLACEMENT Right 02/13/2024    Procedure: INSERTION STENT URETERAL;  Surgeon: Chuck Campo MD;  [facility];  Service: Urology   • US GUIDANCE  07/15/2022     Social  History   Social History     Substance and Sexual Activity   Alcohol Use Not Currently     Social History     Substance and Sexual Activity   Drug Use Never     Social History     Tobacco Use   Smoking Status Never   • Passive exposure: Current (very mild/social)   Smokeless Tobacco Never       Meds/Allergies     Current Outpatient Medications:   •  busPIRone (BUSPAR) 15 mg tablet, Take 1 tablet (15 mg total) by mouth 2 (two) times a day, Disp: 60 tablet, Rfl: 3  •  ketorolac (TORADOL) 10 mg tablet, Take 1 tablet (10 mg total) by mouth every 6 (six) hours as needed for moderate pain, Disp: 45 tablet, Rfl: 1  •  Omega-3 Fatty Acids (fish oil) 1,000 mg, Take 2,000 mg by mouth daily, Disp: , Rfl:   •  ondansetron (ZOFRAN-ODT) 4 mg disintegrating tablet, Take 1 tablet (4 mg total) by mouth every 6 (six) hours as needed for nausea or vomiting, Disp: 30 tablet, Rfl: 0  •  oxybutynin (DITROPAN) 5 mg tablet, Take 1 tablet (5 mg total) by mouth 3 (three) times a day as needed (Stent discomfort, bladder spasms), Disp: 30 tablet, Rfl: 5  •  oxyCODONE (ROXICODONE) 5 immediate release tablet, Take 1 tablet (5 mg total) by mouth every 4 (four) hours as needed for moderate pain or severe pain Max Daily Amount: 30 mg, Disp: 15 tablet, Rfl: 0  •  phenazopyridine (PYRIDIUM) 100 mg tablet, Take 1 tablet (100 mg total) by mouth 3 (three) times a day as needed for bladder spasms, Disp: 10 tablet, Rfl: 0  •  polyethylene glycol (MiraLax) 17 g packet, Take 17 g by mouth daily as needed, Disp: , Rfl:   •  Probiotic Product (Probiotic Daily) CAPS, Take by mouth, Disp: , Rfl:   •  tamsulosin (FLOMAX) 0.4 mg, Take 1 capsule (0.4 mg total) by mouth daily at bedtime, Disp: 90 each, Rfl: 3  •  diazepam (VALIUM) 5 mg tablet, Take 1 tablet PO 30 min prior to imaging study. (Patient not taking: Reported on 3/15/2024), Disp: 1 tablet, Rfl: 0  •  estradiol (VIVELLE-DOT) 0.0375 MG/24HR, PLACE 1 PATCH ON THE SKIN 2 TIMES A WEEK. (Patient not taking:  "Reported on 3/15/2024), Disp: 8 patch, Rfl: 3  No Known Allergies    Review of Systems  Please refer to nursing note for full ROS    OBJECTIVE:   /76   Temp 97.6 °F (36.4 °C)   Wt 100 kg (221 lb 8 oz)   LMP 07/27/2022 (Exact Date)   SpO2 99%   BMI 32.71 kg/m²   Pain Assessment:  6  ECOG/[place]/WHO: 1 - Symptomatic but completely ambulatory    Physical Exam:   General Appearance:  Alert, cooperative, no distress, appears stated age  Lungs: Respirations unlabored  Gynecologic: No pain on examination. Cervix is erythematous as expected post-treatment. No obvious tumor. No vaginal tumor. No adhesions.   Skin: No generalized rash or dermatitis  Neurologic: ANOx3, speech and cognition intact.    Portions of the record may have been created with voice recognition software.  Occasional wrong word or \"sound a like\" substitutions may have occurred due to the inherent limitations of voice recognition software.  Read the chart carefully and recognize, using context, where substitutions have occurred.  "

## 2024-03-15 NOTE — PROGRESS NOTES
Margot Moody 1990 is a 33 y.o. female with FIGO IIIC1 cervical cancer s/p initial LEEP in March 2022 showing moderately differentiated SCC with extensive LVSI. MDT consensus was for definitive concurrent chemoRT.  She received 6 cycles of cisplatin concurrent with pelvic RT with brachytherapy boost on 8/3/22.  She was last seen by radiation oncology on 06/13/23 and presents for follow up today     2/9/24  CT abdomen and pelvis  IMPRESSION:  Mild right hydroureteronephrosis and perinephric fat stranding, without ureteral stone. Findings could be due to pyelonephritis, or alternatively distal ureteral stricture, especially given history of pelvic radiation.  ADDENDUM:   Additional findings noted is a hypodense nodule in the left lower quadrant (for example image 3/130) which measures 3.3 x 3.0 cm in size. It increased in size since 2/9/2024 at which time it measured 2.7 x 2.5 cm. However, on CT radiation planning examination of 7/21/2022 it measured 2.9 x 2.6 cm and on 7/18/2022, 3.5 x 2.3 cm. Given waxing and waning size and lack of activity on an outside institution PET/CT from 5/27/2022, a benign etiology is favored, such as waxing and waning size of a seroma/lymphocele. Follow-up PET/CT is planned in the near future. Careful reassessment is recommended on that study.  Colorectal anastomosis noted. No evidence of bowel obstruction.    2/15/24  Dr. 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 She is status post right ureteral stent placement on 2/13/2024. Reviewed recent CT scans., no measurable disease. Will order PET CT    2/26/24  Dr. Fields   Currently managed with stent.  Discussed stents versus ureteral reimplant surgery. Will notify office of decision.  Appointment scheduled for stent replacement in the interim.     3/7/24  PET CT  IMPRESSION:     1. No suspicious residual FDG activity at the cervix.  2. However  compared to prior PET/CT there is new patchy FDG uptake in pelvic fluid extending to the presacral region, nonspecific could be inflammatory, but malignant ascites and intraperitoneal disease should be considered. Overall fluid in this region has decreased from recent CT of 2024.  3. Mild curvilinear uptake along the right distal ureter, nonspecific could be inflammatory but again metastatic disease is not excluded.  4. Small focus of uptake in the right internal iliac region for which grady disease is not excluded.  5. Smaller fluid pocket in the left lower quadrant with minimal FDG uptake, nonspecific.  6. Incidentally noted is focal uptake at the base of the cecum/proximal appendix with appearance of a chronically thickened appendix, in retrospect appears similar to prior PET/CT of 2022. Question related to chronic reactive changes with metastatic  disease not excluded.  7. Otherwise no findings for hypermetabolic metastasis to the neck, chest or upper abdomen.      Upcomin24  Dr. Fields  24  Dr. Moctezuma      Upcoming:        Follow up visit     Oncology History   Malignant neoplasm of overlapping sites of cervix (HCC)   3/24/2022 Initial Diagnosis    Malignant neoplasm of overlapping sites of cervix (HCC)     2022 Surgery    Exploratory laparotomy for planned radical hysterectomy, bilateral pelvic lymph node dissection, aborted radical hysterectomy, bilateral ovarian transposition due to positive lymph nodes.  1. Two right pelvic lymph nodes positive     2022 -  Cancer Staged    Staging form: Cervix Uteri, AJCC Version 9  - Pathologic: FIGO Stage IIIC1p (pT1b1, cM0) - Signed by Parminder Moctezuma MD on 2022  Stage confirmation method: Pathology  Pelvic grady status: Positive  Pelvic grady method of assessment: Lymph node dissection  Para-aortic status: Negative       2022 - 2022 Chemotherapy    palonosetron (ALOXI), 0.25 mg, Intravenous, Once, 6 of 6  cycles  Administration: 0.25 mg (6/20/2022), 0.25 mg (6/27/2022), 0.25 mg (7/6/2022), 0.25 mg (7/11/2022), 0.25 mg (7/19/2022), 0.25 mg (7/26/2022)  CISplatin (PLATINOL) infusion, 70 mg (original dose 40 mg/m2), Intravenous, Once, 6 of 6 cycles  Dose modification: 70 mg (original dose 40 mg/m2, Cycle 1, Reason: Other (Must fill in a comment), Comment: max 70), 70 mg (original dose 40 mg/m2, Cycle 2, Reason: Dose modified as per discussion with consulting physician)  Administration: 70 mg (6/20/2022), 70 mg (6/27/2022), 70 mg (7/6/2022), 70 mg (7/11/2022), 70 mg (7/19/2022), 70 mg (7/26/2022)  aprepitant (CINVANTI) in  mL IVPB, 130 mg, Intravenous, Once, 6 of 6 cycles  Administration: 130 mg (6/20/2022), 130 mg (6/27/2022), 130 mg (7/6/2022), 130 mg (7/11/2022), 130 mg (7/19/2022), 130 mg (7/26/2022)     6/22/2022 - 8/3/2022 Radiation    Course: C1 - EBRT  Plan ID Energy Fractions Dose per Fraction (cGy) Dose Correction (cGy) Total Dose Delivered (cGy) Elapsed Days   Whole Pelvis 10X 25 / 25 180 0 4,500 42       Course: C1 HDR Tandem and Ring Brachytherapy  Plan ID Energy Fractions Dose per Fraction (cGy) Dose Correction (cGy) Total Dose Delivered (cGy) Elapsed Days   HDR1 7-15-22 1 / 1 700 0 700 0   TJE1_9--18-22 1 / 1 700 0 700 0   [id number]  1 / 1 700 0 700 0   HDR4 7-25-22 1 / 1 700 0 700 0             Review of Systems:  Review of Systems   Constitutional: Negative.    HENT: Negative.     Eyes: Negative.    Respiratory: Negative.     Cardiovascular: Negative.    Gastrointestinal:  Positive for anal bleeding, constipation, diarrhea, nausea and vomiting. Negative for blood in stool.   Endocrine: Negative.    Genitourinary:  Positive for difficulty urinating, dysuria and hematuria. Negative for vaginal bleeding, vaginal discharge and vaginal pain.   Musculoskeletal:  Positive for back pain.   Skin: Negative.    Allergic/Immunologic: Negative.    Neurological: Negative.    Hematological: Negative.     Psychiatric/Behavioral:  The patient is nervous/anxious.        Clinical Trial: no    Pregnancy test needed:  no    Teaching completed    Health Maintenance   Topic Date Due    Pneumococcal Vaccine: Pediatrics (0 to 5 Years) and At-Risk Patients (6 to 64 Years) (1 of 2 - PCV) Never done    Zoster Vaccine (1 of 2) Never done    DTaP,Tdap,and Td Vaccines (1 - Tdap) Never done    COVID-19 Vaccine (3 - Moderna risk series) 06/22/2021    BMI: Followup Plan  08/02/2024    Annual Physical  11/02/2024    Depression Screening  03/14/2025    BMI: Adult  03/14/2025    Cervical Cancer Screening  11/12/2026    HIV Screening  Completed    Hepatitis C Screening  Completed    Influenza Vaccine  Completed    HIB Vaccine  Aged Out    IPV Vaccine  Aged Out    Hepatitis A Vaccine  Aged Out    Meningococcal ACWY Vaccine  Aged Out    HPV Vaccine  Aged Out     Patient Active Problem List   Diagnosis    Malignant neoplasm of overlapping sites of cervix (HCC)    Family history of ovarian cancer    Obesity (BMI 35.0-39.9 without comorbidity)    Dysuria    Anxiety    Depression with anxiety    Dyspareunia due to medical condition in female patient    Menopausal symptoms    Pain due to ureteral stent (HCC)    Slow transit constipation    Radiation proctitis    Colonic stricture (HCC)    Hydronephrosis of right kidney    Electrolyte abnormalities    Ureteral stricture, right     Past Medical History:   Diagnosis Date    Anxiety     Cancer (HCC)     cervix    Cervical cancer (HCC)     receiving chemo and radiation    COVID     Jan 2022    Depression     Diarrhea     Dizziness     Frequent headaches     Hx of bleeding disorder     vaginal bleeding    Obesity      Past Surgical History:   Procedure Laterality Date    CERVICAL BIOPSY  W/ LOOP ELECTRODE EXCISION      COLON SURGERY  Colon resection    May 2023    FL RETROGRADE PYELOGRAM  02/13/2024    LYMPH NODE DISSECTION Bilateral 05/06/2022    Procedure: DISSECTION/STAGING LYMPH NODE  PELVIS/ABDOMEN;  Surgeon: Parminder Moctezuma MD;  [facility];  Service: Gynecology Oncology    NE CYSTO BLADDER W/URETERAL CATHETERIZATION Right 02/13/2024    Procedure: CYSTOSCOPY WITH RETROGRADE PYELOGRAM;  Surgeon: Chuck Campo MD;  [facility];  Service: Urology    NE INSERTION VAGINAL RADIATION DEVICE N/A 07/15/2022    Procedure: INSERTION NADIR SLEEVE VAGINA WITH POST OP BRACHYTHERAPY (IN RADIATION ONCOLOGY);  Surgeon: Parminder Moctezuma MD;  [facility];  Service: Gynecology Oncology    NE LAPAROSCOPY COLECTOMY PARTIAL W/ANASTOMOSIS N/A 05/10/2023    Procedure: RESECTION COLON SIGMOID LAPAROSCOPIC;  Surgeon: Marin Salinas MD;  [facility];  Service: Colorectal    NE SIGMOIDOSCOPY FLX DX W/COLLJ SPEC BR/WA IF PFRMD N/A 05/10/2023    Procedure: SIGMOIDOSCOPY FLEXIBLE;  Surgeon: Marin Salinas MD;  [facility];  Service: Colorectal    SALPINGECTOMY Bilateral 05/06/2022    Procedure: SALPINGECTOMY, OVARIAN TRANSPOSITION;  Surgeon: Parminder Moctezuma MD;  [facility];  Service: Gynecology Oncology    URETERAL STENT PLACEMENT Right 02/13/2024    Procedure: INSERTION STENT URETERAL;  Surgeon: Chuck Campo MD;  [facility];  Service: Urology    US GUIDANCE  07/15/2022     Family History   Problem Relation Age of Onset    Ovarian cancer Maternal Aunt     Ovarian cancer Maternal Grandmother     No Known Problems Mother     Heart disease Father     Cancer Maternal Uncle         Stage 2 Liver Cancer    Cancer Brother         Niece/ brothers daughter stage 4 Adrenocortocal carcinoma     Social History     Socioeconomic History    Marital status: Single     Spouse name: Not on file    Number of children: Not on file    Years of education: Not on file    Highest education level: Not on file   Occupational History    Not on file   Tobacco Use    Smoking status: Never     Passive exposure: Current (very  mild/social)    Smokeless tobacco: Never   Vaping Use    Vaping status: Never Used   Substance and Sexual Activity    Alcohol use: Not Currently    Drug use: Never    Sexual activity: Not Currently     Partners: Male     Birth control/protection: Abstinence, None     Comment: Havent had sex for at least a year.   Other Topics Concern    Not on file   Social History Narrative    Not on file     Social Determinants of Health     Financial Resource Strain: Not on file   Food Insecurity: No Food Insecurity (2/12/2024)    Hunger Vital Sign     Worried About Running Out of Food in the Last Year: Never true     Ran Out of Food in the Last Year: Never true   Transportation Needs: No Transportation Needs (2/12/2024)    PRAPARE - Transportation     Lack of Transportation (Medical): No     Lack of Transportation (Non-Medical): No   Physical Activity: Not on file   Stress: Not on file   Social Connections: Not on file   Intimate Partner Violence: Not At Risk (3/14/2024)    Humiliation, Afraid, Rape, and Kick questionnaire     Fear of Current or Ex-Partner: No     Emotionally Abused: No     Physically Abused: No     Sexually Abused: No   Housing Stability: Low Risk  (2/12/2024)    Housing Stability Vital Sign     Unable to Pay for Housing in the Last Year: No     Number of Places Lived in the Last Year: 1     Unstable Housing in the Last Year: No       Current Outpatient Medications:     busPIRone (BUSPAR) 15 mg tablet, Take 1 tablet (15 mg total) by mouth 2 (two) times a day, Disp: 60 tablet, Rfl: 3    diazepam (VALIUM) 5 mg tablet, Take 1 tablet PO 30 min prior to imaging study., Disp: 1 tablet, Rfl: 0    estradiol (VIVELLE-DOT) 0.0375 MG/24HR, PLACE 1 PATCH ON THE SKIN 2 TIMES A WEEK., Disp: 8 patch, Rfl: 3    ketorolac (TORADOL) 10 mg tablet, Take 1 tablet (10 mg total) by mouth every 6 (six) hours as needed for moderate pain, Disp: 45 tablet, Rfl: 1    Omega-3 Fatty Acids (fish oil) 1,000 mg, Take 2,000 mg by mouth daily,  Disp: , Rfl:     ondansetron (ZOFRAN-ODT) 4 mg disintegrating tablet, Take 1 tablet (4 mg total) by mouth every 6 (six) hours as needed for nausea or vomiting, Disp: 30 tablet, Rfl: 0    oxybutynin (DITROPAN) 5 mg tablet, Take 1 tablet (5 mg total) by mouth 3 (three) times a day as needed (Stent discomfort, bladder spasms), Disp: 30 tablet, Rfl: 5    oxyCODONE (ROXICODONE) 5 immediate release tablet, Take 1 tablet (5 mg total) by mouth every 4 (four) hours as needed for moderate pain or severe pain Max Daily Amount: 30 mg, Disp: 15 tablet, Rfl: 0    phenazopyridine (PYRIDIUM) 100 mg tablet, Take 1 tablet (100 mg total) by mouth 3 (three) times a day as needed for bladder spasms, Disp: 10 tablet, Rfl: 0    polyethylene glycol (MiraLax) 17 g packet, Take 17 g by mouth daily as needed, Disp: , Rfl:     Probiotic Product (Probiotic Daily) CAPS, Take by mouth, Disp: , Rfl:     tamsulosin (FLOMAX) 0.4 mg, Take 1 capsule (0.4 mg total) by mouth daily at bedtime, Disp: 90 each, Rfl: 3  No Known Allergies  There were no vitals filed for this visit.

## 2024-03-18 ENCOUNTER — DOCUMENTATION (OUTPATIENT)
Dept: GYNECOLOGIC ONCOLOGY | Facility: CLINIC | Age: 34
End: 2024-03-18

## 2024-03-19 ENCOUNTER — TELEPHONE (OUTPATIENT)
Dept: GYNECOLOGIC ONCOLOGY | Facility: CLINIC | Age: 34
End: 2024-03-19

## 2024-03-19 NOTE — TELEPHONE ENCOUNTER
Called patient to schedule an appointment with Dr. Moctezuma.  Offered 3/28 at 3 pm but patient declined due to work.  Offered 4/11 at 3:30 pm in [facility] with Dr. Moctezuma.  Patient was able to do 4/11 appointment.

## 2024-03-19 NOTE — PROGRESS NOTES
I spoke to her about her PET scan. I also reviewed the images with Dr. Oden. There is no clear evidence of recurrence. A right inguinal lymph node measures 1.4cm. Other than the inguinal lymph node, there are no opportunities for percutaneous biopsy. I discussed using MRD testing to help differentiate the imaging findings. She is reticent to have ctDNA testing. We also discussed a 9 week f/u PET scan and evaluating the pelvis at the time of her planned surgery for her ureteral stricture. She will consider MRD testing.

## 2024-03-28 DIAGNOSIS — F41.9 ANXIETY: ICD-10-CM

## 2024-03-28 RX ORDER — BUSPIRONE HYDROCHLORIDE 15 MG/1
15 TABLET ORAL 2 TIMES DAILY
Qty: 180 TABLET | Refills: 1 | Status: SHIPPED | OUTPATIENT
Start: 2024-03-28

## 2024-04-01 NOTE — PROGRESS NOTES
Follow up for Diabetes care:    It is recommended that you follow up with an outpatient diabetes center.   Please obtain a referral from your primary care doctor if you do not already have one.   Below is the location and number of the [facility] nearest you.     [place] location:  [address]  [phone]    [place] location:  [address]  [phone]    [place] location:  [address]   [phone]    [place] location:  [address]  [phone]    [place] location:  [address]  [phone]    [place] location:  [address]  [phone]    Sometimes managing your health at home requires assistance.  The [facility] team has recognized your preference to use [facility]. They can be reached by phone at [phone]. The fax number for your reference is [phone].. A representative from the home health agency will contact you or your family to schedule your first visit.      It was recommended that you use a wheelchair and commode at home to facilitate your recovery and compliment your treatment.  The [facility] team has set up delivery with [facility].  Contact information: [address] [place] [id number].  Phone: [phone]..  The estimated delivery is 4/5/24.    If you experienced any difficulties with the delivery, please contact the [facility] department at [phone].  Today I reviewed the following with the patient:  Overall stay off the foot and keep it elevated you must remain strictly nonweightbearing on the right lower extremity if you are not you could develop the reinfection and lose your leg  Keep your foot elevated above hip level  Keep the dressing clean and dry do not remove it  Call our office at [phone] and make an appointment for follow-up with Dr. Benites this coming week.  If you have concerns that there is an infection developing  Progress Note - General Surgery   Shantelle Gomez 32 y o  female MRN: [id number]  Unit/Bed#: [facility] 919-01 Encounter: [id number]    Assessment:  Stage I B newly diagnosed metastatic cervical carcinoma to pelvic nodes postoperative day 2  Status post exploratory laparotomy pelvic lymphadenectomy bilateral oophoropexy for metastatic cervical cancer  Pain is controlled however patient is having some difficulty ambulating  Plan:  Postop day 2  Patient is tolerating regular diet, passing gas but is having some difficulty ambulating  Will observe today  Patient will be re-evaluated for consideration of discharge     Subjective/Objective   Chief Complaint:  Postop day 2  Lymphadenectomy and who for pexy     Subjective:  Patient is doing reasonably well  She is voiding without difficulty  She is tolerating regular diet  She is Pat passing gas  Her pain is well controlled  She has some difficulty ambulating due to leg weakness        Blood pressure 122/78, pulse 85, temperature 98 °F (36 7 °C), temperature source Oral, resp  rate 18, height 5' 10" (1 778 m), weight 113 kg (250 lb), SpO2 96 %  ,Body mass index is 35 87 kg/m²        Intake/Output Summary (Last 24 hours) at 5/8/2022 1202  Last data filed at 5/8/2022 1114  Gross per 24 hour   Intake 1026 25 ml   Output 3070 ml   Net -2043 75 ml       Invasive Devices  Report    Peripheral Intravenous Line            Peripheral IV 05/06/22 Left Antecubital 2 days                Physical Exam: /78   Pulse 85   Temp 98 °F (36 7 °C) (Oral)   Resp 18   Ht 5' 10" (1 778 m)   Wt 113 kg (250 lb)   SpO2 96%   BMI 35 87 kg/m²     General Appearance:    Alert, cooperative, no distress, appears stated age   Head:    Normocephalic, without obvious abnormality, atraumatic   Eyes:    PERRL, conjunctiva/corneas clear, EOM's intact, fundi     benign, both eyes   Ears:    Normal TM's and external ear canals, both ears   Nose:   Nares normal, septum midline, mucosa normal, no drainage    or sinus tenderness   Throat:   Lips, mucosa, and tongue normal; teeth and gums normal   Neck:   Supple, symmetrical, trachea midline, no adenopathy;     thyroid:  no enlargement/tenderness/nodules; no carotid    bruit or JVD   Back:     Symmetric, no curvature, ROM normal, no CVA tenderness   Lungs:     Clear to auscultation bilaterally, respirations unlabored   Chest Wall:    No tenderness or deformity    Heart:    Regular rate and rhythm, S1 and S2 normal, no murmur, rub   or gallop       Abdomen:     Soft, non-tender, bowel sounds active all four quadrants,     no masses, no organomegaly dressing is dry           Extremities:   Extremities normal, atraumatic, no cyanosis or edema   Pulses:   2+ and symmetric all extremities   Skin:   Skin color, texture, turgor normal, no rashes or lesions   Lymph nodes:   Cervical, supraclavicular, and axillary nodes normal   Neurologic:   CNII-XII intact, normal strength, sensation and reflexes     throughout       Lab, Imaging and other studies:  CBC:   Lab Results   Component Value Date    WBC 6 73 05/08/2022    HGB 10 2 (L) 05/08/2022    HCT 31 1 (L) 05/08/2022    MCV 89 05/08/2022     05/08/2022    MCH 29 3 05/08/2022    MCHC 32 8 05/08/2022    RDW 12 6 05/08/2022    MPV 8 8 (L) 05/08/2022   , CMP:   Lab Results   Component Value Date    SODIUM 142 05/08/2022    K 3 8 05/08/2022     (H) 05/08/2022    CO2 29 05/08/2022    BUN 6 05/08/2022    CREATININE 0 58 (L) 05/08/2022    CALCIUM 8 5 05/08/2022    EGFR 123 05/08/2022     VTE Pharmacologic Prophylaxis: Heparin  VTE Mechanical Prophylaxis: sequential compression device please go back to the emergency room at [facility] and have us paged.

## 2024-04-03 ENCOUNTER — TELEPHONE (OUTPATIENT)
Dept: HEMATOLOGY ONCOLOGY | Facility: CLINIC | Age: 34
End: 2024-04-03

## 2024-04-04 ENCOUNTER — TELEPHONE (OUTPATIENT)
Dept: UROLOGY | Facility: AMBULATORY SURGERY CENTER | Age: 34
End: 2024-04-04

## 2024-04-04 ENCOUNTER — PREP FOR PROCEDURE (OUTPATIENT)
Dept: UROLOGY | Facility: AMBULATORY SURGERY CENTER | Age: 34
End: 2024-04-04

## 2024-04-04 DIAGNOSIS — N13.5 URETERAL STRICTURE, RIGHT: Primary | ICD-10-CM

## 2024-04-04 DIAGNOSIS — Z01.812 PRE-OPERATIVE LABORATORY EXAMINATION: ICD-10-CM

## 2024-04-04 DIAGNOSIS — Z79.01 LONG TERM (CURRENT) USE OF ANTICOAGULANTS: ICD-10-CM

## 2024-04-04 DIAGNOSIS — R39.89 SUSPECTED UTI: ICD-10-CM

## 2024-04-04 RX ORDER — HEPARIN SODIUM 5000 [USP'U]/ML
5000 INJECTION, SOLUTION INTRAVENOUS; SUBCUTANEOUS ONCE
OUTPATIENT
Start: 2024-04-04 | End: 2024-04-04

## 2024-04-04 RX ORDER — CEFAZOLIN SODIUM 2 G/50ML
2000 SOLUTION INTRAVENOUS ONCE
OUTPATIENT
Start: 2024-04-04 | End: 2024-04-04

## 2024-04-04 NOTE — TELEPHONE ENCOUNTER
Return call placed. Discussed communication with our team and urology. Additionally, will f/u with Gilberto for ctDNA.

## 2024-04-04 NOTE — TELEPHONE ENCOUNTER
Called patient to schedule Robotic Right Ureteral Reimplant surgery with Dr. Fields. I offered patient date of 4/24 with Dr. Fields. Patient stated she would like to wait to late June early July as she has vacation planned in June. I offered patient date of 7/22 at the [facility]. Patient was satisfied with offered date and location, I did let patient know that pre-op urine culture and and labs have been ordered and need to be completed 2 weeks prior to surgery. Patient is to be seen by IR prior to surgery for PCN placement. Patient to have stents removed following PCN placement. Patient had concerns if stents would need to be exchanged prior to surgery as she would like to keep them in until after vacation. I did confirm with Dr. Elliott that existing stents can remain in until closer to surgery. All pre-op instructions have been reviewed with patient. Surgery packet to be mailed to patients address on file as well as scanned into patients chart. I did ask that if patient has any future questions or concerns to please give the office a call. Patient verbalized understanding.

## 2024-04-09 ENCOUNTER — NURSE TRIAGE (OUTPATIENT)
Age: 34
End: 2024-04-09

## 2024-04-09 ENCOUNTER — PREP FOR PROCEDURE (OUTPATIENT)
Dept: INTERVENTIONAL RADIOLOGY/VASCULAR | Facility: CLINIC | Age: 34
End: 2024-04-09

## 2024-04-09 DIAGNOSIS — C53.8 MALIGNANT NEOPLASM OF OVERLAPPING SITES OF CERVIX (HCC): ICD-10-CM

## 2024-04-09 DIAGNOSIS — T83.84XS PAIN DUE TO URETERAL STENT, SEQUELA: Primary | ICD-10-CM

## 2024-04-09 RX ORDER — SODIUM CHLORIDE 9 MG/ML
30 INJECTION, SOLUTION INTRAVENOUS CONTINUOUS
OUTPATIENT
Start: 2024-04-09

## 2024-04-09 NOTE — TELEPHONE ENCOUNTER
Patient called in questioning, if May 8th appt. Is needed as she thought it was for stent removal however she is waiting until after her vacation to have stent removed as she discussed in 04/04/24 encounter with SS.    Patient also calling to follow up on mychart question that was forwarded to provider pool. Explained it may take a few days to hear back.   States with her job she does not think  she will be able to return in a few days considering she is moving, bending, pulling, twisting, etc. Inquiring what the  restrictions would be and for how long as her job does not dayanara light duty work conditions. Advised to forward FMLA paper work to the office as soon as possible so we can fill this out.   Already spoke to IR about this question and was told to peak with Urology about this.

## 2024-04-09 NOTE — TELEPHONE ENCOUNTER
pt scheduled for OR 7/22 with Dr Fields, so she will likely have her nephrostomy tube put in early July. She might just need that time leading up to OR included in her fmla/time off. it does sound like she would be at risk for tube dislodgment from her job description unfortunately

## 2024-04-10 NOTE — TELEPHONE ENCOUNTER
Unable to connect with Audrey at this time - she does  not need appointment on 5/8 and will cancel - will attempt later to call her

## 2024-04-11 ENCOUNTER — HOSPITAL ENCOUNTER (OUTPATIENT)
Dept: CT IMAGING | Facility: HOSPITAL | Age: 34
Discharge: HOME | End: 2024-04-11
Attending: OBSTETRICS & GYNECOLOGY
Payer: COMMERCIAL

## 2024-04-11 ENCOUNTER — OFFICE VISIT (OUTPATIENT)
Age: 34
End: 2024-04-11
Payer: COMMERCIAL

## 2024-04-11 VITALS
TEMPERATURE: 97.4 F | BODY MASS INDEX: 32.11 KG/M2 | HEART RATE: 68 BPM | OXYGEN SATURATION: 99 % | SYSTOLIC BLOOD PRESSURE: 114 MMHG | DIASTOLIC BLOOD PRESSURE: 80 MMHG | WEIGHT: 216.8 LBS | HEIGHT: 69 IN

## 2024-04-11 DIAGNOSIS — T83.84XS PAIN DUE TO URETERAL STENT, SEQUELA: ICD-10-CM

## 2024-04-11 DIAGNOSIS — R39.89 BLADDER PAIN: ICD-10-CM

## 2024-04-11 DIAGNOSIS — C53.8 MALIGNANT NEOPLASM OF OVERLAPPING SITES OF CERVIX (HCC): Primary | ICD-10-CM

## 2024-04-11 PROCEDURE — 99214 OFFICE O/P EST MOD 30 MIN: CPT | Performed by: OBSTETRICS & GYNECOLOGY

## 2024-04-11 PROCEDURE — 74177 CT ABD & PELVIS W/CONTRAST: CPT

## 2024-04-11 RX ORDER — KETOROLAC TROMETHAMINE 10 MG/1
10 TABLET, FILM COATED ORAL EVERY 6 HOURS PRN
Qty: 45 TABLET | Refills: 1 | Status: SHIPPED | OUTPATIENT
Start: 2024-04-11 | End: 2024-05-11

## 2024-04-11 RX ADMIN — IOHEXOL 100 ML: 350 INJECTION, SOLUTION INTRAVENOUS at 18:31

## 2024-04-11 NOTE — ASSESSMENT & PLAN NOTE
33-year-old with stage III C1 cervical cancer status post aborted radical hysterectomy followed by curative intent chemoradiation that completed 8/3/2022.  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  She has worsening pain with the stent in situ.  I reviewed her PET/CT images from 3/7/2024.  There are changes that are consistent with inflammation.  MRD testing is pending.  She has stopped estrogen replacement therapy.  Her performance status is 0.  1.  Plan to repeat PET scan in 3 months prior to planned right ureteroneocystostomy.  2.  Follow-up results of MRD testing.  3.  DEXA scan in 2 years after stopping estrogen replacement therapy.  4.  Return in 3 months after repeat PET scan for evaluation.

## 2024-04-11 NOTE — TELEPHONE ENCOUNTER
Called Audrey and notified her that her 5/8 appointment was canceled - she is questioning her FMLA paperwork she sent through the portal. Told her I would check with Dr Fields's nurse and have her review with Dr Fields and will have her get back about time off

## 2024-04-11 NOTE — LETTER
April 11, 2024     Demi Mandel DO  [address]  [address] [place] [id number]    Patient: Margot Moody   YOB: 1990   Date of Visit: 4/11/2024       Dear Dr. Mandel:    Thank you for referring Margot Moody to me for evaluation. Below are my notes for this consultation.    If you have questions, please do not hesitate to call me. I look forward to following your patient along with you.         Sincerely,        Parminder Moctezuma MD        CC: MD Parminder Galan MD  4/11/2024  4:55 PM  Sign when Signing Visit  Assessment/Plan:    Problem List Items Addressed This Visit          Genitourinary    Malignant neoplasm of overlapping sites of cervix (HCC) - Primary     33-year-old with stage III C1 cervical cancer status post aborted radical hysterectomy followed by curative intent chemoradiation that completed 8/3/2022.  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  She has worsening pain with the stent in situ.  I reviewed her PET/CT images from 3/7/2024.  There are changes that are consistent with inflammation.  MRD testing is pending.  She has stopped estrogen replacement therapy.  Her performance status is 0.  1.  Plan to repeat PET scan in 3 months prior to planned right ureteroneocystostomy.  2.  Follow-up results of MRD testing.  3.  DEXA scan in 2 years after stopping estrogen replacement therapy.  4.  Return in 3 months after repeat PET scan for evaluation.         Relevant Orders    NM PET CT skull base to mid thigh    CBC and differential    Comprehensive metabolic panel       Urinary    Pain due to ureteral stent (HCC)     Plan for CT abdomen pelvis, CBC, CMP to evaluate for stent migration versus other cause of worsening abdominal pain.  We also discussed the possibility of removing the stent and the subsequent risk of recurrent  hydronephrosis, kidney injury, worsening pain.  Will discuss with Dr. Fields.         Relevant Orders    CT abdomen pelvis w contrast    CBC and differential    Comprehensive metabolic panel         CHIEF COMPLAINT: Abdominal and pelvic pain, follow-up PET/CT imaging      Problem:  Cancer Staging   Malignant neoplasm of overlapping sites of cervix (HCC)  Staging form: Cervix Uteri, AJCC Version 9  - Pathologic: FIGO Stage IIIC1p (pT1b1, cM0) - Signed by Parminder Moctezuma MD on 5/19/2022        Previous therapy:  Oncology History   Malignant neoplasm of overlapping sites of cervix (HCC)   3/24/2022 Initial Diagnosis    Malignant neoplasm of overlapping sites of cervix (HCC)     5/6/2022 Surgery    Exploratory laparotomy for planned radical hysterectomy, bilateral pelvic lymph node dissection, aborted radical hysterectomy, bilateral ovarian transposition due to positive lymph nodes.  1. Two right pelvic lymph nodes positive     5/19/2022 -  Cancer Staged    Staging form: Cervix Uteri, AJCC Version 9  - Pathologic: FIGO Stage IIIC1p (pT1b1, cM0) - Signed by Parminder Moctezuma MD on 5/19/2022  Stage confirmation method: Pathology  Pelvic grady status: Positive  Pelvic grady method of assessment: Lymph node dissection  Para-aortic status: Negative       6/20/2022 - 7/26/2022 Chemotherapy    palonosetron (ALOXI), 0.25 mg, Intravenous, Once, 6 of 6 cycles  Administration: 0.25 mg (6/20/2022), 0.25 mg (6/27/2022), 0.25 mg (7/6/2022), 0.25 mg (7/11/2022), 0.25 mg (7/19/2022), 0.25 mg (7/26/2022)  CISplatin (PLATINOL) infusion, 70 mg (original dose 40 mg/m2), Intravenous, Once, 6 of 6 cycles  Dose modification: 70 mg (original dose 40 mg/m2, Cycle 1, Reason: Other (Must fill in a comment), Comment: max 70), 70 mg (original dose 40 mg/m2, Cycle 2, Reason: Dose modified as per discussion with consulting physician)  Administration: 70 mg (6/20/2022), 70 mg (6/27/2022), 70 mg (7/6/2022), 70 mg (7/11/2022), 70 mg  (7/19/2022), 70 mg (7/26/2022)  aprepitant (CINVANTI) in  mL IVPB, 130 mg, Intravenous, Once, 6 of 6 cycles  Administration: 130 mg (6/20/2022), 130 mg (6/27/2022), 130 mg (7/6/2022), 130 mg (7/11/2022), 130 mg (7/19/2022), 130 mg (7/26/2022)     6/22/2022 - 8/3/2022 Radiation    Course: C1 - EBRT  Plan ID Energy Fractions Dose per Fraction (cGy) Dose Correction (cGy) Total Dose Delivered (cGy) Elapsed Days   Whole Pelvis 10X 25 / 25 180 0 4,500 42       Course: C1 HDR Tandem and Ring Brachytherapy  Plan ID Energy Fractions Dose per Fraction (cGy) Dose Correction (cGy) Total Dose Delivered (cGy) Elapsed Days   HDR1 7-15-22  1 / 1 700 0 700 0   CKB5_9--18-22 1 / 1 700 0 700 0   VBJ7_9--21-22 1 / 1 700 0 700 0   HDR4 7-25-22 1 / 1 700 0 700 0               Patient ID: Margot Moody is a 33 y.o. female  Who presents for a treatment discussion.  She had a PET CT scan on 3/7/2024 that revealed nonspecific inflammatory changes in the pelvis.  There was some fluid in the pelvis which had mild uptake.  There was a nodular focus presacral with an SUV of 2.6 and a mild focus at the right external iliac chain with an SUV of 2.6 without any CT correlate.  Since placement of the ureteral stent, she has noted worsening right-sided abdominal and pelvic pain.  She has been taking oxycodone and Toradol around-the-clock and still has discomfort.  She is also on oxybutynin.  She stopped her estrogen replacement therapy approximately 3 months ago due to recurrent stye formation.  Since stopping the estrogen replacement, she has not had a recurrent stye.  She is managing her menopausal symptoms well.        The following portions of the patient's history were reviewed and updated as appropriate: allergies, current medications, past family history, past medical history, past social history, past surgical history, and problem list.    Review of Systems   Constitutional:  Negative for activity change and unexpected weight change.    HENT: Negative.     Eyes: Negative.    Respiratory: Negative.     Cardiovascular: Negative.    Gastrointestinal:  Positive for abdominal pain. Negative for abdominal distention.   Endocrine: Negative.    Genitourinary:  Positive for pelvic pain. Negative for vaginal bleeding.   Musculoskeletal: Negative.    Skin: Negative.    Allergic/Immunologic: Negative.    Neurological: Negative.    Hematological: Negative.    Psychiatric/Behavioral: Negative.         Current Outpatient Medications   Medication Sig Dispense Refill   • busPIRone (BUSPAR) 15 mg tablet TAKE 1 TABLET BY MOUTH TWICE A  tablet 1   • ketorolac (TORADOL) 10 mg tablet Take 1 tablet (10 mg total) by mouth every 6 (six) hours as needed for moderate pain 45 tablet 1   • Omega-3 Fatty Acids (fish oil) 1,000 mg Take 2,000 mg by mouth daily     • ondansetron (ZOFRAN-ODT) 4 mg disintegrating tablet Take 1 tablet (4 mg total) by mouth every 6 (six) hours as needed for nausea or vomiting 30 tablet 0   • oxybutynin (DITROPAN) 5 mg tablet Take 1 tablet (5 mg total) by mouth 3 (three) times a day as needed (Stent discomfort, bladder spasms) 30 tablet 5   • oxyCODONE (ROXICODONE) 5 immediate release tablet Take 1 tablet (5 mg total) by mouth every 4 (four) hours as needed for moderate pain or severe pain Max Daily Amount: 30 mg 15 tablet 0   • phenazopyridine (PYRIDIUM) 100 mg tablet Take 1 tablet (100 mg total) by mouth 3 (three) times a day as needed for bladder spasms 10 tablet 0   • polyethylene glycol (MiraLax) 17 g packet Take 17 g by mouth daily as needed     • Probiotic Product (Probiotic Daily) CAPS Take by mouth     • tamsulosin (FLOMAX) 0.4 mg Take 1 capsule (0.4 mg total) by mouth daily at bedtime 90 each 3   • diazepam (VALIUM) 5 mg tablet Take 1 tablet PO 30 min prior to imaging study. (Patient not taking: Reported on 3/15/2024) 1 tablet 0     No current facility-administered medications for this visit.           Objective:    Blood pressure  "114/80, pulse 68, temperature (!) 97.4 °F (36.3 °C), height 5' 9\" (1.753 m), weight 98.3 kg (216 lb 12.8 oz), SpO2 99%, not currently breastfeeding.  Body mass index is 32.02 kg/m².  Body surface area is 2.14 meters squared.    Physical Exam  Vitals reviewed.   Constitutional:       General: She is not in acute distress.     Appearance: Normal appearance. She is not ill-appearing.   HENT:      Head: Normocephalic and atraumatic.      Mouth/Throat:      Mouth: Mucous membranes are moist.   Eyes:      General: No scleral icterus.        Right eye: No discharge.         Left eye: No discharge.      Conjunctiva/sclera: Conjunctivae normal.   Pulmonary:      Effort: Pulmonary effort is normal.   Musculoskeletal:      Right lower leg: No edema.      Left lower leg: No edema.   Skin:     General: Skin is warm and dry.      Coloration: Skin is not jaundiced.      Findings: No rash.   Neurological:      General: No focal deficit present.      Mental Status: She is alert and oriented to person, place, and time.      Cranial Nerves: No cranial nerve deficit.      Motor: No weakness.      Gait: Gait normal.   Psychiatric:         Mood and Affect: Mood normal.         Behavior: Behavior normal.         Thought Content: Thought content normal.         Judgment: Judgment normal.         No results found for: \"\"  Lab Results   Component Value Date    K 3.7 02/14/2024     02/14/2024    CO2 26 02/14/2024    BUN 7 02/14/2024    CREATININE 0.60 02/14/2024    CALCIUM 8.6 02/14/2024    CORRECTEDCA 8.8 02/12/2024    AST 10 (L) 02/13/2024    ALT 8 02/13/2024    ALKPHOS 47 02/13/2024    EGFR 120 02/14/2024     Lab Results   Component Value Date    WBC 4.98 02/14/2024    HGB 11.5 02/14/2024    HCT 34.5 (L) 02/14/2024    MCV 90 02/14/2024     02/14/2024     Lab Results   Component Value Date    NEUTROABS 3.87 02/14/2024              "

## 2024-04-11 NOTE — TELEPHONE ENCOUNTER
Called patient letting her know that I did hear back from Dr. Fields regarding her concerns. I did let her know that per Dr. Fields he finds it reasonable that she go on FMLA now until after surgery and recovery. I did let her know that the other option would be patient seeing IR and having PCN placed and stent removed in clinic. Patient expressed that she does not think she can afford to go on FMLA as of right now and will just deal with the pain. She expressed that half way through her shift at work pain level reaches a 6 or 7. I encouraged patient to continue to take tomadol, oxybutynin and flomax as SUKHDEV Lynn had advised. I did let patient know that if anything changes at any time and she feels she would like to go on out on FMLA to give the office a call. Patient verbalized understanding.

## 2024-04-11 NOTE — PROGRESS NOTES
Assessment/Plan:    Problem List Items Addressed This Visit          Genitourinary    Malignant neoplasm of overlapping sites of cervix (HCC) - Primary     33-year-old with stage III C1 cervical cancer status post aborted radical hysterectomy followed by curative intent chemoradiation that completed 8/3/2022.  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  She has worsening pain with the stent in situ.  I reviewed her PET/CT images from 3/7/2024.  There are changes that are consistent with inflammation.  MRD testing is pending.  She has stopped estrogen replacement therapy.  Her performance status is 0.  1.  Plan to repeat PET scan in 3 months prior to planned right ureteroneocystostomy.  2.  Follow-up results of MRD testing.  3.  DEXA scan in 2 years after stopping estrogen replacement therapy.  4.  Return in 3 months after repeat PET scan for evaluation.         Relevant Orders    NM PET CT skull base to mid thigh    CBC and differential    Comprehensive metabolic panel       Urinary    Pain due to ureteral stent (HCC)     Plan for CT abdomen pelvis, CBC, CMP to evaluate for stent migration versus other cause of worsening abdominal pain.  We also discussed the possibility of removing the stent and the subsequent risk of recurrent hydronephrosis, kidney injury, worsening pain.  Will discuss with Dr. Fields.         Relevant Orders    CT abdomen pelvis w contrast    CBC and differential    Comprehensive metabolic panel         CHIEF COMPLAINT: Abdominal and pelvic pain, follow-up PET/CT imaging      Problem:  Cancer Staging   Malignant neoplasm of overlapping sites of cervix (HCC)  Staging form: Cervix Uteri, AJCC Version 9  - Pathologic: FIGO Stage IIIC1p (pT1b1, cM0) - Signed by Parminder Moctezuma MD on 5/19/2022        Previous therapy:  Oncology History   Malignant neoplasm of overlapping sites of  cervix (HCC)   3/24/2022 Initial Diagnosis    Malignant neoplasm of overlapping sites of cervix (HCC)     5/6/2022 Surgery    Exploratory laparotomy for planned radical hysterectomy, bilateral pelvic lymph node dissection, aborted radical hysterectomy, bilateral ovarian transposition due to positive lymph nodes.  1. Two right pelvic lymph nodes positive     5/19/2022 -  Cancer Staged    Staging form: Cervix Uteri, AJCC Version 9  - Pathologic: FIGO Stage IIIC1p (pT1b1, cM0) - Signed by Parminder Moctezuma MD on 5/19/2022  Stage confirmation method: Pathology  Pelvic grady status: Positive  Pelvic grady method of assessment: Lymph node dissection  Para-aortic status: Negative       6/20/2022 - 7/26/2022 Chemotherapy    palonosetron (ALOXI), 0.25 mg, Intravenous, Once, 6 of 6 cycles  Administration: 0.25 mg (6/20/2022), 0.25 mg (6/27/2022), 0.25 mg (7/6/2022), 0.25 mg (7/11/2022), 0.25 mg (7/19/2022), 0.25 mg (7/26/2022)  CISplatin (PLATINOL) infusion, 70 mg (original dose 40 mg/m2), Intravenous, Once, 6 of 6 cycles  Dose modification: 70 mg (original dose 40 mg/m2, Cycle 1, Reason: Other (Must fill in a comment), Comment: max 70), 70 mg (original dose 40 mg/m2, Cycle 2, Reason: Dose modified as per discussion with consulting physician)  Administration: 70 mg (6/20/2022), 70 mg (6/27/2022), 70 mg (7/6/2022), 70 mg (7/11/2022), 70 mg (7/19/2022), 70 mg (7/26/2022)  aprepitant (CINVANTI) in  mL IVPB, 130 mg, Intravenous, Once, 6 of 6 cycles  Administration: 130 mg (6/20/2022), 130 mg (6/27/2022), 130 mg (7/6/2022), 130 mg (7/11/2022), 130 mg (7/19/2022), 130 mg (7/26/2022)     6/22/2022 - 8/3/2022 Radiation    Course: C1 - EBRT  Plan ID Energy Fractions Dose per Fraction (cGy) Dose Correction (cGy) Total Dose Delivered (cGy) Elapsed Days   Whole Pelvis 10X 25 / 25 180 0 4,500 42       Course: C1 HDR Tandem and Ring Brachytherapy  Plan ID Energy Fractions Dose per Fraction (cGy) Dose Correction (cGy) Total  Dose Delivered (cGy) Elapsed Days   HDR1 7-15-22  1 / 1 700 0 700 0   LCB2_2--18-22 1 / 1 700 0 700 0   FBM7_7--21-22 1 / 1 700 0 700 0   HDR4 7-25-22 1 / 1 700 0 700 0               Patient ID: Margot Moody is a 33 y.o. female  Who presents for a treatment discussion.  She had a PET CT scan on 3/7/2024 that revealed nonspecific inflammatory changes in the pelvis.  There was some fluid in the pelvis which had mild uptake.  There was a nodular focus presacral with an SUV of 2.6 and a mild focus at the right external iliac chain with an SUV of 2.6 without any CT correlate.  Since placement of the ureteral stent, she has noted worsening right-sided abdominal and pelvic pain.  She has been taking oxycodone and Toradol around-the-clock and still has discomfort.  She is also on oxybutynin.  She stopped her estrogen replacement therapy approximately 3 months ago due to recurrent stye formation.  Since stopping the estrogen replacement, she has not had a recurrent stye.  She is managing her menopausal symptoms well.        The following portions of the patient's history were reviewed and updated as appropriate: allergies, current medications, past family history, past medical history, past social history, past surgical history, and problem list.    Review of Systems   Constitutional:  Negative for activity change and unexpected weight change.   HENT: Negative.     Eyes: Negative.    Respiratory: Negative.     Cardiovascular: Negative.    Gastrointestinal:  Positive for abdominal pain. Negative for abdominal distention.   Endocrine: Negative.    Genitourinary:  Positive for pelvic pain. Negative for vaginal bleeding.   Musculoskeletal: Negative.    Skin: Negative.    Allergic/Immunologic: Negative.    Neurological: Negative.    Hematological: Negative.    Psychiatric/Behavioral: Negative.         Current Outpatient Medications   Medication Sig Dispense Refill    busPIRone (BUSPAR) 15 mg tablet TAKE 1 TABLET BY MOUTH  "TWICE A  tablet 1    ketorolac (TORADOL) 10 mg tablet Take 1 tablet (10 mg total) by mouth every 6 (six) hours as needed for moderate pain 45 tablet 1    Omega-3 Fatty Acids (fish oil) 1,000 mg Take 2,000 mg by mouth daily      ondansetron (ZOFRAN-ODT) 4 mg disintegrating tablet Take 1 tablet (4 mg total) by mouth every 6 (six) hours as needed for nausea or vomiting 30 tablet 0    oxybutynin (DITROPAN) 5 mg tablet Take 1 tablet (5 mg total) by mouth 3 (three) times a day as needed (Stent discomfort, bladder spasms) 30 tablet 5    oxyCODONE (ROXICODONE) 5 immediate release tablet Take 1 tablet (5 mg total) by mouth every 4 (four) hours as needed for moderate pain or severe pain Max Daily Amount: 30 mg 15 tablet 0    phenazopyridine (PYRIDIUM) 100 mg tablet Take 1 tablet (100 mg total) by mouth 3 (three) times a day as needed for bladder spasms 10 tablet 0    polyethylene glycol (MiraLax) 17 g packet Take 17 g by mouth daily as needed      Probiotic Product (Probiotic Daily) CAPS Take by mouth      tamsulosin (FLOMAX) 0.4 mg Take 1 capsule (0.4 mg total) by mouth daily at bedtime 90 each 3    diazepam (VALIUM) 5 mg tablet Take 1 tablet PO 30 min prior to imaging study. (Patient not taking: Reported on 3/15/2024) 1 tablet 0     No current facility-administered medications for this visit.           Objective:    Blood pressure 114/80, pulse 68, temperature (!) 97.4 °F (36.3 °C), height 5' 9\" (1.753 m), weight 98.3 kg (216 lb 12.8 oz), SpO2 99%, not currently breastfeeding.  Body mass index is 32.02 kg/m².  Body surface area is 2.14 meters squared.    Physical Exam  Vitals reviewed.   Constitutional:       General: She is not in acute distress.     Appearance: Normal appearance. She is not ill-appearing.   HENT:      Head: Normocephalic and atraumatic.      Mouth/Throat:      Mouth: Mucous membranes are moist.   Eyes:      General: No scleral icterus.        Right eye: No discharge.         Left eye: No discharge. " "     Conjunctiva/sclera: Conjunctivae normal.   Pulmonary:      Effort: Pulmonary effort is normal.   Musculoskeletal:      Right lower leg: No edema.      Left lower leg: No edema.   Skin:     General: Skin is warm and dry.      Coloration: Skin is not jaundiced.      Findings: No rash.   Neurological:      General: No focal deficit present.      Mental Status: She is alert and oriented to person, place, and time.      Cranial Nerves: No cranial nerve deficit.      Motor: No weakness.      Gait: Gait normal.   Psychiatric:         Mood and Affect: Mood normal.         Behavior: Behavior normal.         Thought Content: Thought content normal.         Judgment: Judgment normal.         No results found for: \"\"  Lab Results   Component Value Date    K 3.7 02/14/2024     02/14/2024    CO2 26 02/14/2024    BUN 7 02/14/2024    CREATININE 0.60 02/14/2024    CALCIUM 8.6 02/14/2024    CORRECTEDCA 8.8 02/12/2024    AST 10 (L) 02/13/2024    ALT 8 02/13/2024    ALKPHOS 47 02/13/2024    EGFR 120 02/14/2024     Lab Results   Component Value Date    WBC 4.98 02/14/2024    HGB 11.5 02/14/2024    HCT 34.5 (L) 02/14/2024    MCV 90 02/14/2024     02/14/2024     Lab Results   Component Value Date    NEUTROABS 3.87 02/14/2024              "

## 2024-04-11 NOTE — TELEPHONE ENCOUNTER
Returned call to patient regarding FMLA paperwork. I did let patient know I have received her paperwork and will work on it in a timely manner. I did let patient know that a 15$ charge will be added to her account once FMLA is completed. During call patient expressed she is having bad pain and is need of more toradol. Patient expressed she is having flank pain as well as nausea and requested toradol be reordered. I did speak with CORDELL Lynn and she was able to send over prescription for toradol. Zoya did advise that patient continue to take oxybutynin as well as flomax to help with stent colic. I did let patient know of Zoya's recommendations. Patient stated that she mostly experiences pain while working due to heavy duty and lifting at her workplace. Patient denied any fever or chills. I did let patient know I will notify Dr. Fields of concerns. I did ask that if patient has any fever, chills, or pain becomes unbearable to go to ER. Patient verbalized understanding.

## 2024-04-11 NOTE — ASSESSMENT & PLAN NOTE
Plan for CT abdomen pelvis, CBC, CMP to evaluate for stent migration versus other cause of worsening abdominal pain.  We also discussed the possibility of removing the stent and the subsequent risk of recurrent hydronephrosis, kidney injury, worsening pain.  Will discuss with Dr. Fields.

## 2024-05-02 ENCOUNTER — LAB REQUISITION (OUTPATIENT)
Dept: LAB | Facility: HOSPITAL | Age: 34
End: 2024-05-02

## 2024-05-02 DIAGNOSIS — C53.9 MALIGNANT NEOPLASM OF CERVIX UTERI, UNSPECIFIED (HCC): ICD-10-CM

## 2024-05-14 LAB — SCAN RESULT: NORMAL

## 2024-06-10 DIAGNOSIS — C53.9 MALIGNANT NEOPLASM OF CERVIX, UNSPECIFIED SITE (HCC): Primary | ICD-10-CM

## 2024-06-10 DIAGNOSIS — E28.319 PREMATURE MENOPAUSE: ICD-10-CM

## 2024-06-14 ENCOUNTER — TELEPHONE (OUTPATIENT)
Dept: UROLOGY | Facility: AMBULATORY SURGERY CENTER | Age: 34
End: 2024-06-14

## 2024-06-14 NOTE — TELEPHONE ENCOUNTER
[place] paperwork emailed to patient. Patient notified of completed paperwork and asked if any questions or concerns to give the office a call.

## 2024-06-24 ENCOUNTER — TELEPHONE (OUTPATIENT)
Dept: RADIOLOGY | Facility: HOSPITAL | Age: 34
End: 2024-06-24

## 2024-06-26 NOTE — PRE-PROCEDURE INSTRUCTIONS
Pre-Surgery Instructions:   Medication Instructions    oxybutynin (DITROPAN) 5 mg tablet Uses PRN- DO NOT take day of surgery    tamsulosin (FLOMAX) 0.4 mg Uses PRN- OK to take day of surgery      Medication instructions for day surgery reviewed. Please use only a sip of water to take your instructed medications. Avoid all over the counter vitamins, supplements and NSAIDS for one week prior to surgery per anesthesia guidelines. Tylenol is ok to take as needed.     You will receive a call one business day prior to surgery with an arrival time and hospital directions. If your surgery is scheduled on a Monday, the hospital will be calling you on the Friday prior to your surgery. If you have not heard from anyone by 8pm, please call the hospital supervisor through the hospital  at [phone]. ([place] [phone] or [place] [phone]).    Do not eat or drink anything after midnight the night before your surgery, including candy, mints, lifesavers, or chewing gum. Do not drink alcohol 24hrs before your surgery. Try not to smoke at least 24hrs before your surgery.       Follow the pre surgery showering instructions as listed in the “My Surgical Experience Booklet” or otherwise provided by your surgeon's office. Do not use a blade to shave the surgical area 1 week before surgery. It is okay to use a clean electric clippers up to 24 hours before surgery. Do not apply any lotions, creams, including makeup, cologne, deodorant, or perfumes after showering on the day of your surgery. Do not use dry shampoo, hair spray, hair gel, or any type of hair products.     No contact lenses, eye make-up, or artificial eyelashes. Remove nail polish, including gel polish, and any artificial, gel, or acrylic nails if possible. Remove all jewelry including rings and body piercing jewelry.     Wear causal clothing that is easy to take on and off. Consider your type of surgery.    Keep any valuables, jewelry, piercings at home.  Please bring any specially ordered equipment (sling, braces) if indicated.    Arrange for a responsible person to drive you to and from the hospital on the day of your surgery. Please confirm the visitor policy for the day of your procedure when you receive your phone call with an arrival time.     Call the surgeon's office with any new illnesses, exposures, or additional questions prior to surgery.    Please reference your “My Surgical Experience Booklet” for additional information to prepare for your upcoming surgery.

## 2024-06-28 ENCOUNTER — PROCEDURE VISIT (OUTPATIENT)
Dept: UROLOGY | Facility: AMBULATORY SURGERY CENTER | Age: 34
End: 2024-06-28

## 2024-06-28 VITALS
WEIGHT: 230 LBS | OXYGEN SATURATION: 99 % | SYSTOLIC BLOOD PRESSURE: 120 MMHG | DIASTOLIC BLOOD PRESSURE: 80 MMHG | HEART RATE: 69 BPM | BODY MASS INDEX: 33.97 KG/M2

## 2024-06-28 DIAGNOSIS — N13.5 URETERAL STRICTURE, RIGHT: Primary | ICD-10-CM

## 2024-06-28 RX ORDER — CEPHALEXIN 500 MG/1
500 CAPSULE ORAL EVERY 8 HOURS SCHEDULED
Qty: 9 CAPSULE | Refills: 0 | Status: SHIPPED | OUTPATIENT
Start: 2024-06-28 | End: 2024-07-01

## 2024-06-28 NOTE — PROGRESS NOTES
The patient returns to the office today to undergo cystoscopy with RIGHT stent removal. Risk and benefits of the procedure were discussed and informed consent was obtained.  The patient was placed in the modified supine position. The genitalia were prepped and draped in a sterile fashion. Viscous lidocaine was used for local anesthesia. The flexible cystoscope was passed. The bladder was inspected. The stent was identified coming from the RIGHT ureteral orifice. The stent was grasped with a flexible grasper and was then removed in its entirety without complications. Overall the patient tolerated the procedure.    The patient was provided with a 3 day prescription of Keflex for infection prophylaxis following the procedure.    They were made aware to advise our office of any fevers greater than 101 degrees Fahrenheit, malaise, or chills.  They were advised that it is normal to have cramping pain on the ipsilateral side for a day or so after ureteral stent removal.  They are to remain well hydrated in the coming days.     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  She is planning to undergo a reimplant of her right ureter by Dr. Fields on 7/22/2024.  She will plan to have a PCN placed on 7/1/2024 by IR in preparation of her upcoming surgery.  I provided her with instructions that pain and discomfort can be present after stent removal and she may also see some pink-tinged urine.  She should plan to increase her fluid intake and take over-the-counter medications for pain and discomfort.  She also was prescribed Toradol for pain and discomfort and she can utilize that as well.        Cystoscopy     Date/Time  6/28/2024 11:20 AM     Performed by  CORDELL Lynn   Authorized by  CORDELL Lynn     Universal Protocol:  Consent: Verbal consent obtained.  Risks and benefits: risks, benefits and alternatives were  discussed  Consent given by: patient  Timeout called at: 6/28/2024 11:20 AM.  Patient understanding: patient states understanding of the procedure being performed  Patient consent: the patient's understanding of the procedure matches consent given  Procedure consent: procedure consent matches procedure scheduled  Patient identity confirmed: verbally with patient      Procedure Details:  Procedure type: simple removal of a foreign body, stone, or stent    Patient tolerance: Patient tolerated the procedure well with no immediate complications

## 2024-06-28 NOTE — ASSESSMENT & PLAN NOTE
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  She is planning to undergo a reimplant of her right ureter by Dr. Fields on 7/22/2024.  She will plan to have a PCN placed on 7/1/2024 by IR in preparation of her upcoming surgery.  I provided her with instructions that pain and discomfort can be present after stent removal and she may also see some pink-tinged urine.  She should plan to increase her fluid intake and take over-the-counter medications for pain and discomfort.  She also was prescribed Toradol for pain and discomfort and she can utilize that as well.  
room air

## 2024-06-30 LAB
ABO GROUP BLD: NORMAL
APTT PPP: 30 SEC (ref 24–33)
BACTERIA UR CULT: NORMAL
BASOPHILS # BLD AUTO: 0 X10E3/UL (ref 0–0.2)
BASOPHILS NFR BLD AUTO: 0 %
BLD GP AB SCN SERPL QL: NEGATIVE
BUN SERPL-MCNC: 17 MG/DL (ref 6–20)
BUN/CREAT SERPL: 21 (ref 9–23)
CALCIUM SERPL-MCNC: 9.3 MG/DL (ref 8.7–10.2)
CHLORIDE SERPL-SCNC: 104 MMOL/L (ref 96–106)
CO2 SERPL-SCNC: 25 MMOL/L (ref 20–29)
CREAT SERPL-MCNC: 0.8 MG/DL (ref 0.57–1)
EGFR: 99 ML/MIN/1.73
EOSINOPHIL # BLD AUTO: 0.1 X10E3/UL (ref 0–0.4)
EOSINOPHIL NFR BLD AUTO: 3 %
ERYTHROCYTE [DISTWIDTH] IN BLOOD BY AUTOMATED COUNT: 13.2 % (ref 11.7–15.4)
GLUCOSE SERPL-MCNC: 89 MG/DL (ref 70–99)
HCT VFR BLD AUTO: 36.4 % (ref 34–46.6)
HGB BLD-MCNC: 12.1 G/DL (ref 11.1–15.9)
IMM GRANULOCYTES # BLD: 0 X10E3/UL (ref 0–0.1)
IMM GRANULOCYTES NFR BLD: 0 %
INR PPP: 0.9 (ref 0.9–1.2)
LYMPHOCYTES # BLD AUTO: 0.9 X10E3/UL (ref 0.7–3.1)
LYMPHOCYTES NFR BLD AUTO: 23 %
Lab: NO GROWTH
MCH RBC QN AUTO: 30 PG (ref 26.6–33)
MCHC RBC AUTO-ENTMCNC: 33.2 G/DL (ref 31.5–35.7)
MCV RBC AUTO: 90 FL (ref 79–97)
MONOCYTES # BLD AUTO: 0.4 X10E3/UL (ref 0.1–0.9)
MONOCYTES NFR BLD AUTO: 11 %
NEUTROPHILS # BLD AUTO: 2.5 X10E3/UL (ref 1.4–7)
NEUTROPHILS NFR BLD AUTO: 63 %
PLATELET # BLD AUTO: 240 X10E3/UL (ref 150–450)
POTASSIUM SERPL-SCNC: 4.6 MMOL/L (ref 3.5–5.2)
PROTHROMBIN TIME: 9.8 SEC (ref 9.1–12)
RBC # BLD AUTO: 4.04 X10E6/UL (ref 3.77–5.28)
RH BLD: POSITIVE
SODIUM SERPL-SCNC: 140 MMOL/L (ref 134–144)
WBC # BLD AUTO: 4 X10E3/UL (ref 3.4–10.8)

## 2024-07-01 ENCOUNTER — ANESTHESIA EVENT (OUTPATIENT)
Dept: RADIOLOGY | Facility: HOSPITAL | Age: 34
End: 2024-07-01

## 2024-07-01 ENCOUNTER — TELEPHONE (OUTPATIENT)
Age: 34
End: 2024-07-01

## 2024-07-01 ENCOUNTER — ANESTHESIA (OUTPATIENT)
Dept: RADIOLOGY | Facility: HOSPITAL | Age: 34
End: 2024-07-01

## 2024-07-01 ENCOUNTER — HOSPITAL ENCOUNTER (OUTPATIENT)
Dept: RADIOLOGY | Facility: HOSPITAL | Age: 34
Discharge: HOME | End: 2024-07-01
Attending: RADIOLOGY
Payer: COMMERCIAL

## 2024-07-01 VITALS
RESPIRATION RATE: 20 BRPM | TEMPERATURE: 97.8 F | DIASTOLIC BLOOD PRESSURE: 54 MMHG | HEIGHT: 69 IN | WEIGHT: 194.67 LBS | OXYGEN SATURATION: 98 % | SYSTOLIC BLOOD PRESSURE: 102 MMHG | BODY MASS INDEX: 28.83 KG/M2 | HEART RATE: 80 BPM

## 2024-07-01 DIAGNOSIS — C53.8 MALIGNANT NEOPLASM OF OVERLAPPING SITES OF CERVIX (HCC): ICD-10-CM

## 2024-07-01 DIAGNOSIS — N13.5 URETERAL STRICTURE, RIGHT: Primary | ICD-10-CM

## 2024-07-01 DIAGNOSIS — N13.5 URETERAL STRICTURE, RIGHT: ICD-10-CM

## 2024-07-01 DIAGNOSIS — T83.84XS PAIN DUE TO URETERAL STENT, SEQUELA: ICD-10-CM

## 2024-07-01 PROCEDURE — C1729 CATH, DRAINAGE: HCPCS

## 2024-07-01 PROCEDURE — C1769 GUIDE WIRE: HCPCS

## 2024-07-01 PROCEDURE — 50432 PLMT NEPHROSTOMY CATHETER: CPT

## 2024-07-01 PROCEDURE — C1894 INTRO/SHEATH, NON-LASER: HCPCS

## 2024-07-01 PROCEDURE — 50432 PLMT NEPHROSTOMY CATHETER: CPT | Performed by: RADIOLOGY

## 2024-07-01 RX ORDER — LIDOCAINE HYDROCHLORIDE 10 MG/ML
INJECTION, SOLUTION EPIDURAL; INFILTRATION; INTRACAUDAL; PERINEURAL AS NEEDED
Status: DISCONTINUED | OUTPATIENT
Start: 2024-07-01 | End: 2024-07-01

## 2024-07-01 RX ORDER — FENTANYL CITRATE 50 UG/ML
INJECTION, SOLUTION INTRAMUSCULAR; INTRAVENOUS AS NEEDED
Status: DISCONTINUED | OUTPATIENT
Start: 2024-07-01 | End: 2024-07-01

## 2024-07-01 RX ORDER — PROPOFOL 10 MG/ML
INJECTION, EMULSION INTRAVENOUS AS NEEDED
Status: DISCONTINUED | OUTPATIENT
Start: 2024-07-01 | End: 2024-07-01

## 2024-07-01 RX ORDER — ONDANSETRON 2 MG/ML
INJECTION INTRAMUSCULAR; INTRAVENOUS AS NEEDED
Status: DISCONTINUED | OUTPATIENT
Start: 2024-07-01 | End: 2024-07-01

## 2024-07-01 RX ORDER — FENTANYL CITRATE 50 UG/ML
25 INJECTION, SOLUTION INTRAMUSCULAR; INTRAVENOUS
Status: DISCONTINUED | OUTPATIENT
Start: 2024-07-01 | End: 2024-07-01 | Stop reason: HOSPADM

## 2024-07-01 RX ORDER — OXYCODONE HYDROCHLORIDE 5 MG/1
5 TABLET ORAL ONCE AS NEEDED
Status: DISCONTINUED | OUTPATIENT
Start: 2024-07-01 | End: 2024-07-02 | Stop reason: HOSPADM

## 2024-07-01 RX ORDER — SODIUM CHLORIDE, SODIUM LACTATE, POTASSIUM CHLORIDE, CALCIUM CHLORIDE 600; 310; 30; 20 MG/100ML; MG/100ML; MG/100ML; MG/100ML
INJECTION, SOLUTION INTRAVENOUS CONTINUOUS PRN
Status: DISCONTINUED | OUTPATIENT
Start: 2024-07-01 | End: 2024-07-01

## 2024-07-01 RX ORDER — CEFAZOLIN SODIUM 2 G/50ML
2000 SOLUTION INTRAVENOUS ONCE
Status: COMPLETED | OUTPATIENT
Start: 2024-07-01 | End: 2024-07-01

## 2024-07-01 RX ORDER — LORAZEPAM 2 MG/ML
0.5 INJECTION INTRAMUSCULAR ONCE
Status: COMPLETED | OUTPATIENT
Start: 2024-07-01 | End: 2024-07-01

## 2024-07-01 RX ORDER — MIDAZOLAM HYDROCHLORIDE 2 MG/2ML
INJECTION, SOLUTION INTRAMUSCULAR; INTRAVENOUS AS NEEDED
Status: DISCONTINUED | OUTPATIENT
Start: 2024-07-01 | End: 2024-07-01

## 2024-07-01 RX ORDER — LIDOCAINE WITH 8.4% SOD BICARB 0.9%(10ML)
SYRINGE (ML) INJECTION AS NEEDED
Status: COMPLETED | OUTPATIENT
Start: 2024-07-01 | End: 2024-07-01

## 2024-07-01 RX ORDER — DEXAMETHASONE SODIUM PHOSPHATE 10 MG/ML
INJECTION, SOLUTION INTRAMUSCULAR; INTRAVENOUS AS NEEDED
Status: DISCONTINUED | OUTPATIENT
Start: 2024-07-01 | End: 2024-07-01

## 2024-07-01 RX ORDER — ACETAMINOPHEN 325 MG/1
650 TABLET ORAL EVERY 4 HOURS PRN
Status: DISCONTINUED | OUTPATIENT
Start: 2024-07-01 | End: 2024-07-02 | Stop reason: HOSPADM

## 2024-07-01 RX ORDER — SODIUM CHLORIDE 9 MG/ML
10 INJECTION, SOLUTION INTRAMUSCULAR; INTRAVENOUS; SUBCUTANEOUS DAILY
Qty: 300 ML | Refills: 2 | Status: SHIPPED | OUTPATIENT
Start: 2024-07-01 | End: 2024-07-01 | Stop reason: SDUPTHER

## 2024-07-01 RX ORDER — ROCURONIUM BROMIDE 10 MG/ML
INJECTION, SOLUTION INTRAVENOUS AS NEEDED
Status: DISCONTINUED | OUTPATIENT
Start: 2024-07-01 | End: 2024-07-01

## 2024-07-01 RX ORDER — ONDANSETRON 2 MG/ML
4 INJECTION INTRAMUSCULAR; INTRAVENOUS EVERY 6 HOURS PRN
Status: DISCONTINUED | OUTPATIENT
Start: 2024-07-01 | End: 2024-07-01 | Stop reason: HOSPADM

## 2024-07-01 RX ORDER — SODIUM CHLORIDE 9 MG/ML
10 INJECTION, SOLUTION INTRAMUSCULAR; INTRAVENOUS; SUBCUTANEOUS DAILY
Qty: 300 ML | Refills: 2 | Status: SHIPPED | OUTPATIENT
Start: 2024-07-01 | End: 2024-09-29

## 2024-07-01 RX ADMIN — LORAZEPAM 0.5 MG: 2 INJECTION INTRAMUSCULAR; INTRAVENOUS at 10:20

## 2024-07-01 RX ADMIN — FENTANYL CITRATE 50 MCG: 50 INJECTION, SOLUTION INTRAMUSCULAR; INTRAVENOUS at 09:00

## 2024-07-01 RX ADMIN — SODIUM CHLORIDE, SODIUM LACTATE, POTASSIUM CHLORIDE, AND CALCIUM CHLORIDE: .6; .31; .03; .02 INJECTION, SOLUTION INTRAVENOUS at 08:58

## 2024-07-01 RX ADMIN — SUGAMMADEX 200 MG: 100 INJECTION, SOLUTION INTRAVENOUS at 09:37

## 2024-07-01 RX ADMIN — LIDOCAINE HYDROCHLORIDE 50 MG: 10 INJECTION, SOLUTION EPIDURAL; INFILTRATION; INTRACAUDAL; PERINEURAL at 09:00

## 2024-07-01 RX ADMIN — IOHEXOL 20 ML: 350 INJECTION, SOLUTION INTRAVENOUS at 09:40

## 2024-07-01 RX ADMIN — FENTANYL CITRATE 50 MCG: 50 INJECTION, SOLUTION INTRAMUSCULAR; INTRAVENOUS at 09:30

## 2024-07-01 RX ADMIN — ROCURONIUM BROMIDE 50 MG: 10 INJECTION, SOLUTION INTRAVENOUS at 09:00

## 2024-07-01 RX ADMIN — PROPOFOL 150 MG: 10 INJECTION, EMULSION INTRAVENOUS at 09:00

## 2024-07-01 RX ADMIN — DEXAMETHASONE SODIUM PHOSPHATE 10 MG: 10 INJECTION INTRAMUSCULAR; INTRAVENOUS at 09:19

## 2024-07-01 RX ADMIN — ONDANSETRON 4 MG: 2 INJECTION INTRAMUSCULAR; INTRAVENOUS at 09:37

## 2024-07-01 RX ADMIN — Medication 10 ML: at 09:25

## 2024-07-01 RX ADMIN — FENTANYL CITRATE 25 MCG: 50 INJECTION INTRAMUSCULAR; INTRAVENOUS at 10:17

## 2024-07-01 RX ADMIN — CEFAZOLIN SODIUM 2000 MG: 2 SOLUTION INTRAVENOUS at 08:45

## 2024-07-01 RX ADMIN — MIDAZOLAM 2 MG: 1 INJECTION INTRAMUSCULAR; INTRAVENOUS at 08:57

## 2024-07-01 NOTE — ANESTHESIA PREPROCEDURE EVALUATION
Procedure:  IR NEPHROSTOMY TUBE PLACEMENT    Relevant Problems   ANESTHESIA (within normal limits)      CARDIO (within normal limits)      ENDO (within normal limits)      GI/HEPATIC (within normal limits)      /RENAL   (+) Hydronephrosis of right kidney      GYN   (+) Malignant neoplasm of overlapping sites of cervix (HCC)      HEMATOLOGY (within normal limits)      MUSCULOSKELETAL (within normal limits)      NEURO/PSYCH   (+) Anxiety   (+) Depression with anxiety      PULMONARY (within normal limits)        Physical Exam    Airway    Mallampati score: II  TM Distance: >3 FB  Neck ROM: full     Dental   No notable dental hx     Cardiovascular  Rhythm: regular, Rate: normal, Cardiovascular exam normal    Pulmonary  Pulmonary exam normal Breath sounds clear to auscultation    Other Findings  post-pubertal.      Anesthesia Plan  ASA Score- 2     Anesthesia Type- general with ASA Monitors.         Additional Monitors:     Airway Plan: ETT.           Plan Factors-Exercise tolerance (METS): >4 METS.    Chart reviewed.   Existing labs reviewed. Patient summary reviewed.    Patient is not a current smoker.              Induction- intravenous.    Postoperative Plan-     Perioperative Resuscitation Plan - Level 1 - Full Code.       Informed Consent- Anesthetic plan and risks discussed with patient and spouse.

## 2024-07-01 NOTE — BRIEF OP NOTE (RAD/CATH)
INTERVENTIONAL RADIOLOGY PROCEDURE NOTE    Date: 7/1/2024    Procedure:   Procedure Summary       Date: 07/01/24 Room / Location: [facility] Interventional Radiology    Anesthesia Start: 0850 Anesthesia Stop: 1000    Procedure: IR NEPHROSTOMY TUBE PLACEMENT Diagnosis:       Pain due to ureteral stent, sequela      Malignant neoplasm of overlapping sites of cervix (HCC)      (Pt with right ureteral stricture from radiation managed with stent. want to place PCN and remove stent for about 2-3 weeks of ureteral rest before robotic ureteral reimplant surgery (scheduled for 7/22))    Scheduled Providers:  Responsible Provider: Chaya Mcmahon MD    Anesthesia Type: general ASA Status: 2            Preoperative diagnosis:   1. Ureteral stricture, right    2. Pain due to ureteral stent, sequela    3. Malignant neoplasm of overlapping sites of cervix (HCC)         Postoperative diagnosis: Same.    Surgeon: Reilly Waller MD     Assistant: None. No qualified resident was available.    Blood loss: minimal     Specimens: none     Findings: Clear urine. 10Fr PCN placed with image guidance.     Complications: None immediate.    Anesthesia: general

## 2024-07-01 NOTE — DISCHARGE INSTRUCTIONS
Nephrostomy Tube Care     WHAT YOU NEED TO KNOW:   A nephrostomy tube is a catheter (thin plastic tube) that is inserted through your skin and into your kidney. The nephrostomy tube drains urine from your kidney into a collecting bag outside your body. You may need a nephrostomy tube when something is blocking the normal flow of urine. A nephrostomy tube may be used for a short or a long period of time. The nephrostomy tube comes out of your back, so you will need someone to help care for your nephrostomy tube.        DISCHARGE INSTRUCTIONS:      How to clean the skin around the nephrostomy tube and change the bandage:  Since the nephrostomy tube comes out of your back, you will not be able to care for it by yourself. Ask someone to follow the general directions below to check and care for your nephrostomy tube.   Gather the items you will need.          Disposable (single use) under-pad, and a clean washcloth  Plain soap, warm water, and new medical gloves  Sterile gauze bandages  Clear adhesive dressing or medical tape  Skin barrier  Protective skin film  Trash bag  Remove the old bandage, and check the tube entry site.    Have the patient lie on his side with the nephrostomy tube entry site facing up. Place the under-pad where it will catch drainage as you are working with the nephrostomy tube.   Wash your hands with soap and water. Put on new medical gloves.  Gently remove the old bandage, without pulling on the tube. Do this by holding the skin beside the tube with one hand. With the other hand, gently remove sticky tape and the skin barrier by pulling in the same direction as hair growth. Do not touch the side of the bandage that is placed over or around the tube. Throw the bandage and skin barrier away in a trash bag.  Look for signs of infection, such as skin redness and swelling. Report any skin changes to healthcare providers.  Clean the tube entry site.    Hold the tube in place to keep it from being  pulled out while you are cleaning around it.  You will need to clean the area twice. For the first cleaning, wet a new gauze bandage with soap and water.  Begin at the entry site of the tube. Wipe the skin in circles, moving away from the entry site. Remove blood and any other material with the gauze. Do this as often as needed. Use a new gauze bandage each time you clean the area, moving away from the entry site.   For the second cleaning, wet a new gauze bandage with water. Begin at the entry site of the tube. Wipe the skin in circles, moving away from the entry site. Use a new gauze bandage each time you clean the area, moving away from the entry site.   Gently pat the skin with a clean washcloth to dry it.    Apply the skin barrier and bandages.    Roll up a bandage to make it thick, and place it under  the place where the tube enters the skin. Place it to support the tube, and stop it from kinking or bending. Tape the bandage in place, and apply more bandages if directed by a healthcare provider.   Bring the tubing forward to the front and tape it to the skin. Do not stretch the tube tight, because this may pull the nephrostomy tube out.  How often to change the bandage.  Change the bandage around the tube, every other day. If your bandages  get dirty or wet, change them right away, and as often as needed. If your nephrostomy tube is to be used for a long period of time, the tube needs to be changed every 2 to 3 months. Healthcare providers will tell you when you need to make an appointment to have your tube changed.     How to care for the urine drainage bag:   Ask if you need to measure and write down how much urine is in the bag before you empty it. Drain urine out of the drainage bag when it is ½ to ? full. Open the spout at the bottom of the bag to empty the urine into the toilet.   You may need to detach the drainage bag from the nephrostomy tube to change it.. If so, attach a new drainage bag tightly to  the nephrostomy tube.     How to prevent problems with your nephrostomy tube:   Change bandages, directed.  This helps to prevent infection. Throw away or clean your drainage bag as directed by your healthcare provider.    Wipe the connecting ends of the drainage bag with alcohol before you reconnect the bag to the tube.  This helps prevent infection.     Keep the tube taped to your skin and connected to a drainage bag placed below the level of your kidneys.  This helps prevent urine from backing up into your kidneys. You may wear a small drainage bag strapped to your leg to let you move around more easily.    Check the catheter to be sure it is in place after you change your clothes or do other activities.  Do not wear tight clothing over the tube. Place the tubing over your thigh rather than under it when you are sitting down. Be sure that nothing is pulling on the nephrostomy tube when you move around.    Change positions if you see little or no urine in your drainage bag.  Check to see if the urine tube is twisted or bent. Be sure that you are not sitting or lying on the tube. If there are no kinks and there is little or no urine in the drainage bag, tell your healthcare provider.    Flush out the tube as directed. Some tubes get flushed one time a day with 10 mls of NSS You will be given a prescription for the flushes.  To flush the nephrostomy tube, clean both connections with alcohol swap. Twist off the drainage bag tube and twist the saline syringe into the nephrostomy tube and flush briskly. Remove the syringe and twist the drainage bag tube back into the nephrostomy tube.  Keep the site covered while you shower.  Tape a piece of clear adhesive plastic over the dressing to keep it dry while you shower. Do not take tub baths.    Contact Interventional Radiology at [phone] ([place] PATIENTS: Contact Interventional Radiology at [phone]) ([place] PATIENTS: Contact Interventional Radiology at  [phone]) if:  The skin around the nephrostomy tube is red, swollen, itches, or has a rash.   You have a fever greater than 101 or chills.  You have lower back or hip pain.  There are changes in how your urine looks or smells.  You have little or no urine draining from the nephrostomy tube.   You have nausea and are vomiting.  The black vivian on your tube has moved, or the tube is longer than when it was put in.   You have questions or concerns about your condition or care.  The nephrostomy tube comes out completely.   There is blood, pus, or a bad smell coming from the place where the tube enters your skin.  Urine is leaking around the tube.        The following pharmacies carry the flush syringes.       [facility]                     [facility]                         [facility]       [address]                     [address]                    [phone]  [place]                       [place]  Phone [phone]            Phone [phone]                 [facility]                                                                                                   [phone]  [facility]             [facility]                             [address] [place], [place] [address] [facility]   Lexa BARRIOS                                 [phone]  Phone [phone]            Phone [phone]    [facility]                                                                         [facility] [phone]  [address]  [place]   Phone [phone]     WHAT YOU NEED TO KNOW:   A nephrostomy tube is a catheter (thin plastic tube) that is inserted through your skin and into your kidney. The nephrostomy tube drains urine from your kidney into a collecting bag outside your body. You may need a nephrostomy tube when something is blocking the normal flow of  urine. A nephrostomy tube may be used for a short or a long period of time. The nephrostomy tube comes out of your back, so you will need someone to help care for your nephrostomy tube.        DISCHARGE INSTRUCTIONS:      How to clean the skin around the nephrostomy tube and change the bandage:  Since the nephrostomy tube comes out of your back, you will not be able to care for it by yourself. Ask someone to follow the general directions below to check and care for your nephrostomy tube.   Gather the items you will need.          Disposable (single use) under-pad, and a clean washcloth  Plain soap, warm water, and new medical gloves  Sterile gauze bandages  Clear adhesive dressing or medical tape  Skin barrier  Protective skin film  Trash bag  Remove the old bandage, and check the tube entry site.    Have the patient lie on his side with the nephrostomy tube entry site facing up. Place the under-pad where it will catch drainage as you are working with the nephrostomy tube.   Wash your hands with soap and water. Put on new medical gloves.  Gently remove the old bandage, without pulling on the tube. Do this by holding the skin beside the tube with one hand. With the other hand, gently remove sticky tape and the skin barrier by pulling in the same direction as hair growth. Do not touch the side of the bandage that is placed over or around the tube. Throw the bandage and skin barrier away in a trash bag.  Look for signs of infection, such as skin redness and swelling. Report any skin changes to healthcare providers.  Clean the tube entry site.    Hold the tube in place to keep it from being pulled out while you are cleaning around it.  You will need to clean the area twice. For the first cleaning, wet a new gauze bandage with soap and water.  Begin at the entry site of the tube. Wipe the skin in circles, moving away from the entry site. Remove blood and any other material with the gauze. Do this as often as needed. Use  a new gauze bandage each time you clean the area, moving away from the entry site.   For the second cleaning, wet a new gauze bandage with water. Begin at the entry site of the tube. Wipe the skin in circles, moving away from the entry site. Use a new gauze bandage each time you clean the area, moving away from the entry site.   Gently pat the skin with a clean washcloth to dry it.    Apply the skin barrier and bandages.    Roll up a bandage to make it thick, and place it under  the place where the tube enters the skin. Place it to support the tube, and stop it from kinking or bending. Tape the bandage in place, and apply more bandages if directed by a healthcare provider.   Bring the tubing forward to the front and tape it to the skin. Do not stretch the tube tight, because this may pull the nephrostomy tube out.  How often to change the bandage.  Change the bandage around the tube, every other day. If your bandages  get dirty or wet, change them right away, and as often as needed. If your nephrostomy tube is to be used for a long period of time, the tube needs to be changed every 2 to 3 months. Healthcare providers will tell you when you need to make an appointment to have your tube changed.     How to care for the urine drainage bag:   Ask if you need to measure and write down how much urine is in the bag before you empty it. Drain urine out of the drainage bag when it is ½ to ? full. Open the spout at the bottom of the bag to empty the urine into the toilet.   You may need to detach the drainage bag from the nephrostomy tube to change it.. If so, attach a new drainage bag tightly to the nephrostomy tube.     How to prevent problems with your nephrostomy tube:   Change bandages, directed.  This helps to prevent infection. Throw away or clean your drainage bag as directed by your healthcare provider.    Wipe the connecting ends of the drainage bag with alcohol before you reconnect the bag to the tube.  This helps  prevent infection.     Keep the tube taped to your skin and connected to a drainage bag placed below the level of your kidneys.  This helps prevent urine from backing up into your kidneys. You may wear a small drainage bag strapped to your leg to let you move around more easily.    Check the catheter to be sure it is in place after you change your clothes or do other activities.  Do not wear tight clothing over the tube. Place the tubing over your thigh rather than under it when you are sitting down. Be sure that nothing is pulling on the nephrostomy tube when you move around.    Change positions if you see little or no urine in your drainage bag.  Check to see if the urine tube is twisted or bent. Be sure that you are not sitting or lying on the tube. If there are no kinks and there is little or no urine in the drainage bag, tell your healthcare provider.    Flush out the tube as directed. Some tubes get flushed one time a day with 10 mls of NSS You will be given a prescription for the flushes.  To flush the nephrostomy tube, clean both connections with alcohol swap. Twist off the drainage bag tube and twist the saline syringe into the nephrostomy tube and flush briskly. Remove the syringe and twist the drainage bag tube back into the nephrostomy tube.  Keep the site covered while you shower.  Tape a piece of clear adhesive plastic over the dressing to keep it dry while you shower. Do not take tub baths.    Contact Interventional Radiology at [phone] ([place] PATIENTS: Contact Interventional Radiology at [phone]) ([place] PATIENTS: Contact Interventional Radiology at [phone]) if:  The skin around the nephrostomy tube is red, swollen, itches, or has a rash.   You have a fever greater than 101 or chills.  You have lower back or hip pain.  There are changes in how your urine looks or smells.  You have little or no urine draining from the nephrostomy tube.   You have nausea and are vomiting.  The black  vivian on your tube has moved, or the tube is longer than when it was put in.   You have questions or concerns about your condition or care.  The nephrostomy tube comes out completely.   There is blood, pus, or a bad smell coming from the place where the tube enters your skin.  Urine is leaking around the tube.        The following pharmacies carry the flush syringes.       [facility]                     [facility]                         [facility]       [address]                     [address] [facility]                    [phone]  [place]                       [place]  Phone [phone]            Phone [phone]                 [facility]                                                                                                   [phone]  [facility]             [facility]                             [address]                      [address] [facility]   [place] BARRI[place]                                 [phone]  Phone [phone]            Phone [phone]    [facility]                                                                         [facility] [phone]  [address]  [place]   Phone [phone]

## 2024-07-01 NOTE — ANESTHESIA POSTPROCEDURE EVALUATION
"Post-Op Assessment Note    CV Status:  Stable    Pain management: adequate       Mental Status:  Alert and awake   Hydration Status:  Euvolemic   PONV Controlled:  Controlled   Airway Patency:  Patent  Airway: intubated  Two or more mitigation strategies used for obstructive sleep apnea   Post Op Vitals Reviewed: Yes    No anethesia notable event occurred.    Staff: CRNA               BP      Temp      Pulse     Resp      SpO2      /61   Pulse 77   Temp 97.8 °F (36.6 °C) (Temporal)   Resp 16   Ht 5' 9\" (1.753 m)   Wt 88.3 kg (194 lb 10.7 oz)   SpO2 99%   BMI 28.75 kg/m²    "

## 2024-07-01 NOTE — H&P
"Interventional Radiology  History and Physical 7/1/2024     Margot Moody   1990   [id number]    H&P reviewed. There have been no interval changes since the time the H&P was written.    /59   Pulse 66   Temp 97.6 °F (36.4 °C)   Resp 14   Ht 5' 9\" (1.753 m)   Wt 88.3 kg (194 lb 10.7 oz)   SpO2 99%   BMI 28.75 kg/m²     Prior imaging was reviewed.  Presents today for placement of right nephrostomy tube.  She is scheduled for ureteral implantation and in preparation, her right ureteral stent was removed 2 days ago and she was scheduled for placement of nephrostomy tube.  She is doing well following ureteral stent removal.  No fever.    Nephrostomy tube procedure was discussed and all questions answered.    Informed written consent was obtained.    Reilly Waller MD   "

## 2024-07-01 NOTE — TELEPHONE ENCOUNTER
Pt under care of: Christofer     Last Seen: 6/28/24    Pt calling due to:    Patient calling in stating she had nephrostomy tube placed today. Hospital sent over syringes to [facility] pharmacy but they are not in stock. Patient will need script for nephrostomy tube flushes/syringes. Please send this over to pharmacy below.     PHARMACY:    [facility]/[address], [place] [id number]  Phone: [phone]      Pt can be reached at: [phone]

## 2024-07-03 ENCOUNTER — TELEPHONE (OUTPATIENT)
Dept: FAMILY MEDICINE CLINIC | Facility: CLINIC | Age: 34
End: 2024-07-03

## 2024-07-03 DIAGNOSIS — C53.8 MALIGNANT NEOPLASM OF OVERLAPPING SITES OF CERVIX (HCC): Primary | ICD-10-CM

## 2024-07-03 DIAGNOSIS — Z71.89 ENCOUNTER FOR OSTOMY CARE EDUCATION: ICD-10-CM

## 2024-07-03 DIAGNOSIS — N13.30 HYDRONEPHROSIS OF RIGHT KIDNEY: ICD-10-CM

## 2024-07-03 DIAGNOSIS — N12 PYELONEPHRITIS: ICD-10-CM

## 2024-07-05 ENCOUNTER — TELEPHONE (OUTPATIENT)
Age: 34
End: 2024-07-05

## 2024-07-05 DIAGNOSIS — Z71.89 ENCOUNTER FOR OSTOMY CARE EDUCATION: ICD-10-CM

## 2024-07-05 DIAGNOSIS — N13.30 HYDRONEPHROSIS OF RIGHT KIDNEY: ICD-10-CM

## 2024-07-05 DIAGNOSIS — N12 PYELONEPHRITIS: Primary | ICD-10-CM

## 2024-07-05 NOTE — TELEPHONE ENCOUNTER
[facility] home health care called to let us know that the request for home health care was denied by insurance due to being out of network.

## 2024-07-09 ENCOUNTER — TELEPHONE (OUTPATIENT)
Age: 34
End: 2024-07-09

## 2024-07-09 DIAGNOSIS — N13.30 HYDRONEPHROSIS OF RIGHT KIDNEY: Primary | ICD-10-CM

## 2024-07-09 DIAGNOSIS — N12 PYELONEPHRITIS: ICD-10-CM

## 2024-07-09 DIAGNOSIS — C53.8 MALIGNANT NEOPLASM OF OVERLAPPING SITES OF CERVIX (HCC): ICD-10-CM

## 2024-07-09 DIAGNOSIS — Z71.89 ENCOUNTER FOR OSTOMY CARE EDUCATION: ICD-10-CM

## 2024-07-09 DIAGNOSIS — C53.8 MALIGNANT NEOPLASM OF OVERLAPPING SITES OF CERVIX (HCC): Primary | ICD-10-CM

## 2024-07-09 NOTE — TELEPHONE ENCOUNTER
PER PLAN THIS HAS BEEN DENIED UNDER THE PHARMACY BENEFIT. IT SHOULD BE BILLED TO THE PT'S MEDICAL BENEFIT.     The saline is excluded from pharmacy benefit. Pt can purchase out of pocket at Pharmacy as I understand she did that already    The pt's pharmacy cannot bill to medical portion.     Future scripts would need to be submitted via [employer] to a DME Company so it is covered and pt is not out of pocket.    Thank you    [employer]

## 2024-07-09 NOTE — TELEPHONE ENCOUNTER
Patient is requesting an insurance referral for the following specialty:      Test Name / Order Name: Home Health    DX Code: N12, N13.30, Z71.89    Date Of Service: not yet scheduled    Location/Facility Name/Address/Phone #: [facility]    Location / Facility NPI:     Best Phone # To Reach The Patient:  [phone]

## 2024-07-09 NOTE — TELEPHONE ENCOUNTER
Sue with [facility] called, they are unable to see the Pt until she is assessed by PCP office as the need is for wound care. They cannot use the 2/19/24 visit. Sue will let Pt know she will need to make an appointment with the office, and have her call to schedule.     Please f/u. Thank you

## 2024-07-09 NOTE — TELEPHONE ENCOUNTER
Patient calling in to get information on home care referral. Provided her [facility]'s phone number. She will be calling.

## 2024-07-10 ENCOUNTER — OFFICE VISIT (OUTPATIENT)
Dept: FAMILY MEDICINE CLINIC | Facility: CLINIC | Age: 34
End: 2024-07-10
Payer: COMMERCIAL

## 2024-07-10 ENCOUNTER — TELEPHONE (OUTPATIENT)
Dept: UROLOGY | Facility: AMBULATORY SURGERY CENTER | Age: 34
End: 2024-07-10

## 2024-07-10 VITALS
TEMPERATURE: 99.3 F | SYSTOLIC BLOOD PRESSURE: 120 MMHG | BODY MASS INDEX: 34.96 KG/M2 | WEIGHT: 236 LBS | OXYGEN SATURATION: 99 % | DIASTOLIC BLOOD PRESSURE: 84 MMHG | RESPIRATION RATE: 18 BRPM | HEIGHT: 69 IN | HEART RATE: 97 BPM

## 2024-07-10 DIAGNOSIS — C53.8 MALIGNANT NEOPLASM OF OVERLAPPING SITES OF CERVIX (HCC): ICD-10-CM

## 2024-07-10 DIAGNOSIS — F41.9 ANXIETY: ICD-10-CM

## 2024-07-10 DIAGNOSIS — Z71.89 ENCOUNTER FOR OSTOMY CARE EDUCATION: ICD-10-CM

## 2024-07-10 DIAGNOSIS — N12 PYELONEPHRITIS: ICD-10-CM

## 2024-07-10 DIAGNOSIS — Z93.6 NEPHROSTOMY STATUS (HCC): Primary | ICD-10-CM

## 2024-07-10 DIAGNOSIS — N13.5 URETERAL STRICTURE, RIGHT: ICD-10-CM

## 2024-07-10 DIAGNOSIS — N13.30 HYDRONEPHROSIS OF RIGHT KIDNEY: ICD-10-CM

## 2024-07-10 DIAGNOSIS — C53.8 MALIGNANT NEOPLASM OF OVERLAPPING SITES OF CERVIX (HCC): Primary | ICD-10-CM

## 2024-07-10 DIAGNOSIS — T83.84XD PAIN DUE TO URETERAL STENT, SUBSEQUENT ENCOUNTER: ICD-10-CM

## 2024-07-10 DIAGNOSIS — D22.9 MULTIPLE NEVI: ICD-10-CM

## 2024-07-10 PROCEDURE — 99214 OFFICE O/P EST MOD 30 MIN: CPT | Performed by: FAMILY MEDICINE

## 2024-07-10 RX ORDER — LORAZEPAM 1 MG/1
TABLET ORAL
Qty: 2 TABLET | Refills: 0 | Status: SHIPPED | OUTPATIENT
Start: 2024-07-10

## 2024-07-10 NOTE — ASSESSMENT & PLAN NOTE
The patient is status post nephrostomy.  We will arrange for home care as mentioned and she will continue to follow-up with urology.

## 2024-07-10 NOTE — ASSESSMENT & PLAN NOTE
The patient stopped the buspirone because it was causing suicidal ideations and she feels her anxiety is stable at the present time.  She is considering possibly starting another medication to help with depression and anxiety but does not wish to start at this time until after her upcoming PET scan and surgery.  She will reach out to me if she decides to try anything sooner.

## 2024-07-10 NOTE — ASSESSMENT & PLAN NOTE
The patient is having an extremely difficult time trying to take care of the nephrostomy tube site herself.  She cannot reach it and cannot do the proper dressing changes and wound checks.  There is no one else at home that can do this for her either.  The patient is largely homebound due to the fact that she is unable to drive at this time without full assistance due to weakness related to her current condition.  She does not feel safe to drive.  We are going to try to expedite getting home care set up for her for routine nephrostomy tube site checks and wound care.  We will look to get this established with [facility] nurses as soon as possible.  In addition due to the patient's complex medical history I discussed referring her to our complex care manager to help navigate any other services that she may require at home.

## 2024-07-10 NOTE — PROGRESS NOTES
Assessment/Plan:  Problem List Items Addressed This Visit        Genitourinary    Malignant neoplasm of overlapping sites of cervix (HCC)     The patient will continue to follow-up with oncology as scheduled.         Relevant Orders    Ambulatory Referral to Complex Care Management Program    EXTERNAL Referral to Home Health    Ureteral stricture, right     The patient is status post nephrostomy.  We will arrange for home care as mentioned and she will continue to follow-up with urology.         Relevant Orders    Ambulatory Referral to Complex Care Management Program    EXTERNAL Referral to Home Health       Urinary    Nephrostomy status (HCC) - Primary     The patient is having an extremely difficult time trying to take care of the nephrostomy tube site herself.  She cannot reach it and cannot do the proper dressing changes and wound checks.  There is no one else at home that can do this for her either.  The patient is largely homebound due to the fact that she is unable to drive at this time without full assistance due to weakness related to her current condition.  She does not feel safe to drive.  We are going to try to expedite getting home care set up for her for routine nephrostomy tube site checks and wound care.  We will look to get this established with [facility] nurses as soon as possible.  In addition due to the patient's complex medical history I discussed referring her to our complex care manager to help navigate any other services that she may require at home.         Relevant Orders    Ambulatory Referral to Complex Care Management Program    EXTERNAL Referral to Home Health    Pain due to ureteral stent (HCC)     The patient will follow-up with urology as scheduled.         Relevant Orders    Ambulatory Referral to Complex Care Management Program    EXTERNAL Referral to Home Health       Behavioral Health    Anxiety     The patient stopped the buspirone because it was causing suicidal ideations and she  feels her anxiety is stable at the present time.  She is considering possibly starting another medication to help with depression and anxiety but does not wish to start at this time until after her upcoming PET scan and surgery.  She will reach out to me if she decides to try anything sooner.        Other Visit Diagnoses     Multiple nevi        Relevant Orders    Ambulatory Referral to Dermatology            Return for Next scheduled follow up.   I spent 15 minutes during the visit reviewing the history from the patient, performing the examination, discussing the findings with the patient, providing counseling and education, and making a plan. I spent 15 minutes ordering referrals and testing and documenting.    Subjective:   Chief Complaint   Patient presents with   • Follow-up     Requesting home care to help with Nephrostomy tube        Patient ID: Margot Moody is a 34 y.o. female presents today for a visit to discuss getting a home care referral.  Margot Moody is a 34 y.o. female with a history of a right ureteral stricture from radiation who is status post R placement of a nephrostomy tube on July 1, 2024 and malignant neoplasm of overlapping sites of the cervix.  The patient just had a nephrostomy tube placed through interventional radiology-the patient has a ureteral stricture managed with a stent that needs to be in place for about 2 to 3 weeks before robotic ureteral reimplant surgery that scheduled for July 22, 2024 with urology.  She is here to discuss getting home care to help with her nephrostomy tube treatment.  She has stage III C1 cervical cancer status post aborted radical hysterectomy followed by curative intent chemoradiation that completed 8/3/2022.  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  She states the stitches are uncomfortable and she feels that there is occasional discharge from the area.  She  has not been able to shower and the area is getting crusty. She needs help taking care of the site.  There are soreness in her back- she is having bad hot flashes and is uncomfortable.  There is soreness- there is dried blood at the incision she also has difficulty doing the dressing changes due to the location.  The patient states she is largely homebound at this point and does not feel comfortable driving because she feels extremely weak due to her current condition.  She needs assistance in her transportation and has to have someone take her to all of her appointments.  Therefore, she needs in-home wound care since she always requires assistance of another person to go to any visits at this time.  With the patient's insurance she needs to set something up through [facility] nurses.  The patient was taking BuSpar for anxiety but she reports that she had to stop it altogether because was actually giving her suicidal ideations.  She no longer has those ideation since stopping the medication.  She is considering starting something else to help with anxiety and depression but wishes to think about it at this time.  I did recommend sertraline for her.  She also requests name of a dermatologist due to multiple pigmented skin lesions on her arms that she wants to have evaluated.  Her biggest concern now is getting assistance to care for the nephrostomy tube properly leading up to her upcoming surgery.      The following portions of the patient's history were reviewed and updated as appropriate: allergies, current medications, past family history, past medical history, past social history, past surgical history and problem list.  Patient Active Problem List   Diagnosis   • Malignant neoplasm of overlapping sites of cervix (HCC)   • Family history of ovarian cancer   • Obesity (BMI 35.0-39.9  without comorbidity)   • Dysuria   • Anxiety   • Depression with anxiety   • Dyspareunia due to medical condition in female patient   • Menopausal symptoms   • Pain due to ureteral stent (HCC)   • Slow transit constipation   • Radiation proctitis   • Colonic stricture (HCC)   • Hydronephrosis of right kidney   • Electrolyte abnormalities   • Ureteral stricture, right   • Nephrostomy status (HCC)     Past Medical History:   Diagnosis Date   • Anxiety    • Cancer (HCC)     cervix   • Cervical cancer (HCC)     receiving chemo and radiation   • COVID     Jan 2022   • Depression    • Diarrhea    • Dizziness    • Frequent headaches    • Hx of bleeding disorder     vaginal bleeding   • Obesity      Past Surgical History:   Procedure Laterality Date   • CERVICAL BIOPSY  W/ LOOP ELECTRODE EXCISION     • COLON SURGERY  Colon resection    May 2023   • FL RETROGRADE PYELOGRAM  02/13/2024   • IR NEPHROSTOMY TUBE PLACEMENT  7/1/2024   • LYMPH NODE DISSECTION Bilateral 05/06/2022    Procedure: DISSECTION/STAGING LYMPH NODE PELVIS/ABDOMEN;  Surgeon: Parminder Moctezuma MD;  [facility];  Service: Gynecology Oncology   • CO CYSTO BLADDER W/URETERAL CATHETERIZATION Right 02/13/2024    Procedure: CYSTOSCOPY WITH RETROGRADE PYELOGRAM;  Surgeon: Chuck Campo MD;  [facility];  Service: Urology   • CO INSERTION VAGINAL RADIATION DEVICE N/A 07/15/2022    Procedure: INSERTION NADIR SLEEVE VAGINA WITH POST OP BRACHYTHERAPY (IN RADIATION ONCOLOGY);  Surgeon: Parminder Moctezuma MD;  [facility];  Service: Gynecology Oncology   • CO LAPAROSCOPY COLECTOMY PARTIAL W/ANASTOMOSIS N/A 05/10/2023    Procedure: RESECTION COLON SIGMOID LAPAROSCOPIC;  Surgeon: Marin Salinas MD;  [facility];  Service: Colorectal   • CO SIGMOIDOSCOPY FLX DX W/COLLJ SPEC BR/WA IF PFRMD N/A 05/10/2023    Procedure: SIGMOIDOSCOPY FLEXIBLE;  Surgeon: Marin Salinas MD;  [facility];  Service:  Colorectal   • SALPINGECTOMY Bilateral 2022    Procedure: SALPINGECTOMY, OVARIAN TRANSPOSITION;  Surgeon: Parminder Moctezuma MD;  [facility];  Service: Gynecology Oncology   • URETERAL STENT PLACEMENT Right 2024    Procedure: INSERTION STENT URETERAL;  Surgeon: Chuck Campo MD;  [facility];  Service: Urology   • US GUIDANCE  07/15/2022     No Known Allergies  Family History   Problem Relation Age of Onset   • No Known Problems Mother    • Heart disease Father    • Cancer Brother         Niece/ brothers daughter stage 4 Adrenocortocal carcinoma   • Ovarian cancer Maternal Aunt         Dont know  before i was born   • Cancer Maternal Uncle         Stage 2 Liver Cancer   • Ovarian cancer Maternal Grandmother         Dont know  before i was born   • Cancer Niece 7        adrenal gland     Social History     Socioeconomic History   • Marital status: Single     Spouse name: Not on file   • Number of children: Not on file   • Years of education: Not on file   • Highest education level: Not on file   Occupational History   • Not on file   Tobacco Use   • Smoking status: Never     Passive exposure: Current (very mild/social)   • Smokeless tobacco: Never   Vaping Use   • Vaping status: Never Used   Substance and Sexual Activity   • Alcohol use: Not Currently   • Drug use: Never   • Sexual activity: Not Currently     Partners: Male     Birth control/protection: None     Comment: Havent had sex for at least a year.   Other Topics Concern   • Not on file   Social History Narrative   • Not on file     Social Determinants of Health     Financial Resource Strain: Not on file   Food Insecurity: No Food Insecurity (2024)    Hunger Vital Sign    • Worried About Running Out of Food in the Last Year: Never true    • Ran Out of Food in the Last Year: Never true   Transportation Needs: No Transportation Needs (2024)    PRAPARE - Transportation    • Lack of Transportation  (Medical): No    • Lack of Transportation (Non-Medical): No   Physical Activity: Not on file   Stress: Not on file   Social Connections: Not on file   Intimate Partner Violence: Not At Risk (3/14/2024)    Humiliation, Afraid, Rape, and Kick questionnaire    • Fear of Current or Ex-Partner: No    • Emotionally Abused: No    • Physically Abused: No    • Sexually Abused: No   Housing Stability: Low Risk  (2/12/2024)    Housing Stability Vital Sign    • Unable to Pay for Housing in the Last Year: No    • Number of Times Moved in the Last Year: 1    • Homeless in the Last Year: No     Current Outpatient Medications on File Prior to Visit   Medication Sig Dispense Refill   • ketorolac (TORADOL) 10 mg tablet Take 1 tablet (10 mg total) by mouth every 6 (six) hours as needed for moderate pain 45 tablet 1   • ondansetron (ZOFRAN-ODT) 4 mg disintegrating tablet Take 1 tablet (4 mg total) by mouth every 6 (six) hours as needed for nausea or vomiting 30 tablet 0   • oxybutynin (DITROPAN) 5 mg tablet Take 1 tablet (5 mg total) by mouth 3 (three) times a day as needed (Stent discomfort, bladder spasms) 30 tablet 5   • oxyCODONE (ROXICODONE) 5 immediate release tablet Take 1 tablet (5 mg total) by mouth every 4 (four) hours as needed for moderate pain or severe pain Max Daily Amount: 30 mg 15 tablet 0   • phenazopyridine (PYRIDIUM) 100 mg tablet Take 1 tablet (100 mg total) by mouth 3 (three) times a day as needed for bladder spasms 10 tablet 0   • polyethylene glycol (MiraLax) 17 g packet Take 17 g by mouth daily as needed     • tamsulosin (FLOMAX) 0.4 mg Take 1 capsule (0.4 mg total) by mouth daily at bedtime 90 each 3   • sodium chloride, PF, 0.9 % 10 mL by Intracatheter route daily Intracatheter flushing daily. May substitute prefilled syringe with normal saline 10 mL vials, 10 mL syringes, and 18 g blunt needles (Patient not taking: Reported on 7/10/2024) 300 mL 2     No current facility-administered medications on file prior  "to visit.     Review of Systems   Constitutional: Negative.    HENT: Negative.     Eyes: Negative.    Respiratory: Negative.     Cardiovascular: Negative.    Gastrointestinal: Negative.    Endocrine: Negative.    Genitourinary: Negative.    Musculoskeletal: Negative.    Skin: Negative.    Allergic/Immunologic: Negative.    Neurological: Negative.    Hematological: Negative.    Psychiatric/Behavioral: Negative.         Objective:  Vitals:    07/10/24 0958   BP: 120/84   BP Location: Left arm   Patient Position: Sitting   Cuff Size: Large   Pulse: 97   Resp: 18   Temp: 99.3 °F (37.4 °C)   TempSrc: Tympanic   SpO2: 99%   Weight: 107 kg (236 lb)   Height: 5' 9\" (1.753 m)     Body mass index is 34.85 kg/m².     Physical Exam  Constitutional:       Appearance: She is well-developed.   HENT:      Head: Normocephalic and atraumatic.      Right Ear: Tympanic membrane, ear canal and external ear normal.      Left Ear: Tympanic membrane, ear canal and external ear normal.      Nose: Nose normal.      Mouth/Throat:      Mouth: Mucous membranes are moist.      Pharynx: No oropharyngeal exudate.   Eyes:      Extraocular Movements: Extraocular movements intact.      Conjunctiva/sclera: Conjunctivae normal.      Pupils: Pupils are equal, round, and reactive to light.   Neck:      Thyroid: No thyromegaly.      Vascular: No JVD.      Trachea: No tracheal deviation.   Cardiovascular:      Rate and Rhythm: Normal rate and regular rhythm.      Pulses: Normal pulses.      Heart sounds: Normal heart sounds. No murmur heard.     No friction rub. No gallop.   Pulmonary:      Effort: Pulmonary effort is normal. No respiratory distress.      Breath sounds: Normal breath sounds. No stridor. No wheezing or rales.   Chest:      Chest wall: No tenderness.   Abdominal:      General: Bowel sounds are normal. There is no distension.      Palpations: Abdomen is soft. There is no mass.      Tenderness: There is no abdominal tenderness. There is no " guarding or rebound.   Genitourinary:     Comments: I evaluated the nephrostomy tube site on the right side and the site looks clear and the tube is intact.  There are some mild dried blood in the area of the tube.  Sutures are in place.  There is no evidence of redness or swelling or infection.  The site looks normal.  Musculoskeletal:         General: No swelling, tenderness, deformity or signs of injury. Normal range of motion.      Cervical back: Normal range of motion.      Right lower leg: No edema.      Left lower leg: No edema.   Lymphadenopathy:      Cervical: No cervical adenopathy.   Skin:     General: Skin is warm and dry.   Neurological:      General: No focal deficit present.      Mental Status: She is alert and oriented to person, place, and time.      Cranial Nerves: No cranial nerve deficit.      Motor: No abnormal muscle tone.      Coordination: Coordination normal.      Deep Tendon Reflexes: Reflexes are normal and symmetric. Reflexes normal.

## 2024-07-11 ENCOUNTER — PATIENT OUTREACH (OUTPATIENT)
Dept: FAMILY MEDICINE CLINIC | Facility: CLINIC | Age: 34
End: 2024-07-11

## 2024-07-11 ENCOUNTER — HOME CARE VISIT (OUTPATIENT)
Dept: HOME HEALTH SERVICES | Facility: HOME HEALTHCARE | Age: 34
End: 2024-07-11

## 2024-07-11 ENCOUNTER — HOSPITAL ENCOUNTER (OUTPATIENT)
Dept: RADIOLOGY | Age: 34
Discharge: HOME | End: 2024-07-11
Payer: COMMERCIAL

## 2024-07-11 ENCOUNTER — HOME HEALTH ADMISSION (OUTPATIENT)
Dept: HOME HEALTH SERVICES | Facility: HOME HEALTHCARE | Age: 34
End: 2024-07-11
Payer: COMMERCIAL

## 2024-07-11 DIAGNOSIS — C53.8 MALIGNANT NEOPLASM OF OVERLAPPING SITES OF CERVIX (HCC): ICD-10-CM

## 2024-07-11 DIAGNOSIS — Z71.89 COMPLEX CARE COORDINATION: Primary | ICD-10-CM

## 2024-07-11 LAB — GLUCOSE SERPL-MCNC: 82 MG/DL (ref 65–140)

## 2024-07-11 PROCEDURE — 82948 REAGENT STRIP/BLOOD GLUCOSE: CPT

## 2024-07-11 PROCEDURE — 78815 PET IMAGE W/CT SKULL-THIGH: CPT

## 2024-07-11 PROCEDURE — A9552 F18 FDG: HCPCS

## 2024-07-11 NOTE — PROGRESS NOTES
Outpatient Care Management Note:    Care manager called, Audrey, introduced myself, and the care management program. Audrey is waiting to hear about [facility] services for her nephrostomy tube. She thought Juan Erwin, was going to call her and start services today, but she has not heard anything. LEONARD also noted that the PCP office placed a referral to  [facility].  CM will attempt to follow up on both orders.     Audrey also needs transportation services. She states that it is too painful to drive and she does not feel safe. She has a boyfriend, but he can not get FMLA, because they are not . She is on short term disability and states she can not afford uber or lift.  She used [employer] during her cancer treatments.  Audrey has 2 GYN appts next week. I encouraged her to call the offices and see if they will schedule her a lift ride. LEONARD will also place a referral to  to assist.     Med review completed.     LEONARD gave Audrey my contact information. She knows that my phone goes through my computer and I do not get messages after hours or on weekends. She is aware to call her PCP office directly with any immediate questions.      LEONARD called [facility]. Staff state that they need updated insurance information. They will have Sue call me back.     CM called  [facility] . They are accepting the case. Patient should be contacted today or tomorrow to schedule.     LEONARD called Audrey and updated her that  [facility] is accepting the case and she should be contacted today or tomorrow.     Voice mail message received from Yaima Rogers: Yes, hi, this is Sue calling from [facility]. Returning the phone call for Devika. In regards to Miss Moody, I was just calling. I know you spoke with my colleague earlier. We are currently waiting for her new insurance to kick in. Looks like she had a new member ID. So unfortunately we cannot start services without that. But if you have any additional questions, I did speak with Miss Moody earlier and got  all the new information, but my phone number is [phone]. Thank you.

## 2024-07-11 NOTE — TELEPHONE ENCOUNTER
Submitted patient demographics sheet and script for saline as requested by Cali at [facility]. Per Cali, they will be able to fill the order for her.

## 2024-07-12 ENCOUNTER — PATIENT OUTREACH (OUTPATIENT)
Dept: FAMILY MEDICINE CLINIC | Facility: CLINIC | Age: 34
End: 2024-07-12

## 2024-07-12 NOTE — PROGRESS NOTES
Covering OPCM EMILEE received IB message regarding referral for transportation from OPCM [employer].  OPCM EMILEE also reviewed patient's chart.  She has concerns using [employer] due to her her current medical state.  She used [employer] in the past when receiving Oncology treatments.    OPCM EMILEE reached out to patient and left a voicemail and routed note to OPCM Mae

## 2024-07-13 ENCOUNTER — HOME CARE VISIT (OUTPATIENT)
Dept: HOME HEALTH SERVICES | Facility: HOME HEALTHCARE | Age: 34
End: 2024-07-13
Payer: COMMERCIAL

## 2024-07-13 VITALS
SYSTOLIC BLOOD PRESSURE: 122 MMHG | HEART RATE: 87 BPM | TEMPERATURE: 97.7 F | HEIGHT: 69 IN | BODY MASS INDEX: 34.07 KG/M2 | WEIGHT: 230 LBS | RESPIRATION RATE: 18 BRPM | OXYGEN SATURATION: 98 % | DIASTOLIC BLOOD PRESSURE: 82 MMHG

## 2024-07-13 PROCEDURE — 400013 VN SOC

## 2024-07-13 PROCEDURE — G0299 HHS/HOSPICE OF RN EA 15 MIN: HCPCS

## 2024-07-16 ENCOUNTER — OFFICE VISIT (OUTPATIENT)
Dept: OBGYN CLINIC | Facility: CLINIC | Age: 34
End: 2024-07-16
Payer: COMMERCIAL

## 2024-07-16 ENCOUNTER — PATIENT OUTREACH (OUTPATIENT)
Dept: FAMILY MEDICINE CLINIC | Facility: CLINIC | Age: 34
End: 2024-07-16

## 2024-07-16 VITALS
SYSTOLIC BLOOD PRESSURE: 110 MMHG | WEIGHT: 242 LBS | BODY MASS INDEX: 35.84 KG/M2 | HEIGHT: 69 IN | DIASTOLIC BLOOD PRESSURE: 68 MMHG

## 2024-07-16 DIAGNOSIS — E89.40: Primary | ICD-10-CM

## 2024-07-16 DIAGNOSIS — C53.8 MALIGNANT NEOPLASM OF OVERLAPPING SITES OF CERVIX (HCC): ICD-10-CM

## 2024-07-16 DIAGNOSIS — E28.319 PREMATURE MENOPAUSE: ICD-10-CM

## 2024-07-16 PROBLEM — C53.9 MALIGNANT NEOPLASM OF CERVIX (HCC): Status: ACTIVE | Noted: 2024-07-16

## 2024-07-16 PROCEDURE — 99204 OFFICE O/P NEW MOD 45 MIN: CPT | Performed by: STUDENT IN AN ORGANIZED HEALTH CARE EDUCATION/TRAINING PROGRAM

## 2024-07-16 RX ORDER — NORETHINDRONE ACETATE AND ETHINYL ESTRADIOL .5; 2.5 MG/1; UG/1
1 TABLET ORAL DAILY
Qty: 90 TABLET | Refills: 1 | Status: SHIPPED | OUTPATIENT
Start: 2024-07-16

## 2024-07-16 NOTE — ASSESSMENT & PLAN NOTE
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   - We reviewed that patients with POI are at increased risk for osteoporosis, cardiovascular disease, and urogenital atrophy. We discussed that systemic hormone therapy (HT) with estrogen is an effective approach to treat the symptoms of hypoestrogenism and mitigate long-term health risks if there are no contraindications to treatment.   - We reviewed that patients with primary ovarian insufficiency may experience hot flushes, night sweats, vaginal dryness, dyspareunia, and disordered sleep. These symptoms routinely respond well to HT.   - Options for HT were reviewed, including combined oral contraceptives, oral or transdermal estrogen therapy in combination with oral, transdermal, or intrauterine progestin therapy.   - Following discussion, patient desires to initiate Fem-HRT. Rx provided.   - Will obtain estradiol and FSH levels now to establish baseline. She agrees to have labs drawn prior to initiation of HT.   - Return to office for follow up in 3 months to discuss response to therapy.

## 2024-07-16 NOTE — PROGRESS NOTES
EMILEE VALLE called patient a second time ([phone]) to discuss transportation needs. Patient answered and stated that she has transportation taken care of at this time as she has [employer] to her Oncology Services and her boyfriend takes her to her additional appointments. However, since they are not  he cannot take FMLA to bring her to appointments. EMILEE VALLE discussed transnet but patient not interested in. She did state that sometimes she can drive herself. Patient had no other questions, concerns or needs. EMILEE VALLE encouraged patient to call EMILEE VALLE as needed. Please re consult EMILEE VALLE as needed.

## 2024-07-16 NOTE — PROGRESS NOTES
[facility]  [address] [place] [address]    Assessment/Plan:  1. Iatrogenic premature ovarian failure  Assessment & Plan:  - Clinical symptoms and history are consistent with likely premature ovarian failure (POI) secondary to chemotherapy and pelvic radiation.   - We reviewed that patients with POI are at increased risk for osteoporosis, cardiovascular disease, and urogenital atrophy. We discussed that systemic hormone therapy (HT) with estrogen is an effective approach to treat the symptoms of hypoestrogenism and mitigate long-term health risks if there are no contraindications to treatment.   - We reviewed that patients with primary ovarian insufficiency may experience hot flushes, night sweats, vaginal dryness, dyspareunia, and disordered sleep. These symptoms routinely respond well to HT.   - Options for HT were reviewed, including combined oral contraceptives, oral or transdermal estrogen therapy in combination with oral, transdermal, or intrauterine progestin therapy.   - Following discussion, patient desires to initiate Fem-HRT. Rx provided.   - Will obtain estradiol and FSH levels now to establish baseline. She agrees to have labs drawn prior to initiation of HT.   - Return to office for follow up in 3 months to discuss response to therapy.     Orders:  -     norethindrone-ethinyl estradiol (Femhrt) 0.5-2.5 MG-MCG per tablet; Take 1 tablet by mouth daily  -     Estradiol Sensitive LC/MS; Future  -     Follicle stimulating hormone; Future  -     Estradiol Sensitive LC/MS  -     Follicle stimulating hormone  2. Premature menopause  Assessment & Plan:  - We reviewed available treatments for vasomotor symptoms of menopause, including hormonal and non-hormonal therapies. She has previously tried transdermal estrogen, but discontinued due to perceived side effects.   - We discussed trial of SSRI therapy if she desires to avoid hormonal therapies. We briefly reviewed plant-based  phytoestrogens, herbal therapies, gabapentin, oxybutynin, and fezolinetant.   - Given her young age and adverse effects of hypo-estrogenic state on long-term cardiovascular and bone health, I recommend that she strongly consider hormonal-based treatment modalities.   Orders:  -     Ambulatory Referral to Obstetrics / Gynecology  -     norethindrone-ethinyl estradiol (Femhrt) 0.5-2.5 MG-MCG per tablet; Take 1 tablet by mouth daily  -     Estradiol Sensitive LC/MS; Future  -     Follicle stimulating hormone; Future  -     Estradiol Sensitive LC/MS  -     Follicle stimulating hormone  3. Malignant neoplasm of overlapping sites of cervix (HCC)  Assessment & Plan:  - Continue post-treatment surveillance with Gynecologic Oncology.  Orders:  -     Ambulatory Referral to Obstetrics / Gynecology      Subjective:   Margot Moody is a 34 y.o.  with symptoms of menopause following pelvic radiation.   CC:   Chief Complaint   Patient presents with    Menopause     Pt states she wants to discuss about there menopause symptoms, having hot flashes, lack of sleep, her body regulation is totally off .        HPI: Patient presents today for a new patient consultation to discuss menopausal symptoms as a result of treatment of cervical cancer. Audrey has a history of stage III C1 cervical cancer now s/p chemoradiation which was completed in 2022. She was referred by Dnuia Nunez PA-C of [facility].     Patient reports that she has persistent and bothersome hot flushes and sleep disturbances. She is not currently sexually active, and therefore does not report any dyspareunia or notable vaginal dryness. Patient was previously on transdermal estrogen therapy as prescribed by Gynecologic Oncology. She has since discontinued therapy due to recurrent blepharitis, which she attributed to estrogen therapy. Since discontinuing therapy, her blepharitis has resolved. She has been off all hormone replacement for  more than 1 year. She reports complete absence of menses.     Today a prior office visit note from Primary Care dated 7/10/2024 was reviewed. The patient's most recent Gyn/Onc office noted from     ROS: Negative except as noted in HPI    No LMP recorded (lmp unknown).        She  reports that she is not currently sexually active and has had partner(s) who are male. She reports using the following methods of birth control/protection: Abstinence and None.       The following portions of the patient's history were reviewed and updated as appropriate:   Past Medical History:   Diagnosis Date    Anxiety     Cancer (HCC)     cervix    Cervical cancer (HCC)     receiving chemo and radiation    COVID     Jan 2022    Depression     Diarrhea     Dizziness     Frequent headaches     Hx of bleeding disorder     vaginal bleeding    Obesity      Past Surgical History:   Procedure Laterality Date    CERVICAL BIOPSY  W/ LOOP ELECTRODE EXCISION      COLON SURGERY  Colon resection    May 2023    FL RETROGRADE PYELOGRAM  02/13/2024    IR NEPHROSTOMY TUBE PLACEMENT  7/1/2024    LYMPH NODE DISSECTION Bilateral 05/06/2022    Procedure: DISSECTION/STAGING LYMPH NODE PELVIS/ABDOMEN;  Surgeon: Parminder Moctezuma MD;  [facility]: [facility];  Service: Gynecology Oncology    DC CYSTO BLADDER W/URETERAL CATHETERIZATION Right 02/13/2024    Procedure: CYSTOSCOPY WITH RETROGRADE PYELOGRAM;  Surgeon: Chuck Campo MD;  [facility];  Service: Urology    DC INSERTION VAGINAL RADIATION DEVICE N/A 07/15/2022    Procedure: INSERTION NADIR SLEEVE VAGINA WITH POST OP BRACHYTHERAPY (IN RADIATION ONCOLOGY);  Surgeon: Parminder Moctezuma MD;  [facility];  Service: Gynecology Oncology    DC LAPAROSCOPY COLECTOMY PARTIAL W/ANASTOMOSIS N/A 05/10/2023    Procedure: RESECTION COLON SIGMOID LAPAROSCOPIC;  Surgeon: Marin Salinas MD;  [facility];  Service: Colorectal    DC SIGMOIDOSCOPY FLX DX W/COLLJ SPEC BR/WA  IF PFRMD N/A 05/10/2023    Procedure: SIGMOIDOSCOPY FLEXIBLE;  Surgeon: Marin Salinas MD;  [facility];  Service: Colorectal    SALPINGECTOMY Bilateral 2022    Procedure: SALPINGECTOMY, OVARIAN TRANSPOSITION;  Surgeon: Parminder Moctezuma MD;  [facility];  Service: Gynecology Oncology    URETERAL STENT PLACEMENT Right 2024    Procedure: INSERTION STENT URETERAL;  Surgeon: Chuck Campo MD;  [facility];  Service: Urology    US GUIDANCE  07/15/2022     Family History   Problem Relation Age of Onset    No Known Problems Mother     Heart disease Father     Cancer Brother         Niece/ brothers daughter stage 4 Adrenocortocal carcinoma    Ovarian cancer Maternal Aunt         Dont know  before i was born    Cancer Maternal Uncle         Stage 2 Liver Cancer    Ovarian cancer Maternal Grandmother         Dont know  before i was born    Cancer Niece 7        adrenal gland     Social History     Socioeconomic History    Marital status: Single     Spouse name: Not on file    Number of children: Not on file    Years of education: Not on file    Highest education level: Not on file   Occupational History    Not on file   Tobacco Use    Smoking status: Never     Passive exposure: Current (very mild/social)    Smokeless tobacco: Never   Vaping Use    Vaping status: Never Used   Substance and Sexual Activity    Alcohol use: Not Currently    Drug use: Never    Sexual activity: Not Currently     Partners: Male     Birth control/protection: Abstinence, None     Comment: Havent had sex for at least two years   Other Topics Concern    Not on file   Social History Narrative    Not on file     Social Determinants of Health     Financial Resource Strain: Not on file   Food Insecurity: No Food Insecurity (2024)    Hunger Vital Sign     Worried About Running Out of Food in the Last Year: Never true     Ran Out of Food in the Last Year: Never true   Transportation  "Needs: No Transportation Needs (2/12/2024)    PRAPARE - Transportation     Lack of Transportation (Medical): No     Lack of Transportation (Non-Medical): No   Physical Activity: Not on file   Stress: Not on file   Social Connections: Not on file   Intimate Partner Violence: Not At Risk (3/14/2024)    Humiliation, Afraid, Rape, and Kick questionnaire     Fear of Current or Ex-Partner: No     Emotionally Abused: No     Physically Abused: No     Sexually Abused: No   Housing Stability: Low Risk  (2/12/2024)    Housing Stability Vital Sign     Unable to Pay for Housing in the Last Year: No     Number of Times Moved in the Last Year: 1     Homeless in the Last Year: No     Outpatient Medications Marked as Taking for the 7/16/24 encounter (Office Visit) with Ramses Mora MD   Medication    ketorolac (TORADOL) 10 mg tablet    ketorolac (TORADOL) 10 mg tablet    LORazepam (ATIVAN) 1 mg tablet    norethindrone-ethinyl estradiol (Femhrt) 0.5-2.5 MG-MCG per tablet    ondansetron (ZOFRAN-ODT) 4 mg disintegrating tablet    oxybutynin (DITROPAN) 5 mg tablet    oxyCODONE (ROXICODONE) 5 immediate release tablet    phenazopyridine (PYRIDIUM) 100 mg tablet    polyethylene glycol (MiraLax) 17 g packet    sodium chloride, PF, 0.9 %    tamsulosin (FLOMAX) 0.4 mg     No Known Allergies        Objective:  /68 (BP Location: Left arm, Patient Position: Sitting, Cuff Size: Large)   Ht 5' 9\" (1.753 m)   Wt 110 kg (242 lb)   LMP  (LMP Unknown) Comment: not cycle for the last 3 years  Breastfeeding No   BMI 35.74 kg/m²      General Appearance: alert and oriented, in no acute distress.   Neurologic: alert, oriented x3  Psychiatric: Appropriate affect, mood stable, cooperative with exam.        Ramses Mora MD  7/16/2024 6:41 PM  "

## 2024-07-16 NOTE — ASSESSMENT & PLAN NOTE
- We reviewed available treatments for vasomotor symptoms of menopause, including hormonal and non-hormonal therapies. She has previously tried transdermal estrogen, but discontinued due to perceived side effects.   - We discussed trial of SSRI therapy if she desires to avoid hormonal therapies. We briefly reviewed plant-based phytoestrogens, herbal therapies, gabapentin, oxybutynin, and fezolinetant.   - Given her young age and adverse effects of hypo-estrogenic state on long-term cardiovascular and bone health, I recommend that she strongly consider hormonal-based treatment modalities.

## 2024-07-17 ENCOUNTER — HOME CARE VISIT (OUTPATIENT)
Dept: HOME HEALTH SERVICES | Facility: HOME HEALTHCARE | Age: 34
End: 2024-07-17
Payer: COMMERCIAL

## 2024-07-17 VITALS
HEART RATE: 78 BPM | OXYGEN SATURATION: 98 % | DIASTOLIC BLOOD PRESSURE: 70 MMHG | SYSTOLIC BLOOD PRESSURE: 130 MMHG | RESPIRATION RATE: 20 BRPM | TEMPERATURE: 98.2 F

## 2024-07-17 PROCEDURE — G0299 HHS/HOSPICE OF RN EA 15 MIN: HCPCS

## 2024-07-18 ENCOUNTER — OFFICE VISIT (OUTPATIENT)
Age: 34
End: 2024-07-18
Payer: COMMERCIAL

## 2024-07-18 ENCOUNTER — PATIENT OUTREACH (OUTPATIENT)
Dept: CASE MANAGEMENT | Facility: OTHER | Age: 34
End: 2024-07-18

## 2024-07-18 VITALS
TEMPERATURE: 98 F | BODY MASS INDEX: 35.55 KG/M2 | HEIGHT: 69 IN | DIASTOLIC BLOOD PRESSURE: 82 MMHG | SYSTOLIC BLOOD PRESSURE: 120 MMHG | HEART RATE: 92 BPM | OXYGEN SATURATION: 98 % | WEIGHT: 240 LBS | RESPIRATION RATE: 18 BRPM

## 2024-07-18 DIAGNOSIS — C53.8 MALIGNANT NEOPLASM OF OVERLAPPING SITES OF CERVIX (HCC): Primary | ICD-10-CM

## 2024-07-18 PROCEDURE — 99214 OFFICE O/P EST MOD 30 MIN: CPT | Performed by: OBSTETRICS & GYNECOLOGY

## 2024-07-18 NOTE — PROGRESS NOTES
Outpatient Care Management Note:    Care manager called Audrey. [facility]. Audrey states she is having urology surgery on Monday. She would like ongoing outreach post surgery to see if she has any needs.      Initial assessment completed and care plan updated.     Audrey has my contact information and will call with any questions.

## 2024-07-18 NOTE — PROGRESS NOTES
Assessment/Plan:    Problem List Items Addressed This Visit          Genitourinary    Malignant neoplasm of overlapping sites of cervix (HCC) - Primary     33-year-old with stage III C1 cervical cancer status post aborted radical hysterectomy followed by curative intent chemoradiation that completed 8/3/2022. 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  She currently has a right-sided percutaneous nephrostomy tube in situ.  I reviewed the PET/CT images from 7/11/2024.  MRD testing from 4/18/2024 was negative.  She has a planned robotic assisted reimplantation ureteroneocystostomy on 7/22/2024.  PET overall is without evidence of recurrent disease.  There were multiple nonspecific findings.  I reviewed the images and agree with radiologist interpretation.  Performance status is 0.  1.  Surgery to address right hydronephrosis from radiation-induced fibrosis as planned.    2.  Continue MRD testing  3.  Return in 3 months for surveillance.              CHIEF COMPLAINT: Cervical cancer surveillance, back pain from percutaneous nephrostomy tube      Problem:  Cancer Staging   Malignant neoplasm of overlapping sites of cervix (HCC)  Staging form: Cervix Uteri, AJCC Version 9  - Pathologic: FIGO Stage IIIC1p (pT1b1, cM0) - Signed by Parminder Moctezuma MD on 5/19/2022        Previous therapy:  Oncology History   Malignant neoplasm of overlapping sites of cervix (HCC)   3/24/2022 Initial Diagnosis    Malignant neoplasm of overlapping sites of cervix (HCC)     5/6/2022 Surgery    Exploratory laparotomy for planned radical hysterectomy, bilateral pelvic lymph node dissection, aborted radical hysterectomy, bilateral ovarian transposition due to positive lymph nodes.  1. Two right pelvic lymph nodes positive     5/19/2022 -  Cancer Staged    Staging form: Cervix Uteri, AJCC Version 9  - Pathologic: FIGO Stage IIIC1p (pT1b1, cM0) - Signed by Parminder Moctezuma MD on  5/19/2022  Stage confirmation method: Pathology  Pelvic grady status: Positive  Pelvic grady method of assessment: Lymph node dissection  Para-aortic status: Negative       6/20/2022 - 7/26/2022 Chemotherapy    palonosetron (ALOXI), 0.25 mg, Intravenous, Once, 6 of 6 cycles  Administration: 0.25 mg (6/20/2022), 0.25 mg (6/27/2022), 0.25 mg (7/6/2022), 0.25 mg (7/11/2022), 0.25 mg (7/19/2022), 0.25 mg (7/26/2022)  CISplatin (PLATINOL) infusion, 70 mg (original dose 40 mg/m2), Intravenous, Once, 6 of 6 cycles  Dose modification: 70 mg (original dose 40 mg/m2, Cycle 1, Reason: Other (Must fill in a comment), Comment: max 70), 70 mg (original dose 40 mg/m2, Cycle 2, Reason: Dose modified as per discussion with consulting physician)  Administration: 70 mg (6/20/2022), 70 mg (6/27/2022), 70 mg (7/6/2022), 70 mg (7/11/2022), 70 mg (7/19/2022), 70 mg (7/26/2022)  aprepitant (CINVANTI) in  mL IVPB, 130 mg, Intravenous, Once, 6 of 6 cycles  Administration: 130 mg (6/20/2022), 130 mg (6/27/2022), 130 mg (7/6/2022), 130 mg (7/11/2022), 130 mg (7/19/2022), 130 mg (7/26/2022)     6/22/2022 - 8/3/2022 Radiation    Course: C1 - EBRT  Plan ID Energy Fractions Dose per Fraction (cGy) Dose Correction (cGy) Total Dose Delivered (cGy) Elapsed Days   Whole Pelvis 10X 25 / 25 180 0 4,500 42       Course: C1 HDR Tandem and Ring Brachytherapy  Plan ID Energy Fractions Dose per Fraction (cGy) Dose Correction (cGy) Total Dose Delivered (cGy) Elapsed Days   HDR1 7-15-22  1 / 1 700 0 700 0   FGQ7_2--18-22 1 / 1 700 0 700 0   QQG3_9--21-22 1 / 1 700 0 700 0   HDR4 7-25-22 1 / 1 700 0 700 0               Patient ID: Margot Moody is a 34 y.o. female  Who returns for evaluation prior to planned reimplantation ureteroneocystostomy.  PET/CT imaging 7/11/2024 revealed some uptake in pelvic fluid, however, the uptake was decreased from previous.  There is no evidence of lymphadenopathy or local soft tissue uptake.  I reviewed the images and  concur with the radiologist opinion.  She had MRD testing performed on 4/18/2024 which is negative.  She had a right-sided ureteral stent in situ which was causing discomfort and therefore had the stent removed and a percutaneous nephrostomy tube placed.  She also has having discomfort with the percutaneous nephrostomy tube.  No vaginal bleeding.  She has been restarted on hormone replacement therapy.  She has dyspareunia.  No other interval changes in medications or medical history since her last visit.        The following portions of the patient's history were reviewed and updated as appropriate: allergies, current medications, past family history, past medical history, past social history, past surgical history, and problem list.    Review of Systems   Constitutional:  Negative for activity change and unexpected weight change.   HENT: Negative.     Eyes: Negative.    Respiratory: Negative.     Cardiovascular: Negative.    Gastrointestinal:  Negative for abdominal distention and abdominal pain.   Endocrine: Negative.    Genitourinary:  Positive for dyspareunia and pelvic pain. Negative for vaginal bleeding.   Musculoskeletal:  Positive for back pain.   Skin: Negative.    Allergic/Immunologic: Negative.    Neurological: Negative.    Hematological: Negative.    Psychiatric/Behavioral: Negative.         Current Outpatient Medications   Medication Sig Dispense Refill    ketorolac (TORADOL) 10 mg tablet Take 1 tablet by mouth every 6 (six) hours as needed for moderate pain. Indications: Pain      LORazepam (ATIVAN) 1 mg tablet Take 2 tablets PO 30 min prior to PET 2 tablet 0    norethindrone-ethinyl estradiol (Femhrt) 0.5-2.5 MG-MCG per tablet Take 1 tablet by mouth daily 90 tablet 1    ondansetron (ZOFRAN-ODT) 4 mg disintegrating tablet Take 1 tablet (4 mg total) by mouth every 6 (six) hours as needed for nausea or vomiting 30 tablet 0    oxybutynin (DITROPAN) 5 mg tablet Take 1 tablet (5 mg total) by mouth 3 (three)  "times a day as needed (Stent discomfort, bladder spasms) 30 tablet 5    oxyCODONE (ROXICODONE) 5 immediate release tablet Take 1 tablet (5 mg total) by mouth every 4 (four) hours as needed for moderate pain or severe pain Max Daily Amount: 30 mg 15 tablet 0    phenazopyridine (PYRIDIUM) 100 mg tablet Take 1 tablet (100 mg total) by mouth 3 (three) times a day as needed for bladder spasms 10 tablet 0    polyethylene glycol (MiraLax) 17 g packet Take 17 g by mouth daily as needed (constipation)  dissolve in 8 oz liquid      sodium chloride, PF, 0.9 % 10 mL by Intracatheter route daily Intracatheter flushing daily. May substitute prefilled syringe with normal saline 10 mL vials, 10 mL syringes, and 18 g blunt needles 300 mL 2    tamsulosin (FLOMAX) 0.4 mg Take 1 capsule (0.4 mg total) by mouth daily at bedtime 90 each 3    ketorolac (TORADOL) 10 mg tablet Take 1 tablet (10 mg total) by mouth every 6 (six) hours as needed for moderate pain (Patient not taking: Reported on 7/18/2024) 45 tablet 1     No current facility-administered medications for this visit.           Objective:    Blood pressure 120/82, pulse 92, temperature 98 °F (36.7 °C), temperature source Temporal, resp. rate 18, height 5' 9\" (1.753 m), weight 109 kg (240 lb), SpO2 98%, not currently breastfeeding.  Body mass index is 35.44 kg/m².  Body surface area is 2.23 meters squared.    Physical Exam  Vitals reviewed.   Constitutional:       General: She is not in acute distress.     Appearance: Normal appearance. She is not ill-appearing.   HENT:      Head: Normocephalic and atraumatic.      Mouth/Throat:      Mouth: Mucous membranes are moist.   Eyes:      General: No scleral icterus.        Right eye: No discharge.         Left eye: No discharge.      Conjunctiva/sclera: Conjunctivae normal.   Pulmonary:      Effort: Pulmonary effort is normal.   Musculoskeletal:      Right lower leg: No edema.      Left lower leg: No edema.   Skin:     General: Skin is " "warm and dry.      Coloration: Skin is not jaundiced.      Findings: No rash.   Neurological:      General: No focal deficit present.      Mental Status: She is alert and oriented to person, place, and time.      Cranial Nerves: No cranial nerve deficit.      Motor: No weakness.      Gait: Gait normal.   Psychiatric:         Mood and Affect: Mood normal.         Behavior: Behavior normal.         Thought Content: Thought content normal.         Judgment: Judgment normal.         No results found for: \"\"  Lab Results   Component Value Date    K 4.6 06/28/2024     06/28/2024    CO2 25 06/28/2024    BUN 17 06/28/2024    CREATININE 0.80 06/28/2024    CALCIUM 8.6 02/14/2024    CORRECTEDCA 8.8 02/12/2024    AST 10 (L) 02/13/2024    ALT 8 02/13/2024    ALKPHOS 47 02/13/2024    EGFR 99 06/28/2024     Lab Results   Component Value Date    WBC 4.0 06/28/2024    HGB 12.1 06/28/2024    HCT 36.4 06/28/2024    MCV 90 06/28/2024     06/28/2024     Lab Results   Component Value Date    NEUTROABS 2.5 06/28/2024     NM PET CT skull base to mid thigh  Narrative: PET/CT SCAN    INDICATION: C53.8: Malignant neoplasm of overlapping sites of cervix uteri  Stage III C1 cervical cancer. Prior chemo radiation. Restaging study  History of what is likely radiation fibrosis that has created right-sided hydronephrosis. Right nephrostomy catheter.  MODIFIER: PS    COMPARISON: Prior PET scan 3/7/2024 and CT abdomen and pelvis 4/11/2024    CELL TYPE: Metastatic squamous cell carcinoma    TECHNIQUE:   10.5 mCi F-18-FDG administered IV. Multiplanar attenuation corrected and non attenuation corrected PET images are available for interpretation, and contiguous, low dose, axial CT sections were obtained from the skull base through the femurs.   Intravenous contrast material was not utilized. This examination, like all CT scans performed in the [facility], was performed utilizing techniques to minimize radiation dose " exposure, including the use of iterative reconstruction and   automated exposure control.    Fasting serum glucose: 82 mg/dl    FINDINGS:    VISUALIZED BRAIN:  No acute abnormalities are seen.    HEAD/NECK:  There is a physiologic distribution of FDG. No FDG avid cervical adenopathy is seen.  CT images: Unremarkable.    CHEST:  No FDG avid soft tissue lesions are seen.  CT images: Unremarkable.    ABDOMEN:  No FDG avid soft tissue lesions are seen  CT images: Interval removal of right ureteral stent and placement of right nephrostomy catheter curling in the right renal pelvis, without right-sided hydronephrosis.    PELVIS:  -Prior study demonstrated a curvilinear area activity at the level of the distal right ureter. This is not seen today  - Presacral soft tissue thickening is again noted. This may be related to prior pelvic radiation. Degree of soft tissue thickening has not increased. SUV max somewhat difficult to measure given adjacent bowel and other physiologic structures, however   felt to be diminished from prior study, with a measured value of 1.8 on image 257.  - Prior study demonstrated a small amount of posterior pelvic fluid on the right, with SUV max 1.8, image 260. The amount of fluid has diminished since the previous PET/CT and diminished since the interval standard CT.  - No evidence of increasing or significant residual activity in the right internal iliac region  - Previously seen activity in the region of the cecum/proximal appendix was seen with an SUV max of 3.6. On today's study, SUV max in this region, measured on image 234, is 1.8. Soft tissue thickening is noted posterior to the cecum for example image   229. This is unchanged in appearance on CT, with SUV max 1.0.  - Pocket of fluid in the left lower quadrant with adjacent clip is seen on image 228, 229, 232. This is unchanged in appearance on the limited CT. SUV max was earlier 1.2 and is currently also 1.2.  - No recurrent or increasing  activity is seen in the region of the cervix.  - Increased activity is seen in the region of the right obturator internus muscle. This is most likely either physiologic muscle activity or inflammatory in nature.  - There is a new rounded density in the posterior left hemipelvis on image 263 measuring 2.1 x 1.9 cm. Not significantly FDG avid. It is possible this represents a follicle within the transposed left ovary. Lymphocele is also a possibility. SUV max 2.1.  - Previously described right inguinal lymph node earlier measured 1.3 x 1.2 cm, currently 0.9 x 0.8 cm. SUV max 1.4  - CT images: No other interval change    OSSEOUS STRUCTURES:  No FDG avid lesions are seen.  CT images: Small retention cyst or polyp in the left maxillary sinus  Impression: 1. There remains no evidence of FDG avid metastatic disease within the neck, chest, upper abdomen, or skeleton  2. Interval replacement of right ureteral stent with right nephrostomy catheter without right-sided hydronephrosis  3. No progression of any of the previously seen pelvic findings. Stable degree of presacral soft tissue thickening. No areas of worsening FDG activity (with the exception of right obturator internus muscle activity, likely physiologic or inflammatory)  4. New rounded soft tissue density in the posterior left hemipelvis, SUV max 2.1, measuring 2.1 cm in size. May represent a follicle in the transposed left ovary or a lymphocele. This should be carefully reassessed during the course of follow-up.    Workstation performed: [id number]

## 2024-07-18 NOTE — ASSESSMENT & PLAN NOTE
33-year-old with stage III C1 cervical cancer status post aborted radical hysterectomy followed by curative intent chemoradiation that completed 8/3/2022. 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  She currently has a right-sided percutaneous nephrostomy tube in situ.  I reviewed the PET/CT images from 7/11/2024.  MRD testing from 4/18/2024 was negative.  She has a planned robotic assisted reimplantation ureteroneocystostomy on 7/22/2024.  PET overall is without evidence of recurrent disease.  There were multiple nonspecific findings.  I reviewed the images and agree with radiologist interpretation.  Performance status is 0.  1.  Surgery to address right hydronephrosis from radiation-induced fibrosis as planned.    2.  Continue MRD testing  3.  Return in 3 months for surveillance.

## 2024-07-22 ENCOUNTER — HOSPITAL ENCOUNTER (INPATIENT)
Facility: HOSPITAL | Age: 34
LOS: 3 days | Discharge: HOME | DRG: 660 | End: 2024-07-25
Attending: UROLOGY | Admitting: UROLOGY
Payer: COMMERCIAL

## 2024-07-22 ENCOUNTER — HOME CARE VISIT (OUTPATIENT)
Dept: HOME HEALTH SERVICES | Facility: HOME HEALTHCARE | Age: 34
End: 2024-07-22
Payer: COMMERCIAL

## 2024-07-22 ENCOUNTER — ANESTHESIA (OUTPATIENT)
Dept: PERIOP | Facility: HOSPITAL | Age: 34
DRG: 660 | End: 2024-07-22
Payer: COMMERCIAL

## 2024-07-22 ENCOUNTER — ANESTHESIA EVENT (OUTPATIENT)
Dept: PERIOP | Facility: HOSPITAL | Age: 34
DRG: 660 | End: 2024-07-22
Payer: COMMERCIAL

## 2024-07-22 ENCOUNTER — PATIENT OUTREACH (OUTPATIENT)
Dept: FAMILY MEDICINE CLINIC | Facility: CLINIC | Age: 34
End: 2024-07-22

## 2024-07-22 ENCOUNTER — APPOINTMENT (INPATIENT)
Dept: RADIOLOGY | Facility: HOSPITAL | Age: 34
DRG: 660 | End: 2024-07-22
Payer: COMMERCIAL

## 2024-07-22 DIAGNOSIS — N13.30 HYDRONEPHROSIS, UNSPECIFIED HYDRONEPHROSIS TYPE: ICD-10-CM

## 2024-07-22 DIAGNOSIS — N12 PYELONEPHRITIS: ICD-10-CM

## 2024-07-22 DIAGNOSIS — N13.5 URETERAL STRICTURE, RIGHT: ICD-10-CM

## 2024-07-22 LAB
ABO GROUP BLD: NORMAL
ANION GAP SERPL CALCULATED.3IONS-SCNC: 7 MMOL/L (ref 4–13)
BLD GP AB SCN SERPL QL: NEGATIVE
BUN SERPL-MCNC: 18 MG/DL (ref 5–25)
CALCIUM SERPL-MCNC: 9.2 MG/DL (ref 8.4–10.2)
CHLORIDE SERPL-SCNC: 103 MMOL/L (ref 96–108)
CO2 SERPL-SCNC: 27 MMOL/L (ref 21–32)
CREAT SERPL-MCNC: 0.92 MG/DL (ref 0.6–1.3)
ERYTHROCYTE [DISTWIDTH] IN BLOOD BY AUTOMATED COUNT: 12.4 % (ref 11.6–15.1)
EXT PREGNANCY TEST URINE: NEGATIVE
EXT. CONTROL: NORMAL
GFR SERPL CREATININE-BSD FRML MDRD: 81 ML/MIN/1.73SQ M
GLUCOSE SERPL-MCNC: 134 MG/DL (ref 65–140)
HCT VFR BLD AUTO: 39.8 % (ref 34.8–46.1)
HGB BLD-MCNC: 13.2 G/DL (ref 11.5–15.4)
MCH RBC QN AUTO: 29.3 PG (ref 26.8–34.3)
MCHC RBC AUTO-ENTMCNC: 33.2 G/DL (ref 31.4–37.4)
MCV RBC AUTO: 88 FL (ref 82–98)
PLATELET # BLD AUTO: 222 THOUSANDS/UL (ref 149–390)
PMV BLD AUTO: 8.1 FL (ref 8.9–12.7)
POTASSIUM SERPL-SCNC: 3.9 MMOL/L (ref 3.5–5.3)
RBC # BLD AUTO: 4.5 MILLION/UL (ref 3.81–5.12)
RH BLD: POSITIVE
SODIUM SERPL-SCNC: 137 MMOL/L (ref 135–147)
SPECIMEN EXPIRATION DATE: NORMAL
WBC # BLD AUTO: 7.47 THOUSAND/UL (ref 4.31–10.16)

## 2024-07-22 PROCEDURE — NC001 PR NO CHARGE: Performed by: UROLOGY

## 2024-07-22 PROCEDURE — 86900 BLOOD TYPING SEROLOGIC ABO: CPT | Performed by: UROLOGY

## 2024-07-22 PROCEDURE — C1769 GUIDE WIRE: HCPCS | Performed by: UROLOGY

## 2024-07-22 PROCEDURE — 74018 RADEX ABDOMEN 1 VIEW: CPT

## 2024-07-22 PROCEDURE — 85027 COMPLETE CBC AUTOMATED: CPT | Performed by: UROLOGY

## 2024-07-22 PROCEDURE — C2625 STENT, NON-COR, TEM W/DEL SY: HCPCS | Performed by: UROLOGY

## 2024-07-22 PROCEDURE — 8E0W4CZ ROBOTIC ASSISTED PROCEDURE OF TRUNK REGION, PERCUTANEOUS ENDOSCOPIC APPROACH: ICD-10-PCS | Performed by: UROLOGY

## 2024-07-22 PROCEDURE — 88341 IMHCHEM/IMCYTCHM EA ADD ANTB: CPT | Performed by: PATHOLOGY

## 2024-07-22 PROCEDURE — C1758 CATHETER, URETERAL: HCPCS | Performed by: UROLOGY

## 2024-07-22 PROCEDURE — S2900 ROBOTIC SURGICAL SYSTEM: HCPCS | Performed by: UROLOGY

## 2024-07-22 PROCEDURE — 0TB64ZX EXCISION OF RIGHT URETER, PERCUTANEOUS ENDOSCOPIC APPROACH, DIAGNOSTIC: ICD-10-PCS | Performed by: UROLOGY

## 2024-07-22 PROCEDURE — 0DNW4ZZ RELEASE PERITONEUM, PERCUTANEOUS ENDOSCOPIC APPROACH: ICD-10-PCS | Performed by: UROLOGY

## 2024-07-22 PROCEDURE — 94664 DEMO&/EVAL PT USE INHALER: CPT

## 2024-07-22 PROCEDURE — 50947 LAPARO NEW URETER/BLADDER: CPT | Performed by: UROLOGY

## 2024-07-22 PROCEDURE — 81025 URINE PREGNANCY TEST: CPT | Performed by: UROLOGY

## 2024-07-22 PROCEDURE — 80048 BASIC METABOLIC PNL TOTAL CA: CPT | Performed by: UROLOGY

## 2024-07-22 PROCEDURE — 0T164ZB BYPASS RIGHT URETER TO BLADDER, PERCUTANEOUS ENDOSCOPIC APPROACH: ICD-10-PCS | Performed by: UROLOGY

## 2024-07-22 PROCEDURE — 86901 BLOOD TYPING SEROLOGIC RH(D): CPT | Performed by: UROLOGY

## 2024-07-22 PROCEDURE — 86920 COMPATIBILITY TEST SPIN: CPT

## 2024-07-22 PROCEDURE — 88342 IMHCHEM/IMCYTCHM 1ST ANTB: CPT | Performed by: PATHOLOGY

## 2024-07-22 PROCEDURE — 0TJB8ZZ INSPECTION OF BLADDER, VIA NATURAL OR ARTIFICIAL OPENING ENDOSCOPIC: ICD-10-PCS | Performed by: UROLOGY

## 2024-07-22 PROCEDURE — 86850 RBC ANTIBODY SCREEN: CPT | Performed by: UROLOGY

## 2024-07-22 PROCEDURE — 88305 TISSUE EXAM BY PATHOLOGIST: CPT | Performed by: PATHOLOGY

## 2024-07-22 RX ORDER — LORAZEPAM 2 MG/ML
1 INJECTION INTRAMUSCULAR ONCE AS NEEDED
Status: DISCONTINUED | OUTPATIENT
Start: 2024-07-22 | End: 2024-07-22 | Stop reason: HOSPADM

## 2024-07-22 RX ORDER — ONDANSETRON 2 MG/ML
INJECTION INTRAMUSCULAR; INTRAVENOUS AS NEEDED
Status: DISCONTINUED | OUTPATIENT
Start: 2024-07-22 | End: 2024-07-22

## 2024-07-22 RX ORDER — SODIUM CHLORIDE, SODIUM LACTATE, POTASSIUM CHLORIDE, CALCIUM CHLORIDE 600; 310; 30; 20 MG/100ML; MG/100ML; MG/100ML; MG/100ML
125 INJECTION, SOLUTION INTRAVENOUS CONTINUOUS
Status: DISCONTINUED | OUTPATIENT
Start: 2024-07-22 | End: 2024-07-23

## 2024-07-22 RX ORDER — BUPIVACAINE HYDROCHLORIDE 2.5 MG/ML
INJECTION, SOLUTION EPIDURAL; INFILTRATION; INTRACAUDAL AS NEEDED
Status: DISCONTINUED | OUTPATIENT
Start: 2024-07-22 | End: 2024-07-22 | Stop reason: HOSPADM

## 2024-07-22 RX ORDER — NORETHINDRONE ACETATE AND ETHINYL ESTRADIOL .5; 2.5 MG/1; UG/1
1 TABLET ORAL DAILY
Status: DISCONTINUED | OUTPATIENT
Start: 2024-07-23 | End: 2024-07-23

## 2024-07-22 RX ORDER — ACETAMINOPHEN 325 MG/1
650 TABLET ORAL EVERY 6 HOURS SCHEDULED
Status: DISCONTINUED | OUTPATIENT
Start: 2024-07-22 | End: 2024-07-25 | Stop reason: HOSPADM

## 2024-07-22 RX ORDER — PROMETHAZINE HYDROCHLORIDE 25 MG/ML
12.5 INJECTION, SOLUTION INTRAMUSCULAR; INTRAVENOUS
Status: DISCONTINUED | OUTPATIENT
Start: 2024-07-22 | End: 2024-07-25 | Stop reason: HOSPADM

## 2024-07-22 RX ORDER — LIDOCAINE HYDROCHLORIDE 20 MG/ML
INJECTION, SOLUTION EPIDURAL; INFILTRATION; INTRACAUDAL; PERINEURAL AS NEEDED
Status: DISCONTINUED | OUTPATIENT
Start: 2024-07-22 | End: 2024-07-22

## 2024-07-22 RX ORDER — HYDROMORPHONE HYDROCHLORIDE 1 MG/ML
INJECTION, SOLUTION INTRAMUSCULAR; INTRAVENOUS; SUBCUTANEOUS AS NEEDED
Status: DISCONTINUED | OUTPATIENT
Start: 2024-07-22 | End: 2024-07-22

## 2024-07-22 RX ORDER — ONDANSETRON 2 MG/ML
4 INJECTION INTRAMUSCULAR; INTRAVENOUS ONCE AS NEEDED
Status: COMPLETED | OUTPATIENT
Start: 2024-07-22 | End: 2024-07-22

## 2024-07-22 RX ORDER — SENNOSIDES 8.6 MG
1 TABLET ORAL DAILY
Status: DISCONTINUED | OUTPATIENT
Start: 2024-07-23 | End: 2024-07-25 | Stop reason: HOSPADM

## 2024-07-22 RX ORDER — HEPARIN SODIUM 5000 [USP'U]/ML
5000 INJECTION, SOLUTION INTRAVENOUS; SUBCUTANEOUS EVERY 8 HOURS SCHEDULED
Status: DISCONTINUED | OUTPATIENT
Start: 2024-07-22 | End: 2024-07-25 | Stop reason: HOSPADM

## 2024-07-22 RX ORDER — FENTANYL CITRATE 50 UG/ML
INJECTION, SOLUTION INTRAMUSCULAR; INTRAVENOUS AS NEEDED
Status: DISCONTINUED | OUTPATIENT
Start: 2024-07-22 | End: 2024-07-22

## 2024-07-22 RX ORDER — CEFAZOLIN SODIUM 2 G/50ML
2000 SOLUTION INTRAVENOUS EVERY 8 HOURS
Status: COMPLETED | OUTPATIENT
Start: 2024-07-22 | End: 2024-07-23

## 2024-07-22 RX ORDER — HYDROMORPHONE HCL/PF 1 MG/ML
0.5 SYRINGE (ML) INJECTION EVERY 2 HOUR PRN
Status: DISCONTINUED | OUTPATIENT
Start: 2024-07-22 | End: 2024-07-25 | Stop reason: HOSPADM

## 2024-07-22 RX ORDER — ALBUTEROL SULFATE 2.5 MG/3ML
2.5 SOLUTION RESPIRATORY (INHALATION) ONCE AS NEEDED
Status: DISCONTINUED | OUTPATIENT
Start: 2024-07-22 | End: 2024-07-22 | Stop reason: HOSPADM

## 2024-07-22 RX ORDER — KETAMINE HCL IN NACL, ISO-OSM 100MG/10ML
SYRINGE (ML) INJECTION AS NEEDED
Status: DISCONTINUED | OUTPATIENT
Start: 2024-07-22 | End: 2024-07-22

## 2024-07-22 RX ORDER — DOCUSATE SODIUM 100 MG/1
100 CAPSULE, LIQUID FILLED ORAL 2 TIMES DAILY
Status: DISCONTINUED | OUTPATIENT
Start: 2024-07-22 | End: 2024-07-25 | Stop reason: HOSPADM

## 2024-07-22 RX ORDER — ONDANSETRON 2 MG/ML
4 INJECTION INTRAMUSCULAR; INTRAVENOUS EVERY 6 HOURS PRN
Status: DISCONTINUED | OUTPATIENT
Start: 2024-07-22 | End: 2024-07-25 | Stop reason: HOSPADM

## 2024-07-22 RX ORDER — MIDAZOLAM HYDROCHLORIDE 2 MG/2ML
INJECTION, SOLUTION INTRAMUSCULAR; INTRAVENOUS AS NEEDED
Status: DISCONTINUED | OUTPATIENT
Start: 2024-07-22 | End: 2024-07-22

## 2024-07-22 RX ORDER — CEFAZOLIN SODIUM 2 G/50ML
2000 SOLUTION INTRAVENOUS ONCE
Status: COMPLETED | OUTPATIENT
Start: 2024-07-22 | End: 2024-07-22

## 2024-07-22 RX ORDER — PROPOFOL 10 MG/ML
INJECTION, EMULSION INTRAVENOUS AS NEEDED
Status: DISCONTINUED | OUTPATIENT
Start: 2024-07-22 | End: 2024-07-22

## 2024-07-22 RX ORDER — SCOLOPAMINE TRANSDERMAL SYSTEM 1 MG/1
1 PATCH, EXTENDED RELEASE TRANSDERMAL ONCE
Status: COMPLETED | OUTPATIENT
Start: 2024-07-22 | End: 2024-07-25

## 2024-07-22 RX ORDER — HYDROMORPHONE HCL/PF 1 MG/ML
0.5 SYRINGE (ML) INJECTION
Status: DISCONTINUED | OUTPATIENT
Start: 2024-07-22 | End: 2024-07-22 | Stop reason: HOSPADM

## 2024-07-22 RX ORDER — ROCURONIUM BROMIDE 10 MG/ML
INJECTION, SOLUTION INTRAVENOUS AS NEEDED
Status: DISCONTINUED | OUTPATIENT
Start: 2024-07-22 | End: 2024-07-22

## 2024-07-22 RX ORDER — DIPHENHYDRAMINE HYDROCHLORIDE 50 MG/ML
12.5 INJECTION INTRAMUSCULAR; INTRAVENOUS ONCE AS NEEDED
Status: DISCONTINUED | OUTPATIENT
Start: 2024-07-22 | End: 2024-07-22 | Stop reason: HOSPADM

## 2024-07-22 RX ORDER — DEXAMETHASONE SODIUM PHOSPHATE 10 MG/ML
INJECTION, SOLUTION INTRAMUSCULAR; INTRAVENOUS AS NEEDED
Status: DISCONTINUED | OUTPATIENT
Start: 2024-07-22 | End: 2024-07-22

## 2024-07-22 RX ORDER — FENTANYL CITRATE/PF 50 MCG/ML
50 SYRINGE (ML) INJECTION
Status: DISCONTINUED | OUTPATIENT
Start: 2024-07-22 | End: 2024-07-22 | Stop reason: HOSPADM

## 2024-07-22 RX ORDER — SODIUM CHLORIDE, SODIUM LACTATE, POTASSIUM CHLORIDE, CALCIUM CHLORIDE 600; 310; 30; 20 MG/100ML; MG/100ML; MG/100ML; MG/100ML
INJECTION, SOLUTION INTRAVENOUS CONTINUOUS PRN
Status: DISCONTINUED | OUTPATIENT
Start: 2024-07-22 | End: 2024-07-22

## 2024-07-22 RX ORDER — GABAPENTIN 100 MG/1
100 CAPSULE ORAL 3 TIMES DAILY
Status: DISCONTINUED | OUTPATIENT
Start: 2024-07-22 | End: 2024-07-25 | Stop reason: HOSPADM

## 2024-07-22 RX ORDER — HEPARIN SODIUM 5000 [USP'U]/ML
5000 INJECTION, SOLUTION INTRAVENOUS; SUBCUTANEOUS ONCE
Status: COMPLETED | OUTPATIENT
Start: 2024-07-22 | End: 2024-07-22

## 2024-07-22 RX ADMIN — GABAPENTIN 100 MG: 100 CAPSULE ORAL at 22:33

## 2024-07-22 RX ADMIN — FENTANYL CITRATE 50 MCG: 50 INJECTION INTRAMUSCULAR; INTRAVENOUS at 08:34

## 2024-07-22 RX ADMIN — ONDANSETRON 4 MG: 2 INJECTION INTRAMUSCULAR; INTRAVENOUS at 13:15

## 2024-07-22 RX ADMIN — PROPOFOL 80 MCG/KG/MIN: 10 INJECTION, EMULSION INTRAVENOUS at 07:54

## 2024-07-22 RX ADMIN — Medication 30 MG: at 07:50

## 2024-07-22 RX ADMIN — ROCURONIUM BROMIDE 20 MG: 10 INJECTION INTRAVENOUS at 11:31

## 2024-07-22 RX ADMIN — SUGAMMADEX 225 MG: 100 INJECTION, SOLUTION INTRAVENOUS at 13:50

## 2024-07-22 RX ADMIN — SCOPALAMINE 1 PATCH: 1 PATCH, EXTENDED RELEASE TRANSDERMAL at 07:30

## 2024-07-22 RX ADMIN — GABAPENTIN 100 MG: 100 CAPSULE ORAL at 17:03

## 2024-07-22 RX ADMIN — CEFAZOLIN SODIUM 2000 MG: 2 SOLUTION INTRAVENOUS at 11:51

## 2024-07-22 RX ADMIN — SODIUM CHLORIDE, SODIUM LACTATE, POTASSIUM CHLORIDE, AND CALCIUM CHLORIDE 125 ML/HR: .6; .31; .03; .02 INJECTION, SOLUTION INTRAVENOUS at 16:30

## 2024-07-22 RX ADMIN — FENTANYL CITRATE 50 MCG: 50 INJECTION INTRAMUSCULAR; INTRAVENOUS at 08:29

## 2024-07-22 RX ADMIN — PROPOFOL 50 MG: 10 INJECTION, EMULSION INTRAVENOUS at 10:16

## 2024-07-22 RX ADMIN — ROCURONIUM BROMIDE 50 MG: 10 INJECTION INTRAVENOUS at 07:51

## 2024-07-22 RX ADMIN — FENTANYL CITRATE 100 MCG: 50 INJECTION INTRAMUSCULAR; INTRAVENOUS at 07:50

## 2024-07-22 RX ADMIN — HYDROMORPHONE HYDROCHLORIDE 0.5 MG: 1 INJECTION, SOLUTION INTRAMUSCULAR; INTRAVENOUS; SUBCUTANEOUS at 13:52

## 2024-07-22 RX ADMIN — ONDANSETRON 4 MG: 2 INJECTION INTRAMUSCULAR; INTRAVENOUS at 14:28

## 2024-07-22 RX ADMIN — CEFAZOLIN SODIUM 2000 MG: 2 SOLUTION INTRAVENOUS at 20:08

## 2024-07-22 RX ADMIN — ROCURONIUM BROMIDE 20 MG: 10 INJECTION INTRAVENOUS at 13:24

## 2024-07-22 RX ADMIN — ACETAMINOPHEN 650 MG: 325 TABLET, FILM COATED ORAL at 17:03

## 2024-07-22 RX ADMIN — SODIUM CHLORIDE, SODIUM LACTATE, POTASSIUM CHLORIDE, AND CALCIUM CHLORIDE: .6; .31; .03; .02 INJECTION, SOLUTION INTRAVENOUS at 07:43

## 2024-07-22 RX ADMIN — LIDOCAINE HYDROCHLORIDE 100 MG: 20 INJECTION, SOLUTION EPIDURAL; INFILTRATION; INTRACAUDAL at 07:50

## 2024-07-22 RX ADMIN — DEXAMETHASONE SODIUM PHOSPHATE 10 MG: 10 INJECTION, SOLUTION INTRAMUSCULAR; INTRAVENOUS at 08:08

## 2024-07-22 RX ADMIN — HYDROMORPHONE HYDROCHLORIDE 1 MG: 1 INJECTION, SOLUTION INTRAMUSCULAR; INTRAVENOUS; SUBCUTANEOUS at 10:37

## 2024-07-22 RX ADMIN — DOCUSATE SODIUM 100 MG: 100 CAPSULE, LIQUID FILLED ORAL at 17:03

## 2024-07-22 RX ADMIN — Medication 20 MG: at 10:22

## 2024-07-22 RX ADMIN — HEPARIN SODIUM 5000 UNITS: 5000 INJECTION INTRAVENOUS; SUBCUTANEOUS at 07:08

## 2024-07-22 RX ADMIN — MIDAZOLAM 2 MG: 1 INJECTION INTRAMUSCULAR; INTRAVENOUS at 07:43

## 2024-07-22 RX ADMIN — HEPARIN SODIUM 5000 UNITS: 5000 INJECTION INTRAVENOUS; SUBCUTANEOUS at 22:33

## 2024-07-22 RX ADMIN — ROCURONIUM BROMIDE 20 MG: 10 INJECTION INTRAVENOUS at 09:49

## 2024-07-22 RX ADMIN — SODIUM CHLORIDE, SODIUM LACTATE, POTASSIUM CHLORIDE, AND CALCIUM CHLORIDE: .6; .31; .03; .02 INJECTION, SOLUTION INTRAVENOUS at 13:35

## 2024-07-22 RX ADMIN — PROMETHAZINE HYDROCHLORIDE 12.5 MG: 25 INJECTION INTRAMUSCULAR; INTRAVENOUS at 14:49

## 2024-07-22 RX ADMIN — ROCURONIUM BROMIDE 20 MG: 10 INJECTION INTRAVENOUS at 11:57

## 2024-07-22 RX ADMIN — HYDROMORPHONE HYDROCHLORIDE 0.5 MG: 1 INJECTION, SOLUTION INTRAMUSCULAR; INTRAVENOUS; SUBCUTANEOUS at 19:51

## 2024-07-22 RX ADMIN — ROCURONIUM BROMIDE 30 MG: 10 INJECTION INTRAVENOUS at 10:17

## 2024-07-22 RX ADMIN — SODIUM CHLORIDE, SODIUM LACTATE, POTASSIUM CHLORIDE, AND CALCIUM CHLORIDE: .6; .31; .03; .02 INJECTION, SOLUTION INTRAVENOUS at 10:30

## 2024-07-22 RX ADMIN — CEFAZOLIN SODIUM 2000 MG: 2 SOLUTION INTRAVENOUS at 07:53

## 2024-07-22 RX ADMIN — ROCURONIUM BROMIDE 30 MG: 10 INJECTION INTRAVENOUS at 08:47

## 2024-07-22 RX ADMIN — PROPOFOL 200 MG: 10 INJECTION, EMULSION INTRAVENOUS at 07:50

## 2024-07-22 NOTE — RESPIRATORY THERAPY NOTE
RT Protocol Note  Margot Moody 34 y.o. female MRN: [id number]  Unit/Bed#: S -01 Encounter: [id number]    Assessment    Principal Problem:    Ureteral stricture, right  Active Problems:    Obesity (BMI 35.0-39.9 without comorbidity)    Hydronephrosis of right kidney    Nephrostomy status (HCC)      Home Pulmonary Medications:NA         Past Medical History:   Diagnosis Date    Anxiety     Cancer (HCC)     cervix    Cervical cancer (HCC)     receiving chemo and radiation    COVID     Jan 2022    Depression     Diarrhea     Dizziness     Frequent headaches     Hx of bleeding disorder     vaginal bleeding    Obesity      Social History     Socioeconomic History    Marital status: Single     Spouse name: None    Number of children: None    Years of education: None    Highest education level: None   Occupational History    None   Tobacco Use    Smoking status: Never     Passive exposure: Current (very mild/social)    Smokeless tobacco: Never   Vaping Use    Vaping status: Never Used   Substance and Sexual Activity    Alcohol use: Not Currently    Drug use: Never    Sexual activity: Not Currently     Partners: Male     Birth control/protection: Abstinence, None     Comment: Havent had sex for at least two years   Other Topics Concern    None   Social History Narrative    None     Social Determinants of Health     Financial Resource Strain: Not on file   Food Insecurity: No Food Insecurity (2/12/2024)    Hunger Vital Sign     Worried About Running Out of Food in the Last Year: Never true     Ran Out of Food in the Last Year: Never true   Transportation Needs: No Transportation Needs (2/12/2024)    PRAPARE - Transportation     Lack of Transportation (Medical): No     Lack of Transportation (Non-Medical): No   Physical Activity: Not on file   Stress: Not on file   Social Connections: Not on file   Intimate Partner Violence: Not At Risk (3/14/2024)    Humiliation, Afraid, Rape, and Kick questionnaire     Fear of  "Current or Ex-Partner: No     Emotionally Abused: No     Physically Abused: No     Sexually Abused: No   Housing Stability: Low Risk  (2/12/2024)    Housing Stability Vital Sign     Unable to Pay for Housing in the Last Year: No     Number of Times Moved in the Last Year: 1     Homeless in the Last Year: No       Subjective         Objective    Physical Exam:   Assessment Type: During-treatment  General Appearance: Alert, Drowsy  Respiratory Pattern: Normal  Chest Assessment: Chest expansion symmetrical  Bilateral Breath Sounds: Clear, Diminished  Cough: Non-productive  O2 Device: RA    Vitals:  Blood pressure (!) 94/48, pulse 64, temperature 98.8 °F (37.1 °C), resp. rate 16, height 5' 9\" (1.753 m), weight 109 kg (240 lb), SpO2 95%, not currently breastfeeding.          Imaging and other studies: I have personally reviewed pertinent reports.      O2 Device: RA     Plan    Respiratory Plan: No distress/Pulmonary history, Discontinue Protocol        Resp Comments: denies pulm hx, non smoker, no medical equip, has occupational exposures, to fumes, chemicals, dirt and dust       "

## 2024-07-22 NOTE — ANESTHESIA PREPROCEDURE EVALUATION
Medical History    History Comments   Depression    Anxiety    Obesity    Cervical cancer (HCC) receiving chemo and radiation   Diarrhea    Dizziness    Frequent headaches    COVID Jan 2022   Hx of bleeding disorder vaginal bleeding   Cancer (HCC) cervix   Procedure:  REIMPLANTATION URETEROCYSTOSTOMY LAPAROSCOPIC W/ ROBOTICS (Right: Bladder)  CYSTOSCOPY (Bladder)  DISSECTION/STAGING LYMPH NODE LAPAROSCOPIC PELVIS/ABDOMEN (Right: Abdomen)    Relevant Problems   ANESTHESIA (within normal limits)      /RENAL   (+) Hydronephrosis of right kidney      GYN   (+) Malignant neoplasm of overlapping sites of cervix (HCC)      NEURO/PSYCH   (+) Anxiety   (+) Depression with anxiety        Physical Exam    Airway    Mallampati score: II  TM Distance: >3 FB  Neck ROM: full     Dental       Cardiovascular  Rate: normal    Pulmonary  Pulmonary exam normal     Other Findings  Per pt denies anything remaining that is loose or removeable  post-pubertal.      Anesthesia Plan  ASA Score- 2     Anesthesia Type- general with ASA Monitors.         Additional Monitors:     Airway Plan:            Plan Factors-Exercise tolerance (METS): >4 METS.    Chart reviewed.    Patient summary reviewed.    Patient is not a current smoker.              Induction- intravenous.    Postoperative Plan- Plan for postoperative opioid use.         Informed Consent- Anesthetic plan and risks discussed with patient.  I personally reviewed this patient with the CRNA. Discussed and agreed on the Anesthesia Plan with the CRNA..

## 2024-07-22 NOTE — PROGRESS NOTES
Outpatient Care Management Note:    ADT alert received.  Patient is currently admitted for urology surgery. CM will attempt to outreach on discharge.

## 2024-07-22 NOTE — PLAN OF CARE
Problem: PAIN - ADULT  Goal: Verbalizes/displays adequate comfort level or baseline comfort level  Description: Interventions:  - Encourage patient to monitor pain and request assistance  - Assess pain using appropriate pain scale  - Administer analgesics based on type and severity of pain and evaluate response  - Implement non-pharmacological measures as appropriate and evaluate response  - Consider cultural and social influences on pain and pain management  - Notify physician/advanced practitioner if interventions unsuccessful or patient reports new pain  Outcome: Progressing     Problem: INFECTION - ADULT  Goal: Absence or prevention of progression during hospitalization  Description: INTERVENTIONS:  - Assess and monitor for signs and symptoms of infection  - Monitor lab/diagnostic results  - Monitor all insertion sites, i.e. indwelling lines, tubes, and drains  - Monitor endotracheal if appropriate and nasal secretions for changes in amount and color  - Marilla appropriate cooling/warming therapies per order  - Administer medications as ordered  - Instruct and encourage patient and family to use good hand hygiene technique  - Identify and instruct in appropriate isolation precautions for identified infection/condition  Outcome: Progressing  Goal: Absence of fever/infection during neutropenic period  Description: INTERVENTIONS:  - Monitor WBC    Outcome: Progressing     Problem: SAFETY ADULT  Goal: Patient will remain free of falls  Description: INTERVENTIONS:  - Educate patient/family on patient safety including physical limitations  - Instruct patient to call for assistance with activity   - Consult OT/PT to assist with strengthening/mobility   - Keep Call bell within reach  - Keep bed low and locked with side rails adjusted as appropriate  - Keep care items and personal belongings within reach  - Initiate and maintain comfort rounds  - Make Fall Risk Sign visible to staff  - Apply yellow socks and bracelet  for high fall risk patients  - Consider moving patient to room near nurses station  Outcome: Progressing  Goal: Maintain or return to baseline ADL function  Description: INTERVENTIONS:  -  Assess patient's ability to carry out ADLs; assess patient's baseline for ADL function and identify physical deficits which impact ability to perform ADLs (bathing, care of mouth/teeth, toileting, grooming, dressing, etc.)  - Assess/evaluate cause of self-care deficits   - Assess range of motion  - Assess patient's mobility; develop plan if impaired  - Assess patient's need for assistive devices and provide as appropriate  - Encourage maximum independence but intervene and supervise when necessary  - Involve family in performance of ADLs  - Assess for home care needs following discharge   - Consider OT consult to assist with ADL evaluation and planning for discharge  - Provide patient education as appropriate  Outcome: Progressing  Goal: Maintains/Returns to pre admission functional level  Description: INTERVENTIONS:  - Perform AM-PAC 6 Click Basic Mobility/ Daily Activity assessment daily.  - Set and communicate daily mobility goal to care team and patient/family/caregiver.   - Collaborate with rehabilitation services on mobility goals if consulted  - Out of bed for toileting  - Record patient progress and toleration of activity level   Outcome: Progressing     Problem: DISCHARGE PLANNING  Goal: Discharge to home or other facility with appropriate resources  Description: INTERVENTIONS:  - Identify barriers to discharge w/patient and caregiver  - Arrange for needed discharge resources and transportation as appropriate  - Identify discharge learning needs (meds, wound care, etc.)  - Arrange for interpretive services to assist at discharge as needed  - Refer to Case Management Department for coordinating discharge planning if the patient needs post-hospital services based on physician/advanced practitioner order or complex needs  related to functional status, cognitive ability, or social support system  Outcome: Progressing     Problem: Knowledge Deficit  Goal: Patient/family/caregiver demonstrates understanding of disease process, treatment plan, medications, and discharge instructions  Description: Complete learning assessment and assess knowledge base.  Interventions:  - Provide teaching at level of understanding  - Provide teaching via preferred learning methods  Outcome: Progressing     Problem: GENITOURINARY - ADULT  Goal: Maintains or returns to baseline urinary function  Description: INTERVENTIONS:  - Assess urinary function  - Encourage oral fluids to ensure adequate hydration if ordered  - Administer IV fluids as ordered to ensure adequate hydration  - Administer ordered medications as needed  - Offer frequent toileting  - Follow urinary retention protocol if ordered  Outcome: Progressing  Goal: Absence of urinary retention  Description: INTERVENTIONS:  - Assess patient’s ability to void and empty bladder  - Monitor I/O  - Bladder scan as needed  - Discuss with physician/AP medications to alleviate retention as needed  - Discuss catheterization for long term situations as appropriate  Outcome: Progressing  Goal: Urinary catheter remains patent  Description: INTERVENTIONS:  - Assess patency of urinary catheter  - If patient has a chronic paul, consider changing catheter if non-functioning  - Follow guidelines for intermittent irrigation of non-functioning urinary catheter  Outcome: Progressing

## 2024-07-22 NOTE — ANESTHESIA POSTPROCEDURE EVALUATION
Post-Op Assessment Note    CV Status:  Stable  Pain Score: 0    Pain management: adequate       Mental Status:  Sleepy and arousable   Hydration Status:  Stable   PONV Controlled:  None   Airway Patency:  Patent     Post Op Vitals Reviewed: Yes    No anethesia notable event occurred.    Staff: CRNA               BP  119/68    Temp  97.8    Pulse 64   Resp 15   SpO2 96 RA

## 2024-07-22 NOTE — OP NOTE
OPERATIVE REPORT  PATIENT NAME: Margot Moody    :  1990  MRN: [id number]  Pt Location: AN OR ROOM 04    SURGERY DATE: 2024    Surgeons and Role:     * Vitor Fields MD - Primary     * Rosalio Keene MD - Assisting     * Niraj Mckeon PA-C - Assisting    A bedside assistant was needed for help with suture passage tissue retraction and suctioning.    A second urologist was called into the room to help with identification of the patient's complicated anatomy secondary to radiation changes.    Preop Diagnosis:  Ureteral stricture, right [N13.5]    Post-Op Diagnosis Codes:     * Ureteral stricture, right [N13.5]    Procedure(s):  Right - REIMPLANTATION URETEROCYSTOSTOMY LAPAROSCOPIC W/ ROBOTICS. EXTENSIVE LYSIS OF ADHESIONS  CYSTOSCOPY  Right - INSERTION STENT URETERAL    Specimen(s):  ID Type Source Tests Collected by Time Destination   1 : right distal ureter Tissue Ureter, Right TISSUE EXAM Vitor Fields MD 2024 [id number]        Estimated Blood Loss:   250 mL    Drains:  Closed/Suction Drain LLQ Bulb 19 Fr. (Active)   Site Description Unable to view 24 1415   Dressing Status Clean;Dry;Intact 24 1600   Drainage Appearance Bloody 24 1415   Status To bulb suction 24 1415   Output (mL) 40 mL 24 1600   Number of days: 0       Nephrostomy Right 10.2 Fr. (Active)   Urine Color Cornelia 24 141   Output (mL) 550 mL 24 1816   Number of days: 21       Urethral Catheter Non-latex 16 Fr. (Active)   Site Assessment Clean 24 1413   Securement Method Securing device (Describe) 24 1413   Output (mL) 250 mL 24 1600   Number of days: 0       [REMOVED] NG/OG/Enteral Tube Orogastric 18 Fr Center mouth (Removed)   Site Assessment Dry;Intact;Clean 24 0825   Status Suction-low continuous 24 0825   Drainage Appearance Clear 24 0825   Number of days: 0       Anesthesia Type:   General    Operative Indications:  Patient with distal right  ureteral stricture based on retrograde performed in February 2024 developed as a result of radiation for cervical cancer.  Elected for reconstructive surgery    Operative Findings:  Adhesions of the small bowel taken down sharply from the anterior midline and right lateral walls.  Lower abdomen with significant radiation changes which made identification of the ureter very difficult even with placement of ureteral stent to facilitate  Once ureter was identified it was appreciated to be abnormal in appearance for its entirety that could be seen including at the level of the right  common iliac  Ureteral reimplantation performed bypassing the distal ~7 cm of ureter which were clipped      Complications:   None    Procedure and Technique:  The patient was brought to the operating room and general anesthesia was induced.  The patient was then placed in a dorsolithotomy position    The pt was prepped and draped using standard sterile technique (to include their genitals) after being secured to the surgical table with surgical towels and tape.  All pressure points were carefully padded and there was no undue pressure on the muscle belly, nerve structure, or bony prominence.  Antibiotics were administered, and thromboembolism prophylaxis was given as per the guidelines.  A surgical time out was performed with all in the room in agreement with the correct patient, procedure, indications, and laterality.    Towel clamps were used to elevate the skin 2cm superior and left lateral to the umbilicius and a Veress needle was passed.  Good positioning was confirmed with the saline drip test then pneumoperitoneum was obtained to 15 mmHg.  Proposed four robotic incisions were marked out angling to focus on the right lower pelvis.    Given the history of prior abdominal surgery and adhesions a 5 mm camera was used below the right rib cage to gain entry.  Several adhesions were seen in the lower midline as well as along the right  lateral wall.  However it appeared safe to place the remainder of our ports.    The  4 robotic 8mm ports were identified and each was anesthetized with under direct vision to perform a block between the transversalis and internal oblique layers, incision created sharply and port placed under vision.   The proprosed 12 mm port site in the right lower quadrant was localized and then the AirSeal 12mm assistant port was placed under vision.     Attention was turn sharply taking down adhesions in the midline and then along the right lateral wall.  This took some time.    Attention was then turned towards trying to identify and the ureter.  The patient's pelvis had obvious radiation changes as there was a desmoplastic tissue appreciated and the adhesions were more matted than typical almost certainly result of radiation changes.    The ureter was not able to be identified despite careful dissection along the external iliac artery towards the common iliac.  Therefore decision made to perform cystoscopy to place a right sided ureteral stent.  This was done without difficulty passing about 5 [place] mini catheter through the right orifice to 25 cm vivian.    We then resumed looking for the right ureter robotically.  A potential candidate was found along the posterior aspect of the right mesentery but with further dissection this did not appear to be the ureter.    We continue to search for the ureter trying to use a wire through the open-ended catheter to help induce movement in the abdomen but none was identified.  We also attempted to use methylene blue through the patient's PCN tube and while methylene blue was seen to come out the ureteral catheter no methylene blue was appreciated to be seen through tissue to identify the ureter.    At this time Dr. Keene came into the op room to assist with your identification.  He looked robotically for other possible tissue that could represent the ureter.  In the process we elected  to place a more cephalad robotic port for the fourth arm which was done under vision.  The right external/internal and common iliac arteries were skeletonized. A potential additional ureter candidate was found and dissected but with time and manipulation of ureteral catheter we decided this also was not the true ureter.    We dissected more cephalad lateral to the common iliac artery which had been skeletonized and found a area of matted tissue and with careful dissection we appreciated this was indeed the ureter.  It was quite difficult to follow because it was very abnormal in appearance and matted to the right pelvic sidewall including over the external iliac artery.    We carefully dissected the ureter away from surrounding structures with a high level of concern for potential vascular injury over the iliac vessels given the degree of matting present.  The ureter unfortunately did not look normal at any point along its dissection including at its most cephalad aspect.  As we approached the bladder with dissection the ureter appeared to be more tapered.  We dissected the ureter as distally as was feasible likely resulting in visualized ureter approximately 4 cm from the ureteral orifice    Then dropped the bladder and divided the superior left side of the bladder wing to mobilize the bladder towards the right side.    Once this was done the bladder was filled and a an appropriate location for the ureteral reimplant was identified.  The tiger tail catheter was removed.  The ureter was divided approximately 3 cm from its skeletonized distal aspect after for securing the distal end with 2 Weck clips.  At this point Dr. Keene left the operating room.    The ureter was then spatulated with Jacinto scissors length of approximately 2-3 cm.The bladder was opened with cautery and sharply for a length of about 3-4 cm.    We then performed a circumferential anastomosis of the ureter to the cystotomy site using a  combination of 3-0 and 4-0 Vicryl sutures.  Once approximately one third of the sutures had been placed a new stent was placed up the ureter with the aid of a guidewire.  As we complete the circumferential anastomosis the distal aspect of the ureter which had been used as a handle was excised and sent for pathology.    Bladder muscle was brought over the ureteral reimplant using 3-0 Vicryl sutures    The surgical bed was carefully examined with the pressure turned down to 8 mmHg and no significant bleeding was seen.    A 19fr jacob drain was placed through a lateral robotic port.    The 12 port was closed with a 0 Vicryl suture.    subcutaneous fat was closed with interrupted 2-0 Vicryl suture and the skin with subcuticular 4-0 Monocryl.  All other port sites were closed at the skin only using subcuticular 4-0 Monocryl and dermal glue.  Sterile dressings were then applied.  There were no complications.  All instrument and sponge counts were correct.      A qualified resident physician was not available.    Patient Disposition:  PACU     Plan:  PCN will be clamped tonight or tomorrow after KUB hopefully shows stent to be in correct location.  Marshall catheter will be removed before discharge  Stent will be removed in 4 weeks  If the patient is willing to keep PCN we will perform an antegrade nephrostogram after stent removal, otherwise we will plan for a CT IVP with catheter in place or cystogram        SIGNATURE: Vitor Fields MD  DATE: July 22, 2024  TIME: 7:30 PM

## 2024-07-22 NOTE — H&P
UROLOGY HISTORY AND PHYSICAL     Patient Identifiers: Margot Moody (MRN [id number])      Date of Service: 7/22/2024        ASSESSMENT:     34 y.o. old female with  right uretearl stricture 2/2 radiation for cervical cancer with prior abdominal surgeries notable for adhesions.      PLAN:     Right ureteral reimplant surgery. We will attempt do this robotically knowing her past surgical history there are much higher than normal odds of conversion to open surgery because of adhesions. We also rediscussed the higher than normal risks of bowel injury and that her reimplant scars down because of poor tissue quality       History of Present Illness:     Margot Moody is a 34 y.o. old with a history of stage III C1 cervical cancer status post aborted radical hysterectomy (because of grossly palpable lymph node disease which was resected) and then status post curative intent chemotherapy and radiation that completed 8/3/2022 now with right sided hydronephrosis from right ureteral stricture.     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  Surgical notes are notable for adhesions and radiation changes.  After surgery she had noticed increased difficulty with voiding.  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 She is status post right ureteral stent placement on 2/13/2024 with Dr. 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  The right orifice appeared erythematous and swollen.  Retrograde suggested that distal 3 cm of ureter or tapered with proximal dilation and mild hydronephrosis          PET scan does not suggest there is recurrent disease.     She has had a PCN placed and stent removed.  Urine cx is negative        Past Medical, Past Surgical History:     Past  Medical History:   Diagnosis Date    Anxiety     Cancer (HCC)     cervix    Cervical cancer (HCC)     receiving chemo and radiation    COVID     Jan 2022    Depression     Diarrhea     Dizziness     Frequent headaches     Hx of bleeding disorder     vaginal bleeding    Obesity    :    Past Surgical History:   Procedure Laterality Date    CERVICAL BIOPSY  W/ LOOP ELECTRODE EXCISION      COLON SURGERY  Colon resection    May 2023    FL RETROGRADE PYELOGRAM  02/13/2024    IR NEPHROSTOMY TUBE PLACEMENT  7/1/2024    LYMPH NODE DISSECTION Bilateral 05/06/2022    Procedure: DISSECTION/STAGING LYMPH NODE PELVIS/ABDOMEN;  Surgeon: Parminder Moctezuma MD;  [facility];  Service: Gynecology Oncology    ME CYSTO BLADDER W/URETERAL CATHETERIZATION Right 02/13/2024    Procedure: CYSTOSCOPY WITH RETROGRADE PYELOGRAM;  Surgeon: Chuck Campo MD;  [facility];  Service: Urology    ME INSERTION VAGINAL RADIATION DEVICE N/A 07/15/2022    Procedure: INSERTION NADRI SLEEVE VAGINA WITH POST OP BRACHYTHERAPY (IN RADIATION ONCOLOGY);  Surgeon: Parminder Moctezuma MD;  [facility];  Service: Gynecology Oncology    ME LAPAROSCOPY COLECTOMY PARTIAL W/ANASTOMOSIS N/A 05/10/2023    Procedure: RESECTION COLON SIGMOID LAPAROSCOPIC;  Surgeon: Marin Salinas MD;  [facility];  Service: Colorectal    ME SIGMOIDOSCOPY FLX DX W/COLLJ SPEC BR/WA IF PFRMD N/A 05/10/2023    Procedure: SIGMOIDOSCOPY FLEXIBLE;  Surgeon: Marin Salinas MD;  [facility];  Service: Colorectal    SALPINGECTOMY Bilateral 05/06/2022    Procedure: SALPINGECTOMY, OVARIAN TRANSPOSITION;  Surgeon: Parminder Moctezuma MD;  [facility];  Service: Gynecology Oncology    URETERAL STENT PLACEMENT Right 02/13/2024    Procedure: INSERTION STENT URETERAL;  Surgeon: Chuck Campo MD;  [facility];  Service: Urology    US GUIDANCE  07/15/2022   :    Medications, Allergies:     Current  "Facility-Administered Medications:     ceFAZolin (ANCEF) IVPB (premix in dextrose) 2,000 mg 50 mL, 2,000 mg, Intravenous, Once, Vitor Fields MD    scopolamine (TRANSDERM-SCOP) 1 mg/3 days TD 72 hr patch 1 patch, 1 patch, Transdermal, Once, Yumiko Osman CRNA    Allergies:  No Known Allergies:    Social and Family History:   Social History:   Social History     Tobacco Use    Smoking status: Never     Passive exposure: Current (very mild/social)    Smokeless tobacco: Never   Vaping Use    Vaping status: Never Used   Substance Use Topics    Alcohol use: Not Currently    Drug use: Never   .    Social History     Tobacco Use   Smoking Status Never    Passive exposure: Current (very mild/social)   Smokeless Tobacco Never       Family History:  Family History   Problem Relation Age of Onset    No Known Problems Mother     Heart disease Father     Cancer Brother         Niece/ brothers daughter stage 4 Adrenocortocal carcinoma    Ovarian cancer Maternal Aunt         Dont know  before i was born    Cancer Maternal Uncle         Stage 2 Liver Cancer    Ovarian cancer Maternal Grandmother         Dont know  before i was born    Cancer Niece 7        adrenal gland   :     Review of Systems:     General: Fever, chills, or night sweats: negative  Cardiac: Negative for chest pain.    Pulmonary: Negative for shortness of breath.  Gastrointestinal: Abdominal pain negative  Nausea, vomiting, or diarrhea negative  Genitourinary: See HPI above.  Patient does nothave hematuria.  All other systems queried were negative.    Physical Exam:   General: Patient is pleasant and in NAD. Awake and alert  /60   Pulse 74   Temp 98 °F (36.7 °C)   Resp 18   Ht 5' 9\" (1.753 m)   Wt 109 kg (240 lb)   SpO2 100%   BMI 35.44 kg/m²   HEENT:  Normocephalic atraumatic  Cardiac:  Regular rate and rhythm, Peripheral edema: negative  Pulmonary: Non-labored breathing, CTAB  Abdomen: Soft, non-tender, non-distended.  No masses, tenderness, " hernias noted.  Prior surgical scars healed. Right PCN to drainage with clear urine  Genitourinary: negative CVA tenderness, neg suprapubic tenderness.  Extremities: normal movement in all 4       Labs:     Lab Results   Component Value Date    HGB 12.1 06/28/2024    HCT 36.4 06/28/2024    WBC 4.0 06/28/2024     06/28/2024   ]    Lab Results   Component Value Date    K 4.6 06/28/2024     06/28/2024    CO2 25 06/28/2024    BUN 17 06/28/2024    CREATININE 0.80 06/28/2024    CALCIUM 8.6 02/14/2024   ]    Imaging:   I personally reviewed the images and report of the following studies, and reviewed them with the patient:    PCN placement imanges and retrograde images and PET scan reviewed.    Thank you for allowing me to participate in this patients’ care.  Please do not hesitate to call with any additional questions.  Vitor Fields MD

## 2024-07-23 LAB
ABO GROUP BLD BPU: NORMAL
ABO GROUP BLD BPU: NORMAL
ANION GAP SERPL CALCULATED.3IONS-SCNC: 6 MMOL/L (ref 4–13)
BPU ID: NORMAL
BPU ID: NORMAL
BUN SERPL-MCNC: 12 MG/DL (ref 5–25)
CALCIUM SERPL-MCNC: 8.7 MG/DL (ref 8.4–10.2)
CHLORIDE SERPL-SCNC: 105 MMOL/L (ref 96–108)
CO2 SERPL-SCNC: 28 MMOL/L (ref 21–32)
CREAT FLD-MCNC: 0.56 MG/DL
CREAT SERPL-MCNC: 0.7 MG/DL (ref 0.6–1.3)
CROSSMATCH: NORMAL
CROSSMATCH: NORMAL
ERYTHROCYTE [DISTWIDTH] IN BLOOD BY AUTOMATED COUNT: 12.6 % (ref 11.6–15.1)
ESTRADIOL SERPL HS-MCNC: 13.7 PG/ML
FSH SERPL-ACNC: 67.1 MIU/ML
GFR SERPL CREATININE-BSD FRML MDRD: 113 ML/MIN/1.73SQ M
GLUCOSE SERPL-MCNC: 109 MG/DL (ref 65–140)
HCT VFR BLD AUTO: 32.9 % (ref 34.8–46.1)
HGB BLD-MCNC: 11 G/DL (ref 11.5–15.4)
MCH RBC QN AUTO: 29.5 PG (ref 26.8–34.3)
MCHC RBC AUTO-ENTMCNC: 33.4 G/DL (ref 31.4–37.4)
MCV RBC AUTO: 88 FL (ref 82–98)
PLATELET # BLD AUTO: 222 THOUSANDS/UL (ref 149–390)
PMV BLD AUTO: 8.4 FL (ref 8.9–12.7)
POTASSIUM SERPL-SCNC: 3.9 MMOL/L (ref 3.5–5.3)
RBC # BLD AUTO: 3.73 MILLION/UL (ref 3.81–5.12)
SODIUM SERPL-SCNC: 139 MMOL/L (ref 135–147)
UNIT DISPENSE STATUS: NORMAL
UNIT DISPENSE STATUS: NORMAL
UNIT PRODUCT CODE: NORMAL
UNIT PRODUCT CODE: NORMAL
UNIT PRODUCT VOLUME: 350 ML
UNIT PRODUCT VOLUME: 350 ML
UNIT RH: NORMAL
UNIT RH: NORMAL
WBC # BLD AUTO: 5.83 THOUSAND/UL (ref 4.31–10.16)

## 2024-07-23 PROCEDURE — 82570 ASSAY OF URINE CREATININE: CPT

## 2024-07-23 PROCEDURE — 99024 POSTOP FOLLOW-UP VISIT: CPT | Performed by: UROLOGY

## 2024-07-23 PROCEDURE — 85027 COMPLETE CBC AUTOMATED: CPT | Performed by: UROLOGY

## 2024-07-23 PROCEDURE — 80048 BASIC METABOLIC PNL TOTAL CA: CPT | Performed by: UROLOGY

## 2024-07-23 PROCEDURE — NC001 PR NO CHARGE: Performed by: PHYSICIAN ASSISTANT

## 2024-07-23 RX ORDER — OXYCODONE HYDROCHLORIDE 5 MG/1
5 TABLET ORAL EVERY 4 HOURS PRN
Status: DISCONTINUED | OUTPATIENT
Start: 2024-07-23 | End: 2024-07-25 | Stop reason: HOSPADM

## 2024-07-23 RX ADMIN — SENNOSIDES 8.6 MG: 8.6 TABLET, FILM COATED ORAL at 08:19

## 2024-07-23 RX ADMIN — ACETAMINOPHEN 650 MG: 325 TABLET, FILM COATED ORAL at 17:06

## 2024-07-23 RX ADMIN — DOCUSATE SODIUM 100 MG: 100 CAPSULE, LIQUID FILLED ORAL at 17:06

## 2024-07-23 RX ADMIN — ACETAMINOPHEN 650 MG: 325 TABLET, FILM COATED ORAL at 05:24

## 2024-07-23 RX ADMIN — HEPARIN SODIUM 5000 UNITS: 5000 INJECTION INTRAVENOUS; SUBCUTANEOUS at 21:58

## 2024-07-23 RX ADMIN — ACETAMINOPHEN 650 MG: 325 TABLET, FILM COATED ORAL at 23:25

## 2024-07-23 RX ADMIN — CEFAZOLIN SODIUM 2000 MG: 2 SOLUTION INTRAVENOUS at 04:21

## 2024-07-23 RX ADMIN — DOCUSATE SODIUM 100 MG: 100 CAPSULE, LIQUID FILLED ORAL at 08:19

## 2024-07-23 RX ADMIN — SODIUM CHLORIDE, SODIUM LACTATE, POTASSIUM CHLORIDE, AND CALCIUM CHLORIDE 125 ML/HR: .6; .31; .03; .02 INJECTION, SOLUTION INTRAVENOUS at 08:26

## 2024-07-23 RX ADMIN — HYDROMORPHONE HYDROCHLORIDE 0.5 MG: 1 INJECTION, SOLUTION INTRAMUSCULAR; INTRAVENOUS; SUBCUTANEOUS at 12:00

## 2024-07-23 RX ADMIN — GABAPENTIN 100 MG: 100 CAPSULE ORAL at 08:19

## 2024-07-23 RX ADMIN — HEPARIN SODIUM 5000 UNITS: 5000 INJECTION INTRAVENOUS; SUBCUTANEOUS at 13:45

## 2024-07-23 RX ADMIN — ACETAMINOPHEN 650 MG: 325 TABLET, FILM COATED ORAL at 00:02

## 2024-07-23 RX ADMIN — OXYCODONE HYDROCHLORIDE 5 MG: 5 TABLET ORAL at 21:58

## 2024-07-23 RX ADMIN — HEPARIN SODIUM 5000 UNITS: 5000 INJECTION INTRAVENOUS; SUBCUTANEOUS at 05:24

## 2024-07-23 RX ADMIN — GABAPENTIN 100 MG: 100 CAPSULE ORAL at 21:58

## 2024-07-23 RX ADMIN — GABAPENTIN 100 MG: 100 CAPSULE ORAL at 17:06

## 2024-07-23 RX ADMIN — HYDROMORPHONE HYDROCHLORIDE 0.5 MG: 1 INJECTION, SOLUTION INTRAMUSCULAR; INTRAVENOUS; SUBCUTANEOUS at 08:21

## 2024-07-23 RX ADMIN — ACETAMINOPHEN 650 MG: 325 TABLET, FILM COATED ORAL at 11:59

## 2024-07-23 RX ADMIN — HYDROMORPHONE HYDROCHLORIDE 0.5 MG: 1 INJECTION, SOLUTION INTRAMUSCULAR; INTRAVENOUS; SUBCUTANEOUS at 19:12

## 2024-07-23 RX ADMIN — HYDROMORPHONE HYDROCHLORIDE 0.5 MG: 1 INJECTION, SOLUTION INTRAMUSCULAR; INTRAVENOUS; SUBCUTANEOUS at 14:00

## 2024-07-23 NOTE — UTILIZATION REVIEW
Initial Clinical Review    Elective Inpatient surgical procedure  Age/Sex: 34 y.o. female  Surgery Date: 7/22  Procedure: S/p Right - REIMPLANTATION URETEROCYSTOSTOMY LAPAROSCOPIC W/ ROBOTICS. EXTENSIVE LYSIS OF ADHESIONS  CYSTOSCOPY  Right - INSERTION STENT URETERAL  Anesthesia: General    POD#1 Progress Note:   7/23  Nephrostomy capped, antegrade nephrostogram after stent removal. Stent for 4 weeks.   NI drain 145- serosanguinous. 40 mL today at 10am.Will trend output. NI Cr ordered   Paul output 3725 mL. Dc paul tomorrow.   Aggressive ambulation, incentive spirometry   Pt reports incisions feel like bee stings. Has not ambulated today yet   On exam; Paul with pink tinged urine.    Admission Orders: Date/Time/Statement:   Admission Orders (From admission, onward)       Ordered        07/22/24 1511  Inpatient Admission  Once                          Orders Placed This Encounter   Procedures    Inpatient Admission     Standing Status:   Standing     Number of Occurrences:   1     Order Specific Question:   Level of Care     Answer:   Med Surg [16]     Order Specific Question:   Estimated length of stay     Answer:   Inpatient Only Surgery       Vital Signs (last 3 days)       Date/Time Temp Pulse Resp BP MAP (mmHg) SpO2 O2 Device Cardiac (WDL) Patient Position - Orthostatic VS Pain    07/23/24 11:09:25 98.6 °F (37 °C) 81 17 111/69 83 97 % -- -- -- --    07/23/24 0821 -- -- -- -- -- -- -- -- -- 7 07/23/24 07:25:26 98.3 °F (36.8 °C) 69 17 108/68 81 97 % -- -- -- --    07/23/24 0524 -- -- -- -- -- -- -- -- -- 4 07/23/24 0002 -- -- -- -- -- -- -- -- -- 4 07/22/24 22:39:54 98.1 °F (36.7 °C) 62 -- 110/70 83 95 % -- -- -- --    07/22/24 20:13:43 -- 76 -- 112/68 83 100 % -- -- -- --    07/22/24 1951 -- -- -- -- -- -- -- -- -- 6    07/22/24 19:17:56 -- 69 16 117/71 86 97 % None (Room air) -- Sitting --    07/22/24 18:06:27 98.5 °F (36.9 °C) 59 14 101/65 77 98 % -- -- -- --    07/22/24 1703 -- -- -- -- -- -- -- --  -- 3    07/22/24 17:02:27 98.4 °F (36.9 °C) 57 16 119/67 84 95 % -- -- -- --    07/22/24 1651 -- -- -- -- -- -- None (Room air) -- -- 3    07/22/24 1641 -- 64 16 -- -- 95 % -- -- -- --    07/22/24 16:26:10 98.8 °F (37.1 °C) 59 -- 94/48 63 96 % -- -- -- --    07/22/24 1600 98.1 °F (36.7 °C) 58 14 109/61 79 98 % None (Room air) -- -- --    07/22/24 1545 -- 54 14 105/61 79 97 % None (Room air) -- -- --    07/22/24 1530 -- 58 13 115/63 84 97 % None (Room air) -- -- --    07/22/24 1515 -- 58 12 108/61 78 96 % None (Room air) -- -- --    07/22/24 1500 -- 56 12 111/60 80 96 % None (Room air) -- -- --    07/22/24 1445 -- 62 12 108/58 78 96 % None (Room air) -- -- No Pain    07/22/24 1430 -- 56 15 116/56 80 98 % None (Room air) -- -- --    07/22/24 1415 -- 76 15 122/65 88 97 % -- -- -- --    07/22/24 1413 97.8 °F (36.6 °C) 64 16 119/68 89 96 % None (Room air) WDL -- --    07/22/24 0655 98 °F (36.7 °C) 74 18 113/60 -- 100 % None (Room air) -- -- No Pain          Weight (last 2 days)       Date/Time Weight    07/22/24 0655 109 (240)            Pertinent Labs/Diagnostic Test Results:   Radiology:  XR abdomen 1 view kub   Final Interpretation by Sunil Andre MD (07/23 0701)      Postoperative changes. Subcutaneous air consistent with recent robotic surgery.      Right-sided nephrostomy tube in good position. Right ureteral catheter. The distal aspect of the catheter lies in the midline in the mid pelvis. Correlate for proper positioning within the bladder. If clinically appropriate this can be further evaluated    via a right nephrostogram.         Workstation performed: [id number]           Cardiology:  No orders to display     GI:  No orders to display           Results from last 7 days   Lab Units 07/23/24  0509 07/22/24  1441   WBC Thousand/uL 5.83 7.47   HEMOGLOBIN g/dL 11.0* 13.2   HEMATOCRIT % 32.9* 39.8   PLATELETS Thousands/uL 222 222         Results from last 7 days   Lab Units 07/23/24  0509 07/22/24  1441    SODIUM mmol/L 139 137   POTASSIUM mmol/L 3.9 3.9   CHLORIDE mmol/L 105 103   CO2 mmol/L 28 27   ANION GAP mmol/L 6 7   BUN mg/dL 12 18   CREATININE mg/dL 0.70 0.92   EGFR ml/min/1.73sq m 113 81   CALCIUM mg/dL 8.7 9.2             Results from last 7 days   Lab Units 07/23/24  0509 07/22/24  1441   GLUCOSE RANDOM mg/dL 109 134           Results from last 7 days   Lab Units 07/22/24  1343   UNIT PRODUCT CODE  [id number]  [id number]   UNIT NUMBER  [id number]  [id number]   UNITABO  O  O   UNITRH  POS  POS   CROSSMATCH  Compatible  Compatible   UNIT DISPENSE STATUS  Crossmatched  Crossmatched   UNIT PRODUCT VOL ml 350  350       Diet: Regular  Mobility: Up as tolerated  DVT Prophylaxis: SCD    Medications/Pain Control:   Scheduled Medications:  acetaminophen, 650 mg, Oral, Q6H KATYA  docusate sodium, 100 mg, Oral, BID  gabapentin, 100 mg, Oral, TID  heparin (porcine), 5,000 Units, Subcutaneous, Q8H KATYA  norethindrone-ethinyl estradiol, 1 tablet, Oral, Daily  scopolamine, 1 patch, Transdermal, Once  senna, 1 tablet, Oral, Daily    ceFAZolin (ANCEF) IVPB (premix in dextrose) 2,000 mg 50 mL  Dose: 2,000 mg  Freq: Once Route: IV  Indications of Use: PROPHYLAXIS  Start: 07/22/24 0645 End: 07/22/24 1151    ceFAZolin (ANCEF) IVPB (premix in dextrose) 2,000 mg 50 mL  Dose: 2,000 mg  Freq: Every 8 hours Route: IV  Last Dose: 2,000 mg (07/23/24 0421)  Start: 07/22/24 2000 End: 07/23/24 0451      Continuous IV Infusions:   lactated ringers infusion  Rate: 125 mL/hr Dose: 125 mL/hr  Freq: Continuous Route: IV  Last Dose: 125 mL/hr (07/23/24 0826)  Start: 07/22/24 1630 End: 07/23/24 1117    PRN Meds:  HYDROmorphone, 0.5 mg, Intravenous, Q2H PRN 7/22 x1, 7/23 x1  ondansetron, 4 mg, Intravenous, Q6H PRN  promethazine, 12.5 mg, Intravenous, Q10 Min PRN      Network Utilization Review Department  ATTENTION: Please call with any questions or concerns to [phone] and carefully listen to the prompts so that you are directed  to the right person. All voicemails are confidential.   For Discharge needs, contact Care Management DC Support Team at [phone] opt. 2  Send all requests for admission clinical reviews, approved or denied determinations and any other requests to dedicated fax number below belonging to the [facility] where the patient is receiving treatment. List of dedicated fax numbers for the Facilities:  FACILITY NAME UR FAX NUMBER   ADMISSION DENIALS (Administrative/Medical Necessity) [phone]   DISCHARGE SUPPORT TEAM (NETWORK) [phone]   PARENT CHILD HEALTH (Maternity/NICU/Pediatrics) [phone]   [facility] [phone]   [facility] [phone]   [facility] [phone]   [facility] [phone]   [facility] [phone]   [facility] [phone]   [facility] [phone]   [facility] [phone]   [facility] [phone]   [facility] [phone]   [facility] [phone]   [facility] [phone]

## 2024-07-23 NOTE — PROGRESS NOTES
"Progress Note - Urology  Margot Moody 1990, 34 y.o. female MRN: [id number]    Unit/Bed#: S -01 Encounter: [id number]        Assessment & Plan:  Ureteral stricture  -POD1 right reimplantation ureterocystostomy laparoscopic with robotics, lysis of adhesions, insertion of right ureteral stent by Dr. Fields  -VSS, afebrile  -Tolerating diet, advanced. No N/V. Not passing gas  -No leukocytosis  -Hgb stable 11  -Not ambulatory   -Nephrostomy capped.   -NI drain 145- serosanguinous. 40 mL today at 10am.Will trend output. NI Cr ordered   -Marshall output 3725 mL  -Patient will keep PCN capped, antegrade nephrostogram after stent removal. Stent for 4 weeks.   -Marshall dc tomorrow  -Aggressive ambulation, incentive spirometry   -Likely discharge tomorrow    Subjective:   HPI: Seen at bedside. Reports incisions feel like bee stings. Has not ambulated today yet. No nausea or vomiting, tolerating diet. Discussed keeping PCN until antegrade nephrostogram- patient agreeable.     Review of Systems   Constitutional:  Negative for chills and fever.   Respiratory:  Negative for cough and shortness of breath.    Cardiovascular:  Negative for chest pain and palpitations.   Gastrointestinal:  Positive for abdominal pain. Negative for vomiting.   Genitourinary:  Negative for dysuria and hematuria.   Musculoskeletal:  Negative for arthralgias and back pain.   Skin:  Negative for color change and rash.   Neurological:  Negative for seizures and syncope.   All other systems reviewed and are negative.      Objective:    Vitals: Blood pressure 111/69, pulse 81, temperature 98.6 °F (37 °C), resp. rate 17, height 5' 9\" (1.753 m), weight 109 kg (240 lb), SpO2 97%, not currently breastfeeding.,Body mass index is 35.44 kg/m².    Physical Exam  Vitals reviewed.   Constitutional:       General: She is not in acute distress.     Appearance: Normal appearance. She is normal weight. She is not ill-appearing, toxic-appearing or diaphoretic. "   HENT:      Head: Normocephalic and atraumatic.      Right Ear: External ear normal.      Left Ear: External ear normal.      Nose: Nose normal.      Mouth/Throat:      Mouth: Mucous membranes are moist.   Eyes:      General: No scleral icterus.     Conjunctiva/sclera: Conjunctivae normal.   Cardiovascular:      Rate and Rhythm: Normal rate.      Pulses: Normal pulses.   Pulmonary:      Effort: Pulmonary effort is normal.   Abdominal:      Comments: Incisions clean, dry. Abdomen soft, nontender. NI draining serosanginous.   R PCN capped, no external drainage.    Genitourinary:     Comments: Marshall with pink tinged urine  Musculoskeletal:      Cervical back: Normal range of motion.   Skin:     General: Skin is warm.   Neurological:      General: No focal deficit present.      Mental Status: She is alert and oriented to person, place, and time. Mental status is at baseline.   Psychiatric:         Mood and Affect: Mood normal.         Behavior: Behavior normal.         Thought Content: Thought content normal.         Judgment: Judgment normal.       Labs:  Recent Labs     07/22/24  1441 07/23/24  0509   WBC 7.47 5.83       Recent Labs     07/22/24  1441 07/23/24  0509   HGB 13.2 11.0*     Recent Labs     07/22/24  1441 07/23/24  0509   HCT 39.8 32.9*     Recent Labs     07/22/24  1441 07/23/24  0509   CREATININE 0.92 0.70         History:    Past Medical History:   Diagnosis Date    Anxiety     Cancer (HCC)     cervix    Cervical cancer (HCC)     receiving chemo and radiation    COVID     Jan 2022    Depression     Diarrhea     Dizziness     Frequent headaches     Hx of bleeding disorder     vaginal bleeding    Obesity      Social History     Socioeconomic History    Marital status: Single     Spouse name: None    Number of children: None    Years of education: None    Highest education level: None   Occupational History    None   Tobacco Use    Smoking status: Never     Passive exposure: Current (very mild/social)     Smokeless tobacco: Never   Vaping Use    Vaping status: Never Used   Substance and Sexual Activity    Alcohol use: Not Currently    Drug use: Never    Sexual activity: Not Currently     Partners: Male     Birth control/protection: Abstinence, None     Comment: Havent had sex for at least two years   Other Topics Concern    None   Social History Narrative    None     Social Determinants of Health     Financial Resource Strain: Not on file   Food Insecurity: No Food Insecurity (2/12/2024)    Hunger Vital Sign     Worried About Running Out of Food in the Last Year: Never true     Ran Out of Food in the Last Year: Never true   Transportation Needs: No Transportation Needs (2/12/2024)    PRAPARE - Transportation     Lack of Transportation (Medical): No     Lack of Transportation (Non-Medical): No   Physical Activity: Not on file   Stress: Not on file   Social Connections: Not on file   Intimate Partner Violence: Not At Risk (3/14/2024)    Humiliation, Afraid, Rape, and Kick questionnaire     Fear of Current or Ex-Partner: No     Emotionally Abused: No     Physically Abused: No     Sexually Abused: No   Housing Stability: Low Risk  (2/12/2024)    Housing Stability Vital Sign     Unable to Pay for Housing in the Last Year: No     Number of Times Moved in the Last Year: 1     Homeless in the Last Year: No     Past Surgical History:   Procedure Laterality Date    CERVICAL BIOPSY  W/ LOOP ELECTRODE EXCISION      COLON SURGERY  Colon resection    May 2023    FL RETROGRADE PYELOGRAM  02/13/2024    IR NEPHROSTOMY TUBE PLACEMENT  7/1/2024    LYMPH NODE DISSECTION Bilateral 05/06/2022    Procedure: DISSECTION/STAGING LYMPH NODE PELVIS/ABDOMEN;  Surgeon: Parminder Moctezuma MD;  [facility];  Service: Gynecology Oncology    [place] CYSTO BLADDER W/URETERAL CATHETERIZATION Right 02/13/2024    Procedure: CYSTOSCOPY WITH RETROGRADE PYELOGRAM;  Surgeon: Chuck Campo MD;  [facility];  Service: Urology    [place]  CYSTOURETHROSCOPY N/A 2024    Procedure: CYSTOSCOPY;  Surgeon: Vitor Fields MD;  [facility];  Service: Urology    [place] INSERTION VAGINAL RADIATION DEVICE N/A 07/15/2022    Procedure: INSERTION NADIR SLEEVE VAGINA WITH POST OP BRACHYTHERAPY (IN RADIATION ONCOLOGY);  Surgeon: Parminder Moctezuma MD;  [facility];  Service: Gynecology Oncology    [place] LAPAROSCOPY COLECTOMY PARTIAL W/ANASTOMOSIS N/A 05/10/2023    Procedure: RESECTION COLON SIGMOID LAPAROSCOPIC;  Surgeon: Marin Salinas MD;  [facility];  Service: Colorectal    [place] SIGMOIDOSCOPY FLX DX W/COLLJ SPEC BR/WA IF PFRMD N/A 05/10/2023    Procedure: SIGMOIDOSCOPY FLEXIBLE;  Surgeon: Marin Salinas MD;  [facility];  Service: Colorectal    [place] URETERONEOCYSTOSTOMY ANAST 1 URETER BLADDER Right 2024    Procedure: REIMPLANTATION URETEROCYSTOSTOMY LAPAROSCOPIC W/ ROBOTICS, EXTENSIVE LYSIS OF ADHESIONS;  Surgeon: Vitor Fields MD;  [facility];  Service: Urology    SALPINGECTOMY Bilateral 2022    Procedure: SALPINGECTOMY, OVARIAN TRANSPOSITION;  Surgeon: Parminder Moctezuma MD;  [facility];  Service: Gynecology Oncology    URETERAL STENT PLACEMENT Right 2024    Procedure: INSERTION STENT URETERAL;  Surgeon: Chuck Campo MD;  [facility];  Service: Urology    URETERAL STENT PLACEMENT Right 2024    Procedure: INSERTION STENT URETERAL;  Surgeon: Vitor Fields MD;  [facility];  Service: Urology    US GUIDANCE  07/15/2022     Family History   Problem Relation Age of Onset    No Known Problems Mother     Heart disease Father     Cancer Brother         Niece/ brothers daughter stage 4 Adrenocortocal carcinoma    Ovarian cancer Maternal Aunt         Dont know  before i was born    Cancer Maternal Uncle         Stage 2 Liver Cancer    Ovarian cancer Maternal Grandmother         Dont know  before i was born    Cancer Niece 7        adrenal  gland       Keisha Claros PA-C  Date: 7/23/2024 Time: 12:32 PM

## 2024-07-23 NOTE — PLAN OF CARE
Problem: PAIN - ADULT  Goal: Verbalizes/displays adequate comfort level or baseline comfort level  Description: Interventions:  - Encourage patient to monitor pain and request assistance  - Assess pain using appropriate pain scale  - Administer analgesics based on type and severity of pain and evaluate response  - Implement non-pharmacological measures as appropriate and evaluate response  - Consider cultural and social influences on pain and pain management  - Notify physician/advanced practitioner if interventions unsuccessful or patient reports new pain  Outcome: Progressing     Problem: INFECTION - ADULT  Goal: Absence of fever/infection during neutropenic period  Description: INTERVENTIONS:  - Monitor WBC    Outcome: Progressing     Problem: SAFETY ADULT  Goal: Maintain or return to baseline ADL function  Description: INTERVENTIONS:  -  Assess patient's ability to carry out ADLs; assess patient's baseline for ADL function and identify physical deficits which impact ability to perform ADLs (bathing, care of mouth/teeth, toileting, grooming, dressing, etc.)  - Assess/evaluate cause of self-care deficits   - Assess range of motion  - Assess patient's mobility; develop plan if impaired  - Assess patient's need for assistive devices and provide as appropriate  - Encourage maximum independence but intervene and supervise when necessary  - Involve family in performance of ADLs  - Assess for home care needs following discharge   - Consider OT consult to assist with ADL evaluation and planning for discharge  - Provide patient education as appropriate  Outcome: Progressing     Problem: DISCHARGE PLANNING  Goal: Discharge to home or other facility with appropriate resources  Description: INTERVENTIONS:  - Identify barriers to discharge w/patient and caregiver  - Arrange for needed discharge resources and transportation as appropriate  - Identify discharge learning needs (meds, wound care, etc.)  - Arrange for  interpretive services to assist at discharge as needed  - Refer to Case Management Department for coordinating discharge planning if the patient needs post-hospital services based on physician/advanced practitioner order or complex needs related to functional status, cognitive ability, or social support system  Outcome: Progressing     Problem: Knowledge Deficit  Goal: Patient/family/caregiver demonstrates understanding of disease process, treatment plan, medications, and discharge instructions  Description: Complete learning assessment and assess knowledge base.  Interventions:  - Provide teaching at level of understanding  - Provide teaching via preferred learning methods  Outcome: Progressing     Problem: GENITOURINARY - ADULT  Goal: Absence of urinary retention  Description: INTERVENTIONS:  - Assess patient’s ability to void and empty bladder  - Monitor I/O  - Bladder scan as needed  - Discuss with physician/AP medications to alleviate retention as needed  - Discuss catheterization for long term situations as appropriate  Outcome: Progressing     Problem: GENITOURINARY - ADULT  Goal: Maintains or returns to baseline urinary function  Description: INTERVENTIONS:  - Assess urinary function  - Encourage oral fluids to ensure adequate hydration if ordered  - Administer IV fluids as ordered to ensure adequate hydration  - Administer ordered medications as needed  - Offer frequent toileting  - Follow urinary retention protocol if ordered  Outcome: Progressing     Problem: GENITOURINARY - ADULT  Goal: Urinary catheter remains patent  Description: INTERVENTIONS:  - Assess patency of urinary catheter  - If patient has a chronic paul, consider changing catheter if non-functioning  - Follow guidelines for intermittent irrigation of non-functioning urinary catheter  Outcome: Progressing

## 2024-07-24 ENCOUNTER — TELEPHONE (OUTPATIENT)
Dept: UROLOGY | Facility: CLINIC | Age: 34
End: 2024-07-24

## 2024-07-24 LAB
ANION GAP SERPL CALCULATED.3IONS-SCNC: 5 MMOL/L (ref 4–13)
BUN SERPL-MCNC: 12 MG/DL (ref 5–25)
CALCIUM SERPL-MCNC: 8.3 MG/DL (ref 8.4–10.2)
CHLORIDE SERPL-SCNC: 102 MMOL/L (ref 96–108)
CO2 SERPL-SCNC: 28 MMOL/L (ref 21–32)
CREAT SERPL-MCNC: 0.76 MG/DL (ref 0.6–1.3)
ERYTHROCYTE [DISTWIDTH] IN BLOOD BY AUTOMATED COUNT: 12.9 % (ref 11.6–15.1)
GFR SERPL CREATININE-BSD FRML MDRD: 102 ML/MIN/1.73SQ M
GLUCOSE SERPL-MCNC: 102 MG/DL (ref 65–140)
HCT VFR BLD AUTO: 29.6 % (ref 34.8–46.1)
HGB BLD-MCNC: 10 G/DL (ref 11.5–15.4)
MCH RBC QN AUTO: 29.8 PG (ref 26.8–34.3)
MCHC RBC AUTO-ENTMCNC: 33.8 G/DL (ref 31.4–37.4)
MCV RBC AUTO: 88 FL (ref 82–98)
PLATELET # BLD AUTO: 161 THOUSANDS/UL (ref 149–390)
PMV BLD AUTO: 8 FL (ref 8.9–12.7)
POTASSIUM SERPL-SCNC: 3.6 MMOL/L (ref 3.5–5.3)
RBC # BLD AUTO: 3.36 MILLION/UL (ref 3.81–5.12)
SODIUM SERPL-SCNC: 135 MMOL/L (ref 135–147)
WBC # BLD AUTO: 4.51 THOUSAND/UL (ref 4.31–10.16)

## 2024-07-24 PROCEDURE — 99024 POSTOP FOLLOW-UP VISIT: CPT | Performed by: UROLOGY

## 2024-07-24 PROCEDURE — 80048 BASIC METABOLIC PNL TOTAL CA: CPT | Performed by: UROLOGY

## 2024-07-24 PROCEDURE — 85027 COMPLETE CBC AUTOMATED: CPT | Performed by: UROLOGY

## 2024-07-24 RX ORDER — KETOROLAC TROMETHAMINE 30 MG/ML
15 INJECTION, SOLUTION INTRAMUSCULAR; INTRAVENOUS ONCE
Status: COMPLETED | OUTPATIENT
Start: 2024-07-24 | End: 2024-07-24

## 2024-07-24 RX ORDER — NORETHINDRONE ACETATE AND ETHINYL ESTRADIOL .5; 2.5 MG/1; UG/1
1 TABLET ORAL DAILY
Status: DISCONTINUED | OUTPATIENT
Start: 2024-07-24 | End: 2024-07-25 | Stop reason: HOSPADM

## 2024-07-24 RX ADMIN — OXYCODONE HYDROCHLORIDE 5 MG: 5 TABLET ORAL at 03:34

## 2024-07-24 RX ADMIN — GABAPENTIN 100 MG: 100 CAPSULE ORAL at 09:12

## 2024-07-24 RX ADMIN — GABAPENTIN 100 MG: 100 CAPSULE ORAL at 21:48

## 2024-07-24 RX ADMIN — ONDANSETRON 4 MG: 2 INJECTION INTRAMUSCULAR; INTRAVENOUS at 11:58

## 2024-07-24 RX ADMIN — HEPARIN SODIUM 5000 UNITS: 5000 INJECTION INTRAVENOUS; SUBCUTANEOUS at 21:48

## 2024-07-24 RX ADMIN — HEPARIN SODIUM 5000 UNITS: 5000 INJECTION INTRAVENOUS; SUBCUTANEOUS at 06:20

## 2024-07-24 RX ADMIN — OXYCODONE HYDROCHLORIDE 5 MG: 5 TABLET ORAL at 14:22

## 2024-07-24 RX ADMIN — ACETAMINOPHEN 650 MG: 325 TABLET, FILM COATED ORAL at 17:29

## 2024-07-24 RX ADMIN — HEPARIN SODIUM 5000 UNITS: 5000 INJECTION INTRAVENOUS; SUBCUTANEOUS at 14:22

## 2024-07-24 RX ADMIN — DOCUSATE SODIUM 100 MG: 100 CAPSULE, LIQUID FILLED ORAL at 09:13

## 2024-07-24 RX ADMIN — HYDROMORPHONE HYDROCHLORIDE 0.5 MG: 1 INJECTION, SOLUTION INTRAMUSCULAR; INTRAVENOUS; SUBCUTANEOUS at 11:58

## 2024-07-24 RX ADMIN — ONDANSETRON 4 MG: 2 INJECTION INTRAMUSCULAR; INTRAVENOUS at 03:34

## 2024-07-24 RX ADMIN — HYDROMORPHONE HYDROCHLORIDE 0.5 MG: 1 INJECTION, SOLUTION INTRAMUSCULAR; INTRAVENOUS; SUBCUTANEOUS at 06:29

## 2024-07-24 RX ADMIN — DOCUSATE SODIUM 100 MG: 100 CAPSULE, LIQUID FILLED ORAL at 17:29

## 2024-07-24 RX ADMIN — ACETAMINOPHEN 650 MG: 325 TABLET, FILM COATED ORAL at 06:20

## 2024-07-24 RX ADMIN — GABAPENTIN 100 MG: 100 CAPSULE ORAL at 16:04

## 2024-07-24 RX ADMIN — OXYCODONE HYDROCHLORIDE 5 MG: 5 TABLET ORAL at 21:48

## 2024-07-24 RX ADMIN — OXYCODONE HYDROCHLORIDE 5 MG: 5 TABLET ORAL at 09:13

## 2024-07-24 RX ADMIN — KETOROLAC TROMETHAMINE 15 MG: 30 INJECTION, SOLUTION INTRAMUSCULAR; INTRAVENOUS at 12:42

## 2024-07-24 RX ADMIN — HYDROMORPHONE HYDROCHLORIDE 0.5 MG: 1 INJECTION, SOLUTION INTRAMUSCULAR; INTRAVENOUS; SUBCUTANEOUS at 17:48

## 2024-07-24 RX ADMIN — NORETHINDRONE ACETATE AND ETHINYL ESTRADIOL 1 TABLET: .5; 2.5 TABLET ORAL at 17:29

## 2024-07-24 RX ADMIN — SENNOSIDES 8.6 MG: 8.6 TABLET, FILM COATED ORAL at 09:12

## 2024-07-24 RX ADMIN — ACETAMINOPHEN 650 MG: 325 TABLET, FILM COATED ORAL at 11:54

## 2024-07-24 NOTE — TELEPHONE ENCOUNTER
Patient is being discharged today after a right ureteral reimplantation surgery.  Plan for cystoscopy and stent removal in about 4 weeks.  Plan for antegrade nephrostogram through PCN tube 2-4 weeks later and if patency appreciated then the PCN can be removed.

## 2024-07-24 NOTE — DISCHARGE INSTR - AVS FIRST PAGE
Audrey,     You underwent right reimplantation surgery and lysis of adhesions.  Surgery was difficult because of severe radiation changes throughout the lower abdomen.  You have a stent in place from the right kidney that ends in the bladder. It is very common to have pain associated with stent, frequency, urgency, hematuria.  Please take oxybutynin, Flomax and Pyridium as needed for discomfort with stent.  Toradol is also prescribed to help with pain.  The stent will remain in place for 4 weeks.  He will see Dr. Fields in the office for stent removal- our office will call to scheduled all postop appointments.  Approximately 2 weeks weeks after the stent is removed you will undergo an antegrade nephrostogram to evaluate for healing-once this test is performed interventional radiologist will remove the nephrostomy tube.    Avoid heavy lifting    Stay hydrated drink 6 to 8 cups of water a day.    Please call our office with any questions or concerns    Keisha Claros PA-C  [facility]

## 2024-07-24 NOTE — QUICK NOTE
Evaluated patient in the afternoon. NI removed. Patient not tolerating pain, minimally ambulatory due to incisional abdominal pain. Patient will stay additional night for pain control.

## 2024-07-24 NOTE — PROGRESS NOTES
"Progress Note - Urology  Margot Moody 1990, 34 y.o. female MRN: [id number]    Unit/Bed#: S -01 Encounter: [id number]    Assessment & Plan:  Ureteral stricture  -POD 2 right reimplantation ureterocystostomy laparoscopic with robotics, lysis of adhesions, insertion of right ureteral stent by Dr. Fields  -Vital signs stable, afebrile  -No leukocytosis  -Hemoglobin stable 10, yesterday 11  -BMP stable  -NI drain 40 over the past 24 hours, 105 this a.m.  NI creatinine within normal limits.  Likely bowel sweat. Will remove prior to dc  -Discontinue Marshall  -Tolerating normal diet, no nausea or vomiting. Passing gas  -Patient will keep nephrostomy capped. Antegrade nephrostogram after stent removal-4 weeks  -Will reassess prior to discharge    Subjective:   HPI: Seen at bedside.  Ambulated the halls yesterday.  Passing gas.  Still reporting abdominal pain, incisional.  Along with right-sided flank pain associated with the stent.  Ambulated the halls yesterday.  No difficulty.  Tolerating her diet without nausea or vomiting.    Review of Systems   Constitutional:  Negative for chills and fever.   Respiratory:  Negative for cough and shortness of breath.    Cardiovascular:  Negative for chest pain and palpitations.   Gastrointestinal:  Positive for abdominal pain. Negative for vomiting.   Genitourinary:  Positive for hematuria. Negative for dysuria.   Musculoskeletal:  Negative for arthralgias and back pain.   Skin:  Negative for color change and rash.   Neurological:  Negative for seizures and syncope.   All other systems reviewed and are negative.      Objective:  Vitals: Blood pressure 113/68, pulse 86, temperature 98.5 °F (36.9 °C), resp. rate 15, height 5' 9\" (1.753 m), weight 109 kg (240 lb), SpO2 96%, not currently breastfeeding.,Body mass index is 35.44 kg/m².    Physical Exam  Vitals reviewed.   Constitutional:       General: She is not in acute distress.     Appearance: Normal appearance. She is normal " weight. She is not ill-appearing, toxic-appearing or diaphoretic.   HENT:      Head: Normocephalic and atraumatic.      Right Ear: External ear normal.      Left Ear: External ear normal.      Nose: Nose normal.      Mouth/Throat:      Mouth: Mucous membranes are moist.   Eyes:      General: No scleral icterus.     Conjunctiva/sclera: Conjunctivae normal.   Cardiovascular:      Rate and Rhythm: Normal rate.      Pulses: Normal pulses.   Pulmonary:      Effort: Pulmonary effort is normal.   Musculoskeletal:         General: Normal range of motion.      Cervical back: Normal range of motion.   Skin:     General: Skin is warm.      Findings: No rash.   Neurological:      General: No focal deficit present.      Mental Status: She is alert and oriented to person, place, and time. Mental status is at baseline.   Psychiatric:         Mood and Affect: Mood normal.         Behavior: Behavior normal.         Thought Content: Thought content normal.         Judgment: Judgment normal.         Labs:  Recent Labs     07/22/24  1441 07/23/24  0509 07/24/24  0324   WBC 7.47 5.83 4.51       Recent Labs     07/22/24  1441 07/23/24  0509 07/24/24  0324   HGB 13.2 11.0* 10.0*     Recent Labs     07/22/24  1441 07/23/24  0509 07/24/24  0324   HCT 39.8 32.9* 29.6*     Recent Labs     07/22/24  1441 07/23/24  0509 07/24/24  0324   CREATININE 0.92 0.70 0.76       History:    Past Medical History:   Diagnosis Date    Anxiety     Cancer (HCC)     cervix    Cervical cancer (HCC)     receiving chemo and radiation    COVID     Jan 2022    Depression     Diarrhea     Dizziness     Frequent headaches     Hx of bleeding disorder     vaginal bleeding    Obesity      Social History     Socioeconomic History    Marital status: Single     Spouse name: None    Number of children: None    Years of education: None    Highest education level: None   Occupational History    None   Tobacco Use    Smoking status: Never     Passive exposure: Current (very  mild/social)    Smokeless tobacco: Never   Vaping Use    Vaping status: Never Used   Substance and Sexual Activity    Alcohol use: Not Currently    Drug use: Never    Sexual activity: Not Currently     Partners: Male     Birth control/protection: Abstinence, None     Comment: Havent had sex for at least two years   Other Topics Concern    None   Social History Narrative    None     Social Determinants of Health     Financial Resource Strain: Not on file   Food Insecurity: No Food Insecurity (2/12/2024)    Hunger Vital Sign     Worried About Running Out of Food in the Last Year: Never true     Ran Out of Food in the Last Year: Never true   Transportation Needs: No Transportation Needs (2/12/2024)    PRAPARE - Transportation     Lack of Transportation (Medical): No     Lack of Transportation (Non-Medical): No   Physical Activity: Not on file   Stress: Not on file   Social Connections: Not on file   Intimate Partner Violence: Not At Risk (3/14/2024)    Humiliation, Afraid, Rape, and Kick questionnaire     Fear of Current or Ex-Partner: No     Emotionally Abused: No     Physically Abused: No     Sexually Abused: No   Housing Stability: Low Risk  (2/12/2024)    Housing Stability Vital Sign     Unable to Pay for Housing in the Last Year: No     Number of Times Moved in the Last Year: 1     Homeless in the Last Year: No     Past Surgical History:   Procedure Laterality Date    CERVICAL BIOPSY  W/ LOOP ELECTRODE EXCISION      COLON SURGERY  Colon resection    May 2023    FL RETROGRADE PYELOGRAM  02/13/2024    IR NEPHROSTOMY TUBE PLACEMENT  7/1/2024    LYMPH NODE DISSECTION Bilateral 05/06/2022    Procedure: DISSECTION/STAGING LYMPH NODE PELVIS/ABDOMEN;  Surgeon: Parminder Moctezuma MD;  [facility];  Service: Gynecology Oncology    SC CYSTO BLADDER W/URETERAL CATHETERIZATION Right 02/13/2024    Procedure: CYSTOSCOPY WITH RETROGRADE PYELOGRAM;  Surgeon: Chuck Campo MD;  [facility];   Service: Urology    IL CYSTOURETHROSCOPY N/A 2024    Procedure: CYSTOSCOPY;  Surgeon: Vitor Fields MD;  [facility];  Service: Urology    IL INSERTION VAGINAL RADIATION DEVICE N/A 07/15/2022    Procedure: INSERTION NADIR SLEEVE VAGINA WITH POST OP BRACHYTHERAPY (IN RADIATION ONCOLOGY);  Surgeon: Parminder Moctezuma MD;  [facility];  Service: Gynecology Oncology    IL LAPAROSCOPY COLECTOMY PARTIAL W/ANASTOMOSIS N/A 05/10/2023    Procedure: RESECTION COLON SIGMOID LAPAROSCOPIC;  Surgeon: Marin Salinas MD;  [facility];  Service: Colorectal    IL SIGMOIDOSCOPY FLX DX W/COLLJ SPEC BR/WA IF PFRMD N/A 05/10/2023    Procedure: SIGMOIDOSCOPY FLEXIBLE;  Surgeon: Marin Salinas MD;  [facility];  Service: Colorectal    IL URETERONEOCYSTOSTOMY ANAST 1 URETER BLADDER Right 2024    Procedure: REIMPLANTATION URETEROCYSTOSTOMY LAPAROSCOPIC W/ ROBOTICS, EXTENSIVE LYSIS OF ADHESIONS;  Surgeon: Vitor Fields MD;  [facility];  Service: Urology    SALPINGECTOMY Bilateral 2022    Procedure: SALPINGECTOMY, OVARIAN TRANSPOSITION;  Surgeon: Parminder Moctezuma MD;  [facility];  Service: Gynecology Oncology    URETERAL STENT PLACEMENT Right 2024    Procedure: INSERTION STENT URETERAL;  Surgeon: Chuck Campo MD;  [facility];  Service: Urology    URETERAL STENT PLACEMENT Right 2024    Procedure: INSERTION STENT URETERAL;  Surgeon: Vitor Fields MD;  [facility];  Service: Urology    US GUIDANCE  07/15/2022     Family History   Problem Relation Age of Onset    No Known Problems Mother     Heart disease Father     Cancer Brother         Niece/ brothers daughter stage 4 Adrenocortocal carcinoma    Ovarian cancer Maternal Aunt         Dont know  before i was born    Cancer Maternal Uncle         Stage 2 Liver Cancer    Ovarian cancer Maternal Grandmother         Dont know  before i was born    Cancer  Niece 7        adrenal gland       Keisha Claros PA-C  Date: 7/24/2024 Time: 10:08 AM

## 2024-07-25 VITALS
SYSTOLIC BLOOD PRESSURE: 138 MMHG | DIASTOLIC BLOOD PRESSURE: 86 MMHG | HEIGHT: 69 IN | BODY MASS INDEX: 35.55 KG/M2 | WEIGHT: 240 LBS | TEMPERATURE: 98.6 F | HEART RATE: 85 BPM | OXYGEN SATURATION: 96 % | RESPIRATION RATE: 19 BRPM

## 2024-07-25 LAB
ERYTHROCYTE [DISTWIDTH] IN BLOOD BY AUTOMATED COUNT: 12.5 % (ref 11.6–15.1)
HCT VFR BLD AUTO: 28 % (ref 34.8–46.1)
HGB BLD-MCNC: 9.4 G/DL (ref 11.5–15.4)
MCH RBC QN AUTO: 29.7 PG (ref 26.8–34.3)
MCHC RBC AUTO-ENTMCNC: 33.6 G/DL (ref 31.4–37.4)
MCV RBC AUTO: 88 FL (ref 82–98)
PLATELET # BLD AUTO: 161 THOUSANDS/UL (ref 149–390)
PMV BLD AUTO: 7.9 FL (ref 8.9–12.7)
RBC # BLD AUTO: 3.17 MILLION/UL (ref 3.81–5.12)
WBC # BLD AUTO: 4.43 THOUSAND/UL (ref 4.31–10.16)

## 2024-07-25 PROCEDURE — NC001 PR NO CHARGE

## 2024-07-25 PROCEDURE — 99024 POSTOP FOLLOW-UP VISIT: CPT

## 2024-07-25 PROCEDURE — 85027 COMPLETE CBC AUTOMATED: CPT

## 2024-07-25 PROCEDURE — NC001 PR NO CHARGE: Performed by: UROLOGY

## 2024-07-25 RX ORDER — KETOROLAC TROMETHAMINE 10 MG/1
10 TABLET, FILM COATED ORAL EVERY 6 HOURS PRN
Qty: 56 TABLET | Refills: 0 | Status: SHIPPED | OUTPATIENT
Start: 2024-07-25 | End: 2024-08-08

## 2024-07-25 RX ORDER — DOCUSATE SODIUM 100 MG/1
100 CAPSULE, LIQUID FILLED ORAL 2 TIMES DAILY
Qty: 28 CAPSULE | Refills: 0 | Status: SHIPPED | OUTPATIENT
Start: 2024-07-25 | End: 2024-08-08

## 2024-07-25 RX ORDER — OXYBUTYNIN CHLORIDE 5 MG/1
5 TABLET ORAL 3 TIMES DAILY PRN
Qty: 30 TABLET | Refills: 0 | Status: SHIPPED | OUTPATIENT
Start: 2024-07-25 | End: 2024-08-24

## 2024-07-25 RX ORDER — PHENAZOPYRIDINE HYDROCHLORIDE 100 MG/1
100 TABLET, FILM COATED ORAL 3 TIMES DAILY PRN
Qty: 10 TABLET | Refills: 0 | Status: SHIPPED | OUTPATIENT
Start: 2024-07-25 | End: 2024-07-28

## 2024-07-25 RX ORDER — SENNOSIDES 8.6 MG
8.6 TABLET ORAL
Qty: 14 TABLET | Refills: 0 | Status: SHIPPED | OUTPATIENT
Start: 2024-07-25 | End: 2024-08-08

## 2024-07-25 RX ORDER — OXYCODONE HYDROCHLORIDE 5 MG/1
5 TABLET ORAL EVERY 4 HOURS PRN
Qty: 10 TABLET | Refills: 0 | Status: SHIPPED | OUTPATIENT
Start: 2024-07-25

## 2024-07-25 RX ORDER — ACETAMINOPHEN 325 MG/1
650 TABLET ORAL EVERY 6 HOURS PRN
Qty: 112 TABLET | Refills: 0 | Status: SHIPPED | OUTPATIENT
Start: 2024-07-25 | End: 2024-08-08

## 2024-07-25 RX ADMIN — HEPARIN SODIUM 5000 UNITS: 5000 INJECTION INTRAVENOUS; SUBCUTANEOUS at 05:10

## 2024-07-25 RX ADMIN — OXYCODONE HYDROCHLORIDE 5 MG: 5 TABLET ORAL at 13:29

## 2024-07-25 RX ADMIN — ONDANSETRON 4 MG: 2 INJECTION INTRAMUSCULAR; INTRAVENOUS at 11:38

## 2024-07-25 RX ADMIN — OXYCODONE HYDROCHLORIDE 5 MG: 5 TABLET ORAL at 05:10

## 2024-07-25 RX ADMIN — OXYCODONE HYDROCHLORIDE 5 MG: 5 TABLET ORAL at 09:08

## 2024-07-25 RX ADMIN — HEPARIN SODIUM 5000 UNITS: 5000 INJECTION INTRAVENOUS; SUBCUTANEOUS at 13:31

## 2024-07-25 RX ADMIN — GABAPENTIN 100 MG: 100 CAPSULE ORAL at 09:03

## 2024-07-25 RX ADMIN — ACETAMINOPHEN 650 MG: 325 TABLET, FILM COATED ORAL at 05:10

## 2024-07-25 RX ADMIN — HYDROMORPHONE HYDROCHLORIDE 0.5 MG: 1 INJECTION, SOLUTION INTRAMUSCULAR; INTRAVENOUS; SUBCUTANEOUS at 11:37

## 2024-07-25 RX ADMIN — ACETAMINOPHEN 650 MG: 325 TABLET, FILM COATED ORAL at 11:30

## 2024-07-25 RX ADMIN — DOCUSATE SODIUM 100 MG: 100 CAPSULE, LIQUID FILLED ORAL at 09:03

## 2024-07-25 RX ADMIN — HYDROMORPHONE HYDROCHLORIDE 0.5 MG: 1 INJECTION, SOLUTION INTRAMUSCULAR; INTRAVENOUS; SUBCUTANEOUS at 16:27

## 2024-07-25 RX ADMIN — NORETHINDRONE ACETATE AND ETHINYL ESTRADIOL 1 TABLET: .5; 2.5 TABLET ORAL at 09:04

## 2024-07-25 RX ADMIN — SENNOSIDES 8.6 MG: 8.6 TABLET, FILM COATED ORAL at 09:03

## 2024-07-25 NOTE — PROGRESS NOTES
"Progress Note - Urology  Margot Moody 1990, 34 y.o. female MRN: [id number]    Unit/Bed#: S -01 Encounter: [id number]      Assessment & Plan:  Ureteral stricture  - POD3 right reimplantation ureterocystostomy laparoscopic with robotics, lysis of adhesions, insertion of right ureteral stent by Dr. Fields   -VSS, afebrile  -No leukocytosis  -Hgb 9.5, yesterday 10. No significant drop. Will repeat outpatient one week  -Passing gas. Tolerating diet  -Incisional pain is worse than stent pain. Urinating clear yellow  -Abdominal binder  -Ambulation, pain regimen. Alternate ice and heating pads.Toradol more helpful for patient previously with stent in place.   -Stable for discharge    Subjective:   HPI:Seen at bedside. Reports pain is about the same as yesterday. Feels like bee stings. R sided pain associated with stent. Urinating clear yellow. At times gets nausea. No belching or vomiting. No nausea while eating. Passing gas. No SOB or chest pain. Ambulation worsens pain.      Review of Systems   Constitutional:  Negative for chills and fever.   Respiratory:  Negative for cough and shortness of breath.    Cardiovascular:  Negative for chest pain and palpitations.   Gastrointestinal:  Positive for abdominal pain. Negative for vomiting.   Genitourinary:  Negative for dysuria and hematuria.   Musculoskeletal:  Negative for arthralgias and back pain.   Skin:  Negative for color change and rash.   Neurological:  Negative for seizures and syncope.   All other systems reviewed and are negative.      Objective:    Vitals: Blood pressure 138/86, pulse 85, temperature 98.6 °F (37 °C), resp. rate 19, height 5' 9\" (1.753 m), weight 109 kg (240 lb), SpO2 96%, not currently breastfeeding.,Body mass index is 35.44 kg/m².    Physical Exam  Vitals reviewed.   Constitutional:       General: She is not in acute distress.     Appearance: Normal appearance. She is normal weight. She is not ill-appearing, toxic-appearing or " diaphoretic.   HENT:      Head: Normocephalic and atraumatic.      Right Ear: External ear normal.      Left Ear: External ear normal.      Nose: Nose normal.      Mouth/Throat:      Mouth: Mucous membranes are moist.   Eyes:      General: No scleral icterus.     Conjunctiva/sclera: Conjunctivae normal.   Cardiovascular:      Rate and Rhythm: Normal rate and regular rhythm.      Pulses: Normal pulses.      Heart sounds: Normal heart sounds.   Pulmonary:      Effort: Pulmonary effort is normal.      Breath sounds: Normal breath sounds.   Abdominal:      Comments: Incisions clean, dry. NI site without leakage, bandage clean. Ecchymosis around port sites, clean, dry. No guarding or rigidity, abdomen soft.    Musculoskeletal:         General: Normal range of motion.      Cervical back: Normal range of motion.   Skin:     General: Skin is warm.      Findings: No rash.   Neurological:      General: No focal deficit present.      Mental Status: She is alert and oriented to person, place, and time. Mental status is at baseline.   Psychiatric:         Mood and Affect: Mood normal.         Behavior: Behavior normal.         Thought Content: Thought content normal.         Judgment: Judgment normal.             Labs:  Recent Labs     07/22/24  1441 07/23/24  0509 07/24/24  0324 07/25/24  0505   WBC 7.47 5.83 4.51 4.43       Recent Labs     07/22/24  1441 07/23/24  0509 07/24/24  0324 07/25/24  0505   HGB 13.2 11.0* 10.0* 9.4*     Recent Labs     07/22/24  1441 07/23/24  0509 07/24/24  0324 07/25/24  0505   HCT 39.8 32.9* 29.6* 28.0*     Recent Labs     07/22/24  1441 07/23/24  0509 07/24/24  0324   CREATININE 0.92 0.70 0.76     History:    Past Medical History:   Diagnosis Date    Anxiety     Cancer (HCC)     cervix    Cervical cancer (HCC)     receiving chemo and radiation    COVID     Jan 2022    Depression     Diarrhea     Dizziness     Frequent headaches     Hx of bleeding disorder     vaginal bleeding    Obesity       Social History     Socioeconomic History    Marital status: Single     Spouse name: None    Number of children: None    Years of education: None    Highest education level: None   Occupational History    None   Tobacco Use    Smoking status: Never     Passive exposure: Current (very mild/social)    Smokeless tobacco: Never   Vaping Use    Vaping status: Never Used   Substance and Sexual Activity    Alcohol use: Not Currently    Drug use: Never    Sexual activity: Not Currently     Partners: Male     Birth control/protection: Abstinence, None     Comment: Havent had sex for at least two years   Other Topics Concern    None   Social History Narrative    None     Social Determinants of Health     Financial Resource Strain: Not on file   Food Insecurity: No Food Insecurity (2/12/2024)    Hunger Vital Sign     Worried About Running Out of Food in the Last Year: Never true     Ran Out of Food in the Last Year: Never true   Transportation Needs: No Transportation Needs (2/12/2024)    PRAPARE - Transportation     Lack of Transportation (Medical): No     Lack of Transportation (Non-Medical): No   Physical Activity: Not on file   Stress: Not on file   Social Connections: Not on file   Intimate Partner Violence: Not At Risk (3/14/2024)    Humiliation, Afraid, Rape, and Kick questionnaire     Fear of Current or Ex-Partner: No     Emotionally Abused: No     Physically Abused: No     Sexually Abused: No   Housing Stability: Low Risk  (2/12/2024)    Housing Stability Vital Sign     Unable to Pay for Housing in the Last Year: No     Number of Times Moved in the Last Year: 1     Homeless in the Last Year: No     Past Surgical History:   Procedure Laterality Date    CERVICAL BIOPSY  W/ LOOP ELECTRODE EXCISION      COLON SURGERY  Colon resection    May 2023    FL RETROGRADE PYELOGRAM  02/13/2024    IR NEPHROSTOMY TUBE PLACEMENT  7/1/2024    LYMPH NODE DISSECTION Bilateral 05/06/2022    Procedure: DISSECTION/STAGING LYMPH NODE  PELVIS/ABDOMEN;  Surgeon: Parminder Moctezuma MD;  [facility];  Service: Gynecology Oncology    LA CYSTO BLADDER W/URETERAL CATHETERIZATION Right 02/13/2024    Procedure: CYSTOSCOPY WITH RETROGRADE PYELOGRAM;  Surgeon: Chuck Campo MD;  [facility];  Service: Urology    LA CYSTOURETHROSCOPY N/A 7/22/2024    Procedure: CYSTOSCOPY;  Surgeon: Vitor Fields MD;  [facility];  Service: Urology    LA INSERTION VAGINAL RADIATION DEVICE N/A 07/15/2022    Procedure: INSERTION NADIR SLEEVE VAGINA WITH POST OP BRACHYTHERAPY (IN RADIATION ONCOLOGY);  Surgeon: Parminder Moctezuma MD;  [facility];  Service: Gynecology Oncology    LA LAPAROSCOPY COLECTOMY PARTIAL W/ANASTOMOSIS N/A 05/10/2023    Procedure: RESECTION COLON SIGMOID LAPAROSCOPIC;  Surgeon: Marin Salinas MD;  [facility];  Service: Colorectal    LA SIGMOIDOSCOPY FLX DX W/COLLJ SPEC BR/WA IF PFRMD N/A 05/10/2023    Procedure: SIGMOIDOSCOPY FLEXIBLE;  Surgeon: Marin aSlinas MD;  [facility];  Service: Colorectal    LA URETERONEOCYSTOSTOMY ANAST 1 URETER BLADDER Right 7/22/2024    Procedure: REIMPLANTATION URETEROCYSTOSTOMY LAPAROSCOPIC W/ ROBOTICS, EXTENSIVE LYSIS OF ADHESIONS;  Surgeon: Vitor Fields MD;  [facility];  Service: Urology    SALPINGECTOMY Bilateral 05/06/2022    Procedure: SALPINGECTOMY, OVARIAN TRANSPOSITION;  Surgeon: Parminder Moctezuma MD;  [facility];  Service: Gynecology Oncology    URETERAL STENT PLACEMENT Right 02/13/2024    Procedure: INSERTION STENT URETERAL;  Surgeon: Chuck Campo MD;  [facility];  Service: Urology    URETERAL STENT PLACEMENT Right 7/22/2024    Procedure: INSERTION STENT URETERAL;  Surgeon: Vitor Fields MD;  [facility];  Service: Urology    US GUIDANCE  07/15/2022     Family History   Problem Relation Age of Onset    No Known Problems Mother     Heart disease Father     Cancer  Brother         Niece/ brothers daughter stage 4 Adrenocortocal carcinoma    Ovarian cancer Maternal Aunt         Dont know  before i was born    Cancer Maternal Uncle         Stage 2 Liver Cancer    Ovarian cancer Maternal Grandmother         Dont know  before i was born    Cancer Niece 7        adrenal gland       Keisha Claros PA-C  Date: 2024 Time: 1:05 PM      English

## 2024-07-25 NOTE — PLAN OF CARE
Problem: PAIN - ADULT  Goal: Verbalizes/displays adequate comfort level or baseline comfort level  Description: Interventions:  - Encourage patient to monitor pain and request assistance  - Assess pain using appropriate pain scale  - Administer analgesics based on type and severity of pain and evaluate response  - Implement non-pharmacological measures as appropriate and evaluate response  - Consider cultural and social influences on pain and pain management  - Notify physician/advanced practitioner if interventions unsuccessful or patient reports new pain  Outcome: Progressing     Problem: GENITOURINARY - ADULT  Goal: Maintains or returns to baseline urinary function  Description: INTERVENTIONS:  - Assess urinary function  - Encourage oral fluids to ensure adequate hydration if ordered  - Administer IV fluids as ordered to ensure adequate hydration  - Administer ordered medications as needed  - Offer frequent toileting  - Follow urinary retention protocol if ordered  Outcome: Progressing  Goal: Absence of urinary retention  Description: INTERVENTIONS:  - Assess patient’s ability to void and empty bladder  - Monitor I/O  - Bladder scan as needed  - Discuss with physician/AP medications to alleviate retention as needed  - Discuss catheterization for long term situations as appropriate  Outcome: Progressing  Goal: Urinary catheter remains patent  Description: INTERVENTIONS:  - Assess patency of urinary catheter  - If patient has a chronic paul, consider changing catheter if non-functioning  - Follow guidelines for intermittent irrigation of non-functioning urinary catheter  Outcome: Progressing     Problem: DISCHARGE PLANNING  Goal: Discharge to home or other facility with appropriate resources  Description: INTERVENTIONS:  - Identify barriers to discharge w/patient and caregiver  - Arrange for needed discharge resources and transportation as appropriate  - Identify discharge learning needs (meds, wound care,  etc.)  - Arrange for interpretive services to assist at discharge as needed  - Refer to Case Management Department for coordinating discharge planning if the patient needs post-hospital services based on physician/advanced practitioner order or complex needs related to functional status, cognitive ability, or social support system  Outcome: Progressing

## 2024-07-25 NOTE — DISCHARGE SUMMARY
Discharge Summary - Margot Moody 34 y.o. female MRN: [id number]    Unit/Bed#: S -01 Encounter: [id number]    Admission Date: 7/22/2024     Discharge Date: 07/25/24    HPI: Margot Moody is a 34 y.o. female who presented for right reimplantation ureterocystostomy by Dr. Fields.      Procedure(s):  REIMPLANTATION URETEROCYSTOSTOMY LAPAROSCOPIC W/ ROBOTICS, EXTENSIVE LYSIS OF ADHESIONS  CYSTOSCOPY  INSERTION STENT URETERAL  Surgeon(s):  MD Niraj Galan PA-C Zachariah Goldsmith, MD  7/22/2024    Hospital Course: patient presented for right reimplantation ureterocystostomy by Dr. Fields. Operation was difficult due to radiation changes and required significant lysis of adhesions. POD1 patient was progressing well. NI was checked  for Cr and negative for leak. POD2 NI was removed. Hgb stable. Ambulatory, passing gas and tolerating diet. Patient required additional night stay due to worsening abdominal pain with ambulation. POD3 patient labs are stable. She utilizes walker for assistance with going from sitting to standings, has walker at home. Requiring oxycodone for pain but no breakthrough dilaudid. Incisions clean, dry. She has an abdominal binder to help with her pain. She is stable for discharge on the afternoon of 7/25/2024.       Discharge Diagnosis: Ureteral stricture, right    Condition at Discharge: good    Discharge Medications:  See after visit summary for reconciled discharge medications provided to patient and family.  Patient was discharged home on home medications with the addition of oxycodone, toradol, oxybutinin, pyridium, senna, colace.     Discharge instructions/Information to patient and family:   See after visit summary for written and verbal information which has been provided to patient and family.      Provisions for Follow-Up Care:  See after visit summary for information related to follow-up care and any pertinent home health orders.      Disposition:  Home    Planned Readmission: No    Discharge Statement   I spent 10 minutes discharging the patient. This time was spent on the day of discharge. I had direct contact with the patient on the day of discharge. Additional documentation is required if more than 30 minutes were spent on discharge.     Signature:   Keisha Claros PA-C  Date: 7/25/2024 Time: 1:12 PM

## 2024-07-26 ENCOUNTER — PATIENT OUTREACH (OUTPATIENT)
Dept: CASE MANAGEMENT | Facility: OTHER | Age: 34
End: 2024-07-26

## 2024-07-26 ENCOUNTER — PATIENT MESSAGE (OUTPATIENT)
Dept: UROLOGY | Facility: AMBULATORY SURGERY CENTER | Age: 34
End: 2024-07-26

## 2024-07-26 PROCEDURE — 88305 TISSUE EXAM BY PATHOLOGIST: CPT | Performed by: PATHOLOGY

## 2024-07-26 PROCEDURE — 88341 IMHCHEM/IMCYTCHM EA ADD ANTB: CPT | Performed by: PATHOLOGY

## 2024-07-26 PROCEDURE — 88342 IMHCHEM/IMCYTCHM 1ST ANTB: CPT | Performed by: PATHOLOGY

## 2024-07-26 NOTE — PROGRESS NOTES
Outpatient Care Management Note:    ADT alert received. Patient was hospitalized from 7/22-7/25/24 with reimplantation surgery and lysis of adhesions. Has a ureteral stent in place along with nephrostomy tube. She is to follow up with Dr Fields.     Voice mail message left for Audrey, with my contact information, requesting a call back.

## 2024-07-26 NOTE — UTILIZATION REVIEW
NOTIFICATION OF ADMISSION DISCHARGE   This is a Notification of Discharge from [facility]. Please be advised that this patient has been discharge from our facility. Below you will find the admission and discharge date and time including the patient’s disposition.   UTILIZATION REVIEW CONTACT:  Saira Shaw  Utilization   Network Utilization Review Department  Phone: [phone] x carefully listen to the prompts. All voicemails are confidential.  Email: [email or web address]     ADMISSION INFORMATION  PRESENTATION DATE: 7/22/2024  6:14 AM  OBERVATION ADMISSION DATE: N/A  INPATIENT ADMISSION DATE: 7/22/24  3:11 PM   DISCHARGE DATE: 7/25/2024  4:38 PM   DISPOSITION:Home/Self Care    Network Utilization Review Department  ATTENTION: Please call with any questions or concerns to [phone] and carefully listen to the prompts so that you are directed to the right person. All voicemails are confidential.   For Discharge needs, contact Care Management DC Support Team at [phone] opt. 2  Send all requests for admission clinical reviews, approved or denied determinations and any other requests to dedicated fax number below belonging to the campus where the patient is receiving treatment. List of dedicated fax numbers for the Facilities:  FACILITY NAME UR FAX NUMBER   ADMISSION DENIALS (Administrative/Medical Necessity) [phone]   DISCHARGE SUPPORT TEAM ([facility]) [phone]   PARENT CHILD HEALTH (Maternity/NICU/Pediatrics) [phone]   [facility] [phone]   [facility] [phone]   [facility] [phone]   General acute hospital [phone]   [facility] [phone]   [facility] [phone]   [facility] [phone]   [facility] [phone]    [facility] [phone]   [facility] [phone]   [facility] [phone]   [facility] [phone]

## 2024-07-28 ENCOUNTER — HOME CARE VISIT (OUTPATIENT)
Dept: HOME HEALTH SERVICES | Facility: HOME HEALTHCARE | Age: 34
End: 2024-07-28
Payer: COMMERCIAL

## 2024-07-28 VITALS
RESPIRATION RATE: 16 BRPM | DIASTOLIC BLOOD PRESSURE: 60 MMHG | OXYGEN SATURATION: 97 % | SYSTOLIC BLOOD PRESSURE: 110 MMHG | HEART RATE: 84 BPM | TEMPERATURE: 97.6 F

## 2024-07-28 PROCEDURE — G0299 HHS/HOSPICE OF RN EA 15 MIN: HCPCS

## 2024-07-28 NOTE — CASE COMMUNICATION
"Medication discrepancies or Major drug interactions: Pt is no longer taking lorazepam or normal saline flushes  Abnormal clinical findings: Trocar sites to abdomen and NI site, all scabbed and GENEVA. Blood filled blister to L abdomen, GENEVA. R nephrostomy is capped, insertion site is WNL, peridrain area is pink/irritated from medical tape. 8/10 abdominal incision pain. Anxiety.   This report is informational only, no response is needed  [facility] has Resumed your patient to Home Health service with the following disciplines: SN  Patient stated goals of care: \"to get my drain out\"  Potential barriers to goal achievement: incisional pain, impaired functional mobility, high risk medications, fall risk, at risk herminio score, anxiety  Primary focus of home health care:Wound  Anticipated visit pattern and next visit date: 2w1, 1w6, next anticipated SN visit 8/1/24  Thank merry haas for allowing us to participate in the care of your patient.      [facility]      "

## 2024-07-29 NOTE — PROGRESS NOTES
CLARIFY DIAGNOSIS RESPONSE NOTE    Based on the query that was sent to you, please clarify the diagnosis below.    There is no way for us to know if there is radiation cystitis during this admission because we did not look inside her bladder with a camera.

## 2024-08-01 ENCOUNTER — HOME CARE VISIT (OUTPATIENT)
Dept: HOME HEALTH SERVICES | Facility: HOME HEALTHCARE | Age: 34
End: 2024-08-01
Payer: COMMERCIAL

## 2024-08-01 VITALS
DIASTOLIC BLOOD PRESSURE: 70 MMHG | RESPIRATION RATE: 20 BRPM | TEMPERATURE: 98.2 F | SYSTOLIC BLOOD PRESSURE: 120 MMHG | OXYGEN SATURATION: 98 % | HEART RATE: 76 BPM

## 2024-08-01 PROCEDURE — G0299 HHS/HOSPICE OF RN EA 15 MIN: HCPCS

## 2024-08-02 ENCOUNTER — PATIENT OUTREACH (OUTPATIENT)
Dept: CASE MANAGEMENT | Facility: OTHER | Age: 34
End: 2024-08-02

## 2024-08-02 NOTE — PROGRESS NOTES
Outpatient Care Management Note:    Care manager called Audrey. She states that she is feeling a little better each day. Her skin around the nephrostomy site is still irritated and itchy. She has been doing a great job keeping in contact with urology to address all concerns. CM encouraged her to continue. She is scheduled to see DR Fields, urology, on 8/22 for a cysto.     Audrey states her nephrostomy tube is capped presently.     LEONARD reminded Audrey that she needs to complete her CBC blood work. She forgot but will get it done.     Audrey denies any other questions. She requested follow up in 2-3 weeks.

## 2024-08-14 ENCOUNTER — TELEPHONE (OUTPATIENT)
Dept: GYNECOLOGIC ONCOLOGY | Facility: CLINIC | Age: 34
End: 2024-08-14

## 2024-08-14 NOTE — TELEPHONE ENCOUNTER
Called pt 2X with he hanging up on me once.    LMOM to inform the pt of her appointment with Dr. Moctezuma on 10/17 @ 3:15 having to be change to 10/17 @ 1:45.    Explained his scheduled closed and it will be the same date and location just an earlier time.    Left call back number if the pt cannot make that time.

## 2024-08-19 ENCOUNTER — PATIENT OUTREACH (OUTPATIENT)
Dept: CASE MANAGEMENT | Facility: OTHER | Age: 34
End: 2024-08-19

## 2024-08-19 NOTE — PROGRESS NOTES
Outpatient Care Management Note:    Voice mail message left for Audrey, with my contact information, requesting a call back.

## 2024-08-26 ENCOUNTER — PATIENT OUTREACH (OUTPATIENT)
Dept: CASE MANAGEMENT | Facility: OTHER | Age: 34
End: 2024-08-26

## 2024-08-26 NOTE — PROGRESS NOTES
Outpatient Care Management Note:    Voice mail message left for Audrey, with my contact information, requesting a call back.  (2nd attempt)     Unable to reach letter sent via my chart.

## 2024-08-26 NOTE — LETTER
Date: 08/26/24    Dear Margot Moody,   My name is Devika Castillo.  I am a registered nurse care manager working with   [facility]   [address] [place] [id number] [phone].   I have not been able to reach you and would like to set a time that I can talk with you over the phone .  My work is to help patients that have complex medical conditions get the care they need. This includes patients who may have been in the hospital or emergency room.     Please call me with any questions you may have. I look forward to speaking with you.  Sincerely,  Devika Anna  [phone]  Outpatient Care Manager  Copy:  (primary care physician name and address)

## 2024-08-29 ENCOUNTER — TELEPHONE (OUTPATIENT)
Dept: UROLOGY | Facility: CLINIC | Age: 34
End: 2024-08-29

## 2024-08-29 ENCOUNTER — PROCEDURE VISIT (OUTPATIENT)
Dept: UROLOGY | Facility: CLINIC | Age: 34
End: 2024-08-29
Payer: COMMERCIAL

## 2024-08-29 VITALS
OXYGEN SATURATION: 98 % | BODY MASS INDEX: 37.03 KG/M2 | SYSTOLIC BLOOD PRESSURE: 142 MMHG | DIASTOLIC BLOOD PRESSURE: 80 MMHG | HEIGHT: 69 IN | WEIGHT: 250 LBS | HEART RATE: 91 BPM

## 2024-08-29 DIAGNOSIS — N13.30 HYDRONEPHROSIS OF RIGHT KIDNEY: Primary | ICD-10-CM

## 2024-08-29 DIAGNOSIS — N13.5 URETERAL STRICTURE, RIGHT: ICD-10-CM

## 2024-08-29 PROCEDURE — 52310 CYSTOSCOPY AND TREATMENT: CPT | Performed by: PHYSICIAN ASSISTANT

## 2024-08-29 RX ORDER — KETOROLAC TROMETHAMINE 10 MG/1
10 TABLET, FILM COATED ORAL EVERY 6 HOURS PRN
Qty: 12 TABLET | Refills: 0 | Status: SHIPPED | OUTPATIENT
Start: 2024-08-29

## 2024-08-29 RX ORDER — SULFAMETHOXAZOLE/TRIMETHOPRIM 800-160 MG
1 TABLET ORAL EVERY 12 HOURS SCHEDULED
Qty: 6 TABLET | Refills: 0 | Status: SHIPPED | OUTPATIENT
Start: 2024-08-29 | End: 2024-09-01

## 2024-08-29 NOTE — PROGRESS NOTES
Cystoscopy     Date/Time  8/29/2024 10:00 AM     Performed by  Prince Tolentino PA-C   Authorized by  Prince Tolentino PA-C     Universal Protocol:  Consent: Written consent obtained.  Consent given by: patient  Patient understanding: patient states understanding of the procedure being performed  Patient identity confirmed: verbally with patient      Procedure Details:  Procedure type: simple removal of a foreign body, stone, or stent    Patient tolerance: Patient tolerated the procedure well with no immediate complications    Additional Procedure Details: 34-year-old female with history of cervical cancer.  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  She underwent a robotic reimplantation neocystostomy and stent placement.  She has a prior right sided nephrostomy tube capped in place.  She is now here for her stent removal.  After obtaining informed consent and explaining the procedure to the patient.  The patient is placed in a dorsolithotomy position.  Groin is prepped and draped in the usual fashion.  2% lidocaine used for local anesthetic.  The flexible cystoscope was passed per the meatus.  The urine was turbid.  This made inspection of the mucosa difficult.  After adequate filling the stent was identified grasped with a forceps and removed without difficulty.  Patient is made aware of postprocedure care.  Signs and symptoms of post stent removal that may occur and understands.  Prophylactic antibiotics were sent to her pharmacy.  She is scheduled for an antegrade nephrostogram and possible stent removal in about 2 weeks.  I ordered a kidney and bladder ultrasound to be done in about 6 weeks with an office follow-up in 8 weeks.  She should call with any further questions or concerns or if she becomes symptomatic.

## 2024-08-29 NOTE — TELEPHONE ENCOUNTER
Patient is scheduled for 10/29/24 with Aysha at our [place] [facility] office. She will have US done 2 weeks prior to her apt.

## 2024-08-29 NOTE — TELEPHONE ENCOUNTER
Patient would like follow up to be in [place]. She does not drive, hard to find transportation. Please advise.    Return for follow up in 8 weeks with US prior to visit.

## 2024-08-30 ENCOUNTER — APPOINTMENT (EMERGENCY)
Dept: CT IMAGING | Facility: HOSPITAL | Age: 34
End: 2024-08-30
Payer: COMMERCIAL

## 2024-08-30 ENCOUNTER — PATIENT OUTREACH (OUTPATIENT)
Dept: CASE MANAGEMENT | Facility: OTHER | Age: 34
End: 2024-08-30

## 2024-08-30 ENCOUNTER — TELEPHONE (OUTPATIENT)
Dept: UROLOGY | Facility: AMBULATORY SURGERY CENTER | Age: 34
End: 2024-08-30

## 2024-08-30 ENCOUNTER — HOSPITAL ENCOUNTER (EMERGENCY)
Facility: HOSPITAL | Age: 34
Discharge: HOME | End: 2024-08-30
Attending: EMERGENCY MEDICINE
Payer: COMMERCIAL

## 2024-08-30 VITALS
TEMPERATURE: 99.3 F | SYSTOLIC BLOOD PRESSURE: 98 MMHG | DIASTOLIC BLOOD PRESSURE: 56 MMHG | HEART RATE: 77 BPM | OXYGEN SATURATION: 95 % | RESPIRATION RATE: 18 BRPM

## 2024-08-30 DIAGNOSIS — N13.30 HYDRONEPHROSIS: Primary | ICD-10-CM

## 2024-08-30 DIAGNOSIS — R82.81 PYURIA: ICD-10-CM

## 2024-08-30 LAB
ANION GAP SERPL CALCULATED.3IONS-SCNC: 9 MMOL/L (ref 4–13)
BACTERIA UR QL AUTO: ABNORMAL /HPF
BASOPHILS # BLD AUTO: 0.01 THOUSANDS/ÂΜL (ref 0–0.1)
BASOPHILS NFR BLD AUTO: 0 % (ref 0–1)
BILIRUB UR QL STRIP: NEGATIVE
BUN SERPL-MCNC: 17 MG/DL (ref 5–25)
CALCIUM SERPL-MCNC: 8.8 MG/DL (ref 8.4–10.2)
CHLORIDE SERPL-SCNC: 106 MMOL/L (ref 96–108)
CLARITY UR: CLEAR
CO2 SERPL-SCNC: 24 MMOL/L (ref 21–32)
COLOR UR: YELLOW
CREAT SERPL-MCNC: 1.1 MG/DL (ref 0.6–1.3)
EOSINOPHIL # BLD AUTO: 0.17 THOUSAND/ÂΜL (ref 0–0.61)
EOSINOPHIL NFR BLD AUTO: 3 % (ref 0–6)
ERYTHROCYTE [DISTWIDTH] IN BLOOD BY AUTOMATED COUNT: 12.9 % (ref 11.6–15.1)
GFR SERPL CREATININE-BSD FRML MDRD: 65 ML/MIN/1.73SQ M
GLUCOSE SERPL-MCNC: 102 MG/DL (ref 65–140)
GLUCOSE UR STRIP-MCNC: NEGATIVE MG/DL
HCT VFR BLD AUTO: 33.3 % (ref 34.8–46.1)
HGB BLD-MCNC: 11.2 G/DL (ref 11.5–15.4)
HGB UR QL STRIP.AUTO: ABNORMAL
IMM GRANULOCYTES # BLD AUTO: 0.02 THOUSAND/UL (ref 0–0.2)
IMM GRANULOCYTES NFR BLD AUTO: 0 % (ref 0–2)
KETONES UR STRIP-MCNC: NEGATIVE MG/DL
LEUKOCYTE ESTERASE UR QL STRIP: ABNORMAL
LYMPHOCYTES # BLD AUTO: 1.21 THOUSANDS/ÂΜL (ref 0.6–4.47)
LYMPHOCYTES NFR BLD AUTO: 21 % (ref 14–44)
MCH RBC QN AUTO: 29.6 PG (ref 26.8–34.3)
MCHC RBC AUTO-ENTMCNC: 33.6 G/DL (ref 31.4–37.4)
MCV RBC AUTO: 88 FL (ref 82–98)
MONOCYTES # BLD AUTO: 0.57 THOUSAND/ÂΜL (ref 0.17–1.22)
MONOCYTES NFR BLD AUTO: 10 % (ref 4–12)
MUCOUS THREADS UR QL AUTO: ABNORMAL
NEUTROPHILS # BLD AUTO: 3.68 THOUSANDS/ÂΜL (ref 1.85–7.62)
NEUTS SEG NFR BLD AUTO: 66 % (ref 43–75)
NITRITE UR QL STRIP: NEGATIVE
NON-SQ EPI CELLS URNS QL MICRO: ABNORMAL /HPF
NRBC BLD AUTO-RTO: 0 /100 WBCS
PH UR STRIP.AUTO: 6 [PH]
PLATELET # BLD AUTO: 261 THOUSANDS/UL (ref 149–390)
PMV BLD AUTO: 8.2 FL (ref 8.9–12.7)
POTASSIUM SERPL-SCNC: 3.8 MMOL/L (ref 3.5–5.3)
PROT UR STRIP-MCNC: ABNORMAL MG/DL
RBC # BLD AUTO: 3.78 MILLION/UL (ref 3.81–5.12)
RBC #/AREA URNS AUTO: ABNORMAL /HPF
SODIUM SERPL-SCNC: 139 MMOL/L (ref 135–147)
SP GR UR STRIP.AUTO: 1.02 (ref 1–1.03)
UROBILINOGEN UR STRIP-ACNC: <2 MG/DL
WBC # BLD AUTO: 5.66 THOUSAND/UL (ref 4.31–10.16)
WBC #/AREA URNS AUTO: ABNORMAL /HPF

## 2024-08-30 PROCEDURE — 85025 COMPLETE CBC W/AUTO DIFF WBC: CPT | Performed by: EMERGENCY MEDICINE

## 2024-08-30 PROCEDURE — 99284 EMERGENCY DEPT VISIT MOD MDM: CPT

## 2024-08-30 PROCEDURE — 99285 EMERGENCY DEPT VISIT HI MDM: CPT | Performed by: EMERGENCY MEDICINE

## 2024-08-30 PROCEDURE — 87086 URINE CULTURE/COLONY COUNT: CPT | Performed by: EMERGENCY MEDICINE

## 2024-08-30 PROCEDURE — 96375 TX/PRO/DX INJ NEW DRUG ADDON: CPT

## 2024-08-30 PROCEDURE — 80048 BASIC METABOLIC PNL TOTAL CA: CPT | Performed by: EMERGENCY MEDICINE

## 2024-08-30 PROCEDURE — 36415 COLL VENOUS BLD VENIPUNCTURE: CPT | Performed by: EMERGENCY MEDICINE

## 2024-08-30 PROCEDURE — 96361 HYDRATE IV INFUSION ADD-ON: CPT

## 2024-08-30 PROCEDURE — 96365 THER/PROPH/DIAG IV INF INIT: CPT

## 2024-08-30 PROCEDURE — 81001 URINALYSIS AUTO W/SCOPE: CPT | Performed by: EMERGENCY MEDICINE

## 2024-08-30 PROCEDURE — 74177 CT ABD & PELVIS W/CONTRAST: CPT

## 2024-08-30 RX ORDER — KETOROLAC TROMETHAMINE 30 MG/ML
15 INJECTION, SOLUTION INTRAMUSCULAR; INTRAVENOUS ONCE
Status: COMPLETED | OUTPATIENT
Start: 2024-08-30 | End: 2024-08-30

## 2024-08-30 RX ORDER — OXYCODONE HYDROCHLORIDE 5 MG/1
5 TABLET ORAL ONCE
Status: COMPLETED | OUTPATIENT
Start: 2024-08-30 | End: 2024-08-30

## 2024-08-30 RX ORDER — ACETAMINOPHEN 10 MG/ML
1000 INJECTION, SOLUTION INTRAVENOUS ONCE
Status: COMPLETED | OUTPATIENT
Start: 2024-08-30 | End: 2024-08-30

## 2024-08-30 RX ORDER — ONDANSETRON 2 MG/ML
4 INJECTION INTRAMUSCULAR; INTRAVENOUS ONCE
Status: COMPLETED | OUTPATIENT
Start: 2024-08-30 | End: 2024-08-30

## 2024-08-30 RX ORDER — LORAZEPAM 2 MG/ML
1 INJECTION INTRAMUSCULAR ONCE
Status: COMPLETED | OUTPATIENT
Start: 2024-08-30 | End: 2024-08-30

## 2024-08-30 RX ORDER — MORPHINE SULFATE 4 MG/ML
4 INJECTION, SOLUTION INTRAMUSCULAR; INTRAVENOUS ONCE
Status: COMPLETED | OUTPATIENT
Start: 2024-08-30 | End: 2024-08-30

## 2024-08-30 RX ADMIN — ONDANSETRON 4 MG: 2 INJECTION, SOLUTION INTRAMUSCULAR; INTRAVENOUS at 01:27

## 2024-08-30 RX ADMIN — KETOROLAC TROMETHAMINE 15 MG: 30 INJECTION, SOLUTION INTRAMUSCULAR; INTRAVENOUS at 01:28

## 2024-08-30 RX ADMIN — MORPHINE SULFATE 4 MG: 4 INJECTION INTRAVENOUS at 04:45

## 2024-08-30 RX ADMIN — ACETAMINOPHEN 1000 MG: 10 INJECTION INTRAVENOUS at 01:29

## 2024-08-30 RX ADMIN — LORAZEPAM 1 MG: 2 INJECTION INTRAMUSCULAR; INTRAVENOUS at 01:27

## 2024-08-30 RX ADMIN — OXYCODONE HYDROCHLORIDE 5 MG: 5 TABLET ORAL at 05:39

## 2024-08-30 RX ADMIN — SODIUM CHLORIDE 1000 ML: 0.9 INJECTION, SOLUTION INTRAVENOUS at 01:29

## 2024-08-30 RX ADMIN — IOHEXOL 100 ML: 350 INJECTION, SOLUTION INTRAVENOUS at 03:13

## 2024-08-30 NOTE — DISCHARGE INSTRUCTIONS
Follow-up with urology for further care, if symptoms worsen please return to the emergency department

## 2024-08-30 NOTE — ED NOTES
Pt waiting for CT. This nurse advised [facility] that pt does not need a urine preg before CT.      Ashwini Bain RN  08/30/24 [id number]

## 2024-08-30 NOTE — TELEPHONE ENCOUNTER
I tried calling the patient again and this time was able to get there with her.  We went over how she was doing.  She reported that after stent removal she initially did not have any obvious symptoms other than some mild nausea which she thought was not unexpected as she also felt this after prior stent removal.  She then noticed that she was leaking urine around her PCN tube.  When she notices it was quite significant and this resulted in her going to the emergency room.  She now has a PCN to drainage.  She reports her nausea is slightly better but still persistent.    I told her I reviewed her CT scan and labs.  Based on her imaging and bump in creatinine I am very concerned that her reimplant has scarred down.  We had concerns about this going into surgery given her history of radiation and the fact that her stricture disease appeared to have progressed based on retrograde pyelogram in February and then antegrade nephrostogram in July and during surgery the ureter was very difficult to find because of significant radiation changes/scarring and when found the ureter appeared diffusely abnormal throughout the entire course including its most proximal visualized aspect.  I told her that intraoperatively I considered performing a Boari flap during her surgery because of this but I thought it was unwise because the proximal ureter did not appear healthier than the mid ureter visually and when the mid ureter was cut and spatulated it appeared patent and also her bladder was radiated (which puts a Boari flap at high risk for scarring down may also hamper future reconstructive surgery such as ileal ureter).  I briefly discussed with Audrey the potential role for future reconstructive surgeries including Boari flap as well as an ileal ureter.  At this point I think she should get an antegrade nephrostogram as we had otherwise planned and I will see her right afterwards and that this will facilitate decision-making.  She is  currently scheduled for this to be done in about 2 weeks which was probably an appropriate amount of time but I will try to check in with her again in the coming days and we will reschedule to a sooner date if needed.  Her CT scan also showed fluid in the pelvis of unclear etiology and this may need to be reevaluated another scan as well.

## 2024-08-30 NOTE — TELEPHONE ENCOUNTER
I tried calling both the patient and her significant other.  I got voicemails for both and left messages.    I reviewed her CT scan done earlier this morning which was performed after she presented to emergency room for nausea and leakage from PCN.  There is some free fluid in the pelvis of unclear etiology.    I am very concerned that this represents stricture/failure of her ureteral reimplant.  I had very significant concerns for this because during her reimplant surgery her ureter was not only very difficult to find because it was matted but it was appreciated to be quite abnormal throughout its entirety which raise concern for diffuse radiation damage to the ureter.  This resulted as an harvesting ureter much higher than we originally planned to essentially as high as we could.  When we open the ureter it did appear patent.   Consideration was made during surgery for Boari flap but because her bladder had been radiated and therefore was likely abnormal with higher risk of scarring and also to mitigate the risk of hampering future potential needed options (such as ileal ureter) it was safer to perform reimplant which was done in what appeared to be tension free fashion with wide spatulation.    I will try contacting patient again and we will discuss this in greater detail but otherwise I think the neck step is to proceed with antegrade nephrostogram within the next 2 weeks which will facilitate decision making.

## 2024-08-30 NOTE — ED PROVIDER NOTES
History  Chief Complaint   Patient presents with    Surgical Problem Re-Evaluation     Had nephrostomy placed early July. Had surgery July 22 in which ureter stent placed at that time nephrostomy was capped after surgery. Had ureter stent removed today. Patient noticed that urine is leaking out of back. Patient denies fevers +nausea - vomiting     34-year-old female with history of cervical cancer currently, postmenopausal secondary to hormone use, sent for evaluation of leaking urine from her right sided nephrostomy, nausea, abdominal pain after having a right-sided ureteral stent removed yesterday.  No fevers, does feel shaky and anxious, no vomiting, no shortness of breath.  No hematuria.  Did start on antibiotics, took 1 dose of Bactrim post procedure        Prior to Admission Medications   Prescriptions Last Dose Informant Patient Reported? Taking?   LORazepam (ATIVAN) 1 mg tablet  Self No No   Sig: Take 2 tablets PO 30 min prior to PET   Patient not taking: Reported on 7/28/2024   docusate sodium (COLACE) 100 mg capsule   No No   Sig: Take 1 capsule (100 mg total) by mouth 2 (two) times a day for 14 days   ketorolac (TORADOL) 10 mg tablet   No No   Sig: Take 1 tablet (10 mg total) by mouth every 6 (six) hours as needed for moderate pain   norethindrone-ethinyl estradiol (Femhrt) 0.5-2.5 MG-MCG per tablet  Self No No   Sig: Take 1 tablet by mouth daily   ondansetron (ZOFRAN-ODT) 4 mg disintegrating tablet  Self No No   Sig: Take 1 tablet (4 mg total) by mouth every 6 (six) hours as needed for nausea or vomiting   oxyCODONE (ROXICODONE) 5 immediate release tablet   No No   Sig: Take 1 tablet (5 mg total) by mouth every 4 (four) hours as needed for moderate pain or severe pain for up to 10 doses Max Daily Amount: 30 mg   polyethylene glycol (MiraLax) 17 g packet  Self Yes No   Sig: Take 17 g by mouth daily as needed (constipation)  dissolve in 8 oz liquid   senna (SENOKOT) 8.6 mg   No No   Sig: Take 1 tablet (8.6  mg total) by mouth daily at bedtime for 14 days   sulfamethoxazole-trimethoprim (BACTRIM DS) 800-160 mg per tablet   No No   Sig: Take 1 tablet by mouth every 12 (twelve) hours for 3 days      Facility-Administered Medications: None       Past Medical History:   Diagnosis Date    Anxiety     Cancer (HCC)     cervix    Cervical cancer (HCC)     receiving chemo and radiation    COVID     Jan 2022    Depression     Diarrhea     Dizziness     Frequent headaches     Hx of bleeding disorder     vaginal bleeding    Obesity        Past Surgical History:   Procedure Laterality Date    CERVICAL BIOPSY  W/ LOOP ELECTRODE EXCISION      COLON SURGERY  Colon resection    May 2023    FL RETROGRADE PYELOGRAM  02/13/2024    IR NEPHROSTOMY TUBE PLACEMENT  7/1/2024    LYMPH NODE DISSECTION Bilateral 05/06/2022    Procedure: DISSECTION/STAGING LYMPH NODE PELVIS/ABDOMEN;  Surgeon: Parminder Moctezuma MD;  [facility];  Service: Gynecology Oncology    HI CYSTO BLADDER W/URETERAL CATHETERIZATION Right 02/13/2024    Procedure: CYSTOSCOPY WITH RETROGRADE PYELOGRAM;  Surgeon: Chuck Campo MD;  [facility];  Service: Urology    HI CYSTOURETHROSCOPY N/A 7/22/2024    Procedure: CYSTOSCOPY;  Surgeon: Vitor Fields MD;  [facility];  Service: Urology    HI INSERTION VAGINAL RADIATION DEVICE N/A 07/15/2022    Procedure: INSERTION NADIR SLEEVE VAGINA WITH POST OP BRACHYTHERAPY (IN RADIATION ONCOLOGY);  Surgeon: Parminder Moctezuma MD;  [facility];  Service: Gynecology Oncology    HI LAPAROSCOPY COLECTOMY PARTIAL W/ANASTOMOSIS N/A 05/10/2023    Procedure: RESECTION COLON SIGMOID LAPAROSCOPIC;  Surgeon: Marin Salinas MD;  [facility];  Service: Colorectal    HI SIGMOIDOSCOPY FLX DX W/COLLJ SPEC BR/WA IF PFRMD N/A 05/10/2023    Procedure: SIGMOIDOSCOPY FLEXIBLE;  Surgeon: Marin Salinas MD;  [facility];  Service: Colorectal    HI URETERONEOCYSTOSTOMY ANAST 1  URETER BLADDER Right 2024    Procedure: REIMPLANTATION URETEROCYSTOSTOMY LAPAROSCOPIC W/ ROBOTICS, EXTENSIVE LYSIS OF ADHESIONS;  Surgeon: Vitor Fields MD;  [facility];  Service: Urology    SALPINGECTOMY Bilateral 2022    Procedure: SALPINGECTOMY, OVARIAN TRANSPOSITION;  Surgeon: Parminder Moctezuma MD;  [facility];  Service: Gynecology Oncology    URETERAL STENT PLACEMENT Right 2024    Procedure: INSERTION STENT URETERAL;  Surgeon: Chuck Campo MD;  [facility];  Service: Urology    URETERAL STENT PLACEMENT Right 2024    Procedure: INSERTION STENT URETERAL;  Surgeon: Vitor Fields MD;  [facility];  Service: Urology    US GUIDANCE  07/15/2022       Family History   Problem Relation Age of Onset    No Known Problems Mother     Heart disease Father     Cancer Brother         Niece/ brothers daughter stage 4 Adrenocortocal carcinoma    Ovarian cancer Maternal Aunt         Dont know  before i was born    Cancer Maternal Uncle         Stage 2 Liver Cancer    Ovarian cancer Maternal Grandmother         Dont know  before i was born    Cancer Niece 7        adrenal gland     I have reviewed and agree with the history as documented.    E-Cigarette/Vaping    E-Cigarette Use Never User      E-Cigarette/Vaping Substances    Nicotine No     THC No     CBD No     Flavoring No     Other No     Unknown No      Social History     Tobacco Use    Smoking status: Never     Passive exposure: Current (very mild/social)    Smokeless tobacco: Never   Vaping Use    Vaping status: Never Used   Substance Use Topics    Alcohol use: Not Currently    Drug use: Never       Review of Systems   Constitutional:  Negative for appetite change and fever.   HENT:  Negative for rhinorrhea and sore throat.    Eyes:  Negative for photophobia and visual disturbance.   Respiratory:  Negative for cough, chest tightness and wheezing.    Cardiovascular:  Negative for chest  pain, palpitations and leg swelling.   Gastrointestinal:  Positive for abdominal pain and nausea. Negative for abdominal distention, blood in stool, constipation, diarrhea and vomiting.   Genitourinary:  Negative for dysuria, flank pain, frequency, hematuria and urgency.        Right-sided nephrostomy leaking   Musculoskeletal:  Negative for back pain.   Skin:  Negative for rash.   Neurological:  Negative for dizziness, weakness and headaches.   All other systems reviewed and are negative.      Physical Exam  Physical Exam  Vitals and nursing note reviewed.   Constitutional:       Appearance: She is well-developed.   HENT:      Head: Normocephalic and atraumatic.   Eyes:      Pupils: Pupils are equal, round, and reactive to light.   Cardiovascular:      Rate and Rhythm: Normal rate and regular rhythm.      Heart sounds: No murmur heard.     No friction rub. No gallop.   Pulmonary:      Effort: Pulmonary effort is normal.      Breath sounds: No wheezing or rales.   Chest:      Chest wall: No tenderness.   Abdominal:      General: There is no distension.      Palpations: Abdomen is soft. There is no mass.      Tenderness: There is abdominal tenderness. There is no guarding or rebound.      Comments: Nephrostomy right flank, capped, there is some  excoriations per patient chronic, no significant purulence or drainage noted around the catheter   Musculoskeletal:      Cervical back: Normal range of motion and neck supple.   Skin:     General: Skin is warm and dry.   Neurological:      Mental Status: She is alert and oriented to person, place, and time.         Vital Signs  ED Triage Vitals   Temperature Pulse Respirations Blood Pressure SpO2   08/30/24 0056 08/30/24 0056 08/30/24 0056 08/30/24 0056 08/30/24 0056   99.3 °F (37.4 °C) (!) 112 18 143/79 100 %      Temp Source Heart Rate Source Patient Position - Orthostatic VS BP Location FiO2 (%)   08/30/24 0056 08/30/24 0056 08/30/24 0056 08/30/24 0056 --   Oral Monitor  Sitting Right arm       Pain Score       08/30/24 0128       4           Vitals:    08/30/24 0130 08/30/24 0230 08/30/24 0330 08/30/24 0400   BP: 111/64 92/54 94/52 98/56   Pulse: (!) 110 89 74 77   Patient Position - Orthostatic VS: Sitting Sitting Sitting Sitting         Visual Acuity      ED Medications  Medications   oxyCODONE (ROXICODONE) IR tablet 5 mg (has no administration in time range)   LORazepam (ATIVAN) injection 1 mg (1 mg Intravenous Given 8/30/24 0127)   ketorolac (TORADOL) injection 15 mg (15 mg Intravenous Given 8/30/24 0128)   acetaminophen (Ofirmev) injection 1,000 mg (0 mg Intravenous Stopped 8/30/24 0203)   sodium chloride 0.9 % bolus 1,000 mL (0 mL Intravenous Stopped 8/30/24 0400)   ondansetron (ZOFRAN) injection 4 mg (4 mg Intravenous Given 8/30/24 0127)   iohexol (OMNIPAQUE) 350 MG/ML injection (MULTI-DOSE) 100 mL (100 mL Intravenous Given 8/30/24 0313)   morphine injection 4 mg (4 mg Intravenous Given 8/30/24 0445)       Diagnostic Studies  Results Reviewed       Procedure Component Value Units Date/Time    Urine Microscopic [[id number]]  (Abnormal) Collected: 08/30/24 0420    Lab Status: Final result Specimen: Urine, Clean Catch Updated: 08/30/24 0449     RBC, UA Innumerable /hpf      WBC, UA Innumerable /hpf      Epithelial Cells Occasional /hpf      Bacteria, UA None Seen /hpf      MUCUS THREADS None Seen    Urine culture [[id number]] Collected: 08/30/24 0420    Lab Status: In process Specimen: Urine, Clean Catch Updated: 08/30/24 0449    UA w Reflex to Microscopic w Reflex to Culture [[id number]]  (Abnormal) Collected: 08/30/24 0420    Lab Status: Final result Specimen: Urine, Clean Catch Updated: 08/30/24 0436     Color, UA Yellow     Clarity, UA Clear     Specific Gravity, UA 1.020     pH, UA 6.0     Leukocytes, UA Large     Nitrite, UA Negative     Protein, UA Trace mg/dl      Glucose, UA Negative mg/dl      Ketones, UA Negative mg/dl      Urobilinogen, UA <2.0 mg/dl      Bilirubin, UA  Negative     Occult Blood, UA Large    Basic metabolic panel [[id number]] Collected: 08/30/24 0127    Lab Status: Final result Specimen: Blood from Arm, Left Updated: 08/30/24 0202     Sodium 139 mmol/L      Potassium 3.8 mmol/L      Chloride 106 mmol/L      CO2 24 mmol/L      ANION GAP 9 mmol/L      BUN 17 mg/dL      Creatinine 1.10 mg/dL      Glucose 102 mg/dL      Calcium 8.8 mg/dL      eGFR 65 ml/min/1.73sq m     Narrative:      National Kidney Disease Foundation guidelines for Chronic Kidney Disease (CKD):     Stage 1 with normal or high GFR (GFR > 90 mL/min/1.73 square meters)    Stage 2 Mild CKD (GFR = 60-89 mL/min/1.73 square meters)    Stage 3A Moderate CKD (GFR = 45-59 mL/min/1.73 square meters)    Stage 3B Moderate CKD (GFR = 30-44 mL/min/1.73 square meters)    Stage 4 Severe CKD (GFR = 15-29 mL/min/1.73 square meters)    Stage 5 End Stage CKD (GFR <15 mL/min/1.73 square meters)  Note: GFR calculation is accurate only with a steady state creatinine    CBC and differential [[id number]]  (Abnormal) Collected: 08/30/24 0127    Lab Status: Final result Specimen: Blood from Arm, Left Updated: 08/30/24 0144     WBC 5.66 Thousand/uL      RBC 3.78 Million/uL      Hemoglobin 11.2 g/dL      Hematocrit 33.3 %      MCV 88 fL      MCH 29.6 pg      MCHC 33.6 g/dL      RDW 12.9 %      MPV 8.2 fL      Platelets 261 Thousands/uL      nRBC 0 /100 WBCs      Segmented % 66 %      Immature Grans % 0 %      Lymphocytes % 21 %      Monocytes % 10 %      Eosinophils Relative 3 %      Basophils Relative 0 %      Absolute Neutrophils 3.68 Thousands/µL      Absolute Immature Grans 0.02 Thousand/uL      Absolute Lymphocytes 1.21 Thousands/µL      Absolute Monocytes 0.57 Thousand/µL      Eosinophils Absolute 0.17 Thousand/µL      Basophils Absolute 0.01 Thousands/µL                    CT abdomen pelvis with contrast   Final Result by Jean-Pierre Dorman DO (08/30 0330)   Percutaneous right nephrostomy. Moderate right hydronephrosis.   Mild  "to moderate amount of simple fluid around the bladder, indeterminate. This may be infectious or postsurgical in etiology. Urinoma from bladder or collecting system injury cannot be entirely excluded for which clinical correlation is advised.    Consider short-term follow-up CT abdomen pelvis to monitor for stability/resolution. If clinically indicated, CT cystogram can also be obtained if there is clinical concern for bladder or ureteral injury.   This study was marked for \"Immediate\" notification in EPIC.         Workstation performed: [id number]                    Procedures  Procedures         ED Course  ED Course as of 08/30/24 0533   Fri Aug 30, 2024   0052 Medical record reviewed patient had previous history of cervical cancer with radiation, develop fibrosis with right-sided hydronephrosis underwent  a robotic reimplantation neocystostomy and stent placement.  , she underwent cystoscopy yesterday for R sided stent removal due to hydronephrosis   0144 Hemoglobin(!): 11.2  Baseline   0413 Awaiting urine, discussing results with urology on-call   0507 Patient with pyuria, already on antibiotics which she started yesterday otherwise afebrile no significant leukocytosis, no indication for IV antibiotics   0507 Discussion with urology will place tube to gravity drainage, as long as urine draining and pain improving patient okay for discharge with close follow-up with urology no indication for further inpatient evaluation or treatment   0532 Urine draining pain improving, stable for discharge at this point, no indication for further patient evaluation and treatment                                 SBIRT 20yo+      Flowsheet Row Most Recent Value   Initial Alcohol Screen: US AUDIT-C     1. How often do you have a drink containing alcohol? 0 Filed at: 08/30/2024 0101   2. How many drinks containing alcohol do you have on a typical day you are drinking?  0 Filed at: 08/30/2024 0101   3b. FEMALE Any Age, or MALE 65+: How " often do you have 4 or more drinks on one occassion? 0 Filed at: 08/30/2024 0101   Audit-C Score 0 Filed at: 08/30/2024 0101   ERIC: How many times in the past year have you...    Used an illegal drug or used a prescription medication for non-medical reasons? Never Filed at: 08/30/2024 0101                      Medical Decision Making  34-year-old female with right sided nephrostomy tube leaking status post removal of a right sided ureteral stent, concern for recurrent hydronephrosis, will obtain CT to evaluate for other purpose procedural complications such as perforation, infection    Amount and/or Complexity of Data Reviewed  Labs: ordered. Decision-making details documented in ED Course.  Radiology: ordered.    Risk  Prescription drug management.                 Disposition  Final diagnoses:   Hydronephrosis   Pyuria     Time reflects when diagnosis was documented in both MDM as applicable and the Disposition within this note       Time User Action Codes Description Comment    8/30/2024  5:23 AM Madina Stweart [N13.30] Hydronephrosis     8/30/2024  5:32 AM Madina Stewart [R82.81] Pyuria           ED Disposition       ED Disposition   Discharge    Condition   Stable    Date/Time   Fri Aug 30, 2024 0532    Comment   Margot Moody discharge to home/self care.                   Follow-up Information       Follow up With Specialties Details Why Contact Info Additional Information     [facility] Emergency Department Emergency Medicine  If symptoms worsen [address] [facility] [address] [phone] [facility] Emergency Department, [address] [place], [place] [address]    Demi Mandel,  Family Medicine Schedule an appointment as soon as possible for a visit   [address] [id number] [phone]       [facility] Schedule an appointment as soon as possible for a  visit   [address]  [facility] [address]  [phone] [facility] [place], [address], [facility] [address], [place], [place], [address] [phone]            Patient's Medications   Discharge Prescriptions    No medications on file       No discharge procedures on file.    PDMP Review         Value Time User    PDMP Reviewed  Yes 7/10/2024 12:41 PM Dunia Nunez PA-C            ED Provider  Electronically Signed by             Madina Stewart DO  08/30/24 0533

## 2024-08-30 NOTE — PROGRESS NOTES
Outpatient Care Management Note:    ADT alert received.  Patient was in ER today related to nephrostomy tube leakage and pain post stent removal. Tube was placed to gravity drainage and pain improved. She is to follow up with her PCP and urology.  Patient has not responded to latest CM outreaches.  Unable to reach letter was sent on 8/26.

## 2024-08-31 LAB — BACTERIA UR CULT: NORMAL

## 2024-09-03 ENCOUNTER — TELEPHONE (OUTPATIENT)
Dept: OTHER | Facility: HOSPITAL | Age: 34
End: 2024-09-03

## 2024-09-03 ENCOUNTER — TELEPHONE (OUTPATIENT)
Dept: INTERVENTIONAL RADIOLOGY/VASCULAR | Facility: CLINIC | Age: 34
End: 2024-09-03

## 2024-09-03 NOTE — TELEPHONE ENCOUNTER
I called the patient again over the weekend to check on how she was doing and to rediscuss next steps.  I would like to push up her antegrade nephrostogram as I think we do not need to wait 2 weeks.  If IR can facilitate then we will push this up and I will see her after or at least discuss over the phone.  We also discussed the role for repeating imaging since her CT scan showed simple fluid around the bladder.  This could represent a urine leak although I think that is less likely based on intraoperative findings but certainly possible.    We discussed the different possible pathways for next steps in care based on potential antegrade nephrostogram findings including the role for different reconstructive surgeries as well as what I think is the worst case scenario (nephrectomy).

## 2024-09-03 NOTE — TELEPHONE ENCOUNTER
Spoke with Dr. Fields via secure chat about this patient. He requested to have patient's PCN check moved up to later this week if possible to further assess ureteral reimplantation. Patient recently had to place PCN to drainage from cap due to flank pain. Per my conversation with Dr. Fields, there should be no plan to remove PCN at this time unless IR provider has conversation with Dr. Fields. Schedulers contacted to attempt to move up check to complete nephrostogram sooner.

## 2024-09-09 ENCOUNTER — PATIENT OUTREACH (OUTPATIENT)
Dept: CASE MANAGEMENT | Facility: OTHER | Age: 34
End: 2024-09-09

## 2024-09-10 ENCOUNTER — TELEPHONE (OUTPATIENT)
Dept: GYNECOLOGIC ONCOLOGY | Facility: CLINIC | Age: 34
End: 2024-09-10

## 2024-09-10 DIAGNOSIS — N13.5 URETERAL STRICTURE, RIGHT: ICD-10-CM

## 2024-09-10 DIAGNOSIS — K62.7 RADIATION PROCTITIS: Primary | ICD-10-CM

## 2024-09-10 RX ORDER — VITAMIN E 268 MG
400 CAPSULE ORAL DAILY
Qty: 30 CAPSULE | Refills: 3 | Status: SHIPPED | OUTPATIENT
Start: 2024-09-10

## 2024-09-10 RX ORDER — PENTOXIFYLLINE 400 MG/1
400 TABLET, EXTENDED RELEASE ORAL
Qty: 90 TABLET | Refills: 3 | Status: SHIPPED | OUTPATIENT
Start: 2024-09-10

## 2024-09-10 NOTE — TELEPHONE ENCOUNTER
Called and left a message for wound care to call the patient directly to schedule a consult for hyperbaric oxygen treatment.

## 2024-09-13 ENCOUNTER — TELEPHONE (OUTPATIENT)
Dept: UROLOGY | Facility: CLINIC | Age: 34
End: 2024-09-13

## 2024-09-13 ENCOUNTER — HOSPITAL ENCOUNTER (OUTPATIENT)
Dept: INTERVENTIONAL RADIOLOGY/VASCULAR | Facility: HOSPITAL | Age: 34
Discharge: HOME | End: 2024-09-13
Attending: RADIOLOGY
Payer: COMMERCIAL

## 2024-09-13 DIAGNOSIS — N13.5 URETERAL STRICTURE, RIGHT: Primary | ICD-10-CM

## 2024-09-13 DIAGNOSIS — N13.30 HYDRONEPHROSIS OF RIGHT KIDNEY: Primary | ICD-10-CM

## 2024-09-13 DIAGNOSIS — N13.5 URETERAL STRICTURE, RIGHT: ICD-10-CM

## 2024-09-13 PROCEDURE — 50435 EXCHANGE NEPHROSTOMY CATH: CPT

## 2024-09-13 PROCEDURE — C1769 GUIDE WIRE: HCPCS

## 2024-09-13 PROCEDURE — 50435 EXCHANGE NEPHROSTOMY CATH: CPT | Performed by: INTERNAL MEDICINE

## 2024-09-13 PROCEDURE — 50431 NJX PX NFROSGRM &/URTRGRM: CPT

## 2024-09-13 RX ADMIN — IOHEXOL 16 ML: 350 INJECTION, SOLUTION INTRAVENOUS at 10:33

## 2024-09-13 NOTE — BRIEF OP NOTE (RAD/CATH)
INTERVENTIONAL RADIOLOGY PROCEDURE NOTE    Date: 9/13/2024    Procedure:   Procedure Summary       Date: 09/13/24 Room / Location: [facility]    Anesthesia Start:  Anesthesia Stop:     Procedure: IR NEPHROSTOMY TUBE CHECK/CHANGE/REPOSITION/REINSERTION/UPSIZE Diagnosis:       Ureteral stricture, right      (assess ureteral reimplantation to determine whether PCN can be removed)    Scheduled Providers:  Responsible Provider:     Anesthesia Type: Not recorded ASA Status: Not recorded            Preoperative diagnosis:   1. Ureteral stricture, right         Postoperative diagnosis: Same.    Surgeon: Patricio Moeller MD     Assistant: None. No qualified resident was available.    Blood loss: None    Specimens: None     Findings: Diffuse high grade stenosis present at the mid to distal ureter, with back up of contrast into the right renal pelvis and calyces. Patient had pain during injection of contrast. Delayed imaging shows persistent contrast in the right renal pelvis, however with contrast also seen in the urinary bladder. No urine leak is identified on these images.    The right-sided PCN was exchanged for a new 10.2 Maori catheter.    Case with discussed with Dr. Fields, we will maintain PCN catheter to external bag drainage. We will plan for repeat antegrade nephrostogram in 2 months to re-evaluate the ureter, with possible PCN exchange at that time. Patient will follow-up with Dr. Fields to re-evaluate for any potential options to address the ureteral stenosis.     Complications: None immediate.    Anesthesia: local

## 2024-09-13 NOTE — TELEPHONE ENCOUNTER
I called the pt to discuss her antegrade nephrostogram which shows poor drainage and what appears to be proximal progression of her ureteral stenosis.   I called the patient discussed this with her.  We had already discussed this possibility in the past has I was very concerned about the quality of her ureter based on intraoperative findings.  I told her I was certainly disappointed although not entirely surprised by today's findings.  She asked about the role for balloon dilation which I think is unfortunately unlikely to work.  She also asked if hyperbaric oxygen therapy may help this issue and I told her while it could I unfortunately do not think it will not be enough.  I would like her to see Dr. Gavin at [place].  I have communicated with him.  The patient voices understanding and agreement with this.  I will place a consult to facilitate.

## 2024-09-13 NOTE — DISCHARGE INSTRUCTIONS
Nephrostomy Tube Care     WHAT YOU NEED TO KNOW:   A nephrostomy tube is a catheter (thin plastic tube) that is inserted through your skin and into your kidney. The nephrostomy tube drains urine from your kidney into a collecting bag outside your body. You may need a nephrostomy tube when something is blocking the normal flow of urine. A nephrostomy tube may be used for a short or a long period of time. The nephrostomy tube comes out of your back, so you will need someone to help care for your nephrostomy tube.          DISCHARGE INSTRUCTIONS:      How to clean the skin around the nephrostomy tube and change the bandage:  Since the nephrostomy tube comes out of your back, you will not be able to care for it by yourself. Ask someone to follow the general directions below to check and care for your nephrostomy tube.   Gather the items you will need.          Disposable (single use) under-pad, and a clean washcloth  Plain soap, warm water, and new medical gloves  Sterile gauze bandages  Clear adhesive dressing or medical tape  Skin barrier  Protective skin film  Trash bag  Remove the old bandage, and check the tube entry site.    Have the patient lie on his side with the nephrostomy tube entry site facing up. Place the under-pad where it will catch drainage as you are working with the nephrostomy tube.   Wash your hands with soap and water. Put on new medical gloves.  Gently remove the old bandage, without pulling on the tube. Do this by holding the skin beside the tube with one hand. With the other hand, gently remove sticky tape and the skin barrier by pulling in the same direction as hair growth. Do not touch the side of the bandage that is placed over or around the tube. Throw the bandage and skin barrier away in a trash bag.  Look for signs of infection, such as skin redness and swelling. Report any skin changes to healthcare providers.  Clean the tube entry site.    Hold the tube in place to keep it from  being pulled out while you are cleaning around it.  You will need to clean the area twice. For the first cleaning, wet a new gauze bandage with soap and water.  Begin at the entry site of the tube. Wipe the skin in circles, moving away from the entry site. Remove blood and any other material with the gauze. Do this as often as needed. Use a new gauze bandage each time you clean the area, moving away from the entry site.   For the second cleaning, wet a new gauze bandage with water. Begin at the entry site of the tube. Wipe the skin in circles, moving away from the entry site. Use a new gauze bandage each time you clean the area, moving away from the entry site.   Gently pat the skin with a clean washcloth to dry it.    Apply the skin barrier and bandages.    Roll up a bandage to make it thick, and place it under  the place where the tube enters the skin. Place it to support the tube, and stop it from kinking or bending. Tape the bandage in place, and apply more bandages if directed by a healthcare provider.   Bring the tubing forward to the front and tape it to the skin. Do not stretch the tube tight, because this may pull the nephrostomy tube out.  How often to change the bandage.  Change the bandage around the tube, every other day. If your bandages  get dirty or wet, change them right away, and as often as needed. If your nephrostomy tube is to be used for a long period of time, the tube needs to be changed every 2 to 3 months. Healthcare providers will tell you when you need to make an appointment to have your tube changed.     How to care for the urine drainage bag:   Ask if you need to measure and write down how much urine is in the bag before you empty it. Drain urine out of the drainage bag when it is ½ to ? full. Open the spout at the bottom of the bag to empty the urine into the toilet.   You may need to detach the drainage bag from the nephrostomy tube to change it.. If so, attach a new drainage bag  tightly to the nephrostomy tube.     How to prevent problems with your nephrostomy tube:   Change bandages, directed.  This helps to prevent infection. Throw away or clean your drainage bag as directed by your healthcare provider.    Wipe the connecting ends of the drainage bag with alcohol before you reconnect the bag to the tube.  This helps prevent infection.     Keep the tube taped to your skin and connected to a drainage bag placed below the level of your kidneys.  This helps prevent urine from backing up into your kidneys. You may wear a small drainage bag strapped to your leg to let you move around more easily.    Check the catheter to be sure it is in place after you change your clothes or do other activities.  Do not wear tight clothing over the tube. Place the tubing over your thigh rather than under it when you are sitting down. Be sure that nothing is pulling on the nephrostomy tube when you move around.    Change positions if you see little or no urine in your drainage bag.  Check to see if the urine tube is twisted or bent. Be sure that you are not sitting or lying on the tube. If there are no kinks and there is little or no urine in the drainage bag, tell your healthcare provider.    Flush out the tube as directed. Some tubes get flushed one time a day with 10 mls of NSS You will be given a prescription for the flushes.  To flush the nephrostomy tube, clean both connections with alcohol swap. Twist off the drainage bag tube and twist the saline syringe into the nephrostomy tube and flush briskly. Remove the syringe and twist the drainage bag tube back into the nephrostomy tube.  Keep the site covered while you shower.  Tape a piece of clear adhesive plastic over the dressing to keep it dry while you shower. Do not take tub baths.    Contact Interventional Radiology at [phone]  if:  The skin around the nephrostomy tube is red, swollen, itches, or has a rash.   You have a fever greater than 101 or  chills.  You have lower back or hip pain.  There are changes in how your urine looks or smells.  You have little or no urine draining from the nephrostomy tube.   You have nausea and are vomiting.  The black vivian on your tube has moved, or the tube is longer than when it was put in.   You have questions or concerns about your condition or care.  The nephrostomy tube comes out completely.   There is blood, pus, or a bad smell coming from the place where the tube enters your skin.  Urine is leaking around the tube.

## 2024-09-14 ENCOUNTER — PATIENT MESSAGE (OUTPATIENT)
Dept: UROLOGY | Facility: AMBULATORY SURGERY CENTER | Age: 34
End: 2024-09-14

## 2024-09-16 ENCOUNTER — NURSE TRIAGE (OUTPATIENT)
Age: 34
End: 2024-09-16

## 2024-09-16 ENCOUNTER — TELEPHONE (OUTPATIENT)
Age: 34
End: 2024-09-16

## 2024-09-16 DIAGNOSIS — R39.9 UTI SYMPTOMS: Primary | ICD-10-CM

## 2024-09-16 DIAGNOSIS — R11.0 NAUSEA: Primary | ICD-10-CM

## 2024-09-16 RX ORDER — ONDANSETRON 4 MG/1
4 TABLET, FILM COATED ORAL EVERY 6 HOURS PRN
Qty: 25 TABLET | Refills: 2 | Status: SHIPPED | OUTPATIENT
Start: 2024-09-16

## 2024-09-16 NOTE — TELEPHONE ENCOUNTER
Patient is requesting an insurance referral for the following specialty:      Test Name / Order Name: New patient    DX Code: Z04.9    Date Of Service: 09/17/2024    Location/Facility Name/Address/Phone #:  [phone]   [facility] [place] [address] [address] [facility] [address]     Location / Facility NPI: [id number]    [facility] Phone # To Reach The Patient: [phone]

## 2024-09-16 NOTE — TELEPHONE ENCOUNTER
"Patient called in with concerns of having a fever of 103 since yesterday. States she has been taking tylenol which takes it down to 101. Reports having nausea and vomiting yesterday. Denies any other symptoms. Advised patient to go to ED for evaluation. Patient doesn't want to go to ED as she has an appointment with Deway[facility] Torres tomorrow that she's been waiting for. Reviewed the risks with patient. She is requesting to get urine testing done and a message sent to provider for an antibiotic. Placed urine orders to r/o infection. Again reviewed ED precautions with patient.     Reason for Disposition   Fever with no signs of serious infection or localizing symptoms    Answer Assessment - Initial Assessment Questions  1. TEMPERATURE: \"What is the most recent temperature?\"  \"How was it measured?\"       103  2. ONSET: \"When did the fever start?\"       Yesterday   3. SYMPTOMS: \"Do you have any other symptoms besides the fever?\"  (e.g., colds, headache, sore throat, earache, cough, rash, diarrhea, vomiting, abdominal pain)      Nausea     6. TREATMENT: \"What have you done so far to treat this fever?\" (e.g., medications)      Tylenol    Protocols used: Fever-ADULT-OH    "

## 2024-09-16 NOTE — TELEPHONE ENCOUNTER
Spoke with pt. She says her fever has gone down to 100 with tylenol. Nausea without vomiting currently. Refusing to go to ER despite clinical staff recommendations because of appointment at [facility] tomorrow, encouraged pt to go. She says she will only go if her fever goes back up. Will monitor if/when pt completes urine testing.

## 2024-09-17 ENCOUNTER — TELEPHONE (OUTPATIENT)
Age: 34
End: 2024-09-17

## 2024-09-17 NOTE — PROGRESS NOTES
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 female.    Miss. Moody is a 34-year-old female with a past medical history including but not limited to stage III C1 cervical cancer s/p aborted radical hysterectomy followed by curative intent chemo radiation completed August 2022 presenting today for hyperbaric consult for radiation proctitis/soft tissue radionecrosis.    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  Despite the resection, patient remains with bowel symptoms.  She experiences bowel urgency and sometimes cannot get to the bathroom quick enough.  In addition she has chronic pelvic pain and dyspareunia that occurred after radiation treatments which she still struggles with. Audrey reports that began with bladder/urinary complications around August 2023 and has since followed regularly with urology.  Her symptoms gradually progressed and she developed radiation fibrosis that affected the right distal ureter and caused right hydronephrosis requiring nephrostomy tube.  She eventually required ureteral reimplantation procedure which ultimately has failed due to the progressive radiation-induced damage.  On 9/17/2024 patient had consult with Dewayne Torres and is being considered for surgical procedure    R Nephrostogram at tube change 9/13/2024:  \"diffuse high-grade stenosis at mid to distal ureter with backup of contrast into the right renal " "pelvis and calyces.  Patient had pain during injection of contrast.  Delayed imaging shows persistent contrast in the right renal pelvis however contrast also seen in the urinary bladder.\"    PET 7/11/24:  \"1. There remains no evidence of FDG avid metastatic disease within the neck, chest, upper abdomen, or skeleton  2. Interval replacement of right ureteral stent with right nephrostomy catheter without right-sided hydronephrosis  3. No progression of any of the previously seen pelvic findings. Stable degree of presacral soft tissue thickening. No areas of worsening FDG activity (with the exception of right obturator internus muscle activity, likely physiologic or inflammatory)  4. New rounded soft tissue density in the posterior left hemipelvis, SUV max 2.1, measuring 2.1 cm in size. May represent a follicle in the transposed left ovary or a lymphocele. This should be carefully reassessed during the course of follow-up.\"    Of note, patient had consult on 9/17/2024 with [facility] and is being considered for ileal ureter reconstruction surgery.  Surgery is not yet scheduled but patient states she would like to proceed with this.  In addition, about 3 days ago had fever of 103.5 and was placed on Bactrim which she is currently taking.  She has not had a fever since and is completing short course of antibiotics.     See below for full ROS.      Review of Systems   Constitutional:  Negative for chills and fever.        Had fever 3 days ago but has resolved. On bactrim per team at [facility].   Respiratory:  Negative for shortness of breath.    Cardiovascular:  Negative for chest pain and palpitations.   Gastrointestinal:  Positive for diarrhea and nausea.   Genitourinary:  Positive for dyspareunia and pelvic pain.   Neurological:  Negative for seizures.   Psychiatric/Behavioral:  The patient is not nervous/anxious.          Objective:     Physical Exam  Vitals reviewed.   Constitutional:       General: She is not in " "acute distress.     Appearance: She is not ill-appearing, toxic-appearing or diaphoretic.   HENT:      Right Ear: Tympanic membrane, ear canal and external ear normal. There is no impacted cerumen.      Left Ear: Tympanic membrane, ear canal and external ear normal. There is no impacted cerumen.   Cardiovascular:      Comments:  noted on intake vitals. Was not tachy at time of exam/auscultation HR WNL  Pulmonary:      Effort: Pulmonary effort is normal. No respiratory distress.      Breath sounds: Normal breath sounds. No stridor. No wheezing or rales.   Skin:     General: Skin is warm.   Neurological:      Mental Status: She is alert and oriented to person, place, and time.           HBO Qualification & Assessment     Margot Moody presents today for a consultation for HBO treatment.    She is an excellent candidate for hyperbaric oxygen treatment as adjunctive therapy for the treatment of radiation cystitis/STRN/radiation proctitis. Her symptoms and progressive/continued complications secondary to radiation have adversely affected her quality of life despite efforts at alternate management with medications and procedures.     Hyperbaric oxygen therapy will be utilized as an adjunct to traditional medical therapy/treatments.  Goals of treatment include improvement in symptoms, improvement in tissue health, stimulation of angiogenesis and induction of neovascularization in hypoxic irradiated tissue amongst others stated below under \"hyperbaric treatment goal\" heading.       HBO Indication    Soft Tissue Radionecrosis    Soft Tissue Radionecrosis diagnosis code  L59.8 Other specified disorders of the skin and subcutaneous tissue related to radiation    Anatomical site Other bowels/pelvis/kidney/ureter    Date radiation treatments started June 2022    Date radiation treatments completed August 2022    Radiation therapy treatments ended at least 6 months previous to consideration of HBO  Yes    Symptoms of STRN " the patient is experiencing Other pelvic pain and dyspareunia , chronic diarrhea and urgency, nausea, complications associated with progressive radiation-induced fibrosis with necessity of right nephrostomy tube which causes patient pain.    Since the patient has radiation soft tissue necrosis as evidenced by above documentation HBO is medically necessary    Review of the [facility], selene patient had cancer and received radiation. HBO is being used as an adjunct to conventional Rx.  Per chart review from gynecology oncology and urology multiple medications have been utilized to manage patient's symptoms. Based on the information included herein, it is my determination that the patient has a medical necessity for HBOT and meets the conditions for the adjunctive treatment to a comprehensive plan.  Yes    Brief history of co-morbidities that may affect HBO treatment:    Previously treated with: Cis-platinum; completed 6 cycles in 2022.     Patient reports s/s of High fever Had a fever 3 days ago 103.7 ; currently on Bactrim d/t concern for infection after nephrostomy tube change 3 days     Eyes    Patient has  No history of Myopia, Cataracts or Optic Nerve    Ears    Patient has a history of No ear abnormalities or conditions    Ears assessed for tympanic membrane or middle ear abnormalities Yes    Patient instructed on how to clear ears and demonstrate proper technique: Yes    Right ear baseline TEEDS: Grade 0    Left ear baseline TEEDS: Grade 0    ENT consult for Myringotomy tube insertion due to No consult is needed    Cerumen removal performed:  N/A. ears clear of cerumen    Neurological    Patient has a history of seizures: No    Cardiovascular    Patient has a history of: No cardiac history requiring work-up prior to hyperbaric therapy    EKG ordered: Yes    Echocardiogram ordered: No    Cardiovascular consult ordered: No    Smoking  Social History     Tobacco Use   Smoking Status Never    Passive exposure: Current  (very mild/social)   Smokeless Tobacco Never       Respiratory    Patient has a history of pneumothorax: No    Patient has a history of: No respiratory conditions requiring further work-up prior to HBO    Lungs clear bilaterally: Yes    Chest x-ray ordered: Yes    Psychiatric    Patient has confinement anxiety: Yes    Patient education and consent    Informed Consent:  Margot Moody has no contraindications to hyperbaric oxygen therapy. We have discussed the possible risks and complications of hyperbaric oxygen therapy.  These risks include, but are not limited to, fire, barotrauma of the ears, sinuses, and lungs to include air embolism, CNS oxygen toxicity resulting in seizure, cataracts, myopia and exacerbation of the congestive heart failure.  Patient understands these risks along with the potential benefits and agrees to undergo hyperbaric oxygen therapy.  I discussed with and educated the patient about the HBO procedure, smoking/drug/alcohol policy, and items not allowed in the hyperbaric chamber.  Yes    Patient has been oriented to the HBO chamber. Clearing techniques have been reviewed.    Written consent for HBO obtained: Yes    A trained emergency response team is available in this facility to assist with complications if required: Yes    HBO treatment goal    Margot Moody is an excellent candidate for hyperbaric oxygen treatment as adjunctive therapy for the treatment of  STRN/radiation proctitis/. Radiation causes an obliterative endarteritis, subsequent hypoxic/hypovascular tissue and secondary fibrosis. Hyperbaric oxygen has been shown to be an effective treatment in radiation tissue injury by stimulating angiogenesis and inducing neovacularization in the hypoxic irradiated tissue. Serial hyperbaric oxygen treatments improve local host immune response and enhance the clearance of infection by improving the leukocyte oxidative killing of aerobic bacteria and the direct bactericidal activity against  many anaerobic bacteria. Hyperbaric oxygen also stimulates tissue growth through fibroblast proliferation, collagen synthesis, stimulation of the release of vascular endothelial growth factors (VEGF), induction of the receptor appearance of platelet derived growth factors (PDGF) and angiogenesis.      Goals of treatment: Improvement in symptoms, improvement in tissue health, stimulate angiogenesis and induce neovascularization in hypoxic irradiated tissue, amongst other goal stated above.

## 2024-09-17 NOTE — TELEPHONE ENCOUNTER
Patient of Dr. Fields at [facility]  short term disability called stating the faxed over a physician statement to be filled out by provider. They will fax over to [place] office for review. Please fax back to them at number below.     [facility]  Phone [phone] fax

## 2024-09-18 NOTE — TELEPHONE ENCOUNTER
Called pt to check up on her. She says she went to her appt at [facility] and they also ordered urine testing. Pt says she completed both SL testing and FC testing. She also said FC prescribed her Bactrim which she plans to  from her pharmacy.     She states that she is feeling a little bit better with no fevers. Pt advised to call office if symptoms worsen.

## 2024-09-19 ENCOUNTER — OFFICE VISIT (OUTPATIENT)
Dept: WOUND CARE | Facility: CLINIC | Age: 34
End: 2024-09-19
Payer: COMMERCIAL

## 2024-09-19 VITALS
BODY MASS INDEX: 37.03 KG/M2 | TEMPERATURE: 98.3 F | WEIGHT: 250 LBS | HEART RATE: 104 BPM | HEIGHT: 69 IN | RESPIRATION RATE: 14 BRPM | DIASTOLIC BLOOD PRESSURE: 82 MMHG | SYSTOLIC BLOOD PRESSURE: 116 MMHG

## 2024-09-19 DIAGNOSIS — K62.7 RADIATION PROCTITIS: Primary | ICD-10-CM

## 2024-09-19 DIAGNOSIS — L59.8 RADIATION FIBROSIS OF SOFT TISSUE FROM THERAPEUTIC PROCEDURE: ICD-10-CM
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
## 2024-09-19 DIAGNOSIS — L59.8 SOFT TISSUE RADIONECROSIS: ICD-10-CM

## 2024-09-19 DIAGNOSIS — N30.40 RADIATION CYSTITIS: ICD-10-CM

## 2024-09-19 PROCEDURE — 99204 OFFICE O/P NEW MOD 45 MIN: CPT | Performed by: FAMILY MEDICINE

## 2024-09-19 PROCEDURE — 99213 OFFICE O/P EST LOW 20 MIN: CPT | Performed by: FAMILY MEDICINE

## 2024-09-19 RX ORDER — SULFAMETHOXAZOLE/TRIMETHOPRIM 800-160 MG
1 TABLET ORAL EVERY 12 HOURS SCHEDULED
COMMUNITY
End: 2024-09-27

## 2024-09-19 NOTE — PROGRESS NOTES
The patient was diagnosed with cervical CA in 2022. She had radiation and chemo in 2022. The radiation caused a bowel stricture and in May 2023, the patient had a large bowel resection. She developed radiation proctitis. The patient has a nephrostomy due to a ureter stricture.The patient has no history of smoking, diabetes, lung or heart issues. She stated she is claustrophobic. The patient does not wear contacts, glasses, hearing aides or dentures. She was referred to wound care for HBO by Dr. Parminder Moctezuma.

## 2024-09-20 LAB
APPEARANCE UR: ABNORMAL
BACTERIA UR CULT: ABNORMAL
BACTERIA URNS QL MICRO: ABNORMAL
BILIRUB UR QL STRIP: NEGATIVE
CASTS URNS QL MICRO: ABNORMAL /LPF
COLOR UR: YELLOW
CRYSTALS URNS MICRO: ABNORMAL
EPI CELLS #/AREA URNS HPF: ABNORMAL /HPF (ref 0–10)
GLUCOSE UR QL: NEGATIVE
HGB UR QL STRIP: ABNORMAL
KETONES UR QL STRIP: NEGATIVE
LEUKOCYTE ESTERASE UR QL STRIP: ABNORMAL
Lab: ABNORMAL
MICRO URNS: ABNORMAL
NITRITE UR QL STRIP: POSITIVE
PH UR STRIP: 6 [PH] (ref 5–7.5)
PROT UR QL STRIP: ABNORMAL
RBC #/AREA URNS HPF: >30 /HPF (ref 0–2)
SL AMB ANTIMICROBIAL SUSCEPTIBILITY: ABNORMAL
SP GR UR: 1.02 (ref 1–1.03)
UNIDENT CRYS URNS QL MICRO: PRESENT
UROBILINOGEN UR STRIP-ACNC: 1 MG/DL (ref 0.2–1)
WBC #/AREA URNS HPF: ABNORMAL /HPF (ref 0–5)

## 2024-09-23 ENCOUNTER — TELEPHONE (OUTPATIENT)
Dept: UROLOGY | Facility: CLINIC | Age: 34
End: 2024-09-23

## 2024-09-23 DIAGNOSIS — R39.9 UTI SYMPTOMS: Primary | ICD-10-CM

## 2024-09-23 NOTE — TELEPHONE ENCOUNTER
Spoke with pt relaying Sparkle STAPLETON message,   Urine culture is positive and Abx was sent to the Pharmacy     Pt verbalized understanding.   ----- Message from Sparkle Moore PA-C sent at 9/23/2024  7:20 AM EDT -----  Antibiotics sent

## 2024-09-27 ENCOUNTER — TELEPHONE (OUTPATIENT)
Dept: RADIATION ONCOLOGY | Facility: CLINIC | Age: 34
End: 2024-09-27

## 2024-09-27 NOTE — TELEPHONE ENCOUNTER
Spoke to patient to reschedule due to provider emergency ooo in the afternoon. Offered next available, patient declined. States she will have surgery on 10/23/24 and will call us back to reschedule. Offered to call her after 10/23, pt said she will call.

## 2024-10-02 DIAGNOSIS — K62.7 RADIATION PROCTITIS: ICD-10-CM

## 2024-10-02 DIAGNOSIS — N13.5 URETERAL STRICTURE, RIGHT: ICD-10-CM

## 2024-10-02 RX ORDER — PENTOXIFYLLINE 400 MG/1
400 TABLET, EXTENDED RELEASE ORAL
Qty: 270 TABLET | Refills: 0 | Status: SHIPPED | OUTPATIENT
Start: 2024-10-02

## 2024-10-10 ENCOUNTER — TELEPHONE (OUTPATIENT)
Dept: GYNECOLOGIC ONCOLOGY | Facility: CLINIC | Age: 34
End: 2024-10-10

## 2024-10-10 NOTE — TELEPHONE ENCOUNTER
Called and spoke with patient that 11/7 Dr. Moctezuma will not be in. So patient moved to 12/5/24 at 3:15 pm in [facility].

## 2024-10-17 ENCOUNTER — TELEPHONE (OUTPATIENT)
Age: 34
End: 2024-10-17

## 2024-10-17 NOTE — TELEPHONE ENCOUNTER
Patient is requesting an insurance referral for the following specialty:      Test Name / Order Name: Oncology service    DX Code: N13.5    Date Of Service: 10/23/2024    Location/Facility Name/Address/Phone #:   [facility]   [address] [facility] [address]  [phone]     Location / Facility NPI: [id number]    Best Phone # To Reach The Patient:

## 2024-11-22 ENCOUNTER — TELEPHONE (OUTPATIENT)
Dept: UROLOGY | Facility: CLINIC | Age: 34
End: 2024-11-22

## 2024-11-22 NOTE — TELEPHONE ENCOUNTER
I called pt to check in. She is overall doing well after ileal ureter. She is tolerating diet. Pain is controlled. Stent comes out in a few days.  I provided reassurance that she will likely do well with stent removal.  We discussed that she can follow-up in the future with Dewayne Torres or we can take over follow-up but would ask for Dr. Gavin's guidance on how he typically follows patients with an ileal ureter (US vs CT, cystoscopies etc)

## 2024-12-05 ENCOUNTER — OFFICE VISIT (OUTPATIENT)
Age: 34
End: 2024-12-05
Payer: COMMERCIAL

## 2024-12-05 VITALS
HEART RATE: 97 BPM | RESPIRATION RATE: 18 BRPM | OXYGEN SATURATION: 99 % | BODY MASS INDEX: 39.55 KG/M2 | SYSTOLIC BLOOD PRESSURE: 120 MMHG | HEIGHT: 69 IN | WEIGHT: 267 LBS | TEMPERATURE: 97.4 F | DIASTOLIC BLOOD PRESSURE: 62 MMHG

## 2024-12-05 DIAGNOSIS — C53.8 MALIGNANT NEOPLASM OF OVERLAPPING SITES OF CERVIX (HCC): Primary | ICD-10-CM

## 2024-12-05 DIAGNOSIS — N95.1 MENOPAUSAL SYMPTOMS: ICD-10-CM

## 2024-12-05 DIAGNOSIS — K52.1 DIARRHEA DUE TO DRUG: ICD-10-CM

## 2024-12-05 DIAGNOSIS — N13.5 URETERAL STRICTURE, RIGHT: ICD-10-CM

## 2024-12-05 PROBLEM — Z93.6 NEPHROSTOMY STATUS (HCC): Status: RESOLVED | Noted: 2024-07-10 | Resolved: 2024-12-05

## 2024-12-05 PROBLEM — T83.84XA PAIN DUE TO URETERAL STENT (HCC): Status: RESOLVED | Noted: 2023-01-02 | Resolved: 2024-12-05

## 2024-12-05 PROCEDURE — 99459 PELVIC EXAMINATION: CPT | Performed by: OBSTETRICS & GYNECOLOGY

## 2024-12-05 PROCEDURE — 99214 OFFICE O/P EST MOD 30 MIN: CPT | Performed by: OBSTETRICS & GYNECOLOGY

## 2024-12-05 NOTE — ASSESSMENT & PLAN NOTE
Likely secondary to prolonged antibiotic therapy, possibly functional status post bowel resection and reanastomosis.  Recommended probiotic therapy with consideration of as needed Imodium in the event that probiotics are ineffective.

## 2024-12-05 NOTE — ASSESSMENT & PLAN NOTE
Status post intestinal interposition surgery.  She will follow-up with urology at [place].

## 2024-12-05 NOTE — ASSESSMENT & PLAN NOTE
34-year-old with a history of stage III C1 cervical cancer.  She is clinically without evidence of disease recurrence.  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  She has considered hyperbaric oxygen therapy.  I reviewed CBC, BMP, operative notes, pathology reports.  Her performance status is 0.  1.  Return in 6 months for cervical cancer surveillance.

## 2024-12-05 NOTE — ASSESSMENT & PLAN NOTE
Continue norethindrone/estradiol therapy.  She has had reduced skin side effects with oral medication versus transdermal.

## 2024-12-05 NOTE — PROGRESS NOTES
Name: Margot Moody      : 1990      MRN: [id number]  Encounter Provider: Parminder Moctezuma MD  Encounter Date: 2024   Encounter department: [facility] GYNECOLOGY ONCOLOGY [facility]  :  Assessment & Plan  Malignant neoplasm of overlapping sites of cervix (HCC)  34-year-old with a history of stage III C1 cervical cancer.  She is clinically without evidence of disease recurrence.  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  She has considered hyperbaric oxygen therapy.  I reviewed CBC, BMP, operative notes, pathology reports.  Her performance status is 0.  1.  Return in 6 months for cervical cancer surveillance.         Menopausal symptoms  Continue norethindrone/estradiol therapy.  She has had reduced skin side effects with oral medication versus transdermal.         Ureteral stricture, right  Status post intestinal interposition surgery.  She will follow-up with urology at [facility].         Diarrhea due to drug  Likely secondary to prolonged antibiotic therapy, possibly functional status post bowel resection and reanastomosis.  Recommended probiotic therapy with consideration of as needed Imodium in the event that probiotics are ineffective.               History of Present Illness   Reason for Visit / CC: Cervical cancer surveillance   Margot Moody is a 34 y.o. female   Returns for cervical cancer surveillance.  She is recently status post laparotomy with replacement of the right ureter with ileal segment, partial right ureterectomy, omental wrap on 10/23/2024.  Pathology was consistent with inflammation.  No evidence of malignancy.  She no longer has a percutaneous nephrostomy tube.  She does  note mucus drainage in the urine and she has frequent urinary tract infections.  She does not have any vaginal bleeding.  Menopausal symptoms are controlled with hormone replacement therapy.  Labs from 10/31/2024 revealed a normal BMP, hemoglobin 8.1 g/dL, normal white count and platelet count.  She stopped taking vitamin E and Trental.  She had a consult with hyperbaric oxygen therapy and had considered doing it, however, since the travel was too far given the multiple treatments that were necessary.  She has diarrhea which is improving after stopping a long course of antibiotic therapy.  She still has abdominal discomfort after surgery.  She is able to ambulate.  No vomiting.  No other interval change in medications or medical history since her last visit the office.  She had MRD testing in April 2024 which was negative.  Last CT imaging of the abdomen pelvis in August 2024 revealed inflammatory changes in the pelvis without evidence of malignancy.         Oncology History   Oncology History Overview Note   with FIGO IIIC1 cervical cancer s/p initial LEEP in March 2022 showing moderately differentiated SCC with extensive LVSI. MDT consensus was for definitive concurrent chemoRT. She received 6 cycles of cisplatin concurrent with pelvic RT with brachytherapy boost on 8/3/22.  She was last seen by radiation oncology on 03/15/2024 and presents for follow up today     7/11/24  PET CT  IMPRESSION:  1. There remains no evidence of FDG avid metastatic disease within the neck, chest, upper abdomen, or skeleton  2. Interval replacement of right ureteral stent with right nephrostomy catheter without right-sided hydronephrosis  3. No progression of any of the previously seen pelvic findings. Stable degree of presacral soft tissue thickening. No areas of worsening FDG activity (with the exception of right obturator internus muscle activity, likely physiologic or inflammatory)  4. New rounded soft tissue density in the  posterior left hemipelvis, SUV max 2.1, measuring 2.1 cm in size. May represent a follicle in the transposed left ovary or a lymphocele. This should be carefully reassessed during the course of follow-up.       24  Dr. 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  PET with nonspecific findings but overall negative for recurrent disease.    Planned surgery to address hydronephrosis with urology    24 [facility]/Dr. Fields   REIMPLANTATION URETEROCYSTOSTOMY LAPAROSCOPIC W/ ROBOTICS, EXTENSIVE LYSIS OF ADHESIONS (Right: Bladder)       CYSTOSCOPY (Bladder)       INSERTION STENT URETERAL (Right: Bladder)     Upcomin24  Dr. Moctezuma     Malignant neoplasm of overlapping sites of cervix (HCC)   3/24/2022 Initial Diagnosis    Malignant neoplasm of overlapping sites of cervix (HCC)     2022 Surgery    Exploratory laparotomy for planned radical hysterectomy, bilateral pelvic lymph node dissection, aborted radical hysterectomy, bilateral ovarian transposition due to positive lymph nodes.  1. Two right pelvic lymph nodes positive     2022 -  Cancer Staged    Staging form: Cervix Uteri, AJCC Version 9  - Pathologic: FIGO Stage IIIC1p (pT1b1, cM0) - Signed by Parminder Moctezuma MD on 2022  Stage confirmation method: Pathology  Pelvic grady status: Positive  Pelvic grady method of assessment: Lymph node dissection  Para-aortic status: Negative       2022 - 2022 Chemotherapy    palonosetron (ALOXI), 0.25 mg, Intravenous, Once, 6 of 6 cycles  Administration: 0.25 mg (2022), 0.25 mg (2022), 0.25 mg (2022), 0.25 mg (2022), 0.25 mg (2022), 0.25 mg (2022)  CISplatin (PLATINOL) infusion, 70 mg (original dose 40 mg/m2), Intravenous, Once, 6 of 6 cycles  Dose modification: 70 mg (original dose 40 mg/m2, Cycle 1, Reason: Other (Must fill in a comment), Comment: max 70), 70 mg (original dose 40 mg/m2, Cycle 2, Reason:  Dose modified as per discussion with consulting physician)  Administration: 70 mg (6/20/2022), 70 mg (6/27/2022), 70 mg (7/6/2022), 70 mg (7/11/2022), 70 mg (7/19/2022), 70 mg (7/26/2022)  aprepitant (CINVANTI) in  mL IVPB, 130 mg, Intravenous, Once, 6 of 6 cycles  Administration: 130 mg (6/20/2022), 130 mg (6/27/2022), 130 mg (7/6/2022), 130 mg (7/11/2022), 130 mg (7/19/2022), 130 mg (7/26/2022)     6/22/2022 - 8/3/2022 Radiation    Course: C1 - EBRT  Plan ID Energy Fractions Dose per Fraction (cGy) Dose Correction (cGy) Total Dose Delivered (cGy) Elapsed Days   Whole Pelvis 10X 25 / 25 180 0 4,500 42       Course: C1 HDR Tandem and Ring Brachytherapy  Plan ID Energy Fractions Dose per Fraction (cGy) Dose Correction (cGy) Total Dose Delivered (cGy) Elapsed Days   HDR1 7-15-22 1 / 1 700 0 700 0   [id number]  1 / 1 700 0 700 0   [id number]  1 / 1 700 0 700 0   HDR4 7-25-22 1 / 1 700 0 700 0            Review of Systems A complete review of systems is negative other than that noted above in the HPI.  Past Medical History   Past Medical History:   Diagnosis Date    Anxiety     Cancer (HCC)     cervix    Cervical cancer (HCC)     receiving chemo and radiation    COVID     Jan 2022    Depression     Diarrhea     Dizziness     Frequent headaches     Hx of bleeding disorder     vaginal bleeding    Obesity      Past Surgical History:   Procedure Laterality Date    CERVICAL BIOPSY  W/ LOOP ELECTRODE EXCISION      COLON SURGERY  Colon resection    May 2023    FL RETROGRADE PYELOGRAM  02/13/2024    IR NEPHROSTOMY TUBE CHECK/CHANGE/REPOSITION/REINSERTION/UPSIZE  9/13/2024    IR NEPHROSTOMY TUBE PLACEMENT  7/1/2024    LYMPH NODE DISSECTION Bilateral 05/06/2022    Procedure: DISSECTION/STAGING LYMPH NODE PELVIS/ABDOMEN;  Surgeon: Parminder Moctezuma MD;  [facility];  Service: Gynecology Oncology    DE CYSTO BLADDER W/URETERAL CATHETERIZATION Right 02/13/2024    Procedure: CYSTOSCOPY WITH RETROGRADE  PYELOGRAM;  Surgeon: Chuck Campo MD;  [facility];  Service: Urology    OK CYSTOURETHROSCOPY N/A 2024    Procedure: CYSTOSCOPY;  Surgeon: Vitor Fields MD;  [facility];  Service: Urology    OK INSERTION VAGINAL RADIATION DEVICE N/A 07/15/2022    Procedure: INSERTION NADIR SLEEVE VAGINA WITH POST OP BRACHYTHERAPY (IN RADIATION ONCOLOGY);  Surgeon: Parminder Moctezuma MD;  [facility];  Service: Gynecology Oncology    OK LAPAROSCOPY COLECTOMY PARTIAL W/ANASTOMOSIS N/A 05/10/2023    Procedure: RESECTION COLON SIGMOID LAPAROSCOPIC;  Surgeon: Marin Salinas MD;  [facility];  Service: Colorectal    OK SIGMOIDOSCOPY FLX DX W/COLLJ SPEC BR/WA IF PFRMD N/A 05/10/2023    Procedure: SIGMOIDOSCOPY FLEXIBLE;  Surgeon: Marin Salinas MD;  [facility];  Service: Colorectal    OK URETERONEOCYSTOSTOMY ANAST 1 URETER BLADDER Right 2024    Procedure: REIMPLANTATION URETEROCYSTOSTOMY LAPAROSCOPIC W/ ROBOTICS, EXTENSIVE LYSIS OF ADHESIONS;  Surgeon: Vitor Fields MD;  [facility];  Service: Urology    SALPINGECTOMY Bilateral 2022    Procedure: SALPINGECTOMY, OVARIAN TRANSPOSITION;  Surgeon: Parminder Moctezuma MD;  [facility];  Service: Gynecology Oncology    URETERAL STENT PLACEMENT Right 2024    Procedure: INSERTION STENT URETERAL;  Surgeon: Chuck Campo MD;  [facility];  Service: Urology    URETERAL STENT PLACEMENT Right 2024    Procedure: INSERTION STENT URETERAL;  Surgeon: Vitor Fields MD;  [facility];  Service: Urology    US GUIDANCE  07/15/2022     Family History   Problem Relation Age of Onset    No Known Problems Mother     Heart disease Father     Cancer Brother         Niece/ brothers daughter stage 4 Adrenocortocal carcinoma    Ovarian cancer Maternal Aunt         Dont know  before i was born    Cancer Maternal Uncle         Stage 2 Liver Cancer    Ovarian cancer  Maternal Grandmother         Dont know  before i was born    Cancer Niece 7        adrenal gland      reports that she has never smoked. She has been exposed to tobacco smoke. She has never used smokeless tobacco. She reports that she does not currently use alcohol. She reports that she does not use drugs.  Current Outpatient Medications on File Prior to Visit   Medication Sig Dispense Refill    norethindrone-ethinyl estradiol (Femhrt) 0.5-2.5 MG-MCG per tablet Take 1 tablet by mouth daily 90 tablet 1    docusate sodium (COLACE) 100 mg capsule Take 1 capsule (100 mg total) by mouth 2 (two) times a day for 14 days 28 capsule 0    senna (SENOKOT) 8.6 mg Take 1 tablet (8.6 mg total) by mouth daily at bedtime for 14 days 14 tablet 0    [DISCONTINUED] ketorolac (TORADOL) 10 mg tablet Take 1 tablet (10 mg total) by mouth every 6 (six) hours as needed for moderate pain (Patient not taking: Reported on 2024) 12 tablet 0    [DISCONTINUED] LORazepam (ATIVAN) 1 mg tablet Take 2 tablets PO 30 min prior to PET (Patient not taking: Reported on 2024) 2 tablet 0    [DISCONTINUED] ondansetron (ZOFRAN) 4 mg tablet Take 1 tablet (4 mg total) by mouth every 6 (six) hours as needed for nausea or vomiting (Patient not taking: Reported on 2024) 25 tablet 2    [DISCONTINUED] oxyCODONE (ROXICODONE) 5 immediate release tablet Take 1 tablet (5 mg total) by mouth every 4 (four) hours as needed for moderate pain or severe pain for up to 10 doses Max Daily Amount: 30 mg (Patient not taking: Reported on 2024) 10 tablet 0    [DISCONTINUED] pentoxifylline (TRENtal) 400 mg ER tablet TAKE 1 TABLET BY MOUTH 3 TIMES A DAY WITH MEALS. (Patient not taking: Reported on 2024) 270 tablet 0    [DISCONTINUED] polyethylene glycol (MiraLax) 17 g packet Take 17 g by mouth if needed (constipation)  dissolve in 8 oz liquid (Patient not taking: Reported on 2024)      [DISCONTINUED] vitamin E, tocopherol, 400 units capsule Take 1  "capsule (400 Units total) by mouth daily (Patient not taking: Reported on 12/5/2024) 30 capsule 3     No current facility-administered medications on file prior to visit.     Allergies   Allergen Reactions    Medical Tape Blisters         Objective   /62 (BP Location: Left arm, Patient Position: Sitting, Cuff Size: Large)   Pulse 97   Temp (!) 97.4 °F (36.3 °C)   Resp 18   Ht 5' 9\" (1.753 m)   Wt 121 kg (267 lb)   SpO2 99%   BMI 39.43 kg/m²     Body mass index is 39.43 kg/m².  ECOG   1  Physical Exam  Vitals reviewed. Exam conducted with a chaperone present.   Constitutional:       General: She is not in acute distress.     Appearance: Normal appearance. She is well-developed. She is not ill-appearing, toxic-appearing or diaphoretic.   HENT:      Head: Normocephalic and atraumatic.   Eyes:      General: No scleral icterus.     Extraocular Movements: Extraocular movements intact.      Conjunctiva/sclera: Conjunctivae normal.   Neck:      Thyroid: No thyromegaly.   Pulmonary:      Effort: Pulmonary effort is normal.   Abdominal:      General: There is no distension.      Palpations: Abdomen is soft. There is no mass.      Tenderness: There is no abdominal tenderness. There is no guarding or rebound.      Hernia: No hernia is present.   Genitourinary:     Comments: External female genitalia are normal.  No masses or lesions.  Speculum examination reveals radiation treatment effect to the vagina.  The cervix is not visible.  There is scarring of the upper vagina.  No mass or lesion.  No bleeding.  Bimanual examination reveals a thickened, fixed pelvis consistent with extensive radiation change.  Musculoskeletal:         General: No swelling or tenderness.      Cervical back: Normal range of motion and neck supple.      Right lower leg: No edema.      Left lower leg: No edema.   Lymphadenopathy:      Cervical: No cervical adenopathy.   Skin:     General: Skin is warm and dry.      Coloration: Skin is not " jaundiced or pale.      Findings: No lesion or rash.      Comments: Midline vertical incision is clean dry and intact   Neurological:      General: No focal deficit present.      Mental Status: She is alert and oriented to person, place, and time. Mental status is at baseline.      Cranial Nerves: No cranial nerve deficit.      Motor: No weakness.      Gait: Gait normal.   Psychiatric:         Behavior: Behavior normal.         Thought Content: Thought content normal.         Judgment: Judgment normal.          Labs: I have reviewed pertinent labs.  CBC, BMP from 10/31/2024          Other Study Results Review : Pathology reports reviewed.

## 2025-01-04 DIAGNOSIS — E28.319 PREMATURE MENOPAUSE: ICD-10-CM

## 2025-01-04 DIAGNOSIS — E89.40: ICD-10-CM

## 2025-01-04 PROBLEM — K52.1 DIARRHEA DUE TO DRUG: Status: RESOLVED | Noted: 2024-12-05 | Resolved: 2025-01-04

## 2025-01-06 RX ORDER — NORETHINDRONE ACETATE AND ETHINYL ESTRADIOL .5; 2.5 MG/1; UG/1
1 TABLET ORAL DAILY
Qty: 84 TABLET | Refills: 1 | Status: SHIPPED | OUTPATIENT
Start: 2025-01-06

## 2025-03-20 ENCOUNTER — HOSPITAL ENCOUNTER (OUTPATIENT)
Dept: ULTRASOUND IMAGING | Facility: HOSPITAL | Age: 35
Discharge: HOME | End: 2025-03-20
Payer: COMMERCIAL

## 2025-03-20 DIAGNOSIS — N13.5 CROSSING VESSEL AND STRICTURE OF URETER WITHOUT HYDRONEPHROSIS: ICD-10-CM

## 2025-03-20 PROCEDURE — 76775 US EXAM ABDO BACK WALL LIM: CPT

## 2025-04-07 ENCOUNTER — TELEPHONE (OUTPATIENT)
Age: 35
End: 2025-04-07

## 2025-04-07 NOTE — TELEPHONE ENCOUNTER
There are 2 AMB referrals in chart for urology, unsure if a new one does need to be placed or not.    Pt's appt is tomorrow.      Patient is requesting an insurance referral for the following specialty:      Test Name / Order Name:     DX Code: N13.5    Date Of Service: 4/8/24    Location/Facility Name/Address/Phone #:   Urology  [address]  [place], [place]  [phone]    Location / Facility NPI: [id number]    Best Phone # To Reach The Patient:

## 2025-04-14 ENCOUNTER — APPOINTMENT (OUTPATIENT)
Dept: URGENT CARE | Facility: CLINIC | Age: 35
End: 2025-04-14

## 2025-05-20 ENCOUNTER — OFFICE VISIT (OUTPATIENT)
Dept: URGENT CARE | Facility: CLINIC | Age: 35
End: 2025-05-20
Payer: COMMERCIAL

## 2025-05-20 ENCOUNTER — OFFICE VISIT (OUTPATIENT)
Dept: FAMILY MEDICINE CLINIC | Facility: CLINIC | Age: 35
End: 2025-05-20
Payer: COMMERCIAL

## 2025-05-20 VITALS
SYSTOLIC BLOOD PRESSURE: 138 MMHG | TEMPERATURE: 98.2 F | DIASTOLIC BLOOD PRESSURE: 80 MMHG | HEART RATE: 94 BPM | RESPIRATION RATE: 16 BRPM | OXYGEN SATURATION: 100 %

## 2025-05-20 VITALS
BODY MASS INDEX: 35.73 KG/M2 | TEMPERATURE: 98.4 F | WEIGHT: 241.2 LBS | SYSTOLIC BLOOD PRESSURE: 132 MMHG | HEART RATE: 85 BPM | OXYGEN SATURATION: 98 % | HEIGHT: 69 IN | RESPIRATION RATE: 16 BRPM | DIASTOLIC BLOOD PRESSURE: 88 MMHG

## 2025-05-20 DIAGNOSIS — C53.8 MALIGNANT NEOPLASM OF OVERLAPPING SITES OF CERVIX (HCC): ICD-10-CM

## 2025-05-20 DIAGNOSIS — M79.605 LEFT LEG PAIN: Primary | ICD-10-CM

## 2025-05-20 DIAGNOSIS — M79.652 LEFT THIGH PAIN: Primary | ICD-10-CM

## 2025-05-20 PROCEDURE — 99213 OFFICE O/P EST LOW 20 MIN: CPT | Performed by: STUDENT IN AN ORGANIZED HEALTH CARE EDUCATION/TRAINING PROGRAM

## 2025-05-20 PROCEDURE — 99213 OFFICE O/P EST LOW 20 MIN: CPT | Performed by: PHYSICIAN ASSISTANT

## 2025-05-20 NOTE — PROGRESS NOTES
"Name: Margot Moody      : 1990      MRN: [id number]  Encounter Provider: Angeline Lind MD  Encounter Date: 2025   Encounter department: [facility] PRACTICE  :  Assessment & Plan  Left thigh pain  dorota of cervical cancer, HRT replacement with acute left LE medial leg pain c/f DVT (no BERTHA; full ROM; pt reported swelling in area)    STAT DVT US ordered  Discussed ED precautions- pt declining going to ED at this time   Orders:    VAS VENOUS DUPLEX -LOWER LIMB UNILATERAL; Future    Malignant neoplasm of overlapping sites of cervix (HCC)  C/w GynOnc follow up and management               History of Present Illness   35 yo F w/ dorota of cervical cancer and anxiety presenting for acute same day apt thigh pain.   Notes sx present for about 4 days. Pt w/ c/f DVT. Denies any BERTHA; denies any overuse injury. Pt went to  who noted go to ED, pt declined and is presenting to clinic now. Apt was booked 15 minutes before apt time.       Review of Systems   Constitutional:  Negative for chills and fever.   HENT:  Negative for trouble swallowing.    Eyes:  Negative for visual disturbance.   Respiratory:  Negative for cough and shortness of breath.    Cardiovascular:  Negative for chest pain and palpitations.   Gastrointestinal:  Negative for abdominal pain.   Genitourinary:  Negative for difficulty urinating.   Musculoskeletal:  Positive for myalgias. Negative for back pain.   Skin:  Negative for color change and rash.   Neurological:  Negative for light-headedness.   All other systems reviewed and are negative.      Objective   /88 (BP Location: Left arm, Patient Position: Sitting, Cuff Size: Standard)   Pulse 85   Temp 98.4 °F (36.9 °C) (Tympanic)   Resp 16   Ht 5' 9\" (1.753 m)   Wt 109 kg (241 lb 3.2 oz)   SpO2 98%   BMI 35.62 kg/m²      Physical Exam  Vitals and nursing note reviewed.   Constitutional:       General: She is not in acute distress.     Appearance: Normal appearance. " She is well-developed.   HENT:      Head: Normocephalic and atraumatic.     Eyes:      Conjunctiva/sclera: Conjunctivae normal.       Cardiovascular:      Rate and Rhythm: Normal rate and regular rhythm.      Pulses: Normal pulses.      Heart sounds: Normal heart sounds. No murmur heard.  Pulmonary:      Effort: Pulmonary effort is normal. No respiratory distress.      Breath sounds: Normal breath sounds. No wheezing or rales.   Abdominal:      General: There is no distension.      Palpations: Abdomen is soft.      Tenderness: There is no abdominal tenderness. There is no guarding.     Musculoskeletal:         General: No swelling. Normal range of motion.      Cervical back: Normal range of motion and neck supple.      Right lower leg: No edema.      Left lower leg: No edema.        Legs:       Comments: Area of discomfort- no overlying erythema; no obvious swelling noted; 5/5 knee flexion and extension      Skin:     General: Skin is warm and dry.      Capillary Refill: Capillary refill takes less than 2 seconds.     Neurological:      Mental Status: She is alert.     Psychiatric:         Mood and Affect: Mood normal.       Administrative Statements   I have spent a total time of 20 minutes in caring for this patient on the day of the visit/encounter including Prognosis, Risks and benefits of tx options, Instructions for management, Patient and family education, Importance of tx compliance, Risk factor reductions, Impressions, Counseling / Coordination of care, Documenting in the medical record, and Reviewing/placing orders in the medical record (including tests, medications, and/or procedures).

## 2025-05-20 NOTE — PROGRESS NOTES
"  [facility]        NAME: Margot Moody is a 34 y.o. female  : 1990    MRN: [id number]  DATE: May 20, 2025  TIME: 3:30 PM    Assessment and Plan   Left leg pain [M79.605]  1. Left leg pain          Advised patient she should proceed to the ER for further evaluation.  Patient refused.  Discussed risks associated with refusal and patient still refused.  AMA form signed.    Patient Instructions       Follow up with PCP in 3-5 days.  Proceed to  ER if symptoms worsen.    If tests have been performed at [facility], our office will contact you with results if changes need to be made to the care plan discussed with you at the visit.  [facility]    Chief Complaint     Chief Complaint   Patient presents with    Leg Pain     Patient with progressive L leg pain above her knee. Patient is concerned for possible DT or \"liposarcoma.\"          History of Present Illness       Patient presents with 3-4 days of pain over the posterior left leg. Pain getting worse.  Worried that it is a DVT. Feels like \"a pocket of fluid or mass\" when walking. Area tender to touch. Pain increased to 4 or 5/10.   Gets night sweats from menopause but no changes to that.   Denies any injury to the area, changes to ADLs, bruising, warmth,   Also concerned for a liposarcoma from radiation therapy previously.   Also with \" a lot of weight loss recently\" states about 7 lbs in the past week.       Leg Pain         Review of Systems   Review of Systems   Constitutional:  Positive for unexpected weight change.   Musculoskeletal:  Positive for myalgias.   Skin:  Negative for color change.   Neurological:  Negative for weakness.         Current Medications     Current Medications[1]    Current Allergies     Allergies as of 2025 - Reviewed 2025   Allergen Reaction Noted    Medical tape Blisters 2024            The following portions of the patient's history were reviewed and updated " as appropriate: allergies, current medications, past family history, past medical history, past social history, past surgical history and problem list.     Past Medical History:   Diagnosis Date    Anxiety     Cancer (HCC)     cervix    Cervical cancer (HCC)     receiving chemo and radiation    COVID     Jan 2022    Depression     Diarrhea     Dizziness     Frequent headaches     Hx of bleeding disorder     vaginal bleeding    Obesity        Past Surgical History:   Procedure Laterality Date    CERVICAL BIOPSY  W/ LOOP ELECTRODE EXCISION      COLON SURGERY  Colon resection    May 2023    FL RETROGRADE PYELOGRAM  02/13/2024    IR NEPHROSTOMY TUBE CHECK/CHANGE/REPOSITION/REINSERTION/UPSIZE  9/13/2024    IR NEPHROSTOMY TUBE PLACEMENT  7/1/2024    LYMPH NODE DISSECTION Bilateral 05/06/2022    Procedure: DISSECTION/STAGING LYMPH NODE PELVIS/ABDOMEN;  Surgeon: Parminder Moctezuma MD;  [facility];  Service: Gynecology Oncology    HI CYSTO BLADDER W/URETERAL CATHETERIZATION Right 02/13/2024    Procedure: CYSTOSCOPY WITH RETROGRADE PYELOGRAM;  Surgeon: Chuck Campo MD;  [facility];  Service: Urology    HI CYSTOURETHROSCOPY N/A 7/22/2024    Procedure: CYSTOSCOPY;  Surgeon: Vitor Fields MD;  [facility];  Service: Urology    HI INSERTION VAGINAL RADIATION DEVICE N/A 07/15/2022    Procedure: INSERTION NADIR SLEEVE VAGINA WITH POST OP BRACHYTHERAPY (IN RADIATION ONCOLOGY);  Surgeon: Parminder Moctezuma MD;  [facility];  Service: Gynecology Oncology    HI LAPAROSCOPY COLECTOMY PARTIAL W/ANASTOMOSIS N/A 05/10/2023    Procedure: RESECTION COLON SIGMOID LAPAROSCOPIC;  Surgeon: Marin Salinas MD;  [facility];  Service: Colorectal    HI SIGMOIDOSCOPY FLX DX W/COLLJ SPEC BR/WA IF PFRMD N/A 05/10/2023    Procedure: SIGMOIDOSCOPY FLEXIBLE;  Surgeon: Marin Salinas MD;  [facility];  Service: Colorectal    HI URETERONEOCYSTOSTOMY ANAST 1 URETER  BLADDER Right 2024    Procedure: REIMPLANTATION URETEROCYSTOSTOMY LAPAROSCOPIC W/ ROBOTICS, EXTENSIVE LYSIS OF ADHESIONS;  Surgeon: Vitor Fields MD;  [facility];  Service: Urology    SALPINGECTOMY Bilateral 2022    Procedure: SALPINGECTOMY, OVARIAN TRANSPOSITION;  Surgeon: Parminder Moctezuma MD;  [facility];  Service: Gynecology Oncology    URETERAL STENT PLACEMENT Right 2024    Procedure: INSERTION STENT URETERAL;  Surgeon: Chcuk Campo MD;  [facility];  Service: Urology    URETERAL STENT PLACEMENT Right 2024    Procedure: INSERTION STENT URETERAL;  Surgeon: Vitor Fields MD;  [facility];  Service: Urology    US GUIDANCE  07/15/2022       Family History   Problem Relation Age of Onset    No Known Problems Mother     Heart disease Father     Cancer Brother         Niece/ brothers daughter stage 4 Adrenocortocal carcinoma    Ovarian cancer Maternal Aunt         Dont know  before i was born    Cancer Maternal Uncle         Stage 2 Liver Cancer    Ovarian cancer Maternal Grandmother         Dont know  before i was born    Cancer Niece 7        adrenal gland         Medications have been verified.        Objective   /80   Pulse 94   Temp 98.2 °F (36.8 °C)   Resp 16   SpO2 100%   No LMP recorded. Patient is premenopausal.       Physical Exam     Physical Exam  Constitutional:       Appearance: Normal appearance.     Musculoskeletal:         General: Tenderness present. No swelling. Normal range of motion.        Legs:       Comments: Mimi MACK as chaperon when assessing leg  Full active range of motion 5 out of 5 muscle strength the lower extremities bilaterally.  No pain with movements or when assessing strengths     Skin:     General: Skin is warm.      Findings: No bruising, erythema or rash.     Neurological:      Mental Status: She is alert.     Psychiatric:         Mood and Affect: Mood normal.         Behavior:  Behavior normal.                        [1]   Current Outpatient Medications:     norethindrone-ethinyl estradiol (FEMHRT LOW DOSE) 0.5-2.5 MG-MCG per tablet, TAKE 1 TABLET BY MOUTH EVERY DAY, Disp: 84 tablet, Rfl: 1    docusate sodium (COLACE) 100 mg capsule, Take 1 capsule (100 mg total) by mouth 2 (two) times a day for 14 days, Disp: 28 capsule, Rfl: 0    senna (SENOKOT) 8.6 mg, Take 1 tablet (8.6 mg total) by mouth daily at bedtime for 14 days, Disp: 14 tablet, Rfl: 0

## 2025-05-21 ENCOUNTER — HOSPITAL ENCOUNTER (OUTPATIENT)
Dept: NON INVASIVE DIAGNOSTICS | Age: 35
Discharge: HOME | End: 2025-05-21
Attending: STUDENT IN AN ORGANIZED HEALTH CARE EDUCATION/TRAINING PROGRAM
Payer: COMMERCIAL

## 2025-05-21 ENCOUNTER — RESULTS FOLLOW-UP (OUTPATIENT)
Dept: FAMILY MEDICINE CLINIC | Facility: CLINIC | Age: 35
End: 2025-05-21

## 2025-05-21 DIAGNOSIS — M79.652 LEFT THIGH PAIN: ICD-10-CM

## 2025-05-21 PROCEDURE — 93971 EXTREMITY STUDY: CPT

## 2025-05-21 PROCEDURE — 93971 EXTREMITY STUDY: CPT | Performed by: SURGERY

## 2025-06-05 ENCOUNTER — OFFICE VISIT (OUTPATIENT)
Age: 35
End: 2025-06-05
Payer: COMMERCIAL

## 2025-06-05 VITALS
RESPIRATION RATE: 18 BRPM | DIASTOLIC BLOOD PRESSURE: 82 MMHG | SYSTOLIC BLOOD PRESSURE: 118 MMHG | TEMPERATURE: 98.3 F | OXYGEN SATURATION: 98 % | HEART RATE: 92 BPM | HEIGHT: 69 IN | WEIGHT: 238.8 LBS | BODY MASS INDEX: 35.37 KG/M2

## 2025-06-05 DIAGNOSIS — F41.9 ANXIETY DISORDER WITH PANIC ATTACKS: ICD-10-CM

## 2025-06-05 DIAGNOSIS — R53.82 CHRONIC FATIGUE: ICD-10-CM

## 2025-06-05 DIAGNOSIS — N95.1 MENOPAUSAL SYMPTOMS: ICD-10-CM

## 2025-06-05 DIAGNOSIS — K62.7 RADIATION PROCTITIS: ICD-10-CM

## 2025-06-05 DIAGNOSIS — C53.8 MALIGNANT NEOPLASM OF OVERLAPPING SITES OF CERVIX (HCC): Primary | ICD-10-CM

## 2025-06-05 PROCEDURE — 99459 PELVIC EXAMINATION: CPT | Performed by: OBSTETRICS & GYNECOLOGY

## 2025-06-05 PROCEDURE — 99214 OFFICE O/P EST MOD 30 MIN: CPT | Performed by: OBSTETRICS & GYNECOLOGY

## 2025-06-05 RX ORDER — ALPRAZOLAM 0.25 MG
0.25 TABLET ORAL 4 TIMES DAILY PRN
Qty: 30 TABLET | Refills: 0 | Status: SHIPPED | OUTPATIENT
Start: 2025-06-05

## 2025-06-05 NOTE — ASSESSMENT & PLAN NOTE
Possibly secondary to working 2 jobs, not sleeping well anxiety.  Will assess labs inclusive of CBC, CMP, TSH, also assess CT imaging.  Orders:    CBC and differential; Future    TSH, 3rd generation; Future    Comprehensive metabolic panel; Future

## 2025-06-05 NOTE — ASSESSMENT & PLAN NOTE
Panic attacks have been increasing in frequency.  She has PTSD symptoms from extensive cancer treatment and related sequelae.  She notes chest pain and shortness of breath.  Prior treatment with BuSpar resulted in suicidal ideation.  1.  I discussed the importance of behavioral health follow-up with in person talk therapy as well as medical management.  I referred her to behavioral health services in [place].  2.  I discussed the risks and benefits of starting anxiolytic therapy for her panic attacks.  She understands that benzodiazepine therapy has addictive potential and would not be considered a long-term solution for her anxiety disorder with panic attacks.  Orders:    Ambulatory referral to Psych Services; Future    ALPRAZolam (XANAX) 0.25 mg tablet; Take 1 tablet (0.25 mg total) by mouth 4 (four) times a day as needed for anxiety

## 2025-06-05 NOTE — ASSESSMENT & PLAN NOTE
34-year-old with history of stage III C1 cervical cancer.  She is clinically without evidence of disease recurrence.  She has been losing weight and is concerned this may be representative of cancer recurrence.  She has had multiple complications from treatment for cervical cancer including radiation fibrosis of the ureter, radiation proctitis.  Her performance status is 0.  1.  Plan for CT chest abdomen pelvis to evaluate for disease recurrence.  2.  Return in 3 months for cervical cancer surveillance pending CT results.  Orders:    CBC and differential; Future    TSH, 3rd generation; Future    Comprehensive metabolic panel; Future    CT chest abdomen pelvis w contrast; Future

## 2025-06-05 NOTE — ASSESSMENT & PLAN NOTE
Partially controlled with the current treatment regimen.  I discussed increasing her dose of estrogen/progesterone.  She would prefer to stay at the current dose.  Will therefore continue to monitor.

## 2025-06-05 NOTE — PROGRESS NOTES
Name: Margot Moody      : 1990      MRN: [id number]  Encounter Provider: Parminder Moctezuma MD  Encounter Date: 2025   Encounter department: [facility] GYNECOLOGY ONCOLOGY [facility]  :  Assessment & Plan  Malignant neoplasm of overlapping sites of cervix (HCC)  34-year-old with history of stage III C1 cervical cancer.  She is clinically without evidence of disease recurrence.  She has been losing weight and is concerned this may be representative of cancer recurrence.  She has had multiple complications from treatment for cervical cancer including radiation fibrosis of the ureter, radiation proctitis.  Her performance status is 0.  1.  Plan for CT chest abdomen pelvis to evaluate for disease recurrence.  2.  Return in 3 months for cervical cancer surveillance pending CT results.  Orders:    CBC and differential; Future    TSH, 3rd generation; Future    Comprehensive metabolic panel; Future    CT chest abdomen pelvis w contrast; Future    Menopausal symptoms  Partially controlled with the current treatment regimen.  I discussed increasing her dose of estrogen/progesterone.  She would prefer to stay at the current dose.  Will therefore continue to monitor.       Chronic fatigue  Possibly secondary to working 2 jobs, not sleeping well anxiety.  Will assess labs inclusive of CBC, CMP, TSH, also assess CT imaging.  Orders:    CBC and differential; Future    TSH, 3rd generation; Future    Comprehensive metabolic panel; Future    Anxiety disorder with panic attacks  Panic attacks have been increasing in frequency.  She has PTSD symptoms from extensive cancer treatment and related sequelae.  She notes chest pain and shortness of breath.  Prior treatment with BuSpar resulted in suicidal ideation.  1.  I discussed the importance of behavioral health follow-up with in person talk therapy as well as medical management.  I referred her to behavioral health services in  [place].  2.  I discussed the risks and benefits of starting anxiolytic therapy for her panic attacks.  She understands that benzodiazepine therapy has addictive potential and would not be considered a long-term solution for her anxiety disorder with panic attacks.  Orders:    Ambulatory referral to Psych Services; Future    ALPRAZolam (XANAX) 0.25 mg tablet; Take 1 tablet (0.25 mg total) by mouth 4 (four) times a day as needed for anxiety    Radiation proctitis  Diarrhea without hematochezia/melena-I recommended Imodium once daily to help reduce frequency of bowel movements.         Assessment & Plan            History of Present Illness   Reason for Visit / CC: Chronic fatigue, weight loss, worsening anxiety/depression, overactive bladder   Margot Moody is a 34 y.o. female   History of Present Illness  Who returns for cervical cancer surveillance.  She has in the interim followed up with urology at [place] and is status post right ileal ureter, partial right ureterectomy, omental wrap 10/23/2024.  Last renal ultrasound 3/20/2025 did not reveal any evidence of hydronephrosis.  She has transition to annual follow-up with urology.  Last imaging was 8/30/2024-CT abdomen pelvis which did not reveal any evidence of recurrent disease.  MRD testing was negative in April 2024.  She has chronic fatigue.  She has been working 50 hours a week.  She also has overactive bladder and is up several times per evening.  She has occasional episodes of chest pain, dyspnea associated with anxiety.  She has PTSD type symptoms related to her cancer diagnosis, treatment, complications from therapy.  She has followed up with telehealth through her employer, however, did not receive benefit from those conversations.  She has taken BuSpar for anxiety disorder previously but felt suicidal ideation.  The anxiety can be overwhelming for her.  She does not have any vaginal bleeding.  No hematuria.  She is still having diarrhea.  She  has weight loss but has been using a ketogenic diet.  Pertinent Medical History          Oncology History   Cancer Staging   Malignant neoplasm of overlapping sites of cervix (HCC)  Staging form: Cervix Uteri, AJCC Version 9  - Pathologic: FIGO Stage IIIC1p (pT1b1, cM0) - Signed by Parminder Moctezuma MD on 5/19/2022  Stage confirmation method: Pathology  Pelvic grady status: Positive  Pelvic grady method of assessment: Lymph node dissection  Para-aortic status: Negative  Oncology History Overview Note   with FIGO IIIC1 cervical cancer s/p initial LEEP in March 2022 showing moderately differentiated SCC with extensive LVSI. MDT consensus was for definitive concurrent chemoRT. She received 6 cycles of cisplatin concurrent with pelvic RT with brachytherapy boost on 8/3/22.  She was last seen by radiation oncology on 03/15/2024 and presents for follow up today     7/11/24  PET CT  IMPRESSION:  1. There remains no evidence of FDG avid metastatic disease within the neck, chest, upper abdomen, or skeleton  2. Interval replacement of right ureteral stent with right nephrostomy catheter without right-sided hydronephrosis  3. No progression of any of the previously seen pelvic findings. Stable degree of presacral soft tissue thickening. No areas of worsening FDG activity (with the exception of right obturator internus muscle activity, likely physiologic or inflammatory)  4. New rounded soft tissue density in the posterior left hemipelvis, SUV max 2.1, measuring 2.1 cm in size. May represent a follicle in the transposed left ovary or a lymphocele. This should be carefully reassessed during the course of follow-up.       7/18/24  Dr. 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  PET with nonspecific findings but overall negative for recurrent disease.    Planned surgery to address hydronephrosis with urology    7/22/24 [facility]/Dr. Fields   REIMPLANTATION  URETEROCYSTOSTOMY LAPAROSCOPIC W/ ROBOTICS, EXTENSIVE LYSIS OF ADHESIONS (Right: Bladder)       CYSTOSCOPY (Bladder)       INSERTION STENT URETERAL (Right: Bladder)     Upcomin24  Dr. Moctezuma     Malignant neoplasm of overlapping sites of cervix (HCC)   3/24/2022 Initial Diagnosis    Malignant neoplasm of overlapping sites of cervix (HCC)     2022 Surgery    Exploratory laparotomy for planned radical hysterectomy, bilateral pelvic lymph node dissection, aborted radical hysterectomy, bilateral ovarian transposition due to positive lymph nodes.  1. Two right pelvic lymph nodes positive     2022 -  Cancer Staged    Staging form: Cervix Uteri, AJCC Version 9  - Pathologic: FIGO Stage IIIC1p (pT1b1, cM0) - Signed by Parminder Moctezuma MD on 2022  Stage confirmation method: Pathology  Pelvic grady status: Positive  Pelvic grady method of assessment: Lymph node dissection  Para-aortic status: Negative       2022 - 2022 Chemotherapy    palonosetron (ALOXI), 0.25 mg, Intravenous, Once, 6 of 6 cycles  Administration: 0.25 mg (2022), 0.25 mg (2022), 0.25 mg (2022), 0.25 mg (2022), 0.25 mg (2022), 0.25 mg (2022)  CISplatin (PLATINOL) infusion, 70 mg (original dose 40 mg/m2), Intravenous, Once, 6 of 6 cycles  Dose modification: 70 mg (original dose 40 mg/m2, Cycle 1, Reason: Other (Must fill in a comment), Comment: max 70), 70 mg (original dose 40 mg/m2, Cycle 2, Reason: Dose modified as per discussion with consulting physician)  Administration: 70 mg (2022), 70 mg (2022), 70 mg (2022), 70 mg (2022), 70 mg (2022), 70 mg (2022)  aprepitant (CINVANTI) in  mL IVPB, 130 mg, Intravenous, Once, 6 of 6 cycles  Administration: 130 mg (2022), 130 mg (2022), 130 mg (2022), 130 mg (2022), 130 mg (2022), 130 mg (2022)     2022 - 8/3/2022 Radiation    Course: C1 - EBRT  Plan ID Energy Fractions Dose per  "Fraction (cGy) Dose Correction (cGy) Total Dose Delivered (cGy) Elapsed Days   Whole Pelvis 10X 25 / 25 180 0 4,500 42       Course: C1 HDR Tandem and Ring Brachytherapy  Plan ID Energy Fractions Dose per Fraction (cGy) Dose Correction (cGy) Total Dose Delivered (cGy) Elapsed Days   HDR1 7-15-22  1 / 1 700 0 700 0   EEY1_5--18-22 1 / 1 700 0 700 0   MBW1_3--21-22 1 / 1 700 0 700 0   HDR4 7-25-22 1 / 1 700 0 700 0            Review of Systems   Constitutional:  Positive for fatigue. Negative for activity change and unexpected weight change.   HENT: Negative.     Eyes: Negative.    Respiratory: Negative.     Cardiovascular: Negative.    Gastrointestinal:  Positive for abdominal pain and diarrhea. Negative for abdominal distention.   Endocrine: Negative.    Genitourinary:  Positive for frequency and pelvic pain. Negative for vaginal bleeding.   Musculoskeletal: Negative.    Skin: Negative.    Allergic/Immunologic: Negative.    Neurological: Negative.    Hematological: Negative.    Psychiatric/Behavioral:  Positive for dysphoric mood. The patient is nervous/anxious.     A complete review of systems is negative other than that noted above in the HPI.  Medications Ordered Prior to Encounter[1]      Objective   /82 (BP Location: Left arm, Patient Position: Sitting, Cuff Size: Large)   Pulse 92   Temp 98.3 °F (36.8 °C)   Resp 18   Ht 5' 9\" (1.753 m)   Wt 108 kg (238 lb 12.8 oz)   SpO2 98%   BMI 35.26 kg/m²     Body mass index is 35.26 kg/m².  Pain Screening:  Pain Score: 0-No pain  ECOG   0  Physical Exam  Vitals reviewed. Exam conducted with a chaperone present.   Constitutional:       General: She is not in acute distress.     Appearance: Normal appearance. She is well-developed. She is not ill-appearing, toxic-appearing or diaphoretic.   HENT:      Head: Normocephalic and atraumatic.     Eyes:      General: No scleral icterus.     Extraocular Movements: Extraocular movements intact.      Conjunctiva/sclera: " Conjunctivae normal.     Neck:      Thyroid: No thyromegaly.   Pulmonary:      Effort: Pulmonary effort is normal.   Abdominal:      General: There is no distension.      Palpations: Abdomen is soft. There is no mass.      Tenderness: There is no abdominal tenderness. There is no guarding or rebound.      Hernia: No hernia is present.   Genitourinary:     Comments: External female genitalia is normal.  No evidence of mass or lesion.  Speculum examination reveals an atrophic vagina.  The cervix is not visible.  There are no masses or lesions.  Bimanual examination reveals extensive radiation change at the vaginal apex with a ballotable lesion anteriorly.    Musculoskeletal:         General: No swelling or tenderness.      Cervical back: Normal range of motion and neck supple.      Right lower leg: No edema.      Left lower leg: No edema.   Lymphadenopathy:      Cervical: No cervical adenopathy.     Skin:     General: Skin is warm and dry.      Coloration: Skin is not jaundiced or pale.      Findings: No lesion or rash.     Neurological:      General: No focal deficit present.      Mental Status: She is alert and oriented to person, place, and time. Mental status is at baseline.      Cranial Nerves: No cranial nerve deficit.      Motor: No weakness.      Gait: Gait normal.     Psychiatric:         Behavior: Behavior normal.         Thought Content: Thought content normal.         Judgment: Judgment normal.      Comments: Dysphoric mood       Physical Exam       Results           [1]   Current Outpatient Medications on File Prior to Visit   Medication Sig Dispense Refill    norethindrone-ethinyl estradiol (FEMHRT LOW DOSE) 0.5-2.5 MG-MCG per tablet TAKE 1 TABLET BY MOUTH EVERY DAY 84 tablet 1    docusate sodium (COLACE) 100 mg capsule Take 1 capsule (100 mg total) by mouth 2 (two) times a day for 14 days 28 capsule 0    senna (SENOKOT) 8.6 mg Take 1 tablet (8.6 mg total) by mouth daily at bedtime for 14 days 14 tablet  0     No current facility-administered medications on file prior to visit.

## 2025-06-05 NOTE — ASSESSMENT & PLAN NOTE
Diarrhea without hematochezia/melena-I recommended Imodium once daily to help reduce frequency of bowel movements.

## 2025-06-23 ENCOUNTER — TELEPHONE (OUTPATIENT)
Age: 35
End: 2025-06-23

## 2025-07-06 DIAGNOSIS — E89.40: ICD-10-CM

## 2025-07-06 DIAGNOSIS — E28.319 PREMATURE MENOPAUSE: ICD-10-CM

## 2025-07-07 RX ORDER — NORETHINDRONE ACETATE AND ETHINYL ESTRADIOL .5; 2.5 MG/1; UG/1
1 TABLET ORAL DAILY
Qty: 28 TABLET | Refills: 0 | Status: SHIPPED | OUTPATIENT
Start: 2025-07-07

## 2025-07-29 DIAGNOSIS — E89.40: ICD-10-CM

## 2025-07-29 DIAGNOSIS — E28.319 PREMATURE MENOPAUSE: ICD-10-CM

## 2025-07-30 DIAGNOSIS — E89.40: ICD-10-CM

## 2025-07-30 DIAGNOSIS — E28.319 PREMATURE MENOPAUSE: ICD-10-CM

## 2025-07-30 RX ORDER — NORETHINDRONE ACETATE AND ETHINYL ESTRADIOL .5; 2.5 MG/1; UG/1
1 TABLET ORAL DAILY
Qty: 30 TABLET | Refills: 2 | Status: SHIPPED | OUTPATIENT
Start: 2025-07-30

## 2025-07-30 RX ORDER — NORETHINDRONE ACETATE AND ETHINYL ESTRADIOL .5; 2.5 MG/1; UG/1
1 TABLET ORAL DAILY
Qty: 84 TABLET | Refills: 1 | OUTPATIENT
Start: 2025-07-30

## (undated) DEVICE — SUT STRATAFIX SPIRAL 3-0 PGA/PCL 30 X 30 CM SXMD2B408

## (undated) DEVICE — TOWEL SET X-RAY

## (undated) DEVICE — TROCAR: Brand: KII® SLEEVE

## (undated) DEVICE — CATH URETERAL 5FR X 70 CM FLEX TIP POLYUR BARD

## (undated) DEVICE — CANNULA SEAL

## (undated) DEVICE — BETHLEHEM UNIVERSAL MINOR VAG: Brand: CARDINAL HEALTH

## (undated) DEVICE — ADHESIVE SKIN HIGH VISCOSITY EXOFIN 1ML

## (undated) DEVICE — SPONGE STICK WITH PVP-I: Brand: KENDALL

## (undated) DEVICE — FENESTRATED BIPOLAR FORCEPS: Brand: ENDOWRIST

## (undated) DEVICE — GUIDEWIRE STRGHT TIP 0.035 IN  SOLO PLUS

## (undated) DEVICE — POOLE SUCTION HANDLE: Brand: CARDINAL HEALTH

## (undated) DEVICE — DRESSING MEPILEX AG BORDER POST-OP 4 X 12 IN

## (undated) DEVICE — PACK PBDS STERILE LAP LITHOTOMY RF

## (undated) DEVICE — GLOVE INDICATOR PI UNDERGLOVE SZ 7.5 BLUE

## (undated) DEVICE — AIRSEAL TUBE SMOKE EVAC LUMENX3 FILTERED

## (undated) DEVICE — GOWN ,SIRUS ,NONREINFORCED 4XL: Brand: MEDLINE

## (undated) DEVICE — MEDI-VAC YANK SUCT HNDL W/TPRD BULBOUS TIP: Brand: CARDINAL HEALTH

## (undated) DEVICE — 3M™ IOBAN™ 2 ANTIMICROBIAL INCISE DRAPE 6650EZ: Brand: IOBAN™ 2

## (undated) DEVICE — VISUALIZATION SYSTEM: Brand: CLEARIFY

## (undated) DEVICE — CHLORHEXIDINE 4PCT 4 OZ

## (undated) DEVICE — CHLORAPREP HI-LITE 26ML ORANGE

## (undated) DEVICE — GAUZE SPONGES,16 PLY: Brand: CURITY

## (undated) DEVICE — GLOVE INDICATOR PI UNDERGLOVE SZ 8 BLUE

## (undated) DEVICE — ELECTRODE BLADE MOD  E-Z CLEAN 6.5IN -0014M

## (undated) DEVICE — ABDOMINAL PAD: Brand: DERMACEA

## (undated) DEVICE — TRAY FOLEY 16FR URIMETER SILICONE SURESTEP

## (undated) DEVICE — PAD GROUNDING ADULT

## (undated) DEVICE — ADHESIVE SKIN CLOSURE SYS EXOFIN FUSION 22CM

## (undated) DEVICE — UROLOGIC DRAIN BAG: Brand: UNBRANDED

## (undated) DEVICE — SUT MONOCRYL 4-0 PS-2 27 IN Y426H

## (undated) DEVICE — STRL PENROSE DRAIN 18" X 1/4": Brand: CARDINAL HEALTH

## (undated) DEVICE — ECHELON CONTOUR W/ BLUE RELOAD: Brand: ECHELON

## (undated) DEVICE — INTENDED FOR TISSUE SEPARATION, AND OTHER PROCEDURES THAT REQUIRE A SHARP SURGICAL BLADE TO PUNCTURE OR CUT.: Brand: BARD-PARKER SAFETY BLADES SIZE 15, STERILE

## (undated) DEVICE — GLOVE PI ULTRA TOUCH SZ.7.5

## (undated) DEVICE — INSUFFLATION NEEDLE TO ESTABLISH PNEUMOPERITONEUM.: Brand: INSUFFLATION NEEDLE

## (undated) DEVICE — 3000CC GUARDIAN II: Brand: GUARDIAN

## (undated) DEVICE — SPECIMEN CONTAINER STERILE PEEL PACK

## (undated) DEVICE — ARM DRAPE

## (undated) DEVICE — PENCIL ELECTROSURG E-Z CLEAN -0035H

## (undated) DEVICE — PACK TUR

## (undated) DEVICE — SYRINGE 10ML LL

## (undated) DEVICE — GLOVE SRG BIOGEL ECLIPSE 7.5

## (undated) DEVICE — ADAPTOR SYRINGE LL ONGUARD

## (undated) DEVICE — MONOPOLAR CURVED SCISSORS: Brand: ENDOWRIST

## (undated) DEVICE — ENSEAL LAPAROSCOPIC TISSUE SEALER G2 STRAIGHT JAW FOR USE WITH G2 GENERATOR 5MM DIAMETER 35CM SHAFT LENGTH: Brand: ENSEAL

## (undated) DEVICE — SUT PLAIN 2-0 CTX 27 IN 872H

## (undated) DEVICE — SUT VICRYL 0 CT-1 27 IN J260H

## (undated) DEVICE — VEST COOLING PLUS SZ SNGL USE

## (undated) DEVICE — SUT VICRYL 3-0 SH 27 IN J416H

## (undated) DEVICE — SUT VICRYL 0 CT-1 CR/8 27 IN JJ41G

## (undated) DEVICE — TRAVELKIT CONTAINS FIRST STEP KIT (200ML EP-4 KIT) AND SOILED SCOPE BAG - 1 KIT: Brand: TRAVELKIT CONTAINS FIRST STEP KIT AND SOILED SCOPE BAG

## (undated) DEVICE — UNDER BUTTOCKS DRAPE W/FLUID CONTROL POUCH: Brand: CONVERTORS

## (undated) DEVICE — PREMIUM DRY TRAY LF: Brand: MEDLINE INDUSTRIES, INC.

## (undated) DEVICE — GLOVE SRG BIOGEL 7.5

## (undated) DEVICE — CATH URET .038 10FR 50CM DUAL LUMEN

## (undated) DEVICE — SUT PDS PLUS 1 CTX 36IN PDP371T

## (undated) DEVICE — TROCAR PORT ACCESS 12 X120MM W/BLDLS OPTICAL TIP AIRSEAL

## (undated) DEVICE — TROCAR: Brand: KII SLEEVE

## (undated) DEVICE — INSUFLATION TUBING INSUFLOW (LEXION)

## (undated) DEVICE — COLUMN DRAPE

## (undated) DEVICE — SUT ETHILON 3-0 FS-1 18 IN 663G

## (undated) DEVICE — HEMOSTATIC MATRIX SURGIFLO 8ML W/THROMBIN

## (undated) DEVICE — ECHELON CONTOUR GST RELOAD GREEN: Brand: ECHELON

## (undated) DEVICE — 40595 XL TRENDELENBURG POSITIONING KIT: Brand: 40595 XL TRENDELENBURG POSITIONING KIT

## (undated) DEVICE — ANTIBACTERIAL UNDYED BRAIDED (POLYGLACTIN 910), SYNTHETIC ABSORBABLE SUTURE: Brand: COATED VICRYL

## (undated) DEVICE — SUT SILK 2-0 SH 30 IN K833H

## (undated) DEVICE — STERILE CYSTO PACK: Brand: CARDINAL HEALTH

## (undated) DEVICE — SURGICEL 4 X 8IN

## (undated) DEVICE — CO2 AND WATER TUBING/CAP SET FOR OLYMPUS® SCOPES & UCR: Brand: ERBE

## (undated) DEVICE — SUT PROLENE 2-0 KS 30 IN 8623H

## (undated) DEVICE — LARGE NEEDLE DRIVER: Brand: ENDOWRIST

## (undated) DEVICE — EXOFIN PRECISION PEN HIGH VISCOSITY TOPICAL SKIN ADHESIVE: Brand: EXOFIN PRECISION PEN, 1G

## (undated) DEVICE — DRAPE SHEET THREE QUARTER

## (undated) DEVICE — INVIEW CLEAR LEGGINGS: Brand: CONVERTORS

## (undated) DEVICE — 1820 FOAM BLOCK NEEDLE COUNTER: Brand: DEVON

## (undated) DEVICE — SUT VICRYL 2-0 SH 27 IN UNDYED J417H

## (undated) DEVICE — BAG URINE DRAINAGE URIMETER 350ML LF

## (undated) DEVICE — VESSEL LOOPS X-RAY DETECTABLE: Brand: DEROYAL

## (undated) DEVICE — LIGACLIP MCA MULTIPLE CLIP APPLIERS, 20 MEDIUM CLIPS: Brand: LIGACLIP

## (undated) DEVICE — ENSEAL 20 CM SHAFT, LARGE JAW: Brand: ENSEAL X1

## (undated) DEVICE — DRAIN SPONGES,6 PLY: Brand: EXCILON

## (undated) DEVICE — SUT MONOCRYL 3-0 RB-1 27 IN Y305H

## (undated) DEVICE — SUT MONOCRYL 4-0 PS-2 18 IN Y496G

## (undated) DEVICE — TUBING SUCTION 5MM X 12 FT

## (undated) DEVICE — TROCAR: Brand: KII FIOS FIRST ENTRY

## (undated) DEVICE — ACCESS PLATFORM FOR MINIMALLY INVASIVE SURGERY.: Brand: GELPORT® LAPAROSCOPIC  SYSTEM

## (undated) DEVICE — PROGRASP FORCEPS: Brand: ENDOWRIST

## (undated) DEVICE — SUT VICRYL 0 UR-6 27 IN J603H

## (undated) DEVICE — ECHELON CIRCULAR POWERED STAPLER: Brand: ECHELON CIRCULAR

## (undated) DEVICE — CYSTO TUBING TUR Y IRRIGATION

## (undated) DEVICE — NEEDLE 25G X 1 1/2

## (undated) DEVICE — SUT STRATAFIX SPIRAL MONOCRYL PLUS 3-0 RB-1 23CM SXMP1B438

## (undated) DEVICE — INTENDED FOR TISSUE SEPARATION, AND OTHER PROCEDURES THAT REQUIRE A SHARP SURGICAL BLADE TO PUNCTURE OR CUT.: Brand: BARD-PARKER SAFETY BLADES SIZE 11, STERILE

## (undated) DEVICE — SUT PROLENE 2-0 SH 36 IN 8523H

## (undated) DEVICE — PLUMEPEN PRO 10FT

## (undated) DEVICE — SUT PDS II 1 XLH 96 IN LOOPED Z881G

## (undated) DEVICE — STERILE LUBRICATING JELLY, TUBE: Brand: HR LUBRICATING JELLY

## (undated) DEVICE — KIT, BETHLEHEM ROBOTIC PROST: Brand: CARDINAL HEALTH

## (undated) DEVICE — BLADELESS OBTURATOR: Brand: WECK VISTA

## (undated) DEVICE — SUT VICRYL 0 REEL 54 IN J287G

## (undated) DEVICE — IRRIG ENDO FLO TUBING

## (undated) DEVICE — LUBRICANT SURGILUBE PACKETS STERILE 144 X 3 GRAMS

## (undated) DEVICE — Device: Brand: DEFENDO AIR/WATER/SUCTION AND BIOPSY VALVE

## (undated) DEVICE — TIP COVER ACCESSORY

## (undated) DEVICE — ADAPTER URINARY CATH SIMPLIVIA ONGUARD 2

## (undated) DEVICE — TELFA NON-ADHERENT ABSORBENT DRESSING: Brand: TELFA